# Patient Record
Sex: MALE | Race: WHITE | Employment: OTHER | URBAN - METROPOLITAN AREA
[De-identification: names, ages, dates, MRNs, and addresses within clinical notes are randomized per-mention and may not be internally consistent; named-entity substitution may affect disease eponyms.]

---

## 2017-01-23 RX ORDER — CHLORTHALIDONE 50 MG/1
TABLET ORAL
Qty: 90 TABLET | Refills: 0 | Status: SHIPPED | OUTPATIENT
Start: 2017-01-23 | End: 2017-04-21 | Stop reason: SDUPTHER

## 2017-03-17 ENCOUNTER — HOSPITAL ENCOUNTER (OUTPATIENT)
Dept: OTHER | Age: 67
Discharge: HOME | End: 2017-03-17
Attending: UROLOGY | Admitting: UROLOGY

## 2017-03-17 ENCOUNTER — HOSPITAL ENCOUNTER (OUTPATIENT)
Dept: OTHER | Age: 67
Discharge: HOME | End: 2017-03-17
Attending: INTERNAL MEDICINE | Admitting: INTERNAL MEDICINE

## 2017-03-17 LAB
CHOLESTEROL, TOTAL: 207 MG/DL (ref 0–199)
HDLC SERPL-MCNC: 35 MG/DL (ref 40–60)
LDL CHOLESTEROL CALCULATED: 132 MG/DL
PROSTATE SPECIFIC ANTIGEN: 2.68 NG/ML (ref 0–4)
TRIGL SERPL-MCNC: 201 MG/DL (ref 0–150)
VLDLC SERPL CALC-MCNC: 40 MG/DL

## 2017-03-28 ENCOUNTER — OFFICE VISIT (OUTPATIENT)
Dept: CARDIOLOGY CLINIC | Age: 67
End: 2017-03-28

## 2017-03-28 VITALS
BODY MASS INDEX: 32.58 KG/M2 | DIASTOLIC BLOOD PRESSURE: 80 MMHG | SYSTOLIC BLOOD PRESSURE: 134 MMHG | HEIGHT: 75 IN | HEART RATE: 77 BPM | WEIGHT: 262 LBS

## 2017-03-28 DIAGNOSIS — I25.10 ATHEROSCLEROSIS OF NATIVE CORONARY ARTERY OF NATIVE HEART WITHOUT ANGINA PECTORIS: ICD-10-CM

## 2017-03-28 DIAGNOSIS — I10 ESSENTIAL HYPERTENSION, BENIGN: ICD-10-CM

## 2017-03-28 DIAGNOSIS — I48.0 PAROXYSMAL ATRIAL FIBRILLATION (HCC): ICD-10-CM

## 2017-03-28 DIAGNOSIS — E78.49 OTHER HYPERLIPIDEMIA: Primary | ICD-10-CM

## 2017-03-28 PROCEDURE — G8427 DOCREV CUR MEDS BY ELIG CLIN: HCPCS | Performed by: INTERNAL MEDICINE

## 2017-03-28 PROCEDURE — G8417 CALC BMI ABV UP PARAM F/U: HCPCS | Performed by: INTERNAL MEDICINE

## 2017-03-28 PROCEDURE — 1123F ACP DISCUSS/DSCN MKR DOCD: CPT | Performed by: INTERNAL MEDICINE

## 2017-03-28 PROCEDURE — G8598 ASA/ANTIPLAT THER USED: HCPCS | Performed by: INTERNAL MEDICINE

## 2017-03-28 PROCEDURE — 99214 OFFICE O/P EST MOD 30 MIN: CPT | Performed by: INTERNAL MEDICINE

## 2017-03-28 PROCEDURE — G8484 FLU IMMUNIZE NO ADMIN: HCPCS | Performed by: INTERNAL MEDICINE

## 2017-03-28 PROCEDURE — 1036F TOBACCO NON-USER: CPT | Performed by: INTERNAL MEDICINE

## 2017-03-28 PROCEDURE — 4040F PNEUMOC VAC/ADMIN/RCVD: CPT | Performed by: INTERNAL MEDICINE

## 2017-03-28 PROCEDURE — 3017F COLORECTAL CA SCREEN DOC REV: CPT | Performed by: INTERNAL MEDICINE

## 2017-04-14 ENCOUNTER — HOSPITAL ENCOUNTER (OUTPATIENT)
Dept: OTHER | Age: 67
Discharge: HOME | End: 2017-04-14
Attending: FAMILY MEDICINE | Admitting: FAMILY MEDICINE

## 2017-04-14 LAB
A/G RATIO: 1.2 (ref 1.1–2.2)
ALBUMIN SERPL-MCNC: 3.9 G/DL (ref 3.4–5)
ALP BLD-CCNC: 75 U/L (ref 40–129)
ALT SERPL-CCNC: 29 U/L (ref 10–40)
ANION GAP SERPL CALCULATED.3IONS-SCNC: 10 MMOL/L (ref 3–16)
AST SERPL-CCNC: 21 U/L (ref 15–37)
BILIRUB SERPL-MCNC: 0.5 MG/DL (ref 0–1)
BUN BLDV-MCNC: 24 MG/DL (ref 7–20)
CALCIUM SERPL-MCNC: 10.2 MG/DL (ref 8.3–10.6)
CHLORIDE BLD-SCNC: 102 MMOL/L (ref 99–110)
CO2: 31 MMOL/L (ref 21–32)
CREAT SERPL-MCNC: 1 MG/DL (ref 0.8–1.3)
GFR AFRICAN AMERICAN: >60
GFR NON-AFRICAN AMERICAN: >60
GLOBULIN: 3.2 G/DL
GLUCOSE BLD-MCNC: 125 MG/DL (ref 70–99)
POTASSIUM SERPL-SCNC: 4.9 MMOL/L (ref 3.5–5.1)
SODIUM BLD-SCNC: 143 MMOL/L (ref 136–145)
TOTAL PROTEIN: 7.1 G/DL (ref 6.4–8.2)

## 2017-04-15 LAB
ESTIMATED AVERAGE GLUCOSE: 157.1 MG/DL
HBA1C MFR BLD: 7.1 %

## 2017-04-21 RX ORDER — CHLORTHALIDONE 50 MG/1
TABLET ORAL
Qty: 90 TABLET | Refills: 0 | Status: SHIPPED | OUTPATIENT
Start: 2017-04-21 | End: 2017-07-20 | Stop reason: SDUPTHER

## 2017-07-24 RX ORDER — CHLORTHALIDONE 50 MG/1
TABLET ORAL
Qty: 90 TABLET | Refills: 3 | Status: SHIPPED | OUTPATIENT
Start: 2017-07-24 | End: 2018-08-02 | Stop reason: SDUPTHER

## 2017-08-23 ENCOUNTER — HOSPITAL ENCOUNTER (OUTPATIENT)
Dept: OTHER | Age: 67
Discharge: HOME | End: 2017-08-23
Attending: FAMILY MEDICINE | Admitting: FAMILY MEDICINE

## 2017-08-23 ENCOUNTER — HOSPITAL ENCOUNTER (OUTPATIENT)
Dept: OTHER | Age: 67
Discharge: HOME | End: 2017-08-23
Attending: INTERNAL MEDICINE | Admitting: INTERNAL MEDICINE

## 2017-08-23 DIAGNOSIS — E78.5 HYPERLIPIDEMIA, UNSPECIFIED: ICD-10-CM

## 2017-08-23 LAB
A/G RATIO: 1.3 (ref 1.1–2.2)
ALBUMIN SERPL-MCNC: 4.1 G/DL (ref 3.4–5)
ALP BLD-CCNC: 78 U/L (ref 40–129)
ALT SERPL-CCNC: 34 U/L (ref 10–40)
ANION GAP SERPL CALCULATED.3IONS-SCNC: 11 MMOL/L (ref 3–16)
AST SERPL-CCNC: 24 U/L (ref 15–37)
BILIRUB SERPL-MCNC: 0.6 MG/DL (ref 0–1)
BUN BLDV-MCNC: 23 MG/DL (ref 7–20)
CALCIUM SERPL-MCNC: 10 MG/DL (ref 8.3–10.6)
CHLORIDE BLD-SCNC: 97 MMOL/L (ref 99–110)
CHOLESTEROL, TOTAL: 140 MG/DL (ref 0–199)
CO2: 30 MMOL/L (ref 21–32)
CREAT SERPL-MCNC: 1 MG/DL (ref 0.8–1.3)
ESTIMATED AVERAGE GLUCOSE: 165.7 MG/DL
GFR AFRICAN AMERICAN: >60
GFR NON-AFRICAN AMERICAN: >60
GLOBULIN: 3.1 G/DL
GLUCOSE BLD-MCNC: 159 MG/DL (ref 70–99)
HBA1C MFR BLD: 7.4 %
HDLC SERPL-MCNC: 30 MG/DL (ref 40–60)
LDL CHOLESTEROL CALCULATED: 71 MG/DL
POTASSIUM SERPL-SCNC: 4.7 MMOL/L (ref 3.5–5.1)
SODIUM BLD-SCNC: 138 MMOL/L (ref 136–145)
TOTAL PROTEIN: 7.2 G/DL (ref 6.4–8.2)
TRIGL SERPL-MCNC: 193 MG/DL (ref 0–150)
VLDLC SERPL CALC-MCNC: 39 MG/DL

## 2017-08-25 ENCOUNTER — TELEPHONE (OUTPATIENT)
Dept: CARDIOLOGY CLINIC | Age: 67
End: 2017-08-25

## 2017-08-28 ENCOUNTER — OFFICE VISIT (OUTPATIENT)
Dept: CARDIOLOGY CLINIC | Age: 67
End: 2017-08-28

## 2017-08-28 VITALS
HEART RATE: 83 BPM | DIASTOLIC BLOOD PRESSURE: 86 MMHG | WEIGHT: 261 LBS | HEIGHT: 75 IN | SYSTOLIC BLOOD PRESSURE: 147 MMHG | BODY MASS INDEX: 32.45 KG/M2

## 2017-08-28 DIAGNOSIS — I10 ESSENTIAL HYPERTENSION, BENIGN: ICD-10-CM

## 2017-08-28 DIAGNOSIS — I25.10 ATHEROSCLEROSIS OF NATIVE CORONARY ARTERY OF NATIVE HEART WITHOUT ANGINA PECTORIS: Primary | ICD-10-CM

## 2017-08-28 DIAGNOSIS — E78.5 HYPERLIPIDEMIA, UNSPECIFIED HYPERLIPIDEMIA TYPE: ICD-10-CM

## 2017-08-28 PROCEDURE — 4040F PNEUMOC VAC/ADMIN/RCVD: CPT | Performed by: INTERNAL MEDICINE

## 2017-08-28 PROCEDURE — G8427 DOCREV CUR MEDS BY ELIG CLIN: HCPCS | Performed by: INTERNAL MEDICINE

## 2017-08-28 PROCEDURE — 3017F COLORECTAL CA SCREEN DOC REV: CPT | Performed by: INTERNAL MEDICINE

## 2017-08-28 PROCEDURE — G8417 CALC BMI ABV UP PARAM F/U: HCPCS | Performed by: INTERNAL MEDICINE

## 2017-08-28 PROCEDURE — G8598 ASA/ANTIPLAT THER USED: HCPCS | Performed by: INTERNAL MEDICINE

## 2017-08-28 PROCEDURE — 1123F ACP DISCUSS/DSCN MKR DOCD: CPT | Performed by: INTERNAL MEDICINE

## 2017-08-28 PROCEDURE — 1036F TOBACCO NON-USER: CPT | Performed by: INTERNAL MEDICINE

## 2017-08-28 PROCEDURE — 99213 OFFICE O/P EST LOW 20 MIN: CPT | Performed by: INTERNAL MEDICINE

## 2017-08-28 RX ORDER — SOTALOL HYDROCHLORIDE 80 MG/1
80 TABLET ORAL 2 TIMES DAILY
Qty: 180 TABLET | Refills: 3 | Status: SHIPPED | OUTPATIENT
Start: 2017-08-28 | End: 2018-09-09 | Stop reason: SDUPTHER

## 2018-03-12 ENCOUNTER — HOSPITAL ENCOUNTER (OUTPATIENT)
Dept: OTHER | Age: 68
Discharge: HOME | End: 2018-03-12
Attending: FAMILY MEDICINE | Admitting: FAMILY MEDICINE

## 2018-03-12 ENCOUNTER — HOSPITAL ENCOUNTER (OUTPATIENT)
Dept: OTHER | Age: 68
Discharge: HOME | End: 2018-03-12
Attending: UROLOGY | Admitting: UROLOGY

## 2018-03-12 LAB
A/G RATIO: 1.5 (ref 1.1–2.2)
ALBUMIN SERPL-MCNC: 4.4 G/DL (ref 3.4–5)
ALP BLD-CCNC: 80 U/L (ref 40–129)
ALT SERPL-CCNC: 36 U/L (ref 10–40)
ANION GAP SERPL CALCULATED.3IONS-SCNC: 13 MMOL/L (ref 3–16)
AST SERPL-CCNC: 29 U/L (ref 15–37)
BILIRUB SERPL-MCNC: 0.5 MG/DL (ref 0–1)
BUN BLDV-MCNC: 26 MG/DL (ref 7–20)
CALCIUM SERPL-MCNC: 9.8 MG/DL (ref 8.3–10.6)
CHLORIDE BLD-SCNC: 101 MMOL/L (ref 99–110)
CO2: 31 MMOL/L (ref 21–32)
CREAT SERPL-MCNC: 1.1 MG/DL (ref 0.8–1.3)
ESTIMATED AVERAGE GLUCOSE: 174.3 MG/DL
GFR AFRICAN AMERICAN: >60
GFR NON-AFRICAN AMERICAN: >60
GLOBULIN: 3 G/DL
GLUCOSE BLD-MCNC: 152 MG/DL (ref 70–99)
HBA1C MFR BLD: 7.7 %
POTASSIUM SERPL-SCNC: 4.7 MMOL/L (ref 3.5–5.1)
PROSTATE SPECIFIC ANTIGEN: 2.85 NG/ML (ref 0–4)
SODIUM BLD-SCNC: 145 MMOL/L (ref 136–145)
TOTAL PROTEIN: 7.4 G/DL (ref 6.4–8.2)

## 2018-03-19 ENCOUNTER — TELEPHONE (OUTPATIENT)
Dept: CARDIOLOGY CLINIC | Age: 68
End: 2018-03-19

## 2018-03-19 NOTE — TELEPHONE ENCOUNTER
Pt calling is having cataract surgery on 04/12/18 and needs to know how many days prior he needs to stop the Xarelto?  Pls call to advise Thank you

## 2018-05-17 ENCOUNTER — OFFICE VISIT (OUTPATIENT)
Dept: CARDIOLOGY CLINIC | Age: 68
End: 2018-05-17

## 2018-05-17 VITALS
SYSTOLIC BLOOD PRESSURE: 124 MMHG | HEIGHT: 75 IN | BODY MASS INDEX: 31.83 KG/M2 | HEART RATE: 80 BPM | DIASTOLIC BLOOD PRESSURE: 78 MMHG | WEIGHT: 256 LBS

## 2018-05-17 DIAGNOSIS — I25.10 ATHEROSCLEROSIS OF NATIVE CORONARY ARTERY OF NATIVE HEART WITHOUT ANGINA PECTORIS: ICD-10-CM

## 2018-05-17 DIAGNOSIS — I10 ESSENTIAL HYPERTENSION, BENIGN: ICD-10-CM

## 2018-05-17 DIAGNOSIS — I48.0 PAROXYSMAL ATRIAL FIBRILLATION (HCC): Primary | ICD-10-CM

## 2018-05-17 DIAGNOSIS — E78.5 HYPERLIPIDEMIA, UNSPECIFIED HYPERLIPIDEMIA TYPE: ICD-10-CM

## 2018-05-17 PROCEDURE — 3017F COLORECTAL CA SCREEN DOC REV: CPT | Performed by: INTERNAL MEDICINE

## 2018-05-17 PROCEDURE — G8417 CALC BMI ABV UP PARAM F/U: HCPCS | Performed by: INTERNAL MEDICINE

## 2018-05-17 PROCEDURE — 4040F PNEUMOC VAC/ADMIN/RCVD: CPT | Performed by: INTERNAL MEDICINE

## 2018-05-17 PROCEDURE — 1123F ACP DISCUSS/DSCN MKR DOCD: CPT | Performed by: INTERNAL MEDICINE

## 2018-05-17 PROCEDURE — 1036F TOBACCO NON-USER: CPT | Performed by: INTERNAL MEDICINE

## 2018-05-17 PROCEDURE — 99214 OFFICE O/P EST MOD 30 MIN: CPT | Performed by: INTERNAL MEDICINE

## 2018-05-17 PROCEDURE — G8427 DOCREV CUR MEDS BY ELIG CLIN: HCPCS | Performed by: INTERNAL MEDICINE

## 2018-05-17 PROCEDURE — G8598 ASA/ANTIPLAT THER USED: HCPCS | Performed by: INTERNAL MEDICINE

## 2018-05-21 ENCOUNTER — NURSE ONLY (OUTPATIENT)
Dept: CARDIOLOGY CLINIC | Age: 68
End: 2018-05-21

## 2018-05-21 DIAGNOSIS — I48.0 PAROXYSMAL ATRIAL FIBRILLATION (HCC): Primary | ICD-10-CM

## 2018-05-21 PROCEDURE — 93225 XTRNL ECG REC<48 HRS REC: CPT | Performed by: INTERNAL MEDICINE

## 2018-05-23 PROCEDURE — 93227 XTRNL ECG REC<48 HR R&I: CPT | Performed by: INTERNAL MEDICINE

## 2018-05-24 ENCOUNTER — TELEPHONE (OUTPATIENT)
Dept: CARDIOLOGY CLINIC | Age: 68
End: 2018-05-24

## 2018-05-25 RX ORDER — ROSUVASTATIN CALCIUM 10 MG/1
10 TABLET, COATED ORAL EVERY OTHER DAY
Qty: 90 TABLET | Refills: 3 | Status: SHIPPED | OUTPATIENT
Start: 2018-05-25 | End: 2019-09-09 | Stop reason: SDUPTHER

## 2018-06-01 ENCOUNTER — TELEPHONE (OUTPATIENT)
Dept: CARDIOLOGY CLINIC | Age: 68
End: 2018-06-01

## 2018-08-02 RX ORDER — CHLORTHALIDONE 50 MG/1
TABLET ORAL
Qty: 90 TABLET | Refills: 1 | Status: SHIPPED | OUTPATIENT
Start: 2018-08-02 | End: 2021-05-13 | Stop reason: SDUPTHER

## 2018-09-10 ENCOUNTER — TELEPHONE (OUTPATIENT)
Dept: CARDIOLOGY CLINIC | Age: 68
End: 2018-09-10

## 2018-09-10 NOTE — TELEPHONE ENCOUNTER
RX APPROVAL:      Refill:   Requested Prescriptions     Pending Prescriptions Disp Refills    sotalol (BETAPACE) 80 MG tablet [Pharmacy Med Name: SOTALOL HCL TABS 80MG] 180 tablet 3     Sig: TAKE 1 TABLET TWICE A DAY      Last OV: 2018   Last EK16  Last Labs:   Lab Results   Component Value Date    GLUCOSE 152 2018    BUN 26 2018    CREATININE 1.1 2018    LABGLOM >60 2018     2018    K 4.7 2018     2018    CO2 31 2018    CALCIUM 9.8 2018     Lab Results   Component Value Date     2018     2018    CO2 31 2018    ANIONGAP 13 2018    GLUCOSE 152 2018    BUN 26 2018    CREATININE 1.1 2018    LABGLOM >60 2018    GFRAA >60 2018    GFRAA >60 2013    CALCIUM 9.8 2018    PROT 7.4 2018    LABALBU 4.4 2018    BILITOT 0.5 2018    ALKPHOS 80 2018    AST 29 2018    ALT 36 2018    GLOB 3.0 2018     Lab Results   Component Value Date    ALT 36 2018    AST 29 2018     Lab Results   Component Value Date    K 4.7 2018       Plan and labs reviewed

## 2018-09-10 NOTE — TELEPHONE ENCOUNTER
Called  office lmom to return our call and let us know patients most recent lab results and fax to our office.

## 2018-09-11 RX ORDER — SOTALOL HYDROCHLORIDE 80 MG/1
TABLET ORAL
Qty: 180 TABLET | Refills: 0 | Status: SHIPPED | OUTPATIENT
Start: 2018-09-11 | End: 2019-01-12 | Stop reason: SDUPTHER

## 2018-09-14 ENCOUNTER — HOSPITAL ENCOUNTER (OUTPATIENT)
Dept: OTHER | Age: 68
Discharge: HOME | End: 2018-09-14

## 2018-09-14 LAB
A/G RATIO: 1.4 (ref 1.1–2.2)
ALBUMIN SERPL-MCNC: 4.3 G/DL (ref 3.4–5)
ALP BLD-CCNC: 69 U/L (ref 40–129)
ALT SERPL-CCNC: 24 U/L (ref 10–40)
ANION GAP SERPL CALCULATED.3IONS-SCNC: 13 MMOL/L (ref 3–16)
AST SERPL-CCNC: 20 U/L (ref 15–37)
BILIRUB SERPL-MCNC: 0.4 MG/DL (ref 0–1)
BUN BLDV-MCNC: 29 MG/DL (ref 7–20)
CALCIUM SERPL-MCNC: 10.1 MG/DL (ref 8.3–10.6)
CHLORIDE BLD-SCNC: 98 MMOL/L (ref 99–110)
CHOLESTEROL, TOTAL: 125 MG/DL (ref 0–199)
CO2: 31 MMOL/L (ref 21–32)
CREAT SERPL-MCNC: 1.4 MG/DL (ref 0.8–1.3)
CREATININE URINE: 139.8 MG/DL (ref 39–259)
GFR AFRICAN AMERICAN: >60
GFR NON-AFRICAN AMERICAN: 50
GLOBULIN: 3.1 G/DL
GLUCOSE BLD-MCNC: 112 MG/DL (ref 70–99)
HDLC SERPL-MCNC: 29 MG/DL (ref 40–60)
HEPATITIS C ANTIBODY INTERPRETATION: NORMAL
LDL CHOLESTEROL CALCULATED: 62 MG/DL
MICROALBUMIN UR-MCNC: <1.2 MG/DL
MICROALBUMIN/CREAT UR-RTO: NORMAL MG/G (ref 0–30)
POTASSIUM SERPL-SCNC: 4.5 MMOL/L (ref 3.5–5.1)
SODIUM BLD-SCNC: 142 MMOL/L (ref 136–145)
T4 TOTAL: 7.1 UG/DL (ref 4.5–10.9)
TOTAL PROTEIN: 7.4 G/DL (ref 6.4–8.2)
TRIGL SERPL-MCNC: 170 MG/DL (ref 0–150)
TSH SERPL DL<=0.05 MIU/L-ACNC: 5.07 UIU/ML (ref 0.27–4.2)
VITAMIN B-12: 385 PG/ML (ref 211–911)
VLDLC SERPL CALC-MCNC: 34 MG/DL

## 2018-09-18 ENCOUNTER — TELEPHONE (OUTPATIENT)
Dept: CARDIOLOGY CLINIC | Age: 68
End: 2018-09-18

## 2018-09-18 NOTE — TELEPHONE ENCOUNTER
Assessment:  1. Paroxysmal atrial fibrillation (HCC)   ~regular to auscultation  ~sotalol / Xarelto   ~CHADsVASc 4    ~has not been aware of heart going out of rhythm  ~will wear 24 hr holter, if no afib, then will stop Xarelto and start ASA    2. Essential hypertension, benign: Stable   ~/78 (Site: Left Arm, Position: Sitting, Cuff Size: Medium Adult)   Pulse 80   Ht 6' 3\" (1.905 m)   Wt 256 lb (116.1 kg)   BMI 32.00 kg/m²   stable    3. Atherosclerosis of native coronary artery of native heart without angina pectoris: Stable, no anginal symptoms   ~Cardiac cath: '06>  mild non-obst disease: PDA/PLV bifurcation 30%. 3/2013 stress test showed no evidence of ischemia or scar    4. Hyperlipidemia:  On crestor 10 every other day 8/17   hdl 30 ldl 71    Plan:  Ada Larsen has a stable cardiac status. 1. Continue all medications for now  2. 24 holter hr monitor, if no afib will consider taking him off of Xarelto and placing him on ASA  3.  Follow up in 6 months with EKG    Thank you for allowing to us to participate in the care of Beatris Gallagher M.D., [facility] [place]

## 2018-12-04 ENCOUNTER — OFFICE VISIT (OUTPATIENT)
Dept: CARDIOLOGY CLINIC | Age: 68
End: 2018-12-04
Payer: MEDICARE

## 2018-12-04 VITALS
BODY MASS INDEX: 31.95 KG/M2 | DIASTOLIC BLOOD PRESSURE: 70 MMHG | SYSTOLIC BLOOD PRESSURE: 118 MMHG | HEIGHT: 75 IN | WEIGHT: 257 LBS

## 2018-12-04 DIAGNOSIS — I25.10 ATHEROSCLEROSIS OF NATIVE CORONARY ARTERY OF NATIVE HEART WITHOUT ANGINA PECTORIS: Primary | ICD-10-CM

## 2018-12-04 DIAGNOSIS — E78.49 OTHER HYPERLIPIDEMIA: ICD-10-CM

## 2018-12-04 DIAGNOSIS — I48.0 PAROXYSMAL ATRIAL FIBRILLATION (HCC): ICD-10-CM

## 2018-12-04 DIAGNOSIS — I10 ESSENTIAL HYPERTENSION, BENIGN: ICD-10-CM

## 2018-12-04 PROCEDURE — G8417 CALC BMI ABV UP PARAM F/U: HCPCS | Performed by: INTERNAL MEDICINE

## 2018-12-04 PROCEDURE — 99214 OFFICE O/P EST MOD 30 MIN: CPT | Performed by: INTERNAL MEDICINE

## 2018-12-04 PROCEDURE — G8484 FLU IMMUNIZE NO ADMIN: HCPCS | Performed by: INTERNAL MEDICINE

## 2018-12-04 PROCEDURE — 1036F TOBACCO NON-USER: CPT | Performed by: INTERNAL MEDICINE

## 2018-12-04 PROCEDURE — 1123F ACP DISCUSS/DSCN MKR DOCD: CPT | Performed by: INTERNAL MEDICINE

## 2018-12-04 PROCEDURE — 1101F PT FALLS ASSESS-DOCD LE1/YR: CPT | Performed by: INTERNAL MEDICINE

## 2018-12-04 PROCEDURE — 93000 ELECTROCARDIOGRAM COMPLETE: CPT | Performed by: INTERNAL MEDICINE

## 2018-12-04 PROCEDURE — G8598 ASA/ANTIPLAT THER USED: HCPCS | Performed by: INTERNAL MEDICINE

## 2018-12-04 PROCEDURE — G8427 DOCREV CUR MEDS BY ELIG CLIN: HCPCS | Performed by: INTERNAL MEDICINE

## 2018-12-04 PROCEDURE — 3017F COLORECTAL CA SCREEN DOC REV: CPT | Performed by: INTERNAL MEDICINE

## 2018-12-04 PROCEDURE — 4040F PNEUMOC VAC/ADMIN/RCVD: CPT | Performed by: INTERNAL MEDICINE

## 2018-12-06 ENCOUNTER — HOSPITAL ENCOUNTER (OUTPATIENT)
Age: 68
Discharge: HOME | End: 2018-12-06
Payer: MEDICARE

## 2018-12-06 LAB
ESTIMATED AVERAGE GLUCOSE: 168.6 MG/DL
HBA1C MFR BLD: 7.5 %

## 2018-12-06 PROCEDURE — 36415 COLL VENOUS BLD VENIPUNCTURE: CPT

## 2018-12-06 PROCEDURE — 83036 HEMOGLOBIN GLYCOSYLATED A1C: CPT

## 2019-01-14 RX ORDER — SOTALOL HYDROCHLORIDE 80 MG/1
TABLET ORAL
Qty: 180 TABLET | Refills: 2 | Status: SHIPPED | OUTPATIENT
Start: 2019-01-14 | End: 2019-11-05 | Stop reason: SDUPTHER

## 2019-05-21 NOTE — PROGRESS NOTES
BILITOT 0.4 09/14/2018     Lab Results   Component Value Date    CREATININE 1.4 (H) 09/14/2018    BUN 29 (H) 09/14/2018     09/14/2018    K 4.5 09/14/2018    CL 98 (L) 09/14/2018    CO2 31 09/14/2018     Lab Results   Component Value Date    TSH 5.07 (H) 09/14/2018    D4GRBRI 7.1 09/14/2018     Lab Results   Component Value Date    WBC 7.5 12/15/2016    HGB 13.5 12/15/2016    HCT 39.7 (L) 12/15/2016    MCV 92.9 12/15/2016     12/15/2016     No components found for: CHLPL  Lab Results   Component Value Date    TRIG 170 (H) 09/14/2018    TRIG 193 (H) 08/23/2017    TRIG 201 (H) 03/17/2017     Lab Results   Component Value Date    HDL 29 (L) 09/14/2018    HDL 30 (L) 08/23/2017    HDL 35 (L) 03/17/2017     Lab Results   Component Value Date    LDLCALC 62 09/14/2018    LDLCALC 71 08/23/2017    LDLCALC 132 (H) 03/17/2017     Lab Results   Component Value Date    LABVLDL 34 09/14/2018    LABVLDL 39 08/23/2017    LABVLDL 40 03/17/2017     Radiology Review:  Pertinent images / reports were reviewed as a part of this visit and reveals the following:    Echo 8/12/15:  Summary  -Technically limited study due to lung interface.  -Normal left ventricular wall thickness.  -Left ventricular size is mildly increased .  -Left ventricular function is borderline with ejection fraction estimated at 50 %. -There is reversal of E/A inflow velocities across the mitral valve  suggesting impaired left ventricular relaxation.  -Abnormal (paradoxical) septal motion is present 2 to CLBBB. -Mild mitral regurgitation is present.  -The aortic root is dilated. Nuclear stress test March 2013:  Summary   There is normal isotope uptake at stress and rest. There is no evidence of   myocardial ischemia or scar. Normal LV function. Stress Protocols      Resting ECG   Normal sinus rhythm. Left bundle branch block.       Resting HR:54 bpm Resting BP:152/70 mmHg      Stress Protocol:Pharmacologic - Lexiscan's      Peak HR:63 bpm HR/BP product:9387   Peak BP:149/68 mmHg   Predicted HR: 156 bpm   % of predicted HR: 40   Test duration: 4 min   Reason for termination:Completed      ECG Findings   Normal sinus rhythm. Cardiac cath 2006: mild non-obst disease: PDA/PLV bifurcation 30%    Assessment:  1. Atherosclerosis of native coronary artery of native heart without angina pectoris: Stable, no anginal symptoms   Cardiac cath 2006> Mild non-obst disease -- PDA/PLV bifurcation 30%. Nuclear stress test 2013> No evidence of ischemia or scar   2. Essential hypertension, benign: Stable   /79 (Site: Right Upper Arm, Position: Sitting, Cuff Size: Medium Adult)   Pulse 67   Ht 6' 3\" (1.905 m)   Wt 252 lb (114.3 kg)   BMI 31.50 kg/m²     3. Hyperlipidemia: Stable on Crestor 10mg every other day. 9/14/18> , , HDL 29, LDL 62.   4.      Paroxysmal atrial fibrillation with CHADsVASc 4: HR regular & controlled. He has not been aware of heart going out of rhythm. Xarelto stopped June 2018 due to no A.fib on Holter. Started baby aspirin at that time. Remains on Betapace. 24 hr Holter monitor 5/21/18> NSR w/ 1st deg AV block & 2nd deg AV block Type I, hr 63, ranges , no episodes of AF noted. Plan:  Noman Cortes has a stable cardiac status. 1. No med changes. 2. Will continue with risk factor modifications. 3. Return for regular follow up in 6 months. Thank you for allowing to us to participate in the care of Carlos Eduardo Buckley M.D., [address] attestation: This note was scribed in the presence of Dr. Park Mooney MD, by Evgeny Lucas RN. The scribe's documentation has been prepared under my direction and personally reviewed by me in its entirety. I confirm that the note above accurately reflects all work, treatment, procedures, and medical decision making performed by me.

## 2019-05-28 ENCOUNTER — OFFICE VISIT (OUTPATIENT)
Dept: CARDIOLOGY CLINIC | Age: 69
End: 2019-05-28
Payer: MEDICARE

## 2019-05-28 VITALS
BODY MASS INDEX: 31.33 KG/M2 | DIASTOLIC BLOOD PRESSURE: 79 MMHG | SYSTOLIC BLOOD PRESSURE: 129 MMHG | HEIGHT: 75 IN | WEIGHT: 252 LBS | HEART RATE: 67 BPM

## 2019-05-28 DIAGNOSIS — I10 ESSENTIAL HYPERTENSION, BENIGN: ICD-10-CM

## 2019-05-28 DIAGNOSIS — E78.49 OTHER HYPERLIPIDEMIA: ICD-10-CM

## 2019-05-28 DIAGNOSIS — I48.0 PAROXYSMAL ATRIAL FIBRILLATION (HCC): ICD-10-CM

## 2019-05-28 DIAGNOSIS — I25.10 ATHEROSCLEROSIS OF NATIVE CORONARY ARTERY OF NATIVE HEART WITHOUT ANGINA PECTORIS: Primary | ICD-10-CM

## 2019-05-28 PROCEDURE — 4040F PNEUMOC VAC/ADMIN/RCVD: CPT | Performed by: INTERNAL MEDICINE

## 2019-05-28 PROCEDURE — 1036F TOBACCO NON-USER: CPT | Performed by: INTERNAL MEDICINE

## 2019-05-28 PROCEDURE — 1123F ACP DISCUSS/DSCN MKR DOCD: CPT | Performed by: INTERNAL MEDICINE

## 2019-05-28 PROCEDURE — 3017F COLORECTAL CA SCREEN DOC REV: CPT | Performed by: INTERNAL MEDICINE

## 2019-05-28 PROCEDURE — G8427 DOCREV CUR MEDS BY ELIG CLIN: HCPCS | Performed by: INTERNAL MEDICINE

## 2019-05-28 PROCEDURE — G8417 CALC BMI ABV UP PARAM F/U: HCPCS | Performed by: INTERNAL MEDICINE

## 2019-05-28 PROCEDURE — 99213 OFFICE O/P EST LOW 20 MIN: CPT | Performed by: INTERNAL MEDICINE

## 2019-05-28 PROCEDURE — G8598 ASA/ANTIPLAT THER USED: HCPCS | Performed by: INTERNAL MEDICINE

## 2019-09-05 ENCOUNTER — HOSPITAL ENCOUNTER (OUTPATIENT)
Age: 69
Discharge: HOME | End: 2019-09-05
Payer: MEDICARE

## 2019-09-05 LAB
A/G RATIO: 1.5 (ref 1.1–2.2)
ALBUMIN SERPL-MCNC: 4.3 G/DL (ref 3.4–5)
ALP BLD-CCNC: 71 U/L (ref 40–129)
ALT SERPL-CCNC: 29 U/L (ref 10–40)
ANION GAP SERPL CALCULATED.3IONS-SCNC: 13 MMOL/L (ref 3–16)
AST SERPL-CCNC: 24 U/L (ref 15–37)
BILIRUB SERPL-MCNC: 0.4 MG/DL (ref 0–1)
BUN BLDV-MCNC: 29 MG/DL (ref 7–20)
CALCIUM SERPL-MCNC: 10.3 MG/DL (ref 8.3–10.6)
CHLORIDE BLD-SCNC: 100 MMOL/L (ref 99–110)
CHOLESTEROL, TOTAL: 142 MG/DL (ref 0–199)
CO2: 30 MMOL/L (ref 21–32)
CREAT SERPL-MCNC: 1.4 MG/DL (ref 0.8–1.3)
CREATININE URINE: 110.2 MG/DL (ref 39–259)
GFR AFRICAN AMERICAN: >60
GFR NON-AFRICAN AMERICAN: 50
GLOBULIN: 2.9 G/DL
GLUCOSE BLD-MCNC: 97 MG/DL (ref 70–99)
HDLC SERPL-MCNC: 32 MG/DL (ref 40–60)
LDL CHOLESTEROL CALCULATED: 75 MG/DL
MICROALBUMIN UR-MCNC: <1.2 MG/DL
MICROALBUMIN/CREAT UR-RTO: NORMAL MG/G (ref 0–30)
POTASSIUM SERPL-SCNC: 4.3 MMOL/L (ref 3.5–5.1)
PROSTATE SPECIFIC ANTIGEN: 3.29 NG/ML (ref 0–4)
SODIUM BLD-SCNC: 143 MMOL/L (ref 136–145)
TOTAL PROTEIN: 7.2 G/DL (ref 6.4–8.2)
TRIGL SERPL-MCNC: 173 MG/DL (ref 0–150)
VLDLC SERPL CALC-MCNC: 35 MG/DL

## 2019-09-05 PROCEDURE — 83036 HEMOGLOBIN GLYCOSYLATED A1C: CPT

## 2019-09-05 PROCEDURE — 82570 ASSAY OF URINE CREATININE: CPT

## 2019-09-05 PROCEDURE — 82043 UR ALBUMIN QUANTITATIVE: CPT

## 2019-09-05 PROCEDURE — 80053 COMPREHEN METABOLIC PANEL: CPT

## 2019-09-05 PROCEDURE — 80061 LIPID PANEL: CPT

## 2019-09-05 PROCEDURE — 36415 COLL VENOUS BLD VENIPUNCTURE: CPT

## 2019-09-05 PROCEDURE — 84153 ASSAY OF PSA TOTAL: CPT

## 2019-09-06 LAB
ESTIMATED AVERAGE GLUCOSE: 154.2 MG/DL
HBA1C MFR BLD: 7 %

## 2019-09-09 RX ORDER — ROSUVASTATIN CALCIUM 10 MG/1
TABLET, COATED ORAL
Qty: 90 TABLET | Refills: 4 | Status: SHIPPED | OUTPATIENT
Start: 2019-09-09 | End: 2020-09-21

## 2019-11-05 ENCOUNTER — OFFICE VISIT (OUTPATIENT)
Dept: CARDIOLOGY CLINIC | Age: 69
End: 2019-11-05
Payer: MEDICARE

## 2019-11-05 VITALS
HEART RATE: 68 BPM | WEIGHT: 238 LBS | HEIGHT: 75 IN | BODY MASS INDEX: 29.59 KG/M2 | DIASTOLIC BLOOD PRESSURE: 60 MMHG | SYSTOLIC BLOOD PRESSURE: 110 MMHG

## 2019-11-05 DIAGNOSIS — I48.0 PAROXYSMAL ATRIAL FIBRILLATION (HCC): ICD-10-CM

## 2019-11-05 DIAGNOSIS — E78.49 OTHER HYPERLIPIDEMIA: ICD-10-CM

## 2019-11-05 DIAGNOSIS — I10 ESSENTIAL HYPERTENSION, BENIGN: ICD-10-CM

## 2019-11-05 DIAGNOSIS — I25.10 ATHEROSCLEROSIS OF NATIVE CORONARY ARTERY OF NATIVE HEART WITHOUT ANGINA PECTORIS: Primary | ICD-10-CM

## 2019-11-05 PROCEDURE — 3017F COLORECTAL CA SCREEN DOC REV: CPT | Performed by: INTERNAL MEDICINE

## 2019-11-05 PROCEDURE — 1123F ACP DISCUSS/DSCN MKR DOCD: CPT | Performed by: INTERNAL MEDICINE

## 2019-11-05 PROCEDURE — G8417 CALC BMI ABV UP PARAM F/U: HCPCS | Performed by: INTERNAL MEDICINE

## 2019-11-05 PROCEDURE — 1036F TOBACCO NON-USER: CPT | Performed by: INTERNAL MEDICINE

## 2019-11-05 PROCEDURE — 99214 OFFICE O/P EST MOD 30 MIN: CPT | Performed by: INTERNAL MEDICINE

## 2019-11-05 PROCEDURE — G8598 ASA/ANTIPLAT THER USED: HCPCS | Performed by: INTERNAL MEDICINE

## 2019-11-05 PROCEDURE — G8427 DOCREV CUR MEDS BY ELIG CLIN: HCPCS | Performed by: INTERNAL MEDICINE

## 2019-11-05 PROCEDURE — G8484 FLU IMMUNIZE NO ADMIN: HCPCS | Performed by: INTERNAL MEDICINE

## 2019-11-05 PROCEDURE — 4040F PNEUMOC VAC/ADMIN/RCVD: CPT | Performed by: INTERNAL MEDICINE

## 2019-11-05 RX ORDER — ROSUVASTATIN CALCIUM 10 MG/1
10 TABLET, COATED ORAL DAILY
Qty: 90 TABLET | Refills: 3 | Status: CANCELLED | OUTPATIENT
Start: 2019-11-05

## 2019-11-05 RX ORDER — SOTALOL HYDROCHLORIDE 80 MG/1
80 TABLET ORAL 2 TIMES DAILY
Qty: 180 TABLET | Refills: 3 | Status: SHIPPED | OUTPATIENT
Start: 2019-11-05 | End: 2020-11-10 | Stop reason: SDUPTHER

## 2020-05-21 ENCOUNTER — HOSPITAL ENCOUNTER (OUTPATIENT)
Age: 70
Discharge: HOME | End: 2020-05-21
Payer: MEDICARE

## 2020-05-21 LAB — PROSTATE SPECIFIC ANTIGEN: 3.33 NG/ML (ref 0–4)

## 2020-05-21 PROCEDURE — 36415 COLL VENOUS BLD VENIPUNCTURE: CPT

## 2020-05-21 PROCEDURE — 84153 ASSAY OF PSA TOTAL: CPT

## 2020-05-21 NOTE — PROGRESS NOTES
[address]     Outpatient Follow Up Note      Chief Complaint   Patient presents with    6 Month Follow-Up     Pt denies cardiac SX.  Coronary Artery Disease    Atrial Fibrillation     paroxsymal    Hypertension     HPI:  Mr. Laquita Lauren 69 y.o. male with a history of  CAD, hypertension, hyperlipidemia, PAF. He wore a Holter monitor May 2018 which showed no a. Fib; his Xarelto was stopped and baby aspirin started. Today, he reports he feels ok. He has recently found out hi wife has breast cancer. He denies exertional chest pain, palpitations, light-headedness, edema. He is somewhat active, does not smoke. Past Medical History:   Diagnosis Date    Atrial fibrillation ([facility] [address]     Diabetes mellitus ([facility] [address]     Hyperlipidemia     Hypertension      Social History:    Social History     Tobacco Use   Smoking Status Never Smoker   Smokeless Tobacco Never Used     Current Medications:  Current Outpatient Medications   Medication Sig Dispense Refill    sotalol (BETAPACE) 80 MG tablet Take 1 tablet by mouth 2 times daily 180 tablet 3    rosuvastatin (CRESTOR) 10 MG tablet TAKE 1 TABLET EVERY OTHER DAY 90 tablet 4    chlorthalidone (HYGROTEN) 50 MG tablet TAKE 1 TABLET DAILY 90 tablet 1    aspirin 81 MG tablet Take 81 mg by mouth daily      losartan (COZAAR) 50 MG tablet Take 100 mg by mouth daily      insulin lispro (HUMALOG) 100 UNIT/ML injection cartridge Inject into the skin 3 times daily (before meals)      terazosin (HYTRIN) 10 MG capsule Take 10 mg by mouth nightly      glimepiride (AMARYL) 4 MG tablet Take 4 mg by mouth every morning (before breakfast).  omeprazole (PRILOSEC) 20 MG capsule Take 20 mg by mouth Five times weekly       metformin (GLUCOPHAGE) 1000 MG tablet Take 1,000 mg by mouth 2 times daily (with meals).  insulin glargine (LANTUS) 100 UNIT/ML injection Inject 25 Units into the skin nightly        No current facility-administered medications for this visit. ALKPHOS 71 09/05/2019    BILITOT 0.4 09/05/2019     Lab Results   Component Value Date    CREATININE 1.4 (H) 09/05/2019    BUN 29 (H) 09/05/2019     09/05/2019    K 4.3 09/05/2019     09/05/2019    CO2 30 09/05/2019     Lab Results   Component Value Date    TSH 5.07 (H) 09/14/2018    A2FLUAM 7.1 09/14/2018     Lab Results   Component Value Date    WBC 7.5 12/15/2016    HGB 13.5 12/15/2016    HCT 39.7 (L) 12/15/2016    MCV 92.9 12/15/2016     12/15/2016     No components found for: CHLPL  Lab Results   Component Value Date    TRIG 173 (H) 09/05/2019    TRIG 170 (H) 09/14/2018    TRIG 193 (H) 08/23/2017     Lab Results   Component Value Date    HDL 32 (L) 09/05/2019    HDL 29 (L) 09/14/2018    HDL 30 (L) 08/23/2017     Lab Results   Component Value Date    LDLCALC 75 09/05/2019    LDLCALC 62 09/14/2018    LDLCALC 71 08/23/2017     Lab Results   Component Value Date    LABVLDL 35 09/05/2019    LABVLDL 34 09/14/2018    LABVLDL 39 08/23/2017     Radiology Review:  Pertinent images / reports were reviewed as a part of this visit and reveals the following:    Echo 8/12/15:  Summary  -Technically limited study due to lung interface.  -Normal left ventricular wall thickness.  -Left ventricular size is mildly increased .  -Left ventricular function is borderline with ejection fraction estimated at 50 %. -There is reversal of E/A inflow velocities across the mitral valve  suggesting impaired left ventricular relaxation.  -Abnormal (paradoxical) septal motion is present 2 to CLBBB. -Mild mitral regurgitation is present.  -The aortic root is dilated. Nuclear stress test March 2013:  Summary   There is normal isotope uptake at stress and rest. There is no evidence of   myocardial ischemia or scar. Normal LV function. Stress Protocols      Resting ECG   Normal sinus rhythm. Left bundle branch block.       Resting HR:54 bpm Resting BP:152/70 mmHg      Stress Protocol:Pharmacologic - Lexiscan's

## 2020-05-26 ENCOUNTER — OFFICE VISIT (OUTPATIENT)
Dept: CARDIOLOGY CLINIC | Age: 70
End: 2020-05-26
Payer: MEDICARE

## 2020-05-26 VITALS
BODY MASS INDEX: 28.85 KG/M2 | DIASTOLIC BLOOD PRESSURE: 70 MMHG | SYSTOLIC BLOOD PRESSURE: 124 MMHG | HEART RATE: 58 BPM | WEIGHT: 232 LBS | HEIGHT: 75 IN | TEMPERATURE: 97.4 F

## 2020-05-26 PROCEDURE — 3017F COLORECTAL CA SCREEN DOC REV: CPT | Performed by: INTERNAL MEDICINE

## 2020-05-26 PROCEDURE — 99214 OFFICE O/P EST MOD 30 MIN: CPT | Performed by: INTERNAL MEDICINE

## 2020-05-26 PROCEDURE — G8417 CALC BMI ABV UP PARAM F/U: HCPCS | Performed by: INTERNAL MEDICINE

## 2020-05-26 PROCEDURE — 93000 ELECTROCARDIOGRAM COMPLETE: CPT | Performed by: INTERNAL MEDICINE

## 2020-05-26 PROCEDURE — 1123F ACP DISCUSS/DSCN MKR DOCD: CPT | Performed by: INTERNAL MEDICINE

## 2020-05-26 PROCEDURE — G8427 DOCREV CUR MEDS BY ELIG CLIN: HCPCS | Performed by: INTERNAL MEDICINE

## 2020-05-26 PROCEDURE — 4040F PNEUMOC VAC/ADMIN/RCVD: CPT | Performed by: INTERNAL MEDICINE

## 2020-05-26 PROCEDURE — 1036F TOBACCO NON-USER: CPT | Performed by: INTERNAL MEDICINE

## 2020-09-21 RX ORDER — ROSUVASTATIN CALCIUM 10 MG/1
TABLET, COATED ORAL
Qty: 45 TABLET | Refills: 5 | Status: SHIPPED | OUTPATIENT
Start: 2020-09-21 | End: 2021-05-13 | Stop reason: SDUPTHER

## 2020-09-21 NOTE — TELEPHONE ENCOUNTER
RX APPROVAL:      Refill:   Requested Prescriptions     Pending Prescriptions Disp Refills    rosuvastatin (CRESTOR) 10 MG tablet [Pharmacy Med Name: ROSUVASTATIN CALCIUM 10 MG Tablet] 45 tablet      Sig: TAKE 1 TABLET EVERY OTHER DAY      Last OV: 5/26/2020   Last EKG:    Last Labs:  Lab Results   Component Value Date    CHOL 142 09/05/2019    TRIG 173 09/05/2019    HDL 32 09/05/2019    LDLCALC 75 09/05/2019    LABVLDL 35 09/05/2019     Lab Results   Component Value Date    ALT 29 09/05/2019    AST 24 09/05/2019       Plan and labs reviewed

## 2020-11-06 NOTE — PROGRESS NOTES
[address]     Outpatient Follow Up Note      Chief Complaint   Patient presents with    6 Month Follow-Up    Coronary Artery Disease    Hyperlipidemia     HPI:  Mr. Jose David 69 y.o. male with a history of  CAD, hypertension, hyperlipidemia, PAF. He wore a Holter monitor May 2018 which showed no a. Fib; his Xarelto was stopped and baby aspirin started. Today, he reports he feels ok. He believes his heart has been in rhythm. He has recently found out his wife has breast cancer, she is to begin radiation treatment soon. He denies exertional chest pain, palpitations, light-headedness, edema. He is somewhat active, does not smoke. No recent labs. Past Medical History:   Diagnosis Date    Atrial fibrillation ([facility] [address]     Diabetes mellitus ([facility] [address]     Hyperlipidemia     Hypertension      Social History:    Social History     Tobacco Use   Smoking Status Never Smoker   Smokeless Tobacco Never Used     Current Medications:  Current Outpatient Medications   Medication Sig Dispense Refill    Naproxen Sodium 220 MG CAPS Take 220 mg by mouth as needed for Pain      rosuvastatin (CRESTOR) 10 MG tablet TAKE 1 TABLET EVERY OTHER DAY 45 tablet 5    sotalol (BETAPACE) 80 MG tablet Take 1 tablet by mouth 2 times daily 180 tablet 3    chlorthalidone (HYGROTEN) 50 MG tablet TAKE 1 TABLET DAILY 90 tablet 1    aspirin 81 MG tablet Take 81 mg by mouth daily      losartan (COZAAR) 50 MG tablet Take 100 mg by mouth daily      insulin lispro (HUMALOG) 100 UNIT/ML injection cartridge Inject into the skin 3 times daily (before meals)      terazosin (HYTRIN) 10 MG capsule Take 10 mg by mouth nightly      glimepiride (AMARYL) 4 MG tablet Take 4 mg by mouth every morning (before breakfast).  omeprazole (PRILOSEC) 20 MG capsule Take 20 mg by mouth Five times weekly       metformin (GLUCOPHAGE) 1000 MG tablet Take 1,000 mg by mouth 2 times daily (with meals).         insulin glargine (LANTUS) 100 UNIT/ML injection Inject 25 Units into the skin nightly        No current facility-administered medications for this visit. REVIEW OF SYSTEMS:    CONSTITUTIONAL: No major weight gain or loss, fatigue, weakness, night sweats or fever. HEENT: No new vision difficulties or ringing in the ears. RESPIRATORY: no  SOB; - PND, orthopnea or cough. CARDIOVASCULAR: See HPI  GI: No nausea, vomiting, diarrhea, constipation, abdominal pain or changes in bowel habits. : No urinary frequency, urgency, incontinence hematuria or dysuria. SKIN: No cyanosis or skin lesions. MUSCULOSKELETAL: No new muscle or joint pain. Knee pain  NEUROLOGICAL: No syncope or TIA-like symptoms. PSYCHIATRIC: + anxiety    PHYSICAL EXAM:        Vitals:    08/09/16 0833 08/09/16 0855 08/09/16 0857   BP: 130/84 104/62 108/64   Pulse: 65 RUE LUE   Weight: 250 lb (113.4 kg)     Height: 6' 3\" (1.905 m)       /62 (Site: Right Upper Arm, Position: Sitting, Cuff Size: Medium Adult)   Pulse 66   Resp 20   Ht 6' 3\" (1.905 m)   Wt 242 lb (109.8 kg)   SpO2 97%   BMI 30.25 kg/m²      CONSTITUTIONAL: Cooperative, no apparent distress, and appears well nourished / developed  NEUROLOGIC:  Awake and orientated to person, place and time. PSYCH: Calm affect. SKIN: Warm and dry. HEENT: Sclera non-icteric, normocephalic, neck supple, no elevation of JVP, normal carotid pulses with no bruits and thyroid normal size. LUNGS:  No increased work of breathing and clear to auscultation, no crackles or wheezing  CARDIOVASCULAR:  Regular rate and rhythm with no murmurs, gallops, rubs, or abnormal heart sounds, normal PMI. The apical impulses not displaced  JVP less than 8 cm H2O  Heart tones are crisp and normal  Cervical veins are not engorged  The carotid upstroke is normal in amplitude and contour without delay or bruit  JVP is not elevated  ABDOMEN:  Normal bowel sounds, non-distended and non-tender to palpation  EXT: No edema, no calf tenderness.  Pulses are present block. Resting HR:54 bpm Resting BP:152/70 mmHg      Stress Protocol:Pharmacologic - Lexiscan's      Peak HR:63 bpm HR/BP product:9387   Peak BP:149/68 mmHg   Predicted HR: 156 bpm   % of predicted HR: 40   Test duration: 4 min   Reason for termination:Completed      ECG Findings   Normal sinus rhythm. Cardiac cath 2006: mild non-obst disease: PDA/PLV bifurcation 30%    Assessment:  1. Atherosclerosis of native coronary artery of native heart without angina pectoris: Stable, no anginal symptoms   Cardiac cath 2006> Mild non-obst disease -- PDA/PLV bifurcation 30%. Nuclear stress test 2013> No evidence of ischemia or scar   2. Essential hypertension, benign: Stable   /62 (Site: Right Upper Arm, Position: Sitting, Cuff Size: Medium Adult)   Pulse 66   Resp 20   Ht 6' 3\" (1.905 m)   Wt 242 lb (109.8 kg)   SpO2 97%   BMI 30.25 kg/m²     3. Hyperlipidemia: Stable on Crestor 10mg every other day. 9/05/19> , , HDL 32, LDL 75.   4.      Paroxysmal atrial fibrillation : HR regular & controlled. He has not been aware of heart going out of rhythm. Xarelto stopped June 2018 due to no A.fib on Holter. Remains on Betapace. 24 hr Holter monitor 5/21/18> NSR w/ 1st deg AV block & 2nd deg AV block Type I, hr 63, ranges , no episodes of AF noted. Plan:  Kat Best has a stable cardiac status. Cardiac test and lab results personally reviewed by me during this office visit and discussed. No med changes. Labs soon. Continue risk factor modifications. Call for any change in symptoms. Return for regular follow up in 6 months with EKG. Thank you for allowing to us to participate in the care of Bonifacio Proctor M.D., [facility] [place]    Patient's problem list, medications, allergies, past medical, surgical, social and family histories were reviewed and updated as appropriate. Scribe's attestation:  This note was scribed in the presence of Dr. Santino Ferreira MD, by Germain Murphy RN.    The scribe's documentation has been prepared under my direction and personally reviewed by me in its entirety. I confirm that the note above accurately reflects all work, treatment, procedures, and medical decision making performed by me.

## 2020-11-10 ENCOUNTER — OFFICE VISIT (OUTPATIENT)
Dept: CARDIOLOGY CLINIC | Age: 70
End: 2020-11-10
Payer: MEDICARE

## 2020-11-10 VITALS
SYSTOLIC BLOOD PRESSURE: 126 MMHG | HEART RATE: 66 BPM | RESPIRATION RATE: 20 BRPM | DIASTOLIC BLOOD PRESSURE: 62 MMHG | HEIGHT: 75 IN | BODY MASS INDEX: 30.09 KG/M2 | WEIGHT: 242 LBS | OXYGEN SATURATION: 97 %

## 2020-11-10 PROCEDURE — 4040F PNEUMOC VAC/ADMIN/RCVD: CPT | Performed by: INTERNAL MEDICINE

## 2020-11-10 PROCEDURE — 3017F COLORECTAL CA SCREEN DOC REV: CPT | Performed by: INTERNAL MEDICINE

## 2020-11-10 PROCEDURE — 99213 OFFICE O/P EST LOW 20 MIN: CPT | Performed by: INTERNAL MEDICINE

## 2020-11-10 PROCEDURE — G8427 DOCREV CUR MEDS BY ELIG CLIN: HCPCS | Performed by: INTERNAL MEDICINE

## 2020-11-10 PROCEDURE — 1123F ACP DISCUSS/DSCN MKR DOCD: CPT | Performed by: INTERNAL MEDICINE

## 2020-11-10 PROCEDURE — 1036F TOBACCO NON-USER: CPT | Performed by: INTERNAL MEDICINE

## 2020-11-10 PROCEDURE — G8417 CALC BMI ABV UP PARAM F/U: HCPCS | Performed by: INTERNAL MEDICINE

## 2020-11-10 PROCEDURE — G8484 FLU IMMUNIZE NO ADMIN: HCPCS | Performed by: INTERNAL MEDICINE

## 2020-11-10 RX ORDER — COVID-19 ANTIGEN TEST
220 KIT MISCELLANEOUS PRN
Status: ON HOLD | COMMUNITY
End: 2022-07-25 | Stop reason: HOSPADM

## 2020-11-10 RX ORDER — SOTALOL HYDROCHLORIDE 80 MG/1
80 TABLET ORAL 2 TIMES DAILY
Qty: 180 TABLET | Refills: 3 | Status: SHIPPED | OUTPATIENT
Start: 2020-11-10 | End: 2021-05-13 | Stop reason: SDUPTHER

## 2021-01-04 ENCOUNTER — HOSPITAL ENCOUNTER (OUTPATIENT)
Age: 71
Discharge: HOME | End: 2021-01-04
Payer: MEDICARE

## 2021-01-04 LAB
A/G RATIO: 1.5 (ref 1.1–2.2)
ALBUMIN SERPL-MCNC: 4.4 G/DL (ref 3.4–5)
ALP BLD-CCNC: 98 U/L (ref 40–129)
ALT SERPL-CCNC: 20 U/L (ref 10–40)
ANION GAP SERPL CALCULATED.3IONS-SCNC: 10 MMOL/L (ref 3–16)
AST SERPL-CCNC: 17 U/L (ref 15–37)
BILIRUB SERPL-MCNC: 0.4 MG/DL (ref 0–1)
BUN BLDV-MCNC: 24 MG/DL (ref 7–20)
CALCIUM SERPL-MCNC: 10.1 MG/DL (ref 8.3–10.6)
CHLORIDE BLD-SCNC: 100 MMOL/L (ref 99–110)
CHOLESTEROL, TOTAL: 151 MG/DL (ref 0–199)
CO2: 30 MMOL/L (ref 21–32)
CREAT SERPL-MCNC: 1.4 MG/DL (ref 0.8–1.3)
CREATININE URINE: 73.1 MG/DL (ref 39–259)
FOLATE: >20 NG/ML (ref 4.78–24.2)
GFR AFRICAN AMERICAN: >60
GFR NON-AFRICAN AMERICAN: 50
GLOBULIN: 3 G/DL
GLUCOSE BLD-MCNC: 126 MG/DL (ref 70–99)
HDLC SERPL-MCNC: 33 MG/DL (ref 40–60)
LDL CHOLESTEROL CALCULATED: 85 MG/DL
MICROALBUMIN UR-MCNC: <1.2 MG/DL
MICROALBUMIN/CREAT UR-RTO: NORMAL MG/G (ref 0–30)
POTASSIUM SERPL-SCNC: 4.7 MMOL/L (ref 3.5–5.1)
SODIUM BLD-SCNC: 140 MMOL/L (ref 136–145)
T4 FREE: 1.1 NG/DL (ref 0.9–1.8)
TOTAL PROTEIN: 7.4 G/DL (ref 6.4–8.2)
TRIGL SERPL-MCNC: 164 MG/DL (ref 0–150)
TSH SERPL DL<=0.05 MIU/L-ACNC: 5.03 UIU/ML (ref 0.27–4.2)
VITAMIN B-12: 354 PG/ML (ref 211–911)
VLDLC SERPL CALC-MCNC: 33 MG/DL

## 2021-01-04 PROCEDURE — 80053 COMPREHEN METABOLIC PANEL: CPT

## 2021-01-04 PROCEDURE — 82043 UR ALBUMIN QUANTITATIVE: CPT

## 2021-01-04 PROCEDURE — 80061 LIPID PANEL: CPT

## 2021-01-04 PROCEDURE — 82570 ASSAY OF URINE CREATININE: CPT

## 2021-01-04 PROCEDURE — 84439 ASSAY OF FREE THYROXINE: CPT

## 2021-01-04 PROCEDURE — 84443 ASSAY THYROID STIM HORMONE: CPT

## 2021-01-04 PROCEDURE — 82607 VITAMIN B-12: CPT

## 2021-01-04 PROCEDURE — 82746 ASSAY OF FOLIC ACID SERUM: CPT

## 2021-01-04 PROCEDURE — 83036 HEMOGLOBIN GLYCOSYLATED A1C: CPT

## 2021-01-05 LAB
ESTIMATED AVERAGE GLUCOSE: 159.9 MG/DL
HBA1C MFR BLD: 7.2 %

## 2021-03-16 ENCOUNTER — HOSPITAL ENCOUNTER (OUTPATIENT)
Age: 71
Discharge: HOME | End: 2021-03-16
Payer: MEDICARE

## 2021-03-16 DIAGNOSIS — I25.10 ATHEROSCLEROSIS OF NATIVE CORONARY ARTERY OF NATIVE HEART WITHOUT ANGINA PECTORIS: ICD-10-CM

## 2021-03-16 DIAGNOSIS — I10 ESSENTIAL HYPERTENSION, BENIGN: ICD-10-CM

## 2021-03-16 DIAGNOSIS — E78.49 OTHER HYPERLIPIDEMIA: ICD-10-CM

## 2021-03-16 LAB
ALBUMIN SERPL-MCNC: 3.9 G/DL (ref 3.4–5)
ALP BLD-CCNC: 86 U/L (ref 40–129)
ALT SERPL-CCNC: 15 U/L (ref 10–40)
ANION GAP SERPL CALCULATED.3IONS-SCNC: 9 MMOL/L (ref 3–16)
AST SERPL-CCNC: 14 U/L (ref 15–37)
BASOPHILS ABSOLUTE: 0.1 K/UL (ref 0–0.2)
BASOPHILS RELATIVE PERCENT: 1.1 %
BILIRUB SERPL-MCNC: 0.4 MG/DL (ref 0–1)
BILIRUBIN DIRECT: <0.2 MG/DL (ref 0–0.3)
BILIRUBIN, INDIRECT: ABNORMAL MG/DL (ref 0–1)
BUN BLDV-MCNC: 22 MG/DL (ref 7–20)
CALCIUM SERPL-MCNC: 10.7 MG/DL (ref 8.3–10.6)
CHLORIDE BLD-SCNC: 100 MMOL/L (ref 99–110)
CHOLESTEROL, TOTAL: 130 MG/DL (ref 0–199)
CO2: 31 MMOL/L (ref 21–32)
CREAT SERPL-MCNC: 1.4 MG/DL (ref 0.8–1.3)
EOSINOPHILS ABSOLUTE: 0.5 K/UL (ref 0–0.6)
EOSINOPHILS RELATIVE PERCENT: 6.5 %
GFR AFRICAN AMERICAN: >60
GFR NON-AFRICAN AMERICAN: 50
GLUCOSE BLD-MCNC: 151 MG/DL (ref 70–99)
HCT VFR BLD CALC: 36.3 % (ref 40.5–52.5)
HDLC SERPL-MCNC: 26 MG/DL (ref 40–60)
HEMOGLOBIN: 12.6 G/DL (ref 13.5–17.5)
LDL CHOLESTEROL CALCULATED: 72 MG/DL
LYMPHOCYTES ABSOLUTE: 1.2 K/UL (ref 1–5.1)
LYMPHOCYTES RELATIVE PERCENT: 15.4 %
MCH RBC QN AUTO: 32.1 PG (ref 26–34)
MCHC RBC AUTO-ENTMCNC: 34.8 G/DL (ref 31–36)
MCV RBC AUTO: 92.1 FL (ref 80–100)
MONOCYTES ABSOLUTE: 0.6 K/UL (ref 0–1.3)
MONOCYTES RELATIVE PERCENT: 8.2 %
NEUTROPHILS ABSOLUTE: 5.2 K/UL (ref 1.7–7.7)
NEUTROPHILS RELATIVE PERCENT: 68.8 %
PDW BLD-RTO: 13.2 % (ref 12.4–15.4)
PHOSPHORUS: 3.2 MG/DL (ref 2.5–4.9)
PLATELET # BLD: 205 K/UL (ref 135–450)
PMV BLD AUTO: 8 FL (ref 5–10.5)
POTASSIUM SERPL-SCNC: 4.1 MMOL/L (ref 3.5–5.1)
RBC # BLD: 3.94 M/UL (ref 4.2–5.9)
SODIUM BLD-SCNC: 140 MMOL/L (ref 136–145)
TOTAL PROTEIN: 7.2 G/DL (ref 6.4–8.2)
TRIGL SERPL-MCNC: 158 MG/DL (ref 0–150)
VLDLC SERPL CALC-MCNC: 32 MG/DL
WBC # BLD: 7.5 K/UL (ref 4–11)

## 2021-03-16 PROCEDURE — 80061 LIPID PANEL: CPT

## 2021-03-16 PROCEDURE — 36415 COLL VENOUS BLD VENIPUNCTURE: CPT

## 2021-03-16 PROCEDURE — 85025 COMPLETE CBC W/AUTO DIFF WBC: CPT

## 2021-03-16 PROCEDURE — 80076 HEPATIC FUNCTION PANEL: CPT

## 2021-03-16 PROCEDURE — 80069 RENAL FUNCTION PANEL: CPT

## 2021-04-27 ENCOUNTER — HOSPITAL ENCOUNTER (OUTPATIENT)
Age: 71
Discharge: HOME | End: 2021-04-27
Payer: MEDICARE

## 2021-04-27 LAB — PROSTATE SPECIFIC ANTIGEN: 4.37 NG/ML (ref 0–4)

## 2021-04-27 PROCEDURE — 84153 ASSAY OF PSA TOTAL: CPT

## 2021-04-27 PROCEDURE — 36415 COLL VENOUS BLD VENIPUNCTURE: CPT

## 2021-05-13 ENCOUNTER — OFFICE VISIT (OUTPATIENT)
Dept: CARDIOLOGY CLINIC | Age: 71
End: 2021-05-13
Payer: MEDICARE

## 2021-05-13 VITALS
OXYGEN SATURATION: 96 % | DIASTOLIC BLOOD PRESSURE: 66 MMHG | HEART RATE: 80 BPM | HEIGHT: 75 IN | BODY MASS INDEX: 29.22 KG/M2 | WEIGHT: 235 LBS | SYSTOLIC BLOOD PRESSURE: 124 MMHG | RESPIRATION RATE: 18 BRPM

## 2021-05-13 DIAGNOSIS — I25.10 ATHEROSCLEROSIS OF NATIVE CORONARY ARTERY OF NATIVE HEART WITHOUT ANGINA PECTORIS: ICD-10-CM

## 2021-05-13 DIAGNOSIS — E78.49 OTHER HYPERLIPIDEMIA: ICD-10-CM

## 2021-05-13 DIAGNOSIS — I48.0 PAROXYSMAL ATRIAL FIBRILLATION (HCC): Primary | ICD-10-CM

## 2021-05-13 DIAGNOSIS — I10 ESSENTIAL HYPERTENSION, BENIGN: ICD-10-CM

## 2021-05-13 PROCEDURE — G8417 CALC BMI ABV UP PARAM F/U: HCPCS | Performed by: INTERNAL MEDICINE

## 2021-05-13 PROCEDURE — 1123F ACP DISCUSS/DSCN MKR DOCD: CPT | Performed by: INTERNAL MEDICINE

## 2021-05-13 PROCEDURE — 3017F COLORECTAL CA SCREEN DOC REV: CPT | Performed by: INTERNAL MEDICINE

## 2021-05-13 PROCEDURE — 99214 OFFICE O/P EST MOD 30 MIN: CPT | Performed by: INTERNAL MEDICINE

## 2021-05-13 PROCEDURE — 4040F PNEUMOC VAC/ADMIN/RCVD: CPT | Performed by: INTERNAL MEDICINE

## 2021-05-13 PROCEDURE — G8427 DOCREV CUR MEDS BY ELIG CLIN: HCPCS | Performed by: INTERNAL MEDICINE

## 2021-05-13 PROCEDURE — 93000 ELECTROCARDIOGRAM COMPLETE: CPT | Performed by: INTERNAL MEDICINE

## 2021-05-13 PROCEDURE — 1036F TOBACCO NON-USER: CPT | Performed by: INTERNAL MEDICINE

## 2021-05-13 RX ORDER — SOTALOL HYDROCHLORIDE 80 MG/1
80 TABLET ORAL 2 TIMES DAILY
Qty: 180 TABLET | Refills: 3 | Status: SHIPPED | OUTPATIENT
Start: 2021-05-13 | End: 2022-06-28 | Stop reason: SDUPTHER

## 2021-05-13 RX ORDER — ROSUVASTATIN CALCIUM 10 MG/1
TABLET, COATED ORAL
Qty: 45 TABLET | Refills: 3 | Status: SHIPPED | OUTPATIENT
Start: 2021-05-13 | End: 2022-06-28 | Stop reason: SDUPTHER

## 2021-05-13 RX ORDER — CHLORTHALIDONE 50 MG/1
TABLET ORAL
Qty: 90 TABLET | Refills: 3 | Status: SHIPPED | OUTPATIENT
Start: 2021-05-13

## 2021-05-13 NOTE — PROGRESS NOTES
[address]     Outpatient Follow Up Note      Chief Complaint   Patient presents with    6 Month Follow-Up    Coronary Artery Disease    Hyperlipidemia    Atrial Fibrillation     HPI:  Mr. Demetri Dai 66 y.o. male with a history of  CAD, hypertension, hyperlipidemia, PAF. He wore a Holter monitor May 2018 which showed no a. Fib; his Xarelto was stopped and baby aspirin started. Today, he reports he feels good. He believes his heart has been in rhythm. He denies exertional chest pain, palpitations, light-headedness, edema. He is somewhat active, does not smoke. Strongly encouraged the COVID vaccine. Past Medical History:   Diagnosis Date    At[facility] fibrillation ([facility] [address]     Diabetes mellitus ([facility] [address]     Hyperlipidemia     Hypertension      Social History:    Social History     Tobacco Use   Smoking Status Never Smoker   Smokeless Tobacco Never Used     Current Medications:  Current Outpatient Medications   Medication Sig Dispense Refill    Naproxen Sodium 220 MG CAPS Take 220 mg by mouth as needed for Pain      sotalol (BETAPACE) 80 MG tablet Take 1 tablet by mouth 2 times daily 180 tablet 3    rosuvastatin (CRESTOR) 10 MG tablet TAKE 1 TABLET EVERY OTHER DAY 45 tablet 5    chlorthalidone (HYGROTEN) 50 MG tablet TAKE 1 TABLET DAILY 90 tablet 1    aspirin 81 MG tablet Take 81 mg by mouth daily      losartan (COZAAR) 50 MG tablet Take 100 mg by mouth daily      insulin lispro (HUMALOG) 100 UNIT/ML injection cartridge Inject into the skin 3 times daily (before meals)      terazosin (HYTRIN) 10 MG capsule Take 10 mg by mouth nightly      glimepiride (AMARYL) 4 MG tablet Take 4 mg by mouth every morning (before breakfast).  omeprazole (PRILOSEC) 20 MG capsule Take 20 mg by mouth Five times weekly       metformin (GLUCOPHAGE) 1000 MG tablet Take 1,000 mg by mouth 2 times daily (with meals).         insulin glargine (LANTUS) 100 UNIT/ML injection Inject 25 Units into the skin nightly BP:152/70 mmHg      Stress Protocol:Pharmacologic - Lexiscan's      Peak HR:63 bpm HR/BP product:9387   Peak BP:149/68 mmHg   Predicted HR: 156 bpm   % of predicted HR: 40   Test duration: 4 min   Reason for termination:Completed      ECG Findings   Normal sinus rhythm. Cardiac cath 2006: mild non-obst disease: PDA/PLV bifurcation 30%    Assessment:  1. Atherosclerosis of native coronary artery of native heart without angina pectoris: Stable, no anginal symptoms   Cardiac cath 2006> Mild non-obst disease -- PDA/PLV bifurcation 30%. Nuclear stress test 2013> No evidence of ischemia or scar   2. Essential hypertension, benign: Stable   /66 (Site: Left Upper Arm, Position: Sitting, Cuff Size: Large Adult)   Pulse 80   Resp 18   Ht 6' 3\" (1.905 m)   Wt 235 lb (106.6 kg)   SpO2 96%   BMI 29.37 kg/m²     3. Hyperlipidemia: Stable on Crestor 10mg every other day. 4.      Paroxysmal atrial fibrillation : HR regular & controlled. He has not been aware of heart going out of rhythm. Xarelto stopped June 2018 due to no A.fib on Holter. Remains on Betapace. 24 hr Holter monitor 5/21/18> NSR w/ 1st deg AV block & 2nd deg AV block Type I, hr 63, ranges , no episodes of AF noted. Plan:  Kenny Escalera has a stable cardiac status. Cardiac test and lab results personally reviewed by me during this office visit and discussed. No med changes. Strongly encouraged the COVID vaccine. Continue risk factor modifications. Call for any change in symptoms. Return for regular follow up in 12 months with EKG . Thank you for allowing to us to participate in the care of Liz Watts M.D., [facility]    Patient's problem list, medications, allergies, past medical, surgical, social and family histories were reviewed and updated as appropriate. Scribe's attestation: This note was scribed in the presence of Dr. Jacob Neal MD, by Kayleigh Perez RN.       The scribe's documentation has been prepared under my direction and personally reviewed by me in its entirety. I confirm that the note above accurately reflects all work, treatment, procedures, and medical decision making performed by me.

## 2021-09-16 ENCOUNTER — HOSPITAL ENCOUNTER (OUTPATIENT)
Age: 71
Discharge: HOME | End: 2021-09-16
Payer: MEDICARE

## 2021-09-16 LAB — PROSTATE SPECIFIC ANTIGEN: 5.27 NG/ML (ref 0–4)

## 2021-09-16 PROCEDURE — 36415 COLL VENOUS BLD VENIPUNCTURE: CPT

## 2021-09-16 PROCEDURE — 84153 ASSAY OF PSA TOTAL: CPT

## 2022-05-12 ENCOUNTER — HOSPITAL ENCOUNTER (OUTPATIENT)
Dept: NUCLEAR MEDICINE | Age: 72
Discharge: HOME | End: 2022-05-12
Payer: MEDICARE

## 2022-05-12 ENCOUNTER — HOSPITAL ENCOUNTER (OUTPATIENT)
Dept: CT IMAGING | Age: 72
Discharge: HOME | End: 2022-05-12
Payer: MEDICARE

## 2022-05-12 DIAGNOSIS — C61 MALIGNANT NEOPLASM OF PROSTATE (HCC): ICD-10-CM

## 2022-05-12 DIAGNOSIS — C61 PROSTATE CANCER (HCC): ICD-10-CM

## 2022-05-12 LAB
BUN BLDV-MCNC: 30 MG/DL (ref 7–20)
CREAT SERPL-MCNC: 1.5 MG/DL (ref 0.8–1.3)
GFR AFRICAN AMERICAN: 55
GFR NON-AFRICAN AMERICAN: 46

## 2022-05-12 PROCEDURE — 82565 ASSAY OF CREATININE: CPT

## 2022-05-12 PROCEDURE — 36415 COLL VENOUS BLD VENIPUNCTURE: CPT

## 2022-05-12 PROCEDURE — 78306 BONE IMAGING WHOLE BODY: CPT

## 2022-05-12 PROCEDURE — A9503 TC99M MEDRONATE: HCPCS | Performed by: RADIOLOGY

## 2022-05-12 PROCEDURE — 3430000000 HC RX DIAGNOSTIC RADIOPHARMACEUTICAL: Performed by: RADIOLOGY

## 2022-05-12 PROCEDURE — 2580000003 HC RX 258: Performed by: RADIOLOGY

## 2022-05-12 PROCEDURE — 74177 CT ABD & PELVIS W/CONTRAST: CPT

## 2022-05-12 PROCEDURE — 84520 ASSAY OF UREA NITROGEN: CPT

## 2022-05-12 PROCEDURE — 6360000004 HC RX CONTRAST MEDICATION: Performed by: RADIOLOGY

## 2022-05-12 RX ORDER — TC 99M MEDRONATE 20 MG/10ML
27.16 INJECTION, POWDER, LYOPHILIZED, FOR SOLUTION INTRAVENOUS
Status: COMPLETED | OUTPATIENT
Start: 2022-05-12 | End: 2022-05-12

## 2022-05-12 RX ORDER — SODIUM CHLORIDE 0.9 % (FLUSH) 0.9 %
10 SYRINGE (ML) INJECTION PRN
Status: DISCONTINUED | OUTPATIENT
Start: 2022-05-12 | End: 2022-05-13 | Stop reason: HOSPADM

## 2022-05-12 RX ADMIN — IOPAMIDOL 75 ML: 755 INJECTION, SOLUTION INTRAVENOUS at 11:27

## 2022-05-12 RX ADMIN — Medication 10 ML: at 10:00

## 2022-05-12 RX ADMIN — TC 99M MEDRONATE 27.16 MILLICURIE: 20 INJECTION, POWDER, LYOPHILIZED, FOR SOLUTION INTRAVENOUS at 10:00

## 2022-05-27 ENCOUNTER — HOSPITAL ENCOUNTER (OUTPATIENT)
Dept: CT IMAGING | Age: 72
Discharge: HOME | End: 2022-05-27
Payer: MEDICARE

## 2022-05-27 DIAGNOSIS — C61 MALIGNANT NEOPLASM OF PROSTATE (HCC): ICD-10-CM

## 2022-05-27 PROCEDURE — 71250 CT THORAX DX C-: CPT

## 2022-06-21 NOTE — PROGRESS NOTES
[facility]     Outpatient Follow Up Note      Chief Complaint   Patient presents with    Atrial Fibrillation    Hyperlipidemia    Hypertension    Cardiac Clearance     HPI:  Mr. Noyola Stager 67 y.o. male here for a 1 year follow up appointment with pre operative clearance for a Radical Right Nephrectomy with Dr Katelin Kirk on 7/25/22. He has a history of  CAD, hypertension, hyperlipidemia, PAF. He wore a Holter monitor May 2018 which showed no a. Fib; his Xarelto was stopped and baby aspirin started. Today, he reports that he is scheduled for surgery in July a right Nephrectomy. The Urologist and Oncologist have concerns for cancer in the kidney. It started with his prostate, he had a biopsy. He states he was told he may have to stay overnight. He put in some fence posts yesterday and didn't notice any shortness of breath, just discomfort in his shoulders.      Past Medical History:   Diagnosis Date    Atrial fibrillation ([facility]     Diabetes mellitus ([facility]     Hyperlipidemia     Hypertension      Social History:    Social History     Tobacco Use   Smoking Status Never Smoker   Smokeless Tobacco Never Used     Current Medications:  Current Outpatient Medications   Medication Sig Dispense Refill    ascorbic acid (VITAMIN C) 500 MG tablet Take 500 mg by mouth daily      vitamin D3 (CHOLECALCIFEROL) 125 MCG (5000 UT) TABS tablet Take 5,000 Units by mouth daily      diclofenac sodium (VOLTAREN) 1 % GEL Apply 4 g topically 4 times daily      Multiple Vitamins-Minerals (MULTIVITAMIN ADULTS PO) Take 1 tablet by mouth daily      sotalol (BETAPACE) 80 MG tablet Take 1 tablet by mouth 2 times daily 180 tablet 3    rosuvastatin (CRESTOR) 10 MG tablet TAKE 1 TABLET EVERY OTHER DAY 45 tablet 3    chlorthalidone (HYGROTEN) 50 MG tablet TAKE 1 TABLET DAILY 90 tablet 3    Naproxen Sodium 220 MG CAPS Take 220 mg by mouth as needed for Pain      aspirin 81 MG tablet Take 81 mg by mouth daily      losartan (COZAAR) 50 MG tablet Take 100 mg by mouth daily      insulin lispro (HUMALOG) 100 UNIT/ML injection cartridge Inject into the skin 3 times daily (before meals)      terazosin (HYTRIN) 10 MG capsule Take 10 mg by mouth nightly      glimepiride (AMARYL) 4 MG tablet Take 4 mg by mouth every morning (before breakfast).  omeprazole (PRILOSEC) 20 MG capsule Take 20 mg by mouth Five times weekly       metformin (GLUCOPHAGE) 1000 MG tablet Take 1,000 mg by mouth 2 times daily (with meals).  insulin glargine (LANTUS) 100 UNIT/ML injection Inject 25 Units into the skin nightly        No current facility-administered medications for this visit. REVIEW OF SYSTEMS:    CONSTITUTIONAL: No major weight gain or loss, fatigue, weakness, night sweats or fever. HEENT: No new vision difficulties or ringing in the ears. RESPIRATORY: no  SOB; - PND, orthopnea or cough. CARDIOVASCULAR: See HPI  GI: No nausea, vomiting, diarrhea, constipation, abdominal pain or changes in bowel habits. : No urinary frequency, urgency, incontinence hematuria or dysuria. SKIN: No cyanosis or skin lesions. MUSCULOSKELETAL: No new muscle or joint pain. Knee pain  NEUROLOGICAL: No syncope or TIA-like symptoms. PSYCHIATRIC: + anxiety    PHYSICAL EXAM:        Vitals:    08/09/16 0833 08/09/16 0855 08/09/16 0857   BP: 130/84 104/62 108/64   Pulse: 65 RUE LUE   Weight: 250 lb (113.4 kg)     Height: 6' 3\" (1.905 m)       /64 (Site: Left Upper Arm, Position: Sitting, Cuff Size: Medium Adult)   Pulse 70   Ht 6' 3\" (1.905 m)   Wt 215 lb 11.2 oz (97.8 kg)   SpO2 99%   BMI 26.96 kg/m²      CONSTITUTIONAL: Cooperative, no apparent distress, and appears well nourished / developed  NEUROLOGIC:  Awake and orientated to person, place and time. PSYCH: Calm affect. SKIN: Warm and dry. HEENT: Sclera non-icteric, normocephalic, neck supple, no elevation of JVP, normal carotid pulses with no bruits and thyroid normal size.   LUNGS:  No increased work of breathing and clear to auscultation, no crackles or wheezing  CARDIOVASCULAR:  Regular rate and rhythm with no murmurs, gallops, rubs, or abnormal heart sounds, normal PMI. The apical impulses not displaced  JVP less than 8 cm H2O  Heart tones are crisp and normal  Cervical veins are not engorged  The carotid upstroke is normal in amplitude and contour without delay or bruit  JVP is not elevated  ABDOMEN:  Normal bowel sounds, non-distended and non-tender to palpation  EXT: No edema, no calf tenderness. Pulses are present bilaterally.     DATA:    Lab Results   Component Value Date    ALT 15 03/16/2021    AST 14 (L) 03/16/2021    ALKPHOS 86 03/16/2021    BILITOT 0.4 03/16/2021     Lab Results   Component Value Date    CREATININE 1.5 (H) 05/12/2022    BUN 30 (H) 05/12/2022     03/16/2021    K 4.1 03/16/2021     03/16/2021    CO2 31 03/16/2021     Lab Results   Component Value Date    TSH 5.03 (H) 01/04/2021    G3MWFSC 7.1 09/14/2018     Lab Results   Component Value Date    WBC 7.5 03/16/2021    HGB 12.6 (L) 03/16/2021    HCT 36.3 (L) 03/16/2021    MCV 92.1 03/16/2021     03/16/2021     No components found for: CHLPL  Lab Results   Component Value Date    TRIG 158 (H) 03/16/2021    TRIG 164 (H) 01/04/2021    TRIG 173 (H) 09/05/2019     Lab Results   Component Value Date    HDL 26 (L) 03/16/2021    HDL 33 (L) 01/04/2021    HDL 32 (L) 09/05/2019     Lab Results   Component Value Date    LDLCALC 72 03/16/2021    LDLCALC 85 01/04/2021    LDLCALC 75 09/05/2019     Lab Results   Component Value Date    LABVLDL 32 03/16/2021    LABVLDL 33 01/04/2021    LABVLDL 35 09/05/2019     Radiology Review:  Pertinent images / reports were reviewed as a part of this visit and reveals the following:    Echo 8/12/15:  Summary  -Technically limited study due to lung interface.  -Normal left ventricular wall thickness.  -Left ventricular size is mildly increased .  -Left ventricular function is borderline with ejection fraction estimated at 50 %. -There is reversal of E/A inflow velocities across the mitral valve  suggesting impaired left ventricular relaxation.  -Abnormal (paradoxical) septal motion is present 2 to CLBBB. -Mild mitral regurgitation is present.  -The aortic root is dilated. Nuclear stress test March 2013:  Summary   There is normal isotope uptake at stress and rest. There is no evidence of   myocardial ischemia or scar. Normal LV function. Stress Protocols      Resting ECG   Normal sinus rhythm. Left bundle branch block. Resting HR:54 bpm Resting BP:152/70 mmHg      Stress Protocol:Pharmacologic - Lexiscan's      Peak HR:63 bpm HR/BP product:[id number]   Peak BP:149/68 mmHg   Predicted HR: 156 bpm   % of predicted HR: 40   Test duration: 4 min   Reason for termination:Completed      ECG Findings   Normal sinus rhythm. Cardiac cath 2006: mild non-obst disease: PDA/PLV bifurcation 30%    Assessment:  1. Atherosclerosis of native coronary artery of native heart without angina pectoris: Stable, no anginal symptoms   Cardiac cath 2006> Mild non-obst disease -- PDA/PLV bifurcation 30%. Nuclear stress test 2013> No evidence of ischemia or scar   2. Essential hypertension, benign: Stable   /64 (Site: Left Upper Arm, Position: Sitting, Cuff Size: Medium Adult)   Pulse 70   Ht 6' 3\" (1.905 m)   Wt 215 lb 11.2 oz (97.8 kg)   SpO2 99%   BMI 26.96 kg/m²     3. Hyperlipidemia: Stable on Crestor 10mg every other day. 4.      Paroxysmal atrial fibrillation : HR regular & controlled. He has not been aware of heart going out of rhythm. Xarelto stopped June 2018 due to no A.fib on Holter. Remains on Betapace. 24 hr Holter monitor 5/21/18> NSR w/ 1st deg AV block & 2nd deg AV block Type I, hr 63, ranges , no episodes of AF noted. Plan:  Russ Abreu has a stable cardiac status. Cardiac test and lab results personally reviewed by me during this office visit and discussed.    No med changes. Cleared for Robotic Laparoscopic Radical Right Nephrectomy with Dr Everett Contreras on 7/25/22, okay to hold aspirin 7 days prior to procedure   Continue risk factor modifications. Call for any change in symptoms. Return for regular follow up in 12 months with EKG . Thank you for allowing to us to participate in the care of Thelma Ornelas M.D., [facility]    Patient's problem list, medications, allergies, past medical, surgical, social and family histories were reviewed and updated as appropriate. Scribe's attestation: This note was scribed in the presence of Dr. Kacie Orr MD, by Usha Arce RN. The scribe's documentation has been prepared under my direction and personally reviewed by me in its entirety. I confirm that the note above accurately reflects all work, treatment, procedures, and medical decision making performed by me.

## 2022-06-28 ENCOUNTER — OFFICE VISIT (OUTPATIENT)
Dept: CARDIOLOGY CLINIC | Age: 72
End: 2022-06-28
Payer: MEDICARE

## 2022-06-28 VITALS
HEART RATE: 70 BPM | SYSTOLIC BLOOD PRESSURE: 122 MMHG | BODY MASS INDEX: 26.82 KG/M2 | OXYGEN SATURATION: 99 % | HEIGHT: 75 IN | WEIGHT: 215.7 LBS | DIASTOLIC BLOOD PRESSURE: 64 MMHG

## 2022-06-28 DIAGNOSIS — I25.10 ATHEROSCLEROSIS OF NATIVE CORONARY ARTERY OF NATIVE HEART WITHOUT ANGINA PECTORIS: Primary | ICD-10-CM

## 2022-06-28 DIAGNOSIS — I48.0 PAROXYSMAL ATRIAL FIBRILLATION (HCC): ICD-10-CM

## 2022-06-28 DIAGNOSIS — E78.49 OTHER HYPERLIPIDEMIA: ICD-10-CM

## 2022-06-28 DIAGNOSIS — Z01.818 PRE-OPERATIVE CLEARANCE: ICD-10-CM

## 2022-06-28 DIAGNOSIS — I10 ESSENTIAL HYPERTENSION, BENIGN: ICD-10-CM

## 2022-06-28 PROCEDURE — 99214 OFFICE O/P EST MOD 30 MIN: CPT | Performed by: INTERNAL MEDICINE

## 2022-06-28 PROCEDURE — G8417 CALC BMI ABV UP PARAM F/U: HCPCS | Performed by: INTERNAL MEDICINE

## 2022-06-28 PROCEDURE — 3017F COLORECTAL CA SCREEN DOC REV: CPT | Performed by: INTERNAL MEDICINE

## 2022-06-28 PROCEDURE — 1036F TOBACCO NON-USER: CPT | Performed by: INTERNAL MEDICINE

## 2022-06-28 PROCEDURE — 93000 ELECTROCARDIOGRAM COMPLETE: CPT | Performed by: INTERNAL MEDICINE

## 2022-06-28 PROCEDURE — G8427 DOCREV CUR MEDS BY ELIG CLIN: HCPCS | Performed by: INTERNAL MEDICINE

## 2022-06-28 PROCEDURE — 1123F ACP DISCUSS/DSCN MKR DOCD: CPT | Performed by: INTERNAL MEDICINE

## 2022-06-28 RX ORDER — SOTALOL HYDROCHLORIDE 80 MG/1
80 TABLET ORAL 2 TIMES DAILY
Qty: 180 TABLET | Refills: 3 | Status: SHIPPED | OUTPATIENT
Start: 2022-06-28

## 2022-06-28 RX ORDER — ASCORBIC ACID 500 MG
500 TABLET ORAL DAILY
COMMUNITY

## 2022-06-28 RX ORDER — ROSUVASTATIN CALCIUM 10 MG/1
TABLET, COATED ORAL
Qty: 90 TABLET | Refills: 3 | Status: SHIPPED | OUTPATIENT
Start: 2022-06-28

## 2022-06-29 NOTE — PROGRESS NOTES
Name_______________________________________Printed:____________________  Date and time of surgery__7/25/22 @ 0730______________________Arrival Time:__0600 main hosp______________   1. The instructions given regarding when and if a patient needs to stop oral intake prior to surgery varies. Follow the specific instructions you were given                  __x_Nothing to eat or to drink after Midnight the night before.                   ____Carbo loading or ERAS instructions will be given to select patients-if you have been given those instructions -please do the following                           The evening before your surgery after dinner before midnight drink 40 ounces of gatorade. If you are diabetic use sugar free. The morning of surgery drink 40 ounces of water. This needs to be finished 3 hours prior to your surgery start time. 2. Take the following pills with a small sip of water on the morning of surgery______sotalol, omeprazole  ____________________________________________                  Do not take blood pressure medications ending in pril or sartan the roderick prior to surgery or the morning of surgery_   3. Aspirin, Ibuprofen, Advil, Naproxen, Vitamin E and other Anti-inflammatory products and supplements should be stopped for 5 -7days before surgery or as directed by your physician. 4. Check with your Doctor regarding stopping Plavix, Coumadin,Eliquis, Lovenox,Effient,Pradaxa,Xarelto, Fragmin or other blood thinners and follow their instructions. 5. Do not smoke, and do not drink any alcoholic beverages 24 hours prior to surgery. This includes NA Beer. Refrain from the usage of any recreational drugs. 6. You may brush your teeth and gargle the morning of surgery. DO NOT SWALLOW WATER   7. You MUST make arrangements for a responsible adult to stay on site while you are here and take you home after your surgery. You will not be allowed to leave alone or drive yourself home.   It is strongly suggested someone stay with you the first 24 hrs. Your surgery will be cancelled if you do not have a ride home. 8. A parent/legal guardian must accompany a child scheduled for surgery and plan to stay at the hospital until the child is discharged. Please do not bring other children with you. 9. Please wear simple, loose fitting clothing to the hospital.  Laney Culp not bring valuables (money, credit cards, checkbooks, etc.) Do not wear any makeup (including no eye makeup) or nail polish on your fingers or toes. 10. DO NOT wear any jewelry or piercings on day of surgery. All body piercing jewelry must be removed. 11. If you have ___dentures, they will be removed before going to the OR; we will provide you a container. If you wear ___contact lenses or __x_glasses, they will be removed; please bring a case for them. 12. Please see your family doctor/pediatrician for a history & physical and/or concerning medications. Bring any test results/reports from your physician's office. PCP___more_______________Phone___________H&P Appt. Date__7/12______             13 If you  have a Living Will and Durable Power of  for Healthcare, please bring in a copy. 15. Notify your Surgeon if you develop any illness between now and surgery  time, cough, cold, fever, sore throat, nausea, vomiting, etc.  Please notify your surgeon if you experience dizziness, shortness of breath or blurred vision between now & the time of your surgery             15. DO NOT shave your operative site 96 hours prior to surgery. For face & neck surgery, men may use an electric razor 48 hours prior to surgery. 16. Shower the night before or morning of surgery using an antibacterial soap or as you have been instructed. 17. To provide excellent care visitors will be limited to one in the room at any given time. 18.  Please bring picture ID and insurance card.              19. Visit our web site for additional information:  [email or web address]              20.During flu season no children under the age of 15 are permitted in the hospital for the safety of all patients. 21. If you take a long acting insulin in the evening only  take half of your usual  dose the night  before your procedure              22. If you use a c-pap please bring DOS if staying overnight,             23.For your convenience [facility] has a pharmacy on site to fill your prescriptions. 24. If you use oxygen and have a portable tank please bring it  with you the DOS             25. Bring a complete list of all your medications with name and dose include any supplements. 26. Other__________________________________________   *Please call pre admission testing if you any further questions   [facility]   [address] [place] [address]. [place]  [phone]   [address] [place] [address]       VISITOR POLICY(subject to change)    Current policy is 2 visitors per patient. No children. A mask is required. Visiting hours are 8a-8p. Overnight visitors will be at the discretion of the nurse. All above information reviewed with patient in person or by phone. Patient verbalizes understanding. All questions and concerns addressed.                                                                                                  Patient/Rep____pt________________                                                                                                                                    PRE OP INSTRUCTIONS

## 2022-07-08 ENCOUNTER — HOSPITAL ENCOUNTER (OUTPATIENT)
Age: 72
Discharge: HOME | End: 2022-07-08
Payer: MEDICARE

## 2022-07-08 DIAGNOSIS — Z01.818 PREOP TESTING: ICD-10-CM

## 2022-07-08 LAB
A/G RATIO: 1.1 (ref 1.1–2.2)
ABO/RH: NORMAL
ALBUMIN SERPL-MCNC: 3.6 G/DL (ref 3.4–5)
ALP BLD-CCNC: 77 U/L (ref 40–129)
ALT SERPL-CCNC: 6 U/L (ref 10–40)
ANION GAP SERPL CALCULATED.3IONS-SCNC: 11 MMOL/L (ref 3–16)
ANTIBODY SCREEN: NORMAL
APTT: 32.1 SEC (ref 23–34.3)
AST SERPL-CCNC: 9 U/L (ref 15–37)
BASOPHILS ABSOLUTE: 0 K/UL (ref 0–0.2)
BASOPHILS RELATIVE PERCENT: 0.6 %
BILIRUB SERPL-MCNC: 0.3 MG/DL (ref 0–1)
BUN BLDV-MCNC: 26 MG/DL (ref 7–20)
CALCIUM SERPL-MCNC: 9.5 MG/DL (ref 8.3–10.6)
CHLORIDE BLD-SCNC: 102 MMOL/L (ref 99–110)
CO2: 29 MMOL/L (ref 21–32)
CREAT SERPL-MCNC: 1.5 MG/DL (ref 0.8–1.3)
EOSINOPHILS ABSOLUTE: 0.3 K/UL (ref 0–0.6)
EOSINOPHILS RELATIVE PERCENT: 4.8 %
GFR AFRICAN AMERICAN: 55
GFR NON-AFRICAN AMERICAN: 46
GLUCOSE BLD-MCNC: 123 MG/DL (ref 70–99)
HCT VFR BLD CALC: 32.3 % (ref 40.5–52.5)
HEMOGLOBIN: 10.6 G/DL (ref 13.5–17.5)
INR BLD: 1.11 (ref 0.87–1.14)
LYMPHOCYTES ABSOLUTE: 0.8 K/UL (ref 1–5.1)
LYMPHOCYTES RELATIVE PERCENT: 12.8 %
MCH RBC QN AUTO: 28.8 PG (ref 26–34)
MCHC RBC AUTO-ENTMCNC: 32.8 G/DL (ref 31–36)
MCV RBC AUTO: 87.8 FL (ref 80–100)
MONOCYTES ABSOLUTE: 0.4 K/UL (ref 0–1.3)
MONOCYTES RELATIVE PERCENT: 7.1 %
NEUTROPHILS ABSOLUTE: 4.6 K/UL (ref 1.7–7.7)
NEUTROPHILS RELATIVE PERCENT: 74.7 %
PDW BLD-RTO: 14.5 % (ref 12.4–15.4)
PLATELET # BLD: 231 K/UL (ref 135–450)
PMV BLD AUTO: 7.4 FL (ref 5–10.5)
POTASSIUM SERPL-SCNC: 4.2 MMOL/L (ref 3.5–5.1)
PROSTATE SPECIFIC ANTIGEN: 6.91 NG/ML (ref 0–4)
PROTHROMBIN TIME: 14.3 SEC (ref 11.7–14.5)
RBC # BLD: 3.68 M/UL (ref 4.2–5.9)
SODIUM BLD-SCNC: 142 MMOL/L (ref 136–145)
TOTAL PROTEIN: 7 G/DL (ref 6.4–8.2)
WBC # BLD: 6.2 K/UL (ref 4–11)

## 2022-07-08 PROCEDURE — 84153 ASSAY OF PSA TOTAL: CPT

## 2022-07-08 PROCEDURE — 86901 BLOOD TYPING SEROLOGIC RH(D): CPT

## 2022-07-08 PROCEDURE — 85610 PROTHROMBIN TIME: CPT

## 2022-07-08 PROCEDURE — 85730 THROMBOPLASTIN TIME PARTIAL: CPT

## 2022-07-08 PROCEDURE — 80053 COMPREHEN METABOLIC PANEL: CPT

## 2022-07-08 PROCEDURE — 85025 COMPLETE CBC W/AUTO DIFF WBC: CPT

## 2022-07-08 PROCEDURE — 36415 COLL VENOUS BLD VENIPUNCTURE: CPT

## 2022-07-08 PROCEDURE — 86850 RBC ANTIBODY SCREEN: CPT

## 2022-07-08 PROCEDURE — 86900 BLOOD TYPING SEROLOGIC ABO: CPT

## 2022-07-24 ENCOUNTER — ANESTHESIA EVENT (OUTPATIENT)
Dept: OPERATING ROOM | Age: 72
DRG: 657 | End: 2022-07-24
Payer: MEDICARE

## 2022-07-25 ENCOUNTER — HOSPITAL ENCOUNTER (INPATIENT)
Age: 72
LOS: 1 days | Discharge: HOME | DRG: 657 | End: 2022-07-26
Attending: UROLOGY | Admitting: UROLOGY
Payer: MEDICARE

## 2022-07-25 ENCOUNTER — ANESTHESIA (OUTPATIENT)
Dept: OPERATING ROOM | Age: 72
DRG: 657 | End: 2022-07-25
Payer: MEDICARE

## 2022-07-25 DIAGNOSIS — N28.89 RENAL MASS: ICD-10-CM

## 2022-07-25 DIAGNOSIS — Z01.818 PREOP TESTING: Primary | ICD-10-CM

## 2022-07-25 LAB
ABO/RH: NORMAL
ANION GAP SERPL CALCULATED.3IONS-SCNC: 10 MMOL/L (ref 3–16)
ANTIBODY SCREEN: NORMAL
BUN BLDV-MCNC: 31 MG/DL (ref 7–20)
CALCIUM SERPL-MCNC: 8.8 MG/DL (ref 8.3–10.6)
CHLORIDE BLD-SCNC: 103 MMOL/L (ref 99–110)
CO2: 25 MMOL/L (ref 21–32)
CREAT SERPL-MCNC: 1.7 MG/DL (ref 0.8–1.3)
GFR AFRICAN AMERICAN: 48
GFR NON-AFRICAN AMERICAN: 40
GLUCOSE BLD-MCNC: 146 MG/DL (ref 70–99)
GLUCOSE BLD-MCNC: 198 MG/DL (ref 70–99)
GLUCOSE BLD-MCNC: 201 MG/DL (ref 70–99)
GLUCOSE BLD-MCNC: 253 MG/DL (ref 70–99)
HCT VFR BLD CALC: 28.7 % (ref 40.5–52.5)
HEMOGLOBIN: 9.5 G/DL (ref 13.5–17.5)
MCH RBC QN AUTO: 29 PG (ref 26–34)
MCHC RBC AUTO-ENTMCNC: 33.1 G/DL (ref 31–36)
MCV RBC AUTO: 87.8 FL (ref 80–100)
PDW BLD-RTO: 14.3 % (ref 12.4–15.4)
PERFORMED ON: ABNORMAL
PLATELET # BLD: 232 K/UL (ref 135–450)
PMV BLD AUTO: 7.2 FL (ref 5–10.5)
POTASSIUM SERPL-SCNC: 4.1 MMOL/L (ref 3.5–5.1)
RBC # BLD: 3.27 M/UL (ref 4.2–5.9)
SODIUM BLD-SCNC: 138 MMOL/L (ref 136–145)
WBC # BLD: 8.5 K/UL (ref 4–11)

## 2022-07-25 PROCEDURE — 8E0W4CZ ROBOTIC ASSISTED PROCEDURE OF TRUNK REGION, PERCUTANEOUS ENDOSCOPIC APPROACH: ICD-10-PCS | Performed by: UROLOGY

## 2022-07-25 PROCEDURE — 3700000001 HC ADD 15 MINUTES (ANESTHESIA): Performed by: UROLOGY

## 2022-07-25 PROCEDURE — 6360000002 HC RX W HCPCS: Performed by: UROLOGY

## 2022-07-25 PROCEDURE — 86900 BLOOD TYPING SEROLOGIC ABO: CPT

## 2022-07-25 PROCEDURE — 6360000002 HC RX W HCPCS

## 2022-07-25 PROCEDURE — 6370000000 HC RX 637 (ALT 250 FOR IP): Performed by: UROLOGY

## 2022-07-25 PROCEDURE — 3600000019 HC SURGERY ROBOT ADDTL 15MIN: Performed by: UROLOGY

## 2022-07-25 PROCEDURE — 2580000003 HC RX 258: Performed by: NURSE ANESTHETIST, CERTIFIED REGISTERED

## 2022-07-25 PROCEDURE — 1200000000 HC SEMI PRIVATE

## 2022-07-25 PROCEDURE — 85027 COMPLETE CBC AUTOMATED: CPT

## 2022-07-25 PROCEDURE — 2709999900 HC NON-CHARGEABLE SUPPLY: Performed by: UROLOGY

## 2022-07-25 PROCEDURE — 3700000000 HC ANESTHESIA ATTENDED CARE: Performed by: UROLOGY

## 2022-07-25 PROCEDURE — 6360000002 HC RX W HCPCS: Performed by: ANESTHESIOLOGY

## 2022-07-25 PROCEDURE — 2580000003 HC RX 258: Performed by: UROLOGY

## 2022-07-25 PROCEDURE — 7100000001 HC PACU RECOVERY - ADDTL 15 MIN: Performed by: UROLOGY

## 2022-07-25 PROCEDURE — 36415 COLL VENOUS BLD VENIPUNCTURE: CPT

## 2022-07-25 PROCEDURE — 88307 TISSUE EXAM BY PATHOLOGIST: CPT

## 2022-07-25 PROCEDURE — 3600000009 HC SURGERY ROBOT BASE: Performed by: UROLOGY

## 2022-07-25 PROCEDURE — 80048 BASIC METABOLIC PNL TOTAL CA: CPT

## 2022-07-25 PROCEDURE — 86850 RBC ANTIBODY SCREEN: CPT

## 2022-07-25 PROCEDURE — 88342 IMHCHEM/IMCYTCHM 1ST ANTB: CPT

## 2022-07-25 PROCEDURE — 88341 IMHCHEM/IMCYTCHM EA ADD ANTB: CPT

## 2022-07-25 PROCEDURE — 6370000000 HC RX 637 (ALT 250 FOR IP)

## 2022-07-25 PROCEDURE — 7100000000 HC PACU RECOVERY - FIRST 15 MIN: Performed by: UROLOGY

## 2022-07-25 PROCEDURE — A4217 STERILE WATER/SALINE, 500 ML: HCPCS | Performed by: UROLOGY

## 2022-07-25 PROCEDURE — 6360000002 HC RX W HCPCS: Performed by: NURSE ANESTHETIST, CERTIFIED REGISTERED

## 2022-07-25 PROCEDURE — 2720000010 HC SURG SUPPLY STERILE: Performed by: UROLOGY

## 2022-07-25 PROCEDURE — S2900 ROBOTIC SURGICAL SYSTEM: HCPCS | Performed by: UROLOGY

## 2022-07-25 PROCEDURE — 2500000003 HC RX 250 WO HCPCS: Performed by: NURSE ANESTHETIST, CERTIFIED REGISTERED

## 2022-07-25 PROCEDURE — 2500000003 HC RX 250 WO HCPCS: Performed by: UROLOGY

## 2022-07-25 PROCEDURE — C9290 INJ, BUPIVACAINE LIPOSOME: HCPCS | Performed by: UROLOGY

## 2022-07-25 PROCEDURE — 86901 BLOOD TYPING SEROLOGIC RH(D): CPT

## 2022-07-25 PROCEDURE — 0TT04ZZ RESECTION OF RIGHT KIDNEY, PERCUTANEOUS ENDOSCOPIC APPROACH: ICD-10-PCS | Performed by: UROLOGY

## 2022-07-25 RX ORDER — MAGNESIUM HYDROXIDE 1200 MG/15ML
LIQUID ORAL CONTINUOUS PRN
Status: COMPLETED | OUTPATIENT
Start: 2022-07-25 | End: 2022-07-25

## 2022-07-25 RX ORDER — ONDANSETRON 2 MG/ML
4 INJECTION INTRAMUSCULAR; INTRAVENOUS EVERY 6 HOURS PRN
Status: DISCONTINUED | OUTPATIENT
Start: 2022-07-25 | End: 2022-07-26 | Stop reason: HOSPADM

## 2022-07-25 RX ORDER — KETAMINE HCL IN NACL, ISO-OSM 100MG/10ML
SYRINGE (ML) INJECTION PRN
Status: DISCONTINUED | OUTPATIENT
Start: 2022-07-25 | End: 2022-07-25 | Stop reason: SDUPTHER

## 2022-07-25 RX ORDER — ROSUVASTATIN CALCIUM 10 MG/1
5 TABLET, COATED ORAL NIGHTLY
Status: DISCONTINUED | OUTPATIENT
Start: 2022-07-25 | End: 2022-07-25

## 2022-07-25 RX ORDER — SENNA AND DOCUSATE SODIUM 50; 8.6 MG/1; MG/1
1 TABLET, FILM COATED ORAL DAILY
Qty: 30 TABLET | Refills: 0 | Status: SHIPPED | OUTPATIENT
Start: 2022-07-25

## 2022-07-25 RX ORDER — APREPITANT 40 MG/1
CAPSULE ORAL
Status: COMPLETED
Start: 2022-07-25 | End: 2022-07-25

## 2022-07-25 RX ORDER — PHENYLEPHRINE HYDROCHLORIDE 10 MG/ML
INJECTION INTRAVENOUS PRN
Status: DISCONTINUED | OUTPATIENT
Start: 2022-07-25 | End: 2022-07-25 | Stop reason: SDUPTHER

## 2022-07-25 RX ORDER — FENTANYL CITRATE 50 UG/ML
25 INJECTION, SOLUTION INTRAMUSCULAR; INTRAVENOUS EVERY 5 MIN PRN
Status: DISCONTINUED | OUTPATIENT
Start: 2022-07-25 | End: 2022-07-25 | Stop reason: HOSPADM

## 2022-07-25 RX ORDER — FENTANYL CITRATE 50 UG/ML
INJECTION, SOLUTION INTRAMUSCULAR; INTRAVENOUS PRN
Status: DISCONTINUED | OUTPATIENT
Start: 2022-07-25 | End: 2022-07-25 | Stop reason: SDUPTHER

## 2022-07-25 RX ORDER — VASOPRESSIN 20 U/ML
INJECTION PARENTERAL PRN
Status: DISCONTINUED | OUTPATIENT
Start: 2022-07-25 | End: 2022-07-25 | Stop reason: SDUPTHER

## 2022-07-25 RX ORDER — INSULIN LISPRO 100 [IU]/ML
0-4 INJECTION, SOLUTION INTRAVENOUS; SUBCUTANEOUS NIGHTLY
Status: DISCONTINUED | OUTPATIENT
Start: 2022-07-25 | End: 2022-07-26 | Stop reason: HOSPADM

## 2022-07-25 RX ORDER — INSULIN LISPRO 100 [IU]/ML
0-8 INJECTION, SOLUTION INTRAVENOUS; SUBCUTANEOUS
Status: DISCONTINUED | OUTPATIENT
Start: 2022-07-25 | End: 2022-07-26 | Stop reason: HOSPADM

## 2022-07-25 RX ORDER — BUPIVACAINE HYDROCHLORIDE 5 MG/ML
INJECTION, SOLUTION EPIDURAL; INTRACAUDAL
Status: COMPLETED | OUTPATIENT
Start: 2022-07-25 | End: 2022-07-25

## 2022-07-25 RX ORDER — SODIUM CHLORIDE 0.9 % (FLUSH) 0.9 %
5-40 SYRINGE (ML) INJECTION EVERY 12 HOURS SCHEDULED
Status: DISCONTINUED | OUTPATIENT
Start: 2022-07-25 | End: 2022-07-26 | Stop reason: HOSPADM

## 2022-07-25 RX ORDER — ONDANSETRON 2 MG/ML
4 INJECTION INTRAMUSCULAR; INTRAVENOUS
Status: COMPLETED | OUTPATIENT
Start: 2022-07-25 | End: 2022-07-25

## 2022-07-25 RX ORDER — EPHEDRINE SULFATE/0.9% NACL/PF 50 MG/5 ML
SYRINGE (ML) INTRAVENOUS PRN
Status: DISCONTINUED | OUTPATIENT
Start: 2022-07-25 | End: 2022-07-25 | Stop reason: SDUPTHER

## 2022-07-25 RX ORDER — SODIUM CHLORIDE 9 MG/ML
INJECTION, SOLUTION INTRAVENOUS CONTINUOUS
Status: DISCONTINUED | OUTPATIENT
Start: 2022-07-25 | End: 2022-07-26 | Stop reason: HOSPADM

## 2022-07-25 RX ORDER — DEXAMETHASONE SODIUM PHOSPHATE 4 MG/ML
INJECTION, SOLUTION INTRA-ARTICULAR; INTRALESIONAL; INTRAMUSCULAR; INTRAVENOUS; SOFT TISSUE PRN
Status: DISCONTINUED | OUTPATIENT
Start: 2022-07-25 | End: 2022-07-25 | Stop reason: SDUPTHER

## 2022-07-25 RX ORDER — SOTALOL HYDROCHLORIDE 80 MG/1
80 TABLET ORAL 2 TIMES DAILY
Status: DISCONTINUED | OUTPATIENT
Start: 2022-07-25 | End: 2022-07-26 | Stop reason: HOSPADM

## 2022-07-25 RX ORDER — SODIUM CHLORIDE 0.9 % (FLUSH) 0.9 %
5-40 SYRINGE (ML) INJECTION PRN
Status: DISCONTINUED | OUTPATIENT
Start: 2022-07-25 | End: 2022-07-26 | Stop reason: HOSPADM

## 2022-07-25 RX ORDER — DEXTROSE MONOHYDRATE 100 MG/ML
INJECTION, SOLUTION INTRAVENOUS CONTINUOUS PRN
Status: DISCONTINUED | OUTPATIENT
Start: 2022-07-25 | End: 2022-07-26 | Stop reason: HOSPADM

## 2022-07-25 RX ORDER — LIDOCAINE HYDROCHLORIDE 20 MG/ML
INJECTION, SOLUTION EPIDURAL; INFILTRATION; INTRACAUDAL; PERINEURAL PRN
Status: DISCONTINUED | OUTPATIENT
Start: 2022-07-25 | End: 2022-07-25 | Stop reason: SDUPTHER

## 2022-07-25 RX ORDER — LABETALOL HYDROCHLORIDE 5 MG/ML
10 INJECTION, SOLUTION INTRAVENOUS
Status: DISCONTINUED | OUTPATIENT
Start: 2022-07-25 | End: 2022-07-25 | Stop reason: HOSPADM

## 2022-07-25 RX ORDER — HYDROMORPHONE HCL 110MG/55ML
PATIENT CONTROLLED ANALGESIA SYRINGE INTRAVENOUS
Status: COMPLETED
Start: 2022-07-25 | End: 2022-07-25

## 2022-07-25 RX ORDER — ACETAMINOPHEN 325 MG/1
TABLET ORAL
Status: COMPLETED
Start: 2022-07-25 | End: 2022-07-25

## 2022-07-25 RX ORDER — OXYCODONE HYDROCHLORIDE AND ACETAMINOPHEN 5; 325 MG/1; MG/1
1 TABLET ORAL EVERY 8 HOURS PRN
Qty: 15 TABLET | Refills: 0 | Status: SHIPPED | OUTPATIENT
Start: 2022-07-25 | End: 2022-07-30

## 2022-07-25 RX ORDER — SENNA AND DOCUSATE SODIUM 50; 8.6 MG/1; MG/1
1 TABLET, FILM COATED ORAL 2 TIMES DAILY
Status: DISCONTINUED | OUTPATIENT
Start: 2022-07-25 | End: 2022-07-26 | Stop reason: HOSPADM

## 2022-07-25 RX ORDER — LIDOCAINE HYDROCHLORIDE 10 MG/ML
0.5 INJECTION, SOLUTION EPIDURAL; INFILTRATION; INTRACAUDAL; PERINEURAL ONCE
Status: DISCONTINUED | OUTPATIENT
Start: 2022-07-25 | End: 2022-07-25 | Stop reason: HOSPADM

## 2022-07-25 RX ORDER — ROSUVASTATIN CALCIUM 10 MG/1
10 TABLET, COATED ORAL EVERY OTHER DAY
Status: DISCONTINUED | OUTPATIENT
Start: 2022-07-25 | End: 2022-07-26 | Stop reason: HOSPADM

## 2022-07-25 RX ORDER — ACETAMINOPHEN 325 MG/1
650 TABLET ORAL EVERY 6 HOURS
Status: DISCONTINUED | OUTPATIENT
Start: 2022-07-25 | End: 2022-07-26 | Stop reason: HOSPADM

## 2022-07-25 RX ORDER — ACETAMINOPHEN 325 MG/1
650 TABLET ORAL ONCE
Status: DISCONTINUED | OUTPATIENT
Start: 2022-07-25 | End: 2022-07-25 | Stop reason: SDUPTHER

## 2022-07-25 RX ORDER — ONDANSETRON 4 MG/1
4 TABLET, ORALLY DISINTEGRATING ORAL EVERY 8 HOURS PRN
Status: DISCONTINUED | OUTPATIENT
Start: 2022-07-25 | End: 2022-07-26 | Stop reason: HOSPADM

## 2022-07-25 RX ORDER — MORPHINE SULFATE 2 MG/ML
2 INJECTION, SOLUTION INTRAMUSCULAR; INTRAVENOUS
Status: DISCONTINUED | OUTPATIENT
Start: 2022-07-25 | End: 2022-07-26 | Stop reason: HOSPADM

## 2022-07-25 RX ORDER — APREPITANT 40 MG/1
40 CAPSULE ORAL ONCE
Status: COMPLETED | OUTPATIENT
Start: 2022-07-25 | End: 2022-07-25

## 2022-07-25 RX ORDER — MORPHINE SULFATE 4 MG/ML
4 INJECTION, SOLUTION INTRAMUSCULAR; INTRAVENOUS
Status: DISCONTINUED | OUTPATIENT
Start: 2022-07-25 | End: 2022-07-26 | Stop reason: HOSPADM

## 2022-07-25 RX ORDER — OXYCODONE HYDROCHLORIDE 5 MG/1
5 TABLET ORAL
Status: DISCONTINUED | OUTPATIENT
Start: 2022-07-25 | End: 2022-07-25 | Stop reason: HOSPADM

## 2022-07-25 RX ORDER — SUCCINYLCHOLINE/SOD CL,ISO/PF 200MG/10ML
SYRINGE (ML) INTRAVENOUS PRN
Status: DISCONTINUED | OUTPATIENT
Start: 2022-07-25 | End: 2022-07-25 | Stop reason: SDUPTHER

## 2022-07-25 RX ORDER — LIDOCAINE HYDROCHLORIDE 10 MG/ML
1 INJECTION, SOLUTION EPIDURAL; INFILTRATION; INTRACAUDAL; PERINEURAL
Status: DISCONTINUED | OUTPATIENT
Start: 2022-07-25 | End: 2022-07-25 | Stop reason: HOSPADM

## 2022-07-25 RX ORDER — TERAZOSIN 5 MG/1
10 CAPSULE ORAL NIGHTLY
Status: DISCONTINUED | OUTPATIENT
Start: 2022-07-25 | End: 2022-07-25 | Stop reason: CLARIF

## 2022-07-25 RX ORDER — ACETAMINOPHEN 325 MG/1
650 TABLET ORAL ONCE
Status: COMPLETED | OUTPATIENT
Start: 2022-07-25 | End: 2022-07-25

## 2022-07-25 RX ORDER — PANTOPRAZOLE SODIUM 40 MG/1
40 TABLET, DELAYED RELEASE ORAL
Status: DISCONTINUED | OUTPATIENT
Start: 2022-07-26 | End: 2022-07-26 | Stop reason: HOSPADM

## 2022-07-25 RX ORDER — APREPITANT 40 MG/1
40 CAPSULE ORAL ONCE
Status: DISCONTINUED | OUTPATIENT
Start: 2022-07-25 | End: 2022-07-26 | Stop reason: HOSPADM

## 2022-07-25 RX ORDER — DOXAZOSIN MESYLATE 1 MG/1
8 TABLET ORAL NIGHTLY
Status: DISCONTINUED | OUTPATIENT
Start: 2022-07-25 | End: 2022-07-26 | Stop reason: HOSPADM

## 2022-07-25 RX ORDER — PROCHLORPERAZINE EDISYLATE 5 MG/ML
5 INJECTION INTRAMUSCULAR; INTRAVENOUS
Status: DISCONTINUED | OUTPATIENT
Start: 2022-07-25 | End: 2022-07-25 | Stop reason: HOSPADM

## 2022-07-25 RX ORDER — HYDRALAZINE HYDROCHLORIDE 20 MG/ML
10 INJECTION INTRAMUSCULAR; INTRAVENOUS
Status: DISCONTINUED | OUTPATIENT
Start: 2022-07-25 | End: 2022-07-25 | Stop reason: HOSPADM

## 2022-07-25 RX ORDER — LOSARTAN POTASSIUM 100 MG/1
100 TABLET ORAL DAILY
Status: DISCONTINUED | OUTPATIENT
Start: 2022-07-25 | End: 2022-07-26 | Stop reason: HOSPADM

## 2022-07-25 RX ORDER — HYDROMORPHONE HCL 110MG/55ML
0.25 PATIENT CONTROLLED ANALGESIA SYRINGE INTRAVENOUS EVERY 5 MIN PRN
Status: DISCONTINUED | OUTPATIENT
Start: 2022-07-25 | End: 2022-07-25 | Stop reason: HOSPADM

## 2022-07-25 RX ORDER — OXYCODONE HYDROCHLORIDE 5 MG/1
5 TABLET ORAL EVERY 4 HOURS PRN
Status: DISCONTINUED | OUTPATIENT
Start: 2022-07-25 | End: 2022-07-26 | Stop reason: HOSPADM

## 2022-07-25 RX ORDER — ONDANSETRON 2 MG/ML
INJECTION INTRAMUSCULAR; INTRAVENOUS PRN
Status: DISCONTINUED | OUTPATIENT
Start: 2022-07-25 | End: 2022-07-25 | Stop reason: SDUPTHER

## 2022-07-25 RX ORDER — SODIUM CHLORIDE, SODIUM LACTATE, POTASSIUM CHLORIDE, CALCIUM CHLORIDE 600; 310; 30; 20 MG/100ML; MG/100ML; MG/100ML; MG/100ML
INJECTION, SOLUTION INTRAVENOUS CONTINUOUS PRN
Status: COMPLETED | OUTPATIENT
Start: 2022-07-25 | End: 2022-07-25

## 2022-07-25 RX ORDER — PROPOFOL 10 MG/ML
INJECTION, EMULSION INTRAVENOUS PRN
Status: DISCONTINUED | OUTPATIENT
Start: 2022-07-25 | End: 2022-07-25 | Stop reason: SDUPTHER

## 2022-07-25 RX ORDER — OXYCODONE HYDROCHLORIDE 5 MG/1
10 TABLET ORAL EVERY 4 HOURS PRN
Status: DISCONTINUED | OUTPATIENT
Start: 2022-07-25 | End: 2022-07-26 | Stop reason: HOSPADM

## 2022-07-25 RX ORDER — SODIUM CHLORIDE 9 MG/ML
INJECTION, SOLUTION INTRAVENOUS PRN
Status: DISCONTINUED | OUTPATIENT
Start: 2022-07-25 | End: 2022-07-26 | Stop reason: HOSPADM

## 2022-07-25 RX ORDER — SODIUM CHLORIDE 9 MG/ML
INJECTION, SOLUTION INTRAVENOUS CONTINUOUS
Status: DISCONTINUED | OUTPATIENT
Start: 2022-07-25 | End: 2022-07-25 | Stop reason: HOSPADM

## 2022-07-25 RX ORDER — CHLORTHALIDONE 25 MG/1
50 TABLET ORAL DAILY
Status: DISCONTINUED | OUTPATIENT
Start: 2022-07-26 | End: 2022-07-26 | Stop reason: HOSPADM

## 2022-07-25 RX ORDER — SODIUM CHLORIDE 9 MG/ML
INJECTION, SOLUTION INTRAVENOUS CONTINUOUS PRN
Status: DISCONTINUED | OUTPATIENT
Start: 2022-07-25 | End: 2022-07-25 | Stop reason: SDUPTHER

## 2022-07-25 RX ORDER — VECURONIUM BROMIDE 1 MG/ML
INJECTION, POWDER, LYOPHILIZED, FOR SOLUTION INTRAVENOUS PRN
Status: DISCONTINUED | OUTPATIENT
Start: 2022-07-25 | End: 2022-07-25 | Stop reason: SDUPTHER

## 2022-07-25 RX ORDER — MAGNESIUM SULFATE HEPTAHYDRATE 500 MG/ML
INJECTION, SOLUTION INTRAMUSCULAR; INTRAVENOUS PRN
Status: DISCONTINUED | OUTPATIENT
Start: 2022-07-25 | End: 2022-07-25 | Stop reason: SDUPTHER

## 2022-07-25 RX ADMIN — ROSUVASTATIN CALCIUM 10 MG: 10 TABLET, FILM COATED ORAL at 20:41

## 2022-07-25 RX ADMIN — STANDARDIZED SENNA CONCENTRATE AND DOCUSATE SODIUM 1 TABLET: 8.6; 5 TABLET ORAL at 20:06

## 2022-07-25 RX ADMIN — ACETAMINOPHEN 650 MG: 325 TABLET ORAL at 07:25

## 2022-07-25 RX ADMIN — Medication 10 MG: at 08:10

## 2022-07-25 RX ADMIN — HYDROMORPHONE HYDROCHLORIDE 0.25 MG: 2 INJECTION, SOLUTION INTRAMUSCULAR; INTRAVENOUS; SUBCUTANEOUS at 11:35

## 2022-07-25 RX ADMIN — CEFAZOLIN 2000 MG: 2 INJECTION, POWDER, FOR SOLUTION INTRAMUSCULAR; INTRAVENOUS at 07:26

## 2022-07-25 RX ADMIN — ONDANSETRON 4 MG: 2 INJECTION INTRAMUSCULAR; INTRAVENOUS at 10:24

## 2022-07-25 RX ADMIN — Medication 10 MG: at 08:17

## 2022-07-25 RX ADMIN — VASOPRESSIN 1 UNITS: 20 INJECTION INTRAVENOUS at 07:47

## 2022-07-25 RX ADMIN — HYDROMORPHONE HYDROCHLORIDE 0.25 MG: 2 INJECTION, SOLUTION INTRAMUSCULAR; INTRAVENOUS; SUBCUTANEOUS at 16:59

## 2022-07-25 RX ADMIN — HYDROMORPHONE HYDROCHLORIDE 0.25 MG: 2 INJECTION, SOLUTION INTRAMUSCULAR; INTRAVENOUS; SUBCUTANEOUS at 11:25

## 2022-07-25 RX ADMIN — DOXAZOSIN 8 MG: 1 TABLET ORAL at 20:06

## 2022-07-25 RX ADMIN — VECURONIUM BROMIDE 2 MG: 1 INJECTION, POWDER, LYOPHILIZED, FOR SOLUTION INTRAVENOUS at 10:14

## 2022-07-25 RX ADMIN — HYDROMORPHONE HYDROCHLORIDE 0.25 MG: 2 INJECTION, SOLUTION INTRAMUSCULAR; INTRAVENOUS; SUBCUTANEOUS at 12:03

## 2022-07-25 RX ADMIN — MAGNESIUM SULFATE HEPTAHYDRATE 1 G: 500 INJECTION, SOLUTION INTRAMUSCULAR; INTRAVENOUS at 07:30

## 2022-07-25 RX ADMIN — SODIUM CHLORIDE: 9 INJECTION, SOLUTION INTRAVENOUS at 12:20

## 2022-07-25 RX ADMIN — PROPOFOL 100 MG: 10 INJECTION, EMULSION INTRAVENOUS at 07:33

## 2022-07-25 RX ADMIN — SODIUM CHLORIDE: 9 INJECTION, SOLUTION INTRAVENOUS at 07:10

## 2022-07-25 RX ADMIN — HYDROMORPHONE HYDROCHLORIDE 0.25 MG: 2 INJECTION, SOLUTION INTRAMUSCULAR; INTRAVENOUS; SUBCUTANEOUS at 11:50

## 2022-07-25 RX ADMIN — VASOPRESSIN 1 UNITS: 20 INJECTION INTRAVENOUS at 08:01

## 2022-07-25 RX ADMIN — FENTANYL CITRATE 25 MCG: 50 INJECTION, SOLUTION INTRAMUSCULAR; INTRAVENOUS at 10:19

## 2022-07-25 RX ADMIN — DEXAMETHASONE SODIUM PHOSPHATE 4 MG: 4 INJECTION, SOLUTION INTRAMUSCULAR; INTRAVENOUS at 07:58

## 2022-07-25 RX ADMIN — MORPHINE SULFATE 4 MG: 4 INJECTION, SOLUTION INTRAMUSCULAR; INTRAVENOUS at 21:51

## 2022-07-25 RX ADMIN — Medication 10 MG: at 07:54

## 2022-07-25 RX ADMIN — APREPITANT 40 MG: 40 CAPSULE ORAL at 07:26

## 2022-07-25 RX ADMIN — FENTANYL CITRATE 25 MCG: 50 INJECTION, SOLUTION INTRAMUSCULAR; INTRAVENOUS at 07:30

## 2022-07-25 RX ADMIN — PHENYLEPHRINE HYDROCHLORIDE 100 MCG: 10 INJECTION INTRAVENOUS at 08:20

## 2022-07-25 RX ADMIN — ONDANSETRON 4 MG: 2 INJECTION INTRAMUSCULAR; INTRAVENOUS at 16:58

## 2022-07-25 RX ADMIN — VASOPRESSIN 1 UNITS: 20 INJECTION INTRAVENOUS at 08:14

## 2022-07-25 RX ADMIN — SODIUM CHLORIDE: 9 INJECTION, SOLUTION INTRAVENOUS at 15:25

## 2022-07-25 RX ADMIN — Medication 10 MG: at 10:14

## 2022-07-25 RX ADMIN — Medication 10 MG: at 08:06

## 2022-07-25 RX ADMIN — SOTALOL HYDROCHLORIDE 80 MG: 80 TABLET ORAL at 20:06

## 2022-07-25 RX ADMIN — VASOPRESSIN 1 UNITS: 20 INJECTION INTRAVENOUS at 08:08

## 2022-07-25 RX ADMIN — LIDOCAINE HYDROCHLORIDE 100 MG: 20 INJECTION, SOLUTION EPIDURAL; INFILTRATION; INTRACAUDAL; PERINEURAL at 07:33

## 2022-07-25 RX ADMIN — HYDROMORPHONE HYDROCHLORIDE 0.25 MG: 2 INJECTION, SOLUTION INTRAMUSCULAR; INTRAVENOUS; SUBCUTANEOUS at 11:40

## 2022-07-25 RX ADMIN — HYDROMORPHONE HYDROCHLORIDE 0.25 MG: 2 INJECTION, SOLUTION INTRAMUSCULAR; INTRAVENOUS; SUBCUTANEOUS at 11:30

## 2022-07-25 RX ADMIN — VECURONIUM BROMIDE 2 MG: 1 INJECTION, POWDER, LYOPHILIZED, FOR SOLUTION INTRAVENOUS at 08:25

## 2022-07-25 RX ADMIN — ACETAMINOPHEN 650 MG: 325 TABLET ORAL at 20:06

## 2022-07-25 RX ADMIN — Medication 10 MG: at 07:30

## 2022-07-25 RX ADMIN — ONDANSETRON 4 MG: 2 INJECTION INTRAMUSCULAR; INTRAVENOUS at 21:51

## 2022-07-25 RX ADMIN — SODIUM CHLORIDE: 9 INJECTION, SOLUTION INTRAVENOUS at 07:30

## 2022-07-25 RX ADMIN — VECURONIUM BROMIDE 3 MG: 1 INJECTION, POWDER, LYOPHILIZED, FOR SOLUTION INTRAVENOUS at 09:46

## 2022-07-25 RX ADMIN — Medication 20 MG: at 08:40

## 2022-07-25 RX ADMIN — VECURONIUM BROMIDE 8 MG: 1 INJECTION, POWDER, LYOPHILIZED, FOR SOLUTION INTRAVENOUS at 07:47

## 2022-07-25 RX ADMIN — SODIUM CHLORIDE: 9 INJECTION, SOLUTION INTRAVENOUS at 09:30

## 2022-07-25 RX ADMIN — Medication 120 MG: at 07:33

## 2022-07-25 ASSESSMENT — PAIN DESCRIPTION - LOCATION
LOCATION: ABDOMEN

## 2022-07-25 ASSESSMENT — PAIN SCALES - GENERAL
PAINLEVEL_OUTOF10: 0
PAINLEVEL_OUTOF10: 7
PAINLEVEL_OUTOF10: 5
PAINLEVEL_OUTOF10: 7
PAINLEVEL_OUTOF10: 8
PAINLEVEL_OUTOF10: 7
PAINLEVEL_OUTOF10: 3
PAINLEVEL_OUTOF10: 8
PAINLEVEL_OUTOF10: 2
PAINLEVEL_OUTOF10: 7
PAINLEVEL_OUTOF10: 7

## 2022-07-25 ASSESSMENT — PAIN DESCRIPTION - FREQUENCY: FREQUENCY: INTERMITTENT

## 2022-07-25 ASSESSMENT — PAIN DESCRIPTION - DESCRIPTORS: DESCRIPTORS: ACHING;DISCOMFORT

## 2022-07-25 ASSESSMENT — PAIN DESCRIPTION - PAIN TYPE: TYPE: SURGICAL PAIN

## 2022-07-25 ASSESSMENT — ENCOUNTER SYMPTOMS: SHORTNESS OF BREATH: 0

## 2022-07-25 ASSESSMENT — PAIN - FUNCTIONAL ASSESSMENT: PAIN_FUNCTIONAL_ASSESSMENT: NONE - DENIES PAIN

## 2022-07-25 NOTE — PROGRESS NOTES
Pt arrived from OR to PACU, awakens to voice, c/o pain in abdomen. VSS, O2 sats 94% on 6 L simple mask. Incisions to abdomen dry and intact, abdomen soft, ice pack in place. Lazo in place draining clear yellow urine. Will monitor and treat pain per MAR.

## 2022-07-25 NOTE — ANESTHESIA PRE PROCEDURE
Department of Anesthesiology  Preprocedure Note       Name:  Liberty Kumar   Age:  67 y.o.  :  1950                                          MRN:  [id number]         Date:  2022      Surgeon: Mirella Sheikh):  Betty Case MD    Procedure: Procedure(s):  ROBOTIC LAPAROSCOPIC RADICAL RIGHT NEPHRECTOMY    Medications prior to admission:   Prior to Admission medications    Medication Sig Start Date End Date Taking? Authorizing Provider   ascorbic acid (VITAMIN C) 500 MG tablet Take 500 mg by mouth daily    Historical Provider, MD   vitamin D3 (CHOLECALCIFEROL) 125 MCG (5000 UT) TABS tablet Take 5,000 Units by mouth daily    Historical Provider, MD   diclofenac sodium (VOLTAREN) 1 % GEL Apply 4 g topically 4 times daily    Historical Provider, MD   Multiple Vitamins-Minerals (MULTIVITAMIN ADULTS PO) Take 1 tablet by mouth daily    Historical Provider, MD   rosuvastatin (CRESTOR) 10 MG tablet TAKE 1 TABLET EVERY OTHER DAY 22   Clair Veras MD   sotalol (BETAPACE) 80 MG tablet Take 1 tablet by mouth 2 times daily 22   Clair Veras MD   chlorthalidone (HYGROTEN) 50 MG tablet TAKE 1 TABLET DAILY 21   Clair Veras MD   Naproxen Sodium 220 MG CAPS Take 220 mg by mouth as needed for Pain    Historical Provider, MD   aspirin 81 MG tablet Take 81 mg by mouth daily    Historical Provider, MD   losartan (COZAAR) 50 MG tablet Take 100 mg by mouth daily    Historical Provider, MD   insulin lispro (HUMALOG) 100 UNIT/ML injection cartridge Inject into the skin 3 times daily (before meals)    Historical Provider, MD   terazosin (HYTRIN) 10 MG capsule Take 10 mg by mouth nightly    Historical Provider, MD   glimepiride (AMARYL) 4 MG tablet Take 4 mg by mouth every morning (before breakfast).       Historical Provider, MD   omeprazole (PRILOSEC) 20 MG capsule Take 20 mg by mouth Five times weekly     Historical Provider, MD   metformin (GLUCOPHAGE) 1000 MG tablet Take 1,000 mg by mouth 2 times daily (with She will return for labs next week,   Apply bactroban oint to affected lesion bid,   If symptoms persist call office will refer to derm if needed,   Cont f/u with GYN as scheduled for visit and US,   Diflucan 150mg x1 tablet.   Encouraged low salt diet, regular exercise.   Increase fluid intake, get plenty of rest.   .Patient agrees with plan of care and understands instructions. Call if worsening symptoms or any problems or concerns.      Comment: minimal                                Counseling given: Not Answered      Vital Signs (Current):   Vitals:    06/29/22 1149 07/25/22 0630   BP:  125/69   Pulse:  62   Resp:  16   Temp:  (!) 96 °F (35.6 °C)   TempSrc:  Temporal   SpO2:  99%   Weight: 216 lb (98 kg) 211 lb 14.4 oz (96.1 kg)   Height: 6' 3\" (1.905 m)                                               BP Readings from Last 3 Encounters:   07/25/22 125/69   06/28/22 122/64   05/13/21 124/66       NPO Status: Time of last liquid consumption: 2359                        Time of last solid consumption: 2000                        Date of last liquid consumption: 07/24/22                        Date of last solid food consumption: 07/24/22    BMI:   Wt Readings from Last 3 Encounters:   07/25/22 211 lb 14.4 oz (96.1 kg)   06/28/22 215 lb 11.2 oz (97.8 kg)   05/13/21 235 lb (106.6 kg)     Body mass index is 26.49 kg/m².     CBC:   Lab Results   Component Value Date/Time    WBC 6.2 07/08/2022 09:28 AM    RBC 3.68 07/08/2022 09:28 AM    HGB 10.6 07/08/2022 09:28 AM    HCT 32.3 07/08/2022 09:28 AM    MCV 87.8 07/08/2022 09:28 AM    RDW 14.5 07/08/2022 09:28 AM     07/08/2022 09:28 AM       CMP:   Lab Results   Component Value Date/Time     07/08/2022 09:28 AM    K 4.2 07/08/2022 09:28 AM     07/08/2022 09:28 AM    CO2 29 07/08/2022 09:28 AM    BUN 26 07/08/2022 09:28 AM    CREATININE 1.5 07/08/2022 09:28 AM    GFRAA 55 07/08/2022 09:28 AM    GFRAA >60 03/21/2013 10:55 AM    AGRATIO 1.1 07/08/2022 09:28 AM    LABGLOM 46 07/08/2022 09:28 AM    GLUCOSE 123 07/08/2022 09:28 AM    PROT 7.0 07/08/2022 09:28 AM    CALCIUM 9.5 07/08/2022 09:28 AM    BILITOT 0.3 07/08/2022 09:28 AM    ALKPHOS 77 07/08/2022 09:28 AM    AST 9 07/08/2022 09:28 AM    ALT 6 07/08/2022 09:28 AM       POC Tests:   Recent Labs     07/25/22  0646   POCGLU 146*       Coags:   Lab Results   Component Value Date/Time    PROTIME 14.3 07/08/2022 09:28 AM    INR 1.11 07/08/2022 09:28 AM    APTT 32.1 07/08/2022 09:28 AM       HCG (If Applicable): No results found for: PREGTESTUR, PREGSERUM, HCG, HCGQUANT     ABGs: No results found for: PHART, PO2ART, PUT5XUO, IPB9NZU, BEART, [id number]     Type & Screen (If Applicable):  No results found for: LABABO, LABRH    Drug/Infectious Status (If Applicable):  No results found for: HIV, HEPCAB    COVID-19 Screening (If Applicable): No results found for: COVID19        Anesthesia Evaluation  Patient summary reviewed and Nursing notes reviewed no history of anesthetic complications:   Airway: Mallampati: I  TM distance: >3 FB   Neck ROM: full  Mouth opening: > = 3 FB   Dental: normal exam         Pulmonary:       (-) asthma and shortness of breath                           Cardiovascular:    (+) hypertension:, CAD:, dysrhythmias: atrial fibrillation, hyperlipidemia    (-)  angina          Echocardiogram reviewed                  Neuro/Psych:      (-) CVA           GI/Hepatic/Renal:        (-) GERD and liver disease       Endo/Other:    (+) DiabetesType II DM, , malignancy/cancer. (-) hypothyroidism               Abdominal:             Vascular:     - PVD. Other Findings:           Anesthesia Plan      general     ASA 3       Induction: intravenous. MIPS: Postoperative opioids intended and Prophylactic antiemetics administered. Anesthetic plan and risks discussed with patient. Use of blood products discussed with patient whom. Plan discussed with CRNA.                     Omayra Rosario MD   7/25/2022

## 2022-07-25 NOTE — ANESTHESIA POSTPROCEDURE EVALUATION
Department of Anesthesiology  Postprocedure Note    Patient: Harry Milner  MRN: [id number]  YOB: 1950  Date of evaluation: 7/25/2022      Procedure Summary     Date: 07/25/22 Room / Location: [facility] OR [address] [place] [address]    Anesthesia Start: 0730 Anesthesia Stop: 6117    Procedure: ROBOTIC LAPAROSCOPIC RADICAL RIGHT NEPHRECTOMY (Right: Abdomen) Diagnosis:       Right renal mass      (Right renal mass [N28.89])    Surgeons: Román Rosales MD Responsible Provider: Ellamae Aschoff, MD    Anesthesia Type: General ASA Status: 3          Anesthesia Type: General    Ye Phase I: Ye Score: 8    Ye Phase II:        Anesthesia Post Evaluation    Patient location during evaluation: PACU  Patient participation: complete - patient participated  Level of consciousness: awake and alert  Airway patency: patent  Nausea & Vomiting: no nausea and no vomiting  Complications: no  Cardiovascular status: blood pressure returned to baseline  Respiratory status: acceptable  Hydration status: euvolemic  Multimodal analgesia pain management approach

## 2022-07-25 NOTE — PROGRESS NOTES
Pt resting quietly in bed, awake, rates pain in abdomen as a 2 out of 10, states it is tolerable for him. VSS, O2 sats 99% on 3 L NC. Incisions to abdomen dry and intact, abdomen soft. Lazo remains in place draining clear yellow urine. Pt seen by anesthesia, phase 1 criteria met. Awaiting bed placement on 4T. Will monitor and transfer pt when room ready.

## 2022-07-25 NOTE — PROGRESS NOTES
Pt resting quietly in bed with eyes closed, awakens easily-VSS, denies any needs at this time. Family at bedside. Labs resulted and Dr. Silvio Desouza aware, no new orders. Will continue to monitor while waiting on 4T bed.

## 2022-07-25 NOTE — OP NOTE
Urology Operative Report  [facility]    Provider: Taty Gongora MD MD Patient ID:  Admission Date: 2022 Name: Cheikh Hale  OR Date: 2022  MRN: [id number]   Patient Location: OR/NONE : 1950  Attending: Taty Gongora MD Date of Service: 2022  PCP: Charito Cedeno     Date of Operation: 2022     Preoperative Diagnosis: renal mass on the RIGHT kidney (~9cm)    Postoperative Diagnosis: same    Procedure:    1. Robotic assisted laparoscopic radical nephrectomy on the RIGHT  2. Layered closure of the anterior abdominal wall    Surgeon:   Taty Gongora MD    Anesthesia: General endotracheal anesthesia    Indications: Cheikh Hale is a 67 y.o. male who presents for the above named surgery. Informed consent was obtained and the risks, benefits, and details of the procedure were explained to the patient who elected to proceed. Details of Procedure:  After informed consent was obtained, making the patient aware of the risks, benefits and alternatives to the procedure, he was taken back to the surgical suite and positioned in a supine position on the surgical table. SCDs were placed on the lower extremities. General anesthesia was induced, A Lazo catheter was placed, and he was transferred to a left lateral decubitus position. All pressure points were carefully padded. An 8mm incision was made in the right side paramedian location, superior and lateral to the umbilicus. A veress needle was introduced into the abdomen in one attempt and insufflation was obtained. A 8mm trocar was used to enter the abdomen. No adhesions were seen and no injury was noted. After inspecting the abdomen sufficiently we placed 2 robotic 8 mm trocars in the right lower quadrant and one 8mm trocar in the right upper quadrant. An assistant 5mm port was placed in the midline supra-umbilically.  A 5mm assistant port was placed in the midline below the xiphoid process to assist with liver retraction. A bullet clamped was placed through this port, used to elevate the liver edge, and was self-retaining by grasping the diaphragm. The robot was then docked and the instruments advanced under direct vision. The ascending colon was reflected medially by incising the white line of Toldt. The duodenum was reflected medially allowing exposure to the kidney, the hilar vessels, and the IVC in the usual retroperitoneal location. A plane was created directly above the psoas muscle, elevating the ureter with the kidney packet, and leaving the gonadal vessel with the IVC. This plane posterior to the kidney, on top of the psoas muscle was extended cephalad. The renal hilar vessels were encountered medial to the kidney. The renal artery and vein were dissected free of investing tissues. A 35 mm vascular staple load was used to take the renal artery. A second load was used to take the renal vein. An adrenal sparing approach was employed for the upper pole by creating a fat plane between the kidney and the adrenal gland. There was partial transection oft he adrenal gland, but a majority was spared. All upper pole and lateral kidney attachments were taken down. The ureter was clipped with hem-o-lock clips and cut. The kidney was entirely free and placed into a specimen bag. The wound bed was irrigated and hemostasis was ensured with pneumoperitoneum decreased. Robotic instruments were removed. The camera port incision was extended to allow extraction of the kidney. This incision was closed using 0-vicryl for the peritoneum, 0-looped PDS to close the fascia, 3-0 vicryl to the subcutaneous fat, and 4-0 running Monocryl was used to approximate the skin edges and Dermabond was applied to all wounds. At the end of the procedure all needle, lap, instrument and sponge counts were correct.  The patient tolerated the procedure well, was extubated without issue in the operating room, and was transported to the PACU in stable condition. Findings: There did appear to be an adequate surgical margin. Estimated Blood Loss: Approximately 50mL                  Drains: Lazo catheter    Specimens: Right kidney and proximal ureter    Complications: none apparent           Disposition:  PACU - hemodynamically stable.             Román Rosales MD, MD  7/25/2022

## 2022-07-25 NOTE — H&P
The history and physical was reviewed. The patient was seen and examined in pre-op and his right side was marked with his participation. He had a chance to ask questions which were answered. There has been no interval change. Plan to continue to the OR for right robotic radical nephrectomy.     Cash Doyle MD  7/25/2022

## 2022-07-26 VITALS
DIASTOLIC BLOOD PRESSURE: 71 MMHG | BODY MASS INDEX: 26.24 KG/M2 | TEMPERATURE: 97.6 F | RESPIRATION RATE: 16 BRPM | OXYGEN SATURATION: 95 % | HEIGHT: 75 IN | WEIGHT: 211 LBS | HEART RATE: 57 BPM | SYSTOLIC BLOOD PRESSURE: 131 MMHG

## 2022-07-26 PROBLEM — N28.89 RENAL MASS: Status: ACTIVE | Noted: 2022-07-26

## 2022-07-26 LAB
ANION GAP SERPL CALCULATED.3IONS-SCNC: 12 MMOL/L (ref 3–16)
BUN BLDV-MCNC: 31 MG/DL (ref 7–20)
CALCIUM SERPL-MCNC: 8.6 MG/DL (ref 8.3–10.6)
CHLORIDE BLD-SCNC: 104 MMOL/L (ref 99–110)
CO2: 24 MMOL/L (ref 21–32)
CREAT SERPL-MCNC: 2 MG/DL (ref 0.8–1.3)
GFR AFRICAN AMERICAN: 40
GFR NON-AFRICAN AMERICAN: 33
GLUCOSE BLD-MCNC: 150 MG/DL (ref 70–99)
GLUCOSE BLD-MCNC: 151 MG/DL (ref 70–99)
HCT VFR BLD CALC: 29.4 % (ref 40.5–52.5)
HEMOGLOBIN: 9.6 G/DL (ref 13.5–17.5)
MCH RBC QN AUTO: 28.5 PG (ref 26–34)
MCHC RBC AUTO-ENTMCNC: 32.5 G/DL (ref 31–36)
MCV RBC AUTO: 87.6 FL (ref 80–100)
PDW BLD-RTO: 14.6 % (ref 12.4–15.4)
PERFORMED ON: ABNORMAL
PLATELET # BLD: 255 K/UL (ref 135–450)
PMV BLD AUTO: 7.2 FL (ref 5–10.5)
POTASSIUM SERPL-SCNC: 3.7 MMOL/L (ref 3.5–5.1)
RBC # BLD: 3.36 M/UL (ref 4.2–5.9)
SODIUM BLD-SCNC: 140 MMOL/L (ref 136–145)
WBC # BLD: 10.2 K/UL (ref 4–11)

## 2022-07-26 PROCEDURE — 85027 COMPLETE CBC AUTOMATED: CPT

## 2022-07-26 PROCEDURE — 36415 COLL VENOUS BLD VENIPUNCTURE: CPT

## 2022-07-26 PROCEDURE — 6370000000 HC RX 637 (ALT 250 FOR IP): Performed by: UROLOGY

## 2022-07-26 PROCEDURE — 80048 BASIC METABOLIC PNL TOTAL CA: CPT

## 2022-07-26 PROCEDURE — 2580000003 HC RX 258: Performed by: UROLOGY

## 2022-07-26 RX ADMIN — SOTALOL HYDROCHLORIDE 80 MG: 80 TABLET ORAL at 08:32

## 2022-07-26 RX ADMIN — SODIUM CHLORIDE: 9 INJECTION, SOLUTION INTRAVENOUS at 05:22

## 2022-07-26 RX ADMIN — OXYCODONE 5 MG: 5 TABLET ORAL at 11:07

## 2022-07-26 RX ADMIN — STANDARDIZED SENNA CONCENTRATE AND DOCUSATE SODIUM 1 TABLET: 8.6; 5 TABLET ORAL at 08:29

## 2022-07-26 RX ADMIN — LOSARTAN POTASSIUM 100 MG: 100 TABLET, FILM COATED ORAL at 08:30

## 2022-07-26 RX ADMIN — ACETAMINOPHEN 650 MG: 325 TABLET ORAL at 05:20

## 2022-07-26 ASSESSMENT — PAIN - FUNCTIONAL ASSESSMENT: PAIN_FUNCTIONAL_ASSESSMENT: ACTIVITIES ARE NOT PREVENTED

## 2022-07-26 ASSESSMENT — PAIN SCALES - GENERAL
PAINLEVEL_OUTOF10: 2
PAINLEVEL_OUTOF10: 5
PAINLEVEL_OUTOF10: 2

## 2022-07-26 ASSESSMENT — PAIN DESCRIPTION - LOCATION
LOCATION: ABDOMEN
LOCATION: ABDOMEN

## 2022-07-26 ASSESSMENT — PAIN DESCRIPTION - ORIENTATION: ORIENTATION: MID

## 2022-07-26 ASSESSMENT — PAIN DESCRIPTION - DESCRIPTORS: DESCRIPTORS: SORE

## 2022-07-26 NOTE — PROGRESS NOTES
CLINICAL PHARMACY NOTE: MEDS TO BEDS    Total # of Prescriptions Filled: 2   The following medications were delivered to the patient:  Percocet 5-325 mg  Stimulant laxative 8.5-50 mg    Additional Documentation:  Patient picked up from Outpatient Pharmacy=signed  Delebronwyn Alvarez CPhT

## 2022-07-26 NOTE — DISCHARGE SUMMARY
Urology Discharge Summary  [facility]    Provider: JANET Vasquez CNP  Patient ID:  Admission Date: 2022 Name: Vickie Mendosa Date: 2022 MRN: [id number]   Patient Location: [id number] : 1950  Attending: Naida Ramos MD Date of Service: 2022  PCP: Jyotsna Melgar     Discharge date: 2022    Discharge Diagnoses:   1. Preop testing    2. Renal mass        Reason for Hospitalization: Tamika Murphy is a 67 y.o. male who was admitted post surgery. Operations/Procedures Performed:   1. Robotic assisted laparoscopic radical nephrectomy on the RIGHT  2. Layered closure of the anterior abdominal wall    Discharge Condition:  Stable    Hospital Course: The patient was admitted on 2022 and underwent the above mentioned procedure(s). He tolerated the procedure well with no apparent complications. Please see full operative report for further details regarding the operation. Postoperatively the patient remained in stable condition. Pain was controlled post-operatively with oral and IV medication. Diet was advanced and this was tolerated well. At the time of discharge, the patient was tolerating oral food and hydration, was ambulating without difficulty, and pain was controlled on oral medications. The patient was determined to be suitable for discharge and the patient felt comfortable with that decision. Patient was discharged in good condition. Separate discharge instructions provided to the patient or their caregiver. Consults: None    Significant Diagnostic findings: There did appear to be an adequate surgical margin    Discharge Exam:  Blood pressure 131/71, pulse 57, temperature 97.6 °F (36.4 °C), temperature source Oral, resp. rate 16, height 6' 3\" (1.905 m), weight 211 lb (95.7 kg), SpO2 95 %.   Gen - NAD, AOx3  CV - RRR  Resp - CTAB  Abd - soft, NT/ND, inc c/d/i  Ext - warm, well-perfused    Disposition: Discharged home in good condition    Discharge Medications:  Current Discharge Medication List        START taking these medications    Details   oxyCODONE-acetaminophen (PERCOCET) 5-325 MG per tablet Take 1 tablet by mouth every 8 hours as needed for Pain for up to 5 days. Intended supply: 3 days. Take lowest dose possible to manage pain  Qty: 15 tablet, Refills: 0    Comments: Reduce doses taken as pain becomes manageable  Associated Diagnoses: Renal mass      sennosides-docusate sodium (SENOKOT-S) 8.6-50 MG tablet Take 1 tablet by mouth in the morning. Qty: 30 tablet, Refills: 0           CONTINUE these medications which have NOT CHANGED    Details   ascorbic acid (VITAMIN C) 500 MG tablet Take 500 mg by mouth daily      vitamin D3 (CHOLECALCIFEROL) 125 MCG (5000 UT) TABS tablet Take 5,000 Units by mouth daily      diclofenac sodium (VOLTAREN) 1 % GEL Apply 4 g topically 4 times daily      Multiple Vitamins-Minerals (MULTIVITAMIN ADULTS PO) Take 1 tablet by mouth daily      rosuvastatin (CRESTOR) 10 MG tablet TAKE 1 TABLET EVERY OTHER DAY  Qty: 90 tablet, Refills: 3      sotalol (BETAPACE) 80 MG tablet Take 1 tablet by mouth 2 times daily  Qty: 180 tablet, Refills: 3    Associated Diagnoses: Paroxysmal atrial fibrillation (HCC)      chlorthalidone (HYGROTEN) 50 MG tablet TAKE 1 TABLET DAILY  Qty: 90 tablet, Refills: 3      aspirin 81 MG tablet Take 81 mg by mouth daily      losartan (COZAAR) 50 MG tablet Take 100 mg by mouth daily      insulin lispro (HUMALOG) 100 UNIT/ML injection cartridge Inject into the skin 3 times daily (before meals)      terazosin (HYTRIN) 10 MG capsule Take 10 mg by mouth nightly      glimepiride (AMARYL) 4 MG tablet Take 4 mg by mouth every morning (before breakfast). omeprazole (PRILOSEC) 20 MG capsule Take 20 mg by mouth Five times weekly       metformin (GLUCOPHAGE) 1000 MG tablet Take 1,000 mg by mouth 2 times daily (with meals).         insulin glargine (LANTUS) 100 UNIT/ML injection Inject 25 Units into the skin nightly STOP taking these medications       Naproxen Sodium 220 MG CAPS Comments:   Reason for Stopping: Follow up Appointment:  Please call [phone] to schedule a follow up appointment with Dr. Neelam Aldrich in 1 weeks. Total time spent reviewing discharge plan with patient/caregivers, preparation of the discharge record, filling prescriptions, and arranging post discharge plan was 10min.     Sara Parker, JANET - FRANCHESCA,  7/26/2022

## 2022-07-26 NOTE — PROGRESS NOTES
Discharge instructions gone over with patient, all questions answered and understood. IVs were removed with no complications. Patient was taken out for discharge via wheelchair.

## 2022-07-26 NOTE — PLAN OF CARE
Problem: Chronic Conditions and Co-morbidities  Goal: Patient's chronic conditions and co-morbidity symptoms are monitored and maintained or improved  7/26/2022 1224 by Mukesh Celis RN  Outcome: Resolved/Met     Problem: Discharge Planning  Goal: Discharge to home or other facility with appropriate resources  7/26/2022 1224 by Mukesh Celis RN  Outcome: Resolved/Met     Problem: ABCDS Injury Assessment  Goal: Absence of physical injury  7/26/2022 1224 by Mukesh Celis RN  Outcome: Resolved/Met     Problem: Pain  Goal: Verbalizes/displays adequate comfort level or baseline comfort level  7/26/2022 1224 by Mukesh Celis RN  Outcome: Resolved/Met

## 2022-07-26 NOTE — PROGRESS NOTES
Report received from Светлана Anthony, pt is alert and oriented, denies any needs at this time, will monitor pt. The care plan and education has been reviewed and mutually agreed upon with the patient.

## 2022-07-26 NOTE — PLAN OF CARE
Problem: Chronic Conditions and Co-morbidities  Goal: Patient's chronic conditions and co-morbidity symptoms are monitored and maintained or improved  Outcome: Progressing Towards Goal     Problem: Discharge Planning  Goal: Discharge to home or other facility with appropriate resources  Outcome: Progressing Towards Goal  Flowsheets (Taken 7/25/2022 [id number] by Sacha Slater RN)  Discharge to home or other facility with appropriate resources: Refer to discharge planning if patient needs post-hospital services based on physician order or complex needs related to functional status, cognitive ability or social support system     Problem: ABCDS Injury Assessment  Goal: Absence of physical injury  Outcome: Progressing Towards Goal     Problem: Pain  Goal: Verbalizes/displays adequate comfort level or baseline comfort level  Outcome: Progressing Towards Goal

## 2022-07-26 NOTE — PROGRESS NOTES
note    POD 1 Radical right nephrectomy    Lazo out and voiding volitionally    Incisions CDI, abdomen soft, denies n/v, tolerating po    Some surgical pain when coughing/deep breathing- encouraged IS    AFVSS, labs nl    Discharge in stable condition      Severa Chess, CNP  07/26/2022

## 2022-07-26 NOTE — CARE COORDINATION
Discharge Planning Note:    Chart reviewed and it appears that patient has minimal needs for discharge at this time. Discussed with patients nurse and requested that case management be notified if discharge needs are identified.     - Current discharge plan is for the patient to return home with no needs. Case management will continue to follow progress and update discharge plan as needed.       Risk of Readmission Score: 12%    SUZANNE Contreras RN    [facility]  Phone: [phone]

## 2022-07-28 PROBLEM — Z01.818 PRE-OPERATIVE CLEARANCE: Status: RESOLVED | Noted: 2022-06-28 | Resolved: 2022-07-28

## 2022-08-04 NOTE — PROGRESS NOTES
Physician Progress Note      Maggie Lomeli  CSN #:                  [id number]  :                       1950  ADMIT DATE:       2022 6:06 AM  DISCH DATE:        2022 12:57 PM  RESPONDING  PROVIDER #:        Vania Cuba MD          QUERY TEXT:    Pt admitted with renal mass and noted to have surgical pathology consistent   with Clear cell renal cell carcinoma. If possible, please document in   progress notes and d/c summary a correlating diagnosis to explain pathology   findings: The medical record reflects the following:  Risk Factors: renal mass  Clinical Indicators: The pathology report on  indicates a diagnosis of   clear cell renal cell carcinoma with tumor extension into renal sinus, and   major vein. Treatment: Radical nephrectomy with pathology  Options provided:  -- clear cell renal cell carcinoma with tumor extension into renal sinus, and   major vein  -- Other - I will add my own diagnosis  -- Disagree - Not applicable / Not valid  -- Disagree - Clinically unable to determine / Unknown  -- Refer to Clinical Documentation Reviewer    PROVIDER RESPONSE TEXT:    This patient has a clear cell renal cell carcinoma with tumor extension into   renal sinus, and major vein.     Query created by: Fany Quesada on 8/3/2022 12:43 PM      Electronically signed by:  aVnia Cuba MD 2022 6:27 PM

## 2022-11-21 ENCOUNTER — HOSPITAL ENCOUNTER (OUTPATIENT)
Dept: CT IMAGING | Age: 72
Discharge: HOME | End: 2022-11-21
Payer: MEDICARE

## 2022-11-21 DIAGNOSIS — C64.1 MALIGNANT NEOPLASM OF RIGHT KIDNEY, EXCEPT RENAL PELVIS (HCC): ICD-10-CM

## 2022-11-21 PROCEDURE — 71250 CT THORAX DX C-: CPT

## 2023-03-10 ENCOUNTER — HOSPITAL ENCOUNTER (OUTPATIENT)
Dept: CT IMAGING | Age: 73
Discharge: HOME | End: 2023-03-10
Payer: MEDICARE

## 2023-03-10 DIAGNOSIS — C64.1 MALIGNANT NEOPLASM OF RIGHT KIDNEY, EXCEPT RENAL PELVIS (HCC): ICD-10-CM

## 2023-03-10 PROCEDURE — 71250 CT THORAX DX C-: CPT

## 2023-03-17 ENCOUNTER — TELEPHONE (OUTPATIENT)
Dept: CARDIOLOGY CLINIC | Age: 73
End: 2023-03-17

## 2023-03-17 DIAGNOSIS — I71.21 ASCENDING AORTIC ANEURYSM, UNSPECIFIED WHETHER RUPTURED (HCC): Primary | ICD-10-CM

## 2023-04-19 ENCOUNTER — OFFICE VISIT (OUTPATIENT)
Dept: CARDIOTHORACIC SURGERY | Age: 73
End: 2023-04-19
Payer: MEDICARE

## 2023-04-19 VITALS
WEIGHT: 222 LBS | HEART RATE: 65 BPM | DIASTOLIC BLOOD PRESSURE: 78 MMHG | OXYGEN SATURATION: 98 % | SYSTOLIC BLOOD PRESSURE: 138 MMHG | BODY MASS INDEX: 27.6 KG/M2 | HEIGHT: 75 IN | TEMPERATURE: 98 F

## 2023-04-19 DIAGNOSIS — I77.810 ASCENDING AORTA DILATATION (HCC): Primary | ICD-10-CM

## 2023-04-19 PROCEDURE — 1123F ACP DISCUSS/DSCN MKR DOCD: CPT | Performed by: THORACIC SURGERY (CARDIOTHORACIC VASCULAR SURGERY)

## 2023-04-19 PROCEDURE — 3075F SYST BP GE 130 - 139MM HG: CPT | Performed by: THORACIC SURGERY (CARDIOTHORACIC VASCULAR SURGERY)

## 2023-04-19 PROCEDURE — 1036F TOBACCO NON-USER: CPT | Performed by: THORACIC SURGERY (CARDIOTHORACIC VASCULAR SURGERY)

## 2023-04-19 PROCEDURE — 3017F COLORECTAL CA SCREEN DOC REV: CPT | Performed by: THORACIC SURGERY (CARDIOTHORACIC VASCULAR SURGERY)

## 2023-04-19 PROCEDURE — G8417 CALC BMI ABV UP PARAM F/U: HCPCS | Performed by: THORACIC SURGERY (CARDIOTHORACIC VASCULAR SURGERY)

## 2023-04-19 PROCEDURE — 3078F DIAST BP <80 MM HG: CPT | Performed by: THORACIC SURGERY (CARDIOTHORACIC VASCULAR SURGERY)

## 2023-04-19 PROCEDURE — 99204 OFFICE O/P NEW MOD 45 MIN: CPT | Performed by: THORACIC SURGERY (CARDIOTHORACIC VASCULAR SURGERY)

## 2023-04-19 PROCEDURE — G8427 DOCREV CUR MEDS BY ELIG CLIN: HCPCS | Performed by: THORACIC SURGERY (CARDIOTHORACIC VASCULAR SURGERY)

## 2023-04-19 RX ORDER — LANOLIN ALCOHOL/MO/W.PET/CERES
1000 CREAM (GRAM) TOPICAL DAILY
COMMUNITY

## 2023-04-19 ASSESSMENT — ENCOUNTER SYMPTOMS
ALLERGIC/IMMUNOLOGIC NEGATIVE: 1
GASTROINTESTINAL NEGATIVE: 1
EYES NEGATIVE: 1
RESPIRATORY NEGATIVE: 1

## 2023-04-19 NOTE — PROGRESS NOTES
Subjective:      Patient ID: Karen Waldron is a 75 y.o. male. Chief Complaint   Patient presents with    New Patient     Mr. Sudhakar Ayala is being seen today at the request of Dr. Christianne Frank for ascending aortic and aortic root aneurysm. HPI Mr. Sudhakar Ayala was recently identified as having a dilated ascending aorta on a recent chest CT scan that was performed for other reasons. He is entirely asymptomatic minutes. He presents today for discussion regarding future management. Review of Systems   Constitutional: Negative. HENT: Negative. Eyes: Negative. Wears glasses   Respiratory: Negative. Cardiovascular: Negative. Gastrointestinal: Negative. Endocrine: Negative. Genitourinary: Negative. Musculoskeletal:  Positive for arthralgias (bilateral knees). Negative for gait problem. Skin: Negative. Allergic/Immunologic: Negative. Neurological: Negative. Hematological:  Bruises/bleeds easily. Psychiatric/Behavioral: Negative. Past Medical History:   Diagnosis Date    Atrial fibrillation ([facility] [address]     CAD (coronary artery disease)     Cancer of kidney ([facility] [address]     Currently on Immunotherapy    Diabetes mellitus ([facility] [address]     Hyperlipidemia     Hypertension     Prostate cancer ([facility] [place] [address]     Right renal mass      Past Surgical History:   Procedure Laterality Date    CATARACT REMOVAL Right     ~ 2006    CHOLECYSTECTOMY      EYE SURGERY Left 04/19/2018    Cataract    HERNIA REPAIR      KIDNEY REMOVAL Right 07/25/2022    ROBOTIC LAPAROSCOPIC RADICAL RIGHT NEPHRECTOMY performed by Kimi Julio MD at [address]      both knees    NASAL SEPTUM SURGERY       Allergies   Allergen Reactions    Amoxicillin Other (See Comments)     Urethritis, rash    Bisoprolol-Hydrochlorothiazide Other (See Comments)     G. I.    Cisapride Other (See Comments)     diarrhea    Penicillins      Un aware of pcn allergy    Pioglitazone Other (See Comments)     G. I.     Current Outpatient Medications

## 2023-04-25 NOTE — TELEPHONE ENCOUNTER
Called pt, however no answer. Left VM for Quinn Chaudhari to call back.     Quinn Chaudhari is to call [phone] [facility] scheduling) to schedule his CTA.    (Order is placed)

## 2023-07-26 NOTE — PROGRESS NOTES
visit.      REVIEW OF SYSTEMS:    CONSTITUTIONAL: No major weight gain or loss, fatigue, weakness, night sweats or fever. HEENT: No new vision difficulties or ringing in the ears. RESPIRATORY: no  SOB; - PND, orthopnea or cough. CARDIOVASCULAR: See HPI  GI: No nausea, vomiting, diarrhea, constipation, abdominal pain or changes in bowel habits. : No urinary frequency, urgency, incontinence hematuria or dysuria. SKIN: No cyanosis or skin lesions. MUSCULOSKELETAL: No new muscle or joint pain. Knee pain  NEUROLOGICAL: No syncope or TIA-like symptoms. PSYCHIATRIC: + anxiety    PHYSICAL EXAM:        Vitals:    08/09/16 0833 08/09/16 0855 08/09/16 0857   BP: 130/84 104/62 108/64   Pulse: 65 RUE LUE   Weight: 250 lb (113.4 kg)     Height: 6' 3\" (1.905 m)       /70 (Site: Left Upper Arm, Position: Sitting, Cuff Size: Medium Adult)   Ht 6' 3\" (1.905 m)   Wt 257 lb (116.6 kg)   BMI 32.12 kg/m²      CONSTITUTIONAL: Cooperative, no apparent distress, and appears well nourished / developed  NEUROLOGIC:  Awake and orientated to person, place and time. PSYCH: Calm affect. SKIN: Warm and dry. HEENT: Sclera non-icteric, normocephalic, neck supple, no elevation of JVP, normal carotid pulses with no bruits and thyroid normal size. LUNGS:  No increased work of breathing and clear to auscultation, no crackles or wheezing  CARDIOVASCULAR:  Regular rate and rhythm with no murmurs, gallops, rubs, or abnormal heart sounds, normal PMI. The apical impulses not displaced  JVP less than 8 cm H2O  Heart tones are crisp and normal  Cervical veins are not engorged  The carotid upstroke is normal in amplitude and contour without delay or bruit  JVP is not elevated  ABDOMEN:  Normal bowel sounds, non-distended and non-tender to palpation  EXT: No edema, no calf tenderness. Pulses are present bilaterally.     DATA:    Lab Results   Component Value Date    ALT 24 09/14/2018    AST 20 09/14/2018    ALKPHOS 69 09/14/2018    BILITOT Cimetidine Counseling:  I discussed with the patient the risks of Cimetidine including but not limited to gynecomastia, headache, diarrhea, nausea, drowsiness, arrhythmias, pancreatitis, skin rashes, psychosis, bone marrow suppression and kidney toxicity.

## 2023-08-08 ENCOUNTER — HOSPITAL ENCOUNTER (OUTPATIENT)
Dept: CT IMAGING | Age: 73
Discharge: HOME | End: 2023-08-08
Attending: INTERNAL MEDICINE
Payer: MEDICARE

## 2023-08-08 DIAGNOSIS — C64.1 MALIGNANT NEOPLASM OF RIGHT KIDNEY, EXCEPT RENAL PELVIS (HCC): ICD-10-CM

## 2023-08-08 PROCEDURE — 74176 CT ABD & PELVIS W/O CONTRAST: CPT

## 2023-09-12 ENCOUNTER — OFFICE VISIT (OUTPATIENT)
Dept: CARDIOLOGY CLINIC | Age: 73
End: 2023-09-12
Payer: MEDICARE

## 2023-09-12 VITALS
SYSTOLIC BLOOD PRESSURE: 98 MMHG | HEART RATE: 60 BPM | OXYGEN SATURATION: 98 % | WEIGHT: 211.1 LBS | HEIGHT: 75 IN | BODY MASS INDEX: 26.25 KG/M2 | DIASTOLIC BLOOD PRESSURE: 52 MMHG

## 2023-09-12 DIAGNOSIS — E78.49 OTHER HYPERLIPIDEMIA: ICD-10-CM

## 2023-09-12 DIAGNOSIS — I25.10 ATHEROSCLEROSIS OF NATIVE CORONARY ARTERY OF NATIVE HEART WITHOUT ANGINA PECTORIS: ICD-10-CM

## 2023-09-12 DIAGNOSIS — I48.0 PAROXYSMAL ATRIAL FIBRILLATION (HCC): Primary | ICD-10-CM

## 2023-09-12 DIAGNOSIS — I10 ESSENTIAL HYPERTENSION, BENIGN: ICD-10-CM

## 2023-09-12 PROCEDURE — 3074F SYST BP LT 130 MM HG: CPT | Performed by: INTERNAL MEDICINE

## 2023-09-12 PROCEDURE — G8427 DOCREV CUR MEDS BY ELIG CLIN: HCPCS | Performed by: INTERNAL MEDICINE

## 2023-09-12 PROCEDURE — 3078F DIAST BP <80 MM HG: CPT | Performed by: INTERNAL MEDICINE

## 2023-09-12 PROCEDURE — 3017F COLORECTAL CA SCREEN DOC REV: CPT | Performed by: INTERNAL MEDICINE

## 2023-09-12 PROCEDURE — 99214 OFFICE O/P EST MOD 30 MIN: CPT | Performed by: INTERNAL MEDICINE

## 2023-09-12 PROCEDURE — 1123F ACP DISCUSS/DSCN MKR DOCD: CPT | Performed by: INTERNAL MEDICINE

## 2023-09-12 PROCEDURE — 1036F TOBACCO NON-USER: CPT | Performed by: INTERNAL MEDICINE

## 2023-09-12 PROCEDURE — G8417 CALC BMI ABV UP PARAM F/U: HCPCS | Performed by: INTERNAL MEDICINE

## 2023-09-12 RX ORDER — ROSUVASTATIN CALCIUM 10 MG/1
TABLET, COATED ORAL
Qty: 90 TABLET | Refills: 3 | Status: SHIPPED | OUTPATIENT
Start: 2023-09-12

## 2023-09-12 RX ORDER — LOSARTAN POTASSIUM 50 MG/1
50 TABLET ORAL DAILY
Qty: 90 TABLET | Refills: 3 | Status: SHIPPED | OUTPATIENT
Start: 2023-09-12

## 2023-09-12 RX ORDER — LOSARTAN POTASSIUM 100 MG/1
TABLET ORAL
COMMUNITY
Start: 2023-07-19 | End: 2023-09-12

## 2023-09-12 RX ORDER — SOTALOL HYDROCHLORIDE 80 MG/1
80 TABLET ORAL 2 TIMES DAILY
Qty: 180 TABLET | Refills: 3 | Status: SHIPPED | OUTPATIENT
Start: 2023-09-12

## 2023-09-12 RX ORDER — CHLORTHALIDONE 50 MG/1
TABLET ORAL
Qty: 90 TABLET | Refills: 3 | Status: CANCELLED | OUTPATIENT
Start: 2023-09-12

## 2023-09-12 NOTE — PATIENT INSTRUCTIONS
Stop chlorthalidone  Decrease Losartan to 50 mg daily  If bp elevates, call Dr Raji Brandt (if sbp > 140 or if dbp > 90)  Echocardiogram soon  Continue risk factor modifications. Call for any change in symptoms, call to report any changes in shortness of breath or development of chest pain with activity.     Follow up in 3 mos with EKG

## 2023-09-21 ENCOUNTER — PROCEDURE VISIT (OUTPATIENT)
Dept: CARDIOLOGY CLINIC | Age: 73
End: 2023-09-21

## 2023-09-21 DIAGNOSIS — I48.0 PAROXYSMAL ATRIAL FIBRILLATION (HCC): ICD-10-CM

## 2023-09-21 DIAGNOSIS — E78.49 OTHER HYPERLIPIDEMIA: ICD-10-CM

## 2023-09-21 DIAGNOSIS — I10 ESSENTIAL HYPERTENSION, BENIGN: ICD-10-CM

## 2023-09-21 DIAGNOSIS — I25.10 ATHEROSCLEROSIS OF NATIVE CORONARY ARTERY OF NATIVE HEART WITHOUT ANGINA PECTORIS: ICD-10-CM

## 2023-10-09 ENCOUNTER — HOSPITAL ENCOUNTER (OUTPATIENT)
Dept: MRI IMAGING | Age: 73
Discharge: HOME | End: 2023-10-09
Payer: MEDICARE

## 2023-10-09 DIAGNOSIS — R41.0 CONFUSION: ICD-10-CM

## 2023-10-09 DIAGNOSIS — R68.89 FORGETFULNESS: ICD-10-CM

## 2023-10-09 DIAGNOSIS — C64.1 MALIGNANT NEOPLASM OF RIGHT KIDNEY, EXCEPT RENAL PELVIS (HCC): ICD-10-CM

## 2023-10-09 PROCEDURE — 70551 MRI BRAIN STEM W/O DYE: CPT

## 2023-10-31 ENCOUNTER — OFFICE VISIT (OUTPATIENT)
Dept: NEUROLOGY | Age: 73
End: 2023-10-31
Payer: MEDICARE

## 2023-10-31 VITALS
WEIGHT: 223 LBS | DIASTOLIC BLOOD PRESSURE: 83 MMHG | HEART RATE: 72 BPM | BODY MASS INDEX: 27.87 KG/M2 | SYSTOLIC BLOOD PRESSURE: 156 MMHG

## 2023-10-31 DIAGNOSIS — I63.231 ARTERIAL ISCHEMIC STROKE, ICA, RIGHT, ACUTE (HCC): Primary | ICD-10-CM

## 2023-10-31 DIAGNOSIS — I10 HTN (HYPERTENSION), BENIGN: ICD-10-CM

## 2023-10-31 DIAGNOSIS — I48.0 PAF (PAROXYSMAL ATRIAL FIBRILLATION) (HCC): ICD-10-CM

## 2023-10-31 DIAGNOSIS — Z86.73 REMOTE HISTORY OF STROKE: ICD-10-CM

## 2023-10-31 PROCEDURE — G8427 DOCREV CUR MEDS BY ELIG CLIN: HCPCS | Performed by: PSYCHIATRY & NEUROLOGY

## 2023-10-31 PROCEDURE — 99204 OFFICE O/P NEW MOD 45 MIN: CPT | Performed by: PSYCHIATRY & NEUROLOGY

## 2023-10-31 PROCEDURE — 1123F ACP DISCUSS/DSCN MKR DOCD: CPT | Performed by: PSYCHIATRY & NEUROLOGY

## 2023-10-31 PROCEDURE — G8417 CALC BMI ABV UP PARAM F/U: HCPCS | Performed by: PSYCHIATRY & NEUROLOGY

## 2023-10-31 PROCEDURE — G8484 FLU IMMUNIZE NO ADMIN: HCPCS | Performed by: PSYCHIATRY & NEUROLOGY

## 2023-10-31 PROCEDURE — 3077F SYST BP >= 140 MM HG: CPT | Performed by: PSYCHIATRY & NEUROLOGY

## 2023-10-31 PROCEDURE — 3017F COLORECTAL CA SCREEN DOC REV: CPT | Performed by: PSYCHIATRY & NEUROLOGY

## 2023-10-31 PROCEDURE — 1036F TOBACCO NON-USER: CPT | Performed by: PSYCHIATRY & NEUROLOGY

## 2023-10-31 PROCEDURE — 3079F DIAST BP 80-89 MM HG: CPT | Performed by: PSYCHIATRY & NEUROLOGY

## 2023-10-31 RX ORDER — ASPIRIN 325 MG
325 TABLET, DELAYED RELEASE (ENTERIC COATED) ORAL DAILY
COMMUNITY

## 2023-10-31 NOTE — PROGRESS NOTES
The patient is a 75y.o. years old male who  was referred consultation regarding MRI results and new stroke    HPI:  The patient had a sudden onset of confusion with ataxia 3 weeks ago. Degree was severe persistent. He was having difficulties with some of his ADLs and even using remote control of his TV. No triggers or relieving factors. No headache or chest pain or falling. Further imaging with MRI which I did review with the patient showed small acute right cerebellar stroke and remote strokes in the past.  He came today for stroke prevention. He does have longstanding history of renal and prostate cancer. He is receiving immunotherapy with oncology. History of prior A-fib in the past and according to report, he had event monitor in 2018 which showed no A-fib. Is currently on aspirin. He is to be on Xarelto. He is not aware of any prior strokes in the past.  No recent carotid Doppler. Recent echo from last month was unremarkable. He takes aspirin and statin. Blood pressures controlled medication. He denies any sleep issues. He is diabetic. Cannot recall last A1c although 21 A1c was 7.1. Now he feels better. No history of DVT or PE. Other review of system was unremarkable. ROS : A 10-14 system review of constitutional, cardiovascular, respiratory, GI, eyes, , ENT, musculoskeletal, endocrine, skin, SHEENT, genitourinary, psychiatric and neurologic systems was obtained and updated today and is unremarkable except as mentioned in my HPI        Exam:   Constitutional:   Vitals:    10/31/23 1429   BP: (!) 156/83   Pulse: 72   Weight: 101.2 kg (223 lb)       General appearance:  Normal development and appear in no acute distress. Mental Status:   Oriented to person, place, problem, and time. Memory: Good immediate recall. Intact remote memory  Normal attention span and concentration. Language: intact naming, repeating and fluency   Good fund of Knowledge.  Aware of current events and

## 2023-11-07 ENCOUNTER — HOSPITAL ENCOUNTER (OUTPATIENT)
Dept: VASCULAR LAB | Age: 73
Discharge: HOME | End: 2023-11-07
Attending: PSYCHIATRY & NEUROLOGY
Payer: MEDICARE

## 2023-11-07 DIAGNOSIS — I63.231 ARTERIAL ISCHEMIC STROKE, ICA, RIGHT, ACUTE (HCC): ICD-10-CM

## 2023-11-07 PROCEDURE — 93880 EXTRACRANIAL BILAT STUDY: CPT

## 2023-11-20 ENCOUNTER — HOSPITAL ENCOUNTER (EMERGENCY)
Age: 73
Discharge: HOME | End: 2023-11-20
Attending: EMERGENCY MEDICINE
Payer: MEDICARE

## 2023-11-20 ENCOUNTER — APPOINTMENT (OUTPATIENT)
Dept: CT IMAGING | Age: 73
End: 2023-11-20
Payer: MEDICARE

## 2023-11-20 VITALS
SYSTOLIC BLOOD PRESSURE: 150 MMHG | DIASTOLIC BLOOD PRESSURE: 93 MMHG | RESPIRATION RATE: 25 BRPM | HEART RATE: 61 BPM | OXYGEN SATURATION: 97 % | TEMPERATURE: 98.2 F

## 2023-11-20 DIAGNOSIS — E16.2 HYPOGLYCEMIA: Primary | ICD-10-CM

## 2023-11-20 DIAGNOSIS — N28.9 RENAL INSUFFICIENCY: ICD-10-CM

## 2023-11-20 LAB
ALBUMIN SERPL-MCNC: 3.4 G/DL (ref 3.4–5)
ALBUMIN/GLOB SERPL: 1.2 {RATIO} (ref 1.1–2.2)
ALP SERPL-CCNC: 61 U/L (ref 40–129)
ALT SERPL-CCNC: <5 U/L (ref 10–40)
ANION GAP SERPL CALCULATED.3IONS-SCNC: 12 MMOL/L (ref 3–16)
AST SERPL-CCNC: 10 U/L (ref 15–37)
BASOPHILS # BLD: 0 K/UL (ref 0–0.2)
BASOPHILS NFR BLD: 0.4 %
BILIRUB SERPL-MCNC: <0.2 MG/DL (ref 0–1)
BUN SERPL-MCNC: 40 MG/DL (ref 7–20)
CALCIUM SERPL-MCNC: 9.2 MG/DL (ref 8.3–10.6)
CHLORIDE SERPL-SCNC: 106 MMOL/L (ref 99–110)
CO2 SERPL-SCNC: 22 MMOL/L (ref 21–32)
CREAT SERPL-MCNC: 3.4 MG/DL (ref 0.8–1.3)
DEPRECATED RDW RBC AUTO: 13.2 % (ref 12.4–15.4)
EOSINOPHIL # BLD: 0.2 K/UL (ref 0–0.6)
EOSINOPHIL NFR BLD: 3.1 %
GFR SERPLBLD CREATININE-BSD FMLA CKD-EPI: 18 ML/MIN/{1.73_M2}
GLUCOSE BLD-MCNC: 136 MG/DL (ref 70–99)
GLUCOSE BLD-MCNC: 52 MG/DL (ref 70–99)
GLUCOSE BLD-MCNC: 55 MG/DL (ref 70–99)
GLUCOSE BLD-MCNC: 73 MG/DL (ref 70–99)
GLUCOSE SERPL-MCNC: 52 MG/DL (ref 70–99)
HCT VFR BLD AUTO: 25.1 % (ref 40.5–52.5)
HGB BLD-MCNC: 8.3 G/DL (ref 13.5–17.5)
LACTATE BLDV-SCNC: 1.3 MMOL/L (ref 0.4–2)
LYMPHOCYTES # BLD: 0.6 K/UL (ref 1–5.1)
LYMPHOCYTES NFR BLD: 8.7 %
MCH RBC QN AUTO: 30.3 PG (ref 26–34)
MCHC RBC AUTO-ENTMCNC: 33.2 G/DL (ref 31–36)
MCV RBC AUTO: 91.5 FL (ref 80–100)
MONOCYTES # BLD: 0.7 K/UL (ref 0–1.3)
MONOCYTES NFR BLD: 9.9 %
NEUTROPHILS # BLD: 5.8 K/UL (ref 1.7–7.7)
NEUTROPHILS NFR BLD: 77.9 %
PERFORMED ON: ABNORMAL
PERFORMED ON: NORMAL
PLATELET # BLD AUTO: 194 K/UL (ref 135–450)
PMV BLD AUTO: 7.4 FL (ref 5–10.5)
POTASSIUM SERPL-SCNC: 3.9 MMOL/L (ref 3.5–5.1)
PROT SERPL-MCNC: 6.2 G/DL (ref 6.4–8.2)
RBC # BLD AUTO: 2.74 M/UL (ref 4.2–5.9)
SODIUM SERPL-SCNC: 140 MMOL/L (ref 136–145)
WBC # BLD AUTO: 7.4 K/UL (ref 4–11)

## 2023-11-20 PROCEDURE — 70450 CT HEAD/BRAIN W/O DYE: CPT

## 2023-11-20 PROCEDURE — 80053 COMPREHEN METABOLIC PANEL: CPT

## 2023-11-20 PROCEDURE — 85025 COMPLETE CBC W/AUTO DIFF WBC: CPT

## 2023-11-20 PROCEDURE — 99284 EMERGENCY DEPT VISIT MOD MDM: CPT

## 2023-11-20 PROCEDURE — 83605 ASSAY OF LACTIC ACID: CPT

## 2023-11-20 ASSESSMENT — LIFESTYLE VARIABLES
HOW OFTEN DO YOU HAVE A DRINK CONTAINING ALCOHOL: NEVER
HOW MANY STANDARD DRINKS CONTAINING ALCOHOL DO YOU HAVE ON A TYPICAL DAY: PATIENT DOES NOT DRINK

## 2023-11-20 ASSESSMENT — PAIN - FUNCTIONAL ASSESSMENT: PAIN_FUNCTIONAL_ASSESSMENT: NONE - DENIES PAIN

## 2023-11-20 NOTE — ED PROVIDER NOTES
Take 1 capsule by mouth nightlyHistorical Med      glimepiride (AMARYL) 4 MG tablet Take 1 tablet by mouth every morning (before breakfast)Historical Med      omeprazole (PRILOSEC) 20 MG capsule Take 1 capsule by mouth every other dayHistorical Med      metformin (GLUCOPHAGE) 1000 MG tablet Take 1 tablet by mouth 2 times daily (with meals)Historical Med      insulin glargine (LANTUS) 100 UNIT/ML injection Inject 20-25 Units into the skin nightlyHistorical Med       !! - Potential duplicate medications found. Please discuss with provider.         STOP taking these medications       Naproxen Sodium 220 MG CAPS Comments:   Reason for Stopping:                  Jamar Silverman MD  11/20/23 6850

## 2023-11-20 NOTE — ED NOTES
Pt updated on wait for discharge, family at bedside. Pt is alert and oriented, given peanut butter and crackers, pudding and juice.       Gary Aguilar RN  11/20/23 0952

## 2023-11-27 ENCOUNTER — HOSPITAL ENCOUNTER (OUTPATIENT)
Age: 73
Discharge: HOME | End: 2023-11-27
Payer: MEDICARE

## 2023-11-27 LAB — PSA SERPL DL<=0.01 NG/ML-MCNC: 0.49 NG/ML (ref 0–4)

## 2023-11-27 PROCEDURE — 36415 COLL VENOUS BLD VENIPUNCTURE: CPT

## 2023-11-27 PROCEDURE — 84153 ASSAY OF PSA TOTAL: CPT

## 2023-11-28 ENCOUNTER — HOSPITAL ENCOUNTER (INPATIENT)
Age: 73
LOS: 12 days | Discharge: NURSING, REHAB OR LONG-TERM CARE | DRG: 682 | End: 2023-12-10
Attending: INTERNAL MEDICINE | Admitting: INTERNAL MEDICINE
Payer: MEDICARE

## 2023-11-28 DIAGNOSIS — N17.9 AKI (ACUTE KIDNEY INJURY) (HCC): Primary | ICD-10-CM

## 2023-11-28 DIAGNOSIS — Z90.5 H/O RIGHT NEPHRECTOMY: ICD-10-CM

## 2023-11-28 DIAGNOSIS — Z85.46 HISTORY OF PROSTATE CANCER: ICD-10-CM

## 2023-11-28 DIAGNOSIS — Z79.899 ON ANTINEOPLASTIC CHEMOTHERAPY: ICD-10-CM

## 2023-11-28 LAB
ALBUMIN SERPL-MCNC: 3.3 G/DL (ref 3.4–5)
ALBUMIN/GLOB SERPL: 0.9 {RATIO} (ref 1.1–2.2)
ALP SERPL-CCNC: 57 U/L (ref 40–129)
ALT SERPL-CCNC: 10 U/L (ref 10–40)
ANION GAP SERPL CALCULATED.3IONS-SCNC: 12 MMOL/L (ref 3–16)
AST SERPL-CCNC: 9 U/L (ref 15–37)
BACTERIA URNS QL MICRO: ABNORMAL /HPF
BASOPHILS # BLD: 0.1 K/UL (ref 0–0.2)
BASOPHILS NFR BLD: 0.7 %
BILIRUB SERPL-MCNC: <0.2 MG/DL (ref 0–1)
BILIRUB UR QL STRIP.AUTO: NEGATIVE
BUN SERPL-MCNC: 64 MG/DL (ref 7–20)
CALCIUM SERPL-MCNC: 8.9 MG/DL (ref 8.3–10.6)
CHLORIDE SERPL-SCNC: 104 MMOL/L (ref 99–110)
CLARITY UR: CLEAR
CO2 SERPL-SCNC: 20 MMOL/L (ref 21–32)
COLOR UR: YELLOW
CREAT SERPL-MCNC: 5.8 MG/DL (ref 0.8–1.3)
DEPRECATED RDW RBC AUTO: 13.4 % (ref 12.4–15.4)
EKG ATRIAL RATE: 66 BPM
EKG DIAGNOSIS: NORMAL
EKG Q-T INTERVAL: 444 MS
EKG QRS DURATION: 138 MS
EKG QTC CALCULATION (BAZETT): 454 MS
EKG R AXIS: -19 DEGREES
EKG T AXIS: 59 DEGREES
EKG VENTRICULAR RATE: 63 BPM
EOSINOPHIL # BLD: 0.3 K/UL (ref 0–0.6)
EOSINOPHIL NFR BLD: 3.7 %
EPI CELLS #/AREA URNS AUTO: 2 /HPF (ref 0–5)
FLUAV RNA RESP QL NAA+PROBE: NOT DETECTED
FLUBV RNA RESP QL NAA+PROBE: NOT DETECTED
FOLATE SERPL-MCNC: >20 NG/ML (ref 4.78–24.2)
GFR SERPLBLD CREATININE-BSD FMLA CKD-EPI: 10 ML/MIN/{1.73_M2}
GLUCOSE BLD-MCNC: 146 MG/DL (ref 70–99)
GLUCOSE BLD-MCNC: 94 MG/DL (ref 70–99)
GLUCOSE SERPL-MCNC: 126 MG/DL (ref 70–99)
GLUCOSE UR STRIP.AUTO-MCNC: NEGATIVE MG/DL
HCT VFR BLD AUTO: 24.8 % (ref 40.5–52.5)
HGB BLD-MCNC: 8.2 G/DL (ref 13.5–17.5)
HGB UR QL STRIP.AUTO: ABNORMAL
HYALINE CASTS #/AREA URNS AUTO: 2 /LPF (ref 0–8)
IRON SATN MFR SERPL: 15 % (ref 20–50)
IRON SERPL-MCNC: 50 UG/DL (ref 59–158)
KETONES UR STRIP.AUTO-MCNC: NEGATIVE MG/DL
LEUKOCYTE ESTERASE UR QL STRIP.AUTO: ABNORMAL
LYMPHOCYTES # BLD: 0.5 K/UL (ref 1–5.1)
LYMPHOCYTES NFR BLD: 5.3 %
MCH RBC QN AUTO: 30.3 PG (ref 26–34)
MCHC RBC AUTO-ENTMCNC: 32.9 G/DL (ref 31–36)
MCV RBC AUTO: 92.1 FL (ref 80–100)
MONOCYTES # BLD: 0.7 K/UL (ref 0–1.3)
MONOCYTES NFR BLD: 7.7 %
NEUTROPHILS # BLD: 7.7 K/UL (ref 1.7–7.7)
NEUTROPHILS NFR BLD: 82.6 %
NITRITE UR QL STRIP.AUTO: NEGATIVE
PERFORMED ON: ABNORMAL
PERFORMED ON: NORMAL
PH UR STRIP.AUTO: 5 [PH] (ref 5–8)
PLATELET # BLD AUTO: 263 K/UL (ref 135–450)
PMV BLD AUTO: 7 FL (ref 5–10.5)
POTASSIUM SERPL-SCNC: 4 MMOL/L (ref 3.5–5.1)
PROT SERPL-MCNC: 6.9 G/DL (ref 6.4–8.2)
PROT UR STRIP.AUTO-MCNC: 100 MG/DL
RBC # BLD AUTO: 2.7 M/UL (ref 4.2–5.9)
RBC CLUMPS #/AREA URNS AUTO: 0 /HPF (ref 0–4)
SARS-COV-2 RNA RESP QL NAA+PROBE: NOT DETECTED
SODIUM SERPL-SCNC: 136 MMOL/L (ref 136–145)
SP GR UR STRIP.AUTO: 1.01 (ref 1–1.03)
TIBC SERPL-MCNC: 326 UG/DL (ref 260–445)
UA COMPLETE W REFLEX CULTURE PNL UR: YES
UA DIPSTICK W REFLEX MICRO PNL UR: YES
URN SPEC COLLECT METH UR: ABNORMAL
UROBILINOGEN UR STRIP-ACNC: 0.2 E.U./DL
VIT B12 SERPL-MCNC: 451 PG/ML (ref 211–911)
WBC # BLD AUTO: 9.4 K/UL (ref 4–11)
WBC #/AREA URNS AUTO: 39 /HPF (ref 0–5)

## 2023-11-28 PROCEDURE — 87636 SARSCOV2 & INF A&B AMP PRB: CPT

## 2023-11-28 PROCEDURE — 2580000003 HC RX 258: Performed by: INTERNAL MEDICINE

## 2023-11-28 PROCEDURE — 6360000002 HC RX W HCPCS: Performed by: INTERNAL MEDICINE

## 2023-11-28 PROCEDURE — 2580000003 HC RX 258: Performed by: PHYSICIAN ASSISTANT

## 2023-11-28 PROCEDURE — 85025 COMPLETE CBC W/AUTO DIFF WBC: CPT

## 2023-11-28 PROCEDURE — 83540 ASSAY OF IRON: CPT

## 2023-11-28 PROCEDURE — 93005 ELECTROCARDIOGRAM TRACING: CPT | Performed by: PHYSICIAN ASSISTANT

## 2023-11-28 PROCEDURE — 81001 URINALYSIS AUTO W/SCOPE: CPT

## 2023-11-28 PROCEDURE — 6370000000 HC RX 637 (ALT 250 FOR IP): Performed by: NURSE PRACTITIONER

## 2023-11-28 PROCEDURE — 93010 ELECTROCARDIOGRAM REPORT: CPT | Performed by: INTERNAL MEDICINE

## 2023-11-28 PROCEDURE — 87086 URINE CULTURE/COLONY COUNT: CPT

## 2023-11-28 PROCEDURE — 83550 IRON BINDING TEST: CPT

## 2023-11-28 PROCEDURE — 1200000000 HC SEMI PRIVATE

## 2023-11-28 PROCEDURE — 82607 VITAMIN B-12: CPT

## 2023-11-28 PROCEDURE — 82746 ASSAY OF FOLIC ACID SERUM: CPT

## 2023-11-28 PROCEDURE — 99285 EMERGENCY DEPT VISIT HI MDM: CPT

## 2023-11-28 PROCEDURE — 80053 COMPREHEN METABOLIC PANEL: CPT

## 2023-11-28 PROCEDURE — 6370000000 HC RX 637 (ALT 250 FOR IP): Performed by: INTERNAL MEDICINE

## 2023-11-28 RX ORDER — HYDROXYZINE PAMOATE 25 MG/1
25 CAPSULE ORAL
Status: COMPLETED | OUTPATIENT
Start: 2023-11-28 | End: 2023-11-28

## 2023-11-28 RX ORDER — INSULIN LISPRO 100 [IU]/ML
0-8 INJECTION, SOLUTION INTRAVENOUS; SUBCUTANEOUS
Status: DISCONTINUED | OUTPATIENT
Start: 2023-11-28 | End: 2023-12-01

## 2023-11-28 RX ORDER — DOXAZOSIN MESYLATE 4 MG/1
8 TABLET ORAL NIGHTLY
Status: DISCONTINUED | OUTPATIENT
Start: 2023-11-28 | End: 2023-11-28

## 2023-11-28 RX ORDER — DOXAZOSIN MESYLATE 4 MG/1
8 TABLET ORAL NIGHTLY
Status: DISCONTINUED | OUTPATIENT
Start: 2023-11-28 | End: 2023-12-10 | Stop reason: HOSPADM

## 2023-11-28 RX ORDER — INSULIN LISPRO 100 [IU]/ML
0-4 INJECTION, SOLUTION INTRAVENOUS; SUBCUTANEOUS NIGHTLY
Status: DISCONTINUED | OUTPATIENT
Start: 2023-11-28 | End: 2023-12-01

## 2023-11-28 RX ORDER — ST. JOHN'S WORT 300 MG
1 CAPSULE ORAL NIGHTLY
Status: ON HOLD | COMMUNITY

## 2023-11-28 RX ORDER — POLYETHYLENE GLYCOL 3350 17 G/17G
17 POWDER, FOR SOLUTION ORAL DAILY PRN
Status: DISCONTINUED | OUTPATIENT
Start: 2023-11-28 | End: 2023-12-10 | Stop reason: HOSPADM

## 2023-11-28 RX ORDER — HEPARIN SODIUM 5000 [USP'U]/ML
5000 INJECTION, SOLUTION INTRAVENOUS; SUBCUTANEOUS EVERY 8 HOURS SCHEDULED
Status: DISCONTINUED | OUTPATIENT
Start: 2023-11-28 | End: 2023-12-08

## 2023-11-28 RX ORDER — ACETAMINOPHEN 325 MG/1
650 TABLET ORAL EVERY 6 HOURS PRN
Status: DISCONTINUED | OUTPATIENT
Start: 2023-11-28 | End: 2023-12-10 | Stop reason: HOSPADM

## 2023-11-28 RX ORDER — ASPIRIN 325 MG
325 TABLET, DELAYED RELEASE (ENTERIC COATED) ORAL DAILY
Status: DISCONTINUED | OUTPATIENT
Start: 2023-11-28 | End: 2023-12-01

## 2023-11-28 RX ORDER — 0.9 % SODIUM CHLORIDE 0.9 %
1000 INTRAVENOUS SOLUTION INTRAVENOUS ONCE
Status: COMPLETED | OUTPATIENT
Start: 2023-11-28 | End: 2023-11-28

## 2023-11-28 RX ORDER — SODIUM CHLORIDE 0.9 % (FLUSH) 0.9 %
5-40 SYRINGE (ML) INJECTION PRN
Status: DISCONTINUED | OUTPATIENT
Start: 2023-11-28 | End: 2023-12-10 | Stop reason: HOSPADM

## 2023-11-28 RX ORDER — SODIUM CHLORIDE 9 MG/ML
INJECTION, SOLUTION INTRAVENOUS PRN
Status: DISCONTINUED | OUTPATIENT
Start: 2023-11-28 | End: 2023-12-10 | Stop reason: HOSPADM

## 2023-11-28 RX ORDER — ACETAMINOPHEN 650 MG/1
650 SUPPOSITORY RECTAL EVERY 6 HOURS PRN
Status: DISCONTINUED | OUTPATIENT
Start: 2023-11-28 | End: 2023-12-10 | Stop reason: HOSPADM

## 2023-11-28 RX ORDER — ONDANSETRON 4 MG/1
4 TABLET, ORALLY DISINTEGRATING ORAL EVERY 8 HOURS PRN
Status: DISCONTINUED | OUTPATIENT
Start: 2023-11-28 | End: 2023-12-10 | Stop reason: HOSPADM

## 2023-11-28 RX ORDER — SODIUM CHLORIDE, SODIUM LACTATE, POTASSIUM CHLORIDE, CALCIUM CHLORIDE 600; 310; 30; 20 MG/100ML; MG/100ML; MG/100ML; MG/100ML
INJECTION, SOLUTION INTRAVENOUS CONTINUOUS
Status: DISCONTINUED | OUTPATIENT
Start: 2023-11-28 | End: 2023-11-30

## 2023-11-28 RX ORDER — LEVOFLOXACIN 5 MG/ML
750 INJECTION, SOLUTION INTRAVENOUS ONCE
Status: DISCONTINUED | OUTPATIENT
Start: 2023-11-28 | End: 2023-11-30

## 2023-11-28 RX ORDER — ONDANSETRON 2 MG/ML
4 INJECTION INTRAMUSCULAR; INTRAVENOUS EVERY 6 HOURS PRN
Status: DISCONTINUED | OUTPATIENT
Start: 2023-11-28 | End: 2023-12-10 | Stop reason: HOSPADM

## 2023-11-28 RX ORDER — SOTALOL HYDROCHLORIDE 80 MG/1
80 TABLET ORAL 2 TIMES DAILY
Status: CANCELLED | OUTPATIENT
Start: 2023-11-28

## 2023-11-28 RX ORDER — CIPROFLOXACIN 2 MG/ML
400 INJECTION, SOLUTION INTRAVENOUS EVERY 12 HOURS
Status: DISCONTINUED | OUTPATIENT
Start: 2023-11-28 | End: 2023-11-28 | Stop reason: CLARIF

## 2023-11-28 RX ORDER — INSULIN GLARGINE 100 [IU]/ML
15 INJECTION, SOLUTION SUBCUTANEOUS NIGHTLY
Status: DISCONTINUED | OUTPATIENT
Start: 2023-11-28 | End: 2023-11-29

## 2023-11-28 RX ORDER — DEXTROSE MONOHYDRATE 100 MG/ML
INJECTION, SOLUTION INTRAVENOUS CONTINUOUS PRN
Status: DISCONTINUED | OUTPATIENT
Start: 2023-11-28 | End: 2023-12-10 | Stop reason: HOSPADM

## 2023-11-28 RX ORDER — LEVOFLOXACIN 5 MG/ML
500 INJECTION, SOLUTION INTRAVENOUS
Status: DISCONTINUED | OUTPATIENT
Start: 2023-11-30 | End: 2023-11-30

## 2023-11-28 RX ORDER — SODIUM CHLORIDE 0.9 % (FLUSH) 0.9 %
5-40 SYRINGE (ML) INJECTION EVERY 12 HOURS SCHEDULED
Status: DISCONTINUED | OUTPATIENT
Start: 2023-11-28 | End: 2023-12-10 | Stop reason: HOSPADM

## 2023-11-28 RX ADMIN — INSULIN GLARGINE 15 UNITS: 100 INJECTION, SOLUTION SUBCUTANEOUS at 22:56

## 2023-11-28 RX ADMIN — HYDROXYZINE PAMOATE 25 MG: 25 CAPSULE ORAL at 22:54

## 2023-11-28 RX ADMIN — DOXAZOSIN 8 MG: 4 TABLET ORAL at 19:23

## 2023-11-28 RX ADMIN — SODIUM CHLORIDE 1000 ML: 9 INJECTION, SOLUTION INTRAVENOUS at 16:37

## 2023-11-28 RX ADMIN — HEPARIN SODIUM 5000 UNITS: 5000 INJECTION INTRAVENOUS; SUBCUTANEOUS at 22:57

## 2023-11-28 RX ADMIN — SODIUM CHLORIDE, POTASSIUM CHLORIDE, SODIUM LACTATE AND CALCIUM CHLORIDE: 600; 310; 30; 20 INJECTION, SOLUTION INTRAVENOUS at 23:18

## 2023-11-28 RX ADMIN — Medication 5 ML: at 23:19

## 2023-11-28 RX ADMIN — ASPIRIN 325 MG: 325 TABLET, COATED ORAL at 22:54

## 2023-11-28 ASSESSMENT — LIFESTYLE VARIABLES
HOW OFTEN DO YOU HAVE A DRINK CONTAINING ALCOHOL: MONTHLY OR LESS
HOW MANY STANDARD DRINKS CONTAINING ALCOHOL DO YOU HAVE ON A TYPICAL DAY: 1 OR 2

## 2023-11-28 ASSESSMENT — ENCOUNTER SYMPTOMS
ABDOMINAL PAIN: 0
DIARRHEA: 0
BACK PAIN: 0
SORE THROAT: 0
EYE PAIN: 0
RHINORRHEA: 0
NAUSEA: 0
VOMITING: 0
COUGH: 0
CONSTIPATION: 0
SHORTNESS OF BREATH: 0

## 2023-11-28 ASSESSMENT — PAIN SCALES - GENERAL: PAINLEVEL_OUTOF10: 0

## 2023-11-28 ASSESSMENT — PAIN - FUNCTIONAL ASSESSMENT: PAIN_FUNCTIONAL_ASSESSMENT: 0-10

## 2023-11-28 NOTE — ACP (ADVANCE CARE PLANNING)
Advanced Care Planning Note. Purpose of Encounter: Advanced care planning in light of hospitalization  Parties In Attendance: Patient,    Decisional Capacity: Yes  Subjective: Patient  understand that this conversation is to address long term care goal  Objective: Admitted to our hospital with CASTRO on CKD 3 history of renal cell carcinoma and diabetes  Goals of Care Determination: Patient would pursue CPR and Intubation if required. No tracheostomy or long term ventilation if required.    Consider short-term dialysis if required  Code Status: full code  Time spent on Advanced care Plannin minutes  Advanced Care Planning Documents: documented patient's wishes, would like Wife Kieran Night to make medical decisions if unable to make decisions    Tommy Case MD  2023 4:39 PM

## 2023-11-28 NOTE — ED NOTES
ED TO INPATIENT SBAR HANDOFF    Patient Name: Joaquina Yepez   :  1950  75 y.o. MRN:  [id number]  Preferred Name  Kaykay Rivas  ED Room #:  ED-0014/14  Family/Caregiver Present yes   Restraints no   Sitter no   Sepsis Risk Score Sepsis Risk Score: 1.35    Situation  Code Status: Prior No additional code details. Allergies: Amoxicillin, Bisoprolol-hydrochlorothiazide, Cisapride, Penicillins, and Pioglitazone  Weight: Patient Vitals for the past 96 hrs (Last 3 readings):   Weight   23 1355 102.1 kg (225 lb)     Arrived from: home  Chief Complaint:   Chief Complaint   Patient presents with    Abnormal Lab     States he had blood work done recently, and was called today told to come to ED for abnormal kidney labs. Denies lower back back, reports hx of kidney stones. Hospital Problem/Diagnosis:  Principal Problem:    CASTRO (acute kidney injury) ([address])  Resolved Problems:    * No resolved hospital problems.  *    Imaging:   No orders to display     Abnormal labs:   Abnormal Labs Reviewed   CBC WITH AUTO DIFFERENTIAL - Abnormal; Notable for the following components:       Result Value    RBC 2.70 (*)     Hemoglobin 8.2 (*)     Hematocrit 24.8 (*)     Lymphocytes Absolute 0.5 (*)     All other components within normal limits   COMPREHENSIVE METABOLIC PANEL W/ REFLEX TO MG FOR LOW K - Abnormal; Notable for the following components:    CO2 20 (*)     Glucose 126 (*)     BUN 64 (*)     Creatinine 5.8 (*)     Est, Glom Filt Rate 10 (*)     Albumin 3.3 (*)     Albumin/Globulin Ratio 0.9 (*)     AST 9 (*)     All other components within normal limits    Narrative:     CALL  [facility] tel. [phone],  Chemistry results called to and read back by Courtney Galaviz, 2023  15:08, by [address] - Abnormal; Notable for the following components:    Blood, Urine TRACE (*)     Protein,  (*)     Leukocyte Esterase, Urine MODERATE (*)     All other components within normal limits

## 2023-11-28 NOTE — H&P
HOSPITALISTS HISTORY AND PHYSICAL    11/28/2023 4:36 PM    Patient Information:  Ravindra Wilburn is a 75 y.o. male [id number]  PCP:  Lise Pcp (Tel: None )    Chief complaint:    Chief Complaint   Patient presents with    Abnormal Lab     States he had blood work done recently, and was called today told to come to ED for abnormal kidney labs. Denies lower back back, reports hx of kidney stones. History of Present Illness:  Ravindra Wilburn is a 75 y.o. male with a history of renal cell carcinoma underwent immunotherapy with 2-day yesterday. Patient had abnormal labs with elevated creatinine thus was sent to the ED by oncology. Patient denies any nausea vomiting difficulty urinating or diarrhea patient states has been drinking plenty of water. REVIEW OF SYSTEMS:   Constitutional: Negative for fever,chills or night sweats  ENT: Negative for rhinorrhea, epistaxis, hoarseness, sore throat. Respiratory: Negative for shortness of breath,wheezing  Cardiovascular: Negative for chest pain, palpitations   Gastrointestinal: Negative for nausea, vomiting, diarrhea  Genitourinary: Negative for polyuria, dysuria   Hematologic/Lymphatic: Negative for bleeding tendency, easy bruising  Musculoskeletal: Negative for myalgias and arthralgias  Neurologic: Negative for confusion,dysarthria. Skin: Negative for itching,rash  Psychiatric: Negative for depression,anxiety, agitation. Endocrine: Negative for polydipsia,polyuria,heat /cold intolerance. Past Medical History:   has a past medical history of Atrial fibrillation ([address]), CAD (coronary artery disease), Cancer of kidney ([address]), Diabetes mellitus ([address]), Hyperlipidemia, Hypertension, Prostate cancer ([address]), and Right renal mass. Past Surgical History:   has a past surgical history that includes hernia repair; knee surgery; Cholecystectomy;  Nasal septum surgery; eye surgery (Left,

## 2023-11-29 ENCOUNTER — APPOINTMENT (OUTPATIENT)
Dept: CT IMAGING | Age: 73
DRG: 682 | End: 2023-11-29
Payer: MEDICARE

## 2023-11-29 LAB
ANION GAP SERPL CALCULATED.3IONS-SCNC: 14 MMOL/L (ref 3–16)
BACTERIA UR CULT: NORMAL
BASOPHILS # BLD: 0.1 K/UL (ref 0–0.2)
BASOPHILS NFR BLD: 0.9 %
BUN SERPL-MCNC: 62 MG/DL (ref 7–20)
CALCIUM SERPL-MCNC: 9.1 MG/DL (ref 8.3–10.6)
CHLORIDE SERPL-SCNC: 106 MMOL/L (ref 99–110)
CO2 SERPL-SCNC: 20 MMOL/L (ref 21–32)
CREAT SERPL-MCNC: 5.5 MG/DL (ref 0.8–1.3)
DEPRECATED RDW RBC AUTO: 13.5 % (ref 12.4–15.4)
EOSINOPHIL # BLD: 0.4 K/UL (ref 0–0.6)
EOSINOPHIL NFR BLD: 4.7 %
GFR SERPLBLD CREATININE-BSD FMLA CKD-EPI: 10 ML/MIN/{1.73_M2}
GLUCOSE BLD-MCNC: 106 MG/DL (ref 70–99)
GLUCOSE BLD-MCNC: 109 MG/DL (ref 70–99)
GLUCOSE BLD-MCNC: 70 MG/DL (ref 70–99)
GLUCOSE BLD-MCNC: 74 MG/DL (ref 70–99)
GLUCOSE BLD-MCNC: 79 MG/DL (ref 70–99)
GLUCOSE BLD-MCNC: 89 MG/DL (ref 70–99)
GLUCOSE BLD-MCNC: 90 MG/DL (ref 70–99)
GLUCOSE BLD-MCNC: 93 MG/DL (ref 70–99)
GLUCOSE SERPL-MCNC: 72 MG/DL (ref 70–99)
HCT VFR BLD AUTO: 23 % (ref 40.5–52.5)
HGB BLD-MCNC: 7.5 G/DL (ref 13.5–17.5)
LYMPHOCYTES # BLD: 0.5 K/UL (ref 1–5.1)
LYMPHOCYTES NFR BLD: 6.3 %
MCH RBC QN AUTO: 30.2 PG (ref 26–34)
MCHC RBC AUTO-ENTMCNC: 32.8 G/DL (ref 31–36)
MCV RBC AUTO: 92 FL (ref 80–100)
MONOCYTES # BLD: 0.8 K/UL (ref 0–1.3)
MONOCYTES NFR BLD: 9.2 %
NEUTROPHILS # BLD: 6.4 K/UL (ref 1.7–7.7)
NEUTROPHILS NFR BLD: 78.9 %
PERFORMED ON: ABNORMAL
PERFORMED ON: ABNORMAL
PERFORMED ON: NORMAL
PLATELET # BLD AUTO: 239 K/UL (ref 135–450)
PMV BLD AUTO: 6.7 FL (ref 5–10.5)
POTASSIUM SERPL-SCNC: 3.8 MMOL/L (ref 3.5–5.1)
RBC # BLD AUTO: 2.5 M/UL (ref 4.2–5.9)
SODIUM SERPL-SCNC: 140 MMOL/L (ref 136–145)
WBC # BLD AUTO: 8.2 K/UL (ref 4–11)

## 2023-11-29 PROCEDURE — 6370000000 HC RX 637 (ALT 250 FOR IP): Performed by: INTERNAL MEDICINE

## 2023-11-29 PROCEDURE — 97162 PT EVAL MOD COMPLEX 30 MIN: CPT

## 2023-11-29 PROCEDURE — 97166 OT EVAL MOD COMPLEX 45 MIN: CPT

## 2023-11-29 PROCEDURE — 97530 THERAPEUTIC ACTIVITIES: CPT

## 2023-11-29 PROCEDURE — 6370000000 HC RX 637 (ALT 250 FOR IP): Performed by: NURSE PRACTITIONER

## 2023-11-29 PROCEDURE — 1200000000 HC SEMI PRIVATE

## 2023-11-29 PROCEDURE — 80048 BASIC METABOLIC PNL TOTAL CA: CPT

## 2023-11-29 PROCEDURE — 99222 1ST HOSP IP/OBS MODERATE 55: CPT | Performed by: STUDENT IN AN ORGANIZED HEALTH CARE EDUCATION/TRAINING PROGRAM

## 2023-11-29 PROCEDURE — 6370000000 HC RX 637 (ALT 250 FOR IP): Performed by: STUDENT IN AN ORGANIZED HEALTH CARE EDUCATION/TRAINING PROGRAM

## 2023-11-29 PROCEDURE — 6360000002 HC RX W HCPCS: Performed by: INTERNAL MEDICINE

## 2023-11-29 PROCEDURE — 36415 COLL VENOUS BLD VENIPUNCTURE: CPT

## 2023-11-29 PROCEDURE — 97116 GAIT TRAINING THERAPY: CPT

## 2023-11-29 PROCEDURE — 97535 SELF CARE MNGMENT TRAINING: CPT

## 2023-11-29 PROCEDURE — 85025 COMPLETE CBC W/AUTO DIFF WBC: CPT

## 2023-11-29 PROCEDURE — 51798 US URINE CAPACITY MEASURE: CPT

## 2023-11-29 PROCEDURE — 74176 CT ABD & PELVIS W/O CONTRAST: CPT

## 2023-11-29 PROCEDURE — 2580000003 HC RX 258: Performed by: INTERNAL MEDICINE

## 2023-11-29 RX ORDER — INSULIN GLARGINE 100 [IU]/ML
10 INJECTION, SOLUTION SUBCUTANEOUS NIGHTLY
Status: DISCONTINUED | OUTPATIENT
Start: 2023-11-29 | End: 2023-12-01

## 2023-11-29 RX ORDER — METOPROLOL SUCCINATE 25 MG/1
25 TABLET, EXTENDED RELEASE ORAL DAILY
Status: DISCONTINUED | OUTPATIENT
Start: 2023-11-29 | End: 2023-12-09

## 2023-11-29 RX ORDER — HYDROXYZINE PAMOATE 25 MG/1
25 CAPSULE ORAL NIGHTLY PRN
Status: DISCONTINUED | OUTPATIENT
Start: 2023-11-29 | End: 2023-11-30

## 2023-11-29 RX ORDER — HYDRALAZINE HYDROCHLORIDE 20 MG/ML
5 INJECTION INTRAMUSCULAR; INTRAVENOUS EVERY 4 HOURS PRN
Status: DISCONTINUED | OUTPATIENT
Start: 2023-11-29 | End: 2023-12-10 | Stop reason: HOSPADM

## 2023-11-29 RX ADMIN — METOPROLOL SUCCINATE 25 MG: 25 TABLET, FILM COATED, EXTENDED RELEASE ORAL at 08:56

## 2023-11-29 RX ADMIN — HEPARIN SODIUM 5000 UNITS: 5000 INJECTION INTRAVENOUS; SUBCUTANEOUS at 05:35

## 2023-11-29 RX ADMIN — ASPIRIN 325 MG: 325 TABLET, COATED ORAL at 08:56

## 2023-11-29 RX ADMIN — HEPARIN SODIUM 5000 UNITS: 5000 INJECTION INTRAVENOUS; SUBCUTANEOUS at 23:40

## 2023-11-29 RX ADMIN — DOXAZOSIN 8 MG: 4 TABLET ORAL at 23:40

## 2023-11-29 RX ADMIN — HEPARIN SODIUM 5000 UNITS: 5000 INJECTION INTRAVENOUS; SUBCUTANEOUS at 16:36

## 2023-11-29 RX ADMIN — Medication 10 ML: at 23:40

## 2023-11-29 RX ADMIN — Medication 10 ML: at 08:56

## 2023-11-29 RX ADMIN — HYDROXYZINE PAMOATE 25 MG: 25 CAPSULE ORAL at 23:55

## 2023-11-30 LAB
ALBUMIN SERPL-MCNC: 2.9 G/DL (ref 3.4–5)
ANION GAP SERPL CALCULATED.3IONS-SCNC: 12 MMOL/L (ref 3–16)
BASOPHILS # BLD: 0 K/UL (ref 0–0.2)
BASOPHILS NFR BLD: 0.6 %
BUN SERPL-MCNC: 62 MG/DL (ref 7–20)
CALCIUM SERPL-MCNC: 9.2 MG/DL (ref 8.3–10.6)
CHLORIDE SERPL-SCNC: 108 MMOL/L (ref 99–110)
CO2 SERPL-SCNC: 20 MMOL/L (ref 21–32)
CREAT SERPL-MCNC: 6.4 MG/DL (ref 0.8–1.3)
DEPRECATED RDW RBC AUTO: 13.5 % (ref 12.4–15.4)
EOSINOPHIL # BLD: 0.3 K/UL (ref 0–0.6)
EOSINOPHIL NFR BLD: 4.3 %
GFR SERPLBLD CREATININE-BSD FMLA CKD-EPI: 8 ML/MIN/{1.73_M2}
GLUCOSE BLD-MCNC: 107 MG/DL (ref 70–99)
GLUCOSE BLD-MCNC: 152 MG/DL (ref 70–99)
GLUCOSE BLD-MCNC: 228 MG/DL (ref 70–99)
GLUCOSE BLD-MCNC: 253 MG/DL (ref 70–99)
GLUCOSE BLD-MCNC: 81 MG/DL (ref 70–99)
GLUCOSE SERPL-MCNC: 74 MG/DL (ref 70–99)
HCT VFR BLD AUTO: 22.7 % (ref 40.5–52.5)
HGB BLD-MCNC: 7.6 G/DL (ref 13.5–17.5)
LYMPHOCYTES # BLD: 0.4 K/UL (ref 1–5.1)
LYMPHOCYTES NFR BLD: 5.8 %
MAGNESIUM SERPL-MCNC: 1.8 MG/DL (ref 1.8–2.4)
MCH RBC QN AUTO: 30.8 PG (ref 26–34)
MCHC RBC AUTO-ENTMCNC: 33.7 G/DL (ref 31–36)
MCV RBC AUTO: 91.5 FL (ref 80–100)
MONOCYTES # BLD: 0.6 K/UL (ref 0–1.3)
MONOCYTES NFR BLD: 7.7 %
NEUTROPHILS # BLD: 6 K/UL (ref 1.7–7.7)
NEUTROPHILS NFR BLD: 81.6 %
PERFORMED ON: ABNORMAL
PERFORMED ON: NORMAL
PHOSPHATE SERPL-MCNC: 4.2 MG/DL (ref 2.5–4.9)
PLATELET # BLD AUTO: 232 K/UL (ref 135–450)
PMV BLD AUTO: 6.8 FL (ref 5–10.5)
POTASSIUM SERPL-SCNC: 3.9 MMOL/L (ref 3.5–5.1)
RBC # BLD AUTO: 2.48 M/UL (ref 4.2–5.9)
SODIUM SERPL-SCNC: 140 MMOL/L (ref 136–145)
WBC # BLD AUTO: 7.3 K/UL (ref 4–11)

## 2023-11-30 PROCEDURE — 85025 COMPLETE CBC W/AUTO DIFF WBC: CPT

## 2023-11-30 PROCEDURE — 6360000002 HC RX W HCPCS: Performed by: INTERNAL MEDICINE

## 2023-11-30 PROCEDURE — 2580000003 HC RX 258: Performed by: INTERNAL MEDICINE

## 2023-11-30 PROCEDURE — 6370000000 HC RX 637 (ALT 250 FOR IP): Performed by: INTERNAL MEDICINE

## 2023-11-30 PROCEDURE — 36415 COLL VENOUS BLD VENIPUNCTURE: CPT

## 2023-11-30 PROCEDURE — 6370000000 HC RX 637 (ALT 250 FOR IP): Performed by: NURSE PRACTITIONER

## 2023-11-30 PROCEDURE — 1200000000 HC SEMI PRIVATE

## 2023-11-30 PROCEDURE — 84165 PROTEIN E-PHORESIS SERUM: CPT

## 2023-11-30 PROCEDURE — 83735 ASSAY OF MAGNESIUM: CPT

## 2023-11-30 PROCEDURE — 97530 THERAPEUTIC ACTIVITIES: CPT

## 2023-11-30 PROCEDURE — 6370000000 HC RX 637 (ALT 250 FOR IP): Performed by: STUDENT IN AN ORGANIZED HEALTH CARE EDUCATION/TRAINING PROGRAM

## 2023-11-30 PROCEDURE — 99223 1ST HOSP IP/OBS HIGH 75: CPT | Performed by: INTERNAL MEDICINE

## 2023-11-30 PROCEDURE — 80069 RENAL FUNCTION PANEL: CPT

## 2023-11-30 PROCEDURE — 84155 ASSAY OF PROTEIN SERUM: CPT

## 2023-11-30 PROCEDURE — 82728 ASSAY OF FERRITIN: CPT

## 2023-11-30 RX ORDER — PREDNISONE 20 MG/1
60 TABLET ORAL DAILY
Status: DISCONTINUED | OUTPATIENT
Start: 2023-11-30 | End: 2023-11-30

## 2023-11-30 RX ORDER — PANTOPRAZOLE SODIUM 40 MG/1
40 TABLET, DELAYED RELEASE ORAL
Status: DISCONTINUED | OUTPATIENT
Start: 2023-12-01 | End: 2023-12-10 | Stop reason: HOSPADM

## 2023-11-30 RX ORDER — AMLODIPINE BESYLATE 5 MG/1
5 TABLET ORAL DAILY
Status: DISCONTINUED | OUTPATIENT
Start: 2023-11-30 | End: 2023-12-10

## 2023-11-30 RX ADMIN — METOPROLOL SUCCINATE 25 MG: 25 TABLET, FILM COATED, EXTENDED RELEASE ORAL at 09:31

## 2023-11-30 RX ADMIN — HYDRALAZINE HYDROCHLORIDE 5 MG: 20 INJECTION, SOLUTION INTRAMUSCULAR; INTRAVENOUS at 06:18

## 2023-11-30 RX ADMIN — HEPARIN SODIUM 5000 UNITS: 5000 INJECTION INTRAVENOUS; SUBCUTANEOUS at 22:32

## 2023-11-30 RX ADMIN — INSULIN GLARGINE 10 UNITS: 100 INJECTION, SOLUTION SUBCUTANEOUS at 20:20

## 2023-11-30 RX ADMIN — HEPARIN SODIUM 5000 UNITS: 5000 INJECTION INTRAVENOUS; SUBCUTANEOUS at 13:27

## 2023-11-30 RX ADMIN — Medication 10 ML: at 20:19

## 2023-11-30 RX ADMIN — AMLODIPINE BESYLATE 5 MG: 5 TABLET ORAL at 13:27

## 2023-11-30 RX ADMIN — Medication 10 ML: at 09:31

## 2023-11-30 RX ADMIN — HEPARIN SODIUM 5000 UNITS: 5000 INJECTION INTRAVENOUS; SUBCUTANEOUS at 06:12

## 2023-11-30 RX ADMIN — HYDRALAZINE HYDROCHLORIDE 5 MG: 20 INJECTION, SOLUTION INTRAMUSCULAR; INTRAVENOUS at 01:15

## 2023-11-30 RX ADMIN — PREDNISONE 60 MG: 20 TABLET ORAL at 13:27

## 2023-11-30 RX ADMIN — DOXAZOSIN 8 MG: 4 TABLET ORAL at 20:19

## 2023-11-30 RX ADMIN — WATER 125 MG: 1 INJECTION INTRAMUSCULAR; INTRAVENOUS; SUBCUTANEOUS at 22:32

## 2023-11-30 RX ADMIN — ASPIRIN 325 MG: 325 TABLET, COATED ORAL at 09:30

## 2023-12-01 LAB
ALBUMIN SERPL ELPH-MCNC: 2.3 G/DL (ref 3.1–4.9)
ALBUMIN SERPL-MCNC: 3 G/DL (ref 3.4–5)
ALPHA1 GLOB SERPL ELPH-MCNC: 0.4 G/DL (ref 0.2–0.4)
ALPHA2 GLOB SERPL ELPH-MCNC: 1.1 G/DL (ref 0.4–1.1)
ANION GAP SERPL CALCULATED.3IONS-SCNC: 17 MMOL/L (ref 3–16)
ANION GAP SERPL CALCULATED.3IONS-SCNC: 17 MMOL/L (ref 3–16)
B-GLOBULIN SERPL ELPH-MCNC: 0.8 G/DL (ref 0.9–1.6)
BASOPHILS # BLD: 0 K/UL (ref 0–0.2)
BASOPHILS NFR BLD: 0.2 %
BUN SERPL-MCNC: 73 MG/DL (ref 7–20)
BUN SERPL-MCNC: 78 MG/DL (ref 7–20)
CALCIUM SERPL-MCNC: 8.9 MG/DL (ref 8.3–10.6)
CALCIUM SERPL-MCNC: 9.3 MG/DL (ref 8.3–10.6)
CHLORIDE SERPL-SCNC: 104 MMOL/L (ref 99–110)
CHLORIDE SERPL-SCNC: 105 MMOL/L (ref 99–110)
CO2 SERPL-SCNC: 17 MMOL/L (ref 21–32)
CO2 SERPL-SCNC: 19 MMOL/L (ref 21–32)
CREAT SERPL-MCNC: 6.6 MG/DL (ref 0.8–1.3)
CREAT SERPL-MCNC: 6.9 MG/DL (ref 0.8–1.3)
DEPRECATED RDW RBC AUTO: 13.6 % (ref 12.4–15.4)
EOSINOPHIL # BLD: 0 K/UL (ref 0–0.6)
EOSINOPHIL NFR BLD: 0 %
FERRITIN SERPL IA-MCNC: 151.1 NG/ML (ref 30–400)
GAMMA GLOB SERPL ELPH-MCNC: 1.1 G/DL (ref 0.6–1.8)
GFR SERPLBLD CREATININE-BSD FMLA CKD-EPI: 8 ML/MIN/{1.73_M2}
GFR SERPLBLD CREATININE-BSD FMLA CKD-EPI: 8 ML/MIN/{1.73_M2}
GLUCOSE BLD-MCNC: 262 MG/DL (ref 70–99)
GLUCOSE BLD-MCNC: 296 MG/DL (ref 70–99)
GLUCOSE BLD-MCNC: 358 MG/DL (ref 70–99)
GLUCOSE BLD-MCNC: 383 MG/DL (ref 70–99)
GLUCOSE BLD-MCNC: 409 MG/DL (ref 70–99)
GLUCOSE SERPL-MCNC: 289 MG/DL (ref 70–99)
GLUCOSE SERPL-MCNC: 314 MG/DL (ref 70–99)
HCT VFR BLD AUTO: 23.4 % (ref 40.5–52.5)
HGB BLD-MCNC: 7.8 G/DL (ref 13.5–17.5)
LYMPHOCYTES # BLD: 0.3 K/UL (ref 1–5.1)
LYMPHOCYTES NFR BLD: 4.1 %
MAGNESIUM SERPL-MCNC: 2 MG/DL (ref 1.8–2.4)
MCH RBC QN AUTO: 30.8 PG (ref 26–34)
MCHC RBC AUTO-ENTMCNC: 33.2 G/DL (ref 31–36)
MCV RBC AUTO: 92.8 FL (ref 80–100)
MONOCYTES # BLD: 0.1 K/UL (ref 0–1.3)
MONOCYTES NFR BLD: 0.6 %
NEUTROPHILS # BLD: 7.6 K/UL (ref 1.7–7.7)
NEUTROPHILS NFR BLD: 95.1 %
PERFORMED ON: ABNORMAL
PHOSPHATE SERPL-MCNC: 5.3 MG/DL (ref 2.5–4.9)
PLATELET # BLD AUTO: 263 K/UL (ref 135–450)
PMV BLD AUTO: 7.3 FL (ref 5–10.5)
POTASSIUM SERPL-SCNC: 4.4 MMOL/L (ref 3.5–5.1)
POTASSIUM SERPL-SCNC: 4.7 MMOL/L (ref 3.5–5.1)
PROT SERPL-MCNC: 5.7 G/DL (ref 6.4–8.2)
RBC # BLD AUTO: 2.53 M/UL (ref 4.2–5.9)
SODIUM SERPL-SCNC: 139 MMOL/L (ref 136–145)
SODIUM SERPL-SCNC: 140 MMOL/L (ref 136–145)
SPE/IFE INTERPRETATION: NORMAL
WBC # BLD AUTO: 8 K/UL (ref 4–11)

## 2023-12-01 PROCEDURE — 2580000003 HC RX 258

## 2023-12-01 PROCEDURE — 83735 ASSAY OF MAGNESIUM: CPT

## 2023-12-01 PROCEDURE — 6370000000 HC RX 637 (ALT 250 FOR IP): Performed by: NURSE PRACTITIONER

## 2023-12-01 PROCEDURE — 2580000003 HC RX 258: Performed by: INTERNAL MEDICINE

## 2023-12-01 PROCEDURE — 85025 COMPLETE CBC W/AUTO DIFF WBC: CPT

## 2023-12-01 PROCEDURE — 36415 COLL VENOUS BLD VENIPUNCTURE: CPT

## 2023-12-01 PROCEDURE — 6370000000 HC RX 637 (ALT 250 FOR IP): Performed by: INTERNAL MEDICINE

## 2023-12-01 PROCEDURE — 6360000002 HC RX W HCPCS: Performed by: NURSE PRACTITIONER

## 2023-12-01 PROCEDURE — 6360000002 HC RX W HCPCS: Performed by: INTERNAL MEDICINE

## 2023-12-01 PROCEDURE — 80069 RENAL FUNCTION PANEL: CPT

## 2023-12-01 PROCEDURE — 6370000000 HC RX 637 (ALT 250 FOR IP): Performed by: STUDENT IN AN ORGANIZED HEALTH CARE EDUCATION/TRAINING PROGRAM

## 2023-12-01 PROCEDURE — 2580000003 HC RX 258: Performed by: NURSE PRACTITIONER

## 2023-12-01 PROCEDURE — 1200000000 HC SEMI PRIVATE

## 2023-12-01 RX ORDER — INSULIN LISPRO 100 [IU]/ML
0-4 INJECTION, SOLUTION INTRAVENOUS; SUBCUTANEOUS NIGHTLY
Status: DISCONTINUED | OUTPATIENT
Start: 2023-12-01 | End: 2023-12-10 | Stop reason: HOSPADM

## 2023-12-01 RX ORDER — INSULIN LISPRO 100 [IU]/ML
0-16 INJECTION, SOLUTION INTRAVENOUS; SUBCUTANEOUS
Status: DISCONTINUED | OUTPATIENT
Start: 2023-12-01 | End: 2023-12-10 | Stop reason: HOSPADM

## 2023-12-01 RX ORDER — INSULIN GLARGINE 100 [IU]/ML
15 INJECTION, SOLUTION SUBCUTANEOUS NIGHTLY
Status: DISCONTINUED | OUTPATIENT
Start: 2023-12-01 | End: 2023-12-06

## 2023-12-01 RX ORDER — LORAZEPAM 2 MG/ML
0.5 INJECTION INTRAMUSCULAR
Status: COMPLETED | OUTPATIENT
Start: 2023-12-01 | End: 2023-12-02

## 2023-12-01 RX ORDER — SODIUM CHLORIDE 9 MG/ML
INJECTION, SOLUTION INTRAVENOUS
Status: COMPLETED
Start: 2023-12-01 | End: 2023-12-01

## 2023-12-01 RX ADMIN — INSULIN LISPRO 4 UNITS: 100 INJECTION, SOLUTION INTRAVENOUS; SUBCUTANEOUS at 22:32

## 2023-12-01 RX ADMIN — AMLODIPINE BESYLATE 5 MG: 5 TABLET ORAL at 11:03

## 2023-12-01 RX ADMIN — HEPARIN SODIUM 5000 UNITS: 5000 INJECTION INTRAVENOUS; SUBCUTANEOUS at 14:51

## 2023-12-01 RX ADMIN — Medication 10 ML: at 11:04

## 2023-12-01 RX ADMIN — SODIUM CHLORIDE: 9 INJECTION, SOLUTION INTRAVENOUS at 11:43

## 2023-12-01 RX ADMIN — SODIUM CHLORIDE 200 MG: 9 INJECTION, SOLUTION INTRAVENOUS at 11:42

## 2023-12-01 RX ADMIN — WATER 125 MG: 1 INJECTION INTRAMUSCULAR; INTRAVENOUS; SUBCUTANEOUS at 22:33

## 2023-12-01 RX ADMIN — Medication 10 ML: at 21:30

## 2023-12-01 RX ADMIN — INSULIN LISPRO 8 UNITS: 100 INJECTION, SOLUTION INTRAVENOUS; SUBCUTANEOUS at 11:33

## 2023-12-01 RX ADMIN — WATER 125 MG: 1 INJECTION INTRAMUSCULAR; INTRAVENOUS; SUBCUTANEOUS at 11:24

## 2023-12-01 RX ADMIN — METOPROLOL SUCCINATE 25 MG: 25 TABLET, FILM COATED, EXTENDED RELEASE ORAL at 11:03

## 2023-12-01 RX ADMIN — INSULIN GLARGINE 15 UNITS: 100 INJECTION, SOLUTION SUBCUTANEOUS at 21:27

## 2023-12-01 RX ADMIN — DOXAZOSIN 8 MG: 4 TABLET ORAL at 21:27

## 2023-12-01 RX ADMIN — HEPARIN SODIUM 5000 UNITS: 5000 INJECTION INTRAVENOUS; SUBCUTANEOUS at 21:27

## 2023-12-01 RX ADMIN — INSULIN LISPRO 16 UNITS: 100 INJECTION, SOLUTION INTRAVENOUS; SUBCUTANEOUS at 18:26

## 2023-12-01 RX ADMIN — HEPARIN SODIUM 5000 UNITS: 5000 INJECTION INTRAVENOUS; SUBCUTANEOUS at 06:28

## 2023-12-01 RX ADMIN — PANTOPRAZOLE SODIUM 40 MG: 40 TABLET, DELAYED RELEASE ORAL at 11:03

## 2023-12-01 RX ADMIN — ZIPRASIDONE MESYLATE 10 MG: 20 INJECTION, POWDER, LYOPHILIZED, FOR SOLUTION INTRAMUSCULAR at 02:51

## 2023-12-01 NOTE — PLAN OF CARE
Problem: Safety - Adult  Goal: Free from fall injury  12/1/2023 0019 by Ashkan Kimble RN  Outcome: Progressing       Problem: Chronic Conditions and Co-morbidities  Goal: Patient's chronic conditions and co-morbidity symptoms are monitored and maintained or improved  12/1/2023 0019 by Ashkan Kimble RN  Outcome: Progressing       Problem: Skin/Tissue Integrity  Goal: Absence of new skin breakdown  Description: 1. Monitor for areas of redness and/or skin breakdown  2. Assess vascular access sites hourly  3. Every 4-6 hours minimum:  Change oxygen saturation probe site  4. Every 4-6 hours:  If on nasal continuous positive airway pressure, respiratory therapy assess nares and determine need for appliance change or resting period.   12/1/2023 0019 by Ashkan Kimble RN  Outcome: Progressing

## 2023-12-02 PROBLEM — I44.7 LBBB (LEFT BUNDLE BRANCH BLOCK): Status: ACTIVE | Noted: 2023-12-02

## 2023-12-02 LAB
ALBUMIN SERPL-MCNC: 3.1 G/DL (ref 3.4–5)
ANION GAP SERPL CALCULATED.3IONS-SCNC: 12 MMOL/L (ref 3–16)
BASE EXCESS BLDA CALC-SCNC: -6.9 MMOL/L (ref -3–3)
BASOPHILS # BLD: 0 K/UL (ref 0–0.2)
BASOPHILS NFR BLD: 0.2 %
BUN SERPL-MCNC: 83 MG/DL (ref 7–20)
CALCIUM SERPL-MCNC: 9 MG/DL (ref 8.3–10.6)
CHLORIDE SERPL-SCNC: 108 MMOL/L (ref 99–110)
CO2 BLDA-SCNC: 45.4 MMOL/L
CO2 SERPL-SCNC: 20 MMOL/L (ref 21–32)
COHGB MFR BLDA: 1 % (ref 0–1.5)
CREAT SERPL-MCNC: 6.8 MG/DL (ref 0.8–1.3)
DEPRECATED RDW RBC AUTO: 13.7 % (ref 12.4–15.4)
EOSINOPHIL # BLD: 0 K/UL (ref 0–0.6)
EOSINOPHIL NFR BLD: 0 %
GFR SERPLBLD CREATININE-BSD FMLA CKD-EPI: 8 ML/MIN/{1.73_M2}
GLUCOSE BLD-MCNC: 241 MG/DL (ref 70–99)
GLUCOSE BLD-MCNC: 252 MG/DL (ref 70–99)
GLUCOSE BLD-MCNC: 300 MG/DL (ref 70–99)
GLUCOSE BLD-MCNC: 341 MG/DL (ref 70–99)
GLUCOSE SERPL-MCNC: 216 MG/DL (ref 70–99)
HCO3 BLDA-SCNC: 19.1 MMOL/L (ref 21–29)
HCT VFR BLD AUTO: 25.7 % (ref 40.5–52.5)
HGB BLD-MCNC: 8.6 G/DL (ref 13.5–17.5)
HGB BLDA-MCNC: 8.4 G/DL (ref 13.5–17.5)
LYMPHOCYTES # BLD: 0.3 K/UL (ref 1–5.1)
LYMPHOCYTES NFR BLD: 1.9 %
MAGNESIUM SERPL-MCNC: 2.3 MG/DL (ref 1.8–2.4)
MCH RBC QN AUTO: 30.5 PG (ref 26–34)
MCHC RBC AUTO-ENTMCNC: 33.6 G/DL (ref 31–36)
MCV RBC AUTO: 90.8 FL (ref 80–100)
METHGB MFR BLDA: 0.1 %
MONOCYTES # BLD: 0.2 K/UL (ref 0–1.3)
MONOCYTES NFR BLD: 1.5 %
NEUTROPHILS # BLD: 14.6 K/UL (ref 1.7–7.7)
NEUTROPHILS NFR BLD: 96.4 %
O2 THERAPY: ABNORMAL
PCO2 BLDA: 39.4 MMHG (ref 35–45)
PERFORMED ON: ABNORMAL
PH BLDA: 7.29 [PH] (ref 7.35–7.45)
PHOSPHATE SERPL-MCNC: 5.5 MG/DL (ref 2.5–4.9)
PLATELET # BLD AUTO: 292 K/UL (ref 135–450)
PMV BLD AUTO: 6.8 FL (ref 5–10.5)
PO2 BLDA: 71.3 MMHG (ref 75–108)
POTASSIUM SERPL-SCNC: 4.7 MMOL/L (ref 3.5–5.1)
RBC # BLD AUTO: 2.83 M/UL (ref 4.2–5.9)
SAO2 % BLDA: 94 %
SODIUM SERPL-SCNC: 140 MMOL/L (ref 136–145)
WBC # BLD AUTO: 15.2 K/UL (ref 4–11)

## 2023-12-02 PROCEDURE — 2580000003 HC RX 258: Performed by: INTERNAL MEDICINE

## 2023-12-02 PROCEDURE — 1200000000 HC SEMI PRIVATE

## 2023-12-02 PROCEDURE — 6360000002 HC RX W HCPCS: Performed by: INTERNAL MEDICINE

## 2023-12-02 PROCEDURE — 94640 AIRWAY INHALATION TREATMENT: CPT

## 2023-12-02 PROCEDURE — 6370000000 HC RX 637 (ALT 250 FOR IP): Performed by: NURSE PRACTITIONER

## 2023-12-02 PROCEDURE — 36415 COLL VENOUS BLD VENIPUNCTURE: CPT

## 2023-12-02 PROCEDURE — 85025 COMPLETE CBC W/AUTO DIFF WBC: CPT

## 2023-12-02 PROCEDURE — 6370000000 HC RX 637 (ALT 250 FOR IP): Performed by: INTERNAL MEDICINE

## 2023-12-02 PROCEDURE — 6360000002 HC RX W HCPCS: Performed by: NURSE PRACTITIONER

## 2023-12-02 PROCEDURE — 2700000000 HC OXYGEN THERAPY PER DAY

## 2023-12-02 PROCEDURE — 37799 UNLISTED PX VASCULAR SURGERY: CPT

## 2023-12-02 PROCEDURE — 80069 RENAL FUNCTION PANEL: CPT

## 2023-12-02 PROCEDURE — 6370000000 HC RX 637 (ALT 250 FOR IP): Performed by: STUDENT IN AN ORGANIZED HEALTH CARE EDUCATION/TRAINING PROGRAM

## 2023-12-02 PROCEDURE — 99233 SBSQ HOSP IP/OBS HIGH 50: CPT | Performed by: CLINICAL NURSE SPECIALIST

## 2023-12-02 PROCEDURE — 83735 ASSAY OF MAGNESIUM: CPT

## 2023-12-02 PROCEDURE — 94761 N-INVAS EAR/PLS OXIMETRY MLT: CPT

## 2023-12-02 PROCEDURE — 82803 BLOOD GASES ANY COMBINATION: CPT

## 2023-12-02 RX ORDER — HALOPERIDOL 5 MG/ML
2 INJECTION INTRAMUSCULAR
Status: COMPLETED | OUTPATIENT
Start: 2023-12-02 | End: 2023-12-02

## 2023-12-02 RX ORDER — IPRATROPIUM BROMIDE AND ALBUTEROL SULFATE 2.5; .5 MG/3ML; MG/3ML
1 SOLUTION RESPIRATORY (INHALATION) EVERY 4 HOURS PRN
Status: DISCONTINUED | OUTPATIENT
Start: 2023-12-02 | End: 2023-12-10 | Stop reason: HOSPADM

## 2023-12-02 RX ORDER — FUROSEMIDE 10 MG/ML
20 INJECTION INTRAMUSCULAR; INTRAVENOUS 2 TIMES DAILY
Status: DISCONTINUED | OUTPATIENT
Start: 2023-12-02 | End: 2023-12-04

## 2023-12-02 RX ORDER — QUETIAPINE FUMARATE 25 MG/1
25 TABLET, FILM COATED ORAL NIGHTLY
Status: DISCONTINUED | OUTPATIENT
Start: 2023-12-02 | End: 2023-12-10 | Stop reason: HOSPADM

## 2023-12-02 RX ADMIN — LORAZEPAM 0.5 MG: 2 INJECTION INTRAMUSCULAR; INTRAVENOUS at 03:03

## 2023-12-02 RX ADMIN — WATER 125 MG: 1 INJECTION INTRAMUSCULAR; INTRAVENOUS; SUBCUTANEOUS at 20:49

## 2023-12-02 RX ADMIN — Medication 10 ML: at 08:54

## 2023-12-02 RX ADMIN — AMLODIPINE BESYLATE 5 MG: 5 TABLET ORAL at 08:53

## 2023-12-02 RX ADMIN — INSULIN LISPRO 8 UNITS: 100 INJECTION, SOLUTION INTRAVENOUS; SUBCUTANEOUS at 13:46

## 2023-12-02 RX ADMIN — WATER 125 MG: 1 INJECTION INTRAMUSCULAR; INTRAVENOUS; SUBCUTANEOUS at 08:54

## 2023-12-02 RX ADMIN — SODIUM CHLORIDE: 9 INJECTION, SOLUTION INTRAVENOUS at 09:01

## 2023-12-02 RX ADMIN — PANTOPRAZOLE SODIUM 40 MG: 40 TABLET, DELAYED RELEASE ORAL at 05:39

## 2023-12-02 RX ADMIN — INSULIN GLARGINE 15 UNITS: 100 INJECTION, SOLUTION SUBCUTANEOUS at 20:19

## 2023-12-02 RX ADMIN — Medication 10 ML: at 20:50

## 2023-12-02 RX ADMIN — HEPARIN SODIUM 5000 UNITS: 5000 INJECTION INTRAVENOUS; SUBCUTANEOUS at 13:46

## 2023-12-02 RX ADMIN — DOXAZOSIN 8 MG: 4 TABLET ORAL at 20:51

## 2023-12-02 RX ADMIN — QUETIAPINE FUMARATE 25 MG: 25 TABLET ORAL at 20:49

## 2023-12-02 RX ADMIN — INSULIN LISPRO 4 UNITS: 100 INJECTION, SOLUTION INTRAVENOUS; SUBCUTANEOUS at 20:20

## 2023-12-02 RX ADMIN — METOPROLOL SUCCINATE 25 MG: 25 TABLET, FILM COATED, EXTENDED RELEASE ORAL at 08:53

## 2023-12-02 RX ADMIN — SODIUM CHLORIDE 200 MG: 9 INJECTION, SOLUTION INTRAVENOUS at 09:02

## 2023-12-02 RX ADMIN — HALOPERIDOL LACTATE 2 MG: 5 INJECTION, SOLUTION INTRAMUSCULAR at 04:09

## 2023-12-02 RX ADMIN — HEPARIN SODIUM 5000 UNITS: 5000 INJECTION INTRAVENOUS; SUBCUTANEOUS at 05:20

## 2023-12-02 RX ADMIN — HEPARIN SODIUM 5000 UNITS: 5000 INJECTION INTRAVENOUS; SUBCUTANEOUS at 21:30

## 2023-12-02 RX ADMIN — IPRATROPIUM BROMIDE AND ALBUTEROL SULFATE 1 DOSE: .5; 3 SOLUTION RESPIRATORY (INHALATION) at 03:49

## 2023-12-02 RX ADMIN — INSULIN LISPRO 4 UNITS: 100 INJECTION, SOLUTION INTRAVENOUS; SUBCUTANEOUS at 08:54

## 2023-12-02 ASSESSMENT — PAIN SCALES - GENERAL: PAINLEVEL_OUTOF10: 0

## 2023-12-03 ENCOUNTER — APPOINTMENT (OUTPATIENT)
Dept: GENERAL RADIOLOGY | Age: 73
DRG: 682 | End: 2023-12-03
Payer: MEDICARE

## 2023-12-03 LAB
ALBUMIN SERPL-MCNC: 3.4 G/DL (ref 3.4–5)
ANION GAP SERPL CALCULATED.3IONS-SCNC: 13 MMOL/L (ref 3–16)
BASOPHILS # BLD: 0 K/UL (ref 0–0.2)
BASOPHILS NFR BLD: 0.1 %
BUN SERPL-MCNC: 89 MG/DL (ref 7–20)
CALCIUM SERPL-MCNC: 9.2 MG/DL (ref 8.3–10.6)
CHLORIDE SERPL-SCNC: 107 MMOL/L (ref 99–110)
CO2 SERPL-SCNC: 21 MMOL/L (ref 21–32)
CREAT SERPL-MCNC: 6.5 MG/DL (ref 0.8–1.3)
DEPRECATED RDW RBC AUTO: 13.8 % (ref 12.4–15.4)
EOSINOPHIL # BLD: 0 K/UL (ref 0–0.6)
EOSINOPHIL NFR BLD: 0 %
GFR SERPLBLD CREATININE-BSD FMLA CKD-EPI: 8 ML/MIN/{1.73_M2}
GLUCOSE BLD-MCNC: 187 MG/DL (ref 70–99)
GLUCOSE BLD-MCNC: 221 MG/DL (ref 70–99)
GLUCOSE BLD-MCNC: 238 MG/DL (ref 70–99)
GLUCOSE BLD-MCNC: 288 MG/DL (ref 70–99)
GLUCOSE SERPL-MCNC: 230 MG/DL (ref 70–99)
HCT VFR BLD AUTO: 25 % (ref 40.5–52.5)
HGB BLD-MCNC: 8.4 G/DL (ref 13.5–17.5)
LYMPHOCYTES # BLD: 0.2 K/UL (ref 1–5.1)
LYMPHOCYTES NFR BLD: 1.3 %
MCH RBC QN AUTO: 30.5 PG (ref 26–34)
MCHC RBC AUTO-ENTMCNC: 33.4 G/DL (ref 31–36)
MCV RBC AUTO: 91.3 FL (ref 80–100)
MONOCYTES # BLD: 0.2 K/UL (ref 0–1.3)
MONOCYTES NFR BLD: 1.6 %
NEUTROPHILS # BLD: 13.5 K/UL (ref 1.7–7.7)
NEUTROPHILS NFR BLD: 97 %
PERFORMED ON: ABNORMAL
PHOSPHATE SERPL-MCNC: 5 MG/DL (ref 2.5–4.9)
PLATELET # BLD AUTO: 290 K/UL (ref 135–450)
PMV BLD AUTO: 6.8 FL (ref 5–10.5)
POTASSIUM SERPL-SCNC: 4.7 MMOL/L (ref 3.5–5.1)
RBC # BLD AUTO: 2.74 M/UL (ref 4.2–5.9)
SODIUM SERPL-SCNC: 141 MMOL/L (ref 136–145)
WBC # BLD AUTO: 13.9 K/UL (ref 4–11)

## 2023-12-03 PROCEDURE — 6360000002 HC RX W HCPCS: Performed by: INTERNAL MEDICINE

## 2023-12-03 PROCEDURE — 2580000003 HC RX 258: Performed by: INTERNAL MEDICINE

## 2023-12-03 PROCEDURE — 36415 COLL VENOUS BLD VENIPUNCTURE: CPT

## 2023-12-03 PROCEDURE — 6370000000 HC RX 637 (ALT 250 FOR IP): Performed by: INTERNAL MEDICINE

## 2023-12-03 PROCEDURE — 1200000000 HC SEMI PRIVATE

## 2023-12-03 PROCEDURE — 71045 X-RAY EXAM CHEST 1 VIEW: CPT

## 2023-12-03 PROCEDURE — 80069 RENAL FUNCTION PANEL: CPT

## 2023-12-03 PROCEDURE — 6370000000 HC RX 637 (ALT 250 FOR IP): Performed by: STUDENT IN AN ORGANIZED HEALTH CARE EDUCATION/TRAINING PROGRAM

## 2023-12-03 PROCEDURE — 6370000000 HC RX 637 (ALT 250 FOR IP): Performed by: NURSE PRACTITIONER

## 2023-12-03 PROCEDURE — 85025 COMPLETE CBC W/AUTO DIFF WBC: CPT

## 2023-12-03 RX ORDER — LORAZEPAM 0.5 MG/1
0.5 TABLET ORAL EVERY 6 HOURS PRN
Status: DISCONTINUED | OUTPATIENT
Start: 2023-12-03 | End: 2023-12-03

## 2023-12-03 RX ORDER — LORAZEPAM 2 MG/ML
0.5 INJECTION INTRAMUSCULAR EVERY 6 HOURS PRN
Status: DISCONTINUED | OUTPATIENT
Start: 2023-12-03 | End: 2023-12-10 | Stop reason: HOSPADM

## 2023-12-03 RX ADMIN — HEPARIN SODIUM 5000 UNITS: 5000 INJECTION INTRAVENOUS; SUBCUTANEOUS at 16:11

## 2023-12-03 RX ADMIN — INSULIN LISPRO 8 UNITS: 100 INJECTION, SOLUTION INTRAVENOUS; SUBCUTANEOUS at 18:53

## 2023-12-03 RX ADMIN — HEPARIN SODIUM 5000 UNITS: 5000 INJECTION INTRAVENOUS; SUBCUTANEOUS at 20:50

## 2023-12-03 RX ADMIN — FUROSEMIDE 20 MG: 10 INJECTION, SOLUTION INTRAMUSCULAR; INTRAVENOUS at 08:47

## 2023-12-03 RX ADMIN — AMLODIPINE BESYLATE 5 MG: 5 TABLET ORAL at 08:47

## 2023-12-03 RX ADMIN — INSULIN GLARGINE 15 UNITS: 100 INJECTION, SOLUTION SUBCUTANEOUS at 20:55

## 2023-12-03 RX ADMIN — Medication 10 ML: at 09:01

## 2023-12-03 RX ADMIN — LORAZEPAM 0.5 MG: 0.5 TABLET ORAL at 11:09

## 2023-12-03 RX ADMIN — WATER 125 MG: 1 INJECTION INTRAMUSCULAR; INTRAVENOUS; SUBCUTANEOUS at 08:47

## 2023-12-03 RX ADMIN — DOXAZOSIN 8 MG: 4 TABLET ORAL at 20:48

## 2023-12-03 RX ADMIN — SODIUM CHLORIDE: 9 INJECTION, SOLUTION INTRAVENOUS at 08:57

## 2023-12-03 RX ADMIN — FUROSEMIDE 20 MG: 10 INJECTION, SOLUTION INTRAMUSCULAR; INTRAVENOUS at 16:11

## 2023-12-03 RX ADMIN — PANTOPRAZOLE SODIUM 40 MG: 40 TABLET, DELAYED RELEASE ORAL at 08:47

## 2023-12-03 RX ADMIN — SODIUM CHLORIDE 200 MG: 9 INJECTION, SOLUTION INTRAVENOUS at 08:58

## 2023-12-03 RX ADMIN — HEPARIN SODIUM 5000 UNITS: 5000 INJECTION INTRAVENOUS; SUBCUTANEOUS at 06:27

## 2023-12-03 RX ADMIN — Medication 10 ML: at 20:46

## 2023-12-03 RX ADMIN — WATER 125 MG: 1 INJECTION INTRAMUSCULAR; INTRAVENOUS; SUBCUTANEOUS at 23:16

## 2023-12-03 RX ADMIN — METOPROLOL SUCCINATE 25 MG: 25 TABLET, FILM COATED, EXTENDED RELEASE ORAL at 08:47

## 2023-12-03 RX ADMIN — INSULIN LISPRO 4 UNITS: 100 INJECTION, SOLUTION INTRAVENOUS; SUBCUTANEOUS at 08:47

## 2023-12-03 RX ADMIN — SODIUM CHLORIDE, PRESERVATIVE FREE 10 ML: 5 INJECTION INTRAVENOUS at 16:12

## 2023-12-03 RX ADMIN — QUETIAPINE FUMARATE 25 MG: 25 TABLET ORAL at 20:48

## 2023-12-03 RX ADMIN — LORAZEPAM 0.5 MG: 2 INJECTION INTRAMUSCULAR; INTRAVENOUS at 20:43

## 2023-12-03 ASSESSMENT — PAIN SCALES - GENERAL
PAINLEVEL_OUTOF10: 0
PAINLEVEL_OUTOF10: 0

## 2023-12-04 ENCOUNTER — APPOINTMENT (OUTPATIENT)
Dept: MRI IMAGING | Age: 73
DRG: 682 | End: 2023-12-04
Payer: MEDICARE

## 2023-12-04 LAB
ALBUMIN SERPL-MCNC: 3 G/DL (ref 3.4–5)
ANA SER QL IA: NEGATIVE
ANION GAP SERPL CALCULATED.3IONS-SCNC: 10 MMOL/L (ref 3–16)
BACTERIA URNS QL MICRO: ABNORMAL /HPF
BASOPHILS # BLD: 0 K/UL (ref 0–0.2)
BASOPHILS NFR BLD: 0 %
BILIRUB UR QL STRIP.AUTO: NEGATIVE
BUN SERPL-MCNC: 94 MG/DL (ref 7–20)
CALCIUM SERPL-MCNC: 8.5 MG/DL (ref 8.3–10.6)
CHLORIDE SERPL-SCNC: 110 MMOL/L (ref 99–110)
CLARITY UR: CLEAR
CO2 SERPL-SCNC: 21 MMOL/L (ref 21–32)
COLOR UR: YELLOW
CREAT SERPL-MCNC: 6.1 MG/DL (ref 0.8–1.3)
DEPRECATED RDW RBC AUTO: 13.9 % (ref 12.4–15.4)
EOSINOPHIL # BLD: 0 K/UL (ref 0–0.6)
EOSINOPHIL NFR BLD: 0 %
EPI CELLS #/AREA URNS AUTO: 0 /HPF (ref 0–5)
GFR SERPLBLD CREATININE-BSD FMLA CKD-EPI: 9 ML/MIN/{1.73_M2}
GLUCOSE BLD-MCNC: 176 MG/DL (ref 70–99)
GLUCOSE BLD-MCNC: 198 MG/DL (ref 70–99)
GLUCOSE BLD-MCNC: 203 MG/DL (ref 70–99)
GLUCOSE BLD-MCNC: 231 MG/DL (ref 70–99)
GLUCOSE BLD-MCNC: 286 MG/DL (ref 70–99)
GLUCOSE SERPL-MCNC: 203 MG/DL (ref 70–99)
GLUCOSE UR STRIP.AUTO-MCNC: 500 MG/DL
HCT VFR BLD AUTO: 24.9 % (ref 40.5–52.5)
HGB BLD-MCNC: 8.1 G/DL (ref 13.5–17.5)
HGB UR QL STRIP.AUTO: NEGATIVE
HYALINE CASTS #/AREA URNS AUTO: 0 /LPF (ref 0–8)
KETONES UR STRIP.AUTO-MCNC: NEGATIVE MG/DL
LEUKOCYTE ESTERASE UR QL STRIP.AUTO: ABNORMAL
LYMPHOCYTES # BLD: 0.4 K/UL (ref 1–5.1)
LYMPHOCYTES NFR BLD: 4 %
MCH RBC QN AUTO: 30 PG (ref 26–34)
MCHC RBC AUTO-ENTMCNC: 32.7 G/DL (ref 31–36)
MCV RBC AUTO: 91.8 FL (ref 80–100)
METAMYELOCYTES NFR BLD MANUAL: 2 %
MONOCYTES # BLD: 0.1 K/UL (ref 0–1.3)
MONOCYTES NFR BLD: 1 %
NEUTROPHILS # BLD: 10 K/UL (ref 1.7–7.7)
NEUTROPHILS NFR BLD: 93 %
NITRITE UR QL STRIP.AUTO: NEGATIVE
PERFORMED ON: ABNORMAL
PH UR STRIP.AUTO: 5.5 [PH] (ref 5–8)
PHOSPHATE SERPL-MCNC: 5.3 MG/DL (ref 2.5–4.9)
PLATELET # BLD AUTO: 270 K/UL (ref 135–450)
PLATELET BLD QL SMEAR: ADEQUATE
PMV BLD AUTO: 7 FL (ref 5–10.5)
POTASSIUM SERPL-SCNC: 4.7 MMOL/L (ref 3.5–5.1)
PROT UR STRIP.AUTO-MCNC: ABNORMAL MG/DL
RBC # BLD AUTO: 2.71 M/UL (ref 4.2–5.9)
RBC CLUMPS #/AREA URNS AUTO: 0 /HPF (ref 0–4)
RBC MORPH BLD: NORMAL
SLIDE REVIEW: ABNORMAL
SODIUM SERPL-SCNC: 141 MMOL/L (ref 136–145)
SP GR UR STRIP.AUTO: 1.01 (ref 1–1.03)
UA DIPSTICK W REFLEX MICRO PNL UR: YES
URN SPEC COLLECT METH UR: ABNORMAL
UROBILINOGEN UR STRIP-ACNC: 0.2 E.U./DL
WBC # BLD AUTO: 10.5 K/UL (ref 4–11)
WBC #/AREA URNS AUTO: 5 /HPF (ref 0–5)

## 2023-12-04 PROCEDURE — 97110 THERAPEUTIC EXERCISES: CPT

## 2023-12-04 PROCEDURE — 6360000002 HC RX W HCPCS: Performed by: INTERNAL MEDICINE

## 2023-12-04 PROCEDURE — 6370000000 HC RX 637 (ALT 250 FOR IP): Performed by: STUDENT IN AN ORGANIZED HEALTH CARE EDUCATION/TRAINING PROGRAM

## 2023-12-04 PROCEDURE — 2580000003 HC RX 258: Performed by: INTERNAL MEDICINE

## 2023-12-04 PROCEDURE — 6370000000 HC RX 637 (ALT 250 FOR IP): Performed by: INTERNAL MEDICINE

## 2023-12-04 PROCEDURE — 97116 GAIT TRAINING THERAPY: CPT

## 2023-12-04 PROCEDURE — 85025 COMPLETE CBC W/AUTO DIFF WBC: CPT

## 2023-12-04 PROCEDURE — 6370000000 HC RX 637 (ALT 250 FOR IP): Performed by: NURSE PRACTITIONER

## 2023-12-04 PROCEDURE — 70551 MRI BRAIN STEM W/O DYE: CPT

## 2023-12-04 PROCEDURE — 83516 IMMUNOASSAY NONANTIBODY: CPT

## 2023-12-04 PROCEDURE — 86038 ANTINUCLEAR ANTIBODIES: CPT

## 2023-12-04 PROCEDURE — 1200000000 HC SEMI PRIVATE

## 2023-12-04 PROCEDURE — 81001 URINALYSIS AUTO W/SCOPE: CPT

## 2023-12-04 PROCEDURE — 80069 RENAL FUNCTION PANEL: CPT

## 2023-12-04 PROCEDURE — 36415 COLL VENOUS BLD VENIPUNCTURE: CPT

## 2023-12-04 RX ORDER — ATORVASTATIN CALCIUM 40 MG/1
40 TABLET, FILM COATED ORAL NIGHTLY
Status: DISCONTINUED | OUTPATIENT
Start: 2023-12-04 | End: 2023-12-10 | Stop reason: HOSPADM

## 2023-12-04 RX ORDER — ASPIRIN 81 MG/1
81 TABLET, CHEWABLE ORAL DAILY
Status: DISCONTINUED | OUTPATIENT
Start: 2023-12-04 | End: 2023-12-10 | Stop reason: HOSPADM

## 2023-12-04 RX ORDER — MYCOPHENOLATE MOFETIL 250 MG/1
250 CAPSULE ORAL 2 TIMES DAILY
Status: DISCONTINUED | OUTPATIENT
Start: 2023-12-05 | End: 2023-12-05

## 2023-12-04 RX ADMIN — HEPARIN SODIUM 5000 UNITS: 5000 INJECTION INTRAVENOUS; SUBCUTANEOUS at 20:27

## 2023-12-04 RX ADMIN — INSULIN GLARGINE 15 UNITS: 100 INJECTION, SOLUTION SUBCUTANEOUS at 21:10

## 2023-12-04 RX ADMIN — Medication 10 ML: at 09:47

## 2023-12-04 RX ADMIN — HEPARIN SODIUM 5000 UNITS: 5000 INJECTION INTRAVENOUS; SUBCUTANEOUS at 06:47

## 2023-12-04 RX ADMIN — ASPIRIN 81 MG 81 MG: 81 TABLET ORAL at 17:58

## 2023-12-04 RX ADMIN — METOPROLOL SUCCINATE 25 MG: 25 TABLET, FILM COATED, EXTENDED RELEASE ORAL at 09:47

## 2023-12-04 RX ADMIN — HEPARIN SODIUM 5000 UNITS: 5000 INJECTION INTRAVENOUS; SUBCUTANEOUS at 17:49

## 2023-12-04 RX ADMIN — FUROSEMIDE 20 MG: 10 INJECTION, SOLUTION INTRAMUSCULAR; INTRAVENOUS at 09:47

## 2023-12-04 RX ADMIN — PANTOPRAZOLE SODIUM 40 MG: 40 TABLET, DELAYED RELEASE ORAL at 09:47

## 2023-12-04 RX ADMIN — INSULIN LISPRO 4 UNITS: 100 INJECTION, SOLUTION INTRAVENOUS; SUBCUTANEOUS at 12:18

## 2023-12-04 RX ADMIN — Medication 10 ML: at 21:11

## 2023-12-04 RX ADMIN — INSULIN LISPRO 8 UNITS: 100 INJECTION, SOLUTION INTRAVENOUS; SUBCUTANEOUS at 17:49

## 2023-12-04 RX ADMIN — AMLODIPINE BESYLATE 5 MG: 5 TABLET ORAL at 09:47

## 2023-12-04 RX ADMIN — QUETIAPINE FUMARATE 25 MG: 25 TABLET ORAL at 20:25

## 2023-12-04 RX ADMIN — HYDRALAZINE HYDROCHLORIDE 5 MG: 20 INJECTION, SOLUTION INTRAMUSCULAR; INTRAVENOUS at 11:07

## 2023-12-04 RX ADMIN — DOXAZOSIN 8 MG: 4 TABLET ORAL at 20:25

## 2023-12-04 RX ADMIN — ATORVASTATIN CALCIUM 40 MG: 40 TABLET, FILM COATED ORAL at 21:10

## 2023-12-04 RX ADMIN — WATER 125 MG: 1 INJECTION INTRAMUSCULAR; INTRAVENOUS; SUBCUTANEOUS at 09:47

## 2023-12-05 DIAGNOSIS — I48.91 ATRIAL FIBRILLATION, UNSPECIFIED TYPE (HCC): Primary | ICD-10-CM

## 2023-12-05 PROBLEM — Z90.5 H/O RIGHT NEPHRECTOMY: Status: ACTIVE | Noted: 2023-12-05

## 2023-12-05 LAB
ALBUMIN SERPL-MCNC: 2.9 G/DL (ref 3.4–5)
ANION GAP SERPL CALCULATED.3IONS-SCNC: 10 MMOL/L (ref 3–16)
BASOPHILS # BLD: 0 K/UL (ref 0–0.2)
BASOPHILS NFR BLD: 0.1 %
BUN SERPL-MCNC: 94 MG/DL (ref 7–20)
C3 SERPL-MCNC: 80.3 MG/DL (ref 90–180)
C4 SERPL-MCNC: 22.7 MG/DL (ref 10–40)
CALCIUM SERPL-MCNC: 8.3 MG/DL (ref 8.3–10.6)
CHLORIDE SERPL-SCNC: 111 MMOL/L (ref 99–110)
CHOLEST SERPL-MCNC: 136 MG/DL (ref 0–199)
CO2 SERPL-SCNC: 22 MMOL/L (ref 21–32)
CREAT SERPL-MCNC: 5.4 MG/DL (ref 0.8–1.3)
DEPRECATED RDW RBC AUTO: 13.7 % (ref 12.4–15.4)
EOSINOPHIL # BLD: 0 K/UL (ref 0–0.6)
EOSINOPHIL NFR BLD: 0.1 %
GFR SERPLBLD CREATININE-BSD FMLA CKD-EPI: 10 ML/MIN/{1.73_M2}
GLUCOSE BLD-MCNC: 140 MG/DL (ref 70–99)
GLUCOSE BLD-MCNC: 181 MG/DL (ref 70–99)
GLUCOSE BLD-MCNC: 279 MG/DL (ref 70–99)
GLUCOSE BLD-MCNC: 298 MG/DL (ref 70–99)
GLUCOSE SERPL-MCNC: 149 MG/DL (ref 70–99)
HCT VFR BLD AUTO: 24.2 % (ref 40.5–52.5)
HDLC SERPL-MCNC: 28 MG/DL (ref 40–60)
HGB BLD-MCNC: 8.1 G/DL (ref 13.5–17.5)
LDLC SERPL CALC-MCNC: 78 MG/DL
LYMPHOCYTES # BLD: 0.5 K/UL (ref 1–5.1)
LYMPHOCYTES NFR BLD: 5.2 %
MAGNESIUM SERPL-MCNC: 1.8 MG/DL (ref 1.8–2.4)
MCH RBC QN AUTO: 30.8 PG (ref 26–34)
MCHC RBC AUTO-ENTMCNC: 33.7 G/DL (ref 31–36)
MCV RBC AUTO: 91.3 FL (ref 80–100)
MONOCYTES # BLD: 0.5 K/UL (ref 0–1.3)
MONOCYTES NFR BLD: 5.3 %
NEUTROPHILS # BLD: 8.6 K/UL (ref 1.7–7.7)
NEUTROPHILS NFR BLD: 89.3 %
PERFORMED ON: ABNORMAL
PHOSPHATE SERPL-MCNC: 3.9 MG/DL (ref 2.5–4.9)
PLATELET # BLD AUTO: 253 K/UL (ref 135–450)
PMV BLD AUTO: 6.8 FL (ref 5–10.5)
POTASSIUM SERPL-SCNC: 3.7 MMOL/L (ref 3.5–5.1)
RBC # BLD AUTO: 2.65 M/UL (ref 4.2–5.9)
SODIUM SERPL-SCNC: 143 MMOL/L (ref 136–145)
TRIGL SERPL-MCNC: 152 MG/DL (ref 0–150)
VLDLC SERPL CALC-MCNC: 30 MG/DL
WBC # BLD AUTO: 9.6 K/UL (ref 4–11)

## 2023-12-05 PROCEDURE — 6370000000 HC RX 637 (ALT 250 FOR IP): Performed by: INTERNAL MEDICINE

## 2023-12-05 PROCEDURE — 83735 ASSAY OF MAGNESIUM: CPT

## 2023-12-05 PROCEDURE — 97530 THERAPEUTIC ACTIVITIES: CPT

## 2023-12-05 PROCEDURE — 80061 LIPID PANEL: CPT

## 2023-12-05 PROCEDURE — 97535 SELF CARE MNGMENT TRAINING: CPT

## 2023-12-05 PROCEDURE — 92610 EVALUATE SWALLOWING FUNCTION: CPT

## 2023-12-05 PROCEDURE — 86160 COMPLEMENT ANTIGEN: CPT

## 2023-12-05 PROCEDURE — 6360000002 HC RX W HCPCS: Performed by: INTERNAL MEDICINE

## 2023-12-05 PROCEDURE — 83516 IMMUNOASSAY NONANTIBODY: CPT

## 2023-12-05 PROCEDURE — APPSS60 APP SPLIT SHARED TIME 46-60 MINUTES

## 2023-12-05 PROCEDURE — 83036 HEMOGLOBIN GLYCOSYLATED A1C: CPT

## 2023-12-05 PROCEDURE — 2580000003 HC RX 258: Performed by: INTERNAL MEDICINE

## 2023-12-05 PROCEDURE — 86038 ANTINUCLEAR ANTIBODIES: CPT

## 2023-12-05 PROCEDURE — 36415 COLL VENOUS BLD VENIPUNCTURE: CPT

## 2023-12-05 PROCEDURE — 92523 SPEECH SOUND LANG COMPREHEN: CPT

## 2023-12-05 PROCEDURE — 80069 RENAL FUNCTION PANEL: CPT

## 2023-12-05 PROCEDURE — 99223 1ST HOSP IP/OBS HIGH 75: CPT | Performed by: PSYCHIATRY & NEUROLOGY

## 2023-12-05 PROCEDURE — 6370000000 HC RX 637 (ALT 250 FOR IP): Performed by: STUDENT IN AN ORGANIZED HEALTH CARE EDUCATION/TRAINING PROGRAM

## 2023-12-05 PROCEDURE — 85025 COMPLETE CBC W/AUTO DIFF WBC: CPT

## 2023-12-05 PROCEDURE — 92526 ORAL FUNCTION THERAPY: CPT

## 2023-12-05 PROCEDURE — 1200000000 HC SEMI PRIVATE

## 2023-12-05 PROCEDURE — APPNB30 APP NON BILLABLE TIME 0-30 MINS

## 2023-12-05 PROCEDURE — 6370000000 HC RX 637 (ALT 250 FOR IP): Performed by: NURSE PRACTITIONER

## 2023-12-05 RX ADMIN — QUETIAPINE FUMARATE 25 MG: 25 TABLET ORAL at 20:24

## 2023-12-05 RX ADMIN — LORAZEPAM 0.5 MG: 2 INJECTION INTRAMUSCULAR; INTRAVENOUS at 23:21

## 2023-12-05 RX ADMIN — AMLODIPINE BESYLATE 5 MG: 5 TABLET ORAL at 08:56

## 2023-12-05 RX ADMIN — Medication 10 ML: at 08:57

## 2023-12-05 RX ADMIN — HYDRALAZINE HYDROCHLORIDE 5 MG: 20 INJECTION, SOLUTION INTRAMUSCULAR; INTRAVENOUS at 23:21

## 2023-12-05 RX ADMIN — HEPARIN SODIUM 5000 UNITS: 5000 INJECTION INTRAVENOUS; SUBCUTANEOUS at 14:41

## 2023-12-05 RX ADMIN — HEPARIN SODIUM 5000 UNITS: 5000 INJECTION INTRAVENOUS; SUBCUTANEOUS at 20:21

## 2023-12-05 RX ADMIN — MYCOPHENOLATE MOFETIL 250 MG: 250 CAPSULE ORAL at 08:56

## 2023-12-05 RX ADMIN — Medication 10 ML: at 20:15

## 2023-12-05 RX ADMIN — ATORVASTATIN CALCIUM 40 MG: 40 TABLET, FILM COATED ORAL at 20:24

## 2023-12-05 RX ADMIN — PREDNISONE 30 MG: 20 TABLET ORAL at 15:06

## 2023-12-05 RX ADMIN — ASPIRIN 81 MG 81 MG: 81 TABLET ORAL at 08:56

## 2023-12-05 RX ADMIN — METHYLPREDNISOLONE SODIUM SUCCINATE 125 MG: 125 INJECTION INTRAMUSCULAR; INTRAVENOUS at 08:56

## 2023-12-05 RX ADMIN — DOXAZOSIN 8 MG: 4 TABLET ORAL at 20:24

## 2023-12-05 RX ADMIN — HEPARIN SODIUM 5000 UNITS: 5000 INJECTION INTRAVENOUS; SUBCUTANEOUS at 05:30

## 2023-12-05 RX ADMIN — PANTOPRAZOLE SODIUM 40 MG: 40 TABLET, DELAYED RELEASE ORAL at 08:58

## 2023-12-05 RX ADMIN — INSULIN LISPRO 8 UNITS: 100 INJECTION, SOLUTION INTRAVENOUS; SUBCUTANEOUS at 17:06

## 2023-12-05 RX ADMIN — INSULIN GLARGINE 15 UNITS: 100 INJECTION, SOLUTION SUBCUTANEOUS at 20:15

## 2023-12-05 RX ADMIN — METOPROLOL SUCCINATE 25 MG: 25 TABLET, FILM COATED, EXTENDED RELEASE ORAL at 08:56

## 2023-12-05 ASSESSMENT — PAIN SCALES - GENERAL: PAINLEVEL_OUTOF10: 0

## 2023-12-06 LAB
ALBUMIN SERPL-MCNC: 3.2 G/DL (ref 3.4–5)
ANA SER QL IA: NEGATIVE
ANION GAP SERPL CALCULATED.3IONS-SCNC: 11 MMOL/L (ref 3–16)
BASOPHILS # BLD: 0 K/UL (ref 0–0.2)
BASOPHILS NFR BLD: 0 %
BM IGG SER IF-ACNC: 0 AU/ML (ref 0–19)
BUN SERPL-MCNC: 91 MG/DL (ref 7–20)
CALCIUM SERPL-MCNC: 8.7 MG/DL (ref 8.3–10.6)
CHLORIDE SERPL-SCNC: 109 MMOL/L (ref 99–110)
CO2 SERPL-SCNC: 23 MMOL/L (ref 21–32)
CREAT SERPL-MCNC: 4.9 MG/DL (ref 0.8–1.3)
DEPRECATED RDW RBC AUTO: 14.1 % (ref 12.4–15.4)
EOSINOPHIL # BLD: 0 K/UL (ref 0–0.6)
EOSINOPHIL NFR BLD: 0 %
EST. AVERAGE GLUCOSE BLD GHB EST-MCNC: 151.3 MG/DL
GFR SERPLBLD CREATININE-BSD FMLA CKD-EPI: 12 ML/MIN/{1.73_M2}
GLUCOSE BLD-MCNC: 213 MG/DL (ref 70–99)
GLUCOSE BLD-MCNC: 231 MG/DL (ref 70–99)
GLUCOSE BLD-MCNC: 238 MG/DL (ref 70–99)
GLUCOSE BLD-MCNC: 262 MG/DL (ref 70–99)
GLUCOSE SERPL-MCNC: 212 MG/DL (ref 70–99)
HBA1C MFR BLD: 6.9 %
HCT VFR BLD AUTO: 27.2 % (ref 40.5–52.5)
HGB BLD-MCNC: 8.9 G/DL (ref 13.5–17.5)
LYMPHOCYTES # BLD: 0.4 K/UL (ref 1–5.1)
LYMPHOCYTES NFR BLD: 2 %
MAGNESIUM SERPL-MCNC: 1.8 MG/DL (ref 1.8–2.4)
MCH RBC QN AUTO: 30 PG (ref 26–34)
MCHC RBC AUTO-ENTMCNC: 32.7 G/DL (ref 31–36)
MCV RBC AUTO: 91.8 FL (ref 80–100)
MONOCYTES # BLD: 0.4 K/UL (ref 0–1.3)
MONOCYTES NFR BLD: 4 %
MYELOPEROXIDASE AB SER-ACNC: 0 AU/ML (ref 0–19)
NEUTROPHILS # BLD: 8.2 K/UL (ref 1.7–7.7)
NEUTROPHILS NFR BLD: 92 %
PERFORMED ON: ABNORMAL
PHOSPHATE SERPL-MCNC: 4.1 MG/DL (ref 2.5–4.9)
PLATELET # BLD AUTO: 237 K/UL (ref 135–450)
PLATELET BLD QL SMEAR: ADEQUATE
PMV BLD AUTO: 7 FL (ref 5–10.5)
POTASSIUM SERPL-SCNC: 3.9 MMOL/L (ref 3.5–5.1)
PROTEINASE3 AB SER-ACNC: 0 AU/ML (ref 0–19)
RBC # BLD AUTO: 2.97 M/UL (ref 4.2–5.9)
RBC MORPH BLD: NORMAL
SLIDE REVIEW: ABNORMAL
SODIUM SERPL-SCNC: 143 MMOL/L (ref 136–145)
VARIANT LYMPHS NFR BLD MANUAL: 2 % (ref 0–6)
WBC # BLD AUTO: 8.9 K/UL (ref 4–11)

## 2023-12-06 PROCEDURE — 97530 THERAPEUTIC ACTIVITIES: CPT

## 2023-12-06 PROCEDURE — 1200000000 HC SEMI PRIVATE

## 2023-12-06 PROCEDURE — 83735 ASSAY OF MAGNESIUM: CPT

## 2023-12-06 PROCEDURE — APPNB30 APP NON BILLABLE TIME 0-30 MINS

## 2023-12-06 PROCEDURE — 36415 COLL VENOUS BLD VENIPUNCTURE: CPT

## 2023-12-06 PROCEDURE — 92526 ORAL FUNCTION THERAPY: CPT

## 2023-12-06 PROCEDURE — 6370000000 HC RX 637 (ALT 250 FOR IP): Performed by: INTERNAL MEDICINE

## 2023-12-06 PROCEDURE — 6360000004 HC RX CONTRAST MEDICATION: Performed by: INTERNAL MEDICINE

## 2023-12-06 PROCEDURE — 85025 COMPLETE CBC W/AUTO DIFF WBC: CPT

## 2023-12-06 PROCEDURE — 6370000000 HC RX 637 (ALT 250 FOR IP): Performed by: STUDENT IN AN ORGANIZED HEALTH CARE EDUCATION/TRAINING PROGRAM

## 2023-12-06 PROCEDURE — 2580000003 HC RX 258: Performed by: INTERNAL MEDICINE

## 2023-12-06 PROCEDURE — APPSS30 APP SPLIT SHARED TIME 16-30 MINUTES

## 2023-12-06 PROCEDURE — 97535 SELF CARE MNGMENT TRAINING: CPT

## 2023-12-06 PROCEDURE — 80069 RENAL FUNCTION PANEL: CPT

## 2023-12-06 PROCEDURE — 6370000000 HC RX 637 (ALT 250 FOR IP): Performed by: NURSE PRACTITIONER

## 2023-12-06 PROCEDURE — 97116 GAIT TRAINING THERAPY: CPT

## 2023-12-06 PROCEDURE — 97129 THER IVNTJ 1ST 15 MIN: CPT

## 2023-12-06 PROCEDURE — C8929 TTE W OR WO FOL WCON,DOPPLER: HCPCS

## 2023-12-06 PROCEDURE — 6360000002 HC RX W HCPCS: Performed by: INTERNAL MEDICINE

## 2023-12-06 PROCEDURE — 99232 SBSQ HOSP IP/OBS MODERATE 35: CPT | Performed by: PSYCHIATRY & NEUROLOGY

## 2023-12-06 RX ORDER — INSULIN GLARGINE 100 [IU]/ML
18 INJECTION, SOLUTION SUBCUTANEOUS NIGHTLY
Status: DISCONTINUED | OUTPATIENT
Start: 2023-12-06 | End: 2023-12-09

## 2023-12-06 RX ORDER — PREDNISONE 20 MG/1
100 TABLET ORAL DAILY
Status: DISCONTINUED | OUTPATIENT
Start: 2023-12-07 | End: 2023-12-07

## 2023-12-06 RX ADMIN — ASPIRIN 81 MG 81 MG: 81 TABLET ORAL at 11:34

## 2023-12-06 RX ADMIN — INSULIN LISPRO 4 UNITS: 100 INJECTION, SOLUTION INTRAVENOUS; SUBCUTANEOUS at 17:26

## 2023-12-06 RX ADMIN — DOXAZOSIN 8 MG: 4 TABLET ORAL at 20:06

## 2023-12-06 RX ADMIN — PERFLUTREN 1.5 ML: 6.52 INJECTION, SUSPENSION INTRAVENOUS at 10:15

## 2023-12-06 RX ADMIN — HEPARIN SODIUM 5000 UNITS: 5000 INJECTION INTRAVENOUS; SUBCUTANEOUS at 20:06

## 2023-12-06 RX ADMIN — AMLODIPINE BESYLATE 5 MG: 5 TABLET ORAL at 11:34

## 2023-12-06 RX ADMIN — INSULIN GLARGINE 18 UNITS: 100 INJECTION, SOLUTION SUBCUTANEOUS at 20:07

## 2023-12-06 RX ADMIN — PREDNISONE 30 MG: 20 TABLET ORAL at 11:34

## 2023-12-06 RX ADMIN — QUETIAPINE FUMARATE 25 MG: 25 TABLET ORAL at 20:06

## 2023-12-06 RX ADMIN — HEPARIN SODIUM 5000 UNITS: 5000 INJECTION INTRAVENOUS; SUBCUTANEOUS at 05:58

## 2023-12-06 RX ADMIN — INSULIN LISPRO 4 UNITS: 100 INJECTION, SOLUTION INTRAVENOUS; SUBCUTANEOUS at 11:34

## 2023-12-06 RX ADMIN — HEPARIN SODIUM 5000 UNITS: 5000 INJECTION INTRAVENOUS; SUBCUTANEOUS at 16:49

## 2023-12-06 RX ADMIN — METOPROLOL SUCCINATE 25 MG: 25 TABLET, FILM COATED, EXTENDED RELEASE ORAL at 11:34

## 2023-12-06 RX ADMIN — Medication 10 ML: at 20:06

## 2023-12-06 RX ADMIN — PANTOPRAZOLE SODIUM 40 MG: 40 TABLET, DELAYED RELEASE ORAL at 05:58

## 2023-12-06 RX ADMIN — Medication 10 ML: at 11:34

## 2023-12-06 RX ADMIN — ATORVASTATIN CALCIUM 40 MG: 40 TABLET, FILM COATED ORAL at 20:06

## 2023-12-06 ASSESSMENT — PAIN SCALES - GENERAL: PAINLEVEL_OUTOF10: 0

## 2023-12-07 LAB
ALBUMIN SERPL-MCNC: 2.9 G/DL (ref 3.4–5)
ANION GAP SERPL CALCULATED.3IONS-SCNC: 8 MMOL/L (ref 3–16)
BASOPHILS # BLD: 0 K/UL (ref 0–0.2)
BASOPHILS NFR BLD: 0.2 %
BM IGG SER IF-ACNC: 0 AU/ML (ref 0–19)
BUN SERPL-MCNC: 88 MG/DL (ref 7–20)
CALCIUM SERPL-MCNC: 8.7 MG/DL (ref 8.3–10.6)
CHLORIDE SERPL-SCNC: 111 MMOL/L (ref 99–110)
CO2 SERPL-SCNC: 25 MMOL/L (ref 21–32)
CREAT SERPL-MCNC: 4.5 MG/DL (ref 0.8–1.3)
DEPRECATED RDW RBC AUTO: 13.6 % (ref 12.4–15.4)
EOSINOPHIL # BLD: 0.2 K/UL (ref 0–0.6)
EOSINOPHIL NFR BLD: 1.7 %
GFR SERPLBLD CREATININE-BSD FMLA CKD-EPI: 13 ML/MIN/{1.73_M2}
GLUCOSE BLD-MCNC: 133 MG/DL (ref 70–99)
GLUCOSE BLD-MCNC: 193 MG/DL (ref 70–99)
GLUCOSE BLD-MCNC: 395 MG/DL (ref 70–99)
GLUCOSE BLD-MCNC: 403 MG/DL (ref 70–99)
GLUCOSE BLD-MCNC: 427 MG/DL (ref 70–99)
GLUCOSE SERPL-MCNC: 141 MG/DL (ref 70–99)
HCT VFR BLD AUTO: 26.5 % (ref 40.5–52.5)
HGB BLD-MCNC: 8.6 G/DL (ref 13.5–17.5)
LYMPHOCYTES # BLD: 0.6 K/UL (ref 1–5.1)
LYMPHOCYTES NFR BLD: 6.1 %
MAGNESIUM SERPL-MCNC: 1.7 MG/DL (ref 1.8–2.4)
MCH RBC QN AUTO: 29.9 PG (ref 26–34)
MCHC RBC AUTO-ENTMCNC: 32.6 G/DL (ref 31–36)
MCV RBC AUTO: 91.8 FL (ref 80–100)
MONOCYTES # BLD: 0.5 K/UL (ref 0–1.3)
MONOCYTES NFR BLD: 4.8 %
MYELOPEROXIDASE AB SER-ACNC: 0 AU/ML (ref 0–19)
NEUTROPHILS # BLD: 8.4 K/UL (ref 1.7–7.7)
NEUTROPHILS NFR BLD: 87.2 %
PERFORMED ON: ABNORMAL
PHOSPHATE SERPL-MCNC: 3.5 MG/DL (ref 2.5–4.9)
PLATELET # BLD AUTO: 217 K/UL (ref 135–450)
PMV BLD AUTO: 7.4 FL (ref 5–10.5)
POTASSIUM SERPL-SCNC: 3.8 MMOL/L (ref 3.5–5.1)
PROTEINASE3 AB SER-ACNC: 0 AU/ML (ref 0–19)
RBC # BLD AUTO: 2.89 M/UL (ref 4.2–5.9)
SODIUM SERPL-SCNC: 144 MMOL/L (ref 136–145)
WBC # BLD AUTO: 9.6 K/UL (ref 4–11)

## 2023-12-07 PROCEDURE — 6370000000 HC RX 637 (ALT 250 FOR IP): Performed by: STUDENT IN AN ORGANIZED HEALTH CARE EDUCATION/TRAINING PROGRAM

## 2023-12-07 PROCEDURE — 97535 SELF CARE MNGMENT TRAINING: CPT

## 2023-12-07 PROCEDURE — 6370000000 HC RX 637 (ALT 250 FOR IP): Performed by: INTERNAL MEDICINE

## 2023-12-07 PROCEDURE — 97110 THERAPEUTIC EXERCISES: CPT

## 2023-12-07 PROCEDURE — 6360000002 HC RX W HCPCS: Performed by: INTERNAL MEDICINE

## 2023-12-07 PROCEDURE — 1200000000 HC SEMI PRIVATE

## 2023-12-07 PROCEDURE — 97530 THERAPEUTIC ACTIVITIES: CPT

## 2023-12-07 PROCEDURE — 92526 ORAL FUNCTION THERAPY: CPT

## 2023-12-07 PROCEDURE — 36415 COLL VENOUS BLD VENIPUNCTURE: CPT

## 2023-12-07 PROCEDURE — 80069 RENAL FUNCTION PANEL: CPT

## 2023-12-07 PROCEDURE — 83735 ASSAY OF MAGNESIUM: CPT

## 2023-12-07 PROCEDURE — 2580000003 HC RX 258: Performed by: INTERNAL MEDICINE

## 2023-12-07 PROCEDURE — 6370000000 HC RX 637 (ALT 250 FOR IP): Performed by: NURSE PRACTITIONER

## 2023-12-07 PROCEDURE — 97116 GAIT TRAINING THERAPY: CPT

## 2023-12-07 PROCEDURE — 92507 TX SP LANG VOICE COMM INDIV: CPT

## 2023-12-07 PROCEDURE — 85025 COMPLETE CBC W/AUTO DIFF WBC: CPT

## 2023-12-07 RX ORDER — INSULIN LISPRO 100 [IU]/ML
6 INJECTION, SOLUTION INTRAVENOUS; SUBCUTANEOUS ONCE
Status: COMPLETED | OUTPATIENT
Start: 2023-12-07 | End: 2023-12-07

## 2023-12-07 RX ORDER — PREDNISONE 20 MG/1
100 TABLET ORAL DAILY
Status: DISCONTINUED | OUTPATIENT
Start: 2023-12-08 | End: 2023-12-10 | Stop reason: HOSPADM

## 2023-12-07 RX ADMIN — PREDNISONE 100 MG: 20 TABLET ORAL at 10:33

## 2023-12-07 RX ADMIN — INSULIN GLARGINE 18 UNITS: 100 INJECTION, SOLUTION SUBCUTANEOUS at 21:40

## 2023-12-07 RX ADMIN — Medication 10 ML: at 10:34

## 2023-12-07 RX ADMIN — ATORVASTATIN CALCIUM 40 MG: 40 TABLET, FILM COATED ORAL at 21:39

## 2023-12-07 RX ADMIN — DOXAZOSIN 8 MG: 4 TABLET ORAL at 21:39

## 2023-12-07 RX ADMIN — INSULIN LISPRO 4 UNITS: 100 INJECTION, SOLUTION INTRAVENOUS; SUBCUTANEOUS at 21:44

## 2023-12-07 RX ADMIN — AMLODIPINE BESYLATE 5 MG: 5 TABLET ORAL at 10:34

## 2023-12-07 RX ADMIN — HEPARIN SODIUM 5000 UNITS: 5000 INJECTION INTRAVENOUS; SUBCUTANEOUS at 05:34

## 2023-12-07 RX ADMIN — LORAZEPAM 0.5 MG: 2 INJECTION INTRAMUSCULAR; INTRAVENOUS at 21:40

## 2023-12-07 RX ADMIN — INSULIN LISPRO 16 UNITS: 100 INJECTION, SOLUTION INTRAVENOUS; SUBCUTANEOUS at 18:21

## 2023-12-07 RX ADMIN — METOPROLOL SUCCINATE 25 MG: 25 TABLET, FILM COATED, EXTENDED RELEASE ORAL at 10:34

## 2023-12-07 RX ADMIN — QUETIAPINE FUMARATE 25 MG: 25 TABLET ORAL at 21:39

## 2023-12-07 RX ADMIN — INSULIN LISPRO 6 UNITS: 100 INJECTION, SOLUTION INTRAVENOUS; SUBCUTANEOUS at 22:00

## 2023-12-07 RX ADMIN — PANTOPRAZOLE SODIUM 40 MG: 40 TABLET, DELAYED RELEASE ORAL at 05:34

## 2023-12-07 RX ADMIN — HEPARIN SODIUM 5000 UNITS: 5000 INJECTION INTRAVENOUS; SUBCUTANEOUS at 15:28

## 2023-12-07 RX ADMIN — ASPIRIN 81 MG 81 MG: 81 TABLET ORAL at 10:33

## 2023-12-07 RX ADMIN — HYDRALAZINE HYDROCHLORIDE 5 MG: 20 INJECTION, SOLUTION INTRAMUSCULAR; INTRAVENOUS at 06:16

## 2023-12-07 RX ADMIN — Medication 10 ML: at 21:59

## 2023-12-07 RX ADMIN — HEPARIN SODIUM 5000 UNITS: 5000 INJECTION INTRAVENOUS; SUBCUTANEOUS at 21:39

## 2023-12-07 NOTE — PLAN OF CARE
Problem: Safety - Adult  Goal: Free from fall injury  12/6/2023 2235 by Tu Forrester RN  Outcome: Progressing  12/6/2023 1911 by Soco Read RN  Outcome: Progressing     Problem: Chronic Conditions and Co-morbidities  Goal: Patient's chronic conditions and co-morbidity symptoms are monitored and maintained or improved  12/6/2023 2235 by Tu Forrester RN  Outcome: Progressing  12/6/2023 1911 by Soco Read RN  Outcome: Progressing

## 2023-12-08 PROBLEM — I63.9 CEREBROVASCULAR ACCIDENT (CVA) (HCC): Status: ACTIVE | Noted: 2023-10-31

## 2023-12-08 LAB
ALBUMIN SERPL-MCNC: 2.8 G/DL (ref 3.4–5)
ANION GAP SERPL CALCULATED.3IONS-SCNC: 10 MMOL/L (ref 3–16)
BASOPHILS # BLD: 0 K/UL (ref 0–0.2)
BASOPHILS NFR BLD: 0.4 %
BUN SERPL-MCNC: 82 MG/DL (ref 7–20)
CALCIUM SERPL-MCNC: 8.3 MG/DL (ref 8.3–10.6)
CHLORIDE SERPL-SCNC: 109 MMOL/L (ref 99–110)
CO2 SERPL-SCNC: 22 MMOL/L (ref 21–32)
CREAT SERPL-MCNC: 4 MG/DL (ref 0.8–1.3)
DEPRECATED RDW RBC AUTO: 13.5 % (ref 12.4–15.4)
EKG ATRIAL RATE: 105 BPM
EKG DIAGNOSIS: NORMAL
EKG Q-T INTERVAL: 416 MS
EKG QRS DURATION: 152 MS
EKG QTC CALCULATION (BAZETT): 486 MS
EKG R AXIS: 0 DEGREES
EKG T AXIS: 137 DEGREES
EKG VENTRICULAR RATE: 82 BPM
EOSINOPHIL # BLD: 0 K/UL (ref 0–0.6)
EOSINOPHIL NFR BLD: 0.2 %
GFR SERPLBLD CREATININE-BSD FMLA CKD-EPI: 15 ML/MIN/{1.73_M2}
GLUCOSE BLD-MCNC: 112 MG/DL (ref 70–99)
GLUCOSE BLD-MCNC: 122 MG/DL (ref 70–99)
GLUCOSE BLD-MCNC: 236 MG/DL (ref 70–99)
GLUCOSE BLD-MCNC: 274 MG/DL (ref 70–99)
GLUCOSE BLD-MCNC: 318 MG/DL (ref 70–99)
GLUCOSE SERPL-MCNC: 104 MG/DL (ref 70–99)
HCT VFR BLD AUTO: 24.2 % (ref 40.5–52.5)
HGB BLD-MCNC: 8.2 G/DL (ref 13.5–17.5)
LYMPHOCYTES # BLD: 0.3 K/UL (ref 1–5.1)
LYMPHOCYTES NFR BLD: 3.3 %
MCH RBC QN AUTO: 30.8 PG (ref 26–34)
MCHC RBC AUTO-ENTMCNC: 33.7 G/DL (ref 31–36)
MCV RBC AUTO: 91.2 FL (ref 80–100)
MONOCYTES # BLD: 0.3 K/UL (ref 0–1.3)
MONOCYTES NFR BLD: 3.3 %
NEUTROPHILS # BLD: 8.2 K/UL (ref 1.7–7.7)
NEUTROPHILS NFR BLD: 92.8 %
PERFORMED ON: ABNORMAL
PHOSPHATE SERPL-MCNC: 3.8 MG/DL (ref 2.5–4.9)
PLATELET # BLD AUTO: 199 K/UL (ref 135–450)
PMV BLD AUTO: 7.5 FL (ref 5–10.5)
POTASSIUM SERPL-SCNC: 3.7 MMOL/L (ref 3.5–5.1)
RBC # BLD AUTO: 2.65 M/UL (ref 4.2–5.9)
SODIUM SERPL-SCNC: 141 MMOL/L (ref 136–145)
WBC # BLD AUTO: 8.9 K/UL (ref 4–11)

## 2023-12-08 PROCEDURE — 93005 ELECTROCARDIOGRAM TRACING: CPT | Performed by: INTERNAL MEDICINE

## 2023-12-08 PROCEDURE — 6370000000 HC RX 637 (ALT 250 FOR IP): Performed by: STUDENT IN AN ORGANIZED HEALTH CARE EDUCATION/TRAINING PROGRAM

## 2023-12-08 PROCEDURE — 6370000000 HC RX 637 (ALT 250 FOR IP): Performed by: INTERNAL MEDICINE

## 2023-12-08 PROCEDURE — 85025 COMPLETE CBC W/AUTO DIFF WBC: CPT

## 2023-12-08 PROCEDURE — 6370000000 HC RX 637 (ALT 250 FOR IP): Performed by: NURSE PRACTITIONER

## 2023-12-08 PROCEDURE — 97530 THERAPEUTIC ACTIVITIES: CPT

## 2023-12-08 PROCEDURE — 97129 THER IVNTJ 1ST 15 MIN: CPT

## 2023-12-08 PROCEDURE — 36415 COLL VENOUS BLD VENIPUNCTURE: CPT

## 2023-12-08 PROCEDURE — 2580000003 HC RX 258: Performed by: INTERNAL MEDICINE

## 2023-12-08 PROCEDURE — 93010 ELECTROCARDIOGRAM REPORT: CPT | Performed by: INTERNAL MEDICINE

## 2023-12-08 PROCEDURE — 1200000000 HC SEMI PRIVATE

## 2023-12-08 PROCEDURE — 80069 RENAL FUNCTION PANEL: CPT

## 2023-12-08 PROCEDURE — 6360000002 HC RX W HCPCS: Performed by: INTERNAL MEDICINE

## 2023-12-08 PROCEDURE — 92526 ORAL FUNCTION THERAPY: CPT

## 2023-12-08 PROCEDURE — 99233 SBSQ HOSP IP/OBS HIGH 50: CPT | Performed by: INTERNAL MEDICINE

## 2023-12-08 RX ORDER — OYSTER SHELL CALCIUM WITH VITAMIN D 500; 200 MG/1; [IU]/1
1 TABLET, FILM COATED ORAL 2 TIMES DAILY WITH MEALS
Status: DISCONTINUED | OUTPATIENT
Start: 2023-12-08 | End: 2023-12-08

## 2023-12-08 RX ADMIN — LORAZEPAM 0.5 MG: 2 INJECTION INTRAMUSCULAR; INTRAVENOUS at 20:56

## 2023-12-08 RX ADMIN — QUETIAPINE FUMARATE 25 MG: 25 TABLET ORAL at 20:55

## 2023-12-08 RX ADMIN — METOPROLOL SUCCINATE 25 MG: 25 TABLET, FILM COATED, EXTENDED RELEASE ORAL at 08:12

## 2023-12-08 RX ADMIN — PREDNISONE 100 MG: 20 TABLET ORAL at 08:12

## 2023-12-08 RX ADMIN — INSULIN LISPRO 8 UNITS: 100 INJECTION, SOLUTION INTRAVENOUS; SUBCUTANEOUS at 12:05

## 2023-12-08 RX ADMIN — ASPIRIN 81 MG 81 MG: 81 TABLET ORAL at 08:12

## 2023-12-08 RX ADMIN — PANTOPRAZOLE SODIUM 40 MG: 40 TABLET, DELAYED RELEASE ORAL at 06:05

## 2023-12-08 RX ADMIN — ATORVASTATIN CALCIUM 40 MG: 40 TABLET, FILM COATED ORAL at 20:55

## 2023-12-08 RX ADMIN — APIXABAN 5 MG: 5 TABLET, FILM COATED ORAL at 12:05

## 2023-12-08 RX ADMIN — INSULIN GLARGINE 18 UNITS: 100 INJECTION, SOLUTION SUBCUTANEOUS at 20:55

## 2023-12-08 RX ADMIN — APIXABAN 5 MG: 5 TABLET, FILM COATED ORAL at 21:02

## 2023-12-08 RX ADMIN — HEPARIN SODIUM 5000 UNITS: 5000 INJECTION INTRAVENOUS; SUBCUTANEOUS at 06:05

## 2023-12-08 RX ADMIN — INSULIN LISPRO 12 UNITS: 100 INJECTION, SOLUTION INTRAVENOUS; SUBCUTANEOUS at 17:11

## 2023-12-08 RX ADMIN — AMLODIPINE BESYLATE 5 MG: 5 TABLET ORAL at 08:12

## 2023-12-08 RX ADMIN — DOXAZOSIN 8 MG: 4 TABLET ORAL at 20:55

## 2023-12-08 RX ADMIN — Medication 10 ML: at 08:12

## 2023-12-08 RX ADMIN — Medication 1 TABLET: at 17:02

## 2023-12-08 RX ADMIN — Medication 10 ML: at 20:56

## 2023-12-08 NOTE — PLAN OF CARE
Problem: Safety - Adult  Goal: Free from fall injury  Outcome: Not Progressing     Problem: Chronic Conditions and Co-morbidities  Goal: Patient's chronic conditions and co-morbidity symptoms are monitored and maintained or improved  Outcome: Progressing     Problem: Skin/Tissue Integrity  Goal: Absence of new skin breakdown  Description: 1. Monitor for areas of redness and/or skin breakdown  2. Assess vascular access sites hourly  3. Every 4-6 hours minimum:  Change oxygen saturation probe site  4. Every 4-6 hours:  If on nasal continuous positive airway pressure, respiratory therapy assess nares and determine need for appliance change or resting period.   Outcome: Progressing     Problem: Nutrition Deficit:  Goal: Optimize nutritional status  Outcome: Progressing     Problem: Safety - Adult  Goal: Free from fall injury  Outcome: Not Progressing

## 2023-12-08 NOTE — ACP (ADVANCE CARE PLANNING)
Advanced Care Planning Note. Purpose of Encounter: Advanced care planning in light of acute CVA  Parties In Attendance: Patient, wife  Decisional Capacity: Yes  Subjective: Patient/ with tremors in arms  Objective: Cr 4.0  Goals of Care Determination: Patient wants full support (CPR, vent, surgery, HD, trach, PEG)  Plan:  Steroids, MRI Brain, Onc/Urology/Renal/Cardio/Neuro/PMR consults, PT/OT, ARU  Code Status: Full code  Time spent on Advanced care Plannin minutes  Advanced Care Planning Documents: Completed advanced directives on chart, wife is the POA.     Peace Lugo MD  2023 11:41 AM

## 2023-12-09 LAB
ALBUMIN SERPL-MCNC: 3 G/DL (ref 3.4–5)
ANION GAP SERPL CALCULATED.3IONS-SCNC: 8 MMOL/L (ref 3–16)
BASOPHILS # BLD: 0 K/UL (ref 0–0.2)
BASOPHILS NFR BLD: 0.1 %
BUN SERPL-MCNC: 83 MG/DL (ref 7–20)
CALCIUM SERPL-MCNC: 8.4 MG/DL (ref 8.3–10.6)
CHLORIDE SERPL-SCNC: 108 MMOL/L (ref 99–110)
CO2 SERPL-SCNC: 23 MMOL/L (ref 21–32)
CREAT SERPL-MCNC: 3.7 MG/DL (ref 0.8–1.3)
DEPRECATED RDW RBC AUTO: 13.8 % (ref 12.4–15.4)
EOSINOPHIL # BLD: 0 K/UL (ref 0–0.6)
EOSINOPHIL NFR BLD: 0.4 %
GFR SERPLBLD CREATININE-BSD FMLA CKD-EPI: 16 ML/MIN/{1.73_M2}
GLUCOSE BLD-MCNC: 198 MG/DL (ref 70–99)
GLUCOSE BLD-MCNC: 211 MG/DL (ref 70–99)
GLUCOSE BLD-MCNC: 277 MG/DL (ref 70–99)
GLUCOSE BLD-MCNC: 280 MG/DL (ref 70–99)
GLUCOSE BLD-MCNC: 301 MG/DL (ref 70–99)
GLUCOSE SERPL-MCNC: 226 MG/DL (ref 70–99)
HCT VFR BLD AUTO: 25.6 % (ref 40.5–52.5)
HGB BLD-MCNC: 8.4 G/DL (ref 13.5–17.5)
LYMPHOCYTES # BLD: 0.6 K/UL (ref 1–5.1)
LYMPHOCYTES NFR BLD: 5.5 %
MCH RBC QN AUTO: 29.8 PG (ref 26–34)
MCHC RBC AUTO-ENTMCNC: 32.7 G/DL (ref 31–36)
MCV RBC AUTO: 91.2 FL (ref 80–100)
MONOCYTES # BLD: 0.5 K/UL (ref 0–1.3)
MONOCYTES NFR BLD: 4.4 %
NEUTROPHILS # BLD: 9.3 K/UL (ref 1.7–7.7)
NEUTROPHILS NFR BLD: 89.6 %
PERFORMED ON: ABNORMAL
PHOSPHATE SERPL-MCNC: 3.2 MG/DL (ref 2.5–4.9)
PLATELET # BLD AUTO: 204 K/UL (ref 135–450)
PMV BLD AUTO: 7.4 FL (ref 5–10.5)
POTASSIUM SERPL-SCNC: 3.6 MMOL/L (ref 3.5–5.1)
RBC # BLD AUTO: 2.8 M/UL (ref 4.2–5.9)
SODIUM SERPL-SCNC: 139 MMOL/L (ref 136–145)
WBC # BLD AUTO: 10.3 K/UL (ref 4–11)

## 2023-12-09 PROCEDURE — 80069 RENAL FUNCTION PANEL: CPT

## 2023-12-09 PROCEDURE — 6370000000 HC RX 637 (ALT 250 FOR IP): Performed by: INTERNAL MEDICINE

## 2023-12-09 PROCEDURE — 2580000003 HC RX 258: Performed by: INTERNAL MEDICINE

## 2023-12-09 PROCEDURE — 6360000002 HC RX W HCPCS: Performed by: INTERNAL MEDICINE

## 2023-12-09 PROCEDURE — 1200000000 HC SEMI PRIVATE

## 2023-12-09 PROCEDURE — 85025 COMPLETE CBC W/AUTO DIFF WBC: CPT

## 2023-12-09 PROCEDURE — 6370000000 HC RX 637 (ALT 250 FOR IP): Performed by: NURSE PRACTITIONER

## 2023-12-09 PROCEDURE — 6370000000 HC RX 637 (ALT 250 FOR IP): Performed by: STUDENT IN AN ORGANIZED HEALTH CARE EDUCATION/TRAINING PROGRAM

## 2023-12-09 PROCEDURE — 99232 SBSQ HOSP IP/OBS MODERATE 35: CPT | Performed by: NURSE PRACTITIONER

## 2023-12-09 PROCEDURE — 36415 COLL VENOUS BLD VENIPUNCTURE: CPT

## 2023-12-09 RX ORDER — METOPROLOL SUCCINATE 50 MG/1
50 TABLET, EXTENDED RELEASE ORAL DAILY
Status: DISCONTINUED | OUTPATIENT
Start: 2023-12-10 | End: 2023-12-10 | Stop reason: HOSPADM

## 2023-12-09 RX ORDER — INSULIN GLARGINE 100 [IU]/ML
12 INJECTION, SOLUTION SUBCUTANEOUS ONCE
Status: DISCONTINUED | OUTPATIENT
Start: 2023-12-09 | End: 2023-12-10 | Stop reason: HOSPADM

## 2023-12-09 RX ORDER — INSULIN GLARGINE 100 [IU]/ML
30 INJECTION, SOLUTION SUBCUTANEOUS NIGHTLY
Status: DISCONTINUED | OUTPATIENT
Start: 2023-12-10 | End: 2023-12-10 | Stop reason: HOSPADM

## 2023-12-09 RX ADMIN — Medication 10 ML: at 21:01

## 2023-12-09 RX ADMIN — PANTOPRAZOLE SODIUM 40 MG: 40 TABLET, DELAYED RELEASE ORAL at 05:27

## 2023-12-09 RX ADMIN — INSULIN LISPRO 8 UNITS: 100 INJECTION, SOLUTION INTRAVENOUS; SUBCUTANEOUS at 18:11

## 2023-12-09 RX ADMIN — INSULIN LISPRO 4 UNITS: 100 INJECTION, SOLUTION INTRAVENOUS; SUBCUTANEOUS at 13:26

## 2023-12-09 RX ADMIN — PREDNISONE 100 MG: 20 TABLET ORAL at 08:24

## 2023-12-09 RX ADMIN — INSULIN GLARGINE 18 UNITS: 100 INJECTION, SOLUTION SUBCUTANEOUS at 21:01

## 2023-12-09 RX ADMIN — Medication 10 ML: at 08:25

## 2023-12-09 RX ADMIN — ATORVASTATIN CALCIUM 40 MG: 40 TABLET, FILM COATED ORAL at 20:59

## 2023-12-09 RX ADMIN — APIXABAN 5 MG: 5 TABLET, FILM COATED ORAL at 08:25

## 2023-12-09 RX ADMIN — ACETAMINOPHEN 650 MG: 325 TABLET ORAL at 06:49

## 2023-12-09 RX ADMIN — Medication 1 TABLET: at 08:25

## 2023-12-09 RX ADMIN — AMLODIPINE BESYLATE 5 MG: 5 TABLET ORAL at 08:25

## 2023-12-09 RX ADMIN — ASPIRIN 81 MG 81 MG: 81 TABLET ORAL at 08:25

## 2023-12-09 RX ADMIN — INSULIN LISPRO 4 UNITS: 100 INJECTION, SOLUTION INTRAVENOUS; SUBCUTANEOUS at 21:01

## 2023-12-09 RX ADMIN — QUETIAPINE FUMARATE 25 MG: 25 TABLET ORAL at 20:59

## 2023-12-09 RX ADMIN — LORAZEPAM 0.5 MG: 2 INJECTION INTRAMUSCULAR; INTRAVENOUS at 20:59

## 2023-12-09 RX ADMIN — Medication 1 TABLET: at 18:11

## 2023-12-09 RX ADMIN — APIXABAN 5 MG: 5 TABLET, FILM COATED ORAL at 20:59

## 2023-12-09 RX ADMIN — DOXAZOSIN 8 MG: 4 TABLET ORAL at 20:59

## 2023-12-09 RX ADMIN — METOPROLOL SUCCINATE 25 MG: 25 TABLET, FILM COATED, EXTENDED RELEASE ORAL at 08:25

## 2023-12-09 ASSESSMENT — PAIN DESCRIPTION - LOCATION: LOCATION: KNEE

## 2023-12-09 ASSESSMENT — PAIN SCALES - GENERAL
PAINLEVEL_OUTOF10: 3
PAINLEVEL_OUTOF10: 0
PAINLEVEL_OUTOF10: 7

## 2023-12-09 ASSESSMENT — PAIN DESCRIPTION - DESCRIPTORS: DESCRIPTORS: DISCOMFORT

## 2023-12-09 ASSESSMENT — PAIN DESCRIPTION - ORIENTATION: ORIENTATION: LEFT;RIGHT;LOWER

## 2023-12-10 ENCOUNTER — HOSPITAL ENCOUNTER (INPATIENT)
Age: 73
DRG: 947 | End: 2023-12-10
Attending: PHYSICAL MEDICINE & REHABILITATION | Admitting: PHYSICAL MEDICINE & REHABILITATION
Payer: MEDICARE

## 2023-12-10 VITALS
HEIGHT: 75 IN | DIASTOLIC BLOOD PRESSURE: 76 MMHG | SYSTOLIC BLOOD PRESSURE: 167 MMHG | BODY MASS INDEX: 28.16 KG/M2 | RESPIRATION RATE: 18 BRPM | TEMPERATURE: 97.3 F | HEART RATE: 87 BPM | WEIGHT: 226.5 LBS | OXYGEN SATURATION: 96 %

## 2023-12-10 PROBLEM — I63.9 ACUTE ISCHEMIC STROKE (HCC): Status: ACTIVE | Noted: 2023-12-10

## 2023-12-10 PROBLEM — D64.9 ACUTE ANEMIA: Status: ACTIVE | Noted: 2023-12-10

## 2023-12-10 LAB
ALBUMIN SERPL-MCNC: 3 G/DL (ref 3.4–5)
ANION GAP SERPL CALCULATED.3IONS-SCNC: 9 MMOL/L (ref 3–16)
BASOPHILS # BLD: 0 K/UL (ref 0–0.2)
BASOPHILS NFR BLD: 0.1 %
BUN SERPL-MCNC: 85 MG/DL (ref 7–20)
CALCIUM SERPL-MCNC: 8.4 MG/DL (ref 8.3–10.6)
CHLORIDE SERPL-SCNC: 111 MMOL/L (ref 99–110)
CO2 SERPL-SCNC: 23 MMOL/L (ref 21–32)
CREAT SERPL-MCNC: 3.7 MG/DL (ref 0.8–1.3)
DEPRECATED RDW RBC AUTO: 14.1 % (ref 12.4–15.4)
EOSINOPHIL # BLD: 0 K/UL (ref 0–0.6)
EOSINOPHIL NFR BLD: 0.1 %
GFR SERPLBLD CREATININE-BSD FMLA CKD-EPI: 16 ML/MIN/{1.73_M2}
GLUCOSE BLD-MCNC: 184 MG/DL (ref 70–99)
GLUCOSE BLD-MCNC: 192 MG/DL (ref 70–99)
GLUCOSE BLD-MCNC: 209 MG/DL (ref 70–99)
GLUCOSE BLD-MCNC: 299 MG/DL (ref 70–99)
GLUCOSE SERPL-MCNC: 170 MG/DL (ref 70–99)
HCT VFR BLD AUTO: 24.3 % (ref 40.5–52.5)
HGB BLD-MCNC: 8.1 G/DL (ref 13.5–17.5)
LYMPHOCYTES # BLD: 0.5 K/UL (ref 1–5.1)
LYMPHOCYTES NFR BLD: 5.2 %
MCH RBC QN AUTO: 30.5 PG (ref 26–34)
MCHC RBC AUTO-ENTMCNC: 33.5 G/DL (ref 31–36)
MCV RBC AUTO: 91.1 FL (ref 80–100)
MONOCYTES # BLD: 0.5 K/UL (ref 0–1.3)
MONOCYTES NFR BLD: 4.7 %
NEUTROPHILS # BLD: 8.9 K/UL (ref 1.7–7.7)
NEUTROPHILS NFR BLD: 89.9 %
PERFORMED ON: ABNORMAL
PHOSPHATE SERPL-MCNC: 3 MG/DL (ref 2.5–4.9)
PLATELET # BLD AUTO: 179 K/UL (ref 135–450)
PMV BLD AUTO: 7.7 FL (ref 5–10.5)
POTASSIUM SERPL-SCNC: 3.8 MMOL/L (ref 3.5–5.1)
RBC # BLD AUTO: 2.67 M/UL (ref 4.2–5.9)
SODIUM SERPL-SCNC: 143 MMOL/L (ref 136–145)
WBC # BLD AUTO: 10 K/UL (ref 4–11)

## 2023-12-10 PROCEDURE — 99232 SBSQ HOSP IP/OBS MODERATE 35: CPT | Performed by: NURSE PRACTITIONER

## 2023-12-10 PROCEDURE — 6370000000 HC RX 637 (ALT 250 FOR IP): Performed by: INTERNAL MEDICINE

## 2023-12-10 PROCEDURE — 80069 RENAL FUNCTION PANEL: CPT

## 2023-12-10 PROCEDURE — 6370000000 HC RX 637 (ALT 250 FOR IP): Performed by: PHYSICAL MEDICINE & REHABILITATION

## 2023-12-10 PROCEDURE — 36415 COLL VENOUS BLD VENIPUNCTURE: CPT

## 2023-12-10 PROCEDURE — 2580000003 HC RX 258: Performed by: INTERNAL MEDICINE

## 2023-12-10 PROCEDURE — 85025 COMPLETE CBC W/AUTO DIFF WBC: CPT

## 2023-12-10 PROCEDURE — 1280000000 HC REHAB R&B

## 2023-12-10 PROCEDURE — 6370000000 HC RX 637 (ALT 250 FOR IP): Performed by: NURSE PRACTITIONER

## 2023-12-10 RX ORDER — POLYETHYLENE GLYCOL 3350 17 G/17G
17 POWDER, FOR SOLUTION ORAL DAILY PRN
Status: DISCONTINUED | OUTPATIENT
Start: 2023-12-10 | End: 2023-12-21 | Stop reason: HOSPADM

## 2023-12-10 RX ORDER — ATORVASTATIN CALCIUM 40 MG/1
40 TABLET, FILM COATED ORAL NIGHTLY
Status: DISCONTINUED | OUTPATIENT
Start: 2023-12-10 | End: 2023-12-21 | Stop reason: HOSPADM

## 2023-12-10 RX ORDER — IPRATROPIUM BROMIDE AND ALBUTEROL SULFATE 2.5; .5 MG/3ML; MG/3ML
1 SOLUTION RESPIRATORY (INHALATION) EVERY 4 HOURS PRN
Status: CANCELLED | OUTPATIENT
Start: 2023-12-10

## 2023-12-10 RX ORDER — PANTOPRAZOLE SODIUM 40 MG/1
40 TABLET, DELAYED RELEASE ORAL
Status: DISCONTINUED | OUTPATIENT
Start: 2023-12-11 | End: 2023-12-21 | Stop reason: HOSPADM

## 2023-12-10 RX ORDER — POLYETHYLENE GLYCOL 3350 17 G/17G
17 POWDER, FOR SOLUTION ORAL DAILY PRN
Status: CANCELLED | OUTPATIENT
Start: 2023-12-10

## 2023-12-10 RX ORDER — METOPROLOL SUCCINATE 50 MG/1
50 TABLET, EXTENDED RELEASE ORAL DAILY
Status: DISCONTINUED | OUTPATIENT
Start: 2023-12-11 | End: 2023-12-11

## 2023-12-10 RX ORDER — PREDNISONE 20 MG/1
100 TABLET ORAL DAILY
Status: DISCONTINUED | OUTPATIENT
Start: 2023-12-11 | End: 2023-12-13

## 2023-12-10 RX ORDER — ASPIRIN 81 MG/1
81 TABLET, CHEWABLE ORAL DAILY
Status: DISCONTINUED | OUTPATIENT
Start: 2023-12-11 | End: 2023-12-21 | Stop reason: HOSPADM

## 2023-12-10 RX ORDER — ONDANSETRON 2 MG/ML
4 INJECTION INTRAMUSCULAR; INTRAVENOUS EVERY 6 HOURS PRN
Status: CANCELLED | OUTPATIENT
Start: 2023-12-10

## 2023-12-10 RX ORDER — BISACODYL 10 MG
10 SUPPOSITORY, RECTAL RECTAL DAILY PRN
Status: CANCELLED | OUTPATIENT
Start: 2023-12-10

## 2023-12-10 RX ORDER — FERROUS SULFATE 325(65) MG
325 TABLET ORAL
Status: DISCONTINUED | OUTPATIENT
Start: 2023-12-11 | End: 2023-12-10 | Stop reason: HOSPADM

## 2023-12-10 RX ORDER — AMLODIPINE BESYLATE 5 MG/1
10 TABLET ORAL DAILY
Status: DISCONTINUED | OUTPATIENT
Start: 2023-12-11 | End: 2023-12-10 | Stop reason: HOSPADM

## 2023-12-10 RX ORDER — ACETAMINOPHEN 325 MG/1
650 TABLET ORAL EVERY 6 HOURS PRN
Status: DISCONTINUED | OUTPATIENT
Start: 2023-12-10 | End: 2023-12-21 | Stop reason: HOSPADM

## 2023-12-10 RX ORDER — METOPROLOL SUCCINATE 50 MG/1
50 TABLET, EXTENDED RELEASE ORAL DAILY
Status: CANCELLED | OUTPATIENT
Start: 2023-12-11

## 2023-12-10 RX ORDER — QUETIAPINE FUMARATE 25 MG/1
25 TABLET, FILM COATED ORAL NIGHTLY
Status: DISCONTINUED | OUTPATIENT
Start: 2023-12-10 | End: 2023-12-21 | Stop reason: HOSPADM

## 2023-12-10 RX ORDER — DEXTROSE MONOHYDRATE 100 MG/ML
INJECTION, SOLUTION INTRAVENOUS CONTINUOUS PRN
Status: CANCELLED | OUTPATIENT
Start: 2023-12-10

## 2023-12-10 RX ORDER — PANTOPRAZOLE SODIUM 40 MG/1
40 TABLET, DELAYED RELEASE ORAL
Status: CANCELLED | OUTPATIENT
Start: 2023-12-11

## 2023-12-10 RX ORDER — INSULIN LISPRO 100 [IU]/ML
0-16 INJECTION, SOLUTION INTRAVENOUS; SUBCUTANEOUS
Status: DISCONTINUED | OUTPATIENT
Start: 2023-12-10 | End: 2023-12-21 | Stop reason: HOSPADM

## 2023-12-10 RX ORDER — INSULIN GLARGINE 100 [IU]/ML
30 INJECTION, SOLUTION SUBCUTANEOUS NIGHTLY
Status: CANCELLED | OUTPATIENT
Start: 2023-12-10

## 2023-12-10 RX ORDER — AMLODIPINE BESYLATE 5 MG/1
5 TABLET ORAL DAILY
Status: CANCELLED | OUTPATIENT
Start: 2023-12-11

## 2023-12-10 RX ORDER — ONDANSETRON 4 MG/1
4 TABLET, ORALLY DISINTEGRATING ORAL EVERY 8 HOURS PRN
Status: CANCELLED | OUTPATIENT
Start: 2023-12-10

## 2023-12-10 RX ORDER — DOXAZOSIN MESYLATE 4 MG/1
8 TABLET ORAL NIGHTLY
Status: DISCONTINUED | OUTPATIENT
Start: 2023-12-10 | End: 2023-12-21 | Stop reason: HOSPADM

## 2023-12-10 RX ORDER — ASPIRIN 81 MG/1
81 TABLET, CHEWABLE ORAL DAILY
Qty: 30 TABLET | Refills: 0 | Status: ON HOLD
Start: 2023-12-11

## 2023-12-10 RX ORDER — IPRATROPIUM BROMIDE AND ALBUTEROL SULFATE 2.5; .5 MG/3ML; MG/3ML
1 SOLUTION RESPIRATORY (INHALATION) EVERY 4 HOURS PRN
Status: DISCONTINUED | OUTPATIENT
Start: 2023-12-10 | End: 2023-12-21 | Stop reason: HOSPADM

## 2023-12-10 RX ORDER — PREDNISONE 50 MG/1
100 TABLET ORAL DAILY
Qty: 20 TABLET | Refills: 0 | Status: ON HOLD
Start: 2023-12-11 | End: 2023-12-21

## 2023-12-10 RX ORDER — AMLODIPINE BESYLATE 10 MG/1
10 TABLET ORAL DAILY
Qty: 30 TABLET | Refills: 0 | Status: ON HOLD
Start: 2023-12-11

## 2023-12-10 RX ORDER — ATORVASTATIN CALCIUM 40 MG/1
40 TABLET, FILM COATED ORAL NIGHTLY
Qty: 30 TABLET | Refills: 0 | Status: ON HOLD
Start: 2023-12-10

## 2023-12-10 RX ORDER — DEXTROSE MONOHYDRATE 100 MG/ML
INJECTION, SOLUTION INTRAVENOUS CONTINUOUS PRN
Status: DISCONTINUED | OUTPATIENT
Start: 2023-12-10 | End: 2023-12-21 | Stop reason: HOSPADM

## 2023-12-10 RX ORDER — QUETIAPINE FUMARATE 25 MG/1
25 TABLET, FILM COATED ORAL NIGHTLY
Qty: 60 TABLET | Refills: 0 | Status: ON HOLD
Start: 2023-12-10

## 2023-12-10 RX ORDER — HYDRALAZINE HYDROCHLORIDE 10 MG/1
10 TABLET, FILM COATED ORAL EVERY 6 HOURS PRN
Status: DISCONTINUED | OUTPATIENT
Start: 2023-12-10 | End: 2023-12-21 | Stop reason: HOSPADM

## 2023-12-10 RX ORDER — ONDANSETRON 4 MG/1
4 TABLET, ORALLY DISINTEGRATING ORAL EVERY 8 HOURS PRN
Status: DISCONTINUED | OUTPATIENT
Start: 2023-12-10 | End: 2023-12-21 | Stop reason: HOSPADM

## 2023-12-10 RX ORDER — DOXAZOSIN MESYLATE 4 MG/1
8 TABLET ORAL NIGHTLY
Status: CANCELLED | OUTPATIENT
Start: 2023-12-10

## 2023-12-10 RX ORDER — PREDNISONE 20 MG/1
100 TABLET ORAL DAILY
Status: CANCELLED | OUTPATIENT
Start: 2023-12-11

## 2023-12-10 RX ORDER — QUETIAPINE FUMARATE 25 MG/1
25 TABLET, FILM COATED ORAL NIGHTLY
Status: CANCELLED | OUTPATIENT
Start: 2023-12-10

## 2023-12-10 RX ORDER — INSULIN LISPRO 100 [IU]/ML
0-4 INJECTION, SOLUTION INTRAVENOUS; SUBCUTANEOUS NIGHTLY
Status: CANCELLED | OUTPATIENT
Start: 2023-12-10

## 2023-12-10 RX ORDER — INSULIN GLARGINE 100 [IU]/ML
25 INJECTION, SOLUTION SUBCUTANEOUS NIGHTLY
Qty: 10 ML | Refills: 3 | Status: ON HOLD
Start: 2023-12-10

## 2023-12-10 RX ORDER — INSULIN GLARGINE 100 [IU]/ML
30 INJECTION, SOLUTION SUBCUTANEOUS NIGHTLY
Status: DISCONTINUED | OUTPATIENT
Start: 2023-12-10 | End: 2023-12-18

## 2023-12-10 RX ORDER — GLIMEPIRIDE 4 MG/1
4 TABLET ORAL
COMMUNITY

## 2023-12-10 RX ORDER — SENNA AND DOCUSATE SODIUM 50; 8.6 MG/1; MG/1
1 TABLET, FILM COATED ORAL 2 TIMES DAILY
Status: DISCONTINUED | OUTPATIENT
Start: 2023-12-10 | End: 2023-12-16

## 2023-12-10 RX ORDER — HYDRALAZINE HYDROCHLORIDE 10 MG/1
10 TABLET, FILM COATED ORAL EVERY 6 HOURS PRN
Status: CANCELLED | OUTPATIENT
Start: 2023-12-10

## 2023-12-10 RX ORDER — ACETAMINOPHEN 325 MG/1
650 TABLET ORAL EVERY 6 HOURS PRN
Status: CANCELLED | OUTPATIENT
Start: 2023-12-10

## 2023-12-10 RX ORDER — ATORVASTATIN CALCIUM 40 MG/1
40 TABLET, FILM COATED ORAL NIGHTLY
Status: CANCELLED | OUTPATIENT
Start: 2023-12-10

## 2023-12-10 RX ORDER — INSULIN LISPRO 100 [IU]/ML
0-4 INJECTION, SOLUTION INTRAVENOUS; SUBCUTANEOUS NIGHTLY
Status: DISCONTINUED | OUTPATIENT
Start: 2023-12-10 | End: 2023-12-21 | Stop reason: HOSPADM

## 2023-12-10 RX ORDER — SOTALOL HYDROCHLORIDE 80 MG/1
80 TABLET ORAL 2 TIMES DAILY
Status: ON HOLD | COMMUNITY
End: 2023-12-19 | Stop reason: HOSPADM

## 2023-12-10 RX ORDER — INSULIN LISPRO 100 [IU]/ML
0-16 INJECTION, SOLUTION INTRAVENOUS; SUBCUTANEOUS
Status: CANCELLED | OUTPATIENT
Start: 2023-12-10

## 2023-12-10 RX ORDER — METOPROLOL SUCCINATE 50 MG/1
50 TABLET, EXTENDED RELEASE ORAL DAILY
Qty: 30 TABLET | Refills: 3 | Status: ON HOLD | OUTPATIENT
Start: 2023-12-11

## 2023-12-10 RX ORDER — BISACODYL 10 MG
10 SUPPOSITORY, RECTAL RECTAL DAILY PRN
Status: DISCONTINUED | OUTPATIENT
Start: 2023-12-10 | End: 2023-12-21 | Stop reason: HOSPADM

## 2023-12-10 RX ORDER — ONDANSETRON 2 MG/ML
4 INJECTION INTRAMUSCULAR; INTRAVENOUS EVERY 6 HOURS PRN
Status: DISCONTINUED | OUTPATIENT
Start: 2023-12-10 | End: 2023-12-21 | Stop reason: HOSPADM

## 2023-12-10 RX ORDER — SENNA AND DOCUSATE SODIUM 50; 8.6 MG/1; MG/1
1 TABLET, FILM COATED ORAL 2 TIMES DAILY
Status: CANCELLED | OUTPATIENT
Start: 2023-12-10

## 2023-12-10 RX ORDER — LOSARTAN POTASSIUM 50 MG/1
50 TABLET ORAL DAILY
Status: ON HOLD | COMMUNITY
End: 2023-12-19 | Stop reason: HOSPADM

## 2023-12-10 RX ORDER — ASPIRIN 81 MG/1
81 TABLET, CHEWABLE ORAL DAILY
Status: CANCELLED | OUTPATIENT
Start: 2023-12-11

## 2023-12-10 RX ORDER — FERROUS SULFATE 325(65) MG
325 TABLET ORAL
Qty: 30 TABLET | Refills: 0 | Status: ON HOLD
Start: 2023-12-11

## 2023-12-10 RX ADMIN — AMLODIPINE BESYLATE 5 MG: 5 TABLET ORAL at 08:59

## 2023-12-10 RX ADMIN — Medication 1 TABLET: at 16:59

## 2023-12-10 RX ADMIN — METOPROLOL SUCCINATE 50 MG: 50 TABLET, EXTENDED RELEASE ORAL at 08:59

## 2023-12-10 RX ADMIN — INSULIN LISPRO 4 UNITS: 100 INJECTION, SOLUTION INTRAVENOUS; SUBCUTANEOUS at 12:36

## 2023-12-10 RX ADMIN — Medication 1 TABLET: at 08:58

## 2023-12-10 RX ADMIN — DOXAZOSIN 8 MG: 4 TABLET ORAL at 20:24

## 2023-12-10 RX ADMIN — APIXABAN 5 MG: 5 TABLET, FILM COATED ORAL at 20:25

## 2023-12-10 RX ADMIN — PANTOPRAZOLE SODIUM 40 MG: 40 TABLET, DELAYED RELEASE ORAL at 06:52

## 2023-12-10 RX ADMIN — Medication 10 ML: at 09:00

## 2023-12-10 RX ADMIN — ATORVASTATIN CALCIUM 40 MG: 40 TABLET, FILM COATED ORAL at 20:24

## 2023-12-10 RX ADMIN — QUETIAPINE FUMARATE 25 MG: 25 TABLET ORAL at 20:25

## 2023-12-10 RX ADMIN — APIXABAN 5 MG: 5 TABLET, FILM COATED ORAL at 08:59

## 2023-12-10 RX ADMIN — PREDNISONE 100 MG: 20 TABLET ORAL at 08:59

## 2023-12-10 RX ADMIN — INSULIN GLARGINE 30 UNITS: 100 INJECTION, SOLUTION SUBCUTANEOUS at 20:25

## 2023-12-10 RX ADMIN — ASPIRIN 81 MG 81 MG: 81 TABLET ORAL at 09:00

## 2023-12-10 ASSESSMENT — PAIN SCALES - GENERAL
PAINLEVEL_OUTOF10: 0
PAINLEVEL_OUTOF10: 0

## 2023-12-10 NOTE — CARE COORDINATION
CM notified earlier by therapy ARU recommended. CM sent MD PS message in case he wanted to place consult. Pt has Medicare- No pre cert required. CM provided pt and spouse with Medicare snf list in case pt not ARU candidate. CM will need to follow up on choices.      Babs Hawk RN, BSN  [phone]
Discharge Planning Note:    Patient to discharge to ARU today 12/10/2023. Will continue to follow.     BLADE De DiosN RN    [facility]  Phone: [phone]
SW informed from [facility] admissions that patient is still accepted and will be going to ARU when medically stable. Stated possible admit tomorrow or over the weekend. Waiting on Renal's clearance.     Electronically signed by TALAT Gant, SHAYE on 12/7/2023 at 12:46 PM
members/significant others, and if so, who? Yes  Plans to Return to Present Housing: Yes  Other Identified Issues/Barriers to RETURNING to current housing: None  Potential Assistance needed at discharge: [address]            Potential DME:    Patient expects to discharge to: [address] [place] [address] S for transportation at discharge: Other (see comment) (transportation company to [employer])    Financial    Payor: MEDICARE / Plan: MEDICARE PART A AND B / Product Type: *No Product type* /     Does insurance require precert for SNF: No    Potential assistance Purchasing Medications: No  Meds-to-Beds request:        [address] [facility] [place], [place] [facility] [place] [phone] - F [phone]  [address] [facility] [address]  Phone: [phone] Fax: [phone]    [facility] [address] [phone] Deloris Nagyyal [phone] [address]  [address] [address]  Phone: [phone] Fax: [phone]      Notes:    Factors facilitating achievement of predicted outcomes: Family support, Motivated, Cooperative, and Pleasant    Barriers to discharge: Decreased endurance    Additional Case Management Notes:     ARU recommended by therapy, message sent to MD informing. CM provided snf list to pt and spouse at bedside. Follow up for choices. The Plan for Transition of Care is related to the following treatment goals of History of prostate cancer [Z85.46]  CASTRO (acute kidney injury) ([address]) [N17.9]  On antineoplastic chemotherapy [Z79.899]  H/O right nephrectomy [Y91.2]    IF APPLICABLE: The Patient and/or patient representative Shy Leal and his family were provided with a choice of provider and agrees with the discharge plan.  Freedom of choice list with basic dialogue that supports the patient's individualized plan of care/goals and shares the quality data associated with the providers was provided to: Patient, Patient Representative   Patient

## 2023-12-10 NOTE — PROGRESS NOTES
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 Hold Immunotherapy for RCC, last done ~ 2023 ; as per oncologist, AIN from immunotherapy is most likely, although not impossible, even with after end of immunotherapy treatment,    CASTRO - was still worsening   -So started steroids , ideally ~ 1 mg/kg/day, but his wt is 100 kg, so will Rx'ed max 60 mg/day x5 days   - With some psychosis, with encephalopathy, acute, likely metabolic, side effect, unclear if uremia contributing to encephalopathy,    - D/w onc - Dr Isabella Harrison- they suggested 125 mg IV solumedrol BID states that her  -Decreased dose 125 mg from twice to daily to  - I had decreased to Prednisone 30 mg /day - Dr Isabella Harrison prefers high-dose,  Prednisone 100 mg a day  - Monitor mental status closely-  - consult ID for mx of PJP prophylaxis management    - w/ improvement in renal function - did not decide for long term CellCept     -W/ steroids:   Started PPI  Monitor BG, -A1C 6.9  HTN - meds reviewed - monitor BP while on steroids   Added calcium, vitamin D,  -Patient decides to need long-term, will get PGP prophylaxis antibiotics    Kidney biopsy risk would be higher with bleeding, with severe azotemia currently,  I have stopped aspirin, last week,    Urology fup   monitor PVR w/ bladder scan for lowe insertion need.       HTN mx w/ cardura,  + added PRN hydralazine   Hold home dose of losartan  Added Norvasc   LR for mIVFs   Stopped w/ higher BP     Hold metformin  Use ISS     no need for dialysis,     Discussed about possible need, if uremia does not improve, in near future,  at higher risk for

## 2023-12-10 NOTE — ACP (ADVANCE CARE PLANNING)
Advanced Care Planning Note. Purpose of Encounter: Advanced care planning in light of renal cell cancer and acute CVA  Parties In Attendance: Patient, wife, daughter  Decisional Capacity: Yes  Subjective: Patient remains weak and tired  Objective: Cr 3.7  Goals of Care Determination: Patient wants full support (CPR, vent, surgery, HD, trach, PEG)  Plan:  Steroids, anticoagulation, MRI Brain, Onc/Urology/Renal/Cardio/Neuro/PMR consults, PT/OT, ARU  Code Status: Full code  Time spent on Advanced care Plannin minutes  Advanced Care Planning Documents: Completed advanced directives on chart, wife is the POA.     Jeferson Eric MD  12/10/2023 2:07 PM

## 2023-12-10 NOTE — DISCHARGE SUMMARY
12/4/2023 4:12 pm TECHNIQUE: Multiplanar multisequence MRI of the brain was performed without the administration of intravenous contrast. COMPARISON: None. HISTORY: ORDERING SYSTEM PROVIDED HISTORY: AMS TECHNOLOGIST PROVIDED HISTORY: Reason for exam:->AMS Reason for Exam: AMS Initial evaluation. FINDINGS: INTRACRANIAL STRUCTURES/VENTRICLES: There are 2 punctate foci of acute infarct involving the posterior right temporal lobe. No mass effect or midline shift. There is a small chronic infarct involving the left parietal lobe. No evidence of an acute intracranial hemorrhage. Areas of T2 FLAIR hyperintensity are seen in the periventricular and subcortical white matter, which are nonspecific, but may represent chronic microvascular ischemic change. There is prominence of the ventricles and sulci due to global parenchymal volume loss. The ventricular prominence is slightly out of proportion to the cortical sulci. The sellar/suprasellar regions appear unremarkable. The normal signal voids within the major intracranial vessels appear maintained. ORBITS: The visualized portion of the orbits demonstrate no acute abnormality. SINUSES: The visualized paranasal sinuses and mastoid air cells demonstrate no acute abnormality. BONES/SOFT TISSUES: The bone marrow signal intensity appears normal. The soft tissues demonstrate no acute abnormality. 1. There are 2 punctate foci of acute infarct involving the right temporal lobe. No mass effect or midline shift. 2. Otherwise, no acute intracranial abnormality. 3. Mild-to-moderate global parenchymal volume loss with mild chronic microvascular ischemic changes. 4.  The ventricular dilatation is slightly out of proportion to the cortical sulci. While this may be due to predominantly central parenchymal volume loss, a component of normal pressure hydrocephalus cannot be excluded. 5. Small chronic infarct involving the left frontal lobe.      XR CHEST PORTABLE    Result Date:

## 2023-12-10 NOTE — PLAN OF CARE
ARU PATIENT TREATMENT PLAN  [facility]  [address], [facility]  [phone]      Ion Wolf    : 1950  Acct #: [de-identified]  MRN: [id number]  PHYSICIAN:  Aleshia Brown DO  Primary Problem    Patient Active Problem List   Diagnosis    HLD (hyperlipidemia)    Primary hypertension    Coronary artery disease due to lipid rich plaque    Cardiac dysrhythmia    Diabetes mellitus    Paroxysmal atrial fibrillation (HCC)    Renal mass    Cerebrovascular accident (CVA) ([address])    Remote history of stroke    Benign essential HTN    PAF (paroxysmal atrial fibrillation) (HCC)    CASTRO (acute kidney injury) ([address])    LBBB (left bundle branch block)    H/O right nephrectomy    Acute anemia    Acute ischemic stroke [facility])       Rehabilitation Diagnosis:      Acute CVA  - eliquis  - PT/OT  - ASA and lipitor  - Slp eval  - functional mobility training     Afib  - AC- eliquis  - toprol XL     CASTRO on CKD 3  - nephrology following     Renal cell cancer  - oncology consulted     DM 2 with hyperglycemia   - lantus  - SSI as needed  - hypoglycemia protocol     ADMIT DATE:12/10/2023    Patient Goals: Go home and do the things I used to do. Admitting Impairments: Stroke - 1.1 - Left Body Involvement (Right Brain)  Activities: Impaired Eating, Hygiene, Toileting, Bathing, Dressing, Bed Mobility, Transfers, Ambulation, Stairs, and Endurance. Participation: Prior to admission patient was living at home with spouse, was independent with all mobility and activities, was an active .      CARE PLAN     NURSING:  Ion Wolf while on this unit will:  [] Be continent of bowel and bladder     [x] Have an adequate number of bowel movements  [] Urinate with no urinary retention >300ml in bladder  [] Complete bladder protocol with lowe removal  [] Maintain O2 SATs at ___%  [] Have pain managed while on ARU       [] Be pain free by discharge   [] Have no skin breakdown while on ARU  [] Have improved PM

## 2023-12-10 NOTE — PROGRESS NOTES
ARU Admission Assessment    Ethnicity  \"Are you of , /a, or Hungarian origin? \"  Check all that apply:  [x] A. No, not of , /a, or [place] Origin  [] B.  Yes, [place], [place] American, Chicano/a  [] C.  Yes, [facility]  [] D.  Yes, [place]  [] E.  Yes, another , , or Hungarian origin  [] X. Patient unable to respond  [] Y. Patient declines to respond    Race  \"What is your race? \"  Check all that apply:  [x] A. White  [] B. Black or   [] C. American [place] or [place] Native  [] D.  [place]  [] E. [place]  [] F. Greenlandic  [] G. [place]  [] Jacquie Bonilla  [] I. [place]  [] J.  Other   [] K.   [] L. Bolivian or Blade  [] M. Swiss  [] N. Other -[address] [place] [address]  [] X. Patient unable to respond  [] Y. Patient declines to respond  [] Z. None of the above    Language  A. \"What is your preferred language? \"   Alexis Alf. \"Do you need or want an  to communicate with a doctor or health care staff? \"  Check only one:  [x] 0. No  [] 1. Yes  [] 9. Unable to determine    Transportation  \"Has lack of transportation kept you from medical appointments, meetings, work, or from getting things needed for daily living? \"Check all that apply:  [] A.  Yes, it has kept me from medical appointments or from getting my medications  [] B.  Yes, it has kept me from non-medical meetings, appointments, work, or from getting things that I need  [x] C.  No  [] X. Patient unable to respond  [] Y. Patient declines to respond    Hearing  Ability to hear (with hearing aid or hearing appliances if normally used)  []  0. Adequate - no difficulty in normal conversation, social interaction, listening to TV  [x]  1. Minimal difficulty - difficulty in some environments (e.g. when person speaks softly or setting is noisy)  []  2. Moderate difficulty - speaker has to increase volume and speak distinctly   []  3.   Highly impaired - absence of the above    High Risk Drug Classes:  Use and Indication    Is taking: Check if the pt is taking any medications by pharmacological classification, not how it is used, in the following classes  Indication noted: If column 1 is checked, check if there is an indication noted for all meds in the drug class Is taking  (check all that apply) Indication noted (check all that apply)   Antipsychotic [x] [x]   Anticoagulant [x] [x]   Antibiotic [] []   Opioid [] []   Antiplatelet [x] [x]   Hypoglycemic (including insulin) [x] [x]   None of the above []     Special Treatments, Procedures, and Programs    Check all of the following treatments, procedures, and programs that apply on admission. On admission (check all that apply)   Cancer Treatments   A1. Chemotherapy []           A2. IV []           A3. Oral []           A10. Other []   B1. Radiation []   Respiratory Therapies   C1. Oxygen Therapy []           C2. Continuous (continuously for at least 14 hours per day) []           C3. Intermittent []           C4. High-concentration []   D1. Suctioning (Does not include oral suctioning) []           D2. Scheduled []           D3. As needed []   E1. Tracheostomy Care []   F1. Invasive Mechanical Ventilator (ventilator or respirator) []   G1. Non-invasive Mechanical Ventilator []           G2. BiPAP []           G3. CPAP []   Other   H1. IV Medications (Do not include sub Q pumps, flushes, Dextrose 50% or lactated ringers) []           H2. Vasoactive medications []           H3. Antibiotics []           H4. Anticoagulation []           H10. Other []   I1. Transfusions []   J1. Dialysis []           J2. Hemodialysis []           J3. Peritoneal dialysis []   O1. IV access (including a catheter in a vein) []           O2. Peripheral [x]           O3. Midline []           O4.  Central (PICC, tunneled, port) []      None of the above (select if no Cancer, Respiratory, or Other boxes are checked) []     The above items have been

## 2023-12-11 PROBLEM — N10 AIN (ACUTE INTERSTITIAL NEPHRITIS): Status: ACTIVE | Noted: 2023-12-11

## 2023-12-11 PROBLEM — Z79.52 CURRENT CHRONIC USE OF SYSTEMIC STEROIDS: Status: ACTIVE | Noted: 2023-12-11

## 2023-12-11 PROBLEM — R82.81 STERILE PYURIA: Status: ACTIVE | Noted: 2023-12-11

## 2023-12-11 PROBLEM — Z92.241 H/O SYSTEMIC STEROID THERAPY: Status: ACTIVE | Noted: 2023-12-11

## 2023-12-11 PROBLEM — E66.3 OVERWEIGHT (BMI 25.0-29.9): Status: ACTIVE | Noted: 2023-12-11

## 2023-12-11 LAB
ALBUMIN SERPL-MCNC: 2.9 G/DL (ref 3.4–5)
ANION GAP SERPL CALCULATED.3IONS-SCNC: 7 MMOL/L (ref 3–16)
BASOPHILS # BLD: 0 K/UL (ref 0–0.2)
BASOPHILS NFR BLD: 0.2 %
BUN SERPL-MCNC: 81 MG/DL (ref 7–20)
CALCIUM SERPL-MCNC: 8.7 MG/DL (ref 8.3–10.6)
CHLORIDE SERPL-SCNC: 109 MMOL/L (ref 99–110)
CO2 SERPL-SCNC: 25 MMOL/L (ref 21–32)
CREAT SERPL-MCNC: 3.7 MG/DL (ref 0.8–1.3)
DEPRECATED RDW RBC AUTO: 14 % (ref 12.4–15.4)
EOSINOPHIL # BLD: 0 K/UL (ref 0–0.6)
EOSINOPHIL NFR BLD: 0.3 %
GFR SERPLBLD CREATININE-BSD FMLA CKD-EPI: 16 ML/MIN/{1.73_M2}
GLUCOSE BLD-MCNC: 129 MG/DL (ref 70–99)
GLUCOSE BLD-MCNC: 234 MG/DL (ref 70–99)
GLUCOSE BLD-MCNC: 280 MG/DL (ref 70–99)
GLUCOSE BLD-MCNC: 95 MG/DL (ref 70–99)
GLUCOSE SERPL-MCNC: 110 MG/DL (ref 70–99)
HCT VFR BLD AUTO: 23.7 % (ref 40.5–52.5)
HGB BLD-MCNC: 7.9 G/DL (ref 13.5–17.5)
HIV 1+2 AB+HIV1 P24 AG SERPL QL IA: NORMAL
HIV 2 AB SERPL QL IA: NORMAL
HIV1 AB SERPL QL IA: NORMAL
HIV1 P24 AG SERPL QL IA: NORMAL
LYMPHOCYTES # BLD: 0.7 K/UL (ref 1–5.1)
LYMPHOCYTES NFR BLD: 6.3 %
MCH RBC QN AUTO: 30.4 PG (ref 26–34)
MCHC RBC AUTO-ENTMCNC: 33.4 G/DL (ref 31–36)
MCV RBC AUTO: 91 FL (ref 80–100)
MONOCYTES # BLD: 0.6 K/UL (ref 0–1.3)
MONOCYTES NFR BLD: 6.1 %
NEUTROPHILS # BLD: 9 K/UL (ref 1.7–7.7)
NEUTROPHILS NFR BLD: 87.1 %
PERFORMED ON: ABNORMAL
PERFORMED ON: NORMAL
PHOSPHATE SERPL-MCNC: 3 MG/DL (ref 2.5–4.9)
PLATELET # BLD AUTO: 179 K/UL (ref 135–450)
PMV BLD AUTO: 7.5 FL (ref 5–10.5)
POTASSIUM SERPL-SCNC: 3.7 MMOL/L (ref 3.5–5.1)
PREALB SERPL-MCNC: 19.1 MG/DL (ref 20–40)
RBC # BLD AUTO: 2.6 M/UL (ref 4.2–5.9)
SODIUM SERPL-SCNC: 141 MMOL/L (ref 136–145)
WBC # BLD AUTO: 10.3 K/UL (ref 4–11)

## 2023-12-11 PROCEDURE — 84134 ASSAY OF PREALBUMIN: CPT

## 2023-12-11 PROCEDURE — 6370000000 HC RX 637 (ALT 250 FOR IP): Performed by: INTERNAL MEDICINE

## 2023-12-11 PROCEDURE — 80069 RENAL FUNCTION PANEL: CPT

## 2023-12-11 PROCEDURE — 85025 COMPLETE CBC W/AUTO DIFF WBC: CPT

## 2023-12-11 PROCEDURE — 36415 COLL VENOUS BLD VENIPUNCTURE: CPT

## 2023-12-11 PROCEDURE — 6370000000 HC RX 637 (ALT 250 FOR IP): Performed by: PHYSICAL MEDICINE & REHABILITATION

## 2023-12-11 PROCEDURE — 92526 ORAL FUNCTION THERAPY: CPT

## 2023-12-11 PROCEDURE — 97530 THERAPEUTIC ACTIVITIES: CPT

## 2023-12-11 PROCEDURE — 1280000000 HC REHAB R&B

## 2023-12-11 PROCEDURE — 92523 SPEECH SOUND LANG COMPREHEN: CPT

## 2023-12-11 PROCEDURE — 86702 HIV-2 ANTIBODY: CPT

## 2023-12-11 PROCEDURE — 97165 OT EVAL LOW COMPLEX 30 MIN: CPT

## 2023-12-11 PROCEDURE — 92610 EVALUATE SWALLOWING FUNCTION: CPT

## 2023-12-11 PROCEDURE — 94150 VITAL CAPACITY TEST: CPT

## 2023-12-11 PROCEDURE — 97535 SELF CARE MNGMENT TRAINING: CPT

## 2023-12-11 PROCEDURE — 97161 PT EVAL LOW COMPLEX 20 MIN: CPT

## 2023-12-11 PROCEDURE — 86701 HIV-1ANTIBODY: CPT

## 2023-12-11 PROCEDURE — 87390 HIV-1 AG IA: CPT

## 2023-12-11 PROCEDURE — 99223 1ST HOSP IP/OBS HIGH 75: CPT | Performed by: INTERNAL MEDICINE

## 2023-12-11 PROCEDURE — 94761 N-INVAS EAR/PLS OXIMETRY MLT: CPT

## 2023-12-11 RX ORDER — METOPROLOL SUCCINATE 50 MG/1
100 TABLET, EXTENDED RELEASE ORAL DAILY
Status: DISCONTINUED | OUTPATIENT
Start: 2023-12-12 | End: 2023-12-21 | Stop reason: HOSPADM

## 2023-12-11 RX ORDER — TRAZODONE HYDROCHLORIDE 50 MG/1
50 TABLET ORAL NIGHTLY PRN
Status: DISCONTINUED | OUTPATIENT
Start: 2023-12-11 | End: 2023-12-21 | Stop reason: HOSPADM

## 2023-12-11 RX ORDER — AMLODIPINE BESYLATE 5 MG/1
5 TABLET ORAL DAILY
Status: DISCONTINUED | OUTPATIENT
Start: 2023-12-11 | End: 2023-12-21 | Stop reason: HOSPADM

## 2023-12-11 RX ORDER — ATOVAQUONE 750 MG/5ML
1500 SUSPENSION ORAL DAILY
Status: DISCONTINUED | OUTPATIENT
Start: 2023-12-11 | End: 2023-12-21 | Stop reason: HOSPADM

## 2023-12-11 RX ADMIN — AMLODIPINE BESYLATE 5 MG: 5 TABLET ORAL at 18:01

## 2023-12-11 RX ADMIN — PANTOPRAZOLE SODIUM 40 MG: 40 TABLET, DELAYED RELEASE ORAL at 06:28

## 2023-12-11 RX ADMIN — Medication 1 TABLET: at 07:56

## 2023-12-11 RX ADMIN — ASPIRIN 81 MG 81 MG: 81 TABLET ORAL at 07:56

## 2023-12-11 RX ADMIN — QUETIAPINE FUMARATE 25 MG: 25 TABLET ORAL at 21:04

## 2023-12-11 RX ADMIN — ATOVAQUONE 1500 MG: 750 SUSPENSION ORAL at 12:00

## 2023-12-11 RX ADMIN — DOXAZOSIN 8 MG: 4 TABLET ORAL at 21:19

## 2023-12-11 RX ADMIN — INSULIN LISPRO 4 UNITS: 100 INJECTION, SOLUTION INTRAVENOUS; SUBCUTANEOUS at 16:52

## 2023-12-11 RX ADMIN — INSULIN GLARGINE 30 UNITS: 100 INJECTION, SOLUTION SUBCUTANEOUS at 21:02

## 2023-12-11 RX ADMIN — PREDNISONE 100 MG: 20 TABLET ORAL at 07:57

## 2023-12-11 RX ADMIN — APIXABAN 5 MG: 5 TABLET, FILM COATED ORAL at 07:57

## 2023-12-11 RX ADMIN — ATORVASTATIN CALCIUM 40 MG: 40 TABLET, FILM COATED ORAL at 21:04

## 2023-12-11 RX ADMIN — Medication 1 TABLET: at 16:52

## 2023-12-11 RX ADMIN — STANDARDIZED SENNA CONCENTRATE AND DOCUSATE SODIUM 1 TABLET: 8.6; 5 TABLET ORAL at 07:56

## 2023-12-11 RX ADMIN — METOPROLOL SUCCINATE 50 MG: 50 TABLET, EXTENDED RELEASE ORAL at 07:57

## 2023-12-11 RX ADMIN — APIXABAN 5 MG: 5 TABLET, FILM COATED ORAL at 21:04

## 2023-12-11 ASSESSMENT — ENCOUNTER SYMPTOMS
CONSTIPATION: 0
ABDOMINAL PAIN: 0
BACK PAIN: 0
SORE THROAT: 0
SINUS PAIN: 0
NAUSEA: 0
RHINORRHEA: 0
COUGH: 0
DIARRHEA: 0
SHORTNESS OF BREATH: 0
SINUS PRESSURE: 0
WHEEZING: 0
EYE REDNESS: 0
EYE DISCHARGE: 0

## 2023-12-11 ASSESSMENT — PAIN SCALES - GENERAL
PAINLEVEL_OUTOF10: 0
PAINLEVEL_OUTOF10: 0

## 2023-12-11 NOTE — PLAN OF CARE
Problem: Discharge Planning  Goal: Discharge to home or other facility with appropriate resources  12/11/2023 0943 by Aubrey Cheema RN  Outcome: Progressing  Flowsheets (Taken 12/11/2023 0802)  Discharge to home or other facility with appropriate resources:   Identify barriers to discharge with patient and caregiver   Identify discharge learning needs (meds, wound care, etc)  12/10/2023 2203 by Tabby Collins RN  Outcome: Progressing     Problem: Safety - Adult  Goal: Free from fall injury  12/11/2023 0943 by uAbrey Cheema RN  Outcome: Progressing  12/10/2023 2203 by Tabby Collins RN  Outcome: Progressing     Problem: Pain  Goal: Verbalizes/displays adequate comfort level or baseline comfort level  Outcome: Progressing  Flowsheets (Taken 12/11/2023 0754)  Verbalizes/displays adequate comfort level or baseline comfort level:   Encourage patient to monitor pain and request assistance   Assess pain using appropriate pain scale

## 2023-12-11 NOTE — PROGRESS NOTES
[address] [facility] Department   Phone: [phone]    Speech Therapy    [x] Initial Evaluation            [] Daily Treatment Note         [] Discharge Summary      Patient: Kyung Jett   : 1950   MRN: [id number]   Date of Service:  2023  Admitting Diagnosis: Acute ischemic stroke [facility])  Current Admission Summary: See MD's summary   Past Medical History:  has a past medical history of Atrial fibrillation ([address]), CAD (coronary artery disease), Cancer of kidney ([address]), Diabetes mellitus ([address]), Hyperlipidemia, Hypertension, Prostate cancer ([address]), and Right renal mass. Past Surgical History:  has a past surgical history that includes hernia repair; knee surgery; Cholecystectomy; Nasal septum surgery; eye surgery (Left, 2018); Kidney removal (Right, 2022); and Cataract removal (Bilateral). Recent Chest xray completed :  IMPRESSION:  Interval development of mild bronchitis/bronchiolitis versus mild pulmonary  edema. Recent MRI Brain completed 23: IMPRESSION:  1. There are 2 punctate foci of acute infarct involving the right temporal  lobe. No mass effect or midline shift. 2. Otherwise, no acute intracranial abnormality. 3. Mild-to-moderate global parenchymal volume loss with mild chronic  microvascular ischemic changes. 4.  The ventricular dilatation is slightly out of proportion to the cortical  sulci. While this may be due to predominantly central parenchymal volume  loss, a component of normal pressure hydrocephalus cannot be excluded. 5. Small chronic infarct involving the left frontal lobe.   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 COGNITIVE DOMAIN Pt Score Points Possible  COMMENTS   ORIENTATION  3 6 Disoriented to year, [place], city    IMMEDIATE VERBAL RECALL 4 4    VISUAL RECOGNITION/NAMING 3 3    ATTENTION 3 7 Difficulty with selective attention and mental manipulation of digits    ABSTRACTION 3 3    LANGUAGE 2 3 Pt named 9/15+ items in 1 min   EXECUTIVE  1 4 Difficulty with cognitive shifting task and arithmetic reasoning    VISUOSPATIAL  2 4 All numbers present on clock, spacing off and hands incorrect with pt writing numbers for time    DELAYED VERBAL RECALL 0 4 Following 7:12 delay, improved to 3/4 given mod cues f/3    IMMEDIATE STORY RECALL 1 2 Pt immediately recalled 6/11 facts from short story    DELAYED VISUAL MEMORY  0 3 Following a 13 min delay, improved to 3/3 given mod semantic cues    DELAYED STORY RECALL 0 2 Recalled 2/11 details from short story given slight delay    STORY RECOGNITION  3 5 With f/3    SUM 25 50        BCAT CROSSWALK TO FUNCTIONAL STATUS:   Pt's cumulative score indicates MODERATE TO SEVERE DEMENTIA. MILD DEMENTIA (score range 26-34): IADL deficits; typically requires residential support services; clear objective evidence of memory and other cognitive declines. MODERATE TO SEVERE DEMENTIA (score range 0-25): Moderate (uppre end of the range) -Pervasive functional deficits (IADLs), but ADLs generally intact; marked deficits in memory and executive functions; behavior and psychological symptoms are common; requires significant residential support. Severe (lower end of the range) - Needs assistance in ADLs/IADLs; pervasive cognitive deficits; requires complex care     * Of note, Pt does not have a formal dementia diagnosis.  The above score and cognitive stage is not

## 2023-12-11 NOTE — PROGRESS NOTES
Assessment completed. Patient sitting up in chair with spouse at side, alert and oriented x 4 and able to make all his needs known. Needs reminders to call for assistance. Denied any pain. MCOT monitor in place to JÚNIOR chest. Uses a walker with CGA x 1 and tolerates fairly well. Medications administered as ordered. POC and education reviewed with patient and mutually agreed upon. Safety interventions in place. Video monitoring in progress. Call light in reach. Therapy at side. Will continue to monitor.

## 2023-12-11 NOTE — H&P
Patient: Farhana Glynn  [id number]  Date: 12/11/2023      Chief Complaint:     History of Present Illness/Hospital Course:  74 yo M with renal cell carcinoma on immunotherapy, Afib, HTN, DM 2, CKD 3 who came to ER with CASTRO. Has had some fatigue and weakness over past 1-2 weeks. He says just feels terrible overall. Was diagnosed with prostate cancer w mets to kidney and started on chemo. He needs contact-guard assist for transfers. Patient lives at home with his wife in a trilevel house. Prior Level of Function:  independent    Current Level of Function:  Mod assist     Functional Deficits:  Overall weakness- decreased ADLs     has a past medical history of Atrial fibrillation ([address]), CAD (coronary artery disease), Cancer of kidney ([address]), Diabetes mellitus ([address]), Hyperlipidemia, Hypertension, Prostate cancer ([address]), and Right renal mass. has a past surgical history that includes hernia repair; knee surgery; Cholecystectomy; Nasal septum surgery; eye surgery (Left, 04/19/2018); Kidney removal (Right, 07/25/2022); and Cataract removal (Bilateral). reports that he quit smoking about 53 years ago. His smoking use included cigarettes. He has never used smokeless tobacco. He reports current alcohol use. He reports that he does not use drugs. family history includes Heart Disease in his brother and mother; Skin Cancer in his father; Stroke in his brother.     Allergies: Amoxicillin, Bisoprolol-hydrochlorothiazide, Cisapride, Penicillins, and Pioglitazone    Current Facility-Administered Medications   Medication Dose Route Frequency Provider Last Rate Last Admin    atovaquone (MEPRON) suspension 1,500 mg  1,500 mg Oral Daily Jadiel Esteves MD   1,500 mg at 12/11/23 1200    apixaban (ELIQUIS) tablet 5 mg  5 mg Oral BID Susi Moeller MD   5 mg at 12/11/23 0757    aspirin chewable tablet 81 mg  81 mg Oral Daily Susi Moeller MD   81 mg at 12/11/23 0756    atorvastatin (LIPITOR) tablet 40 mg  40 mg following    Renal cell cancer  - oncology consulted    DM 2 with hyperglycemia   - lantus  - SSI as needed  - hypoglycemia protocol     Bowels: Per protocol  Bladder: Per protocol   Sleep: Trazodone provided prn.     DVT PPx: eliquis    ELOS: NAZARIO San[facility].H  PM&R  12/11/2023  1:51 PM

## 2023-12-11 NOTE — PROGRESS NOTES
[address] [facility] Department   Phone: [phone]    Physical Therapy    [x] Initial Evaluation            [] Daily Treatment Note         [] Discharge Summary      Patient: Alexy Harrell   : 1950   MRN: [id number]   Date of Service:  2023  Admitting Diagnosis: Acute ischemic stroke [facility])    Current Admission Summary: Alexy Harrell is a 75 y.o. male who presents to the emergency department due to feeling weak and fatigued over the last 10 days. He has been going through chemotherapy due to prostate cancer. States that he only has 1 kidney currently as he had cancer spread to his kidney as well. He states that his primary care doctor told him to come to the emergency room due to his numbers being low he is uncertain if it is just his hemoglobin or his kidney function exactly he believes that he was told it was his kidney function but he thinks it may be more. Patient denies any fevers or chills. Denies any cough or shortness of breath. He states that he just finished a round of chemotherapy yesterday. Patient's MRI scan of brain was done and revealed infarct in the right temporal lobe. Previous MRI scan done 2 months ago revealed acute right cerebellar infarction. Past Medical History:  has a past medical history of Atrial fibrillation ([address]), CAD (coronary artery disease), Cancer of kidney ([address]), Diabetes mellitus ([address]), Hyperlipidemia, Hypertension, Prostate cancer ([address]), and Right renal mass. Past Surgical History:  has a past surgical history that includes hernia repair; knee surgery; Cholecystectomy; Nasal septum surgery; eye surgery (Left, 2018); Kidney removal (Right, 2022); and Cataract removal (Bilateral).   Discharge Recommendations: Home with Home Health PT   DME Required For Discharge: rolling walker pending patient progress   Precautions/Restrictions: high fall risk  Weight Bearing Restrictions: no restrictions  [] Right Upper strike  Comments:  cuing for walker proximity as patient's lower extremities are on outside edges of rolling walker  Stair Mobility:  Number of Steps: 8  Step Height: 6 inch  Hand Rails: (B) handrail  Assistance: contact guard assistance  Comments: reciprocal pattern, cuing for safety while turning  Wheelchair Mobility:  No w/c mobility completed on this date. Comments:  Balance:  Static Sitting Balance: fair (+): maintains balance at SBA/supervision without use of UE support  Dynamic Sitting Balance: fair (+): maintains balance at SBA/supervision without use of UE support  Static Standing Balance: fair (-): maintains balance at CGA with use of UE support  Dynamic Standing Balance: poor (+): requires min (A) to maintain balance  Patient completes object retrieval from floor in standing position at contact guard assistance  Comments:    Other Therapeutic Interventions    Patient completes the following exercises with use of Airex pad:     1) Standing on Airex pad chest press with 1kg medicine ball x 10 reps with CGA  2) standing on Airex pad shoulder press with 1kg medicine ball x 10 reps with CGA  3) Stepping on/off Airex pad x 10 reps with (B) supported on RW with CGA  4) Repeated STS on Airex pad without UE support x 10 reps each LE with min-mod (A) -- increased losses of balance while in stance on (L) LE  4) Step forward/backward over obstacle without UE support-- min (A) progressing to max (A) -- patient crossing legs and completely looses balance. Appears to have balance and motor planning elements.   5) step forward/backward over obstacle with (B) support on RW with CGA    Functional Outcomes                 13/28 on Tinetti (entered in   [address]) indicating that patient is at a high risk of falls within the home setting     Cognition  Overall Cognitive Status: Impaired  Arousal/Alertness: appropriate responses to stimuli  Following Commands: follows one step commands with repetition, follows one step commands

## 2023-12-11 NOTE — PROGRESS NOTES
risk  Weight Bearing Restrictions: no restrictions   Required Braces/Orthotics: no braces required  Positional Restrictions:no positional restrictions    Pre-Admission Information   Lives With: spouse                  Type of Home: house  Home Layout: tri-level, 8 stairs up & 7 stairs down. HR on R side. Bedroom and bathroom upstairs   Home Access:  1 step to enter without rails   Bathroom Layout: walk in shower downstairs. Tub shower upstairs. Uses walk in shower  Bathroom Equipment: grab bars in shower  Toilet Height: standard height  Home Equipment: single point cane  Transfer Assistance: Independent without use of device  Ambulation Assistance:Independent without use of device  ADL Assistance: independent with all ADL's  IADL Assistance: independent with homemaking tasks. Wife completes most homemaking tasks. Patient did yard work   Active :        [x] Yes                 [] No  Hand Dominance: [] Left                 [x] Right  Current Employment:  retired. [employer]   Hobbies: cutting grass, watching sports, watch grand kids   Recent Falls: pt/spouse reports x1 fall on Nov 20th due to low blood sugar.  (Per acute eval)  Patient reported one stumble     Goes by [employer]     Examination   Vision:   Vision Gross Assessment: Impaired and Vision Corrective Device: wears glasses at all times  Hearing:   hard of hearing  Observation:   General Observation:  on RA, bandage on top of R foot, MCOT monitor on L chest. IV on R forearm   Posture:   Slightly forward head   Sensation:   denies numbness and tingling  Coordination Testing:   Coordination and Movement Description: (R) UE, (L) UE, fine motor impairments, decreased speed, decreased accuracy  Finger to Nose: Impaired Bilaterally  Finger/Thumb Opposition: Impaired Bilaterally    ROM:   (B) UE AROM WFL  Strength:   (B) UE strength grossly 4    Therapist Clinical Decision Making (Complexity): low complexity  Clinical Presentation: stable      Subjective  General: present    Plan  Frequency: 5 x/week, 60 min/day  Current Treatment Recommendations: strengthening, balance training, functional mobility training, transfer training, endurance training, neuromuscular re-education, patient/caregiver education, ADL/self-care training, IADL training, cognitive reorientation, and equipment evaluation/education    Goals  Patient Goals: get home and do the things I used to do    Short Term Goals:  Time Frame: 7-10 days   Patient will complete upper body ADL at [facility]   Patient will complete lower body ADL at [facility]   Patient will complete toileting at modified independent   Patient will complete functional transfers at [facility]   Patient to gather and transport IADL items at modified independent     Above goals reviewed on 12/11/2023. All goals are ongoing at this time unless indicated above.        Therapy Session Time     Individual Group Co-treatment   Time In 1085      Time Out 1145      Minutes 60           Timed Code Treatment Minutes:  Timed Code Treatment Minutes: 45 Minutes  Total Treatment Minutes:  60 minutes        Electronically Signed By: JASON Walters MOT OTR/ALEX [id number]

## 2023-12-11 NOTE — PROGRESS NOTES
Incentive Spirometry education and demonstration completed by Respiratory Therapy Yes      Response to education: Very Good     Teaching Time: 5 minutes    Minimum Predicted Vital Capacity - 855 mL. Patient's Actual Vital Capacity - 1000 mL. Turning over to Nursing for routine follow-up Yes.        Electronically signed by Alissa Trent RCP on 12/11/2023 at 4:30 PM

## 2023-12-11 NOTE — PROGRESS NOTES
12/10/23 1944   RT Protocol   History Pulmonary Disease 0   Respiratory pattern 2   Breath sounds 0   Cough 0   Bronchodilator Assessment Score 2

## 2023-12-11 NOTE — PROGRESS NOTES
4 Eyes Skin Assessment     NAME:  Adia Muniz  YOB: 1950  MEDICAL RECORD NUMBER:  [id number]    The patient is being assessed for  Admission    I agree that at least one RN has performed a thorough Head to Toe Skin Assessment on the patient. ALL assessment sites listed below have been assessed. Areas assessed by both nurses:    Head, Face, Ears, Shoulders, Back, Chest, Arms, Elbows, Hands, Sacrum. Buttock, Coccyx, Ischium, Legs. Feet and Heels, and Under Medical Devices         Does the Patient have a Wound?  No noted wound(s)       Price Prevention initiated by RN: Yes  Wound Care Orders initiated by RN: No    Pressure Injury (Stage 3,4, Unstageable, DTI, NWPT, and Complex wounds) if present, place Wound referral order by RN under : No    New Ostomies, if present place, Ostomy referral order under : No     Nurse 1 eSignature: Electronically signed by Litzy Negron RN on 12/10/23 at 10:36 PM EST    **SHARE this note so that the co-signing nurse can place an eSignature**    Nurse 2 eSignature: Electronically signed by Mikayla Cummings RN on 12/10/23 at 10:40 PM EST

## 2023-12-11 NOTE — PLAN OF CARE
Problem: Discharge Planning  Goal: Discharge to home or other facility with appropriate resources  Outcome: Progressing     Problem: Safety - Adult  Goal: Free from fall injury  12/10/2023 2203 by Randee Pena RN  Outcome: Progressing

## 2023-12-11 NOTE — PROGRESS NOTES
Admission Period/Goal QM Codes for [address] [facility]. QM Admit Code Goal Code   Eating 3 6   Oral Hygiene 4 6   Toileting Hygiene 4 6   Shower/Bathing 4 6   UB Dressing 4 6   LB Dressing 4 6   Putting on/off Footwear 4 6   Rolling Left and Right 4 6   Sit To Lying 4 6   Lying to Sitting on Bedside 3 6   Sit to Stand 3 6   Chair/Bed to Chair Transfer 3 6   Toilet Transfers 3 6   Car Transfers 3 6   Walk 10 Feet 3 6   Walk 50 Feet with Two Turns 3 6   Walk 150 Feet 3 6   Walk 10 Feet on Uneven Surfaces 3 6   1 Step (Curb) 3 6   4 Steps 3 6   12 Steps 80 6   Picking up Object from Floor 4 6   Wheel 50 Feet with 2 Turns 9 -   Type - -   Wheel 150 Feet 9 -   Type - -       The above codes were determined by the treatment team to be the patient's accurate admission assessment codes based on assessment performed soon after the patient's admission and prior to the benefit of services provided by staff, or if appropriate, the patient's usual performance at admission.     OT: Manisha Barrera, MOT OTR/L, [id number] 12/14/2023 9:53 AM     PT:  Brett Henderson PT, DPT - [id number], 12/13/2023 11:25 AM     RN:  Naomi Lezama, BSN, CRRN 12/13/23, 1200     ST:  StacyMandevillerohit [place], [place] CCC-SLP 12/13/2023 1441     :  Lucero Davis PT, DPT [id number]  12/14/23  10:01 AM

## 2023-12-11 NOTE — CONSULTS
[facility] [address] [place] [address] Dr SCOTT Consultation Note    Reason for Consult: CASTRO  Requesting Physician:  Dr. Jessie Gordillo:  Weakness    History obtained from records and patient. HISTORY OF PRESENT ILLNESS:                Gurpreet Bradley  is 75 y.o. y.o. 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 Fib, CAD, Renal CA, HTN, Prostate CA who is admitted to ARU for weakness. We are seeing patient for H/O CASTRO. Crea has now decreased to 3.7. Lower urine output. On Prednisone 100mg daily. -170's. No other complaints. Past Medical History:     has a past medical history of Atrial fibrillation ([address]), CAD (coronary artery disease), Cancer of kidney ([address]), Diabetes mellitus ([address]), Hyperlipidemia, Hypertension, Prostate cancer ([address]), and Right renal mass. Past Surgical History:     has a past surgical history that includes hernia repair; knee surgery; Cholecystectomy; Nasal septum surgery; eye surgery (Left, 04/19/2018); Kidney removal (Right, 07/25/2022); and Cataract removal (Bilateral).    Current Medications:    Current Facility-Administered Medications: atovaquone (MEPRON) suspension 1,500 mg, 1,500 mg, Oral, Daily  apixaban (ELIQUIS) tablet 5 mg, 5 mg, Oral, BID  aspirin chewable tablet 81 mg, 81 mg, Oral, Daily  atorvastatin (LIPITOR) tablet 40 mg, 40 mg, Oral, Nightly  dextrose 10 % infusion, , IntraVENous, Continuous PRN  dextrose bolus 10% 125 mL, 125 mL, IntraVENous, PRN **OR** dextrose bolus 10% 250 mL, 250 mL, IntraVENous, PRN  doxazosin (CARDURA) tablet 8 mg, 8 mg, Oral, Nightly  insulin glargine (LANTUS) injection vial 30 Units, 30 Units, SubCUTAneous, Nightly  insulin lispro (HUMALOG) injection vial 0-16 Units, 0-16 Units, SubCUTAneous, TID WC  insulin lispro (HUMALOG) injection vial 0-4 Units, 0-4 Units, SubCUTAneous, Nightly  metoprolol succinate (TOPROL XL)

## 2023-12-11 NOTE — PROGRESS NOTES
Nutrition Note    RECOMMENDATIONS  PO Diet: Continue the current diet  ONS: Continue the current ONS    NUTRITION ASSESSMENT   Nutrition assesment d/t new admission. Pt had reduced appetite and po intake several weeks ago. Wt hx review indicates no wt loss recently. Pt reported his eating is well currently nevf10-488% po intake. Receving Nepro BID for additional nutrition and accepts well. He would like to keep the supplement. Recommend drinking supplement after or beween meals to promote eating foods. Pt is agreeable. Will cont to monitor for po intake and adequate nutrition. Nutrition Related Findings: LBM 12/11. POCG 95. NA+ 141, K+ 3.7, Phos 3.0. BLE nonpitting edema. Wounds: None  Nutrition Education:  Education not indicated   Nutrition Goals: PO intake 75% or greater, by next RD assessment     MALNUTRITION ASSESSMENT   Acute Illness  Malnutrition Status: No malnutrition  Findings of the 6 clinical characteristics of malnutrition:  Energy Intake:  No significant decrease in energy intake  Weight Loss:  No significant weight loss     Body Fat Loss:  No significant body fat loss     Muscle Mass Loss:  No significant muscle mass loss    Fluid Accumulation:  No significant fluid accumulation     Strength:  Not Performed      NUTRITION DIAGNOSIS   No nutrition diagnosis at this time       CURRENT NUTRITION THERAPIES  ADULT DIET; Regular; 5 carb choices (75 gm/meal); Low Potassium (Less than 3000 mg/day)  ADULT ORAL NUTRITION SUPPLEMENT; Dinner, Breakfast; Renal Oral Supplement     PO Intake: %   PO Supplement Intake:%      ANTHROPOMETRICS  Current Height: 190.5 cm (6' 3\")  Current Weight - Scale: 104.4 kg (230 lb 3.2 oz)    Ideal Body Weight (IBW): 196 lbs  (89 kg)          BMI: 28.8    The patient will be monitored per nutrition standards of care. Consult dietitian if additional nutrition interventions are needed prior to RD reassessment.      Walter Young RD    Contact: [phone]

## 2023-12-12 ENCOUNTER — TELEPHONE (OUTPATIENT)
Dept: CARDIOLOGY CLINIC | Age: 73
End: 2023-12-12

## 2023-12-12 PROBLEM — Z71.89 DIABETES EDUCATION, ENCOUNTER FOR: Status: ACTIVE | Noted: 2023-12-12

## 2023-12-12 LAB
ALBUMIN SERPL-MCNC: 3 G/DL (ref 3.4–5)
ANION GAP SERPL CALCULATED.3IONS-SCNC: 7 MMOL/L (ref 3–16)
BASOPHILS # BLD: 0 K/UL (ref 0–0.2)
BASOPHILS NFR BLD: 0.1 %
BUN SERPL-MCNC: 75 MG/DL (ref 7–20)
CALCIUM SERPL-MCNC: 8.7 MG/DL (ref 8.3–10.6)
CHLORIDE SERPL-SCNC: 108 MMOL/L (ref 99–110)
CO2 SERPL-SCNC: 25 MMOL/L (ref 21–32)
CREAT SERPL-MCNC: 3.4 MG/DL (ref 0.8–1.3)
DEPRECATED RDW RBC AUTO: 14.1 % (ref 12.4–15.4)
EOSINOPHIL # BLD: 0 K/UL (ref 0–0.6)
EOSINOPHIL NFR BLD: 0.4 %
GFR SERPLBLD CREATININE-BSD FMLA CKD-EPI: 18 ML/MIN/{1.73_M2}
GLUCOSE BLD-MCNC: 148 MG/DL (ref 70–99)
GLUCOSE BLD-MCNC: 243 MG/DL (ref 70–99)
GLUCOSE BLD-MCNC: 281 MG/DL (ref 70–99)
GLUCOSE BLD-MCNC: 84 MG/DL (ref 70–99)
GLUCOSE SERPL-MCNC: 115 MG/DL (ref 70–99)
HCT VFR BLD AUTO: 25.2 % (ref 40.5–52.5)
HGB BLD-MCNC: 8.4 G/DL (ref 13.5–17.5)
LYMPHOCYTES # BLD: 0.7 K/UL (ref 1–5.1)
LYMPHOCYTES NFR BLD: 6.8 %
MCH RBC QN AUTO: 30.3 PG (ref 26–34)
MCHC RBC AUTO-ENTMCNC: 33.2 G/DL (ref 31–36)
MCV RBC AUTO: 91.4 FL (ref 80–100)
MONOCYTES # BLD: 0.6 K/UL (ref 0–1.3)
MONOCYTES NFR BLD: 5.8 %
NEUTROPHILS # BLD: 9.4 K/UL (ref 1.7–7.7)
NEUTROPHILS NFR BLD: 86.9 %
PERFORMED ON: ABNORMAL
PERFORMED ON: NORMAL
PHOSPHATE SERPL-MCNC: 2.7 MG/DL (ref 2.5–4.9)
PLATELET # BLD AUTO: 175 K/UL (ref 135–450)
PMV BLD AUTO: 8 FL (ref 5–10.5)
POTASSIUM SERPL-SCNC: 3.8 MMOL/L (ref 3.5–5.1)
RBC # BLD AUTO: 2.76 M/UL (ref 4.2–5.9)
SODIUM SERPL-SCNC: 140 MMOL/L (ref 136–145)
WBC # BLD AUTO: 10.9 K/UL (ref 4–11)

## 2023-12-12 PROCEDURE — 6370000000 HC RX 637 (ALT 250 FOR IP): Performed by: PHYSICAL MEDICINE & REHABILITATION

## 2023-12-12 PROCEDURE — 6370000000 HC RX 637 (ALT 250 FOR IP): Performed by: INTERNAL MEDICINE

## 2023-12-12 PROCEDURE — 97110 THERAPEUTIC EXERCISES: CPT

## 2023-12-12 PROCEDURE — 97530 THERAPEUTIC ACTIVITIES: CPT

## 2023-12-12 PROCEDURE — 97129 THER IVNTJ 1ST 15 MIN: CPT

## 2023-12-12 PROCEDURE — 1280000000 HC REHAB R&B

## 2023-12-12 PROCEDURE — 97112 NEUROMUSCULAR REEDUCATION: CPT

## 2023-12-12 PROCEDURE — 85025 COMPLETE CBC W/AUTO DIFF WBC: CPT

## 2023-12-12 PROCEDURE — 97116 GAIT TRAINING THERAPY: CPT

## 2023-12-12 PROCEDURE — 99233 SBSQ HOSP IP/OBS HIGH 50: CPT | Performed by: INTERNAL MEDICINE

## 2023-12-12 PROCEDURE — 97130 THER IVNTJ EA ADDL 15 MIN: CPT

## 2023-12-12 PROCEDURE — 92507 TX SP LANG VOICE COMM INDIV: CPT

## 2023-12-12 PROCEDURE — 80069 RENAL FUNCTION PANEL: CPT

## 2023-12-12 RX ADMIN — INSULIN LISPRO 4 UNITS: 100 INJECTION, SOLUTION INTRAVENOUS; SUBCUTANEOUS at 16:31

## 2023-12-12 RX ADMIN — APIXABAN 5 MG: 5 TABLET, FILM COATED ORAL at 20:31

## 2023-12-12 RX ADMIN — ATORVASTATIN CALCIUM 40 MG: 40 TABLET, FILM COATED ORAL at 20:32

## 2023-12-12 RX ADMIN — PANTOPRAZOLE SODIUM 40 MG: 40 TABLET, DELAYED RELEASE ORAL at 05:24

## 2023-12-12 RX ADMIN — STANDARDIZED SENNA CONCENTRATE AND DOCUSATE SODIUM 1 TABLET: 8.6; 5 TABLET ORAL at 07:52

## 2023-12-12 RX ADMIN — METOPROLOL SUCCINATE 100 MG: 50 TABLET, EXTENDED RELEASE ORAL at 07:52

## 2023-12-12 RX ADMIN — PREDNISONE 100 MG: 20 TABLET ORAL at 07:52

## 2023-12-12 RX ADMIN — DOXAZOSIN 8 MG: 4 TABLET ORAL at 20:32

## 2023-12-12 RX ADMIN — STANDARDIZED SENNA CONCENTRATE AND DOCUSATE SODIUM 1 TABLET: 8.6; 5 TABLET ORAL at 20:31

## 2023-12-12 RX ADMIN — INSULIN GLARGINE 30 UNITS: 100 INJECTION, SOLUTION SUBCUTANEOUS at 20:32

## 2023-12-12 RX ADMIN — QUETIAPINE FUMARATE 25 MG: 25 TABLET ORAL at 20:31

## 2023-12-12 RX ADMIN — Medication 1 TABLET: at 16:31

## 2023-12-12 RX ADMIN — APIXABAN 5 MG: 5 TABLET, FILM COATED ORAL at 07:52

## 2023-12-12 RX ADMIN — AMLODIPINE BESYLATE 5 MG: 5 TABLET ORAL at 08:02

## 2023-12-12 RX ADMIN — Medication 1 TABLET: at 07:52

## 2023-12-12 RX ADMIN — ASPIRIN 81 MG 81 MG: 81 TABLET ORAL at 07:52

## 2023-12-12 RX ADMIN — ATOVAQUONE 1500 MG: 750 SUSPENSION ORAL at 07:52

## 2023-12-12 ASSESSMENT — ENCOUNTER SYMPTOMS
FACIAL SWELLING: 0
BACK PAIN: 0
EYE DISCHARGE: 0
ABDOMINAL DISTENTION: 0
PHOTOPHOBIA: 0
NAUSEA: 0
CONSTIPATION: 0
BLOOD IN STOOL: 0
RHINORRHEA: 0
CHEST TIGHTNESS: 0
SHORTNESS OF BREATH: 0
EYE REDNESS: 0
ABDOMINAL PAIN: 0
WHEEZING: 0
DIARRHEA: 0
VOMITING: 0
SINUS PRESSURE: 0
SORE THROAT: 0
SINUS PAIN: 0
COUGH: 0

## 2023-12-12 ASSESSMENT — PAIN SCALES - GENERAL
PAINLEVEL_OUTOF10: 0
PAINLEVEL_OUTOF10: 0

## 2023-12-12 NOTE — PROGRESS NOTES
[address] [facility] Department   Phone: [phone]    Speech Therapy    [] Initial Evaluation            [x] Daily Treatment Note         [] Discharge Summary      Patient: Latasha Hahn   : 1950   MRN: [id number]   Date of Service:  2023  Admitting Diagnosis: Acute ischemic stroke [facility])  Current Admission Summary: See MD's summary   Past Medical History:  has a past medical history of Atrial fibrillation ([address]), CAD (coronary artery disease), Cancer of kidney ([address]), Diabetes mellitus ([address]), Hyperlipidemia, Hypertension, Prostate cancer ([address]), and Right renal mass. Past Surgical History:  has a past surgical history that includes hernia repair; knee surgery; Cholecystectomy; Nasal septum surgery; eye surgery (Left, 2018); Kidney removal (Right, 2022); and Cataract removal (Bilateral). 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  Vision:    Vision Corrective Device: wears glasses at all times      Subjective  General: Pt's wife initially present, left during session. Pt pleasant and engaged, aware of extreme difficulty with basic cognitive tasks. Pt with noticeable word finding deficits/ reading comprehension deficits this date more so than during initial evaluation. Changed some of pt's goals to reflect current status.  Pt with perseverative mastication/ chewing pattern during sessions -- no po present in mouth suspect habitual.   Pain: Denies    Safety Interventions: patient left in chair, chair alarm in place, call light within reach, and patient at risk

## 2023-12-12 NOTE — TELEPHONE ENCOUNTER
Fannie Rees, wife called to inform the office that the Pt is in the [address] is requesting for LES call her as she has questions. Please advise.   Thank you

## 2023-12-12 NOTE — PROGRESS NOTES
focal deficit present. Mental Status: He is alert and oriented to person, place, and time. Mental status is at baseline. Motor: No abnormal muscle tone. Psychiatric:         Mood and Affect: Mood normal.         Behavior: Behavior normal.           Lines and drains: All vascular access sites are healthy with no local erythema, discharge or tenderness. Intake and output:    I/O last 3 completed shifts: In: 5 [P.O.:720]  Out: 200 [Urine:350]    Lab Data:   All available labs and old records have been reviewed by me. CBC:  Recent Labs     12/10/23  0711 12/11/23  0625 12/12/23  0538   WBC 10.0 10.3 10.9   RBC 2.67* 2.60* 2.76*   HGB 8.1* 7.9* 8.4*   HCT 24.3* 23.7* 25.2*    179 175   MCV 91.1 91.0 91.4   MCH 30.5 30.4 30.3   MCHC 33.5 33.4 33.2   RDW 14.1 14.0 14.1        BMP:  Recent Labs     12/10/23  0711 12/11/23  0625 12/12/23  0538    141 140   K 3.8 3.7 3.8   * 109 108   CO2 23 25 25   BUN 85* 81* 75*   CREATININE 3.7* 3.7* 3.4*   CALCIUM 8.4 8.7 8.7   GLUCOSE 170* 110* 115*        Hepatic Function Panel:   Lab Results   Component Value Date/Time    ALKPHOS 57 11/28/2023 02:23 PM    ALT 10 11/28/2023 02:23 PM    AST 9 11/28/2023 02:23 PM    PROT 5.7 11/30/2023 05:56 AM    BILITOT <0.2 11/28/2023 02:23 PM    BILIDIR <0.2 03/16/2021 09:41 AM    IBILI see below 03/16/2021 09:41 AM    LABALBU 3.0 12/12/2023 05:38 AM       CPK: No results found for: \"CKTOTAL\"  ESR: No results found for: \"SEDRATE\"  CRP: No results found for: \"CRP\"        Imaging: All pertinent images and reports for the current visit were reviewed by me during this visit. I reviewed the chest x-ray/CT scan/MRI images and independently interpreted the findings and results today. No orders to display       Medications: All current and past medications were reviewed.      atovaquone  1,500 mg Oral Daily    amLODIPine  5 mg Oral Daily    metoprolol succinate  100 mg Oral Daily    apixaban  5 mg Oral BID aspirin  81 mg Oral Daily    atorvastatin  40 mg Oral Nightly    doxazosin  8 mg Oral Nightly    insulin glargine  30 Units SubCUTAneous Nightly    insulin lispro  0-16 Units SubCUTAneous TID WC    insulin lispro  0-4 Units SubCUTAneous Nightly    oyster shell calcium w/D  1 tablet Oral BID WC    pantoprazole  40 mg Oral QAM AC    predniSONE  100 mg Oral Daily    QUEtiapine  25 mg Oral Nightly    sennosides-docusate sodium  1 tablet Oral BID        dextrose         traZODone, dextrose, dextrose bolus **OR** dextrose bolus, ondansetron **OR** ondansetron, ipratropium 0.5 mg-albuterol 2.5 mg, hydrALAZINE, acetaminophen, polyethylene glycol, bisacodyl      Problem list:       Patient Active Problem List   Diagnosis Code    HLD (hyperlipidemia) E78.5    Essential hypertension I10    Coronary artery disease due to lipid rich plaque I25.10, I25.83    Cardiac dysrhythmia I49.9    Diabetes mellitus E11.9    Paroxysmal atrial fibrillation (HCC) I48.0    Renal mass N28.89    Cerebrovascular accident (CVA) ([address]) I63.9    Remote history of stroke Z86.73    Benign essential HTN I10    PAF (paroxysmal atrial fibrillation) (HCC) I48.0    CASTRO (acute kidney injury) ([address]) N17.9    LBBB (left bundle branch block) I44.7    H/O right nephrectomy Z90.5    Acute anemia D64.9    Acute ischemic stroke ([address]) I63.9    Overweight (BMI 25.0-29. 9) E66.3    AIN (acute interstitial nephritis) N10    Sterile pyuria R82.81    Current chronic use of systemic steroids Z79.52    H/O systemic steroid therapy Z92.241    Diabetes education, encounter for Z71.89       Please note that this chart was generated using Dragon dictation software. Although every effort was made to ensure the accuracy of this automated transcription, some errors in transcription may have occurred inadvertently. If you may need any clarification, please do not hesitate to contact me through EPIC or at the phone number provided below with my electronic signature.   Any pictures or

## 2023-12-12 NOTE — PROGRESS NOTES
Standing Balance: fair (-): maintains balance at CGA with use of UE support  Dynamic Standing Balance: fair (-): maintains balance at CGA with use of UE support  Comments:    Other Therapeutic Interventions  Standing balance and cog problem solving targeted this date w/ completion of low complexity PVC pipe activity in stance. Pt required maximal and extensive cueing to problem solve and re-create entire structure. Pt able to state color of pieces and shape of pieces (e.g., elbow connector) from image of example structure to re-create - however pt unable to then process and apply that info into the structure itself. Pt able to complete task w/ yes/no prompts and maximal VC/TC and visual cueing. Pt required maxA overall for task completion but CGA for balance. Significant forward trunk flexion demo'd throughout task. Pt required 9:50 min for task completion - maintaining stance w/o seated rest break. FM coordination and strength targeted this date w/ completion of theraputty exercises.  Pt completed the following exercises w/ light-resistance putty w/ maximal cueing for sequencing, technique, and initiation and [place] assist for motor planning:   - pincer pinch ~1 min R/L   - gross grasp/power gripping R/L - 2 min   - two handed grasp pull aways - 2 minutes  - radial/ulnar deviation w/ mass digit coordination to roll cinnamon buns - x1 on R and L   - pt removed 4 marbles from theraputty following 1 cue w/ B hands      Cognition  Overall Cognitive Status: Impaired  Arousal/Alertness: appropriate responses to stimuli  Following Commands: follows one step commands with repetition, follows one step commands with increased time  Attention Span: appears intact  Memory: decreased recall of recent events, decreased short term memory  Safety Judgement: decreased awareness of need for assistance, decreased awareness of need for safety  Problem Solving: assistance required to generate solutions, assistance required to implement call light within reach, patient at risk for falls, and family/caregiver present    Plan  Frequency: 5 x/week, 60 min/day  Current Treatment Recommendations: strengthening, balance training, functional mobility training, transfer training, endurance training, neuromuscular re-education, patient/caregiver education, ADL/self-care training, IADL training, cognitive reorientation, and equipment evaluation/education    Goals  Patient Goals: get home and do the things I used to do    Short Term Goals:  Time Frame: 7-10 days   Patient will complete upper body ADL at modified independent   Patient will complete lower body ADL at [facility] independent   Patient will complete toileting at modified independent   Patient will complete functional transfers at [facility] independent   Patient to gather and transport IADL items at modified independent     Above goals reviewed on 12/12/2023. All goals are ongoing at this time unless indicated above.        Therapy Session Time     Individual Group Co-treatment   Time In 6442      Time Out 1445      Minutes 60           Timed Code Treatment Minutes:  60 minutes  Total Treatment Minutes:  60 minutes        Electronically Signed By: Onel Honeycutt, [address] [place] [address], [facility] JENNIE/ALEX, [id number] 12/12/2023 3:11 PM

## 2023-12-12 NOTE — PROGRESS NOTES
[address] [facility] Department   Phone: [phone]    Physical Therapy    [] Initial Evaluation            [x] Daily Treatment Note         [] Discharge Summary      Patient: Cm Victor   : 1950   MRN: [id number]   Date of Service:  2023  Admitting Diagnosis: Acute ischemic stroke [facility])    Current Admission Summary: Cm Victor is a 75 y.o. male who presents to the emergency department due to feeling weak and fatigued over the last 10 days. He has been going through chemotherapy due to prostate cancer. States that he only has 1 kidney currently as he had cancer spread to his kidney as well. He states that his primary care doctor told him to come to the emergency room due to his numbers being low he is uncertain if it is just his hemoglobin or his kidney function exactly he believes that he was told it was his kidney function but he thinks it may be more. Patient denies any fevers or chills. Denies any cough or shortness of breath. He states that he just finished a round of chemotherapy yesterday. Patient's MRI scan of brain was done and revealed infarct in the right temporal lobe. Previous MRI scan done 2 months ago revealed acute right cerebellar infarction. Past Medical History:  has a past medical history of Atrial fibrillation ([address]), CAD (coronary artery disease), Cancer of kidney ([address]), Diabetes mellitus ([address]), Hyperlipidemia, Hypertension, Prostate cancer ([address]), and Right renal mass. Past Surgical History:  has a past surgical history that includes hernia repair; knee surgery; Cholecystectomy; Nasal septum surgery; eye surgery (Left, 2018); Kidney removal (Right, 2022); and Cataract removal (Bilateral).   Discharge Recommendations: Home with Home Health PT   DME Required For Discharge: rolling walker pending patient progress   Precautions/Restrictions: high fall risk  Weight Bearing Restrictions: no restrictions    Required

## 2023-12-12 NOTE — PLAN OF CARE
Problem: Discharge Planning  Goal: Discharge to home or other facility with appropriate resources  12/12/2023 1108 by Dorothy Macario RN  Outcome: Progressing  12/12/2023 0807 by Trino Gallegos RN  Outcome: Progressing  Flowsheets (Taken 12/12/2023 0802 by Dorothy Macario RN)  Discharge to home or other facility with appropriate resources:   Identify barriers to discharge with patient and caregiver   Identify discharge learning needs (meds, wound care, etc)     Problem: Safety - Adult  Goal: Free from fall injury  12/12/2023 1108 by Dorothy Macario RN  Outcome: Progressing  12/12/2023 0807 by Trino Gallegos RN  Outcome: Progressing     Problem: Pain  Goal: Verbalizes/displays adequate comfort level or baseline comfort level  Outcome: Progressing  Flowsheets (Taken 12/12/2023 0745)  Verbalizes/displays adequate comfort level or baseline comfort level:   Encourage patient to monitor pain and request assistance   Assess pain using appropriate pain scale

## 2023-12-12 NOTE — PROGRESS NOTES
Assessment completed. Patient up in bed, alert and oriented x 4 with some forgetfulness but able to make all his needs known. Spouse at side. Denied any pain. VSS. 2+ pitting edema noted to lateral sides of feet, encouraged and educated to keep elevated. Medications administered at ordered. POC and education reviewed with patient and mutually agreed upon. Safety interventions in place. Continues on video monitoring for safety. Call light in reach. Will continue to monitor.

## 2023-12-12 NOTE — PROGRESS NOTES
[facility] Renal ARU Note    Patient Active Problem List   Diagnosis    HLD (hyperlipidemia)    Essential hypertension    Coronary artery disease due to lipid rich plaque    Cardiac dysrhythmia    Diabetes mellitus    Paroxysmal atrial fibrillation (HCC)    Renal mass    Cerebrovascular accident (CVA) ([address])    Remote history of stroke    Benign essential HTN    PAF (paroxysmal atrial fibrillation) (HCC)    CASTRO (acute kidney injury) ([address])    LBBB (left bundle branch block)    H/O right nephrectomy    Acute anemia    Acute ischemic stroke (HCC)    Overweight (BMI 25.0-29. 9)    AIN (acute interstitial nephritis)    Sterile pyuria    Current chronic use of systemic steroids    H/O systemic steroid therapy       Past Medical History:   has a past medical history of Atrial fibrillation ([address]), CAD (coronary artery disease), Cancer of kidney ([address]), Diabetes mellitus ([address]), Hyperlipidemia, Hypertension, Prostate cancer ([address]), and Right renal mass. Past Social History:   reports that he quit smoking about 53 years ago. His smoking use included cigarettes. He has never used smokeless tobacco. He reports current alcohol use. He reports that he does not use drugs. Subjective:    Urinating well  No complaints    Review of Systems   Constitutional:  Positive for fatigue. Negative for activity change, appetite change, chills, fever and unexpected weight change. HENT:  Negative for congestion and facial swelling. Eyes:  Negative for photophobia, discharge and redness. Respiratory:  Negative for cough, chest tightness and shortness of breath. Cardiovascular:  Negative for chest pain, palpitations and leg swelling. Gastrointestinal:  Negative for abdominal distention, abdominal pain, blood in stool, constipation, diarrhea, nausea and vomiting. Endocrine: Negative for cold intolerance, heat intolerance and polyuria.    Genitourinary:  Negative for decreased urine volume, difficulty urinating, flank

## 2023-12-13 LAB
ALBUMIN SERPL-MCNC: 2.9 G/DL (ref 3.4–5)
ANION GAP SERPL CALCULATED.3IONS-SCNC: 7 MMOL/L (ref 3–16)
BASOPHILS # BLD: 0 K/UL (ref 0–0.2)
BASOPHILS NFR BLD: 0.1 %
BUN SERPL-MCNC: 79 MG/DL (ref 7–20)
CALCIUM SERPL-MCNC: 8.6 MG/DL (ref 8.3–10.6)
CHLORIDE SERPL-SCNC: 109 MMOL/L (ref 99–110)
CO2 SERPL-SCNC: 25 MMOL/L (ref 21–32)
CREAT SERPL-MCNC: 3.1 MG/DL (ref 0.8–1.3)
DEPRECATED RDW RBC AUTO: 14.6 % (ref 12.4–15.4)
EOSINOPHIL # BLD: 0 K/UL (ref 0–0.6)
EOSINOPHIL NFR BLD: 0.3 %
GFR SERPLBLD CREATININE-BSD FMLA CKD-EPI: 20 ML/MIN/{1.73_M2}
GLUCOSE BLD-MCNC: 164 MG/DL (ref 70–99)
GLUCOSE BLD-MCNC: 214 MG/DL (ref 70–99)
GLUCOSE BLD-MCNC: 284 MG/DL (ref 70–99)
GLUCOSE BLD-MCNC: 93 MG/DL (ref 70–99)
GLUCOSE SERPL-MCNC: 125 MG/DL (ref 70–99)
HCT VFR BLD AUTO: 24.4 % (ref 40.5–52.5)
HGB BLD-MCNC: 8.1 G/DL (ref 13.5–17.5)
LYMPHOCYTES # BLD: 0.7 K/UL (ref 1–5.1)
LYMPHOCYTES NFR BLD: 6.1 %
MCH RBC QN AUTO: 30.5 PG (ref 26–34)
MCHC RBC AUTO-ENTMCNC: 33.1 G/DL (ref 31–36)
MCV RBC AUTO: 92.1 FL (ref 80–100)
MONOCYTES # BLD: 0.6 K/UL (ref 0–1.3)
MONOCYTES NFR BLD: 5.8 %
NEUTROPHILS # BLD: 9.6 K/UL (ref 1.7–7.7)
NEUTROPHILS NFR BLD: 87.7 %
PERFORMED ON: ABNORMAL
PERFORMED ON: NORMAL
PHOSPHATE SERPL-MCNC: 2.8 MG/DL (ref 2.5–4.9)
PLATELET # BLD AUTO: 158 K/UL (ref 135–450)
PMV BLD AUTO: 8 FL (ref 5–10.5)
POTASSIUM SERPL-SCNC: 3.7 MMOL/L (ref 3.5–5.1)
RBC # BLD AUTO: 2.65 M/UL (ref 4.2–5.9)
SODIUM SERPL-SCNC: 141 MMOL/L (ref 136–145)
WBC # BLD AUTO: 10.9 K/UL (ref 4–11)

## 2023-12-13 PROCEDURE — 97130 THER IVNTJ EA ADDL 15 MIN: CPT

## 2023-12-13 PROCEDURE — 6370000000 HC RX 637 (ALT 250 FOR IP): Performed by: INTERNAL MEDICINE

## 2023-12-13 PROCEDURE — 99232 SBSQ HOSP IP/OBS MODERATE 35: CPT | Performed by: INTERNAL MEDICINE

## 2023-12-13 PROCEDURE — 92507 TX SP LANG VOICE COMM INDIV: CPT

## 2023-12-13 PROCEDURE — 97110 THERAPEUTIC EXERCISES: CPT

## 2023-12-13 PROCEDURE — 85025 COMPLETE CBC W/AUTO DIFF WBC: CPT

## 2023-12-13 PROCEDURE — 80069 RENAL FUNCTION PANEL: CPT

## 2023-12-13 PROCEDURE — 97112 NEUROMUSCULAR REEDUCATION: CPT

## 2023-12-13 PROCEDURE — 97129 THER IVNTJ 1ST 15 MIN: CPT

## 2023-12-13 PROCEDURE — 1280000000 HC REHAB R&B

## 2023-12-13 PROCEDURE — 97535 SELF CARE MNGMENT TRAINING: CPT

## 2023-12-13 PROCEDURE — 6370000000 HC RX 637 (ALT 250 FOR IP): Performed by: PHYSICAL MEDICINE & REHABILITATION

## 2023-12-13 PROCEDURE — 97530 THERAPEUTIC ACTIVITIES: CPT

## 2023-12-13 PROCEDURE — 97116 GAIT TRAINING THERAPY: CPT

## 2023-12-13 RX ORDER — PREDNISONE 20 MG/1
80 TABLET ORAL DAILY
Status: DISCONTINUED | OUTPATIENT
Start: 2023-12-14 | End: 2023-12-18

## 2023-12-13 RX ADMIN — PREDNISONE 100 MG: 20 TABLET ORAL at 08:54

## 2023-12-13 RX ADMIN — PANTOPRAZOLE SODIUM 40 MG: 40 TABLET, DELAYED RELEASE ORAL at 05:51

## 2023-12-13 RX ADMIN — Medication 1 TABLET: at 08:54

## 2023-12-13 RX ADMIN — INSULIN LISPRO 4 UNITS: 100 INJECTION, SOLUTION INTRAVENOUS; SUBCUTANEOUS at 17:39

## 2023-12-13 RX ADMIN — METOPROLOL SUCCINATE 100 MG: 50 TABLET, EXTENDED RELEASE ORAL at 08:54

## 2023-12-13 RX ADMIN — APIXABAN 5 MG: 5 TABLET, FILM COATED ORAL at 08:54

## 2023-12-13 RX ADMIN — ATORVASTATIN CALCIUM 40 MG: 40 TABLET, FILM COATED ORAL at 22:35

## 2023-12-13 RX ADMIN — APIXABAN 5 MG: 5 TABLET, FILM COATED ORAL at 22:35

## 2023-12-13 RX ADMIN — DOXAZOSIN 8 MG: 4 TABLET ORAL at 22:34

## 2023-12-13 RX ADMIN — ATOVAQUONE 1500 MG: 750 SUSPENSION ORAL at 08:54

## 2023-12-13 RX ADMIN — Medication 1 TABLET: at 17:39

## 2023-12-13 RX ADMIN — ASPIRIN 81 MG 81 MG: 81 TABLET ORAL at 08:54

## 2023-12-13 RX ADMIN — QUETIAPINE FUMARATE 25 MG: 25 TABLET ORAL at 22:35

## 2023-12-13 RX ADMIN — AMLODIPINE BESYLATE 5 MG: 5 TABLET ORAL at 08:54

## 2023-12-13 RX ADMIN — INSULIN GLARGINE 30 UNITS: 100 INJECTION, SOLUTION SUBCUTANEOUS at 22:36

## 2023-12-13 ASSESSMENT — ENCOUNTER SYMPTOMS
NAUSEA: 0
ABDOMINAL DISTENTION: 0
FACIAL SWELLING: 0
EYE REDNESS: 0
BLOOD IN STOOL: 0
WHEEZING: 0
CONSTIPATION: 0
COUGH: 0
PHOTOPHOBIA: 0
ABDOMINAL PAIN: 0
SHORTNESS OF BREATH: 0
EYE DISCHARGE: 0
CHEST TIGHTNESS: 0
RHINORRHEA: 0
SINUS PRESSURE: 0
DIARRHEA: 0
VOMITING: 0
SORE THROAT: 0
BACK PAIN: 0
SINUS PAIN: 0

## 2023-12-13 ASSESSMENT — PAIN SCALES - GENERAL
PAINLEVEL_OUTOF10: 0
PAINLEVEL_OUTOF10: 3
PAINLEVEL_OUTOF10: 0
PAINLEVEL_OUTOF10: 0

## 2023-12-13 ASSESSMENT — PAIN DESCRIPTION - LOCATION: LOCATION: KNEE

## 2023-12-13 ASSESSMENT — PAIN DESCRIPTION - ONSET: ONSET: ON-GOING

## 2023-12-13 ASSESSMENT — PAIN DESCRIPTION - ORIENTATION: ORIENTATION: RIGHT;LEFT

## 2023-12-13 ASSESSMENT — PAIN DESCRIPTION - DESCRIPTORS: DESCRIPTORS: DISCOMFORT

## 2023-12-13 ASSESSMENT — PAIN DESCRIPTION - PAIN TYPE: TYPE: CHRONIC PAIN

## 2023-12-13 ASSESSMENT — PAIN DESCRIPTION - FREQUENCY: FREQUENCY: CONTINUOUS

## 2023-12-13 NOTE — PROGRESS NOTES
[address] [facility] Department   Phone: [phone]    Physical Therapy    [] Initial Evaluation            [x] Daily Treatment Note         [] Discharge Summary      Patient: Carmenza Jones   : 1950   MRN: [id number]   Date of Service:  2023  Admitting Diagnosis: Acute ischemic stroke [facility])    Current Admission Summary: Carmenza Jones is a 75 y.o. male who presents to the emergency department due to feeling weak and fatigued over the last 10 days. He has been going through chemotherapy due to prostate cancer. States that he only has 1 kidney currently as he had cancer spread to his kidney as well. He states that his primary care doctor told him to come to the emergency room due to his numbers being low he is uncertain if it is just his hemoglobin or his kidney function exactly he believes that he was told it was his kidney function but he thinks it may be more. Patient denies any fevers or chills. Denies any cough or shortness of breath. He states that he just finished a round of chemotherapy yesterday. Patient's MRI scan of brain was done and revealed infarct in the right temporal lobe. Previous MRI scan done 2 months ago revealed acute right cerebellar infarction. Past Medical History:  has a past medical history of Atrial fibrillation ([address]), CAD (coronary artery disease), Cancer of kidney ([address]), Diabetes mellitus ([address]), Hyperlipidemia, Hypertension, Prostate cancer ([address]), and Right renal mass. Past Surgical History:  has a past surgical history that includes hernia repair; knee surgery; Cholecystectomy; Nasal septum surgery; eye surgery (Left, 2018); Kidney removal (Right, 2022); and Cataract removal (Bilateral).   Discharge Recommendations: Home with Home Health PT   DME Required For Discharge: rolling walker pending patient progress   Precautions/Restrictions: high fall risk  Weight Bearing Restrictions: no restrictions    Required

## 2023-12-13 NOTE — PATIENT CARE CONFERENCE
[facility]  Inpatient Rehabilitation  Weekly Team Conference Note    Patient Name: Esmer Ricardo        MRN: [id number]    : 1950  (75 y.o.)  Gender: male           The team conference for this patient was held on 23 at 80 am and led by:  Lashawn Pepper. DO Stephanie     CASE MANAGEMENT:  Assessment:   Patient is a 75year old male who admitted to ARU on 12/10/2023 with the admitting diagnosis of Acute ischemic stroke ([facility]) . Patient resides at home with his spouse and patient reports that he has children that provide support and care as needed. Patient reports that he was independent with ADLs and IADLs prior to admission and had not need for community support programs or DME. .Patient is retired and is an active . Patient would benefit from skilled PT/OT/SLP to promote increased safety and independence in order to return to his prior level of functioning. Patient anticipates discharging home with home health care services and with recommended DME. PHYSICAL THERAPY:    Bed Mobility:    Supine to Sit: stand by assistance  Sit to Supine: stand by assistance  Rolling Left: stand by assistance  Rolling Right: stand by assistance  Scooting: stand by assistance  Transfers:  Sit to stand transfer: contact guard assistance  Stand to sit transfer: contact guard assistance  Stand step transfer: minimal assistance  Comments: All transfers completed without device. Requires occasional min (A) during surface to surface transfers secondary to multidirectional LOB. VC to avoid reaching for nearby surfaces. Ambulation:  Surface:level surface  Assistive Device: no device  Assistance: minimal assistance, moderate assistance (CGA/min (A) for straight path, periods of mod (A) when turning)  Distance: 200' + 100' x 3 completions + short distances throughout duration of therapy session.    Gait Mechanics: forward flexed posturing, wide ALFREDO, (B) knee flexion with valgus, mild path

## 2023-12-13 NOTE — PLAN OF CARE
Problem: Discharge Planning  Goal: Discharge to home or other facility with appropriate resources  12/12/2023 2117 by Susan Hernandez RN  Outcome: Progressing  Flowsheets (Taken 12/12/2023 2037)  Discharge to home or other facility with appropriate resources:   Identify barriers to discharge with patient and caregiver   Identify discharge learning needs (meds, wound care, etc)  12/12/2023 1108 by Susan Hernandez RN  Outcome: Progressing  12/12/2023 0807 by Amparo Gr RN  Outcome: Progressing  Flowsheets (Taken 12/12/2023 0802 by Susan Hernandez RN)  Discharge to home or other facility with appropriate resources:   Identify barriers to discharge with patient and caregiver   Identify discharge learning needs (meds, wound care, etc)     Problem: Safety - Adult  Goal: Free from fall injury  12/12/2023 2117 by Susan Hernandez RN  Outcome: Progressing  12/12/2023 1108 by Susan Hernandez RN  Outcome: Progressing  12/12/2023 0807 by Amparo Gr RN  Outcome: Progressing     Problem: Pain  Goal: Verbalizes/displays adequate comfort level or baseline comfort level  12/12/2023 2117 by Ssuan Hernandez RN  Outcome: Progressing  Flowsheets (Taken 12/12/2023 2026)  Verbalizes/displays adequate comfort level or baseline comfort level:   Encourage patient to monitor pain and request assistance   Assess pain using appropriate pain scale  12/12/2023 1108 by Susan Hernandez RN  Outcome: Progressing  Flowsheets (Taken 12/12/2023 0745)  Verbalizes/displays adequate comfort level or baseline comfort level:   Encourage patient to monitor pain and request assistance   Assess pain using appropriate pain scale

## 2023-12-13 NOTE — PLAN OF CARE
Problem: Discharge Planning  Goal: Discharge to home or other facility with appropriate resources  Outcome: Progressing  Flowsheets (Taken 12/13/2023 1302)  Discharge to home or other facility with appropriate resources:   Identify barriers to discharge with patient and caregiver   Identify discharge learning needs (meds, wound care, etc)   Refer to discharge planning if patient needs post-hospital services based on physician order or complex needs related to functional status, cognitive ability or social support system   Arrange for needed discharge resources and transportation as appropriate     Problem: Safety - Adult  Goal: Free from fall injury  Outcome: Progressing  Flowsheets (Taken 12/13/2023 1302)  Free From Fall Injury: Instruct family/caregiver on patient safety     Problem: Pain  Goal: Verbalizes/displays adequate comfort level or baseline comfort level  Outcome: Progressing  Flowsheets (Taken 12/13/2023 1302)  Verbalizes/displays adequate comfort level or baseline comfort level:   Encourage patient to monitor pain and request assistance   Administer analgesics based on type and severity of pain and evaluate response   Consider cultural and social influences on pain and pain management   Assess pain using appropriate pain scale

## 2023-12-13 NOTE — PROGRESS NOTES
[facility] Renal ARU Note    Patient Active Problem List   Diagnosis    HLD (hyperlipidemia)    Essential hypertension    Coronary artery disease due to lipid rich plaque    Cardiac dysrhythmia    Diabetes mellitus    Paroxysmal atrial fibrillation (HCC)    Renal mass    Cerebrovascular accident (CVA) ([address])    Remote history of stroke    Benign essential HTN    PAF (paroxysmal atrial fibrillation) (HCC)    CASTRO (acute kidney injury) ([address])    LBBB (left bundle branch block)    H/O right nephrectomy    Acute anemia    Acute ischemic stroke (HCC)    Overweight (BMI 25.0-29. 9)    AIN (acute interstitial nephritis)    Sterile pyuria    Current chronic use of systemic steroids    H/O systemic steroid therapy    Diabetes education, encounter for       Past Medical History:   has a past medical history of Atrial fibrillation ([address]), CAD (coronary artery disease), Cancer of kidney ([address]), Diabetes mellitus ([address]), Hyperlipidemia, Hypertension, Prostate cancer ([address]), and Right renal mass. Past Social History:   reports that he quit smoking about 53 years ago. His smoking use included cigarettes. He has never used smokeless tobacco. He reports current alcohol use. He reports that he does not use drugs. Subjective:    Urinating well  No complaints    Review of Systems   Constitutional:  Positive for fatigue. Negative for activity change, appetite change, chills, fever and unexpected weight change. HENT:  Negative for congestion and facial swelling. Eyes:  Negative for photophobia, discharge and redness. Respiratory:  Negative for cough, chest tightness and shortness of breath. Cardiovascular:  Negative for chest pain, palpitations and leg swelling. Gastrointestinal:  Negative for abdominal distention, abdominal pain, blood in stool, constipation, diarrhea, nausea and vomiting. Endocrine: Negative for cold intolerance, heat intolerance and polyuria.    Genitourinary:  Negative for decreased urine Nephrectomy  Fatigue-now in Odin Ham MD

## 2023-12-13 NOTE — CARE COORDINATION
Social Work Admission Assessment    Objective:  Met with pt and his spouse to complete initial assessment and review role of  in rehab process. Pt oriented to unit. Pt states understanding of this. Current Home Situation:  Patient lives at home with his spouse. They reside in a tri-level home with 8 stairs up and 7 stairs down. Patient reports that he has adult children that provide care and support as needed. Patient's spouse is able to assist @ home as well. Pt's plans are:  Patient is retired and plans not to drive again until he takes a  re-evaluation course. Accessibility to community resources/transportation: Patient not active with any community resource programs. Patient reports that he was very active and independent prior to hospitalization. Has pt experienced a recent loss or signigicant life event that would impact their care or ability to participate? No    Has pt ever been treated for emotional disorders? No    How does pt and family cope with stressful events and this hospitalization? Patient reports, \" I am pretty laid back and I don't have a lot of stress. \"  \" I don't let things worry me\". Special Problem Areas: none     Discharge Plan:Home with Home Health Care    Impression/Plan:  Ignacio Dumas is a 75years old male that has been admitted to ARU. Provided patient with this SW's card to contact as needed. Will continue to follow for support and discharge planning.      Electronically signed by TALAT Mondragon on 12/13/2023 at 4:59 PM

## 2023-12-13 NOTE — PROGRESS NOTES
[address] [facility] Department   Phone: [phone]    Occupational Therapy    [] Initial Evaluation            [x] Daily Treatment Note         [] Discharge Summary      Patient: Atul Zamorano   : 1950   MRN: [id number]   Date of Service:  2023    Admitting Diagnosis:  Acute ischemic stroke [facility])  Current Admission Summary:   Atul Zamorano is a 75 y.o. male who presents to the emergency department due to feeling weak and fatigued over the last 10 days. He has been going through chemotherapy due to prostate cancer. States that he only has 1 kidney currently as he had cancer spread to his kidney as well. He states that his primary care doctor told him to come to the emergency room due to his numbers being low he is uncertain if it is just his hemoglobin or his kidney function exactly he believes that he was told it was his kidney function but he thinks it may be more. Patient denies any fevers or chills. Denies any cough or shortness of breath. He states that he just finished a round of chemotherapy yesterday. Patient's MRI scan of brain was done and revealed infarct in the right temporal lobe. Previous MRI scan done 2 months ago revealed acute right cerebellar infarction. Past Medical History:  has a past medical history of Atrial fibrillation ([address]), CAD (coronary artery disease), Cancer of kidney ([address]), Diabetes mellitus ([address]), Hyperlipidemia, Hypertension, Prostate cancer ([address]), and Right renal mass. Past Surgical History:  has a past surgical history that includes hernia repair; knee surgery; Cholecystectomy; Nasal septum surgery; eye surgery (Left, 2018); Kidney removal (Right, 2022); and Cataract removal (Bilateral).     Discharge Recommendations: Home with HHOT and supervision/assist from family pending progress     DME Required For Discharge: shower chair with back, rolling walker pending progress     Precautions/Restrictions: high fall modified independent   Patient will complete lower body ADL at modified independent   Patient will complete toileting at modified independent   Patient will complete functional transfers at modified independent   Patient to gather and transport IADL items at modified independent     Above goals reviewed on 12/13/2023. All goals are ongoing at this time unless indicated above.        Therapy Session Time     Individual Group Co-treatment   Time In 0730      Time Out 0830      Minutes 60           Timed Code Treatment Minutes: 60 minutes  Total Treatment Minutes:  60 minutes        Electronically Signed By: Jan Jeter, [address] [place] [address], SELENE OTR/ALEX, [id number] 12/13/2023 10:22 AM

## 2023-12-13 NOTE — PROGRESS NOTES
Infectious Diseases   Progress Note      Admission Date: 12/10/2023  Hospital Day: Hospital Day: 4   Attending: Sandy Dimas DO  Date of service: 12/13/2023     Chief complaint/ Reason for consult:       Need for pneumocystis prophylaxis  Need for long-term systemic steroid use  Sterile pyuria  Recently diagnosed with acute interstitial nephritis  Recent acute kidney injury    Microbiology:        I have reviewed allavailable micro lab data and cultures      Antibiotics and immunizations:       Current antibiotics: All antibiotics and their doses were reviewed by me    Recent Abx Admin                     atovaquone (MEPRON) suspension 1,500 mg (mg) 1,500 mg Given 12/13/23 2060                      Immunization History: All immunization history was reviewed by me today. There is no immunization history for the selected administration types on file for this patient. Known drug allergies: All allergies were reviewed and updated    Allergies   Allergen Reactions    Amoxicillin Other (See Comments)     Urethritis, rash    Bisoprolol-Hydrochlorothiazide Other (See Comments)     G. I. rash    Cisapride Other (See Comments)     diarrhea    Penicillins      Un aware of pcn allergy    Pioglitazone Other (See Comments)     G. I upset (long time ago)       Social history:     Social History:  All social andepidemiologic history was reviewed and updated by me today as needed. Tobacco use:   reports that he quit smoking about 53 years ago. His smoking use included cigarettes. He has never used smokeless tobacco.  Alcohol use:   reports current alcohol use. Currently lives in: [address] [place] [id number]   reports no history of drug use.      COVID VACCINATION AND LAB RESULT RECORDS:     Internal Administration   First Dose      Second Dose           Last COVID Lab SARS-CoV-2 RNA, RT PCR (no units)   Date Value   11/28/2023 NOT DETECTED     POC Anion Gap (no units)   Date Value   08/12/2015 10     POC BUN (mg/dL)   Date

## 2023-12-13 NOTE — PROGRESS NOTES
[address] [facility] Department   Phone: [phone]    Speech Therapy    [] Initial Evaluation            [x] Daily Treatment Note         [] Discharge Summary      Patient: Kamran Scales   : 1950   MRN: [id number]   Date of Service:  2023  Admitting Diagnosis: Acute ischemic stroke [facility])  Current Admission Summary: See MD's summary   Past Medical History:  has a past medical history of Atrial fibrillation ([address]), CAD (coronary artery disease), Cancer of kidney ([address]), Diabetes mellitus ([address]), Hyperlipidemia, Hypertension, Prostate cancer ([address]), and Right renal mass. Past Surgical History:  has a past surgical history that includes hernia repair; knee surgery; Cholecystectomy; Nasal septum surgery; eye surgery (Left, 2018); Kidney removal (Right, 2022); and Cataract removal (Bilateral). 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  Vision:    Vision Corrective Device: wears glasses at all times      Subjective  General: Pt pleasant and engaged, aware of extreme difficulty with basic cognitive tasks (I.e. using his call light).   Pt with perseverative mastication/ chewing pattern during sessions; reported he is chewing on his tongue   Pain: Denies    Safety Interventions: patient left in chair, chair alarm in place, call light within reach, and patient at risk for falls      Therapeutic Interventions:     Session 1:     Comprehension: Severity: Mild  and To be further assessed   Comprehension of yes/no questions (moderately complex/comparative)   - 87% accuracy function at discharge and to determine supports needed with higher level IADLs. Plan  Frequency: 60 minutes/day; 5 days per week, as tolerated, until goals met, or discharged from ARU. Therapeutic Interventions:  Cognitive-Linguistic intervention , Compensatory Cognitive intervention , and Patient/ Family education   Discharge Recommendations: Home with assistance  Continued SLP at Discharge: Yes     Goals  Patient Goals: \"To get healthy. \"   Time Frame: 7-14 days     Pt will complete word associative tasks to improve mental flexibility, complex thinking, and working memory skills with >85% accuracy. Ongoing   Pt will improve verbal expression for functional expression via graded tasks to > 80% acc, min cues. Ongoing   Pt with improve orientation and functional recall with use of cognitive aids as needed to >80% acc. Ongoing   Pt will complete functional problem solving tasks with >80% acc given min cues. Ongoing   Pt will complete graded auditory/ reading comprehension tasks with > 80% accuracy. Ongoing       Therapy Session Time      Session 1 Session 2   Time In 0900 1250   Time Out 0930 1325   Time Code Minutes 15 20   Individual Minutes 30 35     Timed Code Treatment Minutes: 35  Total Treatment Minutes:  72    Electronically Signed By:   Joel Dunbar M.A.  CCC-SLP S.PPete [id number]  Speech-Language Pathologist   12/13/2023 9:57 AM

## 2023-12-14 LAB
ALBUMIN SERPL-MCNC: 2.8 G/DL (ref 3.4–5)
ANION GAP SERPL CALCULATED.3IONS-SCNC: 9 MMOL/L (ref 3–16)
BASOPHILS # BLD: 0 K/UL (ref 0–0.2)
BASOPHILS NFR BLD: 0.1 %
BUN SERPL-MCNC: 78 MG/DL (ref 7–20)
CALCIUM SERPL-MCNC: 8.6 MG/DL (ref 8.3–10.6)
CHLORIDE SERPL-SCNC: 109 MMOL/L (ref 99–110)
CO2 SERPL-SCNC: 25 MMOL/L (ref 21–32)
CREAT SERPL-MCNC: 3.3 MG/DL (ref 0.8–1.3)
DEPRECATED RDW RBC AUTO: 14.4 % (ref 12.4–15.4)
EOSINOPHIL # BLD: 0 K/UL (ref 0–0.6)
EOSINOPHIL NFR BLD: 0.1 %
GFR SERPLBLD CREATININE-BSD FMLA CKD-EPI: 19 ML/MIN/{1.73_M2}
GLUCOSE BLD-MCNC: 109 MG/DL (ref 70–99)
GLUCOSE BLD-MCNC: 172 MG/DL (ref 70–99)
GLUCOSE BLD-MCNC: 220 MG/DL (ref 70–99)
GLUCOSE BLD-MCNC: 228 MG/DL (ref 70–99)
GLUCOSE SERPL-MCNC: 158 MG/DL (ref 70–99)
HCT VFR BLD AUTO: 22.3 % (ref 40.5–52.5)
HGB BLD-MCNC: 7.5 G/DL (ref 13.5–17.5)
LYMPHOCYTES # BLD: 0.5 K/UL (ref 1–5.1)
LYMPHOCYTES NFR BLD: 4.6 %
MCH RBC QN AUTO: 30.6 PG (ref 26–34)
MCHC RBC AUTO-ENTMCNC: 33.6 G/DL (ref 31–36)
MCV RBC AUTO: 90.9 FL (ref 80–100)
MONOCYTES # BLD: 0.5 K/UL (ref 0–1.3)
MONOCYTES NFR BLD: 4.4 %
NEUTROPHILS # BLD: 9.5 K/UL (ref 1.7–7.7)
NEUTROPHILS NFR BLD: 90.8 %
PERFORMED ON: ABNORMAL
PHOSPHATE SERPL-MCNC: 2.5 MG/DL (ref 2.5–4.9)
PLATELET # BLD AUTO: 140 K/UL (ref 135–450)
PMV BLD AUTO: 8.1 FL (ref 5–10.5)
POTASSIUM SERPL-SCNC: 4.1 MMOL/L (ref 3.5–5.1)
RBC # BLD AUTO: 2.45 M/UL (ref 4.2–5.9)
SODIUM SERPL-SCNC: 143 MMOL/L (ref 136–145)
WBC # BLD AUTO: 10.5 K/UL (ref 4–11)

## 2023-12-14 PROCEDURE — 97116 GAIT TRAINING THERAPY: CPT

## 2023-12-14 PROCEDURE — 6370000000 HC RX 637 (ALT 250 FOR IP): Performed by: PHYSICAL MEDICINE & REHABILITATION

## 2023-12-14 PROCEDURE — 97129 THER IVNTJ 1ST 15 MIN: CPT

## 2023-12-14 PROCEDURE — 92507 TX SP LANG VOICE COMM INDIV: CPT

## 2023-12-14 PROCEDURE — 80069 RENAL FUNCTION PANEL: CPT

## 2023-12-14 PROCEDURE — 1280000000 HC REHAB R&B

## 2023-12-14 PROCEDURE — 97530 THERAPEUTIC ACTIVITIES: CPT

## 2023-12-14 PROCEDURE — 6370000000 HC RX 637 (ALT 250 FOR IP): Performed by: INTERNAL MEDICINE

## 2023-12-14 PROCEDURE — 6360000002 HC RX W HCPCS: Performed by: INTERNAL MEDICINE

## 2023-12-14 PROCEDURE — 97112 NEUROMUSCULAR REEDUCATION: CPT

## 2023-12-14 PROCEDURE — 85025 COMPLETE CBC W/AUTO DIFF WBC: CPT

## 2023-12-14 PROCEDURE — 97130 THER IVNTJ EA ADDL 15 MIN: CPT

## 2023-12-14 RX ADMIN — ASPIRIN 81 MG 81 MG: 81 TABLET ORAL at 08:54

## 2023-12-14 RX ADMIN — AMLODIPINE BESYLATE 5 MG: 5 TABLET ORAL at 08:55

## 2023-12-14 RX ADMIN — PREDNISONE 80 MG: 20 TABLET ORAL at 08:53

## 2023-12-14 RX ADMIN — INSULIN GLARGINE 30 UNITS: 100 INJECTION, SOLUTION SUBCUTANEOUS at 20:27

## 2023-12-14 RX ADMIN — METOPROLOL SUCCINATE 100 MG: 50 TABLET, EXTENDED RELEASE ORAL at 08:54

## 2023-12-14 RX ADMIN — DOXAZOSIN 8 MG: 4 TABLET ORAL at 20:29

## 2023-12-14 RX ADMIN — APIXABAN 5 MG: 5 TABLET, FILM COATED ORAL at 08:54

## 2023-12-14 RX ADMIN — Medication 1 TABLET: at 17:14

## 2023-12-14 RX ADMIN — QUETIAPINE FUMARATE 25 MG: 25 TABLET ORAL at 20:28

## 2023-12-14 RX ADMIN — ATORVASTATIN CALCIUM 40 MG: 40 TABLET, FILM COATED ORAL at 20:28

## 2023-12-14 RX ADMIN — APIXABAN 5 MG: 5 TABLET, FILM COATED ORAL at 20:28

## 2023-12-14 RX ADMIN — INSULIN LISPRO 4 UNITS: 100 INJECTION, SOLUTION INTRAVENOUS; SUBCUTANEOUS at 17:13

## 2023-12-14 RX ADMIN — Medication 1 TABLET: at 08:53

## 2023-12-14 RX ADMIN — PANTOPRAZOLE SODIUM 40 MG: 40 TABLET, DELAYED RELEASE ORAL at 06:07

## 2023-12-14 RX ADMIN — EPOETIN ALFA-EPBX 7000 UNITS: 10000 INJECTION, SOLUTION INTRAVENOUS; SUBCUTANEOUS at 15:05

## 2023-12-14 RX ADMIN — ATOVAQUONE 1500 MG: 750 SUSPENSION ORAL at 08:55

## 2023-12-14 ASSESSMENT — PAIN SCALES - GENERAL
PAINLEVEL_OUTOF10: 0
PAINLEVEL_OUTOF10: 0

## 2023-12-14 NOTE — PLAN OF CARE
Problem: Discharge Planning  Goal: Discharge to home or other facility with appropriate resources  Recent Flowsheet Documentation  Taken 12/13/2023 2233 by Forest Hayes RN  Discharge to home or other facility with appropriate resources: Identify discharge learning needs (meds, wound care, etc)  12/13/2023 1302 by Sonu Cotton RN  Outcome: Progressing  Flowsheets (Taken 12/13/2023 1302)  Discharge to home or other facility with appropriate resources:   Identify barriers to discharge with patient and caregiver   Identify discharge learning needs (meds, wound care, etc)   Refer to discharge planning if patient needs post-hospital services based on physician order or complex needs related to functional status, cognitive ability or social support system   Arrange for needed discharge resources and transportation as appropriate     Problem: Safety - Adult  Goal: Free from fall injury  12/14/2023 0208 by Forest Hayes RN  Outcome: Progressing  12/13/2023 1302 by Sonu Cotton RN  Outcome: Progressing  Flowsheets (Taken 12/13/2023 1302)  Free From Fall Injury: Instruct family/caregiver on patient safety     Problem: Pain  Goal: Verbalizes/displays adequate comfort level or baseline comfort level  12/14/2023 0208 by Forest Hayes RN  Outcome: Progressing  Flowsheets (Taken 12/13/2023 2233)  Verbalizes/displays adequate comfort level or baseline comfort level: Encourage patient to monitor pain and request assistance  12/13/2023 1302 by Darwin Viveros RN  Outcome: Progressing  Flowsheets (Taken 12/13/2023 1302)  Verbalizes/displays adequate comfort level or baseline comfort level:   Encourage patient to monitor pain and request assistance   Administer analgesics based on type and severity of pain and evaluate response   Consider cultural and social influences on pain and pain management   Assess pain using appropriate pain scale     Problem: ABCDS Injury Assessment  Goal: Absence of physical injury  Outcome: Progressing

## 2023-12-14 NOTE — PLAN OF CARE
Problem: Discharge Planning  Goal: Discharge to home or other facility with appropriate resources  Outcome: Progressing  Flowsheets  Taken 12/14/2023 0902 by Lizbeth Hernandez RN  Discharge to home or other facility with appropriate resources:   Identify barriers to discharge with patient and caregiver   Identify discharge learning needs (meds, wound care, etc)  Taken 12/13/2023 2233 by Familia Hutchins RN  Discharge to home or other facility with appropriate resources: Identify discharge learning needs (meds, wound care, etc)     Problem: Safety - Adult  Goal: Free from fall injury  12/14/2023 1034 by Lizbeth Hernandez RN  Outcome: Progressing  12/14/2023 0208 by Familia Hutchins RN  Outcome: Progressing     Problem: Pain  Goal: Verbalizes/displays adequate comfort level or baseline comfort level  12/14/2023 1034 by Lizbeth Hernandez RN  Outcome: Progressing  Flowsheets (Taken 12/14/2023 8839)  Verbalizes/displays adequate comfort level or baseline comfort level:   Encourage patient to monitor pain and request assistance   Assess pain using appropriate pain scale  12/14/2023 0208 by Familia Hutchins RN  Outcome: Progressing  Flowsheets (Taken 12/13/2023 2233)  Verbalizes/displays adequate comfort level or baseline comfort level: Encourage patient to monitor pain and request assistance     Problem: ABCDS Injury Assessment  Goal: Absence of physical injury  12/14/2023 1034 by Lizbeth Hernandez RN  Outcome: Progressing  12/14/2023 0208 by Familia Hutchins RN  Outcome: Progressing

## 2023-12-14 NOTE — PROGRESS NOTES
Assessment completed. Patient up in chair, alert and oriented x 4 and able to make all his needs known. Denied any pain. Spouse at side. Skin tear to top of right foot with new dressing applied. No drainage noted. Medications administered as ordered. POC and education reviewed with patient and spouse and mutually agreed upon. Safety interventions in place. Video monitoring ongoing for impulsivity. All needs met at this time. Call light in reach. Will continue to monitor. 1055: Nephrology MD in to see patient and updated hi re patient's H&H. NON noted at this time but he will f/u with oncology. Will continue to monitor.

## 2023-12-14 NOTE — PROGRESS NOTES
[address] [facility] Department   Phone: [phone]    Physical Therapy    [] Initial Evaluation            [x] Daily Treatment Note         [] Discharge Summary      Patient: Desiree Gage   : 1950   MRN: [id number]   Date of Service:  2023  Admitting Diagnosis: Acute ischemic stroke [facility])    Current Admission Summary: Desiree Gage is a 75 y.o. male who presents to the emergency department due to feeling weak and fatigued over the last 10 days. He has been going through chemotherapy due to prostate cancer. States that he only has 1 kidney currently as he had cancer spread to his kidney as well. He states that his primary care doctor told him to come to the emergency room due to his numbers being low he is uncertain if it is just his hemoglobin or his kidney function exactly he believes that he was told it was his kidney function but he thinks it may be more. Patient denies any fevers or chills. Denies any cough or shortness of breath. He states that he just finished a round of chemotherapy yesterday. Patient's MRI scan of brain was done and revealed infarct in the right temporal lobe. Previous MRI scan done 2 months ago revealed acute right cerebellar infarction. Past Medical History:  has a past medical history of Atrial fibrillation ([address]), CAD (coronary artery disease), Cancer of kidney ([address]), Diabetes mellitus ([address]), Hyperlipidemia, Hypertension, Prostate cancer ([address]), and Right renal mass. Past Surgical History:  has a past surgical history that includes hernia repair; knee surgery; Cholecystectomy; Nasal septum surgery; eye surgery (Left, 2018); Kidney removal (Right, 2022); and Cataract removal (Bilateral).   Discharge Recommendations: Home with Home Health PT   DME Required For Discharge: rolling walker pending patient progress   Precautions/Restrictions: high fall risk  Weight Bearing Restrictions: no restrictions    Required services to further progress independence with mobility and aide in return to function. Safety Interventions: patient left in chair, chair alarm in place, call light within reach, and nurse notified    Plan  Frequency: 5 x/week, 60 min/day  Current Treatment Recommendations: strengthening, balance training, functional mobility training, transfer training, gait training, stair training, endurance training, patient/caregiver education, home exercise program, safety education, and equipment evaluation/education    Goals  Patient Goals: to go home   Short Term Goals:  Time Frame: to be met in 7-10 days  Patient will complete bed mobility at modified independent   Patient will complete stand step transfers at [facility]   Patient will ambulate 200 ft with use of LRAD at [facility]  Patient will ascend/descend 8 stairs with (R) ascending handrail at modified independent  Patient will complete car transfer at [facility]  Patient will improve Tinetti Balance Test score = to or > than 18 indicating a decreased risk of falling    Above goals reviewed on 12/14/2023. All goals are ongoing at this time unless indicated above.       Therapy Session Time     Session Therapy Time:   Individual Concurrent Group Co-treatment   Time In 1335         Time Out 1443         Minutes 68           Timed Code Treatment Minutes:  68 minutes    Total Treatment Minutes:  68 minutes       Electronically Signed By: Julia Dupree, [address], DPT [id number]

## 2023-12-14 NOTE — CARE COORDINATION
Team conference held today. Spoke with patient and his family  to discuss progress with therapy, barriers to discharge, and plans to return home. Estimated discharge date set for 12/21/2023. Patient anticipates discharging to home with home health care services and needed supports. MAIA/ANISHA provided patient a [address] List. MAIA/ANISHA will continue to follow for support and discharge planning.     Electronically signed by TALAT Corbin on 12/14/2023 at 4:25 PM

## 2023-12-14 NOTE — PROGRESS NOTES
[address] [facility] Department   Phone: [phone]    Speech Therapy    [] Initial Evaluation            [x] Daily Treatment Note         [] Discharge Summary      Patient: Trini Walker   : 1950   MRN: [id number]   Date of Service:  2023  Admitting Diagnosis: Acute ischemic stroke [facility])  Current Admission Summary: See MD's summary   Past Medical History:  has a past medical history of Atrial fibrillation ([address]), CAD (coronary artery disease), Cancer of kidney ([address]), Diabetes mellitus ([address]), Hyperlipidemia, Hypertension, Prostate cancer ([address]), and Right renal mass. Past Surgical History:  has a past surgical history that includes hernia repair; knee surgery; Cholecystectomy; Nasal septum surgery; eye surgery (Left, 2018); Kidney removal (Right, 2022); and Cataract removal (Bilateral). 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  Pt with perseverative mastication/ chewing pattern during sessions; reported he is chewing on his tongue   Pain: Denies    Safety Interventions: patient left in chair, chair alarm in place, call light within reach, and patient at risk for falls      Therapeutic Interventions:     Session 1:     Comprehension: Severity: Mild  and To be further assessed   - See below re: subtest from WAB   - reduced comprehension to stating year with perseveration back to date    Expressive Language: Severity: Mild , Moderate , and To be further assessed     Cognition: Severity: Moderate    Orientation  - pt oriented to place/ facility name, current month and date when asked year pt initially stated 2014 then read from digital clock 12/14, with cue to read cognitive aid pt able to correct, able to state current time with use of digital clock  - pt continues to report periods of disorientation especially right when he wakes up, wife stating it was because it was dark and he was having a hard time reading the clock     Functional Recall/ Personal Hx  - able to state where daughter went to college, sport she played and what position   - grossly approximated years she graduated from college   - pt stated high school he and his wife graduated from vs daughter required cues from wife and extended time to correct     Additional Interventions:  Provided education to pt and pt's wife re: language impairments from initial stroke further exacerbating cognitive impairments and causing increased difficulty for pt to complete basic cognitive tasks. Both wife and pt receptive to information. Wife reports she took over pt's meds and finances following initial stroke, however, pt was still driving and drove himself to the hospital for this admission.     Completed The Western Aphasia Battery-Revised (WAB-R) completed with pt:   Spontaneous Speech:  - Information Content- 10/10  - Fluency, Grammatical Competence, and paraphasias- 10/10  Auditory Verbal Comprehension:  - y/n questions 57/60  - Auditory Word Recognition 60/60  - Sequential Commands 88/80  - Repetition- 96/100  Naming and Word Finding:  - Object Naming 60/60  - Word Fluency 8/20  - Sentence Completion 10/10  - Responsive Speech 10/10     Aphasia Quotient (AQ)- 96.5  Mild =    Pt's aphasia classification: Anomic (suspect difficulty with higher level/ low frequency words)     Session 2:      Comprehension: Severity: Mild   Reading

## 2023-12-14 NOTE — PROGRESS NOTES
Assessment complete. Alert, oriented x4. Denies pain. Mepilex noted on superior portion of right foot. Superficial wound noted under Mepilex. Per patient and wife, origin of wound is unknown. Bruise present where border of Mepilex had been sitting against skin. Site cleansed with saline. Mepitel, gauze, and Tegaderm applied over site. Ambulated to bathroom using walker and standby assistance. Steady on feet, but required cues for safe use of walker. Voided. Continent. The care plan and education has been reviewed and mutually agreed upon with the patient. Wife at bedside, spending the night. In bed, alarm on, bed in lowest position, call light and table within reach. No further needs expressed at this time.

## 2023-12-14 NOTE — PROGRESS NOTES
[address] [facility] Department   Phone: [phone]    Occupational Therapy    [] Initial Evaluation            [x] Daily Treatment Note         [] Discharge Summary      Patient: Ravindra Wilburn   : 1950   MRN: [id number]   Date of Service:  2023    Admitting Diagnosis:  Acute ischemic stroke [facility])  Current Admission Summary:   Ravindra Wilburn is a 75 y.o. male who presents to the emergency department due to feeling weak and fatigued over the last 10 days. He has been going through chemotherapy due to prostate cancer. States that he only has 1 kidney currently as he had cancer spread to his kidney as well. He states that his primary care doctor told him to come to the emergency room due to his numbers being low he is uncertain if it is just his hemoglobin or his kidney function exactly he believes that he was told it was his kidney function but he thinks it may be more. Patient denies any fevers or chills. Denies any cough or shortness of breath. He states that he just finished a round of chemotherapy yesterday. Patient's MRI scan of brain was done and revealed infarct in the right temporal lobe. Previous MRI scan done 2 months ago revealed acute right cerebellar infarction. Past Medical History:  has a past medical history of Atrial fibrillation ([address]), CAD (coronary artery disease), Cancer of kidney ([address]), Diabetes mellitus ([address]), Hyperlipidemia, Hypertension, Prostate cancer ([address]), and Right renal mass. Past Surgical History:  has a past surgical history that includes hernia repair; knee surgery; Cholecystectomy; Nasal septum surgery; eye surgery (Left, 2018); Kidney removal (Right, 2022); and Cataract removal (Bilateral).     Discharge Recommendations: Home with HHOT and supervision/assist from family pending progress     DME Required For Discharge: shower chair with back, rolling walker pending progress     Precautions/Restrictions: high fall short term memory  Orientation:    oriented to person, oriented to place, oriented to situation, and disoriented to time   2026   Command Following:   impaired     Education  Barriers To Learning: cognition  Patient Education: patient educated on goals, OT role and benefits, plan of care, ADL adaptive strategies, proper use of assistive device/equipment, disease specific education, transfer training, discharge recommendations  Learning Assessment:  patient will require reinforcement due to cognitive deficits    Assessment  Activity Tolerance: well   Impairments Requiring Therapeutic Intervention: decreased functional mobility, decreased ADL status, decreased strength, decreased safety awareness, decreased cognition, decreased endurance, decreased balance, decreased IADL, decreased fine motor control, decreased coordination  Prognosis: good  Clinical Assessment: Pt currently CGA for amb w/ RW and David for amb w/o device - benefit from use of RW during cog challenging tasks such as IADLs at this time d/t significant LOB during cog taxing tasks. Significant cueing to sequence through mod-high complexity cog and IADL tasks but improved sequencing during task completion this date. Improved RW management and hand placement after initial cues at start of session. Patient primarily limited by decreased dynamic balance, coordination, safety awareness, and cognition. Patient will benefit from continued OT services to address above deficits and maximize safety and independence prior to returning home as well as decrease caregiver burden. Con't per POC.    Safety Interventions: patient left in chair, chair alarm in place, call light within reach, patient at risk for falls, and family/caregiver present    Plan  Frequency: 5 x/week, 60 min/day  Current Treatment Recommendations: strengthening, balance training, functional mobility training, transfer training, endurance training, neuromuscular re-education, patient/caregiver

## 2023-12-15 LAB
ALBUMIN SERPL-MCNC: 2.9 G/DL (ref 3.4–5)
ANION GAP SERPL CALCULATED.3IONS-SCNC: 7 MMOL/L (ref 3–16)
BASOPHILS # BLD: 0 K/UL (ref 0–0.2)
BASOPHILS NFR BLD: 0.2 %
BUN SERPL-MCNC: 79 MG/DL (ref 7–20)
CALCIUM SERPL-MCNC: 8.6 MG/DL (ref 8.3–10.6)
CHLORIDE SERPL-SCNC: 110 MMOL/L (ref 99–110)
CK SERPL-CCNC: 35 U/L (ref 39–308)
CO2 SERPL-SCNC: 26 MMOL/L (ref 21–32)
CREAT SERPL-MCNC: 3.5 MG/DL (ref 0.8–1.3)
DEPRECATED RDW RBC AUTO: 14.9 % (ref 12.4–15.4)
EOSINOPHIL # BLD: 0 K/UL (ref 0–0.6)
EOSINOPHIL NFR BLD: 0.1 %
GFR SERPLBLD CREATININE-BSD FMLA CKD-EPI: 17 ML/MIN/{1.73_M2}
GLUCOSE BLD-MCNC: 106 MG/DL (ref 70–99)
GLUCOSE BLD-MCNC: 190 MG/DL (ref 70–99)
GLUCOSE BLD-MCNC: 194 MG/DL (ref 70–99)
GLUCOSE BLD-MCNC: 336 MG/DL (ref 70–99)
GLUCOSE BLD-MCNC: 58 MG/DL (ref 70–99)
GLUCOSE BLD-MCNC: 75 MG/DL (ref 70–99)
GLUCOSE SERPL-MCNC: 103 MG/DL (ref 70–99)
HCT VFR BLD AUTO: 22.9 % (ref 40.5–52.5)
HGB BLD-MCNC: 7.5 G/DL (ref 13.5–17.5)
LYMPHOCYTES # BLD: 0.6 K/UL (ref 1–5.1)
LYMPHOCYTES NFR BLD: 5.8 %
MCH RBC QN AUTO: 29.9 PG (ref 26–34)
MCHC RBC AUTO-ENTMCNC: 32.8 G/DL (ref 31–36)
MCV RBC AUTO: 91.4 FL (ref 80–100)
MONOCYTES # BLD: 0.5 K/UL (ref 0–1.3)
MONOCYTES NFR BLD: 4.6 %
NEUTROPHILS # BLD: 9.9 K/UL (ref 1.7–7.7)
NEUTROPHILS NFR BLD: 89.3 %
PERFORMED ON: ABNORMAL
PERFORMED ON: NORMAL
PHOSPHATE SERPL-MCNC: 2.7 MG/DL (ref 2.5–4.9)
PLATELET # BLD AUTO: 135 K/UL (ref 135–450)
PMV BLD AUTO: 7.8 FL (ref 5–10.5)
POTASSIUM SERPL-SCNC: 3.9 MMOL/L (ref 3.5–5.1)
RBC # BLD AUTO: 2.5 M/UL (ref 4.2–5.9)
SODIUM SERPL-SCNC: 143 MMOL/L (ref 136–145)
WBC # BLD AUTO: 11 K/UL (ref 4–11)

## 2023-12-15 PROCEDURE — 6370000000 HC RX 637 (ALT 250 FOR IP): Performed by: PHYSICAL MEDICINE & REHABILITATION

## 2023-12-15 PROCEDURE — 80069 RENAL FUNCTION PANEL: CPT

## 2023-12-15 PROCEDURE — 6370000000 HC RX 637 (ALT 250 FOR IP): Performed by: INTERNAL MEDICINE

## 2023-12-15 PROCEDURE — 97110 THERAPEUTIC EXERCISES: CPT

## 2023-12-15 PROCEDURE — 36415 COLL VENOUS BLD VENIPUNCTURE: CPT

## 2023-12-15 PROCEDURE — 97535 SELF CARE MNGMENT TRAINING: CPT

## 2023-12-15 PROCEDURE — 97530 THERAPEUTIC ACTIVITIES: CPT

## 2023-12-15 PROCEDURE — 2580000003 HC RX 258: Performed by: INTERNAL MEDICINE

## 2023-12-15 PROCEDURE — 82550 ASSAY OF CK (CPK): CPT

## 2023-12-15 PROCEDURE — 97130 THER IVNTJ EA ADDL 15 MIN: CPT

## 2023-12-15 PROCEDURE — 97112 NEUROMUSCULAR REEDUCATION: CPT

## 2023-12-15 PROCEDURE — 97129 THER IVNTJ 1ST 15 MIN: CPT

## 2023-12-15 PROCEDURE — 1280000000 HC REHAB R&B

## 2023-12-15 PROCEDURE — 97116 GAIT TRAINING THERAPY: CPT

## 2023-12-15 PROCEDURE — 85025 COMPLETE CBC W/AUTO DIFF WBC: CPT

## 2023-12-15 RX ORDER — SODIUM CHLORIDE 450 MG/100ML
INJECTION, SOLUTION INTRAVENOUS CONTINUOUS
Status: DISCONTINUED | OUTPATIENT
Start: 2023-12-15 | End: 2023-12-16

## 2023-12-15 RX ADMIN — SODIUM CHLORIDE: 4.5 INJECTION, SOLUTION INTRAVENOUS at 11:24

## 2023-12-15 RX ADMIN — PREDNISONE 80 MG: 20 TABLET ORAL at 10:23

## 2023-12-15 RX ADMIN — DOXAZOSIN 8 MG: 4 TABLET ORAL at 21:34

## 2023-12-15 RX ADMIN — ASPIRIN 81 MG 81 MG: 81 TABLET ORAL at 10:23

## 2023-12-15 RX ADMIN — ATORVASTATIN CALCIUM 40 MG: 40 TABLET, FILM COATED ORAL at 21:31

## 2023-12-15 RX ADMIN — AMLODIPINE BESYLATE 5 MG: 5 TABLET ORAL at 10:22

## 2023-12-15 RX ADMIN — Medication 1 TABLET: at 10:23

## 2023-12-15 RX ADMIN — ATOVAQUONE 1500 MG: 750 SUSPENSION ORAL at 10:23

## 2023-12-15 RX ADMIN — SODIUM CHLORIDE: 4.5 INJECTION, SOLUTION INTRAVENOUS at 21:40

## 2023-12-15 RX ADMIN — Medication 1 TABLET: at 17:34

## 2023-12-15 RX ADMIN — APIXABAN 5 MG: 5 TABLET, FILM COATED ORAL at 21:32

## 2023-12-15 RX ADMIN — INSULIN GLARGINE 30 UNITS: 100 INJECTION, SOLUTION SUBCUTANEOUS at 21:34

## 2023-12-15 RX ADMIN — QUETIAPINE FUMARATE 25 MG: 25 TABLET ORAL at 21:32

## 2023-12-15 RX ADMIN — PANTOPRAZOLE SODIUM 40 MG: 40 TABLET, DELAYED RELEASE ORAL at 06:07

## 2023-12-15 RX ADMIN — TRAZODONE HYDROCHLORIDE 50 MG: 50 TABLET ORAL at 21:39

## 2023-12-15 RX ADMIN — STANDARDIZED SENNA CONCENTRATE AND DOCUSATE SODIUM 1 TABLET: 8.6; 5 TABLET ORAL at 10:22

## 2023-12-15 RX ADMIN — METOPROLOL SUCCINATE 100 MG: 50 TABLET, EXTENDED RELEASE ORAL at 10:22

## 2023-12-15 RX ADMIN — APIXABAN 5 MG: 5 TABLET, FILM COATED ORAL at 10:23

## 2023-12-15 RX ADMIN — INSULIN LISPRO 4 UNITS: 100 INJECTION, SOLUTION INTRAVENOUS; SUBCUTANEOUS at 21:35

## 2023-12-15 ASSESSMENT — PAIN SCALES - GENERAL: PAINLEVEL_OUTOF10: 0

## 2023-12-15 NOTE — PROGRESS NOTES
Assessment complete. Alert. Oriented to person, place, and situation. Unable to recall numerical birth date (i.e., \"1950\"), but able to state that he was born [birth year]. Guessed current date was 8/24/2014. Reoriented. Patient somewhat frustrated, stating that he thought he was doing good when someone had been asking him questions earlier in the day. Reviewed with patient cognitive deficits as a manifestation of his recent stroke. Patient encouraged to utilize resources, such as his cell phone or the digital clock on the wall, as tools for reorientation. Patient stated that he tries to avoid using such resources as he wants to be able to recall the information on is own. The care plan and education has been reviewed and mutually agreed upon with the patient. In bed, alarm on, bed in lowest position, call light and table within reach, camera at bedside. No further needs expressed at this time. 0130  MCOT event monitor and sensor fully charged. Sensor and new dressing applied to left chest. Ambulated to bathroom using walker and standby assistance. Required multiple cues to not abandon his walker during transfer. 0400  Episode of urge incontinence while ambulating to the bathroom resulted in pants becoming soiled. Cleaned and changed. Patient wanting to wear pants today to stay warm, but shorts are the only clean lower-body clothing items available at this time. Donned shorts. Pants to be laundered by this RN.

## 2023-12-15 NOTE — PROGRESS NOTES
[facility] Renal ARU Note    Patient Active Problem List   Diagnosis    HLD (hyperlipidemia)    Essential hypertension    Coronary artery disease due to lipid rich plaque    Cardiac dysrhythmia    Diabetes mellitus    Paroxysmal atrial fibrillation (HCC)    Renal mass    Cerebrovascular accident (CVA) ([address])    Remote history of stroke    Benign essential HTN    PAF (paroxysmal atrial fibrillation) (HCC)    CASTRO (acute kidney injury) ([address])    LBBB (left bundle branch block)    H/O right nephrectomy    Acute anemia    Acute ischemic stroke (HCC)    Overweight (BMI 25.0-29. 9)    AIN (acute interstitial nephritis)    Sterile pyuria    Current chronic use of systemic steroids    H/O systemic steroid therapy    Diabetes education, encounter for       Past Medical History:   has a past medical history of Atrial fibrillation ([address]), CAD (coronary artery disease), Cancer of kidney ([address]), Diabetes mellitus ([address]), Hyperlipidemia, Hypertension, Prostate cancer ([address]), and Right renal mass. Past Social History:   reports that he quit smoking about 53 years ago. His smoking use included cigarettes. He has never used smokeless tobacco. He reports current alcohol use. He reports that he does not use drugs. Subjective:    Urinating well  Some loose stools  No complaints    Review of Systems   Constitutional:  Positive for fatigue. Negative for activity change, appetite change, chills, fever and unexpected weight change. HENT:  Negative for congestion and facial swelling. Eyes:  Negative for photophobia, discharge and redness. Respiratory:  Negative for cough, chest tightness and shortness of breath. Cardiovascular:  Negative for chest pain, palpitations and leg swelling. Gastrointestinal:  Negative for abdominal distention, abdominal pain, blood in stool, constipation, diarrhea, nausea and vomiting. Endocrine: Negative for cold intolerance, heat intolerance and polyuria.    Genitourinary:  Negative Renal CA  H/O Prostate CA  H/O A. Fib-rate controlled  H/O DM 2  S/P Right Nephrectomy  Fatigue-now in Altagracia Bardales MD

## 2023-12-15 NOTE — PROGRESS NOTES
[address] [facility] Department   Phone: [phone]    Speech Therapy    [] Initial Evaluation            [x] Daily Treatment Note         [] Discharge Summary      Patient: Terry Culp   : 1950   MRN: [id number]   Date of Service:  12/15/2023  Admitting Diagnosis: Acute ischemic stroke [facility])  Current Admission Summary: See MD's summary   Past Medical History:  has a past medical history of Atrial fibrillation ([address]), CAD (coronary artery disease), Cancer of kidney ([address]), Diabetes mellitus ([address]), Hyperlipidemia, Hypertension, Prostate cancer ([address]), and Right renal mass. Past Surgical History:  has a past surgical history that includes hernia repair; knee surgery; Cholecystectomy; Nasal septum surgery; eye surgery (Left, 2018); Kidney removal (Right, 2022); and Cataract removal (Bilateral). 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    Pain: Denies    Safety Interventions: patient left in chair, chair alarm in place, call light within reach, and patient at risk for falls      Therapeutic Interventions:     Session 1:     Comprehension: Severity: Mild  and To be further assessed   Not directly targeted     Expressive Language: Severity: Mild , Moderate , and To be further assessed   Not directly targeted     Cognition: Severity: Moderate    Orientation  - pt oriented to current month, day of week, date and time with use of complete functional problem solving tasks with >80% acc given min cues. Ongoing   Pt will complete graded auditory/ reading comprehension tasks with > 80% accuracy. Ongoing       Therapy Session Time      Session 1 Session 2   Time In 0845    Time Out 0945    Time Code Minutes 60    Individual Minutes 60      Timed Code Treatment Minutes: 60  Total Treatment Minutes:  60    Electronically Signed By:   Severa Coe, M.A. CCC-SLP [facility] [id number]  Speech-Language Pathologist   12/15/2023 10:15 AM

## 2023-12-15 NOTE — PROGRESS NOTES
[address] [facility] Department   Phone: [phone]    Occupational Therapy    [] Initial Evaluation            [x] Daily Treatment Note         [] Discharge Summary      Patient: Vero Dent   : 1950   MRN: [id number]   Date of Service:  12/15/2023    Admitting Diagnosis:  Acute ischemic stroke [facility])  Current Admission Summary:   Vero Dent is a 75 y.o. male who presents to the emergency department due to feeling weak and fatigued over the last 10 days. He has been going through chemotherapy due to prostate cancer. States that he only has 1 kidney currently as he had cancer spread to his kidney as well. He states that his primary care doctor told him to come to the emergency room due to his numbers being low he is uncertain if it is just his hemoglobin or his kidney function exactly he believes that he was told it was his kidney function but he thinks it may be more. Patient denies any fevers or chills. Denies any cough or shortness of breath. He states that he just finished a round of chemotherapy yesterday. Patient's MRI scan of brain was done and revealed infarct in the right temporal lobe. Previous MRI scan done 2 months ago revealed acute right cerebellar infarction. Past Medical History:  has a past medical history of Atrial fibrillation ([address]), CAD (coronary artery disease), Cancer of kidney ([address]), Diabetes mellitus ([address]), Hyperlipidemia, Hypertension, Prostate cancer ([address]), and Right renal mass. Past Surgical History:  has a past surgical history that includes hernia repair; knee surgery; Cholecystectomy; Nasal septum surgery; eye surgery (Left, 2018); Kidney removal (Right, 2022); and Cataract removal (Bilateral).     Discharge Recommendations: Home with HHOT and supervision/assist from family pending progress     DME Required For Discharge: shower chair with back, rolling walker pending progress     Precautions/Restrictions: high fall brain fatigued and task progressed. Patient primarily limited by decreased dynamic balance, coordination, safety awareness, and cognition. Patient will benefit from continued OT services to address above deficits and maximize safety and independence prior to returning home as well as decrease caregiver burden. Con't per POC. Safety Interventions: patient left in chair, chair alarm in place, call light within reach, patient at risk for falls, and family/caregiver present    Plan  Frequency: 5 x/week, 60 min/day  Current Treatment Recommendations: strengthening, balance training, functional mobility training, transfer training, endurance training, neuromuscular re-education, patient/caregiver education, ADL/self-care training, IADL training, cognitive reorientation, and equipment evaluation/education    Goals  Patient Goals: get home and do the things I used to do    Short Term Goals:  Time Frame: 7-10 days   Patient will complete upper body ADL at modified independent   Patient will complete lower body ADL at modified independent   Patient will complete toileting at modified independent   Patient will complete functional transfers at modified independent   Patient to gather and transport IADL items at modified independent     Above goals reviewed on 12/15/2023. All goals are ongoing at this time unless indicated above.        Therapy Session Time     Individual Group Co-treatment   Time In 0730      Time Out 0830      Minutes 60           Timed Code Treatment Minutes: 60 minutes  Total Treatment Minutes:  60 minutes        Electronically Signed By: Augie Monday, [facility], SELENE SCHNEIDER/ALEX, [id number] 12/15/2023 11:23 AM

## 2023-12-15 NOTE — PLAN OF CARE
Problem: Safety - Adult  Goal: Free from fall injury  Outcome: Progressing     Problem: Pain  Goal: Verbalizes/displays adequate comfort level or baseline comfort level  Outcome: Progressing  Flowsheets (Taken 12/14/2023 2019)  Verbalizes/displays adequate comfort level or baseline comfort level: Encourage patient to monitor pain and request assistance     Problem: ABCDS Injury Assessment  Goal: Absence of physical injury  Outcome: Progressing  Flowsheets (Taken 12/15/2023 0103)  Absence of Physical Injury: Implement safety measures based on patient assessment

## 2023-12-15 NOTE — PLAN OF CARE
Problem: Discharge Planning  Goal: Discharge to home or other facility with appropriate resources  Outcome: Progressing     Problem: Safety - Adult  Goal: Free from fall injury  12/15/2023 0104 by Irlanda Giraldo RN  Outcome: Progressing     Problem: Pain  Goal: Verbalizes/displays adequate comfort level or baseline comfort level  12/15/2023 1234 by Antwon Miles RN  Outcome: Progressing

## 2023-12-15 NOTE — PROGRESS NOTES
Morning assessment completed. Pt comfortably resting in chair. VSS. Denies any pain at this moment. Morning meds given per MAR. PWWW. AXOX4. Call light and bedside table in reach. Chair locked and alarm on. The care plan and education has been reviewed and mutually agreed upon with the patient.

## 2023-12-15 NOTE — PROGRESS NOTES
[address] [facility] Department   Phone: [phone]    Physical Therapy    [] Initial Evaluation            [x] Daily Treatment Note         [] Discharge Summary      Patient: Trip Parekh   : 1950   MRN: [id number]   Date of Service:  12/15/2023  Admitting Diagnosis: Acute ischemic stroke [facility])    Current Admission Summary: Trip Parekh is a 75 y.o. male who presents to the emergency department due to feeling weak and fatigued over the last 10 days. He has been going through chemotherapy due to prostate cancer. States that he only has 1 kidney currently as he had cancer spread to his kidney as well. He states that his primary care doctor told him to come to the emergency room due to his numbers being low he is uncertain if it is just his hemoglobin or his kidney function exactly he believes that he was told it was his kidney function but he thinks it may be more. Patient denies any fevers or chills. Denies any cough or shortness of breath. He states that he just finished a round of chemotherapy yesterday. Patient's MRI scan of brain was done and revealed infarct in the right temporal lobe. Previous MRI scan done 2 months ago revealed acute right cerebellar infarction. Past Medical History:  has a past medical history of Atrial fibrillation ([address]), CAD (coronary artery disease), Cancer of kidney ([address]), Diabetes mellitus ([address]), Hyperlipidemia, Hypertension, Prostate cancer ([address]), and Right renal mass. Past Surgical History:  has a past surgical history that includes hernia repair; knee surgery; Cholecystectomy; Nasal septum surgery; eye surgery (Left, 2018); Kidney removal (Right, 2022); and Cataract removal (Bilateral).   Discharge Recommendations: Home with Home Health PT   DME Required For Discharge: rolling walker pending patient progress   Precautions/Restrictions: high fall risk  Weight Bearing Restrictions: no restrictions    Required

## 2023-12-16 LAB
ALBUMIN SERPL-MCNC: 2.9 G/DL (ref 3.4–5)
ANION GAP SERPL CALCULATED.3IONS-SCNC: 7 MMOL/L (ref 3–16)
BASOPHILS # BLD: 0 K/UL (ref 0–0.2)
BASOPHILS NFR BLD: 0.2 %
BUN SERPL-MCNC: 80 MG/DL (ref 7–20)
CALCIUM SERPL-MCNC: 8.6 MG/DL (ref 8.3–10.6)
CHLORIDE SERPL-SCNC: 109 MMOL/L (ref 99–110)
CO2 SERPL-SCNC: 26 MMOL/L (ref 21–32)
CREAT SERPL-MCNC: 3.7 MG/DL (ref 0.8–1.3)
DEPRECATED RDW RBC AUTO: 15.1 % (ref 12.4–15.4)
EOSINOPHIL # BLD: 0 K/UL (ref 0–0.6)
EOSINOPHIL NFR BLD: 0 %
GFR SERPLBLD CREATININE-BSD FMLA CKD-EPI: 16 ML/MIN/{1.73_M2}
GLUCOSE BLD-MCNC: 106 MG/DL (ref 70–99)
GLUCOSE BLD-MCNC: 150 MG/DL (ref 70–99)
GLUCOSE BLD-MCNC: 243 MG/DL (ref 70–99)
GLUCOSE BLD-MCNC: 297 MG/DL (ref 70–99)
GLUCOSE SERPL-MCNC: 138 MG/DL (ref 70–99)
HCT VFR BLD AUTO: 22.9 % (ref 40.5–52.5)
HGB BLD-MCNC: 7.6 G/DL (ref 13.5–17.5)
LYMPHOCYTES # BLD: 0.4 K/UL (ref 1–5.1)
LYMPHOCYTES NFR BLD: 4.4 %
MCH RBC QN AUTO: 30.3 PG (ref 26–34)
MCHC RBC AUTO-ENTMCNC: 33 G/DL (ref 31–36)
MCV RBC AUTO: 91.8 FL (ref 80–100)
MONOCYTES # BLD: 0.4 K/UL (ref 0–1.3)
MONOCYTES NFR BLD: 4.3 %
NEUTROPHILS # BLD: 8.8 K/UL (ref 1.7–7.7)
NEUTROPHILS NFR BLD: 91.1 %
PERFORMED ON: ABNORMAL
PHOSPHATE SERPL-MCNC: 2.8 MG/DL (ref 2.5–4.9)
PLATELET # BLD AUTO: 124 K/UL (ref 135–450)
PMV BLD AUTO: 7.8 FL (ref 5–10.5)
POTASSIUM SERPL-SCNC: 4.4 MMOL/L (ref 3.5–5.1)
RBC # BLD AUTO: 2.5 M/UL (ref 4.2–5.9)
SODIUM SERPL-SCNC: 142 MMOL/L (ref 136–145)
WBC # BLD AUTO: 9.7 K/UL (ref 4–11)

## 2023-12-16 PROCEDURE — 6370000000 HC RX 637 (ALT 250 FOR IP): Performed by: INTERNAL MEDICINE

## 2023-12-16 PROCEDURE — 1280000000 HC REHAB R&B

## 2023-12-16 PROCEDURE — 85025 COMPLETE CBC W/AUTO DIFF WBC: CPT

## 2023-12-16 PROCEDURE — 6370000000 HC RX 637 (ALT 250 FOR IP): Performed by: PHYSICAL MEDICINE & REHABILITATION

## 2023-12-16 PROCEDURE — 36415 COLL VENOUS BLD VENIPUNCTURE: CPT

## 2023-12-16 PROCEDURE — 80069 RENAL FUNCTION PANEL: CPT

## 2023-12-16 RX ORDER — SENNA AND DOCUSATE SODIUM 50; 8.6 MG/1; MG/1
1 TABLET, FILM COATED ORAL 2 TIMES DAILY PRN
Status: DISCONTINUED | OUTPATIENT
Start: 2023-12-16 | End: 2023-12-21 | Stop reason: HOSPADM

## 2023-12-16 RX ADMIN — AMLODIPINE BESYLATE 5 MG: 5 TABLET ORAL at 09:21

## 2023-12-16 RX ADMIN — APIXABAN 5 MG: 5 TABLET, FILM COATED ORAL at 09:21

## 2023-12-16 RX ADMIN — PREDNISONE 80 MG: 20 TABLET ORAL at 09:21

## 2023-12-16 RX ADMIN — Medication 1 TABLET: at 16:50

## 2023-12-16 RX ADMIN — ATOVAQUONE 1500 MG: 750 SUSPENSION ORAL at 09:23

## 2023-12-16 RX ADMIN — DOXAZOSIN 8 MG: 4 TABLET ORAL at 21:32

## 2023-12-16 RX ADMIN — ATORVASTATIN CALCIUM 40 MG: 40 TABLET, FILM COATED ORAL at 21:25

## 2023-12-16 RX ADMIN — METOPROLOL SUCCINATE 100 MG: 50 TABLET, EXTENDED RELEASE ORAL at 09:21

## 2023-12-16 RX ADMIN — INSULIN GLARGINE 30 UNITS: 100 INJECTION, SOLUTION SUBCUTANEOUS at 21:29

## 2023-12-16 RX ADMIN — PANTOPRAZOLE SODIUM 40 MG: 40 TABLET, DELAYED RELEASE ORAL at 06:18

## 2023-12-16 RX ADMIN — Medication 1 TABLET: at 09:21

## 2023-12-16 RX ADMIN — TRAZODONE HYDROCHLORIDE 50 MG: 50 TABLET ORAL at 21:26

## 2023-12-16 RX ADMIN — QUETIAPINE FUMARATE 25 MG: 25 TABLET ORAL at 21:26

## 2023-12-16 RX ADMIN — INSULIN LISPRO 8 UNITS: 100 INJECTION, SOLUTION INTRAVENOUS; SUBCUTANEOUS at 16:49

## 2023-12-16 RX ADMIN — APIXABAN 5 MG: 5 TABLET, FILM COATED ORAL at 21:25

## 2023-12-16 RX ADMIN — ASPIRIN 81 MG 81 MG: 81 TABLET ORAL at 09:22

## 2023-12-16 ASSESSMENT — PAIN SCALES - GENERAL: PAINLEVEL_OUTOF10: 0

## 2023-12-16 NOTE — PLAN OF CARE
Problem: Safety - Adult  Goal: Free from fall injury  12/15/2023 1234 by Lloyd Ruiz RN  Outcome: Progressing     Problem: ABCDS Injury Assessment  Goal: Absence of physical injury  Outcome: Progressing

## 2023-12-16 NOTE — PLAN OF CARE
Problem: Discharge Planning  Goal: Discharge to home or other facility with appropriate resources  Outcome: Progressing     Problem: Safety - Adult  Goal: Free from fall injury  12/16/2023 1227 by Jacob Lezama RN  Outcome: Progressing     Problem: Pain  Goal: Verbalizes/displays adequate comfort level or baseline comfort level  Outcome: Progressing     Problem: ABCDS Injury Assessment  Goal: Absence of physical injury  12/16/2023 1227 by Jacob Lezama RN  Outcome: Progressing

## 2023-12-16 NOTE — PROGRESS NOTES
[facility] Renal ARU Note    Patient Active Problem List   Diagnosis    HLD (hyperlipidemia)    Essential hypertension    Coronary artery disease due to lipid rich plaque    Cardiac dysrhythmia    Diabetes mellitus    Paroxysmal atrial fibrillation (HCC)    Renal mass    Cerebrovascular accident (CVA) ([address])    Remote history of stroke    Benign essential HTN    PAF (paroxysmal atrial fibrillation) (HCC)    CASTRO (acute kidney injury) ([address])    LBBB (left bundle branch block)    H/O right nephrectomy    Acute anemia    Acute ischemic stroke (HCC)    Overweight (BMI 25.0-29. 9)    AIN (acute interstitial nephritis)    Sterile pyuria    Current chronic use of systemic steroids    H/O systemic steroid therapy    Diabetes education, encounter for       Past Medical History:   has a past medical history of Atrial fibrillation ([address]), CAD (coronary artery disease), Cancer of kidney ([address]), Diabetes mellitus ([address]), Hyperlipidemia, Hypertension, Prostate cancer ([address]), and Right renal mass. Past Social History:   reports that he quit smoking about 53 years ago. His smoking use included cigarettes. He started smoking about 55 years ago. He has never used smokeless tobacco. He reports current alcohol use. He reports that he does not use drugs. Subjective:    Urinating well  Crea increasing despite IVF  No complaints    Review of Systems   Constitutional:  Positive for fatigue. Negative for activity change, appetite change, chills, fever and unexpected weight change. HENT:  Negative for congestion and facial swelling. Eyes:  Negative for photophobia, discharge and redness. Respiratory:  Negative for cough, chest tightness and shortness of breath. Cardiovascular:  Negative for chest pain, palpitations and leg swelling. Gastrointestinal:  Negative for abdominal distention, abdominal pain, blood in stool, constipation, diarrhea, nausea and vomiting.    Endocrine: Negative for cold intolerance, heat Amlodipine 5mg daily. Anemia-follow Hgb. Heme following.   POONAM subQ given 12/14/23  H/O Renal CA  H/O Prostate CA  H/O A. Fib-rate controlled  H/O DM 2  S/P Right Nephrectomy  Fatigue-now in Juan Thomas MD

## 2023-12-17 VITALS
TEMPERATURE: 97.8 F | HEART RATE: 68 BPM | HEIGHT: 75 IN | SYSTOLIC BLOOD PRESSURE: 138 MMHG | DIASTOLIC BLOOD PRESSURE: 65 MMHG | WEIGHT: 215.9 LBS | BODY MASS INDEX: 26.84 KG/M2 | RESPIRATION RATE: 18 BRPM | OXYGEN SATURATION: 97 %

## 2023-12-17 LAB
ALBUMIN SERPL-MCNC: 3.3 G/DL (ref 3.4–5)
ANION GAP SERPL CALCULATED.3IONS-SCNC: 9 MMOL/L (ref 3–16)
BASOPHILS # BLD: 0.1 K/UL (ref 0–0.2)
BASOPHILS NFR BLD: 0.5 %
BUN SERPL-MCNC: 77 MG/DL (ref 7–20)
CALCIUM SERPL-MCNC: 8.9 MG/DL (ref 8.3–10.6)
CHLORIDE SERPL-SCNC: 110 MMOL/L (ref 99–110)
CO2 SERPL-SCNC: 25 MMOL/L (ref 21–32)
CREAT SERPL-MCNC: 3.5 MG/DL (ref 0.8–1.3)
DEPRECATED RDW RBC AUTO: 15.3 % (ref 12.4–15.4)
EOSINOPHIL # BLD: 0 K/UL (ref 0–0.6)
EOSINOPHIL NFR BLD: 0.2 %
GFR SERPLBLD CREATININE-BSD FMLA CKD-EPI: 17 ML/MIN/{1.73_M2}
GLUCOSE BLD-MCNC: 115 MG/DL (ref 70–99)
GLUCOSE BLD-MCNC: 185 MG/DL (ref 70–99)
GLUCOSE BLD-MCNC: 307 MG/DL (ref 70–99)
GLUCOSE BLD-MCNC: 81 MG/DL (ref 70–99)
GLUCOSE SERPL-MCNC: 67 MG/DL (ref 70–99)
HCT VFR BLD AUTO: 27.1 % (ref 40.5–52.5)
HGB BLD-MCNC: 8.8 G/DL (ref 13.5–17.5)
LYMPHOCYTES # BLD: 0.8 K/UL (ref 1–5.1)
LYMPHOCYTES NFR BLD: 7.8 %
MCH RBC QN AUTO: 30.1 PG (ref 26–34)
MCHC RBC AUTO-ENTMCNC: 32.4 G/DL (ref 31–36)
MCV RBC AUTO: 93 FL (ref 80–100)
MONOCYTES # BLD: 0.4 K/UL (ref 0–1.3)
MONOCYTES NFR BLD: 3.8 %
NEUTROPHILS # BLD: 9.2 K/UL (ref 1.7–7.7)
NEUTROPHILS NFR BLD: 87.7 %
PERFORMED ON: ABNORMAL
PERFORMED ON: NORMAL
PHOSPHATE SERPL-MCNC: 2.8 MG/DL (ref 2.5–4.9)
PLATELET # BLD AUTO: 132 K/UL (ref 135–450)
PMV BLD AUTO: 8.1 FL (ref 5–10.5)
POTASSIUM SERPL-SCNC: 3.7 MMOL/L (ref 3.5–5.1)
RBC # BLD AUTO: 2.92 M/UL (ref 4.2–5.9)
SODIUM SERPL-SCNC: 144 MMOL/L (ref 136–145)
WBC # BLD AUTO: 10.5 K/UL (ref 4–11)

## 2023-12-17 PROCEDURE — 80069 RENAL FUNCTION PANEL: CPT

## 2023-12-17 PROCEDURE — 6370000000 HC RX 637 (ALT 250 FOR IP): Performed by: PHYSICAL MEDICINE & REHABILITATION

## 2023-12-17 PROCEDURE — 1280000000 HC REHAB R&B

## 2023-12-17 PROCEDURE — 6370000000 HC RX 637 (ALT 250 FOR IP): Performed by: INTERNAL MEDICINE

## 2023-12-17 PROCEDURE — 6370000000 HC RX 637 (ALT 250 FOR IP): Performed by: STUDENT IN AN ORGANIZED HEALTH CARE EDUCATION/TRAINING PROGRAM

## 2023-12-17 PROCEDURE — 85025 COMPLETE CBC W/AUTO DIFF WBC: CPT

## 2023-12-17 PROCEDURE — 36415 COLL VENOUS BLD VENIPUNCTURE: CPT

## 2023-12-17 RX ORDER — LANOLIN ALCOHOL/MO/W.PET/CERES
6 CREAM (GRAM) TOPICAL NIGHTLY
Status: DISCONTINUED | OUTPATIENT
Start: 2023-12-17 | End: 2023-12-21 | Stop reason: HOSPADM

## 2023-12-17 RX ADMIN — MELATONIN TAB 3 MG 6 MG: 3 TAB at 22:27

## 2023-12-17 RX ADMIN — APIXABAN 5 MG: 5 TABLET, FILM COATED ORAL at 21:48

## 2023-12-17 RX ADMIN — APIXABAN 5 MG: 5 TABLET, FILM COATED ORAL at 08:40

## 2023-12-17 RX ADMIN — ATORVASTATIN CALCIUM 40 MG: 40 TABLET, FILM COATED ORAL at 21:48

## 2023-12-17 RX ADMIN — DOXAZOSIN 8 MG: 4 TABLET ORAL at 21:47

## 2023-12-17 RX ADMIN — AMLODIPINE BESYLATE 5 MG: 5 TABLET ORAL at 08:40

## 2023-12-17 RX ADMIN — INSULIN GLARGINE 30 UNITS: 100 INJECTION, SOLUTION SUBCUTANEOUS at 21:44

## 2023-12-17 RX ADMIN — PREDNISONE 80 MG: 20 TABLET ORAL at 08:39

## 2023-12-17 RX ADMIN — INSULIN LISPRO 4 UNITS: 100 INJECTION, SOLUTION INTRAVENOUS; SUBCUTANEOUS at 21:45

## 2023-12-17 RX ADMIN — METOPROLOL SUCCINATE 100 MG: 50 TABLET, EXTENDED RELEASE ORAL at 08:40

## 2023-12-17 RX ADMIN — Medication 1 TABLET: at 08:40

## 2023-12-17 RX ADMIN — ATOVAQUONE 1500 MG: 750 SUSPENSION ORAL at 17:03

## 2023-12-17 RX ADMIN — QUETIAPINE FUMARATE 25 MG: 25 TABLET ORAL at 21:47

## 2023-12-17 RX ADMIN — ASPIRIN 81 MG 81 MG: 81 TABLET ORAL at 08:40

## 2023-12-17 RX ADMIN — PANTOPRAZOLE SODIUM 40 MG: 40 TABLET, DELAYED RELEASE ORAL at 05:34

## 2023-12-17 RX ADMIN — Medication 1 TABLET: at 17:00

## 2023-12-17 ASSESSMENT — PAIN SCALES - GENERAL: PAINLEVEL_OUTOF10: 0

## 2023-12-17 NOTE — PROGRESS NOTES
The pt called to use restroom. Noted the pt's brief heavily saturated with urine. The pt was not aware of it. Assisted with rey care.

## 2023-12-17 NOTE — PLAN OF CARE
Problem: Safety - Adult  Goal: Free from fall injury  12/16/2023 2213 by Iglesia Frey RN  Outcome: Progressing     Problem: ABCDS Injury Assessment  Goal: Absence of physical injury  12/16/2023 2213 by Iglesia Frey RN  Outcome: Progressing

## 2023-12-17 NOTE — PROGRESS NOTES
[facility] Renal ARU Note    Patient Active Problem List   Diagnosis    HLD (hyperlipidemia)    Essential hypertension    Coronary artery disease due to lipid rich plaque    Cardiac dysrhythmia    Diabetes mellitus    Paroxysmal atrial fibrillation (HCC)    Renal mass    Cerebrovascular accident (CVA) ([address])    Remote history of stroke    Benign essential HTN    PAF (paroxysmal atrial fibrillation) (HCC)    CASTRO (acute kidney injury) ([address])    LBBB (left bundle branch block)    H/O right nephrectomy    Acute anemia    Acute ischemic stroke (HCC)    Overweight (BMI 25.0-29. 9)    AIN (acute interstitial nephritis)    Sterile pyuria    Current chronic use of systemic steroids    H/O systemic steroid therapy    Diabetes education, encounter for       Past Medical History:   has a past medical history of Atrial fibrillation ([address]), CAD (coronary artery disease), Cancer of kidney ([address]), Diabetes mellitus ([address]), Hyperlipidemia, Hypertension, Prostate cancer ([address]), and Right renal mass. Past Social History:   reports that he quit smoking about 53 years ago. His smoking use included cigarettes. He started smoking about 55 years ago. He has never used smokeless tobacco. He reports current alcohol use. He reports that he does not use drugs. Subjective:    Urinating well  Crea better today  No complaints    Review of Systems   Constitutional:  Positive for fatigue. Negative for activity change, appetite change, chills, fever and unexpected weight change. HENT:  Negative for congestion and facial swelling. Eyes:  Negative for photophobia, discharge and redness. Respiratory:  Negative for cough, chest tightness and shortness of breath. Cardiovascular:  Negative for chest pain, palpitations and leg swelling. Gastrointestinal:  Negative for abdominal distention, abdominal pain, blood in stool, constipation, diarrhea, nausea and vomiting.    Endocrine: Negative for cold intolerance, heat intolerance and

## 2023-12-18 ENCOUNTER — APPOINTMENT (OUTPATIENT)
Dept: ULTRASOUND IMAGING | Age: 73
DRG: 947 | End: 2023-12-18
Attending: PHYSICAL MEDICINE & REHABILITATION
Payer: MEDICARE

## 2023-12-18 LAB
ANION GAP SERPL CALCULATED.3IONS-SCNC: 8 MMOL/L (ref 3–16)
BUN SERPL-MCNC: 79 MG/DL (ref 7–20)
CALCIUM SERPL-MCNC: 8.8 MG/DL (ref 8.3–10.6)
CHLORIDE SERPL-SCNC: 108 MMOL/L (ref 99–110)
CO2 SERPL-SCNC: 25 MMOL/L (ref 21–32)
CREAT SERPL-MCNC: 3.4 MG/DL (ref 0.8–1.3)
GFR SERPLBLD CREATININE-BSD FMLA CKD-EPI: 18 ML/MIN/{1.73_M2}
GLUCOSE BLD-MCNC: 117 MG/DL (ref 70–99)
GLUCOSE BLD-MCNC: 162 MG/DL (ref 70–99)
GLUCOSE BLD-MCNC: 191 MG/DL (ref 70–99)
GLUCOSE BLD-MCNC: 288 MG/DL (ref 70–99)
GLUCOSE SERPL-MCNC: 135 MG/DL (ref 70–99)
PERFORMED ON: ABNORMAL
POTASSIUM SERPL-SCNC: 3.5 MMOL/L (ref 3.5–5.1)
SODIUM SERPL-SCNC: 141 MMOL/L (ref 136–145)

## 2023-12-18 PROCEDURE — 97130 THER IVNTJ EA ADDL 15 MIN: CPT

## 2023-12-18 PROCEDURE — 6370000000 HC RX 637 (ALT 250 FOR IP): Performed by: STUDENT IN AN ORGANIZED HEALTH CARE EDUCATION/TRAINING PROGRAM

## 2023-12-18 PROCEDURE — 6370000000 HC RX 637 (ALT 250 FOR IP): Performed by: INTERNAL MEDICINE

## 2023-12-18 PROCEDURE — 97530 THERAPEUTIC ACTIVITIES: CPT

## 2023-12-18 PROCEDURE — 1280000000 HC REHAB R&B

## 2023-12-18 PROCEDURE — 97112 NEUROMUSCULAR REEDUCATION: CPT

## 2023-12-18 PROCEDURE — 6370000000 HC RX 637 (ALT 250 FOR IP): Performed by: PHYSICAL MEDICINE & REHABILITATION

## 2023-12-18 PROCEDURE — 97535 SELF CARE MNGMENT TRAINING: CPT

## 2023-12-18 PROCEDURE — 92507 TX SP LANG VOICE COMM INDIV: CPT

## 2023-12-18 PROCEDURE — 76770 US EXAM ABDO BACK WALL COMP: CPT

## 2023-12-18 PROCEDURE — 97116 GAIT TRAINING THERAPY: CPT

## 2023-12-18 PROCEDURE — 2580000003 HC RX 258: Performed by: PHYSICAL MEDICINE & REHABILITATION

## 2023-12-18 PROCEDURE — 97129 THER IVNTJ 1ST 15 MIN: CPT

## 2023-12-18 PROCEDURE — 36415 COLL VENOUS BLD VENIPUNCTURE: CPT

## 2023-12-18 PROCEDURE — 97110 THERAPEUTIC EXERCISES: CPT

## 2023-12-18 PROCEDURE — 80048 BASIC METABOLIC PNL TOTAL CA: CPT

## 2023-12-18 RX ORDER — INSULIN GLARGINE 100 [IU]/ML
20 INJECTION, SOLUTION SUBCUTANEOUS NIGHTLY
Status: DISCONTINUED | OUTPATIENT
Start: 2023-12-18 | End: 2023-12-21 | Stop reason: HOSPADM

## 2023-12-18 RX ORDER — SODIUM CHLORIDE 0.9 % (FLUSH) 0.9 %
5-40 SYRINGE (ML) INJECTION 2 TIMES DAILY
Status: DISCONTINUED | OUTPATIENT
Start: 2023-12-18 | End: 2023-12-21 | Stop reason: HOSPADM

## 2023-12-18 RX ORDER — SODIUM CHLORIDE 0.9 % (FLUSH) 0.9 %
5-40 SYRINGE (ML) INJECTION PRN
Status: DISCONTINUED | OUTPATIENT
Start: 2023-12-18 | End: 2023-12-21 | Stop reason: HOSPADM

## 2023-12-18 RX ORDER — PREDNISONE 20 MG/1
40 TABLET ORAL DAILY
Status: COMPLETED | OUTPATIENT
Start: 2023-12-18 | End: 2023-12-19

## 2023-12-18 RX ADMIN — Medication 1 TABLET: at 16:51

## 2023-12-18 RX ADMIN — ATOVAQUONE 1500 MG: 750 SUSPENSION ORAL at 09:11

## 2023-12-18 RX ADMIN — ATORVASTATIN CALCIUM 40 MG: 40 TABLET, FILM COATED ORAL at 19:47

## 2023-12-18 RX ADMIN — Medication 5 ML: at 19:48

## 2023-12-18 RX ADMIN — APIXABAN 5 MG: 5 TABLET, FILM COATED ORAL at 19:46

## 2023-12-18 RX ADMIN — AMLODIPINE BESYLATE 5 MG: 5 TABLET ORAL at 09:11

## 2023-12-18 RX ADMIN — ASPIRIN 81 MG 81 MG: 81 TABLET ORAL at 09:11

## 2023-12-18 RX ADMIN — METOPROLOL SUCCINATE 100 MG: 50 TABLET, EXTENDED RELEASE ORAL at 09:11

## 2023-12-18 RX ADMIN — PREDNISONE 40 MG: 20 TABLET ORAL at 12:20

## 2023-12-18 RX ADMIN — Medication 1 TABLET: at 12:20

## 2023-12-18 RX ADMIN — INSULIN GLARGINE 20 UNITS: 100 INJECTION, SOLUTION SUBCUTANEOUS at 20:04

## 2023-12-18 RX ADMIN — MELATONIN TAB 3 MG 6 MG: 3 TAB at 19:47

## 2023-12-18 RX ADMIN — APIXABAN 5 MG: 5 TABLET, FILM COATED ORAL at 09:11

## 2023-12-18 RX ADMIN — SODIUM CHLORIDE, PRESERVATIVE FREE 10 ML: 5 INJECTION INTRAVENOUS at 12:27

## 2023-12-18 RX ADMIN — QUETIAPINE FUMARATE 25 MG: 25 TABLET ORAL at 19:48

## 2023-12-18 RX ADMIN — PANTOPRAZOLE SODIUM 40 MG: 40 TABLET, DELAYED RELEASE ORAL at 05:01

## 2023-12-18 RX ADMIN — DOXAZOSIN 8 MG: 4 TABLET ORAL at 19:46

## 2023-12-18 NOTE — PROGRESS NOTES
Nutrition Note    RECOMMENDATIONS  No new nutrition rec's @ this time. NUTRITION ASSESSMENT   Pt reports is eating well with greater than 75% meals conbsumed recently. Pt states he is taking more than 75% Nepro BID as well. Wife @ bedside & requested low k+, [facility] diet education. Reviewed guidelines with pt & spouse. No further nutrition concerns expressed @ this time. Nutrition Related Findings: Gluc 117; LBM 12/18; +3 pitting edema BLE  Wounds: None  Nutrition Education:  Education completed (low k+, CCC)   Nutrition Goals: PO intake 75% or greater, by next RD assessment     MALNUTRITION ASSESSMENT   Acute Illness  Malnutrition Status: No malnutrition    NUTRITION DIAGNOSIS   No nutrition diagnosis at this time     CURRENT NUTRITION THERAPIES  ADULT DIET; Regular; 5 carb choices (75 gm/meal); Low Potassium (Less than 3000 mg/day)  ADULT ORAL NUTRITION SUPPLEMENT; Dinner, Breakfast; Renal Oral Supplement     PO Intake: %   PO Supplement Intake:%    ANTHROPOMETRICS  Current Height: 190.5 cm (6' 3\")  Current Weight - Scale: 97.3 kg (214 lb 6.4 oz)    Admission weight: 102.1 kg (225 lb)  Ideal Body Weight (IBW): 196 lbs  (89 kg)        BMI: 26.7    The patient will be monitored per nutrition standards of care. Consult dietitian if additional nutrition interventions are needed prior to RD reassessment.      Delores Diego RD, LD    Contact: [phone]

## 2023-12-18 NOTE — PROGRESS NOTES
[facility] Renal ARU Note    Patient Active Problem List   Diagnosis    HLD (hyperlipidemia)    Essential hypertension    Coronary artery disease due to lipid rich plaque    Cardiac dysrhythmia    Diabetes mellitus    Paroxysmal atrial fibrillation (HCC)    Renal mass    Cerebrovascular accident (CVA) ([address])    Remote history of stroke    Benign essential HTN    PAF (paroxysmal atrial fibrillation) (HCC)    CASTRO (acute kidney injury) ([address])    LBBB (left bundle branch block)    H/O right nephrectomy    Acute anemia    Acute ischemic stroke (HCC)    Overweight (BMI 25.0-29. 9)    AIN (acute interstitial nephritis)    Sterile pyuria    Current chronic use of systemic steroids    H/O systemic steroid therapy    Diabetes education, encounter for       Past Medical History:   has a past medical history of Atrial fibrillation ([address]), CAD (coronary artery disease), Cancer of kidney ([address]), Diabetes mellitus ([address]), Hyperlipidemia, Hypertension, Prostate cancer ([address]), and Right renal mass. Past Social History:   reports that he quit smoking about 53 years ago. His smoking use included cigarettes. He started smoking about 56 years ago. He has never used smokeless tobacco. He reports current alcohol use. He reports that he does not use drugs. Subjective:    Urinating well  Crea better today  No complaints. Wife at bedside. Review of Systems   Constitutional:  Positive for fatigue. Negative for activity change, appetite change, chills, fever and unexpected weight change. HENT:  Negative for congestion and facial swelling. Eyes:  Negative for photophobia, discharge and redness. Respiratory:  Negative for cough, chest tightness and shortness of breath. Cardiovascular:  Negative for chest pain, palpitations and leg swelling. Gastrointestinal:  Negative for abdominal distention, abdominal pain, blood in stool, constipation, diarrhea, nausea and vomiting.    Endocrine: Negative for cold intolerance,

## 2023-12-18 NOTE — PROGRESS NOTES
Assessment completed. Patient sitting up in chair, alert and oriented and able to make all his needs known. Can be impulsive at times. Family at side. Edema noted to BLE. MCOT in place to JÚNIOR chest. Medications administered as ordered. POC and education reviewed with patient and family and mutually agreed upon. Incontinent at times of bladder. Ambulates with a walker and CGA and tolerates fairly well. Safety interventions in place. Continues on video monitoring. Call light in reach. Will continue to monitor. 1500: Patient was off unit for a renal US and returned at this time via w/c. Transferred to chair. Family at side. Call light in reach. Will continue to monitor.

## 2023-12-18 NOTE — PROGRESS NOTES
[address] [facility] Department   Phone: [phone]    Physical Therapy    [] Initial Evaluation            [x] Daily Treatment Note         [] Discharge Summary      Patient: Unique Bonilla   : 1950   MRN: [id number]   Date of Service:  2023  Admitting Diagnosis: Acute ischemic stroke [facility])    Current Admission Summary: Unique Bonilla is a 75 y.o. male who presents to the emergency department due to feeling weak and fatigued over the last 10 days. He has been going through chemotherapy due to prostate cancer. States that he only has 1 kidney currently as he had cancer spread to his kidney as well. He states that his primary care doctor told him to come to the emergency room due to his numbers being low he is uncertain if it is just his hemoglobin or his kidney function exactly he believes that he was told it was his kidney function but he thinks it may be more. Patient denies any fevers or chills. Denies any cough or shortness of breath. He states that he just finished a round of chemotherapy yesterday. Patient's MRI scan of brain was done and revealed infarct in the right temporal lobe. Previous MRI scan done 2 months ago revealed acute right cerebellar infarction. Past Medical History:  has a past medical history of Atrial fibrillation ([address]), CAD (coronary artery disease), Cancer of kidney ([address]), Diabetes mellitus ([address]), Hyperlipidemia, Hypertension, Prostate cancer ([address]), and Right renal mass. Past Surgical History:  has a past surgical history that includes hernia repair; knee surgery; Cholecystectomy; Nasal septum surgery; eye surgery (Left, 2018); Kidney removal (Right, 2022); and Cataract removal (Bilateral).   Discharge Recommendations: Home with Home Health PT and family assist PRN  DME Required For Discharge: rolling walker   Precautions/Restrictions: high fall risk  Weight Bearing Restrictions: no restrictions    Required

## 2023-12-18 NOTE — PROGRESS NOTES
Order for diabetes educator consult for \"irregular blood sugar level through out a day\"  noted, chart reviewed. Hemoglobin A1C 6.9%, at goal. eGFR 17%. the patient is on appropriate medications at home; Amaryl 4 mg daily, metformin 1 Gram twice daily, Lantus 15 units daily, and Humalog high dose correction three times daily with meals. Blood sugars have been elevated in hospital while on IV then oral steroids, steroids being tapered. Spoke with ABHISHEK Heredia, I will follow blood sugars with taper and make recommendations for in hospital insulin as needed with steroid taper. Paty Guevara RN, BSN, Munira Major.

## 2023-12-18 NOTE — PROGRESS NOTES
On chart review oral steroid decreased by half to 40 mg daily today. eGFR 17%. Blood sugars remain stable < 200 mg/dL today. Dr called & informed of my concern for hypoglycemia on 30 units lantus nightly. New telephone orders for Lantus 20 units nightly received. Winthrop Prader RN, BSN, Mat Carlos.

## 2023-12-18 NOTE — PROGRESS NOTES
[address] [facility] Department   Phone: [phone]    Speech Therapy    [] Initial Evaluation            [x] Daily Treatment Note         [] Discharge Summary      Patient: Fernanda Bell   : 1950   MRN: [id number]   Date of Service:  2023  Admitting Diagnosis: Acute ischemic stroke [facility])  Current Admission Summary: See MD's summary   Past Medical History:  has a past medical history of Atrial fibrillation ([address]), CAD (coronary artery disease), Cancer of kidney ([address]), Diabetes mellitus ([address]), Hyperlipidemia, Hypertension, Prostate cancer ([address]), and Right renal mass. Past Surgical History:  has a past surgical history that includes hernia repair; knee surgery; Cholecystectomy; Nasal septum surgery; eye surgery (Left, 2018); Kidney removal (Right, 2022); and Cataract removal (Bilateral). 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  Vision:    Vision Corrective Device: wears glasses at all times      Subjective  General: Pt pleasant and engaged throughout the session. Wife present for both sessions.     Pain: Denies    Safety Interventions: patient left in chair, chair alarm in place, call light within reach, and patient at risk for falls      Therapeutic Interventions:     Session 1:     Comprehension: Severity: Mild  and To be further assessed   - Adequate throughout session for basic tasks, required ongoing repetition re: utilization of circumlocution strategy during targeted tasks     Expressive Language: Severity: Mild , Moderate , and To be further

## 2023-12-18 NOTE — CARE COORDINATION
Discharge Planning:     (CM) informed [facility] staff, Zion Rodriguez ([phone]), and informed of needed rolling walker for discharge on Thursday (12/21).       Jesus GILLILAND, AKILA, [facility] -   [phone]    Electronically signed by TALAT Yusuf on 12/18/2023 at 1:18 PM

## 2023-12-18 NOTE — PLAN OF CARE
Problem: Discharge Planning  Goal: Discharge to home or other facility with appropriate resources  Outcome: Progressing  Flowsheets (Taken 12/17/2023 2130 by Manny Lawrence, ABHISHEK)  Discharge to home or other facility with appropriate resources: Identify discharge learning needs (meds, wound care, etc)     Problem: Safety - Adult  Goal: Free from fall injury  12/18/2023 0940 by Jacob Lezama RN  Outcome: Progressing     Problem: ABCDS Injury Assessment  Goal: Absence of physical injury  12/18/2023 0940 by Jacob Lezama RN  Outcome: Progressing     Problem: Pain  Goal: Verbalizes/displays adequate comfort level or baseline comfort level  12/18/2023 0940 by Carter Lezama RN  Outcome: Completed

## 2023-12-18 NOTE — PLAN OF CARE
Problem: Discharge Planning  Goal: Discharge to home or other facility with appropriate resources  Recent Flowsheet Documentation  Taken 12/17/2023 2130 by Familia Hutchins RN  Discharge to home or other facility with appropriate resources: Identify discharge learning needs (meds, wound care, etc)  12/17/2023 1220 by Jacob Lezama RN  Outcome: Progressing     Problem: Safety - Adult  Goal: Free from fall injury  12/18/2023 0052 by Familia Hutchins RN  Outcome: Progressing  12/17/2023 1220 by Jacob Lezama RN  Outcome: Progressing     Problem: Pain  Goal: Verbalizes/displays adequate comfort level or baseline comfort level  12/18/2023 0052 by Familia Hutchins RN  Outcome: Progressing  Flowsheets (Taken 12/17/2023 2130)  Verbalizes/displays adequate comfort level or baseline comfort level: Encourage patient to monitor pain and request assistance  12/17/2023 1220 by Jacob Lezama RN  Outcome: Progressing     Problem: ABCDS Injury Assessment  Goal: Absence of physical injury  12/18/2023 0052 by Familia Hutchins RN  Outcome: Progressing  12/17/2023 1220 by Jacob Lezama RN  Outcome: Progressing

## 2023-12-18 NOTE — CARE COORDINATION
Discharge Planning:     (ANISHA) informed that patient would like to be referred to [address] [facility]). CM called [facility] staff, Vicky Neely ([phone]), who reported that she will update CM, Wesley Max, on referral decision in the morning. AKILA Rangel, [facility] -   [phone]    Electronically signed by TALAT Mata on 12/18/2023 at 2:02 PM        Update at 1528:  CM received a call from Vicky Neely at [address] [place] [address],[address] who reported that they accepted the patient. Vicky Neely verbalized understanding of planned discharge for patient on Thursday (12/21).       AKILA Rangel, [facility] -   [phone]    Electronically signed by TALAT Mata on 12/18/2023 at 3:30 PM

## 2023-12-18 NOTE — PROGRESS NOTES
[address] [facility] Department   Phone: [phone]    Occupational Therapy    [] Initial Evaluation            [x] Daily Treatment Note         [] Discharge Summary      Patient: Atul Zamorano   : 1950   MRN: [id number]   Date of Service:  2023    Admitting Diagnosis:  Acute ischemic stroke [facility])  Current Admission Summary:   Atul Zamorano is a 75 y.o. male who presents to the emergency department due to feeling weak and fatigued over the last 10 days. He has been going through chemotherapy due to prostate cancer. States that he only has 1 kidney currently as he had cancer spread to his kidney as well. He states that his primary care doctor told him to come to the emergency room due to his numbers being low he is uncertain if it is just his hemoglobin or his kidney function exactly he believes that he was told it was his kidney function but he thinks it may be more. Patient denies any fevers or chills. Denies any cough or shortness of breath. He states that he just finished a round of chemotherapy yesterday. Patient's MRI scan of brain was done and revealed infarct in the right temporal lobe. Previous MRI scan done 2 months ago revealed acute right cerebellar infarction. Past Medical History:  has a past medical history of Atrial fibrillation ([address]), CAD (coronary artery disease), Cancer of kidney ([address]), Diabetes mellitus ([address]), Hyperlipidemia, Hypertension, Prostate cancer ([address]), and Right renal mass. Past Surgical History:  has a past surgical history that includes hernia repair; knee surgery; Cholecystectomy; Nasal septum surgery; eye surgery (Left, 2018); Kidney removal (Right, 2022); and Cataract removal (Bilateral).     Discharge Recommendations: Home with HHOT and supervision/assist from family pending progress     DME Required For Discharge: shower chair with back, rolling walker pending progress     Precautions/Restrictions: high fall

## 2023-12-19 LAB
GLUCOSE BLD-MCNC: 132 MG/DL (ref 70–99)
GLUCOSE BLD-MCNC: 284 MG/DL (ref 70–99)
GLUCOSE BLD-MCNC: 290 MG/DL (ref 70–99)
GLUCOSE BLD-MCNC: 98 MG/DL (ref 70–99)
PERFORMED ON: ABNORMAL
PERFORMED ON: NORMAL

## 2023-12-19 PROCEDURE — 97112 NEUROMUSCULAR REEDUCATION: CPT

## 2023-12-19 PROCEDURE — 6370000000 HC RX 637 (ALT 250 FOR IP): Performed by: PHYSICAL MEDICINE & REHABILITATION

## 2023-12-19 PROCEDURE — 2580000003 HC RX 258: Performed by: PHYSICAL MEDICINE & REHABILITATION

## 2023-12-19 PROCEDURE — 97530 THERAPEUTIC ACTIVITIES: CPT

## 2023-12-19 PROCEDURE — 1280000000 HC REHAB R&B

## 2023-12-19 PROCEDURE — 6370000000 HC RX 637 (ALT 250 FOR IP): Performed by: INTERNAL MEDICINE

## 2023-12-19 PROCEDURE — 97130 THER IVNTJ EA ADDL 15 MIN: CPT

## 2023-12-19 PROCEDURE — 97110 THERAPEUTIC EXERCISES: CPT

## 2023-12-19 PROCEDURE — 97535 SELF CARE MNGMENT TRAINING: CPT

## 2023-12-19 PROCEDURE — 97116 GAIT TRAINING THERAPY: CPT

## 2023-12-19 PROCEDURE — 92507 TX SP LANG VOICE COMM INDIV: CPT

## 2023-12-19 PROCEDURE — 6370000000 HC RX 637 (ALT 250 FOR IP): Performed by: STUDENT IN AN ORGANIZED HEALTH CARE EDUCATION/TRAINING PROGRAM

## 2023-12-19 PROCEDURE — 97129 THER IVNTJ 1ST 15 MIN: CPT

## 2023-12-19 RX ORDER — METHYLPREDNISOLONE 4 MG/1
TABLET ORAL
Qty: 16 TABLET | Refills: 0 | Status: SHIPPED | OUTPATIENT
Start: 2023-12-19 | End: 2023-12-20 | Stop reason: HOSPADM

## 2023-12-19 RX ORDER — SENNA AND DOCUSATE SODIUM 50; 8.6 MG/1; MG/1
1 TABLET, FILM COATED ORAL 2 TIMES DAILY PRN
Qty: 60 TABLET | Refills: 1 | Status: SHIPPED | OUTPATIENT
Start: 2023-12-19

## 2023-12-19 RX ORDER — AMLODIPINE BESYLATE 5 MG/1
5 TABLET ORAL DAILY
Qty: 30 TABLET | Refills: 3 | Status: SHIPPED | OUTPATIENT
Start: 2023-12-20

## 2023-12-19 RX ORDER — INSULIN DETEMIR 100 [IU]/ML
15 INJECTION, SOLUTION SUBCUTANEOUS NIGHTLY
Qty: 2 EACH | Refills: 3 | Status: SHIPPED | OUTPATIENT
Start: 2023-12-19

## 2023-12-19 RX ORDER — POLYETHYLENE GLYCOL 3350 17 G/17G
17 POWDER, FOR SOLUTION ORAL DAILY PRN
Qty: 527 G | Refills: 1 | Status: SHIPPED | OUTPATIENT
Start: 2023-12-19 | End: 2024-02-19

## 2023-12-19 RX ORDER — METOPROLOL SUCCINATE 100 MG/1
100 TABLET, EXTENDED RELEASE ORAL DAILY
Qty: 30 TABLET | Refills: 3 | Status: SHIPPED | OUTPATIENT
Start: 2023-12-20

## 2023-12-19 RX ORDER — DOXAZOSIN 8 MG/1
8 TABLET ORAL NIGHTLY
Qty: 30 TABLET | Refills: 3 | Status: SHIPPED | OUTPATIENT
Start: 2023-12-19

## 2023-12-19 RX ADMIN — Medication 1 TABLET: at 08:25

## 2023-12-19 RX ADMIN — Medication 1 TABLET: at 18:19

## 2023-12-19 RX ADMIN — INSULIN LISPRO 8 UNITS: 100 INJECTION, SOLUTION INTRAVENOUS; SUBCUTANEOUS at 18:19

## 2023-12-19 RX ADMIN — ASPIRIN 81 MG 81 MG: 81 TABLET ORAL at 08:26

## 2023-12-19 RX ADMIN — PREDNISONE 40 MG: 20 TABLET ORAL at 08:26

## 2023-12-19 RX ADMIN — ATORVASTATIN CALCIUM 40 MG: 40 TABLET, FILM COATED ORAL at 20:42

## 2023-12-19 RX ADMIN — APIXABAN 5 MG: 5 TABLET, FILM COATED ORAL at 20:42

## 2023-12-19 RX ADMIN — MELATONIN TAB 3 MG 6 MG: 3 TAB at 20:42

## 2023-12-19 RX ADMIN — APIXABAN 5 MG: 5 TABLET, FILM COATED ORAL at 08:26

## 2023-12-19 RX ADMIN — PANTOPRAZOLE SODIUM 40 MG: 40 TABLET, DELAYED RELEASE ORAL at 06:09

## 2023-12-19 RX ADMIN — DOXAZOSIN 8 MG: 4 TABLET ORAL at 20:45

## 2023-12-19 RX ADMIN — AMLODIPINE BESYLATE 5 MG: 5 TABLET ORAL at 08:26

## 2023-12-19 RX ADMIN — METOPROLOL SUCCINATE 100 MG: 50 TABLET, EXTENDED RELEASE ORAL at 08:25

## 2023-12-19 RX ADMIN — ATOVAQUONE 1500 MG: 750 SUSPENSION ORAL at 08:26

## 2023-12-19 RX ADMIN — Medication 10 ML: at 20:48

## 2023-12-19 RX ADMIN — INSULIN GLARGINE 20 UNITS: 100 INJECTION, SOLUTION SUBCUTANEOUS at 20:42

## 2023-12-19 RX ADMIN — QUETIAPINE FUMARATE 25 MG: 25 TABLET ORAL at 20:42

## 2023-12-19 RX ADMIN — Medication 10 ML: at 08:26

## 2023-12-19 NOTE — PROGRESS NOTES
[address] [facility] Department   Phone: [phone]    Physical Therapy    [] Initial Evaluation            [x] Daily Treatment Note         [] Discharge Summary      Patient: Niya Beck   : 1950   MRN: [id number]   Date of Service:  2023  Admitting Diagnosis: Acute ischemic stroke [facility])    Current Admission Summary: Niya Beck is a 75 y.o. male who presents to the emergency department due to feeling weak and fatigued over the last 10 days. He has been going through chemotherapy due to prostate cancer. States that he only has 1 kidney currently as he had cancer spread to his kidney as well. He states that his primary care doctor told him to come to the emergency room due to his numbers being low he is uncertain if it is just his hemoglobin or his kidney function exactly he believes that he was told it was his kidney function but he thinks it may be more. Patient denies any fevers or chills. Denies any cough or shortness of breath. He states that he just finished a round of chemotherapy yesterday. Patient's MRI scan of brain was done and revealed infarct in the right temporal lobe. Previous MRI scan done 2 months ago revealed acute right cerebellar infarction. Past Medical History:  has a past medical history of Atrial fibrillation ([address]), CAD (coronary artery disease), Cancer of kidney ([address]), Diabetes mellitus ([address]), Hyperlipidemia, Hypertension, Prostate cancer ([address]), and Right renal mass. Past Surgical History:  has a past surgical history that includes hernia repair; knee surgery; Cholecystectomy; Nasal septum surgery; eye surgery (Left, 2018); Kidney removal (Right, 2022); and Cataract removal (Bilateral).   Discharge Recommendations: Home with Home Health PT and family assist PRN  DME Required For Discharge: rolling walker   Precautions/Restrictions: high fall risk  Weight Bearing Restrictions: no restrictions    Required

## 2023-12-19 NOTE — PROGRESS NOTES
Assessment complete. Alert, oriented x4. Denies pain. Dressing over IV in right forearm noted to be lifting away from patient's skin, likely due to hair around site. Old dressing removed. While trimming patient's arm hair, this RN accidentally superficially cut patient's skin to right of IV site. Patient denies pain at cut. Cut beneath IV dressing and bandage. Superficial wound at top of patient's right foot continues to improve. Site cleansed with saline; new bandage applied. The care plan and education has been reviewed and mutually agreed upon with the patient. In bed, alarm on, bed in lowest position, call light and table within reach. No further needs expressed at this time.

## 2023-12-19 NOTE — PROGRESS NOTES
[address] [facility] Department   Phone: [phone]    Speech Therapy    [] Initial Evaluation            [x] Daily Treatment Note         [] Discharge Summary      Patient: Analilia Saez   : 1950   MRN: [id number]   Date of Service:  2023  Admitting Diagnosis: Acute ischemic stroke [facility])  Current Admission Summary: See MD's summary   Past Medical History:  has a past medical history of Atrial fibrillation ([address]), CAD (coronary artery disease), Cancer of kidney ([address]), Diabetes mellitus ([address]), Hyperlipidemia, Hypertension, Prostate cancer ([address]), and Right renal mass. Past Surgical History:  has a past surgical history that includes hernia repair; knee surgery; Cholecystectomy; Nasal septum surgery; eye surgery (Left, 2018); Kidney removal (Right, 2022); and Cataract removal (Bilateral). 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  Vision:    Vision Corrective Device: wears glasses at all times      Subjective  General: Pt pleasant and engaged throughout sessions. Reports feeling like he is still having a hard time getting out what he wants to say, \"stating he can see the words but is not able to connect all of his thoughts. \"   Pain: Denies    Safety Interventions: patient left in chair, chair alarm in place, call light within reach, and patient at risk for falls      Therapeutic Interventions:     Session 1:     Comprehension: Severity: Mild  and To be further assessed   Auditory Comprehension  - following 3 step/ 6 component verbal directions- pt able to

## 2023-12-19 NOTE — PROGRESS NOTES
use of UE support  Comments:    Other Therapeutic Interventions        Second Session  Pt met seated in recliner - pt pleasant and agreeable to therapy. Pt denied pain. Pt amb to/from therapy room and ADL suite this date w/ CGA and use of RW. Sit><stands completed throughout session w/ SBA and good hand placement. Tub transfer w/ use of TTB completed this date x2 SUP progressing to Abhijit. Ed provided and verb understanding from pt received regarding recommended SUP for tub transfer and showering at d/c. Cog problem solving and sequencing task completed this date seated at table top. Pt completed drawer Micro[employer] activity to organize \"junk drawer\" to match image. Pt required extensive time for task completion and mod cues to problem solve locations of 4 items. Pt required 12:15 min for task completion w/ fair cog problem solving. Pt left seated in recliner w/ chair alarm, call light, and all other needs within reach.        Cognition  Overall Cognitive Status: Impaired  Arousal/Alertness: appropriate responses to stimuli  Following Commands: follows one step commands with repetition, follows one step commands with increased time  Attention Span: appears intact  Memory: decreased recall of recent events, decreased short term memory  Safety Judgement: decreased awareness of need for assistance, decreased awareness of need for safety  Problem Solving: assistance required to generate solutions, assistance required to implement solutions, decreased awareness of errors  Insights: decreased awareness of deficits  Initiation: requires cues for some  Sequencing: requires cues for all  Comments: pt very impulsive; maximal cues for sequencing, safety, and motor planning; increased time for all motor planning; deficits in working and short term memory  Orientation:    oriented to person, oriented to place, oriented to situation, and disoriented to time   2026   Command Following:   impaired     Education  Barriers To

## 2023-12-19 NOTE — PROGRESS NOTES
[facility] Renal ARU Note    Patient Active Problem List   Diagnosis    HLD (hyperlipidemia)    Essential hypertension    Coronary artery disease due to lipid rich plaque    Cardiac dysrhythmia    Diabetes mellitus    Paroxysmal atrial fibrillation (HCC)    Renal mass    Cerebrovascular accident (CVA) ([address])    Remote history of stroke    Benign essential HTN    PAF (paroxysmal atrial fibrillation) (HCC)    CASTRO (acute kidney injury) ([address])    LBBB (left bundle branch block)    H/O right nephrectomy    Acute anemia    Acute ischemic stroke (HCC)    Overweight (BMI 25.0-29. 9)    AIN (acute interstitial nephritis)    Sterile pyuria    Current chronic use of systemic steroids    H/O systemic steroid therapy    Diabetes education, encounter for       Past Medical History:   has a past medical history of Atrial fibrillation ([address]), CAD (coronary artery disease), Cancer of kidney ([address]), Diabetes mellitus ([address]), Hyperlipidemia, Hypertension, Prostate cancer ([address]), and Right renal mass. Past Social History:   reports that he quit smoking about 54 years ago. His smoking use included cigarettes. He started smoking about 56 years ago. He has never used smokeless tobacco. He reports current alcohol use. He reports that he does not use drugs. Subjective:    Urinating well  Crea better  No complaints. Review of Systems   Constitutional:  Positive for fatigue. Negative for activity change, appetite change, chills, fever and unexpected weight change. HENT:  Negative for congestion and facial swelling. Eyes:  Negative for photophobia, discharge and redness. Respiratory:  Negative for cough, chest tightness and shortness of breath. Cardiovascular:  Negative for chest pain, palpitations and leg swelling. Gastrointestinal:  Negative for abdominal distention, abdominal pain, blood in stool, constipation, diarrhea, nausea and vomiting.    Endocrine: Negative for cold intolerance, heat intolerance and

## 2023-12-19 NOTE — PLAN OF CARE
Problem: Discharge Planning  Goal: Discharge to home or other facility with appropriate resources  Outcome: Progressing     Problem: Safety - Adult  Goal: Free from fall injury  12/19/2023 0017 by Blanche Harkins RN  Outcome: Progressing     Problem: ABCDS Injury Assessment  Goal: Absence of physical injury  12/19/2023 0017 by Blnache Harkins, RN  Outcome: Progressing  Flowsheets (Taken 12/19/2023 0015)  Absence of Physical Injury: Implement safety measures based on patient assessment

## 2023-12-19 NOTE — PLAN OF CARE
Problem: Safety - Adult  Goal: Free from fall injury  Outcome: Progressing     Problem: ABCDS Injury Assessment  Goal: Absence of physical injury  Outcome: Progressing  Flowsheets (Taken 12/19/2023 0015)  Absence of Physical Injury: Implement safety measures based on patient assessment

## 2023-12-20 LAB
ANION GAP SERPL CALCULATED.3IONS-SCNC: 8 MMOL/L (ref 3–16)
BUN SERPL-MCNC: 78 MG/DL (ref 7–20)
CALCIUM SERPL-MCNC: 8.6 MG/DL (ref 8.3–10.6)
CHLORIDE SERPL-SCNC: 108 MMOL/L (ref 99–110)
CO2 SERPL-SCNC: 24 MMOL/L (ref 21–32)
CREAT SERPL-MCNC: 3.3 MG/DL (ref 0.8–1.3)
GFR SERPLBLD CREATININE-BSD FMLA CKD-EPI: 19 ML/MIN/{1.73_M2}
GLUCOSE BLD-MCNC: 163 MG/DL (ref 70–99)
GLUCOSE BLD-MCNC: 245 MG/DL (ref 70–99)
GLUCOSE BLD-MCNC: 335 MG/DL (ref 70–99)
GLUCOSE SERPL-MCNC: 154 MG/DL (ref 70–99)
PERFORMED ON: ABNORMAL
POTASSIUM SERPL-SCNC: 3.9 MMOL/L (ref 3.5–5.1)
SODIUM SERPL-SCNC: 140 MMOL/L (ref 136–145)

## 2023-12-20 PROCEDURE — 97129 THER IVNTJ 1ST 15 MIN: CPT

## 2023-12-20 PROCEDURE — 97116 GAIT TRAINING THERAPY: CPT

## 2023-12-20 PROCEDURE — 97110 THERAPEUTIC EXERCISES: CPT

## 2023-12-20 PROCEDURE — 36415 COLL VENOUS BLD VENIPUNCTURE: CPT

## 2023-12-20 PROCEDURE — 92507 TX SP LANG VOICE COMM INDIV: CPT

## 2023-12-20 PROCEDURE — 97530 THERAPEUTIC ACTIVITIES: CPT

## 2023-12-20 PROCEDURE — 6370000000 HC RX 637 (ALT 250 FOR IP): Performed by: INTERNAL MEDICINE

## 2023-12-20 PROCEDURE — 97535 SELF CARE MNGMENT TRAINING: CPT

## 2023-12-20 PROCEDURE — 1280000000 HC REHAB R&B

## 2023-12-20 PROCEDURE — 6370000000 HC RX 637 (ALT 250 FOR IP): Performed by: PHYSICAL MEDICINE & REHABILITATION

## 2023-12-20 PROCEDURE — 6370000000 HC RX 637 (ALT 250 FOR IP): Performed by: STUDENT IN AN ORGANIZED HEALTH CARE EDUCATION/TRAINING PROGRAM

## 2023-12-20 PROCEDURE — 97130 THER IVNTJ EA ADDL 15 MIN: CPT

## 2023-12-20 PROCEDURE — 80048 BASIC METABOLIC PNL TOTAL CA: CPT

## 2023-12-20 PROCEDURE — 2580000003 HC RX 258: Performed by: PHYSICAL MEDICINE & REHABILITATION

## 2023-12-20 RX ORDER — PREDNISONE 10 MG/1
10 TABLET ORAL DAILY
Status: DISCONTINUED | OUTPATIENT
Start: 2024-01-09 | End: 2023-12-21 | Stop reason: HOSPADM

## 2023-12-20 RX ORDER — PREDNISONE 20 MG/1
20 TABLET ORAL DAILY
Qty: 14 TABLET | Refills: 0 | Status: SHIPPED | OUTPATIENT
Start: 2023-12-26 | End: 2024-01-09

## 2023-12-20 RX ORDER — PREDNISONE 10 MG/1
10 TABLET ORAL DAILY
Qty: 7 TABLET | Refills: 0 | Status: SHIPPED | OUTPATIENT
Start: 2024-01-09 | End: 2024-01-16

## 2023-12-20 RX ORDER — PREDNISONE 20 MG/1
20 TABLET ORAL DAILY
Status: DISCONTINUED | OUTPATIENT
Start: 2023-12-26 | End: 2023-12-21 | Stop reason: HOSPADM

## 2023-12-20 RX ORDER — PREDNISONE 20 MG/1
40 TABLET ORAL DAILY
Qty: 10 TABLET | Refills: 0 | Status: SHIPPED | OUTPATIENT
Start: 2023-12-21 | End: 2023-12-26

## 2023-12-20 RX ORDER — PREDNISONE 20 MG/1
40 TABLET ORAL DAILY
Status: DISCONTINUED | OUTPATIENT
Start: 2023-12-21 | End: 2023-12-21 | Stop reason: HOSPADM

## 2023-12-20 RX ORDER — PREDNISONE 20 MG/1
40 TABLET ORAL DAILY
Status: DISCONTINUED | OUTPATIENT
Start: 2023-12-20 | End: 2023-12-20 | Stop reason: CLARIF

## 2023-12-20 RX ORDER — PREDNISONE 20 MG/1
40 TABLET ORAL DAILY
Qty: 10 TABLET | Refills: 0 | Status: SHIPPED | OUTPATIENT
Start: 2023-12-20 | End: 2023-12-25

## 2023-12-20 RX ADMIN — PREDNISONE 30 MG: 20 TABLET ORAL at 10:19

## 2023-12-20 RX ADMIN — APIXABAN 5 MG: 5 TABLET, FILM COATED ORAL at 10:19

## 2023-12-20 RX ADMIN — ATOVAQUONE 1500 MG: 750 SUSPENSION ORAL at 10:19

## 2023-12-20 RX ADMIN — Medication 1 TABLET: at 20:42

## 2023-12-20 RX ADMIN — Medication 1 TABLET: at 10:19

## 2023-12-20 RX ADMIN — APIXABAN 5 MG: 5 TABLET, FILM COATED ORAL at 20:45

## 2023-12-20 RX ADMIN — MELATONIN TAB 3 MG 6 MG: 3 TAB at 20:45

## 2023-12-20 RX ADMIN — AMLODIPINE BESYLATE 5 MG: 5 TABLET ORAL at 10:19

## 2023-12-20 RX ADMIN — METOPROLOL SUCCINATE 100 MG: 50 TABLET, EXTENDED RELEASE ORAL at 10:19

## 2023-12-20 RX ADMIN — Medication 10 ML: at 10:21

## 2023-12-20 RX ADMIN — ATORVASTATIN CALCIUM 40 MG: 40 TABLET, FILM COATED ORAL at 20:45

## 2023-12-20 RX ADMIN — INSULIN GLARGINE 20 UNITS: 100 INJECTION, SOLUTION SUBCUTANEOUS at 20:40

## 2023-12-20 RX ADMIN — QUETIAPINE FUMARATE 25 MG: 25 TABLET ORAL at 20:44

## 2023-12-20 RX ADMIN — ASPIRIN 81 MG 81 MG: 81 TABLET ORAL at 10:19

## 2023-12-20 RX ADMIN — DOXAZOSIN 8 MG: 4 TABLET ORAL at 20:51

## 2023-12-20 RX ADMIN — PANTOPRAZOLE SODIUM 40 MG: 40 TABLET, DELAYED RELEASE ORAL at 05:57

## 2023-12-20 NOTE — PLAN OF CARE
Problem: Discharge Planning  Goal: Discharge to home or other facility with appropriate resources  Outcome: Progressing  Flowsheets (Taken 12/20/2023 1015)  Discharge to home or other facility with appropriate resources: Identify barriers to discharge with patient and caregiver     Problem: Safety - Adult  Goal: Free from fall injury  Outcome: Progressing  Flowsheets (Taken 12/20/2023 1152)  Free From Fall Injury: Instruct family/caregiver on patient safety     Problem: Pain  Goal: Verbalizes/displays adequate comfort level or baseline comfort level  Recent Flowsheet Documentation  Taken 12/20/2023 1015 by Soren Quinones RN  Verbalizes/displays adequate comfort level or baseline comfort level: Encourage patient to monitor pain and request assistance     Problem: ABCDS Injury Assessment  Goal: Absence of physical injury  Outcome: Progressing  Flowsheets (Taken 12/20/2023 1152)  Absence of Physical Injury: Implement safety measures based on patient assessment

## 2023-12-20 NOTE — PROGRESS NOTES
[address] [facility] Department   Phone: [phone]    Physical Therapy    [] Initial Evaluation            [x] Daily Treatment Note         [x] Discharge Summary      Patient: Erin Chaudhry   : 1950   MRN: [id number]   Date of Service:  2023  Admitting Diagnosis: Acute ischemic stroke [facility])    Current Admission Summary: Erin Chaudhry is a 75 y.o. male who presents to the emergency department due to feeling weak and fatigued over the last 10 days. He has been going through chemotherapy due to prostate cancer. States that he only has 1 kidney currently as he had cancer spread to his kidney as well. He states that his primary care doctor told him to come to the emergency room due to his numbers being low he is uncertain if it is just his hemoglobin or his kidney function exactly he believes that he was told it was his kidney function but he thinks it may be more. Patient denies any fevers or chills. Denies any cough or shortness of breath. He states that he just finished a round of chemotherapy yesterday. Patient's MRI scan of brain was done and revealed infarct in the right temporal lobe. Previous MRI scan done 2 months ago revealed acute right cerebellar infarction. Past Medical History:  has a past medical history of Atrial fibrillation ([address]), CAD (coronary artery disease), Cancer of kidney ([address]), Diabetes mellitus ([address]), Hyperlipidemia, Hypertension, Prostate cancer ([address]), and Right renal mass. Past Surgical History:  has a past surgical history that includes hernia repair; knee surgery; Cholecystectomy; Nasal septum surgery; eye surgery (Left, 2018); Kidney removal (Right, 2022); and Cataract removal (Bilateral).   Discharge Recommendations: Home with Home Health PT and family assist PRN  DME Required For Discharge: rolling walker   Precautions/Restrictions: high fall risk  Weight Bearing Restrictions: no restrictions    Required

## 2023-12-20 NOTE — PROGRESS NOTES
aware of errors and makes attempts to self correct via strategies. Recommend ongoing speech therapy at discharge to further target speech-language deficits and maximize independence in home setting. Plan  Frequency: 60 minutes/day; 5 days per week, as tolerated, until goals met, or discharged from ARU. Therapeutic Interventions:  Cognitive-Linguistic intervention , Compensatory Cognitive intervention , and Patient/ Family education   Discharge Recommendations: Home with assistance  Continued SLP at Discharge: Yes     Goals  Patient Goals: \"To get healthy. \"   Time Frame: 7-14 days     Pt will complete word associative tasks to improve mental flexibility, complex thinking, and working memory skills with >85% accuracy. Goal not met  Pt will improve verbal expression for functional expression via graded tasks to > 80% acc, min cues. Goal not met  Pt with improve orientation and functional recall with use of cognitive aids as needed to >80% acc. Goal met  Pt will complete functional problem solving tasks with >80% acc given min cues. Goal met  Pt will complete graded auditory/ reading comprehension tasks with > 80% accuracy. Goal not met    Therapy Session Time      Session 1 Session 2   Time In 0830 1305   Time Out 0900 1335   Time Code Minutes 15 30   Individual Minutes 30 30     Timed Code Treatment Minutes: 45  Total Treatment Minutes:  60    Electronically Signed By:   Session 1:   Sergey Petty M.A., [address]  Speech-Language Pathologist    Session 2:   Kwaku Harley M.A.  CCC-SLP S.P. [id number]  Speech-Language Pathologist   12/20/2023 1:32 PM

## 2023-12-20 NOTE — DISCHARGE INSTRUCTIONS
We hope your stay on rehab has exceeded your expectations. Once again the entire Acute Rehab Staff at [facility] wish to thank you for allowing us the privilege to care for you. A few days after you are discharged from Rehab, you will receive a survey [employer]) in the mail or through email. This is a nationally distributed survey sent to thousands of rehab patients throughout the nation. It is very important to everyone on the rehab unit that we receive feedback based on your experience. Thank you, we wish you good health always,         Acute Rehab Team      Hospital Preference:     [facility] [address] Diagnosis/Conditions    _______________________ (free text)    Emergency Contact:    ________________________________________Phone#________________________      Advanced Directives:    Code Status: ?  []  Full Code  ? []  DNR  ? []  [facility]  ? []  [facility] - Arrest    (as of date of discharge:  _________)      Medical POA: ?  []   Yes ______________________________ ?   []   No                                       (Name and phone number)                     Living Will:   ?   []   Yes    ?  []   No        Insurance Information:    _______________________ (free text)      Individual Responsible  for the coordination of the discharge/follow up:    ______________________________________________________    Functional Status:    VISUAL DEFICITS:    Yes []  No  []       If yes, assisted device:   Wears Glasses Yes []  No  []  Wears Contacts  Yes []  No  []  Legally Blind Yes []  No  []    HEARING DEFICITS:    Yes []  No  []       If yes, assisted device:   Wears Hearing Aids Yes []  No  []  Pocket Talker  Yes []  No  []       Physical Therapist & Contact #: Mirta Ralph PT, [facility] - [phone]  [facility] E #: SELENE Lopez OTR/L, [id number] [phone]  Speech Therapist & Contact #:       Activities of Daily Living:     ADL's -

## 2023-12-20 NOTE — PATIENT CARE CONFERENCE
[facility]  Inpatient Rehabilitation  Weekly Team Conference Note    Patient Name: Ravindra Wilburn        MRN: [id number]    : 1950  (75 y.o.)  Gender: male           The team conference for this patient was held on 23 at 1000 am and led by:  Lonny Wild. MD Stephanie     CASE MANAGEMENT:  Assessment:   Patient is a 75year old male who admitted to ARU on 12/10/2023 with the admitting diagnosis of Acute ischemic stroke ([address] ) . Patient resides at home with his spouse and patient reports that he has children that provide support and care as needed. Patient reports that he was independent with ADLs and IADLs prior to admission and had no need for community support programs or DME. .Patient is retired and is an active . Patient continues to benefit from skilled PT/OT/SLP to promote increased safety and independence in order to return to his prior level of functioning. Patient anticipates discharging home with home health care services and with recommended DME. PHYSICAL THERAPY:     Bed Mobility:  Supine to Sit: Independent  Sit to Supine: Independent  Rolling Left: Independent  Rolling Right: Independent  Comments: Completed on flat bed without HR assist     Transfers:  Sit to stand transfer: modified independent  Stand to sit transfer: modified independent  Stand step transfer: modified independent  Car transfer: modified independent  Comments: All transfers completed with RW. Ambulation Trial 1:   Surface:level surface  Assistive Device: rolling walker  Assistance: stand by assistance (modified independent for straight path ambulation, ongoing SBA for turning secondary to fast rate of turning and periods of feet leaving walker ALFREDO)  Distance: 300 ft + 100 ft x 3 completions  Gait Mechanics: Reciprocal pattern with mild increase in forward flexed posture. Occasional VC to remain inside walker ALFREDO while turning.   Comments:  T-band added to RW to facilitate positioning within

## 2023-12-20 NOTE — CARE COORDINATION
12/20/23 1202   IMM Letter   IMM Letter given to Patient/Family/Significant other/Guardian/POA/by: 2nd IMM Letter given to patient by SW/CM.  [facility]   IMM Letter date given: 12/20/23   IMM Letter time given: 9806

## 2023-12-20 NOTE — PROGRESS NOTES
[address] [facility] Department   Phone: [phone]    Occupational Therapy    [] Initial Evaluation            [x] Daily Treatment Note         [] Discharge Summary      Patient: Barbie Mancilla   : 1950   MRN: [id number]   Date of Service:  2023    Admitting Diagnosis:  Acute ischemic stroke [facility])  Current Admission Summary:   Barbie Mancilla is a 75 y.o. male who presents to the emergency department due to feeling weak and fatigued over the last 10 days. He has been going through chemotherapy due to prostate cancer. States that he only has 1 kidney currently as he had cancer spread to his kidney as well. He states that his primary care doctor told him to come to the emergency room due to his numbers being low he is uncertain if it is just his hemoglobin or his kidney function exactly he believes that he was told it was his kidney function but he thinks it may be more. Patient denies any fevers or chills. Denies any cough or shortness of breath. He states that he just finished a round of chemotherapy yesterday. Patient's MRI scan of brain was done and revealed infarct in the right temporal lobe. Previous MRI scan done 2 months ago revealed acute right cerebellar infarction. Past Medical History:  has a past medical history of Atrial fibrillation ([address]), CAD (coronary artery disease), Cancer of kidney ([address]), Diabetes mellitus ([address]), Hyperlipidemia, Hypertension, Prostate cancer ([address]), and Right renal mass. Past Surgical History:  has a past surgical history that includes hernia repair; knee surgery; Cholecystectomy; Nasal septum surgery; eye surgery (Left, 2018); Kidney removal (Right, 2022); and Cataract removal (Bilateral).     Discharge Recommendations: Home with HHOT and supervision/assist from family    DME Required For Discharge: shower chair with back, rolling walker, rolling walker basket      Precautions/Restrictions: high fall risk  Weight

## 2023-12-20 NOTE — PROGRESS NOTES
nephrectomy. Assessment/Plan:  CASTRO on CKD 3b-baseline crea 1.7-2. 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 Creatinine better today with good blood pressure and no change in urine output. Hold renal biopsy for now. On GI and bone prophylaxis. Discussed with Oncology about steroid tapering. Prednisone 40mg daily for 5 more days, then 20mg daily for 2 weeks then 10mg daily. Renal US showed no obstruction. Electroytes acceptable. HTN-better controlled. Increased Metoprolol XL to 100mg daily with higher HR. Added Amlodipine 5mg daily. Anemia-follow Hgb. Heme following. POONAM subQ given 12/14/23  H/O Renal CA  H/O Prostate CA  H/O A. Fib-rate controlled  H/O DM 2  S/P Right Nephrectomy  Fatigue-now in ARU. Planning for D/C on Thu.  Bambi for D/C from renal perspective and f/u in office in 2 weeks.      Kandace Orr MD

## 2023-12-20 NOTE — PLAN OF CARE
Problem: Discharge Planning  Goal: Discharge to home or other facility with appropriate resources  Recent Flowsheet Documentation  Taken 12/19/2023 2049 by Yan Ramírez RN  Discharge to home or other facility with appropriate resources: Identify barriers to discharge with patient and caregiver  12/19/2023 1150 by Marielena Gong RN  Outcome: Progressing     Problem: Safety - Adult  Goal: Free from fall injury  12/19/2023 1150 by Marielena Gong RN  Outcome: Progressing     Problem: ABCDS Injury Assessment  Goal: Absence of physical injury  12/19/2023 1150 by Marielena Gong RN  Outcome: Progressing

## 2023-12-21 VITALS
OXYGEN SATURATION: 98 % | HEIGHT: 75 IN | WEIGHT: 217 LBS | HEART RATE: 75 BPM | DIASTOLIC BLOOD PRESSURE: 83 MMHG | RESPIRATION RATE: 18 BRPM | TEMPERATURE: 97.1 F | SYSTOLIC BLOOD PRESSURE: 165 MMHG | BODY MASS INDEX: 26.98 KG/M2

## 2023-12-21 LAB
GLUCOSE BLD-MCNC: 131 MG/DL (ref 70–99)
PERFORMED ON: ABNORMAL

## 2023-12-21 PROCEDURE — 6370000000 HC RX 637 (ALT 250 FOR IP): Performed by: PHYSICAL MEDICINE & REHABILITATION

## 2023-12-21 PROCEDURE — 2580000003 HC RX 258: Performed by: PHYSICAL MEDICINE & REHABILITATION

## 2023-12-21 PROCEDURE — 6370000000 HC RX 637 (ALT 250 FOR IP): Performed by: INTERNAL MEDICINE

## 2023-12-21 RX ADMIN — ATOVAQUONE 1500 MG: 750 SUSPENSION ORAL at 09:03

## 2023-12-21 RX ADMIN — Medication 1 TABLET: at 09:04

## 2023-12-21 RX ADMIN — PANTOPRAZOLE SODIUM 40 MG: 40 TABLET, DELAYED RELEASE ORAL at 06:29

## 2023-12-21 RX ADMIN — APIXABAN 5 MG: 5 TABLET, FILM COATED ORAL at 09:05

## 2023-12-21 RX ADMIN — ASPIRIN 81 MG 81 MG: 81 TABLET ORAL at 09:05

## 2023-12-21 RX ADMIN — AMLODIPINE BESYLATE 5 MG: 5 TABLET ORAL at 09:04

## 2023-12-21 RX ADMIN — METOPROLOL SUCCINATE 100 MG: 50 TABLET, EXTENDED RELEASE ORAL at 09:04

## 2023-12-21 RX ADMIN — Medication 10 ML: at 09:05

## 2023-12-21 RX ADMIN — PREDNISONE 40 MG: 20 TABLET ORAL at 09:04

## 2023-12-21 NOTE — PROGRESS NOTES
[facility] Renal ARU Note    Patient Active Problem List   Diagnosis    HLD (hyperlipidemia)    Essential hypertension    Coronary artery disease due to lipid rich plaque    Cardiac dysrhythmia    Diabetes mellitus    Paroxysmal atrial fibrillation (HCC)    Renal mass    Cerebrovascular accident (CVA) ([address])    Remote history of stroke    Benign essential HTN    PAF (paroxysmal atrial fibrillation) (HCC)    CASTRO (acute kidney injury) ([address])    LBBB (left bundle branch block)    H/O right nephrectomy    Acute anemia    Acute ischemic stroke (HCC)    Overweight (BMI 25.0-29. 9)    AIN (acute interstitial nephritis)    Sterile pyuria    Current chronic use of systemic steroids    H/O systemic steroid therapy    Diabetes education, encounter for       Past Medical History:   has a past medical history of Atrial fibrillation ([address]), CAD (coronary artery disease), Cancer of kidney ([address]), Diabetes mellitus ([address]), Hyperlipidemia, Hypertension, Prostate cancer ([address]), and Right renal mass. Past Social History:   reports that he quit smoking about 54 years ago. His smoking use included cigarettes. He started smoking about 56 years ago. He has never used smokeless tobacco. He reports current alcohol use. He reports that he does not use drugs. Subjective:    Urinating well  Crea better  No complaints  For D/C today      Review of Systems   Constitutional:  Positive for fatigue. Negative for activity change, appetite change, chills, fever and unexpected weight change. HENT:  Negative for congestion and facial swelling. Eyes:  Negative for photophobia, discharge and redness. Respiratory:  Negative for cough, chest tightness and shortness of breath. Cardiovascular:  Negative for chest pain, palpitations and leg swelling. Gastrointestinal:  Negative for abdominal distention, abdominal pain, blood in stool, constipation, diarrhea, nausea and vomiting.    Endocrine: Negative for cold intolerance, heat

## 2023-12-21 NOTE — PROGRESS NOTES
Patient's wife instead that she would like to  the discharge medications from outpatient pharmacy and not [facility] where [facility] were originally [facility], charge nurse called the outpatient pharmacy and requested if they can call [facility] and get the prescription sent to the outpatient pharmacy. Patient will  the prescriptions from outpatient pharmacy after 1400.

## 2023-12-21 NOTE — PROGRESS NOTES
Morning assessment complete, alert and oriented, The care plan and education has been reviewed and mutually agreed upon with the patient. Pt remains free from falls. Fall precautions in place--bed in lowest position, call light within reach, bed alarm in use. Pt aware to call for assistance before getting up.

## 2023-12-21 NOTE — CARE COORDINATION
Discharge Planning:     (CM) called [address] [facility]) staff, Sidney Murillo ([phone]), and left a voicemail informing of patient's discharge today. Voicemail left for [facility] staff, Rsoalina Brownlee ([phone]), requesting a callback to confirm that walker was delivered to patient.         Veronica GILLILAND, AKILA, [facility] -   [phone]    Electronically signed by TALAT Justin on 12/21/2023 at 8:32 AM

## 2023-12-21 NOTE — CARE COORDINATION
Davy received a referral to this patient for a rolling walker. Rolling walker has been delivered to the patient's room. Thank you for the referral.  Electronically signed by Geraldine Dailey on 12/21/2023 at 9:30 AM  Cell ph# [phone]    NOTE: After 5:00 pm, Weekends, Holidays: Call [facility] at [phone] to coordinate delivery of home medical equipment.

## 2023-12-21 NOTE — PROGRESS NOTES
ARU Discharge Assessment    Transportation  \"Has lack of transportation kept you from medical appointments, meetings, work, or from getting things needed for daily living? \"Check all that apply:  [] A.  Yes, it has kept me from medical appointments or from getting my medications  [] B.  Yes, it has kept me from non-medical meetings, appointments, work, or from getting things that I need  [x] C.  No  [] X. Patient unable to respond  [] Y. Patient declines to respond    Provision of Current Reconciled Medication List to Subsequent Provider at Discharge  [] No, current reconciled medication list not provided to the subsequent provider. [x] Yes, current reconciled medication list provided to the subsequent provider. (**Select route of transmission below**)   [x] Via Electronic Health Record   [] [facility] [employer]  [] Verbal (e.g. in person, telephone, video conferencing)  [] Paper-based (e.g. fax, copies, printouts)   [] Other Methods (e.g. texting, email, CDs)    Provision of Current Reconciled Medication List to Patient at Discharge  [] No, current reconciled medication list not provided to the patient, family and/or caregiver. [x] Yes, current reconciled medication list provided to the patient, family and/or caregiver.  (**Select route of transmission below**)   [] Via Electronic Health Record (e.g., electronic access to patient portal)   [] Via [employer]  [x] Verbal (e.g. in person, telephone, video conferencing)  [x] Paper-based (e.g. fax, copies, printouts)   [] Other Methods (e.g. texting, email, CDs)    Health Literacy  \"How often do you need to have someone help you when you read instructions, pamphlets, or other written material from your doctor or pharmacy? \"  []  0. Never  [x]  1. Rarely  []  2. Sometimes  []  3. Often  []  4. Always  []  7. Patient declines to respond  []  8.   Patient unable to respond    BIMS - **Must be completed in the

## 2023-12-21 NOTE — DISCHARGE SUMMARY
Physical Medicine & Rehabilitation  Discharge Summary     Patient Identification:  Fernanda Bell  : 1950  Admit date: 12/10/2023  Discharge date: 2023  Attending provider: Christina John DO        Primary care provider: JANET Baires CNP     Discharge Diagnoses:   Patient Active Problem List   Diagnosis    HLD (hyperlipidemia)    Essential hypertension    Coronary artery disease due to lipid rich plaque    Cardiac dysrhythmia    Diabetes mellitus    Paroxysmal atrial fibrillation ([address])    Renal mass    Cerebrovascular accident (CVA) ([address])    Remote history of stroke    Benign essential HTN    PAF (paroxysmal atrial fibrillation) ([address])    CASTRO (acute kidney injury) ([address])    LBBB (left bundle branch block)    H/O right nephrectomy    Acute anemia    Acute ischemic stroke ([address])    Overweight (BMI 25.0-29. 9)    AIN (acute interstitial nephritis)    Sterile pyuria    Current chronic use of systemic steroids    H/O systemic steroid therapy    Diabetes education, encounter for       Discharge Functional Status:      Physical therapy:  Supine to Sit: Independent  Sit to Supine: Independent      Sit to Stand: Independent  Chair/Bed to Chair Transfer: Independent  Car Transfer: Independent     Ambulation 10 ft: Independent  Ambulation 50 ft: Supervision or touching assistance  Ambulation 150 ft: Supervision or touching assistance  Stairs - 1 Step: Independent  Stairs - 4 Step: Independent  Stairs - 12 Step: Independent    Occupational therapy:  Eating: Independent  Oral Hygiene: Independent  Bathing: Supervision or touching assistance  Upper Body Dressing: Independent  Lower Body Dressing: Supervision or touching assistance     Toilet Transfer: Supervision or touching assistance  Toilet Hygiene: Supervision or touching assistance    Speech therapy:    Pt presents with moderate cognitive-linguistic deficits characterized by temporal disorientation, reduced attention, memory, problem solving, and

## 2023-12-21 NOTE — PROGRESS NOTES
CLINICAL PHARMACY NOTE: MEDS TO BEDS    Total # of Prescriptions Filled: 8   The following medications were delivered to the patient:  Levemir flexpen  Stimulant laxative 8.6/50mg  Miralax powder  Doxazosin 4mg (did not have 8mg in stock, patient's daughter was made aware)  Amlodipine 5mg  Eliquis 5mg  Pen needles  Prednisone 20mg    Additional Documentation:     Medications were picked up in the Outpatient Pharmacy by patient's daughter

## 2023-12-21 NOTE — CARE COORDINATION
Team conference held today. Pt is discharging today with [facility]. Called Rakan, [phone], with [facility] and informed of discharge. He asked to fax all orders and discharge paperwork to [phone] which was completed. Confirmed with [facility] that pt's walker was delivered to his room. No further case management needs identified at this time. Discharge Plan:  Home w/[facility] HH and walker from [facility].     Electronically signed by Jannie Harden MSW, LSW on 12/21/2023 at 11:07 AM

## 2024-01-04 ENCOUNTER — TELEPHONE (OUTPATIENT)
Dept: CARDIOLOGY CLINIC | Age: 74
End: 2024-01-04

## 2024-01-04 NOTE — TELEPHONE ENCOUNTER
Urgent MCOT received noting atrial fibrillation with rapid rate of 194 bpm .     Discussed with NPSR. Increase Toprol XL to 100 mg BID    LMOM for patient to return call

## 2024-01-05 RX ORDER — METOPROLOL SUCCINATE 100 MG/1
100 TABLET, EXTENDED RELEASE ORAL 2 TIMES DAILY
Qty: 180 TABLET | Refills: 1 | Status: SHIPPED | OUTPATIENT
Start: 2024-01-05

## 2024-01-05 NOTE — TELEPHONE ENCOUNTER
Spoke with the patient, he denies noticing episode that occurred yesterday. Advised per message below to increase Toprol XL to 100 mg BID. He voiced understanding stating he needs all prescriptions sent to mail order pharmacy ([facility]) Reports he is unable to get in contact with PCP office .     Call placed to PCP office. Spoke with Annette and advised her patient is needing staff to reach out in regards to refills he is requesting. She voiced understanding.

## 2024-01-11 PROCEDURE — 93228 REMOTE 30 DAY ECG REV/REPORT: CPT | Performed by: INTERNAL MEDICINE

## 2024-01-11 RX ORDER — METOPROLOL SUCCINATE 100 MG/1
100 TABLET, EXTENDED RELEASE ORAL 2 TIMES DAILY
Qty: 180 TABLET | Refills: 1 | Status: SHIPPED | OUTPATIENT
Start: 2024-01-11

## 2024-01-11 NOTE — TELEPHONE ENCOUNTER
Medication Question/Concern    What is the name of the medication you need to speak with someone about?  metoprolol succinate (TOPROL XL)   Dosage of the medication:  100 MG extended release tablet   How are you taking this medication:  Take 1 tablet by mouth in the morning and at bedtime   What issues/concerns are you having with this medication:  [facility] is calling to let us know pt is showing as having a sensitivity to beta blocker.    He is verifying we are aware of the sensitivity and that it is okay to send the script to the pt.    Reference number:  [id number]  They are placing the script on hold until they receive the okay to fill from us.    Please advise

## 2024-01-15 DIAGNOSIS — I48.91 ATRIAL FIBRILLATION, UNSPECIFIED TYPE (HCC): ICD-10-CM

## 2024-01-19 ENCOUNTER — TELEPHONE (OUTPATIENT)
Dept: CARDIOLOGY CLINIC | Age: 74
End: 2024-01-19

## 2024-01-19 NOTE — TELEPHONE ENCOUNTER
Dixie is asking about metoprolol medication. States on 1/5 pt states they have an allergy to betablocker. Please advise.

## 2024-01-23 ENCOUNTER — OFFICE VISIT (OUTPATIENT)
Dept: NEUROLOGY | Age: 74
End: 2024-01-23
Payer: MEDICARE

## 2024-01-23 VITALS — SYSTOLIC BLOOD PRESSURE: 125 MMHG | DIASTOLIC BLOOD PRESSURE: 85 MMHG | BODY MASS INDEX: 27.12 KG/M2 | WEIGHT: 217 LBS

## 2024-01-23 DIAGNOSIS — Z86.73 RECENT CEREBROVASCULAR ACCIDENT (CVA): Primary | ICD-10-CM

## 2024-01-23 DIAGNOSIS — E13.8 DM (DIABETES MELLITUS), SECONDARY, WITH COMPLICATIONS (HCC): ICD-10-CM

## 2024-01-23 DIAGNOSIS — I48.0 PAF (PAROXYSMAL ATRIAL FIBRILLATION) (HCC): ICD-10-CM

## 2024-01-23 DIAGNOSIS — I10 HTN (HYPERTENSION), BENIGN: ICD-10-CM

## 2024-01-23 DIAGNOSIS — D64.9 ANEMIA, UNSPECIFIED TYPE: ICD-10-CM

## 2024-01-23 PROCEDURE — 1123F ACP DISCUSS/DSCN MKR DOCD: CPT | Performed by: PSYCHIATRY & NEUROLOGY

## 2024-01-23 PROCEDURE — G8417 CALC BMI ABV UP PARAM F/U: HCPCS | Performed by: PSYCHIATRY & NEUROLOGY

## 2024-01-23 PROCEDURE — 3074F SYST BP LT 130 MM HG: CPT | Performed by: PSYCHIATRY & NEUROLOGY

## 2024-01-23 PROCEDURE — 3017F COLORECTAL CA SCREEN DOC REV: CPT | Performed by: PSYCHIATRY & NEUROLOGY

## 2024-01-23 PROCEDURE — 1036F TOBACCO NON-USER: CPT | Performed by: PSYCHIATRY & NEUROLOGY

## 2024-01-23 PROCEDURE — G8484 FLU IMMUNIZE NO ADMIN: HCPCS | Performed by: PSYCHIATRY & NEUROLOGY

## 2024-01-23 PROCEDURE — 99214 OFFICE O/P EST MOD 30 MIN: CPT | Performed by: PSYCHIATRY & NEUROLOGY

## 2024-01-23 PROCEDURE — G8427 DOCREV CUR MEDS BY ELIG CLIN: HCPCS | Performed by: PSYCHIATRY & NEUROLOGY

## 2024-01-23 PROCEDURE — 3046F HEMOGLOBIN A1C LEVEL >9.0%: CPT | Performed by: PSYCHIATRY & NEUROLOGY

## 2024-01-23 PROCEDURE — 3079F DIAST BP 80-89 MM HG: CPT | Performed by: PSYCHIATRY & NEUROLOGY

## 2024-01-23 RX ORDER — INSULIN GLARGINE 100 [IU]/ML
40 INJECTION, SOLUTION SUBCUTANEOUS NIGHTLY
COMMUNITY

## 2024-01-23 NOTE — PROGRESS NOTES
The patient came today for follow up regarding: Hospital follow-up and recent stroke    The patient was diagnosed recently with small strokes affecting right MCA and similar region.  He was seen over at [facility] twice over the last 2 or 3 months.  He was started on Eliquis and discharged home.  Patient was admitted recently at [facility] with flulike symptoms and was diagnosed with COVID.  Has been having difficulties with anemia since that time but no GI loss.  He came today for follow-up with his family.    He walks with his walker and he is on O2.  He denies today any new symptoms.  No headache, severe dysphagia or dysarthria.  No recent falling or injury blacking out.  He is on Eliquis and statin.  Blood sugar has been waxing and waning but not controlled.  He takes Norvasc and other blood pressure medications.  No sleep issues, insomnia parasomnia.  No severe memory impairment.  Other review of system was unremarkable.          Exam:   Constitutional:   Vitals:    01/23/24 1245   BP: 125/85   Weight: 98.4 kg (217 lb)       General appearance: Looks pale and short of breath.  .   Mental Status:   Oriented to person, place, problem, and time.    Memory: Good immediate recall.  Intact remote memory  Normal attention span and concentration.  Language: intact naming, repeating and fluency   Good fund of Knowledge. Aware of current events and vocabulary   Cranial Nerves:   II: Pupils: equal, round, reactive to light  III,IV,VI: Extra Ocular Movements are intact. No nystagmus  V: Facial sensation is intact   VII: Facial strength and movements: intact and symmetric  XII: Tongue movements are normal  Musculoskeletal: 5/5 in all 4 extremities.   Tone: Normal tone.   Coordination: no tremors or drift  DTR: symmetric 2 in UL and 1+ in his LL  Sensation: normal to all modalities in both arms and legs.  Gait/Posture: steady gait with his walker    ROS : A 10-14 system review of constitutional, cardiovascular,

## 2024-01-25 ENCOUNTER — OFFICE VISIT (OUTPATIENT)
Dept: CARDIOLOGY CLINIC | Age: 74
End: 2024-01-25
Payer: MEDICARE

## 2024-01-25 ENCOUNTER — TELEPHONE (OUTPATIENT)
Dept: CARDIOLOGY CLINIC | Age: 74
End: 2024-01-25

## 2024-01-25 DIAGNOSIS — I25.83 CORONARY ARTERY DISEASE DUE TO LIPID RICH PLAQUE: ICD-10-CM

## 2024-01-25 DIAGNOSIS — I25.10 CORONARY ARTERY DISEASE DUE TO LIPID RICH PLAQUE: ICD-10-CM

## 2024-01-25 DIAGNOSIS — R06.02 SOB (SHORTNESS OF BREATH): ICD-10-CM

## 2024-01-25 DIAGNOSIS — I48.0 PAROXYSMAL ATRIAL FIBRILLATION (HCC): Primary | ICD-10-CM

## 2024-01-25 DIAGNOSIS — I10 ESSENTIAL HYPERTENSION, BENIGN: ICD-10-CM

## 2024-01-25 DIAGNOSIS — E87.79 OTHER HYPERVOLEMIA: ICD-10-CM

## 2024-01-25 PROBLEM — D68.69 SECONDARY HYPERCOAGULABLE STATE (HCC): Status: ACTIVE | Noted: 2024-01-25

## 2024-01-25 PROCEDURE — 99214 OFFICE O/P EST MOD 30 MIN: CPT | Performed by: NURSE PRACTITIONER

## 2024-01-25 PROCEDURE — 3017F COLORECTAL CA SCREEN DOC REV: CPT | Performed by: NURSE PRACTITIONER

## 2024-01-25 PROCEDURE — 3074F SYST BP LT 130 MM HG: CPT | Performed by: NURSE PRACTITIONER

## 2024-01-25 PROCEDURE — 1123F ACP DISCUSS/DSCN MKR DOCD: CPT | Performed by: NURSE PRACTITIONER

## 2024-01-25 PROCEDURE — G8417 CALC BMI ABV UP PARAM F/U: HCPCS | Performed by: NURSE PRACTITIONER

## 2024-01-25 PROCEDURE — 1036F TOBACCO NON-USER: CPT | Performed by: NURSE PRACTITIONER

## 2024-01-25 PROCEDURE — G8484 FLU IMMUNIZE NO ADMIN: HCPCS | Performed by: NURSE PRACTITIONER

## 2024-01-25 PROCEDURE — G8427 DOCREV CUR MEDS BY ELIG CLIN: HCPCS | Performed by: NURSE PRACTITIONER

## 2024-01-25 PROCEDURE — 3078F DIAST BP <80 MM HG: CPT | Performed by: NURSE PRACTITIONER

## 2024-01-25 RX ORDER — ROSUVASTATIN CALCIUM 20 MG/1
20 TABLET, COATED ORAL DAILY
COMMUNITY

## 2024-01-25 RX ORDER — FUROSEMIDE 80 MG
80 TABLET ORAL DAILY
Qty: 30 TABLET | Refills: 3 | Status: SHIPPED | OUTPATIENT
Start: 2024-01-25

## 2024-01-25 RX ORDER — ZINC SULFATE 50(220)MG
50 CAPSULE ORAL DAILY
COMMUNITY

## 2024-01-25 NOTE — PROGRESS NOTES
[facility]     Outpatient Follow Up Note    CHIEF COMPLAINT / HPI: Hospital Follow Up secondary to atrial fibrillation    Hospital record has been reviewed  Hospital Course progressed as follows per discharge summary: 1/6 - 1/17/24    Danny Rock is 73 y.o. male With hx atrial fibrillation type 2 diabetes mellitus GERD hypertension hyperlipidemia prostate cancer renal cell cancer s/p right nephrectomy on immunotherapy recent CASTRO   ~presented to the ED with flulike symptoms. Patient states his wife has been sick for the past 1 week with coughing and wheezing last night patient started having coughing wheezing shortness of breath with exertion and rest patient does not normally wear oxygen brought to the emergency department found to have a COVID-positive denies any other symptoms at this time  On arrival vitals tachycardic  Labs showed BUN 56 serum creatinine 3.85 BNP elevated to 560 troponin elevated to 650 procalcitonin 0.39  WBC count 4.9 INR 1.3 D-dimer 470 flu negative RSV negative COVID-positive  Imaging showed chest x-ray showed extensive bilateral airspace disease throughout both lungs    Hospital Course:   COVID Infection.  Tested positive : 1/6/24     Decadron: since 1/7  Convalescent Plasma:N/A  Remdesivir: 1/7 - 1/10/24.  Baricitinib:n/a  Continue the rocephin, for 7 days  Continue zithromax, for 7 days  Acute hypoxic respiratory failure secondary to above  Pt was on BiPAP, weaned off   Sepsis due to Pneumonia, POA.  CASTRO on CKD III  HD started on 1/9/24, Last dialysis hemodialysis was on 1/13/24.After that we will hold further hemodialysis treatments and monitor for renal recovery. If by Tuesday, major improvement in kidney function we can plan to remove catheter for dialysis  Nephrology consulted  Baseline 1.7  Pt will come for HD catheter removal on Friday.Hold Eliquis.It can be restarted after HD catheter removal  A. Fib  Cardiology consulted.  Placed on cardizem & heparin

## 2024-01-25 NOTE — TELEPHONE ENCOUNTER
The patient was seen by NPTS today and she would like him to see LES in 4 weeks.  The patient can go to [place], [place] or ox if can be 5 weeks 02/2724 or 02/29/24.  Please advise a date/time to schedule.  Thank you    When scheduling, please call Daughter Mckenna (HIPAA) [phone]

## 2024-01-25 NOTE — PATIENT INSTRUCTIONS
Begin lasix / furosemide 80 mg daily     Non-fasting labs in one week    Weigh yourself daily and bring your readings with you to Dr. Boo's appt    Appt with Dr. Boo in 4 weeks

## 2024-01-26 VITALS
DIASTOLIC BLOOD PRESSURE: 70 MMHG | SYSTOLIC BLOOD PRESSURE: 134 MMHG | BODY MASS INDEX: 27.05 KG/M2 | HEART RATE: 87 BPM | WEIGHT: 216.38 LBS

## 2024-01-29 NOTE — TELEPHONE ENCOUNTER
LVM mariely Andres to call back and schedule 4 wk followup for Danny with Dr. Boo. There are appointments on 2/29 in [place] if still available.  kwan

## 2024-01-30 DIAGNOSIS — R06.02 SOB (SHORTNESS OF BREATH): ICD-10-CM

## 2024-01-30 LAB
ALBUMIN SERPL-MCNC: 3 G/DL (ref 3.4–5)
ALBUMIN/GLOB SERPL: 1.3 {RATIO} (ref 1.1–2.2)
ALP SERPL-CCNC: 88 U/L (ref 40–129)
ALT SERPL-CCNC: 18 U/L (ref 10–40)
ANION GAP SERPL CALCULATED.3IONS-SCNC: 14 MMOL/L (ref 3–16)
AST SERPL-CCNC: 18 U/L (ref 15–37)
BILIRUB SERPL-MCNC: 0.4 MG/DL (ref 0–1)
BUN SERPL-MCNC: 62 MG/DL (ref 7–20)
CALCIUM SERPL-MCNC: 9.5 MG/DL (ref 8.3–10.6)
CHLORIDE SERPL-SCNC: 98 MMOL/L (ref 99–110)
CO2 SERPL-SCNC: 25 MMOL/L (ref 21–32)
CREAT SERPL-MCNC: 4 MG/DL (ref 0.8–1.3)
GFR SERPLBLD CREATININE-BSD FMLA CKD-EPI: 15 ML/MIN/{1.73_M2}
GLUCOSE SERPL-MCNC: 205 MG/DL (ref 70–99)
NT-PROBNP SERPL-MCNC: ABNORMAL PG/ML (ref 0–449)
POTASSIUM SERPL-SCNC: 4.3 MMOL/L (ref 3.5–5.1)
PROT SERPL-MCNC: 5.3 G/DL (ref 6.4–8.2)
SODIUM SERPL-SCNC: 137 MMOL/L (ref 136–145)

## 2024-01-30 NOTE — TELEPHONE ENCOUNTER
LVM mariely Andres to call back and schedule 4 wk followup for Danny with Dr. Boo. There are appointments on 2/29 in [place] if still available.  kwan   [Obese] : obese [Poor Appearance] : well-appearing [Acute Distress] : not in acute distress [de-identified] : CONSTITUTIONAL: The patient is a very pleasant individual who is well-nourished and who appears stated age.\par PSYCHIATRIC: The patient is alert and oriented X 3 and in no apparent distress, and participates with orthopedic evaluation well.\par HEAD: Atraumatic and is nonsyndromic in appearance.\par EENT: No visible thyromegaly, EOMI.\par RESPIRATORY: Respiratory rate is regular, not dyspneic on examination.\par LYMPHATICS: There is no inguinal lymphadenopathy\par INTEGUMENTARY: Skin is clean, dry, and intact about the bilateral lower extremities and lumbar spine.\par VASCULAR: There is brisk capillary refill about the bilateral lower extremities.\par NEUROLOGIC: There are no pathologic reflexes. There is no decrease in sensation of the bilateral lower extremities on Wartenberg pinwheel examination. Deep tendon reflexes are well maintained at 2+/4 of the bilateral lower extremities and are symmetric.\par MUSCULOSKELETAL: There is no visible muscular atrophy. Manual motor strength is well maintained in the bilateral lower extremities. Range of motion of lumbar spine is well maintained. The patient ambulates in a non-myelopathic manner. Negative tension sign and straight leg raise bilaterally. Quad extension, ankle dorsiflexion, EHL, plantar flexion, and ankle eversion are well preserved. Normal secondary orthopaedic exam of bilateral hips, greater trochanteric area, knees and ankles \par \par Mechanically orientated low back pain [de-identified] : X-ray of the lumbar spine taken today shows isolated L5-S1 mild DDD.\par \par MRI from Standup MRI from 8/27/2019 shows L3-L4, L4-L5, L5-S1 DDD, worse at L5-S1.

## 2024-02-02 ENCOUNTER — HOSPITAL ENCOUNTER (INPATIENT)
Age: 74
LOS: 8 days | Discharge: HOME | End: 2024-02-10
Attending: INTERNAL MEDICINE | Admitting: INTERNAL MEDICINE
Payer: MEDICARE

## 2024-02-02 ENCOUNTER — APPOINTMENT (OUTPATIENT)
Dept: GENERAL RADIOLOGY | Age: 74
End: 2024-02-02
Payer: MEDICARE

## 2024-02-02 ENCOUNTER — APPOINTMENT (OUTPATIENT)
Dept: ULTRASOUND IMAGING | Age: 74
End: 2024-02-02
Payer: MEDICARE

## 2024-02-02 DIAGNOSIS — R91.8 RIGHT LOWER LOBE PULMONARY INFILTRATE: ICD-10-CM

## 2024-02-02 DIAGNOSIS — R73.9 HYPERGLYCEMIA: ICD-10-CM

## 2024-02-02 DIAGNOSIS — N18.9 ACUTE KIDNEY INJURY SUPERIMPOSED ON CKD (HCC): Primary | ICD-10-CM

## 2024-02-02 DIAGNOSIS — N17.9 ACUTE KIDNEY INJURY SUPERIMPOSED ON CKD (HCC): Primary | ICD-10-CM

## 2024-02-02 DIAGNOSIS — E11.649 HYPOGLYCEMIA ASSOCIATED WITH TYPE 2 DIABETES MELLITUS (HCC): ICD-10-CM

## 2024-02-02 DIAGNOSIS — E87.5 HYPERKALEMIA: ICD-10-CM

## 2024-02-02 LAB
ALBUMIN SERPL-MCNC: 3.2 G/DL (ref 3.4–5)
ALBUMIN/GLOB SERPL: 1.1 {RATIO} (ref 1.1–2.2)
ALP SERPL-CCNC: 96 U/L (ref 40–129)
ALT SERPL-CCNC: 16 U/L (ref 10–40)
ANION GAP SERPL CALCULATED.3IONS-SCNC: 12 MMOL/L (ref 3–16)
AST SERPL-CCNC: 16 U/L (ref 15–37)
BACTERIA URNS QL MICRO: ABNORMAL /HPF
BASOPHILS # BLD: 0.1 K/UL (ref 0–0.2)
BASOPHILS NFR BLD: 0.9 %
BILIRUB SERPL-MCNC: 0.3 MG/DL (ref 0–1)
BILIRUB UR QL STRIP.AUTO: NEGATIVE
BUN SERPL-MCNC: 67 MG/DL (ref 7–20)
CALCIUM SERPL-MCNC: 9.6 MG/DL (ref 8.3–10.6)
CHLORIDE SERPL-SCNC: 95 MMOL/L (ref 99–110)
CLARITY UR: CLEAR
CO2 SERPL-SCNC: 26 MMOL/L (ref 21–32)
COLOR UR: YELLOW
CREAT SERPL-MCNC: 4.1 MG/DL (ref 0.8–1.3)
DEPRECATED RDW RBC AUTO: 22 % (ref 12.4–15.4)
EOSINOPHIL # BLD: 0.1 K/UL (ref 0–0.6)
EOSINOPHIL NFR BLD: 1.3 %
EPI CELLS #/AREA URNS AUTO: 1 /HPF (ref 0–5)
GFR SERPLBLD CREATININE-BSD FMLA CKD-EPI: 14 ML/MIN/{1.73_M2}
GLUCOSE BLD-MCNC: 240 MG/DL (ref 70–99)
GLUCOSE BLD-MCNC: 304 MG/DL (ref 70–99)
GLUCOSE SERPL-MCNC: 330 MG/DL (ref 70–99)
GLUCOSE UR STRIP.AUTO-MCNC: 100 MG/DL
HCT VFR BLD AUTO: 28.8 % (ref 40.5–52.5)
HGB BLD-MCNC: 9.3 G/DL (ref 13.5–17.5)
HGB UR QL STRIP.AUTO: NEGATIVE
HYALINE CASTS #/AREA URNS AUTO: 1 /LPF (ref 0–8)
KETONES UR STRIP.AUTO-MCNC: NEGATIVE MG/DL
LEUKOCYTE ESTERASE UR QL STRIP.AUTO: ABNORMAL
LYMPHOCYTES # BLD: 1.3 K/UL (ref 1–5.1)
LYMPHOCYTES NFR BLD: 23.3 %
MCH RBC QN AUTO: 30.7 PG (ref 26–34)
MCHC RBC AUTO-ENTMCNC: 32.2 G/DL (ref 31–36)
MCV RBC AUTO: 95.3 FL (ref 80–100)
MONOCYTES # BLD: 0.5 K/UL (ref 0–1.3)
MONOCYTES NFR BLD: 8.8 %
NEUTROPHILS # BLD: 3.6 K/UL (ref 1.7–7.7)
NEUTROPHILS NFR BLD: 65.7 %
NITRITE UR QL STRIP.AUTO: NEGATIVE
NT-PROBNP SERPL-MCNC: ABNORMAL PG/ML (ref 0–449)
PERFORMED ON: ABNORMAL
PERFORMED ON: ABNORMAL
PH UR STRIP.AUTO: 5.5 [PH] (ref 5–8)
PHOSPHATE SERPL-MCNC: 5.5 MG/DL (ref 2.5–4.9)
PLATELET # BLD AUTO: 240 K/UL (ref 135–450)
PMV BLD AUTO: 7.3 FL (ref 5–10.5)
POTASSIUM SERPL-SCNC: 5.3 MMOL/L (ref 3.5–5.1)
PROCALCITONIN SERPL IA-MCNC: 0.22 NG/ML (ref 0–0.15)
PROT SERPL-MCNC: 6.2 G/DL (ref 6.4–8.2)
PROT UR STRIP.AUTO-MCNC: ABNORMAL MG/DL
RBC # BLD AUTO: 3.03 M/UL (ref 4.2–5.9)
RBC CLUMPS #/AREA URNS AUTO: 0 /HPF (ref 0–4)
SODIUM SERPL-SCNC: 133 MMOL/L (ref 136–145)
SP GR UR STRIP.AUTO: 1.01 (ref 1–1.03)
UA DIPSTICK W REFLEX MICRO PNL UR: YES
URN SPEC COLLECT METH UR: ABNORMAL
UROBILINOGEN UR STRIP-ACNC: 0.2 E.U./DL
WBC # BLD AUTO: 5.4 K/UL (ref 4–11)
WBC #/AREA URNS AUTO: 9 /HPF (ref 0–5)

## 2024-02-02 PROCEDURE — 83880 ASSAY OF NATRIURETIC PEPTIDE: CPT

## 2024-02-02 PROCEDURE — 93005 ELECTROCARDIOGRAM TRACING: CPT | Performed by: PHYSICIAN ASSISTANT

## 2024-02-02 PROCEDURE — 2580000003 HC RX 258: Performed by: INTERNAL MEDICINE

## 2024-02-02 PROCEDURE — 2500000003 HC RX 250 WO HCPCS: Performed by: INTERNAL MEDICINE

## 2024-02-02 PROCEDURE — 36415 COLL VENOUS BLD VENIPUNCTURE: CPT

## 2024-02-02 PROCEDURE — 6370000000 HC RX 637 (ALT 250 FOR IP): Performed by: INTERNAL MEDICINE

## 2024-02-02 PROCEDURE — 81001 URINALYSIS AUTO W/SCOPE: CPT

## 2024-02-02 PROCEDURE — 6370000000 HC RX 637 (ALT 250 FOR IP): Performed by: PHYSICIAN ASSISTANT

## 2024-02-02 PROCEDURE — 80053 COMPREHEN METABOLIC PANEL: CPT

## 2024-02-02 PROCEDURE — 1200000000 HC SEMI PRIVATE

## 2024-02-02 PROCEDURE — 76770 US EXAM ABDO BACK WALL COMP: CPT

## 2024-02-02 PROCEDURE — 84300 ASSAY OF URINE SODIUM: CPT

## 2024-02-02 PROCEDURE — 99285 EMERGENCY DEPT VISIT HI MDM: CPT

## 2024-02-02 PROCEDURE — 84145 PROCALCITONIN (PCT): CPT

## 2024-02-02 PROCEDURE — 71045 X-RAY EXAM CHEST 1 VIEW: CPT

## 2024-02-02 PROCEDURE — 87040 BLOOD CULTURE FOR BACTERIA: CPT

## 2024-02-02 PROCEDURE — 82570 ASSAY OF URINE CREATININE: CPT

## 2024-02-02 PROCEDURE — 82436 ASSAY OF URINE CHLORIDE: CPT

## 2024-02-02 PROCEDURE — 84156 ASSAY OF PROTEIN URINE: CPT

## 2024-02-02 PROCEDURE — 84100 ASSAY OF PHOSPHORUS: CPT

## 2024-02-02 PROCEDURE — 84133 ASSAY OF URINE POTASSIUM: CPT

## 2024-02-02 PROCEDURE — 85025 COMPLETE CBC W/AUTO DIFF WBC: CPT

## 2024-02-02 RX ORDER — ACETAMINOPHEN 650 MG/1
650 SUPPOSITORY RECTAL EVERY 6 HOURS PRN
Status: DISCONTINUED | OUTPATIENT
Start: 2024-02-02 | End: 2024-02-10 | Stop reason: HOSPADM

## 2024-02-02 RX ORDER — INSULIN GLARGINE 100 [IU]/ML
30 INJECTION, SOLUTION SUBCUTANEOUS NIGHTLY
Status: DISCONTINUED | OUTPATIENT
Start: 2024-02-02 | End: 2024-02-06

## 2024-02-02 RX ORDER — SODIUM CHLORIDE 0.9 % (FLUSH) 0.9 %
5-40 SYRINGE (ML) INJECTION PRN
Status: DISCONTINUED | OUTPATIENT
Start: 2024-02-02 | End: 2024-02-10 | Stop reason: HOSPADM

## 2024-02-02 RX ORDER — INSULIN LISPRO 100 [IU]/ML
0-8 INJECTION, SOLUTION INTRAVENOUS; SUBCUTANEOUS
Status: DISCONTINUED | OUTPATIENT
Start: 2024-02-03 | End: 2024-02-07

## 2024-02-02 RX ORDER — ACETAMINOPHEN 325 MG/1
650 TABLET ORAL EVERY 6 HOURS PRN
Status: DISCONTINUED | OUTPATIENT
Start: 2024-02-02 | End: 2024-02-10 | Stop reason: HOSPADM

## 2024-02-02 RX ORDER — SODIUM CHLORIDE 9 MG/ML
INJECTION, SOLUTION INTRAVENOUS PRN
Status: DISCONTINUED | OUTPATIENT
Start: 2024-02-02 | End: 2024-02-10 | Stop reason: HOSPADM

## 2024-02-02 RX ORDER — INSULIN LISPRO 100 [IU]/ML
0-4 INJECTION, SOLUTION INTRAVENOUS; SUBCUTANEOUS NIGHTLY
Status: DISCONTINUED | OUTPATIENT
Start: 2024-02-02 | End: 2024-02-07

## 2024-02-02 RX ORDER — CEFTRIAXONE 1 G/1
1000 INJECTION, POWDER, FOR SOLUTION INTRAMUSCULAR; INTRAVENOUS ONCE
Status: DISCONTINUED | OUTPATIENT
Start: 2024-02-02 | End: 2024-02-02

## 2024-02-02 RX ORDER — ASPIRIN 81 MG/1
81 TABLET, CHEWABLE ORAL DAILY
Status: DISCONTINUED | OUTPATIENT
Start: 2024-02-03 | End: 2024-02-10 | Stop reason: HOSPADM

## 2024-02-02 RX ORDER — PANTOPRAZOLE SODIUM 40 MG/1
40 TABLET, DELAYED RELEASE ORAL
Status: DISCONTINUED | OUTPATIENT
Start: 2024-02-03 | End: 2024-02-07

## 2024-02-02 RX ORDER — POLYETHYLENE GLYCOL 3350 17 G/17G
17 POWDER, FOR SOLUTION ORAL DAILY PRN
Status: DISCONTINUED | OUTPATIENT
Start: 2024-02-02 | End: 2024-02-10 | Stop reason: HOSPADM

## 2024-02-02 RX ORDER — SODIUM CHLORIDE 0.9 % (FLUSH) 0.9 %
5-40 SYRINGE (ML) INJECTION EVERY 12 HOURS SCHEDULED
Status: DISCONTINUED | OUTPATIENT
Start: 2024-02-02 | End: 2024-02-10 | Stop reason: HOSPADM

## 2024-02-02 RX ORDER — DEXTROSE MONOHYDRATE 100 MG/ML
INJECTION, SOLUTION INTRAVENOUS CONTINUOUS PRN
Status: DISCONTINUED | OUTPATIENT
Start: 2024-02-02 | End: 2024-02-10 | Stop reason: HOSPADM

## 2024-02-02 RX ADMIN — SODIUM ZIRCONIUM CYCLOSILICATE 5 G: 5 POWDER, FOR SUSPENSION ORAL at 18:05

## 2024-02-02 RX ADMIN — APIXABAN 5 MG: 5 TABLET, FILM COATED ORAL at 21:16

## 2024-02-02 RX ADMIN — INSULIN HUMAN 10 UNITS: 100 INJECTION, SOLUTION PARENTERAL at 18:06

## 2024-02-02 RX ADMIN — SODIUM CHLORIDE, PRESERVATIVE FREE 10 ML: 5 INJECTION INTRAVENOUS at 21:19

## 2024-02-02 RX ADMIN — METOPROLOL TARTRATE 25 MG: 25 TABLET, FILM COATED ORAL at 21:16

## 2024-02-02 RX ADMIN — SODIUM BICARBONATE: 84 INJECTION, SOLUTION INTRAVENOUS at 18:13

## 2024-02-02 RX ADMIN — SODIUM ZIRCONIUM CYCLOSILICATE 5 G: 5 POWDER, FOR SUSPENSION ORAL at 21:15

## 2024-02-02 RX ADMIN — INSULIN GLARGINE 30 UNITS: 100 INJECTION, SOLUTION SUBCUTANEOUS at 21:30

## 2024-02-02 ASSESSMENT — PAIN SCALES - GENERAL
PAINLEVEL_OUTOF10: 0
PAINLEVEL_OUTOF10: 0

## 2024-02-02 NOTE — ACP (ADVANCE CARE PLANNING)
Advanced Care Planning Note.    Purpose of Encounter: Advanced care planning in light of hospitalization  Parties In Attendance: Patient,    Decisional Capacity: Yes  Subjective: Patient  understand that this conversation is to address long term care goal  Objective: Admitted to the hospital with CASTRO on CKD 4 history of systolic heart failure  Goals of Care Determination: Patient would pursue CPR and Intubation if required.  Would consider dialysis if required  Code Status: full code  Time spent on Advanced care Plannin minutes  Advanced Care Planning Documents: documented patient's wishes, would like Wife Amanda to make medical decisions if unable to make decisions    Chad Mendoza MD  2024 5:45 PM

## 2024-02-02 NOTE — H&P
HOSPITALISTS HISTORY AND PHYSICAL    2/2/2024 5:43 PM    Patient Information:  DANNY TORRES is a 73 y.o. male [id number]  PCP:  Yesi Rosa APRN - CNP (Tel: [phone] )    Chief complaint:    Chief Complaint   Patient presents with    Referral - General     Pt to ED c/o bilateral leg swelling L>R. PT sts sent in by Dr. Perez for worsening CKD and leg edema. PT sts + urine production. PT denies SOB.        History of Present Illness:  Danny Torres is a 73 y.o. male history of CKD 4 had temporary dialysis at [facility] last month.  Patient increased leg swelling thus was started on increased dose of Lasix 80 mg.  Patient kidney function deteriorated thus was seen by nephrologist and sent to the ED patient has any chest pain fever chills nausea vomiting or shortness of breath no difficulty passing urine      REVIEW OF SYSTEMS:   Constitutional: Negative for fever,chills or night sweats  ENT: Negative for rhinorrhea, epistaxis, hoarseness, sore throat.  Respiratory: Negative for shortness of breath,wheezing  Cardiovascular: Negative for chest pain, palpitations   Gastrointestinal: Negative for nausea, vomiting, diarrhea  Genitourinary: Negative for polyuria, dysuria   Hematologic/Lymphatic: Negative for bleeding tendency, easy bruising  Musculoskeletal: Negative for myalgias and arthralgias  Neurologic: Negative for confusion,dysarthria.  Skin: Negative for itching,rash  Psychiatric: Negative for depression,anxiety, agitation.  Endocrine: Negative for polydipsia,polyuria,heat /cold intolerance.    Past Medical History:   has a past medical history of Atrial fibrillation (HCC), CAD (coronary artery disease), Cancer of kidney (HCC), Diabetes mellitus (HCC), Hyperlipidemia, Hypertension, Prostate cancer (HCC), and Right renal mass.     Past Surgical History:   has a past surgical history that includes hernia  of the above medically necessary diagnoses.    Please note that some part of this chart was generated using Dragon dictation software. Although every effort was made to ensure the accuracy of this automated transcription, some errors in transcription may have occurred inadvertently. If you may need any clarification, please do not hesitate to contact me through EPIC.       Chad Mendoza MD    2/2/2024 5:43 PM

## 2024-02-02 NOTE — ED PROVIDER NOTES
care time provided, excluding separately reportable procedures.           EMERGENCY DEPARTMENT COURSE and DIFFERENTIAL DIAGNOSIS/MDM:   Vitals:    Vitals:    02/02/24 1459   Weight: 97.5 kg (215 lb)   Height: 1.905 m (6' 3\")       Patient was given the following medications:  Medications   cefTRIAXone (ROCEPHIN) injection 1,000 mg (has no administration in time range)   insulin regular (HUMULIN R;NOVOLIN R) injection 10 Units (has no administration in time range)   sodium zirconium cyclosilicate (LOKELMA) oral suspension 5 g (has no administration in time range)   sodium bicarbonate 50 mEq in sodium chloride 0.45 % 1,000 mL infusion (has no administration in time range)             Is this patient to be included in the SEP-1 Core Measure due to severe sepsis or septic shock?   No   Exclusion criteria - the patient is NOT to be included for SEP-1 Core Measure due to:  Infection is not suspected    Chronic Conditions affecting care:    has a past medical history of Atrial fibrillation (HCC), CAD (coronary artery disease), Cancer of kidney (HCC), Diabetes mellitus (HCC), Hyperlipidemia, Hypertension, Prostate cancer (HCC), and Right renal mass.    CONSULTS: (Who and What was discussed)  IP CONSULT TO NEPHROLOGY      Social Determinants : Patient has significant healthcare illiteracy    Records Reviewed (Source):     CC/HPI Summary, DDx, ED Course, and Reassessment:     Danny Rock is a 73 y.o. male with past medical history of CKD, diabetes, hypertension, A-fib who presents with worsening creatinine, sent here for admission by nephrology.  Patient saw his nephrologist, Dr. Rolle today, directed to the ER for creatinine of 4.  Appears he had CASTRO recently, creatinine had trended down to 2, up to 4 now.  Patient has on increased Lasix and states his lower extremity swelling is actually slightly improved.  States mild shortness of breath with exertion.    On exam, vitals nonemergent, nontoxic-appearing, no

## 2024-02-02 NOTE — PROGRESS NOTES
Pharmacy Home Medication Reconciliation Note    A medication reconciliation has been completed for Danny Rock 1950    Pharmacy: [facility] [address] [place], [place]    Information provided by: Wife    The patient's home medication list is as follows:  No current facility-administered medications on file prior to encounter.     Current Outpatient Medications on File Prior to Encounter   Medication Sig Dispense Refill    Alpha-Lipoic Acid 100 MG CAPS Take 1 capsule by mouth Daily      Chromium-Cinnamon (CINNAMON PLUS CHROMIUM) 200-1000 MCG-MG CAPS Take 1 capsule by mouth Daily      melatonin 3 MG TABS tablet Take 1 tablet by mouth daily      Chromium Picolinate 500 MCG CAPS Take 1 capsule by mouth Daily      metoprolol tartrate (LOPRESSOR) 25 MG tablet Take 1 tablet by mouth 2 times daily      zinc sulfate (ZINCATE) 220 (50 Zn)  mg capsule - elemental zinc Take 1 capsule by mouth daily      rosuvastatin (CRESTOR) 20 MG tablet Take 1 tablet by mouth daily      Cyanocobalamin (VITAMIN B-12 PO) Take 1 tablet by mouth daily      furosemide (LASIX) 80 MG tablet Take 1 tablet by mouth daily 30 tablet 3    insulin glargine (LANTUS) 100 UNIT/ML injection vial Inject 40 Units into the skin nightly      insulin lispro protamine & lispro (HUMALOG MIX) (75-25) 100 UNIT per ML SUSP injection vial Inject 100 Units into the skin 2 times daily (with meals)      apixaban (ELIQUIS) 5 MG TABS tablet Take 1 tablet by mouth 2 times daily 180 tablet 1    amLODIPine (NORVASC) 5 MG tablet Take 1 tablet by mouth daily (Patient not taking: Reported on 2/2/2024) 30 tablet 3    blood glucose monitor supplies Alcohol Swabs, Lancets, Lancet Device, Control Solution,  Test 3 times a day & as needed for symptoms of irregular blood glucose. Dispense sufficient amount for indicated testing frequency plus additional to accommodate PRN testing needs. 1 each 0    blood glucose monitor kit and supplies Dispense sufficient amount  for indicated testing frequency plus additional to accommodate PRN testing needs. Dispense all needed supplies to include: monitor, strips, lancing device, lancets, control solutions, alcohol swabs. 1 kit 0    aspirin 81 MG chewable tablet Take 1 tablet by mouth daily 30 tablet 0    atorvastatin (LIPITOR) 40 MG tablet Take 1 tablet by mouth nightly (Patient not taking: Reported on 2/2/2024) 30 tablet 0    Calcium Carb-Cholecalciferol (OYSTER SHELL CALCIUM W/D) 500-5 MG-MCG TABS tablet Take 1 tablet by mouth 2 times daily (with meals) (Patient not taking: Reported on 2/2/2024) 6 tablet 0    folic acid (FOLVITE) 1 MG tablet Take 1 tablet by mouth daily (Patient not taking: Reported on 2/2/2024)      ascorbic acid (VITAMIN C) 500 MG tablet Take 1 tablet by mouth daily      vitamin D3 (CHOLECALCIFEROL) 125 MCG (5000 UT) TABS tablet Take 1 tablet by mouth daily      Multiple Vitamins-Minerals (MULTIVITAMIN ADULTS) TABS Take 1 tablet by mouth daily      [DISCONTINUED] Naproxen Sodium 220 MG CAPS Take 220 mg by mouth as needed for Pain      omeprazole (PRILOSEC) 20 MG capsule Take 1 capsule by mouth every other day         Patient is no longer taking Norvasc 5mg. Lipitor 40mg, Oyster Shell Calcium W/D 500-5mg, or Naproxen Sodium 220mg    Of note, Patient takes Eliquis 5mg tab BID; Most recent dose was taken 1200 on 02/02.    Timing of last doses updated.    Thank you,  Adin Mac CPhT

## 2024-02-02 NOTE — ED NOTES
ED TO INPATIENT SBAR HANDOFF    Patient Name: Danny Rock   :  1950  73 y.o.   MRN:  [id number]  Preferred Name  Danny  ED Room #:  ED-0027/27  Family/Caregiver Present yes   Restraints no   Sitter no   Sepsis Risk Score Sepsis Risk Score: 3.59    Situation  Code Status: Prior No additional code details.    Allergies: Betapace [sotalol], Amoxicillin, Bisoprolol-hydrochlorothiazide, Cisapride, Penicillins, and Pioglitazone  Weight: Patient Vitals for the past 96 hrs (Last 3 readings):   Weight   24 1459 97.5 kg (215 lb)     Arrived from: home  Chief Complaint:   Chief Complaint   Patient presents with    Referral - General     Pt to ED c/o bilateral leg swelling L>R. PT sts sent in by Dr. Perez for worsening CKD and leg edema. PT sts + urine production. PT denies SOB.      Hospital Problem/Diagnosis:  Principal Problem:    CASTRO (acute kidney injury) (HCC)  Resolved Problems:    * No resolved hospital problems. *    Imaging:   US RENAL COMPLETE   Final Result   1. Patient status post right nephrectomy.   2. Multiple simple cysts within the left kidney lower pole.   3. 1.4 cm mildly complicated cyst near the left renal pelvis, demonstrating   low level internal echoes.  Consider more detailed characterization with a   dedicated renal mass protocol CT or MRI study.   4. Unremarkable sonographic appearance of the urinary bladder.         XR CHEST PORTABLE   Final Result   Right mid to lower lung airspace disease.  Recommend follow-up to resolution.           Abnormal labs:   Abnormal Labs Reviewed   CBC WITH AUTO DIFFERENTIAL - Abnormal; Notable for the following components:       Result Value    RBC 3.03 (*)     Hemoglobin 9.3 (*)     Hematocrit 28.8 (*)     RDW 22.0 (*)     All other components within normal limits   COMPREHENSIVE METABOLIC PANEL W/ REFLEX TO MG FOR LOW K - Abnormal; Notable for the following components:    Sodium 133 (*)     Potassium reflex Magnesium 5.3 (*)     Chloride 95 (*)

## 2024-02-02 NOTE — CONSULTS
MD Pratibha Guzmán MD Samir Brahmbhatt, MD                                  Office: [phone]                 Fax: [phone]          [employer]                     NEPHROLOGY CONSULTATION NOTE:     PATIENT NAME: Danny Rock  : 1950  MRN: [id number]      Name:  Danny Rock Date/Time of Admission: 2024  2:43 PM    CSN: [id number] Attending Provider: No att. providers found   Room/Bed: ED- : 1950 Age: 73 y.o.             Reason for Nephrology consult :  Evaluation of patient with worsening renal failure.            History of Presenting complaint:       Danny Rock 73 y.o.    Has been sent to the emergency room for evaluation of worsening creatinine levels.  BUN is 67 and a creatinine of 4.1.  Sodium is 133 and a potassium of 5.3.    Patient underwent right nephrectomy for renal carcinoma in 2023.  Patient then developed severe renal failure which was thought to be secondary due to immunotherapy.    Patient eventually made good improvement without needing dialysis and his baseline creatinine was approximately 2.2.    3 weeks ago patient was admitted to [facility] with COVID infection and pneumonia.    He developed severe renal failure and had to be started on hemodialysis treatment.    Patient received approximately 8 dialysis treatments and made good recovery.  - His dialysis was discontinued.    His creatinine at the time of discharge was approximately 2.8.    His main complaint is generalized weakness.    Home medications reviewed.  Patient takes Lasix 80 mg daily.  On Eliquis.  Amlodipine 5 mg daily.  Naproxen as needed.      Medications reviewed.  Medical records reviewed.          Past Medical History :         Past Medical History:   Diagnosis Date    Atrial fibrillation (HCC)     CAD (coronary artery disease)     Cancer of kidney (HCC)     Currently on Immunotherapy: just finished treatment 23    Diabetes mellitus

## 2024-02-03 LAB
ALBUMIN SERPL-MCNC: 2.7 G/DL (ref 3.4–5)
ANA SER QL IA: NEGATIVE
ANION GAP SERPL CALCULATED.3IONS-SCNC: 13 MMOL/L (ref 3–16)
BASOPHILS # BLD: 0.1 K/UL (ref 0–0.2)
BASOPHILS NFR BLD: 1.1 %
BUN SERPL-MCNC: 61 MG/DL (ref 7–20)
C3 SERPL-MCNC: 79.1 MG/DL (ref 90–180)
C4 SERPL-MCNC: 18.2 MG/DL (ref 10–40)
CALCIUM SERPL-MCNC: 8.9 MG/DL (ref 8.3–10.6)
CHLORIDE SERPL-SCNC: 98 MMOL/L (ref 99–110)
CO2 SERPL-SCNC: 27 MMOL/L (ref 21–32)
CREAT SERPL-MCNC: 3.7 MG/DL (ref 0.8–1.3)
CREAT UR-MCNC: 45.4 MG/DL (ref 39–259)
DEPRECATED RDW RBC AUTO: 20.9 % (ref 12.4–15.4)
EKG ATRIAL RATE: 78 BPM
EKG DIAGNOSIS: NORMAL
EKG Q-T INTERVAL: 458 MS
EKG QRS DURATION: 142 MS
EKG QTC CALCULATION (BAZETT): 468 MS
EKG R AXIS: 112 DEGREES
EKG T AXIS: -32 DEGREES
EKG VENTRICULAR RATE: 63 BPM
EOSINOPHIL # BLD: 0.2 K/UL (ref 0–0.6)
EOSINOPHIL NFR BLD: 3.6 %
GFR SERPLBLD CREATININE-BSD FMLA CKD-EPI: 16 ML/MIN/{1.73_M2}
GLUCOSE BLD-MCNC: 154 MG/DL (ref 70–99)
GLUCOSE BLD-MCNC: 217 MG/DL (ref 70–99)
GLUCOSE BLD-MCNC: 218 MG/DL (ref 70–99)
GLUCOSE BLD-MCNC: 268 MG/DL (ref 70–99)
GLUCOSE SERPL-MCNC: 149 MG/DL (ref 70–99)
HCT VFR BLD AUTO: 25.8 % (ref 40.5–52.5)
HGB BLD-MCNC: 8.4 G/DL (ref 13.5–17.5)
LYMPHOCYTES # BLD: 1.2 K/UL (ref 1–5.1)
LYMPHOCYTES NFR BLD: 24.3 %
MCH RBC QN AUTO: 30.8 PG (ref 26–34)
MCHC RBC AUTO-ENTMCNC: 32.8 G/DL (ref 31–36)
MCV RBC AUTO: 94.1 FL (ref 80–100)
MONOCYTES # BLD: 0.5 K/UL (ref 0–1.3)
MONOCYTES NFR BLD: 9.8 %
NEUTROPHILS # BLD: 3.1 K/UL (ref 1.7–7.7)
NEUTROPHILS NFR BLD: 61.2 %
PERFORMED ON: ABNORMAL
PHOSPHATE SERPL-MCNC: 4.4 MG/DL (ref 2.5–4.9)
PLATELET # BLD AUTO: 211 K/UL (ref 135–450)
PMV BLD AUTO: 7.1 FL (ref 5–10.5)
POTASSIUM SERPL-SCNC: 3.8 MMOL/L (ref 3.5–5.1)
POTASSIUM SERPL-SCNC: 3.8 MMOL/L (ref 3.5–5.1)
PROT UR-MCNC: 14 MG/DL
RBC # BLD AUTO: 2.74 M/UL (ref 4.2–5.9)
SODIUM SERPL-SCNC: 138 MMOL/L (ref 136–145)
URATE SERPL-MCNC: 11.7 MG/DL (ref 3.5–7.2)
WBC # BLD AUTO: 5 K/UL (ref 4–11)

## 2024-02-03 PROCEDURE — 85025 COMPLETE CBC W/AUTO DIFF WBC: CPT

## 2024-02-03 PROCEDURE — 93010 ELECTROCARDIOGRAM REPORT: CPT | Performed by: INTERNAL MEDICINE

## 2024-02-03 PROCEDURE — 2500000003 HC RX 250 WO HCPCS: Performed by: INTERNAL MEDICINE

## 2024-02-03 PROCEDURE — 80069 RENAL FUNCTION PANEL: CPT

## 2024-02-03 PROCEDURE — 51701 INSERT BLADDER CATHETER: CPT

## 2024-02-03 PROCEDURE — 86160 COMPLEMENT ANTIGEN: CPT

## 2024-02-03 PROCEDURE — 6370000000 HC RX 637 (ALT 250 FOR IP): Performed by: INTERNAL MEDICINE

## 2024-02-03 PROCEDURE — 6370000000 HC RX 637 (ALT 250 FOR IP): Performed by: NURSE PRACTITIONER

## 2024-02-03 PROCEDURE — 2580000003 HC RX 258: Performed by: INTERNAL MEDICINE

## 2024-02-03 PROCEDURE — 84550 ASSAY OF BLOOD/URIC ACID: CPT

## 2024-02-03 PROCEDURE — 1200000000 HC SEMI PRIVATE

## 2024-02-03 PROCEDURE — 51798 US URINE CAPACITY MEASURE: CPT

## 2024-02-03 PROCEDURE — 36415 COLL VENOUS BLD VENIPUNCTURE: CPT

## 2024-02-03 PROCEDURE — 86038 ANTINUCLEAR ANTIBODIES: CPT

## 2024-02-03 RX ORDER — LANOLIN ALCOHOL/MO/W.PET/CERES
3 CREAM (GRAM) TOPICAL NIGHTLY PRN
Status: DISCONTINUED | OUTPATIENT
Start: 2024-02-03 | End: 2024-02-08

## 2024-02-03 RX ORDER — QUETIAPINE FUMARATE 25 MG/1
25 TABLET, FILM COATED ORAL NIGHTLY
Status: COMPLETED | OUTPATIENT
Start: 2024-02-03 | End: 2024-02-03

## 2024-02-03 RX ADMIN — INSULIN GLARGINE 30 UNITS: 100 INJECTION, SOLUTION SUBCUTANEOUS at 20:38

## 2024-02-03 RX ADMIN — SODIUM CHLORIDE, PRESERVATIVE FREE 10 ML: 5 INJECTION INTRAVENOUS at 10:08

## 2024-02-03 RX ADMIN — METOPROLOL TARTRATE 25 MG: 25 TABLET, FILM COATED ORAL at 20:34

## 2024-02-03 RX ADMIN — ASPIRIN 81 MG 81 MG: 81 TABLET ORAL at 10:07

## 2024-02-03 RX ADMIN — MELATONIN TAB 3 MG 3 MG: 3 TAB at 20:35

## 2024-02-03 RX ADMIN — QUETIAPINE FUMARATE 25 MG: 25 TABLET ORAL at 20:34

## 2024-02-03 RX ADMIN — SODIUM ZIRCONIUM CYCLOSILICATE 5 G: 5 POWDER, FOR SUSPENSION ORAL at 10:06

## 2024-02-03 RX ADMIN — METOPROLOL TARTRATE 25 MG: 25 TABLET, FILM COATED ORAL at 10:07

## 2024-02-03 RX ADMIN — APIXABAN 5 MG: 5 TABLET, FILM COATED ORAL at 10:07

## 2024-02-03 RX ADMIN — APIXABAN 5 MG: 5 TABLET, FILM COATED ORAL at 20:34

## 2024-02-03 RX ADMIN — INSULIN LISPRO 4 UNITS: 100 INJECTION, SOLUTION INTRAVENOUS; SUBCUTANEOUS at 12:18

## 2024-02-03 RX ADMIN — SODIUM BICARBONATE: 84 INJECTION, SOLUTION INTRAVENOUS at 10:09

## 2024-02-03 RX ADMIN — SODIUM BICARBONATE: 84 INJECTION, SOLUTION INTRAVENOUS at 18:41

## 2024-02-03 RX ADMIN — INSULIN LISPRO 2 UNITS: 100 INJECTION, SOLUTION INTRAVENOUS; SUBCUTANEOUS at 18:39

## 2024-02-03 RX ADMIN — SODIUM BICARBONATE: 84 INJECTION, SOLUTION INTRAVENOUS at 17:09

## 2024-02-03 RX ADMIN — PANTOPRAZOLE SODIUM 40 MG: 40 TABLET, DELAYED RELEASE ORAL at 05:35

## 2024-02-03 NOTE — PROGRESS NOTES
[facility]    2/3/2024 12:18 PM        Name: Danny Rock .              Admitted: 2/2/2024  Primary Care Provider: Yesi Rosa APRN - CNP (Tel: [phone])      Subjective:    Patient lying in bed no nausea vomiting or chest pain    Reviewed interval ancillary notes    Current Medications  [Held by provider] sodium zirconium cyclosilicate (LOKELMA) oral suspension 5 g, TID  sodium bicarbonate 50 mEq in sodium chloride 0.45 % 1,000 mL infusion, Continuous  apixaban (ELIQUIS) tablet 5 mg, BID  aspirin chewable tablet 81 mg, Daily  insulin glargine (LANTUS) injection vial 30 Units, Nightly  insulin lispro (HUMALOG) injection vial 0-8 Units, TID WC  insulin lispro (HUMALOG) injection vial 0-4 Units, Nightly  metoprolol tartrate (LOPRESSOR) tablet 25 mg, BID  pantoprazole (PROTONIX) tablet 40 mg, QAM AC  sodium chloride flush 0.9 % injection 5-40 mL, 2 times per day  sodium chloride flush 0.9 % injection 5-40 mL, PRN  0.9 % sodium chloride infusion, PRN  polyethylene glycol (GLYCOLAX) packet 17 g, Daily PRN  acetaminophen (TYLENOL) tablet 650 mg, Q6H PRN   Or  acetaminophen (TYLENOL) suppository 650 mg, Q6H PRN  dextrose bolus 10% 125 mL, PRN   Or  dextrose bolus 10% 250 mL, PRN  dextrose 10 % infusion, Continuous PRN        Objective:  BP (!) 131/54   Pulse 83   Temp 97.4 °F (36.3 °C) (Oral)   Resp 18   Ht 1.905 m (6' 3\")   Wt 89.8 kg (197 lb 14.4 oz)   SpO2 91%   BMI 24.74 kg/m²     Intake/Output Summary (Last 24 hours) at 2/3/2024 1218  Last data filed at 2/3/2024 1139  Gross per 24 hour   Intake 1435.26 ml   Output 200 ml   Net 1235.26 ml      Wt Readings from Last 3 Encounters:   02/02/24 89.8 kg (197 lb 14.4 oz)   02/02/24 98 kg (216 lb)   01/25/24 98.1 kg (216 lb 6 oz)       General appearance:  Appears comfortable. AAOx3  HEENT: atraumatic, Pupils equal, muscous membranes moist, no masses

## 2024-02-03 NOTE — PROGRESS NOTES
.4 Eyes Skin Assessment     NAME:  Danny Rock  YOB: 1950  MEDICAL RECORD NUMBER:  [id number]    The patient is being assessed for  Admission    I agree that at least one RN has performed a thorough Head to Toe Skin Assessment on the patient. ALL assessment sites listed below have been assessed.      Areas assessed by both nurses:    Head, Face, Ears, Shoulders, Back, Chest, Arms, Elbows, Hands, Sacrum. Buttock, Coccyx, Ischium, Legs. Feet and Heels, Under Medical Devices , and Other          Does the Patient have a Wound? No noted wound(s)       Price Prevention initiated by RN: Yes  Wound Care Orders initiated by RN: No    Pressure Injury (Stage 3,4, Unstageable, DTI, NWPT, and Complex wounds) if present, place Wound referral order by RN under : No    New Ostomies, if present place, Ostomy referral order under : No     Nurse 1 eSignature: Electronically signed by Susanna Lazo RN on 2/3/24 at 2:54 AM EST    **SHARE this note so that the co-signing nurse can place an eSignature**    Nurse 2 eSignature: {Esignature:[id number]}

## 2024-02-03 NOTE — PROGRESS NOTES
MD Pratibha Guzmán MD Samir Brahmbhatt, MD                                  Office: [phone]                 Fax: [phone]          [employer]                     NEPHROLOGY  NOTE:     PATIENT NAME: Danny Rock  : 1950  MRN: [id number]      Name:  Danny Rock Date/Time of Admission: 2024  2:43 PM    CSN: [id number] Attending Provider: Chad Mendoza MD   Room/Bed: [facility]-5560/5560-01 : 1950 Age: 73 y.o.             Reason for Nephrology consult :  Evaluation of patient with worsening renal failure.           Doing better.  Lower extremity edema improved.  Diuretics on hold.  From gentle IV hydration.    Improved urine output.       History of Presenting complaint:       Danny Rock 73 y.o.    Has been sent to the emergency room for evaluation of worsening creatinine levels.  BUN is 67 and a creatinine of 4.1.  Sodium is 133 and a potassium of 5.3.    Patient underwent right nephrectomy for renal carcinoma in 2023.  Patient then developed severe renal failure which was thought to be secondary due to immunotherapy.    Patient eventually made good improvement without needing dialysis and his baseline creatinine was approximately 2.2.    3 weeks ago patient was admitted to [facility] with COVID infection and pneumonia.    He developed severe renal failure and had to be started on hemodialysis treatment.    Patient received approximately 8 dialysis treatments and made good recovery.  - His dialysis was discontinued.    His creatinine at the time of discharge was approximately 2.8.    His main complaint is generalized weakness.    Home medications reviewed.  Patient takes Lasix 80 mg daily.  On Eliquis.  Amlodipine 5 mg daily.  Naproxen as needed.      Medications reviewed.  Medical records reviewed.          Past Medical History :         Past Medical History:   Diagnosis Date    Atrial fibrillation (HCC)     CAD (coronary artery

## 2024-02-03 NOTE — PROGRESS NOTES
Shift assessment completed and charted. VSS. A/o x4. Standard safety measures in place, wife at bedside. Bed locked and in lowest position, call light within reach. SpO2 > 90% on RA. Bilateral extremities edematous with open areas that weep - RN cleansed with chlorhexidine and left them open to air, MD notified. I/O's monitored. Pt stable and denied needs when writer left room.

## 2024-02-04 LAB
ALBUMIN SERPL-MCNC: 2.8 G/DL (ref 3.4–5)
AMMONIA PLAS-SCNC: 30 UMOL/L (ref 16–60)
ANION GAP SERPL CALCULATED.3IONS-SCNC: 10 MMOL/L (ref 3–16)
BASOPHILS # BLD: 0.1 K/UL (ref 0–0.2)
BASOPHILS NFR BLD: 1.3 %
BUN SERPL-MCNC: 56 MG/DL (ref 7–20)
CALCIUM SERPL-MCNC: 8.2 MG/DL (ref 8.3–10.6)
CHLORIDE SERPL-SCNC: 99 MMOL/L (ref 99–110)
CHLORIDE UR-SCNC: 64 MMOL/L
CO2 SERPL-SCNC: 30 MMOL/L (ref 21–32)
CREAT SERPL-MCNC: 4.2 MG/DL (ref 0.8–1.3)
DEPRECATED RDW RBC AUTO: 21.2 % (ref 12.4–15.4)
EOSINOPHIL # BLD: 0.1 K/UL (ref 0–0.6)
EOSINOPHIL NFR BLD: 1.6 %
GFR SERPLBLD CREATININE-BSD FMLA CKD-EPI: 14 ML/MIN/{1.73_M2}
GLUCOSE BLD-MCNC: 136 MG/DL (ref 70–99)
GLUCOSE BLD-MCNC: 164 MG/DL (ref 70–99)
GLUCOSE BLD-MCNC: 169 MG/DL (ref 70–99)
GLUCOSE BLD-MCNC: 170 MG/DL (ref 70–99)
GLUCOSE SERPL-MCNC: 141 MG/DL (ref 70–99)
HCT VFR BLD AUTO: 25.8 % (ref 40.5–52.5)
HGB BLD-MCNC: 8.3 G/DL (ref 13.5–17.5)
LYMPHOCYTES # BLD: 1.1 K/UL (ref 1–5.1)
LYMPHOCYTES NFR BLD: 20.4 %
MAGNESIUM SERPL-MCNC: 1.7 MG/DL (ref 1.8–2.4)
MCH RBC QN AUTO: 30.5 PG (ref 26–34)
MCHC RBC AUTO-ENTMCNC: 32.2 G/DL (ref 31–36)
MCV RBC AUTO: 94.6 FL (ref 80–100)
MONOCYTES # BLD: 0.5 K/UL (ref 0–1.3)
MONOCYTES NFR BLD: 8.9 %
NEUTROPHILS # BLD: 3.7 K/UL (ref 1.7–7.7)
NEUTROPHILS NFR BLD: 67.8 %
PERFORMED ON: ABNORMAL
PHOSPHATE SERPL-MCNC: 4.9 MG/DL (ref 2.5–4.9)
PLATELET # BLD AUTO: 200 K/UL (ref 135–450)
PMV BLD AUTO: 7 FL (ref 5–10.5)
POTASSIUM SERPL-SCNC: 3.1 MMOL/L (ref 3.5–5.1)
POTASSIUM UR-SCNC: 24 MMOL/L
RBC # BLD AUTO: 2.72 M/UL (ref 4.2–5.9)
SODIUM SERPL-SCNC: 139 MMOL/L (ref 136–145)
SODIUM UR-SCNC: 79 MMOL/L
WBC # BLD AUTO: 5.4 K/UL (ref 4–11)

## 2024-02-04 PROCEDURE — 82140 ASSAY OF AMMONIA: CPT

## 2024-02-04 PROCEDURE — 85025 COMPLETE CBC W/AUTO DIFF WBC: CPT

## 2024-02-04 PROCEDURE — 80069 RENAL FUNCTION PANEL: CPT

## 2024-02-04 PROCEDURE — 1200000000 HC SEMI PRIVATE

## 2024-02-04 PROCEDURE — 36415 COLL VENOUS BLD VENIPUNCTURE: CPT

## 2024-02-04 PROCEDURE — 6370000000 HC RX 637 (ALT 250 FOR IP): Performed by: INTERNAL MEDICINE

## 2024-02-04 PROCEDURE — 83735 ASSAY OF MAGNESIUM: CPT

## 2024-02-04 PROCEDURE — 6360000002 HC RX W HCPCS: Performed by: INTERNAL MEDICINE

## 2024-02-04 PROCEDURE — 2580000003 HC RX 258: Performed by: INTERNAL MEDICINE

## 2024-02-04 PROCEDURE — 6370000000 HC RX 637 (ALT 250 FOR IP): Performed by: NURSE PRACTITIONER

## 2024-02-04 PROCEDURE — 2500000003 HC RX 250 WO HCPCS: Performed by: INTERNAL MEDICINE

## 2024-02-04 RX ORDER — POTASSIUM CHLORIDE 20 MEQ/1
40 TABLET, EXTENDED RELEASE ORAL ONCE
Status: COMPLETED | OUTPATIENT
Start: 2024-02-04 | End: 2024-02-04

## 2024-02-04 RX ORDER — MAGNESIUM SULFATE 1 G/100ML
1000 INJECTION INTRAVENOUS ONCE
Status: COMPLETED | OUTPATIENT
Start: 2024-02-04 | End: 2024-02-04

## 2024-02-04 RX ORDER — SODIUM CHLORIDE 9 MG/ML
INJECTION, SOLUTION INTRAVENOUS CONTINUOUS
Status: DISCONTINUED | OUTPATIENT
Start: 2024-02-04 | End: 2024-02-05

## 2024-02-04 RX ADMIN — MELATONIN TAB 3 MG 3 MG: 3 TAB at 20:56

## 2024-02-04 RX ADMIN — ASPIRIN 81 MG 81 MG: 81 TABLET ORAL at 08:49

## 2024-02-04 RX ADMIN — SODIUM BICARBONATE: 84 INJECTION, SOLUTION INTRAVENOUS at 05:59

## 2024-02-04 RX ADMIN — MAGNESIUM SULFATE HEPTAHYDRATE 1000 MG: 1 INJECTION, SOLUTION INTRAVENOUS at 11:36

## 2024-02-04 RX ADMIN — ACETAMINOPHEN 650 MG: 325 TABLET ORAL at 06:01

## 2024-02-04 RX ADMIN — POTASSIUM CHLORIDE 40 MEQ: 1500 TABLET, EXTENDED RELEASE ORAL at 11:37

## 2024-02-04 RX ADMIN — PANTOPRAZOLE SODIUM 40 MG: 40 TABLET, DELAYED RELEASE ORAL at 06:01

## 2024-02-04 RX ADMIN — APIXABAN 5 MG: 5 TABLET, FILM COATED ORAL at 08:49

## 2024-02-04 RX ADMIN — METOPROLOL TARTRATE 25 MG: 25 TABLET, FILM COATED ORAL at 08:49

## 2024-02-04 RX ADMIN — INSULIN GLARGINE 30 UNITS: 100 INJECTION, SOLUTION SUBCUTANEOUS at 20:56

## 2024-02-04 RX ADMIN — SODIUM CHLORIDE, PRESERVATIVE FREE 10 ML: 5 INJECTION INTRAVENOUS at 21:00

## 2024-02-04 RX ADMIN — ACETAMINOPHEN 650 MG: 325 TABLET ORAL at 20:56

## 2024-02-04 RX ADMIN — ACETAMINOPHEN 650 MG: 650 SUPPOSITORY RECTAL at 11:41

## 2024-02-04 RX ADMIN — APIXABAN 5 MG: 5 TABLET, FILM COATED ORAL at 20:56

## 2024-02-04 RX ADMIN — SODIUM CHLORIDE: 9 INJECTION, SOLUTION INTRAVENOUS at 11:35

## 2024-02-04 RX ADMIN — METOPROLOL TARTRATE 25 MG: 25 TABLET, FILM COATED ORAL at 20:56

## 2024-02-04 ASSESSMENT — PAIN DESCRIPTION - LOCATION
LOCATION: BACK;HIP
LOCATION: BACK

## 2024-02-04 ASSESSMENT — PAIN DESCRIPTION - ONSET: ONSET: ON-GOING

## 2024-02-04 ASSESSMENT — PAIN DESCRIPTION - DESCRIPTORS: DESCRIPTORS: ACHING

## 2024-02-04 ASSESSMENT — PAIN SCALES - GENERAL
PAINLEVEL_OUTOF10: 0
PAINLEVEL_OUTOF10: 5
PAINLEVEL_OUTOF10: 10
PAINLEVEL_OUTOF10: 6

## 2024-02-04 ASSESSMENT — PAIN DESCRIPTION - ORIENTATION
ORIENTATION: LEFT;MID
ORIENTATION: RIGHT;LEFT;LOWER

## 2024-02-04 ASSESSMENT — PAIN SCALES - WONG BAKER
WONGBAKER_NUMERICALRESPONSE: 4;6
WONGBAKER_NUMERICALRESPONSE: 4

## 2024-02-04 ASSESSMENT — PAIN DESCRIPTION - PAIN TYPE: TYPE: ACUTE PAIN

## 2024-02-04 ASSESSMENT — PAIN - FUNCTIONAL ASSESSMENT: PAIN_FUNCTIONAL_ASSESSMENT: ACTIVITIES ARE NOT PREVENTED

## 2024-02-04 NOTE — PROGRESS NOTES
Vitals:    02/04/24 0844   BP: (!) 141/61   Pulse: 82   Resp: 16   Temp: 98.4 °F (36.9 °C)   SpO2: 93%     Patient confused this AM- oriented to self. Patient stated, \"I don't know why I'm here or what I'm doing here and I want to go home.\" RN reoriented patient several times and offered emotional support. That seemed to calm him down. Patient up to chair and bathroom and now resting comfortably in chair. NaBicarb infusing. K is 3.1 and notified provider for orders. PCA at bedside. Patient has poor appetite but ate about 45% of breakfast. Patient's wife updated and RN talked to her and she said, \"He is probably agitated because he received seroquel last night. He doesn't do well with that.\" RN told her that was a once time order and will pass on that info. Patient on 2 liters O2 when sleeping- sats drop to 84%. When awake he is 91% on room air.  Will monitor.

## 2024-02-04 NOTE — PROGRESS NOTES
[facility]    2/4/2024 12:26 PM        Name: Danny Rock .              Admitted: 2/2/2024  Primary Care Provider: Yesi Rosa APRN - CNP (Tel: [phone])      Subjective:    In bed no nausea vomiting or chest pain  Reviewed interval ancillary notes    Current Medications  0.9 % sodium chloride infusion, Continuous  magnesium sulfate 1000 mg in dextrose 5% 100 mL IVPB, Once  melatonin tablet 3 mg, Nightly PRN  apixaban (ELIQUIS) tablet 5 mg, BID  aspirin chewable tablet 81 mg, Daily  insulin glargine (LANTUS) injection vial 30 Units, Nightly  insulin lispro (HUMALOG) injection vial 0-8 Units, TID WC  insulin lispro (HUMALOG) injection vial 0-4 Units, Nightly  metoprolol tartrate (LOPRESSOR) tablet 25 mg, BID  pantoprazole (PROTONIX) tablet 40 mg, QAM AC  sodium chloride flush 0.9 % injection 5-40 mL, 2 times per day  sodium chloride flush 0.9 % injection 5-40 mL, PRN  0.9 % sodium chloride infusion, PRN  polyethylene glycol (GLYCOLAX) packet 17 g, Daily PRN  acetaminophen (TYLENOL) tablet 650 mg, Q6H PRN   Or  acetaminophen (TYLENOL) suppository 650 mg, Q6H PRN  dextrose bolus 10% 125 mL, PRN   Or  dextrose bolus 10% 250 mL, PRN  dextrose 10 % infusion, Continuous PRN        Objective:  BP (!) 141/61   Pulse 82   Temp 98.4 °F (36.9 °C) (Oral)   Resp 16   Ht 1.905 m (6' 3\")   Wt 89.8 kg (197 lb 14.4 oz)   SpO2 93%   BMI 24.74 kg/m²     Intake/Output Summary (Last 24 hours) at 2/4/2024 1226  Last data filed at 2/4/2024 0910  Gross per 24 hour   Intake 283.47 ml   Output 350 ml   Net -66.53 ml        Wt Readings from Last 3 Encounters:   02/02/24 89.8 kg (197 lb 14.4 oz)   02/02/24 98 kg (216 lb)   01/25/24 98.1 kg (216 lb 6 oz)       General appearance:  Appears comfortable. AAOx3  HEENT: atraumatic, Pupils equal, muscous membranes moist, no masses appreciated  Cardiovascular: Regular rate and rhythm

## 2024-02-04 NOTE — PROGRESS NOTES
MD Pratibha Guzmán MD Samir Brahmbhatt, MD                                  Office: [phone]                 Fax: [phone]          [employer]                     NEPHROLOGY  NOTE:     PATIENT NAME: Danny Rock  : 1950  MRN: [id number]      Name:  Danny Rock Date/Time of Admission: 2024  2:43 PM    CSN: [id number] Attending Provider: Chad Mendoza MD   Room/Bed: [facility]-5560/5560-01 : 1950 Age: 73 y.o.             Reason for Nephrology consult :  Evaluation of patient with worsening renal failure.           Patient is weak and lethargic.  Appears confused.    Sitter present at bedside.    No family at bedside.    Lower extremity edema improved.  Diuretics on hold.  From gentle IV hydration.    Improved urine output.               History of Presenting complaint:       Danny Rock 73 y.o.    Has been sent to the emergency room for evaluation of worsening creatinine levels.  BUN is 67 and a creatinine of 4.1.  Sodium is 133 and a potassium of 5.3.    Patient underwent right nephrectomy for renal carcinoma in 2023.  Patient then developed severe renal failure which was thought to be secondary due to immunotherapy.    Patient eventually made good improvement without needing dialysis and his baseline creatinine was approximately 2.2.    3 weeks ago patient was admitted to [facility] with COVID infection and pneumonia.    He developed severe renal failure and had to be started on hemodialysis treatment.    Patient received approximately 8 dialysis treatments and made good recovery.  - His dialysis was discontinued.    His creatinine at the time of discharge was approximately 2.8.    His main complaint is generalized weakness.    Home medications reviewed.  Patient takes Lasix 80 mg daily.  On Eliquis.  Amlodipine 5 mg daily.  Naproxen as needed.      Medications reviewed.  Medical records reviewed.          Past Medical History :       Quit date: 1970     Years since quittin.1    Smokeless tobacco: Never   Vaping Use    Vaping Use: Never used   Substance and Sexual Activity    Alcohol use: Yes     Comment: rarely    Drug use: No    Sexual activity: Yes     Partners: Female   Other Topics Concern    Not on file   Social History Narrative    Not on file     Social Determinants of Health     Financial Resource Strain: Not on file   Food Insecurity: No Food Insecurity (12/10/2023)    Hunger Vital Sign     Worried About Running Out of Food in the Last Year: Never true     Ran Out of Food in the Last Year: Never true   Transportation Needs: No Transportation Needs (12/10/2023)    PRAPARE - Transportation     Lack of Transportation (Medical): No     Lack of Transportation (Non-Medical): No   Physical Activity: Not on file   Stress: Not on file   Social Connections: Not on file   Intimate Partner Violence: Not on file   Housing Stability: Low Risk  (12/10/2023)    Housing Stability Vital Sign     Unable to Pay for Housing in the Last Year: No     Number of Places Lived in the Last Year: 1     Unstable Housing in the Last Year: No       Family History    Family History   Problem Relation Age of Onset    Heart Disease Mother     Skin Cancer Father     Heart Disease Brother     Stroke Brother        Review of Systems.     I have reviewed the 10 system review with admitting physician and other consultants on the case       PHYSICAL EXAMINATION.         BP (!) 141/61   Pulse 82   Temp 98.4 °F (36.9 °C) (Oral)   Resp 16   Ht 1.905 m (6' 3\")   Wt 89.8 kg (197 lb 14.4 oz)   SpO2 93%   BMI 24.74 kg/m²     Intake/Output Summary (Last 24 hours) at 2024 1111  Last data filed at 2024 0910  Gross per 24 hour   Intake 1718.73 ml   Output 350 ml   Net 1368.73 ml         INTAKE/OUTPUT:  I/O last 3 completed shifts:  In: 1808.7 [P.O.:600; I.V.:1208.7]  Out: 550 [Urine:550]  I/O this shift:  In: 150 [P.O.:150]  Out: -        Ill Appearing.

## 2024-02-04 NOTE — PLAN OF CARE
Problem: Pain  Goal: Verbalizes/displays adequate comfort level or baseline comfort level  2/4/2024 0912 by Sarah Melara, RN  Outcome: Progressing     Problem: Safety - Adult  Goal: Free from fall injury  2/4/2024 0912 by Sarah Melara, RN  Outcome: Progressing

## 2024-02-05 LAB
ALBUMIN SERPL-MCNC: 2.6 G/DL (ref 3.4–5)
ANION GAP SERPL CALCULATED.3IONS-SCNC: 12 MMOL/L (ref 3–16)
BASOPHILS # BLD: 0.1 K/UL (ref 0–0.2)
BASOPHILS NFR BLD: 0.7 %
BUN SERPL-MCNC: 64 MG/DL (ref 7–20)
CALCIUM SERPL-MCNC: 7.8 MG/DL (ref 8.3–10.6)
CHLORIDE SERPL-SCNC: 102 MMOL/L (ref 99–110)
CO2 SERPL-SCNC: 27 MMOL/L (ref 21–32)
CREAT SERPL-MCNC: 5.9 MG/DL (ref 0.8–1.3)
DEPRECATED RDW RBC AUTO: 20.8 % (ref 12.4–15.4)
EOSINOPHIL # BLD: 0.1 K/UL (ref 0–0.6)
EOSINOPHIL NFR BLD: 1.5 %
FERRITIN SERPL IA-MCNC: 211.4 NG/ML (ref 30–400)
FOLATE SERPL-MCNC: >20 NG/ML (ref 4.78–24.2)
GFR SERPLBLD CREATININE-BSD FMLA CKD-EPI: 9 ML/MIN/{1.73_M2}
GLUCOSE BLD-MCNC: 117 MG/DL (ref 70–99)
GLUCOSE BLD-MCNC: 125 MG/DL (ref 70–99)
GLUCOSE BLD-MCNC: 153 MG/DL (ref 70–99)
GLUCOSE BLD-MCNC: 155 MG/DL (ref 70–99)
GLUCOSE BLD-MCNC: 56 MG/DL (ref 70–99)
GLUCOSE BLD-MCNC: 65 MG/DL (ref 70–99)
GLUCOSE BLD-MCNC: 91 MG/DL (ref 70–99)
GLUCOSE SERPL-MCNC: 63 MG/DL (ref 70–99)
HCT VFR BLD AUTO: 26.9 % (ref 40.5–52.5)
HGB BLD-MCNC: 8.6 G/DL (ref 13.5–17.5)
IRON SATN MFR SERPL: 19 % (ref 20–50)
IRON SERPL-MCNC: 40 UG/DL (ref 59–158)
LYMPHOCYTES # BLD: 2.1 K/UL (ref 1–5.1)
LYMPHOCYTES NFR BLD: 25.1 %
MAGNESIUM SERPL-MCNC: 2 MG/DL (ref 1.8–2.4)
MCH RBC QN AUTO: 30.4 PG (ref 26–34)
MCHC RBC AUTO-ENTMCNC: 31.8 G/DL (ref 31–36)
MCV RBC AUTO: 95.6 FL (ref 80–100)
MONOCYTES # BLD: 0.8 K/UL (ref 0–1.3)
MONOCYTES NFR BLD: 9.1 %
NEUTROPHILS # BLD: 5.3 K/UL (ref 1.7–7.7)
NEUTROPHILS NFR BLD: 63.6 %
PERFORMED ON: ABNORMAL
PERFORMED ON: NORMAL
PHOSPHATE SERPL-MCNC: 5.6 MG/DL (ref 2.5–4.9)
PLATELET # BLD AUTO: 215 K/UL (ref 135–450)
PMV BLD AUTO: 6.8 FL (ref 5–10.5)
POTASSIUM SERPL-SCNC: 4.8 MMOL/L (ref 3.5–5.1)
RBC # BLD AUTO: 2.82 M/UL (ref 4.2–5.9)
SODIUM SERPL-SCNC: 141 MMOL/L (ref 136–145)
TIBC SERPL-MCNC: 208 UG/DL (ref 260–445)
VIT B12 SERPL-MCNC: 701 PG/ML (ref 211–911)
WBC # BLD AUTO: 8.4 K/UL (ref 4–11)

## 2024-02-05 PROCEDURE — 83550 IRON BINDING TEST: CPT

## 2024-02-05 PROCEDURE — 97530 THERAPEUTIC ACTIVITIES: CPT

## 2024-02-05 PROCEDURE — 6370000000 HC RX 637 (ALT 250 FOR IP): Performed by: NURSE PRACTITIONER

## 2024-02-05 PROCEDURE — 85025 COMPLETE CBC W/AUTO DIFF WBC: CPT

## 2024-02-05 PROCEDURE — 82746 ASSAY OF FOLIC ACID SERUM: CPT

## 2024-02-05 PROCEDURE — 83540 ASSAY OF IRON: CPT

## 2024-02-05 PROCEDURE — 83735 ASSAY OF MAGNESIUM: CPT

## 2024-02-05 PROCEDURE — 36415 COLL VENOUS BLD VENIPUNCTURE: CPT

## 2024-02-05 PROCEDURE — 97116 GAIT TRAINING THERAPY: CPT

## 2024-02-05 PROCEDURE — 2580000003 HC RX 258: Performed by: INTERNAL MEDICINE

## 2024-02-05 PROCEDURE — 97166 OT EVAL MOD COMPLEX 45 MIN: CPT

## 2024-02-05 PROCEDURE — 80069 RENAL FUNCTION PANEL: CPT

## 2024-02-05 PROCEDURE — 97162 PT EVAL MOD COMPLEX 30 MIN: CPT

## 2024-02-05 PROCEDURE — 97535 SELF CARE MNGMENT TRAINING: CPT

## 2024-02-05 PROCEDURE — 82728 ASSAY OF FERRITIN: CPT

## 2024-02-05 PROCEDURE — 6360000002 HC RX W HCPCS: Performed by: INTERNAL MEDICINE

## 2024-02-05 PROCEDURE — 6370000000 HC RX 637 (ALT 250 FOR IP): Performed by: INTERNAL MEDICINE

## 2024-02-05 PROCEDURE — 1200000000 HC SEMI PRIVATE

## 2024-02-05 PROCEDURE — 82607 VITAMIN B-12: CPT

## 2024-02-05 RX ADMIN — SODIUM CHLORIDE, PRESERVATIVE FREE 10 ML: 5 INJECTION INTRAVENOUS at 20:58

## 2024-02-05 RX ADMIN — SODIUM CHLORIDE: 9 INJECTION, SOLUTION INTRAVENOUS at 00:52

## 2024-02-05 RX ADMIN — SODIUM CHLORIDE 200 MG: 9 INJECTION, SOLUTION INTRAVENOUS at 21:03

## 2024-02-05 RX ADMIN — SODIUM CHLORIDE, PRESERVATIVE FREE 10 ML: 5 INJECTION INTRAVENOUS at 08:23

## 2024-02-05 RX ADMIN — METOPROLOL TARTRATE 25 MG: 25 TABLET, FILM COATED ORAL at 08:23

## 2024-02-05 RX ADMIN — MELATONIN TAB 3 MG 3 MG: 3 TAB at 20:57

## 2024-02-05 RX ADMIN — ACETAMINOPHEN 650 MG: 325 TABLET ORAL at 15:17

## 2024-02-05 RX ADMIN — INSULIN GLARGINE 30 UNITS: 100 INJECTION, SOLUTION SUBCUTANEOUS at 20:57

## 2024-02-05 RX ADMIN — APIXABAN 5 MG: 5 TABLET, FILM COATED ORAL at 08:23

## 2024-02-05 RX ADMIN — METOPROLOL TARTRATE 25 MG: 25 TABLET, FILM COATED ORAL at 20:57

## 2024-02-05 RX ADMIN — ASPIRIN 81 MG 81 MG: 81 TABLET ORAL at 08:23

## 2024-02-05 RX ADMIN — PANTOPRAZOLE SODIUM 40 MG: 40 TABLET, DELAYED RELEASE ORAL at 05:50

## 2024-02-05 ASSESSMENT — PAIN SCALES - WONG BAKER: WONGBAKER_NUMERICALRESPONSE: 4

## 2024-02-05 NOTE — CARE COORDINATION
Case Management Assessment  Initial Evaluation    Date/Time of Evaluation: 2/5/2024 4:10 PM  Assessment Completed by: Renetta Germain    If patient is discharged prior to next notation, then this note serves as note for discharge by case management.    Patient Name: Danny Rock                   YOB: 1950  Diagnosis: Hyperkalemia [E87.5]  Hyperglycemia [R73.9]  CASTRO (acute kidney injury) (HCC) [N17.9]  Acute kidney injury superimposed on CKD (HCC) [N17.9, N18.9]  Right lower lobe pulmonary infiltrate [R91.8]                   Date / Time: 2/2/2024  2:43 PM    Patient Admission Status: Inpatient   Readmission Risk (Low < 19, Mod (19-27), High > 27): Readmission Risk Score: 29.2    Current PCP: Yesi Rosa APRN - CNP  PCP verified by CM? Yes    Chart Reviewed: Yes      History Provided by: Patient, Significant Other  Patient Orientation: Alert and Oriented    Patient Cognition: Alert    Hospitalization in the last 30 days (Readmission):  No    If yes, Readmission Assessment in CM Navigator will be completed.    Advance Directives:      Code Status: Full Code   Patient's Primary Decision Maker is: Legal Next of Kin    Primary Decision Maker: Tammy Rock - Spouse - [phone]    Discharge Planning:    Patient lives with: Spouse/Significant Other Type of Home: House  Primary Care Giver: Self  Patient Support Systems include: Spouse/Significant Other   Current Financial resources: Medicare  Current community resources: None  Current services prior to admission: Home Care ([facility])            Current DME:              Type of Home Care services:  PT, OT    ADLS  Prior functional level: Independent in ADLs/IADLs, Bathing, Dressing, Toileting, Mobility (RW)  Current functional level: Assistance with the following:, Bathing, Dressing, Toileting, Mobility    PT AM-PAC: 17 /24  OT AM-PAC: 16 /24    Family can provide assistance at DC: Yes  Would you like Case Management to discuss the

## 2024-02-05 NOTE — PROGRESS NOTES
Consult for dexcom education seen. Met with the patient & wife. Both informed that only CGM available at our outpatient pharmacy is Freestyle Kristin 3, & that they must download the FreestKipoe 3 bobby from their smart phone bobby store tp be able to use it. Both stated that their child or children will help with accessing apple bobby store to download bobby, neither one has ever accessed bobby store before. This [facility] informed them that we can assist with review of information on bobby after it is downloaded. Will follow tomorrow as schedule allows. Radha Owusu RN, BSN, [facility].

## 2024-02-05 NOTE — CONSULTS
Please refer to the initial consult note done  By Srinivas Romero MD on  Date of Service: 2/2/2024  4:39 PM        Thank you for allowing me to participate in this patient's care. Please do not hesitate to contact me for any questions/concerns. We will follow along with you.     Gordon Rolle MD  [facility]   Phone: [phone] or [facility] [employer]  Fax: [phone]

## 2024-02-05 NOTE — PROGRESS NOTES
[facility] - Inpatient Rehabilitation Department   Phone: [phone]    Physical Therapy    [x] Initial Evaluation            [] Daily Treatment Note         [] Discharge Summary      Patient: Danny Rock   : 1950   MRN: [id number]   Date of Service:  2024  Admitting Diagnosis: CASTRO (acute kidney injury) (HCC)  Current Admission Summary:  Danny Rock is a 73 y.o. male history of CKD 4 had temporary dialysis at [facility] last month.  Patient increased leg swelling thus was started on increased dose of Lasix 80 mg.  Patient kidney function deteriorated thus was seen by nephrologist and sent to the ED patient has any chest pain fever chills nausea vomiting or shortness of breath no difficulty passing urine.   Past Medical History:  has a past medical history of Atrial fibrillation (HCC), CAD (coronary artery disease), Cancer of kidney (HCC), Diabetes mellitus (HCC), Hyperlipidemia, Hypertension, Prostate cancer (HCC), and Right renal mass.  Past Surgical History:  has a past surgical history that includes hernia repair; knee surgery; Cholecystectomy; Nasal septum surgery; eye surgery (Left, 2018); Kidney removal (Right, 2022); and Cataract removal (Bilateral).  Discharge Recommendations: Danny Rock scored a 17/24 on the AM-PAC short mobility form. Current research shows that an AM-PAC score of 17 or less is typically not associated with a discharge to the patient's home setting. Based on the patient's AM-PAC score and their current functional mobility deficits, it is recommended that the patient have 5-7 sessions per week of Physical Therapy at d/c to increase the patient's independence.  At this time, this patient demonstrates complex nursing, medical, and rehabilitative needs, and would benefit from intensive rehabilitation services upon discharge from the Inpatient setting.  This patient demonstrates the ability to participate in and benefit from an intensive  baseline  Stair Mobility:  Stair mobility not completed on this date.  Comments:  Wheelchair Mobility:  No w/c mobility completed on this date.  Comments:  Balance:  Static Sitting Balance: good: independent with functional balance in unsupported position  Dynamic Sitting Balance: fair (+): maintains balance at SBA/supervision without use of UE support  Static Standing Balance: fair (-): maintains balance at CGA with use of UE support  Dynamic Standing Balance: fair (-): maintains balance at CGA with use of UE support  Comments: No overt LOB throughout; pt noted his balance and strength in LEs is decreased    Other Therapeutic Interventions  Pt completed ADLs with OT  Pt completed dressing with PT/OT including donning of compression socks at end of session  Functional Outcomes  AM-PAC Inpatient Mobility Raw Score : 17              Cognition  Overall Cognitive Status: Impaired  Following Commands: follows one step commands with repetition  Memory: decreased recall of recent events, decreased short term memory  Safety Judgement: decreased awareness of need for assistance  Insights: decreased awareness of deficits  Initiation: requires cues for some  Sequencing: requires cues for some  Orientation:    oriented to person, oriented to place, oriented to time, and disoriented to situation  Command Following:   accurately follows one step commands    Education  Barriers To Learning: cognition  Patient Education: patient educated on goals, PT role and benefits, plan of care, functional mobility training, proper use of assistive device/equipment, transfer training, discharge recommendations  Learning Assessment:  patient verbalizes understanding, would benefit from continued reinforcement    Assessment  Activity Tolerance: Fair: Pt currently requiring increased supplemental O2, demos decreased strength and balance, and increased SOB  Vitals: SpO2 85%, HR 44=698cte sitting following bathroom and ADLs on RA, pt placed back on

## 2024-02-05 NOTE — PROGRESS NOTES
Physician Progress Note      PATIENT:               MARCOS TORRES  CSN #:                  [id number]  :                       1950  ADMIT DATE:       2024 2:43 PM  DISCH DATE:  RESPONDING  PROVIDER #:        Lilly Cotto MD          QUERY TEXT:    Patient was admitted with CASTRO, noted to have \"Paroxysmal atrial fibrillation\"   in H&P and is maintained on Eliquis. If possible, please document in progress   notes and discharge summary if you are evaluating and/or treating any of the   following:    The medical record reflects the following:  Risk Factors: Age 73M, HTN, CHF, DM  Clinical Indicators:  H&P noted \"Paroxysmal atrial fibrillation\" and is   maintained on Eliquis.  Treatment: Eliquis  Options provided:  -- Secondary hypercoagulable state in a patient with atrial fibrillation  -- Other - I will add my own diagnosis  -- Disagree - Not applicable / Not valid  -- Disagree - Clinically unable to determine / Unknown  -- Refer to Clinical Documentation Reviewer    PROVIDER RESPONSE TEXT:    This patient has secondary hypercoagulable state in a patient with atrial   fibrillation.    Query created by: Milvia Mathis on 2024 9:43 AM      Electronically signed by:  Lilly Cotto MD 2024 11:14 AM

## 2024-02-05 NOTE — PROGRESS NOTES
MD Pratibha Guzmán MD Samir Brahmbhatt, MD                                  Office: [phone]                 Fax: [phone]          [employer]                     NEPHROLOGY  INPATIENT PROGRESS  NOTE:     PATIENT NAME: Danny Rock  : 1950  MRN: [id number]  Name:  Danny Rock Date/Time of Admission: 2024  2:43 PM    CSN: [id number] Attending Provider: Lilly Cotto MD   Room/Bed: [facility]-5560/5560-01 : 1950 Age: 73 y.o.            PLAN   Recent hospital admission 2024-[facility]-COVID-pneumonia and acute renal failure requiring dialysis.  Was recently taken off    Stop IVFs trial  Continue holding lasix     I talked to oncologist - Dr Dupont    Will consider another form of IS - such as Infliximab or MMF     Immunotherapy has been on hold    In next 48 hrs, if no major renal improvement, then re-start dialysis  Hold Eliquis and aspirin, in anticipation of ordering dialysis catheter placement in next 24-48 hours    No more Lokelma needed    Metoprolol for hypertension control  Insulin sliding scale for blood sugar control      Medical decision making- high complexity. Multiple complex health problems.   Discussed with patient and and patient's wife  Discussed with treatment team, Lilly Cotto MD , oncologist, nursing team,  Thank you for allowing me to participate in this patient's care. Please do not hesitate to contact me for any questions/concerns. We will follow along with you.     Gordon Rolle MD  [facility]   Phone: [phone] or [facility] [employer]  Fax: [phone]    Severally ill, at risk of impending organ failure needing  higher level of care/monitoring.   Time spent that around 40 minutes included face-to-face meeting/discussion with patient, patient's family -as available, and treatment team (including primary/referring team and other consultants; included coordination of care with the treatment  PRN  dextrose 10 % infusion, , IntraVENous, Continuous PRN   sodium chloride 75 mL/hr at 02/05/24 0052    sodium chloride      dextrose           PHYSICAL EXAMINATION.         /81   Pulse 84   Temp 98.2 °F (36.8 °C) (Oral)   Resp 20   Ht 1.905 m (6' 3\")   Wt 89.8 kg (197 lb 14.4 oz)   SpO2 92%   BMI 24.74 kg/m²     Intake/Output Summary (Last 24 hours) at 2/5/2024 0916  Last data filed at 2/5/2024 0213  Gross per 24 hour   Intake 1619.08 ml   Output --   Net 1619.08 ml         INTAKE/OUTPUT:  I/O last 3 completed shifts:  In: 1902.6 [P.O.:270; I.V.:1632.6]  Out: -   No intake/output data recorded.      General: Awake, no acute distress   HENT: Atraumatic, normocephalic   Eyes: Normal conjunctiva, Non-incteral sclera.   Neck: Supple, JVD not visible.   CVS:  Heart sounds S1-S2 present  RS: Normal respiratory effort, Breat sound: diminished at bases.     Abd: Soft , bowel sounds are normal,   distension and no tenderness  .   Skin: No rash , some bruises,    Extremities/MSK:   Edema,    Labs reviewed by me       CBC:   Recent Labs     02/03/24  0604 02/04/24  0646 02/05/24  0600   WBC 5.0 5.4 8.4   HGB 8.4* 8.3* 8.6*   HCT 25.8* 25.8* 26.9*   MCV 94.1 94.6 95.6    200 215       BMP:   Recent Labs     02/03/24  0604 02/04/24  0646 02/05/24  0600    139 141   K 3.8  3.8 3.1* 4.8   CL 98* 99 102   CO2 27 30 27   PHOS 4.4 4.9 5.6*   BUN 61* 56* 64*   CREATININE 3.7* 4.2* 5.9*       Magnesium:   Lab Results   Component Value Date/Time    MG 2.00 02/05/2024 06:00 AM    MG 1.70 02/04/2024 06:46 AM    MG 1.70 12/07/2023 06:32 AM                      Arterial Blood Gasses  No results for input(s): \"PH\", \"PCO2\", \"PO2\" in the last 72 hours.    Invalid input(s): \"[id number]\", \"INSPIREDO2\"    UA:  Recent Labs     02/02/24  1732   COLORU Yellow   PHUR 5.5   WBCUA 9*   RBCUA 0   BACTERIA None Seen   CLARITYU Clear   SPECGRAV 1.010   LEUKOCYTESUR SMALL*   UROBILINOGEN 0.2   BILIRUBINUR Negative   BLOODU

## 2024-02-05 NOTE — PROGRESS NOTES
[facility] - Inpatient Rehabilitation Department   Phone: [phone]    Occupational Therapy    [x] Initial Evaluation            [] Daily Treatment Note         [] Discharge Summary      Patient: Danny Rock   : 1950   MRN: [id number]   Date of Service:  2024    Admitting Diagnosis:  CASTRO (acute kidney injury) (HCC)  Current Admission Summary: Danny Rock is a 73 y.o. male history of CKD 4 had temporary dialysis at [facility] last month.  Patient increased leg swelling thus was started on increased dose of Lasix 80 mg.  Patient kidney function deteriorated thus was seen by nephrologist and sent to the ED patient has any chest pain fever chills nausea vomiting or shortness of breath no difficulty passing urine   Past Medical History:  has a past medical history of Atrial fibrillation (HCC), CAD (coronary artery disease), Cancer of kidney (HCC), Diabetes mellitus (HCC), Hyperlipidemia, Hypertension, Prostate cancer (HCC), and Right renal mass., (R) cerebellar CVA  Past Surgical History:  has a past surgical history that includes hernia repair; knee surgery; Cholecystectomy; Nasal septum surgery; eye surgery (Left, 2018); Kidney removal (Right, 2022); and Cataract removal (Bilateral).    Discharge Recommendations: Danny Rock scored a 16/24 on the AM-PAC ADL Inpatient form. Current research shows that an AM-PAC score of 17 or less is typically not associated with a discharge to the patient's home setting. Based on the patient's AM-PAC score and their current ADL deficits, it is recommended that the patient have 5-7 sessions per week of Occupational Therapy at d/c to increase the patient's independence.  At this time, this patient demonstrates complex nursing, medical, and rehabilitative needs, and would benefit from intensive rehabilitation services upon discharge from the Inpatient setting.  This patient demonstrates the ability to participate in and benefit from an  toilet  Instrumental Activities of Daily Living  No IADL completed on this date.    Functional Mobility  Bed Mobility:  Bed mobility not completed on this date.  Comments:  Transfers:  Sit to stand transfer:contact guard assistance  Stand to sit transfer: contact guard assistance  Toilet transfer: contact guard assistance  Toilet transfer equipment: standard toilet  Toilet transfer comments: use of BSC placed over toilet for increased height  Comments: good awareness of hand placement with each transition.   Functional Mobility  Functional Mobility Activity: long bout of household distance ambulation in hallway, 1 extended seated rest break utilized   Device Use: rolling walker  Required Assistance: contact guard assistance  Comment: no overt LOB, forward flexed posture, slow oriana  Balance:  Static Sitting Balance: fair (+): maintains balance at SBA/supervision without use of UE support  Dynamic Sitting Balance: fair (+): maintains balance at SBA/supervision without use of UE support  Static Standing Balance: fair (-): maintains balance at CGA with use of UE support  Dynamic Standing Balance: fair (-): maintains balance at CGA with use of UE support  Comments:    Other Therapeutic Interventions    Functional Outcomes  AM-PAC Inpatient Daily Activity Raw Score: 16    Cognition  Overall Cognitive Status: Impaired  Following Commands: follows one step commands with repetition  Memory: decreased recall of recent events, decreased short term memory  Safety Judgement: decreased awareness of need for assistance  Insights: decreased awareness of deficits  Initiation: requires cues for some  Sequencing: requires cues for some  Orientation:    oriented to person, oriented to place, oriented to time, and disoriented to situation  Command Following:   accurately follows one step commands     Education  Barriers To Learning: cognition  Patient Education: patient educated on goals, OT role and benefits, plan of care, energy

## 2024-02-05 NOTE — PROGRESS NOTES
02/05/24 1126   Encounter Summary   Encounter Overview/Reason  Spiritual/Emotional Needs   Service Provided For: Patient   Referral/Consult From: Other    Support System Spouse   Last Encounter  02/05/24  ( conversed and prayed with pt)   Complexity of Encounter Moderate   Begin Time 1105   End Time  1126   Total Time Calculated 21 min   Spiritual/Emotional needs   Type Spiritual Support   Assessment/Intervention/Outcome   Assessment Anxious;Coping   Intervention Active listening;Prayer (assurance of)/[place]   Outcome Comfort;Expressed Gratitude

## 2024-02-06 LAB
ALBUMIN SERPL-MCNC: 2.4 G/DL (ref 3.4–5)
ANION GAP SERPL CALCULATED.3IONS-SCNC: 16 MMOL/L (ref 3–16)
BACTERIA BLD CULT ORG #2: NORMAL
BACTERIA BLD CULT: NORMAL
BASOPHILS # BLD: 0.1 K/UL (ref 0–0.2)
BASOPHILS NFR BLD: 0.7 %
BUN SERPL-MCNC: 81 MG/DL (ref 7–20)
CALCIUM SERPL-MCNC: 7.6 MG/DL (ref 8.3–10.6)
CHLORIDE SERPL-SCNC: 99 MMOL/L (ref 99–110)
CO2 SERPL-SCNC: 22 MMOL/L (ref 21–32)
CREAT SERPL-MCNC: 7 MG/DL (ref 0.8–1.3)
DEPRECATED RDW RBC AUTO: 20.4 % (ref 12.4–15.4)
EOSINOPHIL # BLD: 0.1 K/UL (ref 0–0.6)
EOSINOPHIL NFR BLD: 0.7 %
EST. AVERAGE GLUCOSE BLD GHB EST-MCNC: 119.8 MG/DL
GFR SERPLBLD CREATININE-BSD FMLA CKD-EPI: 8 ML/MIN/{1.73_M2}
GLUCOSE BLD-MCNC: 165 MG/DL (ref 70–99)
GLUCOSE BLD-MCNC: 207 MG/DL (ref 70–99)
GLUCOSE BLD-MCNC: 40 MG/DL (ref 70–99)
GLUCOSE BLD-MCNC: 64 MG/DL (ref 70–99)
GLUCOSE BLD-MCNC: 74 MG/DL (ref 70–99)
GLUCOSE BLD-MCNC: 84 MG/DL (ref 70–99)
GLUCOSE BLD-MCNC: 87 MG/DL (ref 70–99)
GLUCOSE SERPL-MCNC: 37 MG/DL (ref 70–99)
HBA1C MFR BLD: 5.8 %
HCT VFR BLD AUTO: 25.9 % (ref 40.5–52.5)
HGB BLD-MCNC: 8.5 G/DL (ref 13.5–17.5)
LYMPHOCYTES # BLD: 1.9 K/UL (ref 1–5.1)
LYMPHOCYTES NFR BLD: 19.1 %
MCH RBC QN AUTO: 30.8 PG (ref 26–34)
MCHC RBC AUTO-ENTMCNC: 32.7 G/DL (ref 31–36)
MCV RBC AUTO: 94.3 FL (ref 80–100)
MONOCYTES # BLD: 0.9 K/UL (ref 0–1.3)
MONOCYTES NFR BLD: 8.8 %
NEUTROPHILS # BLD: 6.9 K/UL (ref 1.7–7.7)
NEUTROPHILS NFR BLD: 70.7 %
PERFORMED ON: ABNORMAL
PERFORMED ON: NORMAL
PHOSPHATE SERPL-MCNC: 6.5 MG/DL (ref 2.5–4.9)
PLATELET # BLD AUTO: 225 K/UL (ref 135–450)
PMV BLD AUTO: 7.1 FL (ref 5–10.5)
POTASSIUM SERPL-SCNC: 4 MMOL/L (ref 3.5–5.1)
RBC # BLD AUTO: 2.74 M/UL (ref 4.2–5.9)
SODIUM SERPL-SCNC: 137 MMOL/L (ref 136–145)
WBC # BLD AUTO: 9.7 K/UL (ref 4–11)

## 2024-02-06 PROCEDURE — 2580000003 HC RX 258: Performed by: INTERNAL MEDICINE

## 2024-02-06 PROCEDURE — 1200000000 HC SEMI PRIVATE

## 2024-02-06 PROCEDURE — 83036 HEMOGLOBIN GLYCOSYLATED A1C: CPT

## 2024-02-06 PROCEDURE — 97530 THERAPEUTIC ACTIVITIES: CPT

## 2024-02-06 PROCEDURE — 6370000000 HC RX 637 (ALT 250 FOR IP): Performed by: NURSE PRACTITIONER

## 2024-02-06 PROCEDURE — 80069 RENAL FUNCTION PANEL: CPT

## 2024-02-06 PROCEDURE — 97116 GAIT TRAINING THERAPY: CPT

## 2024-02-06 PROCEDURE — 6360000002 HC RX W HCPCS: Performed by: INTERNAL MEDICINE

## 2024-02-06 PROCEDURE — 51798 US URINE CAPACITY MEASURE: CPT

## 2024-02-06 PROCEDURE — 6370000000 HC RX 637 (ALT 250 FOR IP): Performed by: INTERNAL MEDICINE

## 2024-02-06 PROCEDURE — 85025 COMPLETE CBC W/AUTO DIFF WBC: CPT

## 2024-02-06 PROCEDURE — 36415 COLL VENOUS BLD VENIPUNCTURE: CPT

## 2024-02-06 RX ORDER — BLOOD-GLUCOSE SENSOR
1 EACH MISCELLANEOUS
Qty: 2 EACH | Refills: 3 | Status: SHIPPED | OUTPATIENT
Start: 2024-02-06 | End: 2024-02-07

## 2024-02-06 RX ORDER — INSULIN GLARGINE 100 [IU]/ML
20 INJECTION, SOLUTION SUBCUTANEOUS NIGHTLY
Status: DISCONTINUED | OUTPATIENT
Start: 2024-02-06 | End: 2024-02-07

## 2024-02-06 RX ADMIN — MELATONIN TAB 3 MG 3 MG: 3 TAB at 21:28

## 2024-02-06 RX ADMIN — PANTOPRAZOLE SODIUM 40 MG: 40 TABLET, DELAYED RELEASE ORAL at 05:59

## 2024-02-06 RX ADMIN — METOPROLOL TARTRATE 25 MG: 25 TABLET, FILM COATED ORAL at 08:01

## 2024-02-06 RX ADMIN — INFLIXIMAB 450 MG: 100 INJECTION, POWDER, LYOPHILIZED, FOR SOLUTION INTRAVENOUS at 12:23

## 2024-02-06 RX ADMIN — SODIUM CHLORIDE 200 MG: 9 INJECTION, SOLUTION INTRAVENOUS at 21:23

## 2024-02-06 RX ADMIN — INSULIN GLARGINE 20 UNITS: 100 INJECTION, SOLUTION SUBCUTANEOUS at 21:28

## 2024-02-06 RX ADMIN — SODIUM CHLORIDE, PRESERVATIVE FREE 10 ML: 5 INJECTION INTRAVENOUS at 08:11

## 2024-02-06 RX ADMIN — METOPROLOL TARTRATE 25 MG: 25 TABLET, FILM COATED ORAL at 21:29

## 2024-02-06 RX ADMIN — DEXTROSE MONOHYDRATE 125 ML: 100 INJECTION, SOLUTION INTRAVENOUS at 08:00

## 2024-02-06 RX ADMIN — DEXTROSE MONOHYDRATE 125 ML: 100 INJECTION, SOLUTION INTRAVENOUS at 06:57

## 2024-02-06 NOTE — PROGRESS NOTES
[facility]    2/5/2024 7:03 PM        Name: Danny Rock .              Admitted: 2/2/2024  Primary Care Provider: Yesi Rosa APRN - CNP (Tel: [phone])      Subjective:    Patient seen and examined with spouse at bedside.   Worsening renal function.  Denies any complaints. No pain, fever or chills.       Current Medications  [START ON 2/6/2024] inFLIXimab (REMICADE) 450 mg in sodium chloride 0.9 % 250 mL IVPB, Once  melatonin tablet 3 mg, Nightly PRN  [Held by provider] apixaban (ELIQUIS) tablet 5 mg, BID  [Held by provider] aspirin chewable tablet 81 mg, Daily  insulin glargine (LANTUS) injection vial 30 Units, Nightly  insulin lispro (HUMALOG) injection vial 0-8 Units, TID WC  insulin lispro (HUMALOG) injection vial 0-4 Units, Nightly  metoprolol tartrate (LOPRESSOR) tablet 25 mg, BID  pantoprazole (PROTONIX) tablet 40 mg, QAM AC  sodium chloride flush 0.9 % injection 5-40 mL, 2 times per day  sodium chloride flush 0.9 % injection 5-40 mL, PRN  0.9 % sodium chloride infusion, PRN  polyethylene glycol (GLYCOLAX) packet 17 g, Daily PRN  acetaminophen (TYLENOL) tablet 650 mg, Q6H PRN   Or  acetaminophen (TYLENOL) suppository 650 mg, Q6H PRN  dextrose bolus 10% 125 mL, PRN   Or  dextrose bolus 10% 250 mL, PRN  dextrose 10 % infusion, Continuous PRN        Objective:  BP (!) 129/55   Pulse 66   Temp 98.2 °F (36.8 °C) (Oral)   Resp 20   Ht 1.905 m (6' 3\")   Wt 89.8 kg (197 lb 14.4 oz)   SpO2 98%   BMI 24.74 kg/m²     Intake/Output Summary (Last 24 hours) at 2/5/2024 1903  Last data filed at 2/5/2024 1342  Gross per 24 hour   Intake 1859.08 ml   Output 25 ml   Net 1834.08 ml      Wt Readings from Last 3 Encounters:   02/02/24 89.8 kg (197 lb 14.4 oz)   02/02/24 98 kg (216 lb)   01/25/24 98.1 kg (216 lb 6 oz)       General appearance:  Appears comfortable. AAOx3  HEENT: atraumatic, Pupils equal, muscous  decompensation.  Echo 12/6/2023: LV EF 40 to 45%.  Lasix on hold.      Renal cell carcinoma s/p right nephrectomy 12/2023.      Hypokalemia/hypomagnesemia: replaced.  Monitor electrolytes and replace as needed.         T2DM on long-term insulin:  Monitor blood glucose on insulin.  Follow-up A1c.       PAF: Eliquis held for dialysis catheter placement.        DVT prophylaxis Eliquis  Code status full code      Lilly Cotto MD   2/5/2024 7:03 PM

## 2024-02-06 NOTE — PROGRESS NOTES
MD Pratibha Guzmán MD Samir Brahmbhatt, MD                                  Office: [phone]                 Fax: [phone]          [email or web address]                     NEPHROLOGY  INPATIENT PROGRESS  NOTE:     PATIENT NAME: Danny Rock  : 1950  MRN: [id number]  Name:  Danny Rock Date/Time of Admission: 2024  2:43 PM    CSN: [id number] Attending Provider: Chad Mendoza MD   Room/Bed: 5T-5560/5560-01 : 1950 Age: 73 y.o.            PLAN   Recent hospital admission 2024-[facility]-COVID-pneumonia and acute renal failure requiring dialysis.  Was recently taken off    Stopped IVFs trial  Continue holding lasix      Discussed in details with oncologist - Dr Dupont    -We decided with another form of IS - such as Infliximab, first dose on 2024, then second dose after 2 weeks.  -(Vs CellCept-) -as a second line of immunosuppression for possible AIN underlying.  - Appreciated assistance by oncologist.    Immunotherapy has been on hold    In next 24-48 hrs, if no major renal improvement, then re-start dialysis  Hold Eliquis and aspirin, in anticipation of ordering dialysis catheter placement in next 24-48 hours    No more Lokelma needed    Hyperphosphatemia-with CASTRO, follow-up with renal recovery  Renal diet    Anemia, of chronic disease  Hematology following-  appreciated their assistance    Metoprolol for hypertension control  Insulin sliding scale for blood sugar control-per primary team      Medical decision making- high complexity. Multiple complex health problems.   Discussed with patient and and patient's wife  Discussed with treatment team, Chad Mendoza MD , oncologist, nursing team,  Thank you for allowing me to participate in this patient's care. Please do not hesitate to contact me for any questions/concerns. We will follow along with you.     Gordon Rolle MD  [facility]   Phone: [phone] or Via

## 2024-02-06 NOTE — PROGRESS NOTES
Physician Progress Note      PATIENT:               MARCOS TORRES  CSN #:                  [id number]  :                       1950  ADMIT DATE:       2024 2:43 PM  DISCH DATE:  RESPONDING  PROVIDER #:        Chad Mendoza MD          QUERY TEXT:    Patient admitted with bilateral leg swelling.   Noted documentation of Acute   systolic chf ef 45% in PN 2/4 and Chronic Systolic CHF: No acute   decompensation. in PN 2.  If possible, please document in progress notes and discharge summary if you   are evaluating and /or treating any of the following:    The medical record reflects the following:  Risk Factors: 72 yo w/ bilat leg swelling, SOB w/ exertion  Clinical Indicators: Pro-BNP 14,352. Per H&P: Acute systolic chf ef 45%.  hold   lasix for now. Per PN : Chronic Systolic CHF: No acute decompensation.  Treatment: Lab monitoring, Daily weight ordered, Strict I&O's  Options provided:  -- Acute systolic chf confirmed and Chronic Systolic CHF ruled out  -- Chronic Systolic CHF confirmed and Acute systolic chf ruled out  -- Other - I will add my own diagnosis  -- Disagree - Not applicable / Not valid  -- Disagree - Clinically unable to determine / Unknown  -- Refer to Clinical Documentation Reviewer    PROVIDER RESPONSE TEXT:    After study, Chronic Systolic CHF confirmed and Acute systolic chf ruled out.    Query created by: Milvia Mathis on 2024 6:37 AM      Electronically signed by:  Chad Mendoza MD 2024 7:48 AM

## 2024-02-06 NOTE — CONSULTS
Past Surgical History:   Procedure Laterality Date    CATARACT REMOVAL Bilateral     ~  with lens implanted    CHOLECYSTECTOMY      EYE SURGERY Left 2018    Cataract    HERNIA REPAIR      KIDNEY REMOVAL Right 2022    ROBOTIC LAPAROSCOPIC RADICAL RIGHT NEPHRECTOMY performed by Larry Olsen MD at [facility] OR    KNEE SURGERY      both knees: scope for cartilage    NASAL SEPTUM SURGERY         Social History     Tobacco Use    Smoking status: Former     Current packs/day: 0.00     Types: Cigarettes     Start date:      Quit date: 1970     Years since quittin.1    Smokeless tobacco: Never   Vaping Use    Vaping Use: Never used   Substance Use Topics    Alcohol use: Yes     Comment: rarely    Drug use: No       Prior Level of Function:   Mod-I w/ RW for ambulation. Ind w/ ADLs. Wife helps with IADLs.       Objective:  Patient Vitals for the past 24 hrs:   BP Temp Temp src Pulse Resp SpO2   24 0759 135/72 -- -- 78 -- --   24 0708 123/69 -- -- 88 18 95 %   24 0545 (!) 132/58 97.3 °F (36.3 °C) Oral 78 18 93 %   24 0013 (!) 164/69 97.8 °F (36.6 °C) Oral 90 21 95 %   24 2004 (!) 163/87 97.4 °F (36.3 °C) Oral 71 19 100 %   24 1100 (!) 129/55 98.2 °F (36.8 °C) Oral 66 20 98 %     Gen: No distress, pleasant.   HEENT: Normocephalic, atraumatic.   CV: No audible murmurs, well perfused extremities  Resp: No respiratory distress. No increased WOB. On 2L O2 via NC.   Abd: Soft, nontender nondistended  Ext: No edema.   Neuro: Alert, oriented, appropriately interactive. 5/5 strength in bilateral upper and lower extremities. Sensation intact to light touch in bilateral upper and lower extremities.     Laboratory data: Available via EMR.     Therapy progress:    PT    Rolling: Level of difficulty - A Little   Sit to Stand from a Chair: Level of difficulty - A Little  Supine to Sit: Level of difficulty - A Little     Bed to Chair: Physical Assistance Required - A  Little  Ambulate Across Room: Physical Assistance Required - A Little  Ascend 3-5 Steps With HR: Physical Assistance Required - A Little    OT    Eating: Physical Assistance Required - None  Grooming: Physical Assistance Required - A Little  LB Dressing: Physical Assistance Required - A Lot  UB Dressing: Physical Assistance Required - A Little  Bathing: Physical Assistance Required - A Lot  Toileting: Physical Assistance Required - A Lot    SLP         Body mass index is 24.74 kg/m².    Assessment:  Patient Active Problem List   Diagnosis    HLD (hyperlipidemia)    Essential hypertension    Coronary artery disease due to lipid rich plaque    Cardiac dysrhythmia    Diabetes mellitus    Paroxysmal atrial fibrillation (HCC)    Renal mass    Cerebrovascular accident (CVA) (HCC)    Recent cerebrovascular accident (CVA)    HTN (hypertension), benign    PAF (paroxysmal atrial fibrillation) (HCC)    CASTRO (acute kidney injury) ([facility])    LBBB (left bundle branch block)    H/O right nephrectomy    Anemia    Acute ischemic stroke ([facility])    Overweight (BMI 25.0-29.9)    AIN (acute interstitial nephritis)    Sterile pyuria    Current chronic use of systemic steroids    H/O systemic steroid therapy    Diabetes education, encounter for    Secondary hypercoagulable state (HCC)       Plan:   Patient is currently below his functional baseline with regard to mobility and self care. He is likely a candidate for acute inpatient rehab pending clarification of the plan for immunosuppression for management of possible underlying AIN, and need for possible dialysis catheter placement. He will benefit from ongoing therapy assessments while inpatient, if he continues to have skilled therapy needs anticipate he will tolerate and benefit from intensive PT/OT for 3 hrs/day, 5 days per week in acute inpatient rehab.     Garret Ponce MD 2/6/2024, 9:50 AM    * This document was created using dictation software.  While all precautions were taken to

## 2024-02-06 NOTE — PROGRESS NOTES
On chart review FBS 37 mg/dL this am, eGFR 8% today. Secure message sent to Dr to please consider holding lantus while eGFR < 30% to prevent profound hypoglycemia and covering hyperglycemia with humalog correction only. Radha Owusu RN, BSN, [facility].

## 2024-02-06 NOTE — PROGRESS NOTES
Lab called and reported critical BS 37mg/dl and recheck finger stick was 40mg/dl hence, D10 125ml administered bolus. To recheck BS in 15mins.

## 2024-02-06 NOTE — PROGRESS NOTES
Per wife the patient's daughter downloaded the freestyle selin 3 bobby onto the wife's phone, she doesn't know how to open the bobby. She states she will have daughter who is an LPN come in tomorrow or Thursday afternoon to assist her with bobby and with diabetes educator assistance with CGM education. Outpatient pharmacy has [facility] selin 3 sensors, sensor ordered for the patient Radha Owusu RN, BSN, [facility].

## 2024-02-06 NOTE — PLAN OF CARE
Problem: Pain  Goal: Verbalizes/displays adequate comfort level or baseline comfort level  2/6/2024 1236 by Lady Ahn RN  Outcome: Progressing  2/6/2024 0356 by Freddy Gonzalez RN  Outcome: Progressing     Problem: Safety - Adult  Goal: Free from fall injury  2/6/2024 1236 by Lady Ahn RN  Outcome: Progressing  2/6/2024 0356 by Freddy Gonzalez RN  Outcome: Progressing     Problem: Chronic Conditions and Co-morbidities  Goal: Patient's chronic conditions and co-morbidity symptoms are monitored and maintained or improved  2/6/2024 1236 by Lady Ahn RN  Outcome: Progressing  2/6/2024 0356 by Freddy Gonzalez RN  Outcome: Progressing

## 2024-02-06 NOTE — PROGRESS NOTES
appreciated  Cardiovascular: Regular rate and rhythm no murmurs appreciated  Respiratory: CTAB no wheezing  Gastrointestinal: Abdomen soft, non-tender, BS+  EXT: 1+BL edema  Neurology: no gross focal deficts  Psychiatry: Appropriate affect. Not agitated  Skin: Warm, dry, no rashes appreciated    Labs and Tests:  CBC:   Recent Labs     02/04/24  0646 02/05/24  0600 02/06/24  0523   WBC 5.4 8.4 9.7   HGB 8.3* 8.6* 8.5*    215 225       BMP:    Recent Labs     02/04/24  0646 02/05/24  0600 02/06/24  0523    141 137   K 3.1* 4.8 4.0   CL 99 102 99   CO2 30 27 22   BUN 56* 64* 81*   CREATININE 4.2* 5.9* 7.0*   GLUCOSE 141* 63* 37*       Hepatic:   No results for input(s): \"AST\", \"ALT\", \"ALB\", \"BILITOT\", \"ALKPHOS\" in the last 72 hours.  US RENAL COMPLETE   Final Result   1. Patient status post right nephrectomy.   2. Multiple simple cysts within the left kidney lower pole.   3. 1.4 cm mildly complicated cyst near the left renal pelvis, demonstrating   low level internal echoes.  Consider more detailed characterization with a   dedicated renal mass protocol CT or MRI study.   4. Unremarkable sonographic appearance of the urinary bladder.         XR CHEST PORTABLE   Final Result   Right mid to lower lung airspace disease.  Recommend follow-up to resolution.             Recent imaging reviewed    Problem List  Principal Problem:    CASTRO (acute kidney injury) (HCC)  Resolved Problems:    * No resolved hospital problems. *          Assessment/Plan:     CASTRO on CKD 4:  Recently diagnosed with immunotherapy related CASTRO   Trial of Infliximab   Cr up to 7.0 monior I/o bmp in am for cr and k  May need didalysis will discuss with nephro      Chronic Systolic CHF: No acute decompensation.  Echo 12/6/2023: LV EF 40 to 45%.  Lasix on hold.      Renal cell carcinoma s/p right nephrectomy 12/2023.       T2DM on long-term insulin:  With hypgolycemia d50, decrease lantus and monitor       PAF: Eliquis held for dialysis catheter  placement.        DVT prophylaxis Eliquis  Code status full code      Chad Mendoza MD   2/6/2024 10:15 AM

## 2024-02-06 NOTE — PROGRESS NOTES
Physician Progress Note      PATIENT:               MARCOS TORRES  CSN #:                  [id number]  :                       1950  ADMIT DATE:       2024 2:43 PM  DISCH DATE:  RESPONDING  PROVIDER #:        Gordon Rolle MD          QUERY TEXT:    Pt admitted with CASTRO and underwent right nephrectomy 2023.? If possible,   please document in progress notes and discharge summary the relationship if   any between the CASTRO and the right nephrectomy:    The medical record reflects the following:  Risk Factors: 72 yo w/ CASTRO S/P R nephrectomy 2023, hx of 8 dialysis   sessions about 3 weeks ago, CKD III, immunotherapy  Clinical Indicators: Per PN :  underwent right nephrectomy for renal   carcinoma in 2023.  Acute renal failure-severe in   nature-worsening-with decreased urine output.- Creatinine on admission is 4.4.  - His new baseline is approximately 2.6.  Etiology of acute renal failure   likely multifactorial.  Treatment: Nephrology consult, US, lab monitoring, IVF  Options provided:  -- CASTRO is due to the R nephrectomy  -- CASTRO is not due to the R nephrectomy  -- Other - I will add my own diagnosis  -- Disagree - Not applicable / Not valid  -- Disagree - Clinically unable to determine / Unknown  -- Refer to Clinical Documentation Reviewer    PROVIDER RESPONSE TEXT:    Provider disagreed with this query.    Query created by: Milvia Mathis on 2024 6:15 AM      Electronically signed by:  Gordon Rolle MD 2024 1:44 PM

## 2024-02-06 NOTE — PROGRESS NOTES
[facility] - Inpatient Rehabilitation Department   Phone: [phone]    Physical Therapy    [] Initial Evaluation            [x] Daily Treatment Note         [] Discharge Summary      Patient: Danny Rock   : 1950   MRN: [id number]   Date of Service:  2024  Admitting Diagnosis: CASTRO (acute kidney injury) (HCC)  Current Admission Summary:  Danny Rock is a 73 y.o. male history of CKD 4 had temporary dialysis at [facility] last month.  Patient increased leg swelling thus was started on increased dose of Lasix 80 mg.  Patient kidney function deteriorated thus was seen by nephrologist and sent to the ED patient has any chest pain fever chills nausea vomiting or shortness of breath no difficulty passing urine.   Past Medical History:  has a past medical history of Atrial fibrillation (HCC), CAD (coronary artery disease), Cancer of kidney (HCC), Diabetes mellitus (HCC), Hyperlipidemia, Hypertension, Prostate cancer (HCC), Right renal mass, and Systolic CHF (HCC).  Past Surgical History:  has a past surgical history that includes hernia repair; knee surgery; Cholecystectomy; Nasal septum surgery; eye surgery (Left, 2018); Kidney removal (Right, 2022); and Cataract removal (Bilateral).  Discharge Recommendations: Danny Rock scored a 18/24 on the AM-PAC short mobility form. Current research shows that an AM-PAC score of 17 or less is typically not associated with a discharge to the patient's home setting. Based on the patient's AM-PAC score and their current functional mobility deficits, it is recommended that the patient have 5-7 sessions per week of Physical Therapy at d/c to increase the patient's independence.  At this time, this patient demonstrates complex nursing, medical, and rehabilitative needs, and would benefit from intensive rehabilitation services upon discharge from the Inpatient setting.  This patient demonstrates the ability to participate in and benefit from  returned to seated up in chair.   Stair Mobility:  Stair mobility not completed on this date.  Comments:  Wheelchair Mobility:  No w/c mobility completed on this date.  Comments:  Balance:  Static Sitting Balance: good: independent with functional balance in unsupported position  Dynamic Sitting Balance: fair (+): maintains balance at SBA/supervision without use of UE support  Static Standing Balance: fair (-): maintains balance at CGA with use of UE support  Dynamic Standing Balance: fair (-): maintains balance at CGA with use of UE support  Comments: No overt LOB throughout; pt noted his balance and strength in LEs is decreased    Other Therapeutic Interventions  Pt to bathroom and had large BM, needed some assist with brief up/down and hygiene. Pt stood at sink for hand hygiene with CGA. Pt on 2LO2 for activity and dropped to 86%. Once seated after walking, pt back up to 90s% within 30 seconds. Pt left seated up in chair on 1L and sats at 97%. Nursing with pt at end of session. Pt positioned in chair for postural support and comfort with needs placed in reach.    Functional Outcomes  AM-PAC Inpatient Mobility Raw Score : 18              Cognition  Overall Cognitive Status: Impaired  Following Commands: follows one step commands with repetition  Memory: decreased recall of recent events, decreased short term memory  Safety Judgement: decreased awareness of need for assistance  Insights: decreased awareness of deficits  Initiation: requires cues for some  Sequencing: requires cues for some  Orientation:    oriented to person, oriented to place, oriented to time, and disoriented to situation  Command Following:   accurately follows one step commands    Education  Barriers To Learning: cognition  Patient Education: patient educated on goals, PT role and benefits, plan of care, functional mobility training, proper use of assistive device/equipment, transfer training, discharge recommendations  Learning Assessment:

## 2024-02-06 NOTE — PROGRESS NOTES
Remicade started. New IV placed in R wrist with positive blood return. RN at bedside.     1149: Patient tolerated first 15 minutes of infusion without any complications. VSS. RN at bedside

## 2024-02-06 NOTE — CONSULTS
ONCOLOGY HEMATOLOGY CARE PROGRESS NOTE      SUBJECTIVE:    Patient known to me.  He has history of stage II renal cell carcinoma, status post right nephrectomy.  Patient completed adjuvant pembrolizumab in November 2023.  Subsequently he was hospitalized with CASTRO thought to be secondary to pembrolizumab.  He was given high doses of steroids which were subsequently tapered over 5 weeks.  Around that time patient spent some time in rehab.    Subsequently he was discharged.    He was recently hospitalized in [facility] with COVID infection and pneumonia.  At that time developed severe renal failure and was started on hemodialysis on recovery creatinine.    The patient is now rehospitalized with leg swelling.  Creatinine is elevated at 5.9.  He is not on dialysis currently.      ROS:         OBJECTIVE        Physical    VITALS:  Patient Vitals for the past 24 hrs:   BP Temp Temp src Pulse Resp SpO2   02/05/24 2004 (!) 163/87 97.4 °F (36.3 °C) Oral 71 19 100 %   02/05/24 1100 (!) 129/55 98.2 °F (36.8 °C) Oral 66 20 98 %   02/05/24 0915 -- -- -- 84 -- --   02/05/24 0815 132/81 98.2 °F (36.8 °C) Oral 84 20 92 %   02/05/24 0545 (!) 165/81 -- -- 90 18 96 %   02/05/24 0009 131/69 97.7 °F (36.5 °C) Oral 68 -- 97 %       24HR INTAKE/OUTPUT:    Intake/Output Summary (Last 24 hours) at 2/5/2024 2125  Last data filed at 2/5/2024 1904  Gross per 24 hour   Intake 2079.08 ml   Output 25 ml   Net 2054.08 ml     Conscious alert and oriented.  Mild pallor is present.  Neck is supple  Few basal crackles were heard.  Respiratory efforts are normal.  Abdomen is soft bowel sounds are heard  Extremities mild edema noted.      DATA:  CBC:    Recent Labs     02/05/24  0600 02/04/24  0646 02/03/24  0604 02/02/24  1527   WBC 8.4 5.4 5.0 5.4   NEUTROABS 5.3 3.7 3.1 3.6   LYMPHOPCT 25.1 20.4 24.3 23.3   RBC 2.82* 2.72* 2.74* 3.03*   HGB 8.6* 8.3* 8.4* 9.3*   HCT 26.9* 25.8* 25.8* 28.8*   MCV 95.6

## 2024-02-06 NOTE — PROGRESS NOTES
Shift assessment completed. Routine vitals stable. Scheduled medications given. Patient is awake, alert and oriented. Respirations are easy and unlabored. Patient does not appear to be in distress, resting comfortably at this time. Call light within reach.

## 2024-02-07 ENCOUNTER — APPOINTMENT (OUTPATIENT)
Dept: INTERVENTIONAL RADIOLOGY/VASCULAR | Age: 74
End: 2024-02-07
Payer: MEDICARE

## 2024-02-07 LAB
ABO + RH BLD: NORMAL
ALBUMIN SERPL-MCNC: 2.8 G/DL (ref 3.4–5)
ANION GAP SERPL CALCULATED.3IONS-SCNC: 19 MMOL/L (ref 3–16)
BASOPHILS # BLD: 0.1 K/UL (ref 0–0.2)
BASOPHILS NFR BLD: 0.7 %
BLD GP AB SCN SERPL QL: NORMAL
BUN SERPL-MCNC: 83 MG/DL (ref 7–20)
CALCIUM SERPL-MCNC: 7.9 MG/DL (ref 8.3–10.6)
CHLORIDE SERPL-SCNC: 97 MMOL/L (ref 99–110)
CO2 SERPL-SCNC: 21 MMOL/L (ref 21–32)
CREAT SERPL-MCNC: 7.3 MG/DL (ref 0.8–1.3)
DEPRECATED RDW RBC AUTO: 20.4 % (ref 12.4–15.4)
EOSINOPHIL # BLD: 0.2 K/UL (ref 0–0.6)
EOSINOPHIL NFR BLD: 2.2 %
GFR SERPLBLD CREATININE-BSD FMLA CKD-EPI: 7 ML/MIN/{1.73_M2}
GLUCOSE BLD-MCNC: 123 MG/DL (ref 70–99)
GLUCOSE BLD-MCNC: 124 MG/DL (ref 70–99)
GLUCOSE BLD-MCNC: 127 MG/DL (ref 70–99)
GLUCOSE BLD-MCNC: 42 MG/DL (ref 70–99)
GLUCOSE BLD-MCNC: 50 MG/DL (ref 70–99)
GLUCOSE BLD-MCNC: 54 MG/DL (ref 70–99)
GLUCOSE BLD-MCNC: 58 MG/DL (ref 70–99)
GLUCOSE BLD-MCNC: 65 MG/DL (ref 70–99)
GLUCOSE BLD-MCNC: 69 MG/DL (ref 70–99)
GLUCOSE BLD-MCNC: 73 MG/DL (ref 70–99)
GLUCOSE BLD-MCNC: 80 MG/DL (ref 70–99)
GLUCOSE BLD-MCNC: 82 MG/DL (ref 70–99)
GLUCOSE BLD-MCNC: 82 MG/DL (ref 70–99)
GLUCOSE SERPL-MCNC: 45 MG/DL (ref 70–99)
HAV IGM SERPL QL IA: NORMAL
HBV CORE IGM SERPL QL IA: NORMAL
HBV SURFACE AB SERPL IA-ACNC: <3.5 MIU/ML
HBV SURFACE AG SERPL QL IA: NORMAL
HCT VFR BLD AUTO: 25.3 % (ref 40.5–52.5)
HCT VFR BLD AUTO: 25.3 % (ref 40.5–52.5)
HCT VFR BLD AUTO: 28.4 % (ref 40.5–52.5)
HCV AB SERPL QL IA: NORMAL
HEMOCCULT STL QL: ABNORMAL
HGB BLD-MCNC: 8.1 G/DL (ref 13.5–17.5)
HGB BLD-MCNC: 8.3 G/DL (ref 13.5–17.5)
HGB BLD-MCNC: 9.1 G/DL (ref 13.5–17.5)
INR PPP: 1.26 (ref 0.84–1.16)
LACTATE BLDV-SCNC: 1.9 MMOL/L (ref 0.4–2)
LYMPHOCYTES # BLD: 3.3 K/UL (ref 1–5.1)
LYMPHOCYTES NFR BLD: 30.3 %
MAGNESIUM SERPL-MCNC: 2 MG/DL (ref 1.8–2.4)
MCH RBC QN AUTO: 30.4 PG (ref 26–34)
MCHC RBC AUTO-ENTMCNC: 32.1 G/DL (ref 31–36)
MCV RBC AUTO: 94.7 FL (ref 80–100)
MONOCYTES # BLD: 0.9 K/UL (ref 0–1.3)
MONOCYTES NFR BLD: 8.2 %
NEUTROPHILS # BLD: 6.4 K/UL (ref 1.7–7.7)
NEUTROPHILS NFR BLD: 58.6 %
PERFORMED ON: ABNORMAL
PERFORMED ON: NORMAL
PHOSPHATE SERPL-MCNC: 7.2 MG/DL (ref 2.5–4.9)
PLATELET # BLD AUTO: 301 K/UL (ref 135–450)
PMV BLD AUTO: 6.9 FL (ref 5–10.5)
POTASSIUM SERPL-SCNC: 4.3 MMOL/L (ref 3.5–5.1)
PROTHROMBIN TIME: 15.8 SEC (ref 11.5–14.8)
RBC # BLD AUTO: 3 M/UL (ref 4.2–5.9)
SODIUM SERPL-SCNC: 137 MMOL/L (ref 136–145)
WBC # BLD AUTO: 10.9 K/UL (ref 4–11)

## 2024-02-07 PROCEDURE — 2580000003 HC RX 258: Performed by: INTERNAL MEDICINE

## 2024-02-07 PROCEDURE — 6360000002 HC RX W HCPCS: Performed by: INTERNAL MEDICINE

## 2024-02-07 PROCEDURE — 85014 HEMATOCRIT: CPT

## 2024-02-07 PROCEDURE — 85025 COMPLETE CBC W/AUTO DIFF WBC: CPT

## 2024-02-07 PROCEDURE — 36558 INSERT TUNNELED CV CATH: CPT

## 2024-02-07 PROCEDURE — 0JH60XZ INSERTION OF TUNNELED VASCULAR ACCESS DEVICE INTO CHEST SUBCUTANEOUS TISSUE AND FASCIA, OPEN APPROACH: ICD-10-PCS | Performed by: INTERNAL MEDICINE

## 2024-02-07 PROCEDURE — 2500000003 HC RX 250 WO HCPCS: Performed by: INTERNAL MEDICINE

## 2024-02-07 PROCEDURE — 6360000002 HC RX W HCPCS: Performed by: RADIOLOGY

## 2024-02-07 PROCEDURE — 1200000000 HC SEMI PRIVATE

## 2024-02-07 PROCEDURE — 80074 ACUTE HEPATITIS PANEL: CPT

## 2024-02-07 PROCEDURE — 6370000000 HC RX 637 (ALT 250 FOR IP): Performed by: INTERNAL MEDICINE

## 2024-02-07 PROCEDURE — 36415 COLL VENOUS BLD VENIPUNCTURE: CPT

## 2024-02-07 PROCEDURE — C9113 INJ PANTOPRAZOLE SODIUM, VIA: HCPCS | Performed by: INTERNAL MEDICINE

## 2024-02-07 PROCEDURE — 85610 PROTHROMBIN TIME: CPT

## 2024-02-07 PROCEDURE — 76937 US GUIDE VASCULAR ACCESS: CPT

## 2024-02-07 PROCEDURE — 86706 HEP B SURFACE ANTIBODY: CPT

## 2024-02-07 PROCEDURE — 83735 ASSAY OF MAGNESIUM: CPT

## 2024-02-07 PROCEDURE — 5A1D70Z PERFORMANCE OF URINARY FILTRATION, INTERMITTENT, LESS THAN 6 HOURS PER DAY: ICD-10-PCS | Performed by: INTERNAL MEDICINE

## 2024-02-07 PROCEDURE — 6370000000 HC RX 637 (ALT 250 FOR IP): Performed by: PHYSICIAN ASSISTANT

## 2024-02-07 PROCEDURE — 86850 RBC ANTIBODY SCREEN: CPT

## 2024-02-07 PROCEDURE — 99152 MOD SED SAME PHYS/QHP 5/>YRS: CPT

## 2024-02-07 PROCEDURE — 86901 BLOOD TYPING SEROLOGIC RH(D): CPT

## 2024-02-07 PROCEDURE — 85018 HEMOGLOBIN: CPT

## 2024-02-07 PROCEDURE — 90935 HEMODIALYSIS ONE EVALUATION: CPT

## 2024-02-07 PROCEDURE — 82270 OCCULT BLOOD FECES: CPT

## 2024-02-07 PROCEDURE — 02HV33Z INSERTION OF INFUSION DEVICE INTO SUPERIOR VENA CAVA, PERCUTANEOUS APPROACH: ICD-10-PCS | Performed by: INTERNAL MEDICINE

## 2024-02-07 PROCEDURE — 86900 BLOOD TYPING SEROLOGIC ABO: CPT

## 2024-02-07 PROCEDURE — 6370000000 HC RX 637 (ALT 250 FOR IP): Performed by: NURSE PRACTITIONER

## 2024-02-07 PROCEDURE — 83605 ASSAY OF LACTIC ACID: CPT

## 2024-02-07 PROCEDURE — C1750 CATH, HEMODIALYSIS,LONG-TERM: HCPCS

## 2024-02-07 PROCEDURE — 77001 FLUOROGUIDE FOR VEIN DEVICE: CPT

## 2024-02-07 PROCEDURE — 80069 RENAL FUNCTION PANEL: CPT

## 2024-02-07 RX ORDER — FENTANYL CITRATE 50 UG/ML
INJECTION, SOLUTION INTRAMUSCULAR; INTRAVENOUS PRN
Status: COMPLETED | OUTPATIENT
Start: 2024-02-07 | End: 2024-02-07

## 2024-02-07 RX ORDER — ALBUMIN (HUMAN) 12.5 G/50ML
25 SOLUTION INTRAVENOUS
Status: ACTIVE | OUTPATIENT
Start: 2024-02-07 | End: 2024-02-08

## 2024-02-07 RX ORDER — MIDAZOLAM HYDROCHLORIDE 2 MG/2ML
INJECTION, SOLUTION INTRAMUSCULAR; INTRAVENOUS PRN
Status: COMPLETED | OUTPATIENT
Start: 2024-02-07 | End: 2024-02-07

## 2024-02-07 RX ORDER — GLUCAGON 1 MG/ML
1 KIT INJECTION PRN
Status: DISCONTINUED | OUTPATIENT
Start: 2024-02-07 | End: 2024-02-10 | Stop reason: HOSPADM

## 2024-02-07 RX ORDER — DEXTROSE, SODIUM CHLORIDE, SODIUM LACTATE, POTASSIUM CHLORIDE, AND CALCIUM CHLORIDE 5; .6; .31; .03; .02 G/100ML; G/100ML; G/100ML; G/100ML; G/100ML
INJECTION, SOLUTION INTRAVENOUS CONTINUOUS
Status: DISCONTINUED | OUTPATIENT
Start: 2024-02-07 | End: 2024-02-07

## 2024-02-07 RX ORDER — INSULIN GLARGINE 100 [IU]/ML
10 INJECTION, SOLUTION SUBCUTANEOUS NIGHTLY
Status: DISCONTINUED | OUTPATIENT
Start: 2024-02-07 | End: 2024-02-07

## 2024-02-07 RX ORDER — DIPHENHYDRAMINE HYDROCHLORIDE 50 MG/ML
25 INJECTION INTRAMUSCULAR; INTRAVENOUS
Status: ACTIVE | OUTPATIENT
Start: 2024-02-07 | End: 2024-02-08

## 2024-02-07 RX ORDER — INSULIN LISPRO 100 [IU]/ML
0-8 INJECTION, SOLUTION INTRAVENOUS; SUBCUTANEOUS
Status: DISCONTINUED | OUTPATIENT
Start: 2024-02-07 | End: 2024-02-10 | Stop reason: HOSPADM

## 2024-02-07 RX ORDER — INSULIN LISPRO 100 [IU]/ML
0-4 INJECTION, SOLUTION INTRAVENOUS; SUBCUTANEOUS NIGHTLY
Status: DISCONTINUED | OUTPATIENT
Start: 2024-02-07 | End: 2024-02-10 | Stop reason: HOSPADM

## 2024-02-07 RX ORDER — LIDOCAINE 40 MG/G
CREAM TOPICAL PRN
Status: DISCONTINUED | OUTPATIENT
Start: 2024-02-07 | End: 2024-02-10 | Stop reason: HOSPADM

## 2024-02-07 RX ORDER — LIDOCAINE HYDROCHLORIDE 10 MG/ML
10 INJECTION, SOLUTION EPIDURAL; INFILTRATION; INTRACAUDAL; PERINEURAL ONCE
Status: COMPLETED | OUTPATIENT
Start: 2024-02-07 | End: 2024-02-07

## 2024-02-07 RX ORDER — BLOOD-GLUCOSE SENSOR
1 EACH MISCELLANEOUS
Qty: 2 EACH | Refills: 3 | Status: SHIPPED | OUTPATIENT
Start: 2024-02-07

## 2024-02-07 RX ORDER — CLINDAMYCIN PHOSPHATE 900 MG/50ML
INJECTION, SOLUTION INTRAVENOUS CONTINUOUS PRN
Status: COMPLETED | OUTPATIENT
Start: 2024-02-07 | End: 2024-02-07

## 2024-02-07 RX ORDER — DEXTROSE MONOHYDRATE 100 MG/ML
INJECTION, SOLUTION INTRAVENOUS CONTINUOUS PRN
Status: DISCONTINUED | OUTPATIENT
Start: 2024-02-07 | End: 2024-02-07 | Stop reason: SDUPTHER

## 2024-02-07 RX ORDER — LIDOCAINE HYDROCHLORIDE AND EPINEPHRINE BITARTRATE 20; .01 MG/ML; MG/ML
20 INJECTION, SOLUTION SUBCUTANEOUS ONCE
Status: COMPLETED | OUTPATIENT
Start: 2024-02-07 | End: 2024-02-07

## 2024-02-07 RX ORDER — PANTOPRAZOLE SODIUM 40 MG/10ML
40 INJECTION, POWDER, LYOPHILIZED, FOR SOLUTION INTRAVENOUS 2 TIMES DAILY
Status: DISCONTINUED | OUTPATIENT
Start: 2024-02-07 | End: 2024-02-10 | Stop reason: HOSPADM

## 2024-02-07 RX ORDER — CLINDAMYCIN PHOSPHATE 900 MG/50ML
900 INJECTION, SOLUTION INTRAVENOUS ONCE
Status: DISCONTINUED | OUTPATIENT
Start: 2024-02-07 | End: 2024-02-10 | Stop reason: HOSPADM

## 2024-02-07 RX ORDER — DEXTROSE MONOHYDRATE 100 MG/ML
INJECTION, SOLUTION INTRAVENOUS CONTINUOUS
Status: DISPENSED | OUTPATIENT
Start: 2024-02-07 | End: 2024-02-08

## 2024-02-07 RX ORDER — HYDROCORTISONE ACETATE 25 MG/1
25 SUPPOSITORY RECTAL 2 TIMES DAILY
Status: DISCONTINUED | OUTPATIENT
Start: 2024-02-07 | End: 2024-02-10 | Stop reason: HOSPADM

## 2024-02-07 RX ADMIN — DEXTROSE MONOHYDRATE: 100 INJECTION, SOLUTION INTRAVENOUS at 11:44

## 2024-02-07 RX ADMIN — SODIUM CHLORIDE, PRESERVATIVE FREE 10 ML: 5 INJECTION INTRAVENOUS at 07:56

## 2024-02-07 RX ADMIN — MIDAZOLAM HYDROCHLORIDE 0.5 MG: 1 INJECTION, SOLUTION INTRAMUSCULAR; INTRAVENOUS at 15:09

## 2024-02-07 RX ADMIN — METOPROLOL TARTRATE 25 MG: 25 TABLET, FILM COATED ORAL at 21:22

## 2024-02-07 RX ADMIN — FENTANYL CITRATE 25 MCG: 50 INJECTION, SOLUTION INTRAMUSCULAR; INTRAVENOUS at 15:09

## 2024-02-07 RX ADMIN — CLINDAMYCIN PHOSPHATE 900 MG: 900 INJECTION, SOLUTION INTRAVENOUS at 15:06

## 2024-02-07 RX ADMIN — MIDAZOLAM HYDROCHLORIDE 1 MG: 1 INJECTION, SOLUTION INTRAMUSCULAR; INTRAVENOUS at 15:06

## 2024-02-07 RX ADMIN — Medication: at 15:26

## 2024-02-07 RX ADMIN — LIDOCAINE HYDROCHLORIDE,EPINEPHRINE BITARTRATE 20 ML: 20; .01 INJECTION, SOLUTION INFILTRATION; PERINEURAL at 15:26

## 2024-02-07 RX ADMIN — MELATONIN TAB 3 MG 3 MG: 3 TAB at 21:22

## 2024-02-07 RX ADMIN — SODIUM CHLORIDE, SODIUM LACTATE, POTASSIUM CHLORIDE, CALCIUM CHLORIDE AND DEXTROSE MONOHYDRATE: 5; 600; 310; 30; 20 INJECTION, SOLUTION INTRAVENOUS at 06:43

## 2024-02-07 RX ADMIN — HYDROCORTISONE ACETATE 25 MG: 25 SUPPOSITORY RECTAL at 21:28

## 2024-02-07 RX ADMIN — SODIUM CHLORIDE 200 MG: 9 INJECTION, SOLUTION INTRAVENOUS at 21:26

## 2024-02-07 RX ADMIN — LIDOCAINE HYDROCHLORIDE 10 ML: 10 INJECTION, SOLUTION EPIDURAL; INFILTRATION; INTRACAUDAL; PERINEURAL at 15:24

## 2024-02-07 RX ADMIN — METOPROLOL TARTRATE 25 MG: 25 TABLET, FILM COATED ORAL at 07:56

## 2024-02-07 RX ADMIN — DEXTROSE MONOHYDRATE 125 ML: 100 INJECTION, SOLUTION INTRAVENOUS at 06:26

## 2024-02-07 RX ADMIN — PANTOPRAZOLE SODIUM 40 MG: 40 INJECTION, POWDER, FOR SOLUTION INTRAVENOUS at 21:22

## 2024-02-07 RX ADMIN — PANTOPRAZOLE SODIUM 40 MG: 40 INJECTION, POWDER, FOR SOLUTION INTRAVENOUS at 05:35

## 2024-02-07 ASSESSMENT — PAIN SCALES - GENERAL: PAINLEVEL_OUTOF10: 0

## 2024-02-07 NOTE — DIALYSIS
Treatment time: 2 hours  Net UF: 0 ml     Pre weight: 89.9 kg  Post weight:89.9 kg  EDW: TBD kg    Access used: R TDC     Access function: well with BFR  ml/min     Medications or blood products given: none      Regular outpatient schedule: CASTRO, TBD     Summary of response to treatment: Patient tolerated treatment well and without any complications. Patient remained stable throughout entire treatment and upon the exiting the dialysis suite via transport.     Report given to Lady Ahn RN and copy of dialysis treatment record placed in chart, to be scanned into EMR.

## 2024-02-07 NOTE — PRE SEDATION
Swabs, Lancets, Lancet Device, Control Solution,  Test 3 times a day & as needed for symptoms of irregular blood glucose. Dispense sufficient amount for indicated testing frequency plus additional to accommodate PRN testing needs. 12/19/23   Cortez Brown DO   blood glucose monitor kit and supplies Dispense sufficient amount for indicated testing frequency plus additional to accommodate PRN testing needs. Dispense all needed supplies to include: monitor, strips, lancing device, lancets, control solutions, alcohol swabs. 12/19/23   Cortez Brown DO   aspirin 81 MG chewable tablet Take 1 tablet by mouth daily 12/11/23   Mikael Coronado MD   atorvastatin (LIPITOR) 40 MG tablet Take 1 tablet by mouth nightly  Patient not taking: Reported on 2/2/2024 12/10/23   Mikael Coronado MD   Calcium Carb-Cholecalciferol (OYSTER SHELL CALCIUM W/D) 500-5 MG-MCG TABS tablet Take 1 tablet by mouth 2 times daily (with meals)  Patient not taking: Reported on 2/2/2024 12/10/23   Mikael Coronado MD   folic acid (FOLVITE) 1 MG tablet Take 1 tablet by mouth daily  Patient not taking: Reported on 2/2/2024    Svetlana Deleon MD   ascorbic acid (VITAMIN C) 500 MG tablet Take 1 tablet by mouth daily    Svetlana Deleon MD   vitamin D3 (CHOLECALCIFEROL) 125 MCG (5000 UT) TABS tablet Take 1 tablet by mouth daily    Svetlana Deleon MD   Multiple Vitamins-Minerals (MULTIVITAMIN ADULTS) TABS Take 1 tablet by mouth daily    Svetlana Deleon MD   Naproxen Sodium 220 MG CAPS Take 220 mg by mouth as needed for Pain  7/25/22  Svetlana Deleon MD   omeprazole (PRILOSEC) 20 MG capsule Take 1 capsule by mouth every other day    Svetlana Deleon MD     Coumadin Use Last 7 Days:  no  Antiplatelet drug therapy use last 7 days: no  Other anticoagulant use last 7 days: no  Additional Medication Information:  n/a      Pre-Sedation Documentation and Exam:   I have reviewed the patient's history and review of systems.    Mallampati  Airway Assessment:  Mallampati Class II - (soft palate, fauces & uvula are visible)    Prior History of Anesthesia Complications:   none    ASA Classification:  Class 2 - A normal healthy patient with mild systemic disease    Sedation/ Anesthesia Plan:   intravenous sedation    Medications Planned:   midazolam (Versed) intravenously and fentanyl intravenously    Patient is an appropriate candidate for plan of sedation: yes    Electronically signed by Kosta Baugh MD on 2/7/2024 at 3:28 PM

## 2024-02-07 NOTE — PROGRESS NOTES
Assessment complete. VSS. Patient up to bathroom and then to chair.  Respirations even and unlabored on 1L O2 via NC. Bed linens and gown changed. Call light within reach. Fall precautions in place. Camera in room. Bed in low, locked position. No additional needs noted at this time.

## 2024-02-07 NOTE — PROGRESS NOTES
[facility]    2/7/2024 10:15 AM        Name: Danny Rock .              Admitted: 2/2/2024  Primary Care Provider: Yesi Rosa APRN - CNP (Tel: [phone])      Subjective:    With episodes of bright red blood per rectum and episode of hypoglycemia overnight no nausea vomiting chest pain  Current Medications  pantoprazole (PROTONIX) injection 40 mg, BID  dextrose 5 % in lactated ringers infusion, Continuous  glucagon injection 1 mg, PRN  insulin lispro (HUMALOG) injection vial 0-8 Units, TID WC  insulin lispro (HUMALOG) injection vial 0-4 Units, Nightly  epoetin emely-epbx (RETACRIT) injection 10,000 Units, Once per day on Mon Wed Fri  albumin human 25% IV solution 25 g, Once PRN  lidocaine (LMX) 4 % cream, PRN  diphenhydrAMINE (BENADRYL) injection 25 mg, Once PRN  iron sucrose (VENOFER) 200 mg in sodium chloride 0.9 % 100 mL IVPB, Q24H  melatonin tablet 3 mg, Nightly PRN  [Held by provider] apixaban (ELIQUIS) tablet 5 mg, BID  [Held by provider] aspirin chewable tablet 81 mg, Daily  metoprolol tartrate (LOPRESSOR) tablet 25 mg, BID  sodium chloride flush 0.9 % injection 5-40 mL, 2 times per day  sodium chloride flush 0.9 % injection 5-40 mL, PRN  0.9 % sodium chloride infusion, PRN  polyethylene glycol (GLYCOLAX) packet 17 g, Daily PRN  acetaminophen (TYLENOL) tablet 650 mg, Q6H PRN   Or  acetaminophen (TYLENOL) suppository 650 mg, Q6H PRN  dextrose bolus 10% 125 mL, PRN   Or  dextrose bolus 10% 250 mL, PRN  dextrose 10 % infusion, Continuous PRN        Objective:  /83   Pulse 81   Temp 97.1 °F (36.2 °C) (Oral)   Resp 18   Ht 1.905 m (6' 3\")   Wt 89.8 kg (197 lb 14.4 oz)   SpO2 97%   BMI 24.74 kg/m²     Intake/Output Summary (Last 24 hours) at 2/7/2024 1015  Last data filed at 2/7/2024 0756  Gross per 24 hour   Intake 410 ml   Output 550 ml   Net -140 ml        Wt Readings from Last 3

## 2024-02-07 NOTE — PROGRESS NOTES
[facility]  Diabetes Education   Progress Note       NAME:  Danny Rock  MEDICAL RECORD NUMBER:  [id number]  AGE: 73 y.o.   GENDER: male  : 1950  TODAY'S DATE:  2024    Followed up with pt and spouse regarding Freestyle Kristin CGM.  Per NAVEEN Garcia, [facility] outpatient pharmacy stated yesterday that they're not able to bill Medicare for CGM.  Pharmacy states OOP cost is $162 for 2 sensors.    Pt's spouse states they have orders for a different model CGM at [facility] and Medicare was covering it.  I called [facility] who states they currently have orders for a Kristin 2 reader and the original 14 day Kristin sensors with Medicare Advantage coverage but the original 14 day Kristin sensors are no longer available/manufactured.  [facility] states they have Kristin 3 sensors in stock and would need a new order for those.  Order has been sent.    Will continue to follow for DM related discharge needs.    Electronically signed by ABHISHEK Arango on 2024 at 11:08 AM

## 2024-02-07 NOTE — PROGRESS NOTES
Pt went into the bathroom and was found a wobbly as he was trying to make his way back into the room. BS was 50mg/dl. Patient was given a cup of apple juice and recheck done was 64mg/dl. Another cup of juice given. Pt also c/o numbness in his lips and he was assessed for signs of strole which was negative. Will continue to monitor pt

## 2024-02-07 NOTE — PROGRESS NOTES
[facility]  Diabetes Education   Progress Note       NAME:  Danny Rock  MEDICAL RECORD NUMBER:  [id number]  AGE: 73 y.o.   GENDER: male  : 1950  TODAY'S DATE:  2024    Followed up with [facility] regarding cost of Freestyle Kristin CGM.  [facility] states it's not billable through Medicare Part D.    Medicare Standard Written Order form for Freestyle Kristin printed and faxed to [employer] [facility] for completion by PCP.  [facility] call center states written communication is facilitated by medical records at fax # [phone] or email [email or web address].  Fax line has been busy so form was sent via email with ATTN Yesi Rosa NP.    Electronically signed by Stacy Lynn RN [facility] on 2024 at 2:48 PM

## 2024-02-07 NOTE — PLAN OF CARE
Problem: Pain  Goal: Verbalizes/displays adequate comfort level or baseline comfort level  2/7/2024 1631 by Lady Ahn RN  Outcome: Progressing  2/7/2024 0326 by Freddy Gonzalez RN  Outcome: Progressing     Problem: Safety - Adult  Goal: Free from fall injury  2/7/2024 1631 by Lady Ahn RN  Outcome: Progressing  2/7/2024 0326 by Freddy Gonzalez RN  Outcome: Progressing     Problem: Chronic Conditions and Co-morbidities  Goal: Patient's chronic conditions and co-morbidity symptoms are monitored and maintained or improved  2/7/2024 1631 by Lady Ahn RN  Outcome: Progressing  2/7/2024 0326 by Freddy Gonzalez RN  Outcome: Progressing     Problem: Cardiovascular - Adult  Goal: Maintains optimal cardiac output and hemodynamic stability  2/7/2024 1631 by Lady Ahn RN  Outcome: Progressing  2/7/2024 0326 by Freddy Gonzalez RN  Outcome: Progressing     Problem: Skin/Tissue Integrity - Adult  Goal: Skin integrity remains intact  2/7/2024 1631 by Lady Ahn RN  Outcome: Progressing  2/7/2024 0326 by Freddy Gonzalez RN  Outcome: Progressing     Problem: Musculoskeletal - Adult  Goal: Return mobility to safest level of function  2/7/2024 1631 by Lady Ahn RN  Outcome: Progressing  2/7/2024 0326 by Freddy Gonzalez RN  Outcome: Progressing  Goal: Return ADL status to a safe level of function  2/7/2024 1631 by Lady Ahn RN  Outcome: Progressing  2/7/2024 0326 by Freddy Gonzalez RN  Outcome: Progressing     Problem: Metabolic/Fluid and Electrolytes - Adult  Goal: Electrolytes maintained within normal limits  2/7/2024 1631 by Lady Ahn RN  Outcome: Progressing  2/7/2024 0326 by Freddy Gonzalez RN  Outcome: Progressing  Goal: Hemodynamic stability and optimal renal function maintained  2/7/2024 1631 by Lady Ahn RN  Outcome: Progressing  2/7/2024 0326 by Freddy Gonzalez RN  Outcome: Progressing  Goal: Glucose maintained within prescribed range  2/7/2024 1631 by  Lady Ahn, RN  Outcome: Progressing  2/7/2024 0326 by Freddy Gonzalez, RN  Outcome: Progressing     Problem: Genitourinary - Adult  Goal: Absence of urinary retention  Outcome: Progressing

## 2024-02-07 NOTE — PROGRESS NOTES
daily.  Naproxen as needed.      Medications reviewed.  Medical records reviewed.        Past Medical History :         Past Medical History:   Diagnosis Date    Atrial fibrillation (HCC)     CAD (coronary artery disease)     Cancer of kidney (HCC)     Currently on Immunotherapy: just finished treatment 11-22-23    Diabetes mellitus (HCC)     Hyperlipidemia     Hypertension     Prostate cancer (HCC)     radiation    Right renal mass     Systolic CHF (HCC)        Medications  Which i have  Reviewed, also have reviewed home medications  Prior to Admission medications    Medication Sig Start Date End Date Taking? Authorizing Provider   Continuous Blood Gluc Sensor (FREESTYLE MATILDE 3 SENSOR) [facility] 1 applicator by Does not apply route every 14 days 2/6/24  Yes Chad Mendoza MD   Alpha-Lipoic Acid 100 MG CAPS Take 1 capsule by mouth Daily    Svetlana Deelon MD   Chromium-Cinnamon (CINNAMON PLUS CHROMIUM) 200-1000 MCG-MG CAPS Take 1 capsule by mouth Daily    Svetlana Deleon MD   melatonin 3 MG TABS tablet Take 1 tablet by mouth daily    Svetlana Deleon MD   Chromium Picolinate 500 MCG CAPS Take 1 capsule by mouth Daily    Svetlana Deleon MD   metoprolol tartrate (LOPRESSOR) 25 MG tablet Take 1 tablet by mouth 2 times daily    Svetlana Deleon MD   zinc sulfate (ZINCATE) 220 (50 Zn)  mg capsule - elemental zinc Take 1 capsule by mouth daily    Svetlana Deleon MD   rosuvastatin (CRESTOR) 20 MG tablet Take 1 tablet by mouth daily    Svetlana Deleon MD   Cyanocobalamin (VITAMIN B-12 PO) Take 1 tablet by mouth daily    Svetlana Deleon MD   furosemide (LASIX) 80 MG tablet Take 1 tablet by mouth daily 1/25/24   Rianna Milligan APRN - CNP   insulin glargine (LANTUS) 100 UNIT/ML injection vial Inject 40 Units into the skin nightly    Svetlana Deleon MD   insulin lispro protamine & lispro (HUMALOG MIX) (75-25) 100 UNIT per ML SUSP injection vial Inject 100 Units into  capsule by mouth every other day    Provider, MD Svetlana     Current Facility-Administered Medications: pantoprazole (PROTONIX) injection 40 mg, 40 mg, IntraVENous, BID  dextrose 5 % in lactated ringers infusion, , IntraVENous, Continuous  glucagon injection 1 mg, 1 mg, SubCUTAneous, PRN  insulin lispro (HUMALOG) injection vial 0-8 Units, 0-8 Units, SubCUTAneous, TID WC  insulin lispro (HUMALOG) injection vial 0-4 Units, 0-4 Units, SubCUTAneous, Nightly  iron sucrose (VENOFER) 200 mg in sodium chloride 0.9 % 100 mL IVPB, 200 mg, IntraVENous, Q24H  melatonin tablet 3 mg, 3 mg, Oral, Nightly PRN  [Held by provider] apixaban (ELIQUIS) tablet 5 mg, 5 mg, Oral, BID  [Held by provider] aspirin chewable tablet 81 mg, 81 mg, Oral, Daily  metoprolol tartrate (LOPRESSOR) tablet 25 mg, 25 mg, Oral, BID  sodium chloride flush 0.9 % injection 5-40 mL, 5-40 mL, IntraVENous, 2 times per day  sodium chloride flush 0.9 % injection 5-40 mL, 5-40 mL, IntraVENous, PRN  0.9 % sodium chloride infusion, , IntraVENous, PRN  polyethylene glycol (GLYCOLAX) packet 17 g, 17 g, Oral, Daily PRN  acetaminophen (TYLENOL) tablet 650 mg, 650 mg, Oral, Q6H PRN **OR** acetaminophen (TYLENOL) suppository 650 mg, 650 mg, Rectal, Q6H PRN  dextrose bolus 10% 125 mL, 125 mL, IntraVENous, PRN **OR** dextrose bolus 10% 250 mL, 250 mL, IntraVENous, PRN  dextrose 10 % infusion, , IntraVENous, Continuous PRN   dextrose 5% in lactated ringers 100 mL/hr at 02/07/24 0643    sodium chloride      dextrose           PHYSICAL EXAMINATION.         /83   Pulse 81   Temp 97.1 °F (36.2 °C) (Oral)   Resp 18   Ht 1.905 m (6' 3\")   Wt 89.8 kg (197 lb 14.4 oz)   SpO2 97%   BMI 24.74 kg/m²     Intake/Output Summary (Last 24 hours) at 2/7/2024 0933  Last data filed at 2/7/2024 0756  Gross per 24 hour   Intake 410 ml   Output 550 ml   Net -140 ml         INTAKE/OUTPUT:  I/O last 3 completed shifts:  In: 620 [P.O.:620]  Out: 550 [Urine:550]  I/O this shift:  In:

## 2024-02-07 NOTE — BRIEF OP NOTE
Brief Postoperative Note    Danny Rock  YOB: 1950  [id number]    Pre-operative Diagnosis: CASTRO on CKD    Post-operative Diagnosis: Same    Procedure: Tunneled hemodialysis catheter placement    Anesthesia: Moderate Sedation    Surgeons: Kosta Baugh MD    Estimated Blood Loss: Less than 5 mL    Complications: None    Specimens: Was Not Obtained    Findings: Successful placement of a right IJ tunneled hemodialysis catheter.    Electronically signed by Kosta Baugh MD on 2/7/2024 at 3:29 PM

## 2024-02-07 NOTE — PROGRESS NOTES
Patient to go to IR for dialysis catheter placed. Patient bathed with CHG wipes. New gown, depends and socks placed on patient.

## 2024-02-07 NOTE — SIGNIFICANT EVENT
Please avoid patient having rectal movements that are bloody diarrhea.  Hemoccult ordered, Eliquis aspirin held.  GI consulted, Protonix 40 IV twice daily.  Stat type and screen and a.m. labs

## 2024-02-07 NOTE — PLAN OF CARE
Problem: Pain  Goal: Verbalizes/displays adequate comfort level or baseline comfort level  Outcome: Progressing     Problem: Safety - Adult  Goal: Free from fall injury  Outcome: Progressing     Problem: Chronic Conditions and Co-morbidities  Goal: Patient's chronic conditions and co-morbidity symptoms are monitored and maintained or improved  Outcome: Progressing     Problem: Cardiovascular - Adult  Goal: Maintains optimal cardiac output and hemodynamic stability  Outcome: Progressing     Problem: Skin/Tissue Integrity - Adult  Goal: Skin integrity remains intact  Outcome: Progressing     Problem: Musculoskeletal - Adult  Goal: Return mobility to safest level of function  Outcome: Progressing     Problem: Musculoskeletal - Adult  Goal: Return ADL status to a safe level of function  Outcome: Progressing     Problem: Metabolic/Fluid and Electrolytes - Adult  Goal: Electrolytes maintained within normal limits  Outcome: Progressing     Problem: Metabolic/Fluid and Electrolytes - Adult  Goal: Hemodynamic stability and optimal renal function maintained  Outcome: Progressing     Problem: Metabolic/Fluid and Electrolytes - Adult  Goal: Glucose maintained within prescribed range  Outcome: Progressing

## 2024-02-07 NOTE — SIGNIFICANT EVENT
Paged about hypoglycemia. Started D5LR, decreased long acting by 50% for the night and accucehck in 1 hour

## 2024-02-07 NOTE — CONSULTS
results for input(s): \"AST\", \"ALT\", \"ALB\", \"BILIDIR\", \"BILITOT\", \"ALKPHOS\" in the last 72 hours.  No results for input(s): \"LIPASE\", \"AMYLASE\" in the last 72 hours.  No results for input(s): \"PROTIME\", \"INR\" in the last 72 hours.  No results for input(s): \"PTT\" in the last 72 hours.  No results for input(s): \"OCCULTBLD\" in the last 72 hours.                      Assessment/Plan:     BRBPR -occurred once overnight.  Had nonbloody formed bowel movements after.  Hgb stable at baseline.  Last colonoscopy was June 2022 at [place].  He did have hemorrhoids then.  May have been hemorrhoidal bleeding.  Keytruda can cause an immune mediated colitis but he is not having any diarrhea or abdominal pain so no clinical colitis.  -Monitor for any further bleeding  -Fiber supplement  -Anusol suppositories twice daily    CASTRO -felt to possibly be from Keytruda.  Received infliximab 2/6/2024.  Nephrology following.  To have dialysis catheter placement today.    History of stage II RCC -oncology following.  Was treated with Keytruda which he completed in November 2023.    Karen -Eliquis held currently for dialysis catheter placement    Discussed with Dr. Jacinto Mccray, PA-C  [place]

## 2024-02-07 NOTE — PROGRESS NOTES
Physical Therapy  Danny Rock  PT reviewed patient's chart. Patient currently LETA for dialysis catheter placement. Will re-attempt as the schedule allows.  Thank you.  Sommer Israel PT, DPT, [id number]

## 2024-02-08 LAB
ALBUMIN SERPL-MCNC: 2.7 G/DL (ref 3.4–5)
ANION GAP SERPL CALCULATED.3IONS-SCNC: 11 MMOL/L (ref 3–16)
BASOPHILS # BLD: 0.1 K/UL (ref 0–0.2)
BASOPHILS NFR BLD: 1.2 %
BUN SERPL-MCNC: 51 MG/DL (ref 7–20)
CALCIUM SERPL-MCNC: 8.7 MG/DL (ref 8.3–10.6)
CHLORIDE SERPL-SCNC: 103 MMOL/L (ref 99–110)
CO2 SERPL-SCNC: 25 MMOL/L (ref 21–32)
CREAT SERPL-MCNC: 5.2 MG/DL (ref 0.8–1.3)
DEPRECATED RDW RBC AUTO: 20.4 % (ref 12.4–15.4)
EOSINOPHIL # BLD: 0.2 K/UL (ref 0–0.6)
EOSINOPHIL NFR BLD: 3.1 %
GFR SERPLBLD CREATININE-BSD FMLA CKD-EPI: 11 ML/MIN/{1.73_M2}
GLUCOSE BLD-MCNC: 124 MG/DL (ref 70–99)
GLUCOSE BLD-MCNC: 155 MG/DL (ref 70–99)
GLUCOSE BLD-MCNC: 197 MG/DL (ref 70–99)
GLUCOSE BLD-MCNC: 227 MG/DL (ref 70–99)
GLUCOSE BLD-MCNC: 311 MG/DL (ref 70–99)
GLUCOSE BLD-MCNC: 321 MG/DL (ref 70–99)
GLUCOSE SERPL-MCNC: 108 MG/DL (ref 70–99)
HCT VFR BLD AUTO: 24.6 % (ref 40.5–52.5)
HCT VFR BLD AUTO: 24.6 % (ref 40.5–52.5)
HCT VFR BLD AUTO: 25.5 % (ref 40.5–52.5)
HGB BLD-MCNC: 7.8 G/DL (ref 13.5–17.5)
HGB BLD-MCNC: 8.1 G/DL (ref 13.5–17.5)
HGB BLD-MCNC: 8.2 G/DL (ref 13.5–17.5)
LYMPHOCYTES # BLD: 1.4 K/UL (ref 1–5.1)
LYMPHOCYTES NFR BLD: 24.9 %
MAGNESIUM SERPL-MCNC: 2 MG/DL (ref 1.8–2.4)
MCH RBC QN AUTO: 30.9 PG (ref 26–34)
MCHC RBC AUTO-ENTMCNC: 32.8 G/DL (ref 31–36)
MCV RBC AUTO: 94.2 FL (ref 80–100)
MONOCYTES # BLD: 0.6 K/UL (ref 0–1.3)
MONOCYTES NFR BLD: 11.7 %
NEUTROPHILS # BLD: 3.2 K/UL (ref 1.7–7.7)
NEUTROPHILS NFR BLD: 59.1 %
PERFORMED ON: ABNORMAL
PHOSPHATE SERPL-MCNC: 5.5 MG/DL (ref 2.5–4.9)
PLATELET # BLD AUTO: 240 K/UL (ref 135–450)
PMV BLD AUTO: 6.5 FL (ref 5–10.5)
POTASSIUM SERPL-SCNC: 4.4 MMOL/L (ref 3.5–5.1)
RBC # BLD AUTO: 2.61 M/UL (ref 4.2–5.9)
SODIUM SERPL-SCNC: 139 MMOL/L (ref 136–145)
WBC # BLD AUTO: 5.4 K/UL (ref 4–11)

## 2024-02-08 PROCEDURE — 85018 HEMOGLOBIN: CPT

## 2024-02-08 PROCEDURE — 2700000000 HC OXYGEN THERAPY PER DAY

## 2024-02-08 PROCEDURE — 85025 COMPLETE CBC W/AUTO DIFF WBC: CPT

## 2024-02-08 PROCEDURE — 83735 ASSAY OF MAGNESIUM: CPT

## 2024-02-08 PROCEDURE — 80069 RENAL FUNCTION PANEL: CPT

## 2024-02-08 PROCEDURE — 6370000000 HC RX 637 (ALT 250 FOR IP): Performed by: INTERNAL MEDICINE

## 2024-02-08 PROCEDURE — 94760 N-INVAS EAR/PLS OXIMETRY 1: CPT

## 2024-02-08 PROCEDURE — 85014 HEMATOCRIT: CPT

## 2024-02-08 PROCEDURE — 6360000002 HC RX W HCPCS: Performed by: INTERNAL MEDICINE

## 2024-02-08 PROCEDURE — 36415 COLL VENOUS BLD VENIPUNCTURE: CPT

## 2024-02-08 PROCEDURE — 6370000000 HC RX 637 (ALT 250 FOR IP): Performed by: NURSE PRACTITIONER

## 2024-02-08 PROCEDURE — 97530 THERAPEUTIC ACTIVITIES: CPT

## 2024-02-08 PROCEDURE — 2580000003 HC RX 258: Performed by: INTERNAL MEDICINE

## 2024-02-08 PROCEDURE — 97110 THERAPEUTIC EXERCISES: CPT

## 2024-02-08 PROCEDURE — 90935 HEMODIALYSIS ONE EVALUATION: CPT

## 2024-02-08 PROCEDURE — C9113 INJ PANTOPRAZOLE SODIUM, VIA: HCPCS | Performed by: INTERNAL MEDICINE

## 2024-02-08 PROCEDURE — 1200000000 HC SEMI PRIVATE

## 2024-02-08 PROCEDURE — 6370000000 HC RX 637 (ALT 250 FOR IP): Performed by: PHYSICIAN ASSISTANT

## 2024-02-08 RX ORDER — LANOLIN ALCOHOL/MO/W.PET/CERES
9 CREAM (GRAM) TOPICAL NIGHTLY PRN
Status: DISCONTINUED | OUTPATIENT
Start: 2024-02-08 | End: 2024-02-10 | Stop reason: HOSPADM

## 2024-02-08 RX ORDER — QUETIAPINE FUMARATE 25 MG/1
25 TABLET, FILM COATED ORAL ONCE
Status: COMPLETED | OUTPATIENT
Start: 2024-02-09 | End: 2024-02-09

## 2024-02-08 RX ADMIN — METOPROLOL TARTRATE 25 MG: 25 TABLET, FILM COATED ORAL at 21:37

## 2024-02-08 RX ADMIN — HYDROCORTISONE ACETATE 25 MG: 25 SUPPOSITORY RECTAL at 08:40

## 2024-02-08 RX ADMIN — INSULIN LISPRO 4 UNITS: 100 INJECTION, SOLUTION INTRAVENOUS; SUBCUTANEOUS at 22:00

## 2024-02-08 RX ADMIN — PANTOPRAZOLE SODIUM 40 MG: 40 INJECTION, POWDER, FOR SOLUTION INTRAVENOUS at 21:37

## 2024-02-08 RX ADMIN — METOPROLOL TARTRATE 25 MG: 25 TABLET, FILM COATED ORAL at 08:39

## 2024-02-08 RX ADMIN — SODIUM CHLORIDE, PRESERVATIVE FREE 10 ML: 5 INJECTION INTRAVENOUS at 21:37

## 2024-02-08 RX ADMIN — Medication 1 PACKET: at 08:40

## 2024-02-08 RX ADMIN — MELATONIN TAB 3 MG 9 MG: 3 TAB at 21:37

## 2024-02-08 RX ADMIN — HYDROCORTISONE ACETATE 25 MG: 25 SUPPOSITORY RECTAL at 22:01

## 2024-02-08 RX ADMIN — PANTOPRAZOLE SODIUM 40 MG: 40 INJECTION, POWDER, FOR SOLUTION INTRAVENOUS at 11:04

## 2024-02-08 NOTE — CARE COORDINATION
CM submitted required documents for new HD chair in [facility] portal for [facility] location.     Wilda Santoyo RN, BSN  [phone]

## 2024-02-08 NOTE — PROGRESS NOTES
[facility]    2/8/2024 12:38 PM        Name: Danny Rock .              Admitted: 2/2/2024  Primary Care Provider: Yesi Rosa APRN - CNP (Tel: [phone])      Subjective:    With no further bloody diarrhea some shortness of breath no nausea vomiting chest pain  Current Medications  pantoprazole (PROTONIX) injection 40 mg, BID  glucagon injection 1 mg, PRN  insulin lispro (HUMALOG) injection vial 0-8 Units, TID WC  insulin lispro (HUMALOG) injection vial 0-4 Units, Nightly  epoetin emely-epbx (RETACRIT) injection 10,000 Units, Once per day on Mon Wed Fri  lidocaine (LMX) 4 % cream, PRN  clindamycin (CLEOCIN) 900 mg in dextrose 5 % 50 mL IVPB, Once  hydrocortisone (ANUSOL-HC) suppository 25 mg, BID  psyllium (HYDROCIL) 95 % packet 1 packet, Daily  melatonin tablet 3 mg, Nightly PRN  [Held by provider] apixaban (ELIQUIS) tablet 5 mg, BID  [Held by provider] aspirin chewable tablet 81 mg, Daily  metoprolol tartrate (LOPRESSOR) tablet 25 mg, BID  sodium chloride flush 0.9 % injection 5-40 mL, 2 times per day  sodium chloride flush 0.9 % injection 5-40 mL, PRN  0.9 % sodium chloride infusion, PRN  polyethylene glycol (GLYCOLAX) packet 17 g, Daily PRN  acetaminophen (TYLENOL) tablet 650 mg, Q6H PRN   Or  acetaminophen (TYLENOL) suppository 650 mg, Q6H PRN  dextrose bolus 10% 125 mL, PRN   Or  dextrose bolus 10% 250 mL, PRN  dextrose 10 % infusion, Continuous PRN        Objective:  /70   Pulse 98   Temp 97.6 °F (36.4 °C) (Oral)   Resp 18   Ht 1.905 m (6' 3\")   Wt 88.5 kg (195 lb 1.7 oz)   SpO2 93%   BMI 24.39 kg/m²     Intake/Output Summary (Last 24 hours) at 2/8/2024 1238  Last data filed at 2/8/2024 0839  Gross per 24 hour   Intake 1657.92 ml   Output 500 ml   Net 1157.92 ml        Wt Readings from Last 3 Encounters:   02/08/24 88.5 kg (195 lb 1.7 oz)   02/02/24 98 kg (216 lb)   01/25/24 98.1 kg  4:  Recently diagnosed with immunotherapy related CASTRO   S/p remicade  Vascath placed plan to start dialysis toda  Y- bmp in am      Chronic Systolic CHF: No acute decompensation.  Echo 12/6/2023: LV EF 40 to 45%.  Lasix on hold.      Renal cell carcinoma s/p right nephrectomy 12/2023.       T2DM on long-term insulin:  Hold lantus   ssi       PAF: eliquis  acute gi bleed rgi consulted likely hemorrhoidal  Anusol suppositories twice daily   monitor for futher bleeding        DVT prophylaxis Eliquis  Code status full code      Chad Mendoza MD   2/8/2024 12:38 PM

## 2024-02-08 NOTE — PROGRESS NOTES
Gastroenterology Progress Note    Danny Rock is a 73 y.o. male patient.  1. Acute kidney injury superimposed on CKD (HCC)    2. Hyperkalemia    3. Hyperglycemia    4. Right lower lobe pulmonary infiltrate    5. Hypoglycemia associated with type 2 diabetes mellitus (HCC)        Admission Date: 2024  Hospital Day: Hospital Day: 7  Attending: Chad Mendoza MD  Date of service: 24    SUBJECTIVE:    No rectal bleeding since 2 days ago. He has intermittent diarrhea. Denies abdominal pain    ROS:  Cardiovascular ROS: no chest pain or dyspnea on exertion  Gastrointestinal ROS: see above  Respiratory ROS: no cough, shortness of breath, or wheezing    Physical    VITALS:  BP (!) 149/85   Pulse 78   Temp 97.3 °F (36.3 °C) (Oral)   Resp 18   Ht 1.905 m (6' 3\")   Wt 88.5 kg (195 lb 1.7 oz)   SpO2 96%   BMI 24.39 kg/m²   TEMPERATURE:  Current - Temp: 97.3 °F (36.3 °C); Max - Temp  Av.3 °F (36.3 °C)  Min: 97.1 °F (36.2 °C)  Max: 97.6 °F (36.4 °C)    NAD  RRR, Nl s1s2  Lungs CTA Bilaterally, normal effort  Abdomen soft, ND, NT, no HSM, Bowel sounds normal  AAOx3, No asterixis     Data      CBC:   Recent Labs     24  0523 24  0526 24  1049 24  0023 24  0605 24  1200   WBC 9.7 10.9  --   --  5.4  --    RBC 2.74* 3.00*  --   --  2.61*  --    HGB 8.5* 9.1*   < > 7.8* 8.1* 8.2*   HCT 25.9* 28.4*   < > 24.6* 24.6* 25.5*    301  --   --  240  --    MCV 94.3 94.7  --   --  94.2  --    MCH 30.8 30.4  --   --  30.9  --    MCHC 32.7 32.1  --   --  32.8  --    RDW 20.4* 20.4*  --   --  20.4*  --     < > = values in this interval not displayed.        BMP:  Recent Labs     24  0523 24  0526 24  0605    137 139   K 4.0 4.3 4.4   CL 99 97* 103   CO2 22 21 25   BUN 81* 83* 51*   CREATININE 7.0* 7.3* 5.2*   CALCIUM 7.6* 7.9* 8.7   GLUCOSE 37* 45* 108*        Hepatic Function Panel:   No results for input(s): \"AST\", \"ALT\", \"BILIDIR\", \"BILITOT\",  \"ALKPHOS\" in the last 72 hours.    No results for input(s): \"LIPASE\", \"AMYLASE\" in the last 72 hours.  Recent Labs     02/07/24  1049   PROTIME 15.8*   INR 1.26*     No results for input(s): \"PTT\" in the last 72 hours.  No results for input(s): \"OCCULTBLD\" in the last 72 hours.      Radiology Review:    IR TUNNELED CVC PLACE WO SQ PORT/PUMP > 5 YEARS   Final Result   Successful ultrasound and fluoroscopy guided right IJ 23 cm tunneled   hemodialysis catheter placement.         US RENAL COMPLETE   Final Result   1. Patient status post right nephrectomy.   2. Multiple simple cysts within the left kidney lower pole.   3. 1.4 cm mildly complicated cyst near the left renal pelvis, demonstrating   low level internal echoes.  Consider more detailed characterization with a   dedicated renal mass protocol CT or MRI study.   4. Unremarkable sonographic appearance of the urinary bladder.         XR CHEST PORTABLE   Final Result   Right mid to lower lung airspace disease.  Recommend follow-up to resolution.                Assessment/Plan:      BRBPR/hemorrhoidal bleeding -occurred once 2 days ago.  Hgb stable at baseline.  Last colonoscopy was June 2022 at [place].  He did have hemorrhoids then. May have been hemorrhoidal bleeding.      -Monitor for any further bleeding  -Fiber supplement  -Anusol suppositories twice daily  -GI will sign off. Please call if you have questions.     Chronic anemia.  Anemia of chronic disease and intermittent hemorrhoidal bleeding.    CASTRO -felt to possibly be from Keytruda.  Received infliximab 2/6/2024.  Nephrology following.  To have dialysis catheter placement today.     History of stage II RCC -oncology following.  Was treated with Keytruda which he completed in November 2023.     A-fib -Eliquis held currently for dialysis catheter placement    Gennaro Casey MD, MSc  [facility]  02/08/24

## 2024-02-08 NOTE — PROGRESS NOTES
Treatment time: 2 hours    Net UF: 0 ml    Pre weight: 88.5 kg  Post weight: 88.5 kg    Access used: R Upper Chest Wall TDC  Access function:  Access runs without difficulty.     Medications or blood products given: Epogen 10,000 units    Regular outpatient schedule: TTS    Summary of response to treatment: Pt. Tolerates treatment well today.     Copy of dialysis treatment record placed in chart, to be scanned into EMR.

## 2024-02-08 NOTE — PLAN OF CARE
Problem: Pain  Goal: Verbalizes/displays adequate comfort level or baseline comfort level  2/8/2024 0330 by Freddy Gonzalez RN  Outcome: Progressing  2/7/2024 1631 by Lady Ahn RN  Outcome: Progressing     Problem: Safety - Adult  Goal: Free from fall injury  2/8/2024 0330 by Freddy Gonzalez RN  Outcome: Progressing  2/7/2024 1631 by Lady Ahn RN  Outcome: Progressing     Problem: Chronic Conditions and Co-morbidities  Goal: Patient's chronic conditions and co-morbidity symptoms are monitored and maintained or improved  2/7/2024 1631 by Lady Anh RN  Outcome: Progressing     Problem: Cardiovascular - Adult  Goal: Maintains optimal cardiac output and hemodynamic stability    2/7/2024 1631 by Lady Ahn RN  Outcome: Progressing     Problem: Metabolic/Fluid and Electrolytes - Adult  Goal: Electrolytes maintained within normal limits  g  2/7/2024 1631 by Lady Ahn RN  Outcome: Progressing     Problem: Musculoskeletal - Adult  Goal: Return mobility to safest level of function  2/7/2024 1631 by Lady Ahn RN  Outcome: Progressing

## 2024-02-08 NOTE — PROGRESS NOTES
MD Pratibha Guzmán MD Samir Brahmbhatt, MD                                  Office: [phone]                 Fax: [phone]          [facility]                     NEPHROLOGY  INPATIENT PROGRESS  NOTE:     PATIENT NAME: Danny Rock  : 1950  MRN: [id number]  Name:  Danny Rock Date/Time of Admission: 2024  2:43 PM    CSN: [id number] Attending Provider: Chad Mendoza MD   Room/Bed: 5TN-5560/5560-01 : 1950 Age: 73 y.o.          PLAN       After discussed in details with oncologist - Dr Dupont    -decided with another form of IS - such as Infliximab, first dose on 2024, then second dose after 2 weeks.  -(Vs CellCept-) -as a second line of immunosuppression for possible AIN underlying.  - Appreciated assistance by oncologist.    Immunotherapy has been on hold     W/ no major renal improvement, then re-start dialysis  Dialysis #1 on 2024  #2 today   Plan for 3 sessions ordered duration back-to-back for slow clearance, as at high risk of urea disequilibrium syndrome      Holding Eliquis and aspirin, while dialysis catheter placement     S/P Successful placement of a right IJ tunneled hemodialysis catheter. on 2024     No more Lokelma needed    Hyperphosphatemia-with CASTRO, follow-up with renal recovery  Renal diet    Anemia, of chronic disease  Hematology following-  appreciated their assistance    Metoprolol for hypertension control  Insulin sliding scale for blood sugar control-per primary team    D5 as per primary team and insulin adjustment per them.  -GG 2 higher concentration D10, at lower rate, elevated risk of fluid overload    GI bleeding, concern.  GI consulted.      Discharge plan-from renal standpoint-later this week or early next week  - Refer [facility], closer to his home, while monitor for renal recovery, at outpatient dialysis set up      Medical decision making- high complexity. Multiple complex health problems.  for input(s): \"AST\", \"ALT\", \"LIPASE\", \"AMYLASE\", \"ALB\", \"BILIDIR\", \"BILITOT\", \"ALKPHOS\" in the last 72 hours.    PT/INR:    Lab Results   Component Value Date/Time    PROTIME 15.8 02/07/2024 10:49 AM    PROTIME 14.3 07/08/2022 09:28 AM    PROTIME 10.6 08/12/2015 02:27 AM    INR 1.26 02/07/2024 10:49 AM    INR 1.11 07/08/2022 09:28 AM    INR 0.98 08/12/2015 02:27 AM     PTT:    Lab Results   Component Value Date/Time    APTT 32.1 07/08/2022 09:28 AM    APTT 25.8 08/12/2015 02:27 AM     TELMA:    Lab Results   Component Value Date/Time    TELMA Negative 02/03/2024 06:04 AM           RADIOLOGY:    XR CHEST PORTABLE    Result Date: 2/2/2024  EXAMINATION: ONE XRAY VIEW OF THE CHEST 2/2/2024 3:27 pm COMPARISON: Chest x-ray dated 12/03/2023. HISTORY: ORDERING SYSTEM PROVIDED HISTORY: sob, LE edema TECHNOLOGIST PROVIDED HISTORY: Reason for exam:->sob, LE edema Reason for Exam: SOB FINDINGS: HEART/MEDIASTINUM: The cardiomediastinal silhouette is stable. PLEURA/LUNGS: There is right mid to lower lung airspace disease.  There is no appreciable pneumothorax. BONES/SOFT TISSUE: No acute abnormality.     Right mid to lower lung airspace disease.  Recommend follow-up to resolution.       Imaging Results.  Chest X Ray reviwed by me          Electronically Signed:  Gordon Rolle MD 2/8/2024

## 2024-02-08 NOTE — PROGRESS NOTES
[facility] - Inpatient Rehabilitation Department   Phone: [phone]    Physical Therapy    [] Initial Evaluation            [x] Daily Treatment Note         [] Discharge Summary      Patient: Danny Rock   : 1950   MRN: [id number]   Date of Service:  2024  Admitting Diagnosis: CASTRO (acute kidney injury) (HCC)  Current Admission Summary:  Danny Rock is a 73 y.o. male history of CKD 4 had temporary dialysis at [facility] last month.  Patient increased leg swelling thus was started on increased dose of Lasix 80 mg.  Patient kidney function deteriorated thus was seen by nephrologist and sent to the ED patient has any chest pain fever chills nausea vomiting or shortness of breath no difficulty passing urine.   Past Medical History:  has a past medical history of Atrial fibrillation (HCC), CAD (coronary artery disease), Cancer of kidney (HCC), Diabetes mellitus (HCC), Hyperlipidemia, Hypertension, Prostate cancer (HCC), Right renal mass, and Systolic CHF (HCC).  Past Surgical History:  has a past surgical history that includes hernia repair; knee surgery; Cholecystectomy; Nasal septum surgery; eye surgery (Left, 2018); Kidney removal (Right, 2022); Cataract removal (Bilateral); and IR TUNNELED CVC PLACE WO SQ PORT/PUMP > 5 YEARS (2024).  Discharge Recommendations: Danny Rock scored a 18/24 on the AM-PAC short mobility form. Current research shows that an AM-PAC score of 17 or less is typically not associated with a discharge to the patient's home setting. Based on the patient's AM-PAC score and their current functional mobility deficits, it is recommended that the patient have 5-7 sessions per week of Physical Therapy at d/c to increase the patient's independence.  At this time, this patient demonstrates complex nursing, medical, and rehabilitative needs, and would benefit from intensive rehabilitation services upon discharge from the Inpatient setting.  This patient  Balance: fair (-): maintains balance at CGA with use of UE support  Comments: No overt LOB throughout; pt noted his balance and strength in LEs is decreased    Other Therapeutic Interventions  Patient has BM at toilet, completes pericare at SBA, assistance provided for donning of new brief   Seated Exercises: GENNA HARKINS LAQ and marching 2x10     Functional Outcomes  AM-PAC Inpatient Mobility Raw Score : 18              Cognition  Overall Cognitive Status: Impaired  Following Commands: follows one step commands with repetition  Memory: decreased recall of recent events, decreased short term memory  Safety Judgement: decreased awareness of need for assistance  Insights: decreased awareness of deficits  Initiation: requires cues for some  Sequencing: requires cues for some  Orientation:    oriented to person, oriented to place, oriented to time, and disoriented to situation  Command Following:   accurately follows one step commands    Education  Barriers To Learning: cognition  Patient Education: patient educated on goals, PT role and benefits, plan of care, functional mobility training, proper use of assistive device/equipment, transfer training, discharge recommendations  Learning Assessment:  patient verbalizes understanding, would benefit from continued reinforcement    Assessment  Activity Tolerance: Fair: Pt currently requiring increased supplemental O2, demos decreased strength and balance, and increased SOB    Impairments Requiring Therapeutic Intervention: decreased functional mobility, decreased strength, decreased endurance, decreased balance, decreased IADL, increased pain, decreased posture  Prognosis: fair; hx of multiple recent hospital stays  Clinical Assessment: Patent is a 72 yo male who presents to [facility] due to hyperkalemia with hx of recent hospital admissions. Patient's PLOF includes being mod I with use of RW for ambulation. Today, patient requires use of RW for transfers and ambulation CGA, intermittent

## 2024-02-08 NOTE — PROGRESS NOTES
ONCOLOGY HEMATOLOGY CARE PROGRESS NOTE      HPI:    Patient known to me.  He has history of stage II renal cell carcinoma, status post right nephrectomy.  Patient completed adjuvant pembrolizumab in November 2023.  Subsequently he was hospitalized with CASTRO thought to be secondary to pembrolizumab.  He was given high doses of steroids which were subsequently tapered over 5 weeks.  Around that time patient spent some time in rehab.    Subsequently he was discharged.    He was recently hospitalized in [facility] with COVID infection and pneumonia.  At that time developed severe renal failure and was started on hemodialysis on recovery creatinine.    The patient is now rehospitalized with leg swelling.  Creatinine is elevated at 5.9.  He is not on dialysis currently.    SUBJECTIVE:    Events noted  Had Remicade yesterday  Patient had bleeding per rectum and will be evaluated by gastroenterologist.      ROS:         OBJECTIVE        Physical    VITALS:  Patient Vitals for the past 24 hrs:   BP Temp Temp src Pulse Resp SpO2 Weight   02/07/24 2030 (!) 110/57 97.5 °F (36.4 °C) Oral 92 19 -- --   02/07/24 1837 (!) 158/84 -- -- -- 20 -- --   02/07/24 1800 (!) 149/81 97.1 °F (36.2 °C) -- 92 18 -- 89.8 kg (197 lb 15.6 oz)   02/07/24 1538 138/73 97.1 °F (36.2 °C) -- 84 18 -- 89.8 kg (197 lb 15.6 oz)   02/07/24 1515 (!) 141/88 -- -- 82 24 (!) 88 % --   02/07/24 1510 -- -- -- 81 26 96 % --   02/07/24 1505 (!) 162/101 -- -- 88 26 97 % --   02/07/24 1143 (!) 146/65 -- -- 76 16 97 % --   02/07/24 0754 138/83 -- -- 81 -- -- --   02/07/24 0702 (!) 178/87 97.1 °F (36.2 °C) Oral 82 18 97 % --   02/07/24 0400 (!) 163/64 97.4 °F (36.3 °C) Oral 99 19 96 % --   02/06/24 2333 136/66 98 °F (36.7 °C) Oral 91 18 91 % --         24HR INTAKE/OUTPUT:    Intake/Output Summary (Last 24 hours) at 2/7/2024 2105  Last data filed at 2/7/2024 1951  Gross per 24 hour   Intake 1627.92 ml   Output 500 ml

## 2024-02-09 LAB
ALBUMIN SERPL-MCNC: 2.7 G/DL (ref 3.4–5)
ANION GAP SERPL CALCULATED.3IONS-SCNC: 12 MMOL/L (ref 3–16)
BASOPHILS # BLD: 0.1 K/UL (ref 0–0.2)
BASOPHILS NFR BLD: 1.8 %
BUN SERPL-MCNC: 38 MG/DL (ref 7–20)
CALCIUM SERPL-MCNC: 8.9 MG/DL (ref 8.3–10.6)
CHLORIDE SERPL-SCNC: 109 MMOL/L (ref 99–110)
CO2 SERPL-SCNC: 23 MMOL/L (ref 21–32)
CREAT SERPL-MCNC: 3.9 MG/DL (ref 0.8–1.3)
DEPRECATED RDW RBC AUTO: 20.2 % (ref 12.4–15.4)
EOSINOPHIL # BLD: 0.2 K/UL (ref 0–0.6)
EOSINOPHIL NFR BLD: 3.9 %
GFR SERPLBLD CREATININE-BSD FMLA CKD-EPI: 15 ML/MIN/{1.73_M2}
GLUCOSE BLD-MCNC: 109 MG/DL (ref 70–99)
GLUCOSE BLD-MCNC: 214 MG/DL (ref 70–99)
GLUCOSE BLD-MCNC: 238 MG/DL (ref 70–99)
GLUCOSE BLD-MCNC: 259 MG/DL (ref 70–99)
GLUCOSE BLD-MCNC: 96 MG/DL (ref 70–99)
GLUCOSE SERPL-MCNC: 102 MG/DL (ref 70–99)
HCT VFR BLD AUTO: 24.7 % (ref 40.5–52.5)
HGB BLD-MCNC: 7.9 G/DL (ref 13.5–17.5)
LYMPHOCYTES # BLD: 1.8 K/UL (ref 1–5.1)
LYMPHOCYTES NFR BLD: 33.1 %
MAGNESIUM SERPL-MCNC: 1.7 MG/DL (ref 1.8–2.4)
MCH RBC QN AUTO: 30.9 PG (ref 26–34)
MCHC RBC AUTO-ENTMCNC: 31.9 G/DL (ref 31–36)
MCV RBC AUTO: 96.7 FL (ref 80–100)
MONOCYTES # BLD: 0.6 K/UL (ref 0–1.3)
MONOCYTES NFR BLD: 10.6 %
NEUTROPHILS # BLD: 2.7 K/UL (ref 1.7–7.7)
NEUTROPHILS NFR BLD: 50.6 %
PERFORMED ON: ABNORMAL
PERFORMED ON: NORMAL
PHOSPHATE SERPL-MCNC: 4.5 MG/DL (ref 2.5–4.9)
PLATELET # BLD AUTO: 230 K/UL (ref 135–450)
PMV BLD AUTO: 6.7 FL (ref 5–10.5)
POTASSIUM SERPL-SCNC: 4.1 MMOL/L (ref 3.5–5.1)
RBC # BLD AUTO: 2.55 M/UL (ref 4.2–5.9)
SODIUM SERPL-SCNC: 144 MMOL/L (ref 136–145)
WBC # BLD AUTO: 5.4 K/UL (ref 4–11)

## 2024-02-09 PROCEDURE — 36415 COLL VENOUS BLD VENIPUNCTURE: CPT

## 2024-02-09 PROCEDURE — 2580000003 HC RX 258: Performed by: INTERNAL MEDICINE

## 2024-02-09 PROCEDURE — C9113 INJ PANTOPRAZOLE SODIUM, VIA: HCPCS | Performed by: INTERNAL MEDICINE

## 2024-02-09 PROCEDURE — 6370000000 HC RX 637 (ALT 250 FOR IP): Performed by: INTERNAL MEDICINE

## 2024-02-09 PROCEDURE — 85025 COMPLETE CBC W/AUTO DIFF WBC: CPT

## 2024-02-09 PROCEDURE — 6360000002 HC RX W HCPCS: Performed by: INTERNAL MEDICINE

## 2024-02-09 PROCEDURE — 6370000000 HC RX 637 (ALT 250 FOR IP): Performed by: PHYSICIAN ASSISTANT

## 2024-02-09 PROCEDURE — 90935 HEMODIALYSIS ONE EVALUATION: CPT

## 2024-02-09 PROCEDURE — 83735 ASSAY OF MAGNESIUM: CPT

## 2024-02-09 PROCEDURE — 80069 RENAL FUNCTION PANEL: CPT

## 2024-02-09 PROCEDURE — 1200000000 HC SEMI PRIVATE

## 2024-02-09 PROCEDURE — 6370000000 HC RX 637 (ALT 250 FOR IP): Performed by: NURSE PRACTITIONER

## 2024-02-09 RX ORDER — MAGNESIUM SULFATE 1 G/100ML
1000 INJECTION INTRAVENOUS ONCE
Status: COMPLETED | OUTPATIENT
Start: 2024-02-09 | End: 2024-02-09

## 2024-02-09 RX ADMIN — INSULIN LISPRO 2 UNITS: 100 INJECTION, SOLUTION INTRAVENOUS; SUBCUTANEOUS at 13:06

## 2024-02-09 RX ADMIN — INSULIN LISPRO 2 UNITS: 100 INJECTION, SOLUTION INTRAVENOUS; SUBCUTANEOUS at 17:19

## 2024-02-09 RX ADMIN — PANTOPRAZOLE SODIUM 40 MG: 40 INJECTION, POWDER, FOR SOLUTION INTRAVENOUS at 11:34

## 2024-02-09 RX ADMIN — QUETIAPINE FUMARATE 25 MG: 25 TABLET ORAL at 00:34

## 2024-02-09 RX ADMIN — SODIUM CHLORIDE, PRESERVATIVE FREE 10 ML: 5 INJECTION INTRAVENOUS at 21:42

## 2024-02-09 RX ADMIN — METOPROLOL TARTRATE 25 MG: 25 TABLET, FILM COATED ORAL at 11:36

## 2024-02-09 RX ADMIN — HYDROCORTISONE ACETATE 25 MG: 25 SUPPOSITORY RECTAL at 21:42

## 2024-02-09 RX ADMIN — APIXABAN 5 MG: 5 TABLET, FILM COATED ORAL at 21:41

## 2024-02-09 RX ADMIN — PANTOPRAZOLE SODIUM 40 MG: 40 INJECTION, POWDER, FOR SOLUTION INTRAVENOUS at 21:41

## 2024-02-09 RX ADMIN — HYDROCORTISONE ACETATE 25 MG: 25 SUPPOSITORY RECTAL at 11:35

## 2024-02-09 RX ADMIN — MAGNESIUM SULFATE HEPTAHYDRATE 1000 MG: 1 INJECTION, SOLUTION INTRAVENOUS at 11:40

## 2024-02-09 RX ADMIN — Medication 1 PACKET: at 11:33

## 2024-02-09 RX ADMIN — METOPROLOL TARTRATE 25 MG: 25 TABLET, FILM COATED ORAL at 21:41

## 2024-02-09 RX ADMIN — MELATONIN TAB 3 MG 9 MG: 3 TAB at 21:41

## 2024-02-09 NOTE — PROGRESS NOTES
Nutrition Note    RECOMMENDATIONS  PO Diet: Continue current diet   ONS: None  Nutrition Support: None     NUTRITION ASSESSMENT   Pt assessed for nutrition risk. Pt with good PO intake, consuming % of most meals. Weight with fluctuations as expected with CHF but is stable overall. No significant wounds noted. Deemed to be at low nutrition risk at this time. Will continue to monitor for changes in status.     Nutrition Related Findings: Mg+ 1.7. +3 BLE edema. LBM 2/7.  Wounds: None  Nutrition Education:  Education not indicated    MALNUTRITION ASSESSMENT   Malnutrition Status: No malnutrition    NUTRITION DIAGNOSIS   No nutrition diagnosis at this time     CURRENT NUTRITION THERAPIES  ADULT DIET; Regular; Low Sodium (2 gm); Low Potassium (Less than 3000 mg/day); Low Phosphorus (Less than 1000 mg)     PO Intake: %   PO Supplement Intake:None Ordered    ANTHROPOMETRICS  Current Height: 190.5 cm (6' 3\")  Current Weight - Scale: 105.9 kg (233 lb 8 oz)    Ideal Body Weight (IBW): 196 lbs  (89 kg)        BMI: 29.2    The patient will be monitored per nutrition standards of care. Consult dietitian if additional nutrition interventions are needed prior to RD reassessment.     Josey Aguirre MS, RD, LD    Contact: [phone]

## 2024-02-09 NOTE — CARE COORDINATION
Discharge Planning:     (CM) patient is set up for HD.     [facility]  [address] [place] [address]  Phone: [phone]  Fax: [phone]  Patient Schedule: Tuesday, Thursday and Saturday 1:45 pm.  Confirmed with the patient/family and Dr Rolle. The plan right now is to discharge the patient after HD tomorrow.    Electronically signed by Renetta Germain on 2/9/24 at 9:03 AM EST

## 2024-02-09 NOTE — PROGRESS NOTES
MD Pratibha Guzmán MD Samir Brahmbhatt, MD                                  Office: [phone]                 Fax: [phone]          [facility]                     NEPHROLOGY  INPATIENT PROGRESS  NOTE:     PATIENT NAME: Danny Rock  : 1950  MRN: [id number]  Name:  Danny Rock Date/Time of Admission: 2024  2:43 PM    CSN: [id number] Attending Provider: Chad Mendoza MD   Room/Bed: 5TN-5560/5560-01 : 1950 Age: 73 y.o.          PLAN       After discussed in details with oncologist - Dr Dupont    -decided with another form of IS - such as Infliximab, first dose on 2024, then second dose-on  24.  Will arrange.  -(Vs CellCept-) -as a second line of immunosuppression for possible AIN underlying.  - Appreciated assistance by oncologist.    Immunotherapy has been on hold     W/ no major renal improvement, then re-start dialysis  Dialysis restarted again #1 on 2024    Patient is now scheduled for outpatient   Next Allises will be as needed inpatient  Thank admit history of for renal standpoint      - on shorter duration back-to-back for slow clearance, as at high risk of urea disequilibrium syndrome      Holding Eliquis and aspirin, while dialysis catheter placement   -Okay to resume from renal standpoint    S/P Successful placement of a right IJ tunneled hemodialysis catheter. on 2024     No more Lokelma needed    Hyperphosphatemia-with CASTRO, follow-up with renal recovery  Renal diet    Anemia, of chronic disease  Hematology following-  appreciated their assistance    Metoprolol for hypertension control  Insulin sliding scale for blood sugar control-per primary team    D5 as per primary team and insulin adjustment per them.  -GG 2 higher concentration D10, at lower rate, elevated risk of fluid overload    GI bleeding, concern.  GI consulted.      Discharge plan-from renal standpoint-  Saturday-        Medical  14.3 07/08/2022 09:28 AM    PROTIME 10.6 08/12/2015 02:27 AM    INR 1.26 02/07/2024 10:49 AM    INR 1.11 07/08/2022 09:28 AM    INR 0.98 08/12/2015 02:27 AM     PTT:    Lab Results   Component Value Date/Time    APTT 32.1 07/08/2022 09:28 AM    APTT 25.8 08/12/2015 02:27 AM     TELMA:    Lab Results   Component Value Date/Time    TELMA Negative 02/03/2024 06:04 AM           RADIOLOGY:    XR CHEST PORTABLE    Result Date: 2/2/2024  EXAMINATION: ONE XRAY VIEW OF THE CHEST 2/2/2024 3:27 pm COMPARISON: Chest x-ray dated 12/03/2023. HISTORY: ORDERING SYSTEM PROVIDED HISTORY: sob, LE edema TECHNOLOGIST PROVIDED HISTORY: Reason for exam:->sob, LE edema Reason for Exam: SOB FINDINGS: HEART/MEDIASTINUM: The cardiomediastinal silhouette is stable. PLEURA/LUNGS: There is right mid to lower lung airspace disease.  There is no appreciable pneumothorax. BONES/SOFT TISSUE: No acute abnormality.     Right mid to lower lung airspace disease.  Recommend follow-up to resolution.       Imaging Results.  Chest X Ray reviwed by me          Electronically Signed:  Gordon Rolle MD 2/9/2024

## 2024-02-09 NOTE — PROGRESS NOTES
Shift assessment completed. Routine vitals obtained. Scheduled medications given. Patient is awake, alert and oriented. Respirations are easy and unlabored. Patient does not appear to be in distress, resting comfortably at this time. Call light within reach. Fall precautions in place.

## 2024-02-09 NOTE — PROGRESS NOTES
Confirmed that [facility] received CGM form from the patient's PCP. Wife notified that freestyle Kristin sensors are ready for . Radha Owusu RN, BSN, [facility].

## 2024-02-09 NOTE — PROGRESS NOTES
Treatment time: 2 hours    Net UF: 0 ml    Pre weight: 105.9 kg  Post weight: 105.9 kg    Access used: R Upper Chest Wall   Access function: . Access runs without malfunction.     Medications or blood products given: Epogen 10,000 units    Regular outpatient schedule: MWF    Summary of response to treatment: Pt. Tolerates treatment well.     Copy of dialysis treatment record placed in chart, to be scanned into EMR.

## 2024-02-09 NOTE — CARE COORDINATION
02/09/24 1210   IMM Letter   IMM Letter given to Patient/Family/Significant other/Guardian/POA/by: Given to patient/spouse by Halima WEISS   IMM Letter date given: 02/09/24   IMM Letter time given: 1120     Electronically signed by Renetta Germain on 2/9/24 at 12:11 PM EST

## 2024-02-09 NOTE — PLAN OF CARE
Problem: Pain  Goal: Verbalizes/displays adequate comfort level or baseline comfort level  Outcome: Progressing  Flowsheets (Taken 2/9/2024 1112)  Verbalizes/displays adequate comfort level or baseline comfort level: Encourage patient to monitor pain and request assistance     Problem: Safety - Adult  Goal: Free from fall injury  Outcome: Progressing

## 2024-02-09 NOTE — PROGRESS NOTES
LDH:No results for input(s): \"LDH\" in the last 720 hours.    Radiology Review:  IR TUNNELED CVC PLACE WO SQ PORT/PUMP > 5 YEARS  Narrative: PROCEDURE:  ULTRASOUND GUIDED VASCULAR ACCESS.  FLUOROSCOPY GUIDED PLACEMENT OF A TUNNELED HEMODIALYSIS CATHETER.    MODERATE CONSCIOUS SEDATION    2/7/2024.    HISTORY:  ORDERING SYSTEM PROVIDED HISTORY: Tunnled dialysis catheter (For CASTRO)  TECHNOLOGIST PROVIDED HISTORY:  Reason for exam:->Tunnled dialysis catheter (For CASTRO)  How many lumens are being requested?->2  What side should this line be placed?->Either  What site is the preferred site?->No preference    SEDATION:  1.5 mgversed and 25 mcg fentanyl were titrated intravenously for moderate  sedation monitored under my direction.  Total intraservice time of sedation  was 8 minutes.  The patient's vital signs were monitored throughout the  procedure and recorded in the patient's medical record by the nurse.    FLUOROSCOPY DOSE AND TYPE:    Fluoro time: 0.1 minutes    Cumulative air kerma: 1.07 mGy    TECHNIQUE:  Informed consent was obtained after a detailed explanation of the procedure  including risks, benefits, and alternatives. All aspects of maximum sterile  barrier technique were used including washing hands with conventional soap  and water or with alcohol-based hand rubs (ABHR), skin preparation, cap,  mask, sterile gown, sterile gloves, and sterile full body drape. Local  anesthesia was achieved with lidocaine. A micropuncture needle was used to  access the right internal jugular vein using ultrasound guidance.  An  ultrasound image demonstrating patency of the vein with needle tip located  within it was obtained and stored in PACS.  A 0.035 guidewire was used to  place a peel-away sheath.  A subcutaneous tunnel was created to the  infraclavicular region and a tunneled 23 cm hemodialysis catheter was pulled  through the subcutaneous tunnel to the venotomy site and advanced through the  peel-away sheath under  fluoroscopic guidance to the right atrium.  The  catheter flushed easily and there was a good blood return.  The catheter was  sutured to the skin.  The catheter was locked with heparinized saline. The  patient tolerated the procedure well and there were no immediate  complications.    Estimated blood loss: Less than 5 cc    FINDINGS:  Fluoroscopic image demonstrates the tip of the catheter in the right atrium.  Impression: Successful ultrasound and fluoroscopy guided right IJ 23 cm tunneled  hemodialysis catheter placement.      Problem List  Patient Active Problem List   Diagnosis    HLD (hyperlipidemia)    Essential hypertension    Coronary artery disease due to lipid rich plaque    Cardiac dysrhythmia    Diabetes mellitus    Paroxysmal atrial fibrillation (HCC)    Renal mass    Cerebrovascular accident (CVA) (HCC)    Recent cerebrovascular accident (CVA)    HTN (hypertension), benign    PAF (paroxysmal atrial fibrillation) (HCC)    CASTRO (acute kidney injury) (HCC)    LBBB (left bundle branch block)    H/O right nephrectomy    Anemia    Acute ischemic stroke (HCC)    Overweight (BMI 25.0-29.9)    AIN (acute interstitial nephritis)    Sterile pyuria    Current chronic use of systemic steroids    H/O systemic steroid therapy    Diabetes education, encounter for    Secondary hypercoagulable state (HCC)       ASSESSMENT AND PLAN:    73-year-old gentleman with multiple medical problems has following oncology issues    1.  History of stage II RCC:    -Had right-sided radical nephrectomy in July 2022  -Completed 1 year of adjuvant pembrolizumab in November 2023    2.  Acute kidney injury:    -Originally noted in November 2023 and was thought to be secondary to pembrolizumab  -Creatinine improved significantly on steroids.  -Prednisone was eventually tapered off after 5 weeks.    - Dialysis plan per nephrology    3.  Recurrence of acute kidney injury:    -This appears to be multifactorial  -But pembrolizumab induced

## 2024-02-09 NOTE — DISCHARGE INSTR - COC
Other Treatments After Discharge: none     Physician Certification: I certify the above information and transfer of Danny Rock  is necessary for the continuing treatment of the diagnosis listed and that he requires Home Care for greater 30 days.     Update Admission H&P: No change in H&P    PHYSICIAN SIGNATURE:  Electronically signed by Stacey Shelby MD on 2/10/24 at 12:08 PM EST

## 2024-02-09 NOTE — PROGRESS NOTES
[facility]    2/9/2024 11:29 AM        Name: Danny Rock .              Admitted: 2/2/2024  Primary Care Provider: Yesi Rosa APRN - CNP (Tel: [phone])      Subjective:    Tolerating diet having creased urgency to urinate no nausea vomiting chest pain  Current Medications  magnesium sulfate 1000 mg in dextrose 5% 100 mL IVPB, Once  melatonin tablet 9 mg, Nightly PRN  pantoprazole (PROTONIX) injection 40 mg, BID  glucagon injection 1 mg, PRN  insulin lispro (HUMALOG) injection vial 0-8 Units, TID WC  insulin lispro (HUMALOG) injection vial 0-4 Units, Nightly  epoetin emely-epbx (RETACRIT) injection 10,000 Units, Once per day on Mon Wed Fri  lidocaine (LMX) 4 % cream, PRN  clindamycin (CLEOCIN) 900 mg in dextrose 5 % 50 mL IVPB, Once  hydrocortisone (ANUSOL-HC) suppository 25 mg, BID  psyllium (HYDROCIL) 95 % packet 1 packet, Daily  apixaban (ELIQUIS) tablet 5 mg, BID  aspirin chewable tablet 81 mg, Daily  metoprolol tartrate (LOPRESSOR) tablet 25 mg, BID  sodium chloride flush 0.9 % injection 5-40 mL, 2 times per day  sodium chloride flush 0.9 % injection 5-40 mL, PRN  0.9 % sodium chloride infusion, PRN  polyethylene glycol (GLYCOLAX) packet 17 g, Daily PRN  acetaminophen (TYLENOL) tablet 650 mg, Q6H PRN   Or  acetaminophen (TYLENOL) suppository 650 mg, Q6H PRN  dextrose bolus 10% 125 mL, PRN   Or  dextrose bolus 10% 250 mL, PRN  dextrose 10 % infusion, Continuous PRN        Objective:  BP (!) 160/79   Pulse 100   Temp 97.2 °F (36.2 °C) (Axillary)   Resp 16   Ht 1.905 m (6' 3\")   Wt 105.9 kg (233 lb 8 oz)   SpO2 98%   BMI 29.19 kg/m²     Intake/Output Summary (Last 24 hours) at 2/9/2024 1129  Last data filed at 2/8/2024 1403  Gross per 24 hour   Intake 240 ml   Output --   Net 240 ml        Wt Readings from Last 3 Encounters:   02/09/24 105.9 kg (233 lb 8 oz)   02/02/24 98 kg (216 lb)   01/25/24

## 2024-02-09 NOTE — CARE COORDINATION
Patient's functional status has improved and coupled with patient;s wishes are for d/c to home ARU will sign off.  Primary and CM updated.    If patient'[s functional status declines and his needs change please re-consult PM&R.    Dr. Ponce updated.    ARU will continue to follow progress and update discharge plan as needed.    BLADE CardonaN, .[phone]

## 2024-02-10 VITALS
BODY MASS INDEX: 29.03 KG/M2 | HEART RATE: 89 BPM | HEIGHT: 75 IN | TEMPERATURE: 97.8 F | WEIGHT: 233.5 LBS | RESPIRATION RATE: 18 BRPM | OXYGEN SATURATION: 98 % | SYSTOLIC BLOOD PRESSURE: 160 MMHG | DIASTOLIC BLOOD PRESSURE: 82 MMHG

## 2024-02-10 LAB
ALBUMIN SERPL-MCNC: 3.1 G/DL (ref 3.4–5)
ANION GAP SERPL CALCULATED.3IONS-SCNC: 12 MMOL/L (ref 3–16)
BASOPHILS # BLD: 0.1 K/UL (ref 0–0.2)
BASOPHILS NFR BLD: 0.6 %
BUN SERPL-MCNC: 28 MG/DL (ref 7–20)
CALCIUM SERPL-MCNC: 9.4 MG/DL (ref 8.3–10.6)
CHLORIDE SERPL-SCNC: 107 MMOL/L (ref 99–110)
CO2 SERPL-SCNC: 23 MMOL/L (ref 21–32)
CREAT SERPL-MCNC: 3.6 MG/DL (ref 0.8–1.3)
DEPRECATED RDW RBC AUTO: 19.9 % (ref 12.4–15.4)
EOSINOPHIL # BLD: 0.2 K/UL (ref 0–0.6)
EOSINOPHIL NFR BLD: 1.8 %
GFR SERPLBLD CREATININE-BSD FMLA CKD-EPI: 17 ML/MIN/{1.73_M2}
GLUCOSE BLD-MCNC: 202 MG/DL (ref 70–99)
GLUCOSE BLD-MCNC: 228 MG/DL (ref 70–99)
GLUCOSE SERPL-MCNC: 209 MG/DL (ref 70–99)
HCT VFR BLD AUTO: 28.5 % (ref 40.5–52.5)
HGB BLD-MCNC: 9 G/DL (ref 13.5–17.5)
LYMPHOCYTES # BLD: 2.4 K/UL (ref 1–5.1)
LYMPHOCYTES NFR BLD: 26.9 %
MCH RBC QN AUTO: 30.6 PG (ref 26–34)
MCHC RBC AUTO-ENTMCNC: 31.7 G/DL (ref 31–36)
MCV RBC AUTO: 96.6 FL (ref 80–100)
MONOCYTES # BLD: 0.8 K/UL (ref 0–1.3)
MONOCYTES NFR BLD: 9.6 %
NEUTROPHILS # BLD: 5.4 K/UL (ref 1.7–7.7)
NEUTROPHILS NFR BLD: 61.1 %
PERFORMED ON: ABNORMAL
PERFORMED ON: ABNORMAL
PHOSPHATE SERPL-MCNC: 3.7 MG/DL (ref 2.5–4.9)
PLATELET # BLD AUTO: 252 K/UL (ref 135–450)
PMV BLD AUTO: 6.8 FL (ref 5–10.5)
POTASSIUM SERPL-SCNC: 4.4 MMOL/L (ref 3.5–5.1)
RBC # BLD AUTO: 2.95 M/UL (ref 4.2–5.9)
SODIUM SERPL-SCNC: 142 MMOL/L (ref 136–145)
WBC # BLD AUTO: 8.8 K/UL (ref 4–11)

## 2024-02-10 PROCEDURE — 6360000002 HC RX W HCPCS: Performed by: INTERNAL MEDICINE

## 2024-02-10 PROCEDURE — 85025 COMPLETE CBC W/AUTO DIFF WBC: CPT

## 2024-02-10 PROCEDURE — C9113 INJ PANTOPRAZOLE SODIUM, VIA: HCPCS | Performed by: INTERNAL MEDICINE

## 2024-02-10 PROCEDURE — 2580000003 HC RX 258: Performed by: INTERNAL MEDICINE

## 2024-02-10 PROCEDURE — 6370000000 HC RX 637 (ALT 250 FOR IP): Performed by: INTERNAL MEDICINE

## 2024-02-10 PROCEDURE — 80069 RENAL FUNCTION PANEL: CPT

## 2024-02-10 PROCEDURE — 36415 COLL VENOUS BLD VENIPUNCTURE: CPT

## 2024-02-10 PROCEDURE — 90935 HEMODIALYSIS ONE EVALUATION: CPT

## 2024-02-10 PROCEDURE — 6370000000 HC RX 637 (ALT 250 FOR IP): Performed by: PHYSICIAN ASSISTANT

## 2024-02-10 RX ORDER — INSULIN LISPRO 100 [IU]/ML
INJECTION, SOLUTION INTRAVENOUS; SUBCUTANEOUS
Qty: 3 ADJUSTABLE DOSE PRE-FILLED PEN SYRINGE | Refills: 0 | Status: SHIPPED | OUTPATIENT
Start: 2024-02-10

## 2024-02-10 RX ORDER — INSULIN GLARGINE 100 [IU]/ML
10 INJECTION, SOLUTION SUBCUTANEOUS NIGHTLY
Qty: 10 ML | Refills: 3 | Status: SHIPPED | OUTPATIENT
Start: 2024-02-10

## 2024-02-10 RX ORDER — HYDROCORTISONE ACETATE 25 MG/1
25 SUPPOSITORY RECTAL 2 TIMES DAILY
Qty: 9 SUPPOSITORY | Refills: 0 | Status: SHIPPED | OUTPATIENT
Start: 2024-02-10 | End: 2024-02-15

## 2024-02-10 RX ADMIN — SODIUM CHLORIDE, PRESERVATIVE FREE 10 ML: 5 INJECTION INTRAVENOUS at 08:32

## 2024-02-10 RX ADMIN — APIXABAN 5 MG: 5 TABLET, FILM COATED ORAL at 08:32

## 2024-02-10 RX ADMIN — PANTOPRAZOLE SODIUM 40 MG: 40 INJECTION, POWDER, FOR SOLUTION INTRAVENOUS at 08:31

## 2024-02-10 RX ADMIN — INSULIN LISPRO 2 UNITS: 100 INJECTION, SOLUTION INTRAVENOUS; SUBCUTANEOUS at 08:31

## 2024-02-10 RX ADMIN — Medication 1 PACKET: at 08:31

## 2024-02-10 RX ADMIN — ASPIRIN 81 MG 81 MG: 81 TABLET ORAL at 08:32

## 2024-02-10 NOTE — DISCHARGE SUMMARY
Patient: Danny Rock     Gender: male  : 1950   Age: 73 y.o.  MRN: [id number]    Admitting Physician: Chad Mendoza MD  Discharge Physician: Stacey Shelby MD     Code Status: Full Code     Admit Date: 2024   Discharge Date:2/10/2024       Disposition:  Home    Discharge Diagnoses: End-stage renal disease now on hemodialysis  Chronic moderate systolic heart failure  Renal cell carcinoma s/p right nephrectomy in 23  Type 2 diabetes mellitus on insulin  Paroxysmal atrial fibrillation on Eliquis  GI bleed secondary to hemorrhoids      Active Hospital Problems    Diagnosis Date Noted    CASTRO (acute kidney injury) (HCC) [N17.9] 2023       Follow-up appointments:  one week    Outpatient to do list: none     Condition at Discharge:  Stable    Hospital Course:   73-year-old admitted to the hospital with worsening renal functions he has known CKD stage IV he recently had CASTRO which had improved and was felt secondary to pembrolizumab which was on 4 renal cell cancer stage II and had a radical nephrectomy in   Previously had received a course of steroids with his worsening renal functions was probably felt due to progression of his renal disease transitional disease on this occasion he had tunneled dialysis catheter placed had dialysis in house which he tolerated well outpatient dialysis has been arranged he was seen by both oncology and nephrology as an in-house  He had also some lower GI bleed which was felt secondary to hemorrhoids she was started on Anusol suppositories which have centimeter prescription for he was also seen by GI  For his type 2 diabetes mellitus he did not did not require any Lantus while in-house at home he is on Lantus 40 units absent amount and 10 units with sliding scale currently given and prescriptions for that he has a continuous glucose monitor anemia is multifactorial hemoglobin is stable he is on anticoagulation for his atrial fibrillation overall patient doing  Temp 97.8 °F (36.6 °C) (Oral)   Resp 18   Ht 1.905 m (6' 3\")   Wt 105.9 kg (233 lb 8 oz)   SpO2 98%   BMI 29.19 kg/m²   General appearance:  NAD  HEENT:   Normal cephalic, atraumatic, moist mucous membranes, no oropharyngeal erythema or exudate  Heart:: Normal s1/s2, RRR, no murmurs, gallops, or rubs. no leg edema  Lungs:  Normal respiratory effort. Clear to auscultation, bilaterally without Rales/Wheezes/Rhonchi.  Abdomen: Soft, non-tender, non-distended, bowel sounds present, no masses  Musculoskeletal:  No clubbing, no cyanosis, *  Neurologic:  Neurovascularly intact without any focal sensory/motor deficits. Cranial nerves: II-XII intact, grossly non-focal.    Labs: For convenience and continuity at follow-up the following most recent labs are provided:    Lab Results   Component Value Date/Time    WBC 8.8 02/10/2024 06:19 AM    HGB 9.0 02/10/2024 06:19 AM    HCT 28.5 02/10/2024 06:19 AM    MCV 96.6 02/10/2024 06:19 AM     02/10/2024 06:19 AM     02/10/2024 06:19 AM    K 4.4 02/10/2024 06:19 AM    K 3.8 02/03/2024 06:04 AM     02/10/2024 06:19 AM    CO2 23 02/10/2024 06:19 AM    BUN 28 02/10/2024 06:19 AM    CREATININE 3.6 02/10/2024 06:19 AM    CALCIUM 9.4 02/10/2024 06:19 AM    PHOS 3.7 02/10/2024 06:19 AM    ALKPHOS 96 02/02/2024 03:27 PM    ALT 16 02/02/2024 03:27 PM    AST 16 02/02/2024 03:27 PM    BILITOT 0.3 02/02/2024 03:27 PM    BILIDIR <0.2 03/16/2021 09:41 AM    LABALBU 3.1 02/10/2024 06:19 AM    LDLCALC 78 12/05/2023 05:17 AM    TRIG 152 12/05/2023 05:17 AM     Lab Results   Component Value Date    INR 1.26 (H) 02/07/2024    INR 1.11 07/08/2022    INR 0.98 08/12/2015           The patient was seen and examined on day of discharge and this discharge summary is in conjunction with any daily progress note from day of discharge.Time Spent on discharge is 45 minutes  in the examination, evaluation, counseling and review of medications and discharge plan.      Note that greater  than

## 2024-02-10 NOTE — CARE COORDINATION
Case Management -  Discharge Note      Patient Name: Danny Rock                   YOB: 1950            Readmission Risk (Low < 19, Mod (19-27), High > 27): Readmission Risk Score: 30    Current PCP: Yesi Rosa APRN - CNP    ([facility]) Important Message from Medicare:    Date: 2/09/2024    PT AM-PAC: 18 /24  OT AM-PAC: 16 /24    Home Care Information:   Is patient resuming current home health care services: Yes - [facility] [place] [address]  Phone: [phone]  Fax: [phone]           Services: PT/OT/Nursing  Home Health Order Obtained: Yes    Home health agency notified of discharge- CM called Alf with [facility] and the main number and LVM on both numbers advising of Patient D/C home today. CM also faxed the [facility] order to [phone] and wrote a note advising of D/C home today.      Patient is starting HD this Tuesday 02/13/2024 at 12:45PM.    [address] [place] [id number]  Phone: [phone]  Fax: [phone]  Patient Schedule: Tuesday, Thursday and Saturday 1:45 pm.        Financial    Payor: MEDICARE / Plan: MEDICARE PART A AND B / Product Type: *No Product type* /     Pharmacy:  Potential assistance Purchasing Medications: Yes  Meds-to-Beds request: Yes      [id number] - [place], [place] - [address] [phone] - F [phone]  [address] [facility] [address]  Phone: [phone] Fax: [phone]    [facility] [place] [facility] [address] [facility] [address] -  [phone] - F [phone]  [facility]  [place] [address]  Phone: [phone] Fax: [phone]      Notes:    Additional Case Management Notes: All CM needs met. Family to transport home.         Electronically signed by TALAT Montenegro on 2/10/2024 at 1:13 PM]

## 2024-02-10 NOTE — PLAN OF CARE
Problem: Pain  Goal: Verbalizes/displays adequate comfort level or baseline comfort level  Recent Flowsheet Documentation  Taken 2/10/2024 0815 by Linda Pagan RN  Verbalizes/displays adequate comfort level or baseline comfort level:   Encourage patient to monitor pain and request assistance   Assess pain using appropriate pain scale     Problem: Chronic Conditions and Co-morbidities  Goal: Patient's chronic conditions and co-morbidity symptoms are monitored and maintained or improved  Recent Flowsheet Documentation  Taken 2/10/2024 0815 by Linda Pagan, RN  Care Plan - Patient's Chronic Conditions and Co-Morbidity Symptoms are Monitored and Maintained or Improved: Monitor and assess patient's chronic conditions and comorbid symptoms for stability, deterioration, or improvement     Problem: Cardiovascular - Adult  Goal: Maintains optimal cardiac output and hemodynamic stability  Outcome: Progressing  Flowsheets (Taken 2/10/2024 0815)  Maintains optimal cardiac output and hemodynamic stability:   Monitor blood pressure and heart rate   Monitor urine output and notify Licensed Independent Practitioner for values outside of normal range     Problem: Skin/Tissue Integrity - Adult  Goal: Skin integrity remains intact  Outcome: Progressing  Flowsheets (Taken 2/10/2024 0815)  Skin Integrity Remains Intact: Monitor for areas of redness and/or skin breakdown     Problem: Musculoskeletal - Adult  Goal: Return mobility to safest level of function  Outcome: Progressing  Flowsheets (Taken 2/10/2024 0815)  Return Mobility to Safest Level of Function:   Assess patient stability and activity tolerance for standing, transferring and ambulating with or without assistive devices   Assist with transfers and ambulation using safe patient handling equipment as needed  Goal: Return ADL status to a safe level of function  Outcome: Progressing  Flowsheets (Taken 2/10/2024 0815)  Return ADL Status to a Safe Level of Function:

## 2024-02-10 NOTE — PROGRESS NOTES
Treatment time: 2 hours    Net UF: 0 ml    Pre weight: 93.5 kg  Post weight: 93.5 kg    Access used: R Upper Chest Wall TDC  Access function: . Access runs without difficulty.     Medications or blood products given: Epogen 10,000 units    Regular outpatient schedule: TTS    Summary of response to treatment: Pt. Tolerates treatment well today.     Copy of dialysis treatment record placed in chart, to be scanned into EMR.

## 2024-02-10 NOTE — PROGRESS NOTES
Shift assessment completed. Neuro WNL. Denies any pain at this time. AM meds administered per MAR. The care plan and education has been reviewed and mutually agreed upon with the patient. Standard safety measures in place.    4:23 PM  IV removed prior dc. The care plan and education has been resolved and completed. Discharge instructions and medications reviewed with patient. Patient voices understanding and denies further concerns at this time. Patient discharged home per order with all personal belongings.

## 2024-02-10 NOTE — DISCHARGE INSTRUCTIONS
Sliding scale insulin    Glucose: Dose:               No Insulin  140-199 1 Unit  200-249 2 Units  250-299 3 Units  300-349 4 Units  350-399 5 Units  Over 399 6 Units

## 2024-02-10 NOTE — PROGRESS NOTES
25.5* 24.7* 28.5*   MCV 94.2  --  96.7 96.6     --  230 252       BMP:   Recent Labs     02/08/24  0605 02/09/24  0532 02/10/24  0619    144 142   K 4.4 4.1 4.4    109 107   CO2 25 23 23   PHOS 5.5* 4.5 3.7   BUN 51* 38* 28*   CREATININE 5.2* 3.9* 3.6*       Magnesium:   Lab Results   Component Value Date/Time    MG 1.70 02/09/2024 05:32 AM    MG 2.00 02/08/2024 06:05 AM    MG 2.00 02/07/2024 05:26 AM                      Arterial Blood Gasses  No results for input(s): \"PH\", \"PCO2\", \"PO2\" in the last 72 hours.    Invalid input(s): \"[id number]\", \"INSPIREDO2\"    UA:No results for input(s): \"NITRITE\", \"COLORU\", \"PHUR\", \"LABCAST\", \"WBCUA\", \"RBCUA\", \"MUCUS\", \"TRICHOMONAS\", \"YEAST\", \"BACTERIA\", \"CLARITYU\", \"SPECGRAV\", \"LEUKOCYTESUR\", \"UROBILINOGEN\", \"BILIRUBINUR\", \"BLOODU\", \"GLUCOSEU\", \"AMORPHOUS\" in the last 72 hours.    Invalid input(s): \"KETONESU\"      LIVER PROFILE:   No results for input(s): \"AST\", \"ALT\", \"LIPASE\", \"AMYLASE\", \"ALB\", \"BILIDIR\", \"BILITOT\", \"ALKPHOS\" in the last 72 hours.    PT/INR:    Lab Results   Component Value Date/Time    PROTIME 15.8 02/07/2024 10:49 AM    PROTIME 14.3 07/08/2022 09:28 AM    PROTIME 10.6 08/12/2015 02:27 AM    INR 1.26 02/07/2024 10:49 AM    INR 1.11 07/08/2022 09:28 AM    INR 0.98 08/12/2015 02:27 AM     PTT:    Lab Results   Component Value Date/Time    APTT 32.1 07/08/2022 09:28 AM    APTT 25.8 08/12/2015 02:27 AM     TELMA:    Lab Results   Component Value Date/Time    TELMA Negative 02/03/2024 06:04 AM           RADIOLOGY:    XR CHEST PORTABLE    Result Date: 2/2/2024  EXAMINATION: ONE XRAY VIEW OF THE CHEST 2/2/2024 3:27 pm COMPARISON: Chest x-ray dated 12/03/2023. HISTORY: ORDERING SYSTEM PROVIDED HISTORY: sob, LE edema TECHNOLOGIST PROVIDED HISTORY: Reason for exam:->sob, LE edema Reason for Exam: SOB FINDINGS: HEART/MEDIASTINUM: The cardiomediastinal silhouette is stable. PLEURA/LUNGS: There is right mid to lower lung airspace disease.  There is no

## 2024-02-12 ENCOUNTER — CARE COORDINATION (OUTPATIENT)
Dept: CASE MANAGEMENT | Age: 74
End: 2024-02-12

## 2024-02-12 NOTE — CARE COORDINATION
Care Transitions Initial Follow Up Call    Call within 2 business days of discharge: Yes    Patient: Danny Rock Patient : 1950   MRN: [id number]  Reason for Admission: CASTRO (acute kidney injury)  Discharge Date: 2/10/24 RARS: Readmission Risk Score: 29.3      Last Discharge Facility       Date Complaint Diagnosis Description Type Department Provider    24 Referral - General Acute kidney injury superimposed on CKD (HCC) ... ED to Hosp-Admission (Discharged) (ADMITTED) [facility] Stacey Shelby MD; Simba Mendoza...          Attempted to reach patient via phone for initial post hospital transition call.  VM left stating purpose of call along with my contact information requesting a return call.      Care Transitions 24 Hour Call    Care Transitions Interventions         Follow Up  Future Appointments   Date Time Provider Department Center   2024  3:15 PM Gordon Rolle MD [facility]   2024 10:00 AM Rene Boo MD [facility]       Carolyn Valiente RN

## 2024-02-13 ENCOUNTER — CARE COORDINATION (OUTPATIENT)
Dept: CASE MANAGEMENT | Age: 74
End: 2024-02-13

## 2024-02-13 NOTE — CARE COORDINATION
Care Transitions Initial Follow Up Call    Call within 2 business days of discharge: Yes    Patient Current Location:  Home: [address] [place] [address] [address] [place] [address]    Care Transition Nurse contacted the patient by telephone to perform post hospital discharge assessment. Verified name and  with patient as identifiers. Provided introduction to self, and explanation of the Care Transition Nurse role.     Patient: Danny Rock Patient : 1950   MRN: [id number]  Reason for Admission: CASTRO superimposed on CKD  Discharge Date: 2/10/24 RARS: Readmission Risk Score: 29.3      Last Discharge Facility       Date Complaint Diagnosis Description Type Department Provider    24 Referral - General Acute kidney injury superimposed on CKD (HCC) ... ED to Hosp-Admission (Discharged) (ADMITTED) [facility] Stacey Shelby MD; Simba Mendoza...          Second attempt made to reach patient for initial post hospital transition call.  Patient answered call, but stated it was not a good time to talk. Patient had appointment this morning and was getting ready to leave. Offered to call this afternoon and patient was agreeable. Rescheduled call    Call placed to [facility] and spoke to Rut. Referral for services was received, but SOC has not been scheduled yet    Care Transitions 24 Hour Call    Care Transitions Interventions           Follow Up  Future Appointments   Date Time Provider Department Center   2024  3:15 PM Gordon Rolle MD [facility]   2024 10:00 AM Rene Boo MD [facility] EMMANUEL Valiente, ABHISHEK

## 2024-02-13 NOTE — CARE COORDINATION
Care Transitions Initial Follow Up Call    Call within 2 business days of discharge: Yes    Patient: Danny Rock Patient : 1950   MRN: [id number]  Reason for Admission: CASTRO  Discharge Date: 2/10/24 RARS: Readmission Risk Score: 29.3      Last Discharge Facility       Date Complaint Diagnosis Description Type Department Provider    24 Referral - General Acute kidney injury superimposed on CKD (HCC) ... ED to Hosp-Admission (Discharged) (ADMITTED) [facility] Stacey Shelby MD; Simba Mendoza...        Second and final attempt made to reach patient for initial post hospital transition call.  VM left stating purpose of call along with my contact information requesting a return call.      Care Transitions 24 Hour Call    Care Transitions Interventions         Follow Up  Future Appointments   Date Time Provider Department Center   2024  3:15 PM Gordon Rolle MD [facility]   2024 10:00 AM Rene Boo MD [facility]     Carolyn Valiente RN     
Extra-ocular movement intact, eyes are clear b/l

## 2024-02-16 NOTE — PROGRESS NOTES
[facility]     Outpatient Follow Up Note      Chief Complaint   Patient presents with    Coronary Artery Disease    Hypertension    Hyperlipidemia    Atrial Fibrillation    Shortness of Breath     HPI:  Mr. Rock 74 y.o. male who's history includes CAD, hypertension, hyperlipidemia, PAF. He wore a Holter monitor May 2018 which showed no a. Fib; his Xarelto was stopped and baby aspirin started.    Surgery in July 2022 for a right Nephrectomy. The Urologist and Oncologist have concerns for cancer in the kidney.    Pt established with Dr Blair 4/19/23 for ascending aortic and aortic root aneurysm who recommends ongoing surveillance over time.     10/9/23 - Work up for stroke due to confusion and forgetfullness    11/20/23 - ER for hypoglycemia, unresponsiveness and renal insufficiency.      11/28/23-12/10/23 - Hospital admit for acute kidney injury.  He was followed by Urology, Renal, Oncology, Neurology, Cardiology and PMR.  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  Started on IV steroids and transitioned to oral Prednisone.  Small chronic infarct involving left frontal lobe.  Started on Eliquis on 12/8/23 for afib with acute CVA.  Notated that Dr. Chappell to assist with steroids in ARU and Dr. Garner will follow for CASTRO on CKD in ARU.  Medications started at discharge - aspirin 81mg, eliquis 5mg, atorvastatin 40mg, seroquel 25mg, toprol XL 50mg, amlodipine 10mg, prednisone 50mg, ferrous sulfate 325mg, calcium [id number] 500-5mg-mcg.      1/6/24 - [facility] for flu-like symptoms, diagnosed with Covid.      1/23/24 - Seen Neurology with notation that he has had trouble with anemia since being diagnosed with Covid.  No GI bleeding noted.      1/25/24 - Seen by Carolyn Ventura NP - Lasix 80mg started with notation that it is okay to take 40mg BID depending on tolerability to a single dose daily.      2/2/24 - Seen Nephrology with notation that despite starting Lasix 80mg daily there

## 2024-02-20 RX ORDER — EPINEPHRINE 1 MG/ML
0.3 INJECTION, SOLUTION INTRAMUSCULAR; SUBCUTANEOUS PRN
OUTPATIENT
Start: 2024-02-20

## 2024-02-20 RX ORDER — SODIUM CHLORIDE 9 MG/ML
5-250 INJECTION, SOLUTION INTRAVENOUS PRN
OUTPATIENT
Start: 2024-02-20

## 2024-02-20 RX ORDER — SODIUM CHLORIDE 9 MG/ML
INJECTION, SOLUTION INTRAVENOUS CONTINUOUS
OUTPATIENT
Start: 2024-02-20

## 2024-02-20 RX ORDER — ALBUTEROL SULFATE 90 UG/1
4 AEROSOL, METERED RESPIRATORY (INHALATION) PRN
OUTPATIENT
Start: 2024-02-20

## 2024-02-20 RX ORDER — ONDANSETRON 2 MG/ML
8 INJECTION INTRAMUSCULAR; INTRAVENOUS
OUTPATIENT
Start: 2024-02-20

## 2024-02-20 RX ORDER — DIPHENHYDRAMINE HYDROCHLORIDE 50 MG/ML
50 INJECTION INTRAMUSCULAR; INTRAVENOUS
OUTPATIENT
Start: 2024-02-20

## 2024-02-20 RX ORDER — ACETAMINOPHEN 325 MG/1
650 TABLET ORAL
OUTPATIENT
Start: 2024-02-20

## 2024-02-20 RX ORDER — SODIUM CHLORIDE 0.9 % (FLUSH) 0.9 %
5-40 SYRINGE (ML) INJECTION PRN
OUTPATIENT
Start: 2024-02-20

## 2024-02-26 NOTE — DISCHARGE INSTR - COC
Continuity of Care Form    Patient Name: Trip Parekh   :  1950  MRN:  [id number]    Admit date:  12/10/2023  Discharge date:  2023    Code Status Order: Full Code   Advance Directives:     Admitting Physician:  Shonda St DO  PCP: JANET Harmon CNP    Discharging Nurse: [address] Unit/Room#: CZL-6556/4488-99  Discharging Unit Phone Number: [phone]    Emergency Contact:   Extended Emergency Contact Information  Primary Emergency Contact: Radha Duarte  Address: [address], [address] [place] [address] Sandra Man[place] of [address] Phone: [phone]  Mobile Phone: [phone]  Relation: Spouse    Past Surgical History:  Past Surgical History:   Procedure Laterality Date    CATARACT REMOVAL Bilateral     ~  with lens implanted    CHOLECYSTECTOMY      EYE SURGERY Left 2018    Cataract    HERNIA REPAIR      KIDNEY REMOVAL Right 2022    ROBOTIC LAPAROSCOPIC RADICAL RIGHT NEPHRECTOMY performed by Luisa Guardado MD at [facility]      both knees: scope for cartilage    NASAL SEPTUM SURGERY         Immunization History: There is no immunization history for the selected administration types on file for this patient. Active Problems:  Patient Active Problem List   Diagnosis Code    HLD (hyperlipidemia) E78.5    Essential hypertension I10    Coronary artery disease due to lipid rich plaque I25.10, I25.83    Cardiac dysrhythmia I49.9    Diabetes mellitus E11.9    Paroxysmal atrial fibrillation (HCC) I48.0    Renal mass N28.89    Cerebrovascular accident (CVA) ([address]) I63.9    Remote history of stroke Z86.73    Benign essential HTN I10    PAF (paroxysmal atrial fibrillation) (HCC) I48.0    CASTRO (acute kidney injury) ([address]) N17.9    LBBB (left bundle branch block) I44.7    H/O right nephrectomy Z90.5    Acute anemia D64.9    Acute ischemic stroke ([address]) I63.9    Overweight (BMI 25.0-29. 9) E66.3    AIN (acute interstitial
N/A

## 2024-02-27 ENCOUNTER — OFFICE VISIT (OUTPATIENT)
Dept: CARDIOLOGY CLINIC | Age: 74
End: 2024-02-27
Payer: MEDICARE

## 2024-02-27 VITALS
SYSTOLIC BLOOD PRESSURE: 124 MMHG | DIASTOLIC BLOOD PRESSURE: 62 MMHG | HEART RATE: 114 BPM | WEIGHT: 196 LBS | OXYGEN SATURATION: 97 % | HEIGHT: 75 IN | BODY MASS INDEX: 24.37 KG/M2

## 2024-02-27 DIAGNOSIS — I25.10 CORONARY ARTERY DISEASE DUE TO LIPID RICH PLAQUE: Primary | ICD-10-CM

## 2024-02-27 DIAGNOSIS — E78.49 OTHER HYPERLIPIDEMIA: ICD-10-CM

## 2024-02-27 DIAGNOSIS — I48.0 PAROXYSMAL ATRIAL FIBRILLATION (HCC): ICD-10-CM

## 2024-02-27 DIAGNOSIS — I10 ESSENTIAL HYPERTENSION: ICD-10-CM

## 2024-02-27 DIAGNOSIS — I25.83 CORONARY ARTERY DISEASE DUE TO LIPID RICH PLAQUE: Primary | ICD-10-CM

## 2024-02-27 PROCEDURE — 3017F COLORECTAL CA SCREEN DOC REV: CPT | Performed by: INTERNAL MEDICINE

## 2024-02-27 PROCEDURE — 93000 ELECTROCARDIOGRAM COMPLETE: CPT | Performed by: INTERNAL MEDICINE

## 2024-02-27 PROCEDURE — 3074F SYST BP LT 130 MM HG: CPT | Performed by: INTERNAL MEDICINE

## 2024-02-27 PROCEDURE — G8427 DOCREV CUR MEDS BY ELIG CLIN: HCPCS | Performed by: INTERNAL MEDICINE

## 2024-02-27 PROCEDURE — 3078F DIAST BP <80 MM HG: CPT | Performed by: INTERNAL MEDICINE

## 2024-02-27 PROCEDURE — 1111F DSCHRG MED/CURRENT MED MERGE: CPT | Performed by: INTERNAL MEDICINE

## 2024-02-27 PROCEDURE — G8420 CALC BMI NORM PARAMETERS: HCPCS | Performed by: INTERNAL MEDICINE

## 2024-02-27 PROCEDURE — G8484 FLU IMMUNIZE NO ADMIN: HCPCS | Performed by: INTERNAL MEDICINE

## 2024-02-27 PROCEDURE — 1036F TOBACCO NON-USER: CPT | Performed by: INTERNAL MEDICINE

## 2024-02-27 PROCEDURE — 1123F ACP DISCUSS/DSCN MKR DOCD: CPT | Performed by: INTERNAL MEDICINE

## 2024-02-27 PROCEDURE — 99214 OFFICE O/P EST MOD 30 MIN: CPT | Performed by: INTERNAL MEDICINE

## 2024-02-27 RX ORDER — METOPROLOL TARTRATE 50 MG/1
50 TABLET, FILM COATED ORAL 2 TIMES DAILY
Qty: 180 TABLET | Refills: 3 | Status: SHIPPED | OUTPATIENT
Start: 2024-02-27

## 2024-02-27 NOTE — PATIENT INSTRUCTIONS
Discuss the need for Lasix with Dr. Rolle since starting dialysis    Increase Metoprolol Tartrate to 50mg twice a day - you can take 2 of the 25mg until you  the new prescription then only take 1 of the 50mg    Follow up with Dr. Boo in 3 months

## 2024-02-28 ENCOUNTER — HOSPITAL ENCOUNTER (OUTPATIENT)
Dept: ONCOLOGY | Age: 74
Setting detail: INFUSION SERIES
Discharge: HOME | End: 2024-02-28
Payer: MEDICARE

## 2024-02-28 VITALS
OXYGEN SATURATION: 100 % | TEMPERATURE: 97.7 F | RESPIRATION RATE: 16 BRPM | DIASTOLIC BLOOD PRESSURE: 69 MMHG | SYSTOLIC BLOOD PRESSURE: 133 MMHG | HEART RATE: 87 BPM

## 2024-02-28 DIAGNOSIS — N10 AIN (ACUTE INTERSTITIAL NEPHRITIS): Primary | ICD-10-CM

## 2024-02-28 PROCEDURE — 96366 THER/PROPH/DIAG IV INF ADDON: CPT

## 2024-02-28 PROCEDURE — 6360000002 HC RX W HCPCS: Performed by: INTERNAL MEDICINE

## 2024-02-28 PROCEDURE — 96365 THER/PROPH/DIAG IV INF INIT: CPT

## 2024-02-28 PROCEDURE — 99211 OFF/OP EST MAY X REQ PHY/QHP: CPT

## 2024-02-28 PROCEDURE — 2580000003 HC RX 258: Performed by: INTERNAL MEDICINE

## 2024-02-28 RX ORDER — ONDANSETRON 2 MG/ML
8 INJECTION INTRAMUSCULAR; INTRAVENOUS
OUTPATIENT
Start: 2024-02-28

## 2024-02-28 RX ORDER — SODIUM CHLORIDE 9 MG/ML
5-250 INJECTION, SOLUTION INTRAVENOUS PRN
Status: CANCELLED | OUTPATIENT
Start: 2024-02-28

## 2024-02-28 RX ORDER — ACETAMINOPHEN 325 MG/1
650 TABLET ORAL
OUTPATIENT
Start: 2024-02-28

## 2024-02-28 RX ORDER — DIPHENHYDRAMINE HYDROCHLORIDE 50 MG/ML
50 INJECTION INTRAMUSCULAR; INTRAVENOUS
OUTPATIENT
Start: 2024-02-28

## 2024-02-28 RX ORDER — SODIUM CHLORIDE 0.9 % (FLUSH) 0.9 %
5-40 SYRINGE (ML) INJECTION PRN
Status: CANCELLED | OUTPATIENT
Start: 2024-02-28

## 2024-02-28 RX ORDER — EPINEPHRINE 1 MG/ML
0.3 INJECTION, SOLUTION INTRAMUSCULAR; SUBCUTANEOUS PRN
OUTPATIENT
Start: 2024-02-28

## 2024-02-28 RX ORDER — SODIUM CHLORIDE 9 MG/ML
INJECTION, SOLUTION INTRAVENOUS CONTINUOUS
OUTPATIENT
Start: 2024-02-28

## 2024-02-28 RX ORDER — SODIUM CHLORIDE 9 MG/ML
5-250 INJECTION, SOLUTION INTRAVENOUS PRN
Status: DISCONTINUED | OUTPATIENT
Start: 2024-02-28 | End: 2024-02-29 | Stop reason: HOSPADM

## 2024-02-28 RX ORDER — ALBUTEROL SULFATE 90 UG/1
4 AEROSOL, METERED RESPIRATORY (INHALATION) PRN
OUTPATIENT
Start: 2024-02-28

## 2024-02-28 RX ORDER — SODIUM CHLORIDE 0.9 % (FLUSH) 0.9 %
5-40 SYRINGE (ML) INJECTION PRN
Status: DISCONTINUED | OUTPATIENT
Start: 2024-02-28 | End: 2024-02-29 | Stop reason: HOSPADM

## 2024-02-28 RX ADMIN — INFLIXIMAB 450 MG: 100 INJECTION, POWDER, LYOPHILIZED, FOR SOLUTION INTRAVENOUS at 10:32

## 2024-02-28 RX ADMIN — Medication 10 ML: at 10:30

## 2024-02-28 RX ADMIN — SODIUM CHLORIDE 125 ML/HR: 9 INJECTION, SOLUTION INTRAVENOUS at 10:31

## 2024-02-28 NOTE — PROGRESS NOTES
Patient ambulatory for second time Remicade infusion. First infusion given as inpatient in hospital with no pre meds over 2 hours. Tolerated infusion well over 2 hours per orders To follow up with provider for any further plan of care.  Given AVS with remicade information, questions answered, wife at side.

## 2024-03-22 RX ORDER — SODIUM CHLORIDE 9 MG/ML
5-250 INJECTION, SOLUTION INTRAVENOUS PRN
OUTPATIENT
Start: 2024-03-22

## 2024-03-22 RX ORDER — SODIUM CHLORIDE 0.9 % (FLUSH) 0.9 %
5-40 SYRINGE (ML) INJECTION PRN
OUTPATIENT
Start: 2024-03-22

## 2024-03-27 ENCOUNTER — CLINICAL DOCUMENTATION (OUTPATIENT)
Dept: ONCOLOGY | Age: 74
End: 2024-03-27

## 2024-04-03 ENCOUNTER — TELEPHONE (OUTPATIENT)
Dept: CARDIOLOGY CLINIC | Age: 74
End: 2024-04-03

## 2024-04-03 NOTE — TELEPHONE ENCOUNTER
I spoke with Marija. She stated that t is fatigued and freezing and aching all over  She states that he's had a 15 lb weight loss. They held morning dose of Metoprolol.

## 2024-04-03 NOTE — TELEPHONE ENCOUNTER
Betty with [facility] called stating the pt BP is running low 97/55 after PT. Betty stated last office visit metoprolol was increase 25 mg to 50 mg, pt has not taken any BP medications today.    Pt has had wt loss since last visit Current wt is 170 lbs, pt stated the are in constant pain cannot get comfortable and is always freezing.      Pls advise pt CB # [phone]    If there are anymore questions for Betty pls call [phone]

## 2024-04-05 ENCOUNTER — HOSPITAL ENCOUNTER (OUTPATIENT)
Dept: ONCOLOGY | Age: 74
Setting detail: INFUSION SERIES
Discharge: HOME | End: 2024-04-05
Payer: MEDICARE

## 2024-04-05 VITALS
RESPIRATION RATE: 16 BRPM | WEIGHT: 185 LBS | HEART RATE: 59 BPM | TEMPERATURE: 97.9 F | SYSTOLIC BLOOD PRESSURE: 108 MMHG | BODY MASS INDEX: 22.52 KG/M2 | DIASTOLIC BLOOD PRESSURE: 67 MMHG

## 2024-04-05 DIAGNOSIS — N10 AIN (ACUTE INTERSTITIAL NEPHRITIS): Primary | ICD-10-CM

## 2024-04-05 PROCEDURE — 6360000002 HC RX W HCPCS: Performed by: INTERNAL MEDICINE

## 2024-04-05 PROCEDURE — 96365 THER/PROPH/DIAG IV INF INIT: CPT

## 2024-04-05 PROCEDURE — 99211 OFF/OP EST MAY X REQ PHY/QHP: CPT

## 2024-04-05 PROCEDURE — 2580000003 HC RX 258: Performed by: INTERNAL MEDICINE

## 2024-04-05 PROCEDURE — 96366 THER/PROPH/DIAG IV INF ADDON: CPT

## 2024-04-05 RX ORDER — SODIUM CHLORIDE 0.9 % (FLUSH) 0.9 %
5-40 SYRINGE (ML) INJECTION PRN
Status: CANCELLED | OUTPATIENT
Start: 2024-04-05

## 2024-04-05 RX ORDER — SODIUM CHLORIDE 0.9 % (FLUSH) 0.9 %
5-40 SYRINGE (ML) INJECTION PRN
Status: DISCONTINUED | OUTPATIENT
Start: 2024-04-05 | End: 2024-04-05

## 2024-04-05 RX ORDER — SODIUM CHLORIDE 9 MG/ML
5-250 INJECTION, SOLUTION INTRAVENOUS PRN
Status: DISCONTINUED | OUTPATIENT
Start: 2024-04-05 | End: 2024-04-05

## 2024-04-05 RX ORDER — SODIUM CHLORIDE 9 MG/ML
5-250 INJECTION, SOLUTION INTRAVENOUS PRN
Status: CANCELLED | OUTPATIENT
Start: 2024-04-05

## 2024-04-05 RX ADMIN — SODIUM CHLORIDE 125 ML/HR: 9 INJECTION, SOLUTION INTRAVENOUS at 10:22

## 2024-04-05 RX ADMIN — INFLIXIMAB 450 MG: 100 INJECTION, POWDER, LYOPHILIZED, FOR SOLUTION INTRAVENOUS at 10:22

## 2024-04-05 RX ADMIN — Medication 10 ML: at 10:23

## 2024-04-05 NOTE — PROGRESS NOTES
Patient to bathroom complaint of loose bowel movements, states these episodes are not new for him, he has been having bouts explosive diarrhea.  Patient taken to bathroom in wheelchair, tolerated well.  Then back to chair, blood pressure 113/70 after going to bath room.  States his dizziness is gone and he is feeling much better now.   Will continue to monitor.

## 2024-04-05 NOTE — PROGRESS NOTES
Patient with complaints of dizziness, blood pressure 84/50 at this time.  Patient and wife said it has been dropping lately, told not to take certain medications on dialysis day.  Strongly encouraged wife to keep log of blood pressure and call Dr. Boo so that adjustments can be made as necessary.  Repositioned patient into laying back in chair, states he is already feeling better , only lasted a few minutes. Blood pressure rechecked, 100/50.  Will continue to monitor.

## 2024-04-05 NOTE — PROGRESS NOTES
Patient tolerated remicade infusion, no signs/symptoms of reaction.  Patient and wife encouraged to monitor blood pressure and call Dr. Boo to let them know that he has still been feeling dizzy and blood pressure running low even after recent change in medication regimen.  Patient to follow up with Dr. Rolle regarding any further plan of care for remicade infusions, this order only a one time order for today.

## 2024-04-10 DIAGNOSIS — Z90.5 H/O RIGHT NEPHRECTOMY: ICD-10-CM

## 2024-04-10 DIAGNOSIS — Z92.241 H/O SYSTEMIC STEROID THERAPY: ICD-10-CM

## 2024-04-10 DIAGNOSIS — R60.0 EDEMA LEG: ICD-10-CM

## 2024-04-10 DIAGNOSIS — N10 AIN (ACUTE INTERSTITIAL NEPHRITIS): ICD-10-CM

## 2024-04-10 DIAGNOSIS — Z99.2 ACUTE RENAL FAILURE ON DIALYSIS (HCC): ICD-10-CM

## 2024-04-10 DIAGNOSIS — N17.9 ACUTE RENAL FAILURE ON DIALYSIS (HCC): ICD-10-CM

## 2024-04-10 LAB
ALBUMIN SERPL-MCNC: 3.5 G/DL (ref 3.4–5)
ANION GAP SERPL CALCULATED.3IONS-SCNC: 11 MMOL/L (ref 3–16)
BUN SERPL-MCNC: 36 MG/DL (ref 7–20)
CALCIUM SERPL-MCNC: 9.7 MG/DL (ref 8.3–10.6)
CHLORIDE SERPL-SCNC: 99 MMOL/L (ref 99–110)
CO2 SERPL-SCNC: 28 MMOL/L (ref 21–32)
CREAT SERPL-MCNC: 2.5 MG/DL (ref 0.8–1.3)
DEPRECATED RDW RBC AUTO: 17.3 % (ref 12.4–15.4)
GFR SERPLBLD CREATININE-BSD FMLA CKD-EPI: 26 ML/MIN/{1.73_M2}
GLUCOSE SERPL-MCNC: 155 MG/DL (ref 70–99)
HCT VFR BLD AUTO: 33.4 % (ref 40.5–52.5)
HGB BLD-MCNC: 10.6 G/DL (ref 13.5–17.5)
MCH RBC QN AUTO: 29.3 PG (ref 26–34)
MCHC RBC AUTO-ENTMCNC: 31.7 G/DL (ref 31–36)
MCV RBC AUTO: 92.4 FL (ref 80–100)
PHOSPHATE SERPL-MCNC: 3.3 MG/DL (ref 2.5–4.9)
PLATELET # BLD AUTO: 245 K/UL (ref 135–450)
PMV BLD AUTO: 8.2 FL (ref 5–10.5)
POTASSIUM SERPL-SCNC: 4.2 MMOL/L (ref 3.5–5.1)
RBC # BLD AUTO: 3.62 M/UL (ref 4.2–5.9)
SODIUM SERPL-SCNC: 138 MMOL/L (ref 136–145)
WBC # BLD AUTO: 10 K/UL (ref 4–11)

## 2024-05-01 NOTE — PROGRESS NOTES
[facility]     Outpatient Follow Up Note      Chief Complaint   Patient presents with    3 Month Follow-Up    Hyperlipidemia    Hypertension    Coronary Artery Disease     HPI:  Mr. Rock 74 y.o. male who's history includes CAD, hypertension, hyperlipidemia, PAF. He wore a Holter monitor May 2018 which showed no a. Fib; his Xarelto was stopped and baby aspirin started.    Surgery in July 2022 for a right Nephrectomy. The Urologist and Oncologist have concerns for cancer in the kidney.    Pt established with Dr Blair 4/19/23 for ascending aortic and aortic root aneurysm who recommends ongoing surveillance over time.     10/9/23 - Work up for stroke due to confusion and forgetfullness    11/20/23 - ER for hypoglycemia, unresponsiveness and renal insufficiency.      11/28/23-12/10/23 - Hospital admit for acute kidney injury.  He was followed by Urology, Renal, Oncology, Neurology, Cardiology and PMR.  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  Started on IV steroids and transitioned to oral Prednisone.  Small chronic infarct involving left frontal lobe.  Started on Eliquis on 12/8/23 for afib with acute CVA.  Notated that Dr. Chappell to assist with steroids in ARU and Dr. Garner will follow for CASTRO on CKD in ARU.  Medications started at discharge - aspirin 81mg, eliquis 5mg, atorvastatin 40mg, seroquel 25mg, toprol XL 50mg, amlodipine 10mg, prednisone 50mg, ferrous sulfate 325mg, calcium [id number] 500-5mg-mcg.      1/6/24 - [facility] for flu-like symptoms, diagnosed with Covid.      1/23/24 - Seen Neurology with notation that he has had trouble with anemia since being diagnosed with Covid.  No GI bleeding noted.      1/25/24 - Seen by Carolyn Ventura NP - Lasix 80mg started with notation that it is okay to take 40mg BID depending on tolerability to a single dose daily.      2/2/24 - Seen Nephrology with notation that despite starting Lasix 80mg daily there was no improvement in leg

## 2024-05-06 ENCOUNTER — HOSPITAL ENCOUNTER (OUTPATIENT)
Age: 74
Discharge: HOME | End: 2024-05-06
Payer: MEDICARE

## 2024-05-06 LAB
ALBUMIN SERPL-MCNC: 3.6 G/DL (ref 3.4–5)
ANION GAP SERPL CALCULATED.3IONS-SCNC: 14 MMOL/L (ref 3–16)
BUN SERPL-MCNC: 60 MG/DL (ref 7–20)
CALCIUM SERPL-MCNC: 10.5 MG/DL (ref 8.3–10.6)
CHLORIDE SERPL-SCNC: 97 MMOL/L (ref 99–110)
CO2 SERPL-SCNC: 26 MMOL/L (ref 21–32)
CREAT SERPL-MCNC: 3.6 MG/DL (ref 0.8–1.3)
DEPRECATED RDW RBC AUTO: 17.8 % (ref 12.4–15.4)
GFR SERPLBLD CREATININE-BSD FMLA CKD-EPI: 17 ML/MIN/{1.73_M2}
GLUCOSE SERPL-MCNC: 204 MG/DL (ref 70–99)
HCT VFR BLD AUTO: 35.4 % (ref 40.5–52.5)
HGB BLD-MCNC: 11.5 G/DL (ref 13.5–17.5)
MCH RBC QN AUTO: 29.5 PG (ref 26–34)
MCHC RBC AUTO-ENTMCNC: 32.6 G/DL (ref 31–36)
MCV RBC AUTO: 90.5 FL (ref 80–100)
PHOSPHATE SERPL-MCNC: 5 MG/DL (ref 2.5–4.9)
PLATELET # BLD AUTO: 204 K/UL (ref 135–450)
PMV BLD AUTO: 7.9 FL (ref 5–10.5)
POTASSIUM SERPL-SCNC: 4.9 MMOL/L (ref 3.5–5.1)
RBC # BLD AUTO: 3.91 M/UL (ref 4.2–5.9)
SODIUM SERPL-SCNC: 137 MMOL/L (ref 136–145)
WBC # BLD AUTO: 8.4 K/UL (ref 4–11)

## 2024-05-06 PROCEDURE — 80069 RENAL FUNCTION PANEL: CPT

## 2024-05-06 PROCEDURE — 36415 COLL VENOUS BLD VENIPUNCTURE: CPT

## 2024-05-06 PROCEDURE — 85027 COMPLETE CBC AUTOMATED: CPT

## 2024-05-29 ENCOUNTER — OFFICE VISIT (OUTPATIENT)
Dept: CARDIOLOGY CLINIC | Age: 74
End: 2024-05-29
Payer: MEDICARE

## 2024-05-29 VITALS
WEIGHT: 179 LBS | HEART RATE: 94 BPM | DIASTOLIC BLOOD PRESSURE: 54 MMHG | HEIGHT: 75 IN | OXYGEN SATURATION: 98 % | BODY MASS INDEX: 22.26 KG/M2 | SYSTOLIC BLOOD PRESSURE: 100 MMHG

## 2024-05-29 DIAGNOSIS — E78.49 OTHER HYPERLIPIDEMIA: ICD-10-CM

## 2024-05-29 DIAGNOSIS — I25.10 CORONARY ARTERY DISEASE DUE TO LIPID RICH PLAQUE: Primary | ICD-10-CM

## 2024-05-29 DIAGNOSIS — I25.83 CORONARY ARTERY DISEASE DUE TO LIPID RICH PLAQUE: Primary | ICD-10-CM

## 2024-05-29 DIAGNOSIS — I10 ESSENTIAL HYPERTENSION: ICD-10-CM

## 2024-05-29 DIAGNOSIS — I48.0 PAROXYSMAL ATRIAL FIBRILLATION (HCC): ICD-10-CM

## 2024-05-29 PROCEDURE — 3074F SYST BP LT 130 MM HG: CPT | Performed by: INTERNAL MEDICINE

## 2024-05-29 PROCEDURE — 3017F COLORECTAL CA SCREEN DOC REV: CPT | Performed by: INTERNAL MEDICINE

## 2024-05-29 PROCEDURE — G8420 CALC BMI NORM PARAMETERS: HCPCS | Performed by: INTERNAL MEDICINE

## 2024-05-29 PROCEDURE — 3078F DIAST BP <80 MM HG: CPT | Performed by: INTERNAL MEDICINE

## 2024-05-29 PROCEDURE — G8427 DOCREV CUR MEDS BY ELIG CLIN: HCPCS | Performed by: INTERNAL MEDICINE

## 2024-05-29 PROCEDURE — 1036F TOBACCO NON-USER: CPT | Performed by: INTERNAL MEDICINE

## 2024-05-29 PROCEDURE — 1123F ACP DISCUSS/DSCN MKR DOCD: CPT | Performed by: INTERNAL MEDICINE

## 2024-05-29 PROCEDURE — 99214 OFFICE O/P EST MOD 30 MIN: CPT | Performed by: INTERNAL MEDICINE

## 2024-05-29 RX ORDER — ATORVASTATIN CALCIUM 40 MG/1
TABLET, FILM COATED ORAL
COMMUNITY
Start: 2024-02-10

## 2024-05-29 RX ORDER — ATORVASTATIN CALCIUM 40 MG/1
40 TABLET, FILM COATED ORAL DAILY
Qty: 90 TABLET | Refills: 0
Start: 2024-05-29

## 2024-05-29 NOTE — PATIENT INSTRUCTIONS
Hold lasix this Friday and see if BP drops during dialysis on Saturday  Continue risk factor modifications.   Call for any change in symptoms, call to report any changes in shortness of breath or development of chest pain with activity.    Follow up in 4 mos

## 2024-06-25 ENCOUNTER — HOSPITAL ENCOUNTER (OUTPATIENT)
Dept: CT IMAGING | Age: 74
Discharge: HOME | End: 2024-06-25
Attending: INTERNAL MEDICINE
Payer: MEDICARE

## 2024-06-25 DIAGNOSIS — C64.1 MALIGNANT NEOPLASM OF RIGHT KIDNEY, EXCEPT RENAL PELVIS (HCC): ICD-10-CM

## 2024-06-25 PROCEDURE — 74176 CT ABD & PELVIS W/O CONTRAST: CPT

## 2024-07-08 ENCOUNTER — TELEPHONE (OUTPATIENT)
Dept: CARDIOLOGY CLINIC | Age: 74
End: 2024-07-08

## 2024-07-08 NOTE — TELEPHONE ENCOUNTER
Received refill request for Apixaban 5mg from [facility] pharmacy.     Last OV: 5/29/2024 LES     Next OV: 10/1/2024 LES    Last Labs: 5/6/2024 CBC    Last Filled: 1/5/2024 RMM

## 2024-07-08 NOTE — TELEPHONE ENCOUNTER
Medication Refill    Medication needing refilled:  ELIQUIS     Dosage of the medication:  5mg    How are you taking this medication (QD, BID, TID, QID, PRN):  Take 1 tablet by mouth 2 times daily     30 or 90 day supply called in: 180    When will you run out of your medication:    Which Pharmacy are we sending the medication to?:    [facility] [place] [address]  Phone:   [phone]   Fax:   [phone]     NOTE: Per Pt he only saw RMM in the Hospital and ZACH is his Provider

## 2024-07-16 ENCOUNTER — HOSPITAL ENCOUNTER (OUTPATIENT)
Dept: MRI IMAGING | Age: 74
Discharge: HOME | End: 2024-07-16
Attending: INTERNAL MEDICINE
Payer: MEDICARE

## 2024-07-16 DIAGNOSIS — C64.1 MALIGNANT NEOPLASM OF RIGHT KIDNEY, EXCEPT RENAL PELVIS (HCC): ICD-10-CM

## 2024-07-16 PROCEDURE — 74181 MRI ABDOMEN W/O CONTRAST: CPT

## 2024-08-01 ENCOUNTER — HOSPITAL ENCOUNTER (OUTPATIENT)
Dept: CT IMAGING | Age: 74
Discharge: HOME | End: 2024-08-01
Attending: INTERNAL MEDICINE
Payer: MEDICARE

## 2024-08-01 VITALS
OXYGEN SATURATION: 95 % | WEIGHT: 180 LBS | TEMPERATURE: 96.8 F | RESPIRATION RATE: 18 BRPM | SYSTOLIC BLOOD PRESSURE: 136 MMHG | BODY MASS INDEX: 21.92 KG/M2 | HEART RATE: 72 BPM | DIASTOLIC BLOOD PRESSURE: 53 MMHG | HEIGHT: 76 IN

## 2024-08-01 DIAGNOSIS — C64.1 MALIGNANT NEOPLASM OF RIGHT KIDNEY, EXCEPT RENAL PELVIS (HCC): ICD-10-CM

## 2024-08-01 LAB
APTT BLD: 31.8 SEC (ref 22.1–36.4)
INR PPP: 1.07 (ref 0.85–1.15)
PLATELET # BLD AUTO: 206 K/UL (ref 135–450)
PROTHROMBIN TIME: 14.1 SEC (ref 11.9–14.9)

## 2024-08-01 PROCEDURE — 88305 TISSUE EXAM BY PATHOLOGIST: CPT

## 2024-08-01 PROCEDURE — 88341 IMHCHEM/IMCYTCHM EA ADD ANTB: CPT

## 2024-08-01 PROCEDURE — 85610 PROTHROMBIN TIME: CPT

## 2024-08-01 PROCEDURE — 6360000002 HC RX W HCPCS: Performed by: RADIOLOGY

## 2024-08-01 PROCEDURE — 99152 MOD SED SAME PHYS/QHP 5/>YRS: CPT

## 2024-08-01 PROCEDURE — 85049 AUTOMATED PLATELET COUNT: CPT

## 2024-08-01 PROCEDURE — 85730 THROMBOPLASTIN TIME PARTIAL: CPT

## 2024-08-01 PROCEDURE — 36415 COLL VENOUS BLD VENIPUNCTURE: CPT

## 2024-08-01 PROCEDURE — 88342 IMHCHEM/IMCYTCHM 1ST ANTB: CPT

## 2024-08-01 PROCEDURE — 2500000003 HC RX 250 WO HCPCS: Performed by: RADIOLOGY

## 2024-08-01 RX ORDER — LIDOCAINE HYDROCHLORIDE 10 MG/ML
INJECTION, SOLUTION EPIDURAL; INFILTRATION; INTRACAUDAL; PERINEURAL PRN
Status: DISCONTINUED | OUTPATIENT
Start: 2024-08-01 | End: 2024-08-02 | Stop reason: HOSPADM

## 2024-08-01 RX ORDER — FENTANYL CITRATE 50 UG/ML
INJECTION, SOLUTION INTRAMUSCULAR; INTRAVENOUS PRN
Status: DISCONTINUED | OUTPATIENT
Start: 2024-08-01 | End: 2024-08-02 | Stop reason: HOSPADM

## 2024-08-01 RX ORDER — MIDAZOLAM HYDROCHLORIDE 1 MG/ML
INJECTION INTRAMUSCULAR; INTRAVENOUS PRN
Status: DISCONTINUED | OUTPATIENT
Start: 2024-08-01 | End: 2024-08-02 | Stop reason: HOSPADM

## 2024-08-01 RX ADMIN — LIDOCAINE HYDROCHLORIDE 15 ML: 10 INJECTION, SOLUTION EPIDURAL; INFILTRATION; INTRACAUDAL; PERINEURAL at 12:39

## 2024-08-01 RX ADMIN — FENTANYL CITRATE 25 MCG: 50 INJECTION, SOLUTION INTRAMUSCULAR; INTRAVENOUS at 12:36

## 2024-08-01 RX ADMIN — MIDAZOLAM HYDROCHLORIDE 1 MG: 2 INJECTION, SOLUTION INTRAMUSCULAR; INTRAVENOUS at 12:36

## 2024-08-01 ASSESSMENT — PAIN - FUNCTIONAL ASSESSMENT
PAIN_FUNCTIONAL_ASSESSMENT: NONE - DENIES PAIN
PAIN_FUNCTIONAL_ASSESSMENT: 0-10

## 2024-08-01 NOTE — FLOWSHEET NOTE
Received pt after retroperitoneal biopsy. Pt alert and oriented x 4. Dressing to low right side of back clean dry and intact. No swelling, redness or drainage noted.   Pt wife at bedside and supportive.

## 2024-08-01 NOTE — DISCHARGE INSTRUCTIONS
you're given a shot to numb the biopsy area. You may feel some pressure as the biopsy needle removes samples. But if you have general anesthesia, you won't feel anything during the procedure.  How long does it take?  A core needle biopsy can take 15 to 60 minutes. It may take longer depending on what part of your body is being tested. It also depends on the type of anesthesia used or if you need to be monitored afterward.  What happens after the test?  You'll be told how long it may take to get your results back.  You may be able to go home right away. But this will depend on the location of the biopsy and the type of anesthesia used.  After the doctor looks at the biopsy sample, their office will let you know the results.  If the test results aren't clear, you may have another biopsy or test.  How can you care for yourself at home?  Your doctor will tell you when you can resume your normal activities.  The site may be tender for 2 or 3 days. You may also have some bruising, swelling, or slight bleeding.  Most people like to use ice on the sore area after a procedure or surgery. Find out what your doctor recommends.  Ask your doctor if you can take an over-the-counter pain medicine, such as acetaminophen (Tylenol), ibuprofen (Advil, Motrin), or naproxen (Aleve). Be safe with medicines. Read and follow all instructions on the label.  When should you call for help?   Call 911  anytime you think you may need emergency care. For example, call if:    You passed out (lost consciousness).     You have severe trouble breathing.     You have severe pain in your belly or chest.   Call your doctor now or seek medical care if:    You have new or worse pain or swelling.     Bright red blood has soaked through the bandage over the biopsy site.     You cough up blood.     You are sick to your stomach or cannot keep fluids down.     You are dizzy or lightheaded, or you feel like you may faint.     You have signs of infection, such

## 2024-08-01 NOTE — FLOWSHEET NOTE
Ambulatory Surgery/Procedure Discharge Note    Vitals:    08/01/24 1345   BP: (!) 136/53   Pulse: 72   Resp: 18   Temp:    SpO2: 95%       No intake/output data recorded.    Restroom use offered before discharge.  Yes    Pain assessment:  level of pain (1-10, 10 severe),   Pain Level: 0  BP within 20% of admission BP    Pt and wife states \"ready to go home\". Pt alert and oriented x4. IV removed. Denies N/V or pain. Dressing to lower back right side clean dry and intact. No swelling, redness or drainage noted. Voided prior to discharge. Pt tolerating po intake. Discharge instructions given to pt and wife with pt permission. Pt and wife verbalized understanding of all instructions. Left with all belongings and discharge instructions.   Patient discharged to home/self care. Patient discharged via wheel chair by transporter to waiting family.       8/1/2024 1:55 PM

## 2024-08-01 NOTE — PROGRESS NOTES
IMAGING SERVICES NURSING PROGRESS NOTE    Procedure:  retroperitoneal biopsy  August 1, 2024  Danny Rock      Allergies:    Allergies   Allergen Reactions    Amoxicillin Other (See Comments)     Urethritis, rash    Bisoprolol-Hydrochlorothiazide Other (See Comments)     G.I. rash    Cisapride Other (See Comments)     diarrhea    Penicillins      Un aware of pcn allergy    Pioglitazone Other (See Comments)     G.I upset (long time ago)       Vitals:    08/01/24 1036   BP: 126/71   Pulse: 68   Resp: 18   Temp: 98 °F (36.7 °C)   SpO2: 99%       Recent lab work reviewed with MD: yes    Procedure explained to patient by MD: yes   Informed consent obtained:yes  Family with patient:yes    Mental Status:  Normal  Readiness to learn:  Yes  Barriers to learning: No    Pain Assessment Pre-Procedure:  Pain Present:  no  Pain Score:  0  Pain Quality/Description:  na    Time out Procedure Verification with:  [x] RN  [x] Physician  [x] Patient  [x] Other: CT Technologist  Procedure site marked, if applicable:  Yes    Note: Patient arrived A & O x 4, denies pain, breathing easily on room air, Spoke to Dr. Rao & Barby Flower NP prior to procedure.  Procedural sedation:  Fentanyl:  25mcg  Versed:    1mg  Post Procedureal Note:  Patient tolerated procedure well.  Breathing easily on room air.  Report given to \[facility]\""RN.  Patient transported in stable conditon to room 4.    Pain Assessment Post-Procedure:  Pain Present:  no  Pain Score:  0  Pain Quality/Description:  na    Plan of Care Goals:  Safety measures met:  Yes  Patient understands explanation of procedure:  Yes    Time in:  1234  Time out:  1254  Elsie Alford RN.  R.N. 8/1/2024

## 2024-09-05 NOTE — PROGRESS NOTES
[facility]     Outpatient Follow Up Note      No chief complaint on file.    HPI:  Mr. Rock 74 y.o. male who's history includes CAD, hypertension, hyperlipidemia, PAF. He wore a Holter monitor May 2018 which showed no a. Fib; his Xarelto was stopped and baby aspirin started.    Surgery in July 2022 for a right Nephrectomy. The Urologist and Oncologist have concerns for cancer in the kidney.    Pt established with Dr Blair 4/19/23 for ascending aortic and aortic root aneurysm who recommends ongoing surveillance over time.     10/9/23 - Work up for stroke due to confusion and forgetfullness    11/20/23 - ER for hypoglycemia, unresponsiveness and renal insufficiency.      11/28/23-12/10/23 - Hospital admit for acute kidney injury.  He was followed by Urology, Renal, Oncology, Neurology, Cardiology and PMR.  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  Started on IV steroids and transitioned to oral Prednisone.  Small chronic infarct involving left frontal lobe.  Started on Eliquis on 12/8/23 for afib with acute CVA.  Notated that Dr. Chappell to assist with steroids in ARU and Dr. Garner will follow for CASTRO on CKD in ARU.  Medications started at discharge - aspirin 81mg, eliquis 5mg, atorvastatin 40mg, seroquel 25mg, toprol XL 50mg, amlodipine 10mg, prednisone 50mg, ferrous sulfate 325mg, calcium [id number] 500-5mg-mcg.      1/6/24 - [facility] for flu-like symptoms, diagnosed with Covid.      1/23/24 - Seen Neurology with notation that he has had trouble with anemia since being diagnosed with Covid.  No GI bleeding noted.      1/25/24 - Seen by Carolyn Ventura NP - Lasix 80mg started with notation that it is okay to take 40mg BID depending on tolerability to a single dose daily.      2/2/24 - Seen Nephrology with notation that despite starting Lasix 80mg daily there was no improvement in leg swelling, BNP 90281b, Creat worsening to 4 from 2.4.  He was advised to go to the ER at that

## 2024-09-16 NOTE — PROGRESS NOTES
[facility]     Outpatient Follow Up Note      Chief Complaint   Patient presents with    4 mo fu    Hyperlipidemia    Hypertension    Coronary Artery Disease     HPI:  Mr. Rock 74 y.o. male who's history includes CAD, hypertension, hyperlipidemia, PAF. He wore a Holter monitor May 2018 which showed no a. Fib; his Xarelto was stopped and baby aspirin started.    Surgery in July 2022 for a right Nephrectomy. The Urologist and Oncologist have concerns for cancer in the kidney.    Pt established with Dr Blair 4/19/23 for ascending aortic and aortic root aneurysm who recommends ongoing surveillance over time.     10/9/23 - Work up for stroke due to confusion and forgetfullness    11/20/23 - ER for hypoglycemia, unresponsiveness and renal insufficiency.      11/28/23-12/10/23 - Hospital admit for acute kidney injury.  He was followed by Urology, Renal, Oncology, Neurology, Cardiology and PMR.  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  Started on IV steroids and transitioned to oral Prednisone.  Small chronic infarct involving left frontal lobe.  Started on Eliquis on 12/8/23 for afib with acute CVA.  Notated that Dr. Chappell to assist with steroids in ARU and Dr. Garner will follow for CASTRO on CKD in ARU.  Medications started at discharge - aspirin 81mg, eliquis 5mg, atorvastatin 40mg, seroquel 25mg, toprol XL 50mg, amlodipine 10mg, prednisone 50mg, ferrous sulfate 325mg, calcium [id number] 500-5mg-mcg.      1/6/24 - [facility] for flu-like symptoms, diagnosed with Covid.      1/23/24 - Seen Neurology with notation that he has had trouble with anemia since being diagnosed with Covid.  No GI bleeding noted.      1/25/24 - Seen by Carolyn Ventura NP - Lasix 80mg started with notation that it is okay to take 40mg BID depending on tolerability to a single dose daily.      2/2/24 - Seen Nephrology with notation that despite starting Lasix 80mg daily there was no improvement in leg swelling,

## 2024-09-17 ENCOUNTER — HOSPITAL ENCOUNTER (OUTPATIENT)
Dept: GENERAL RADIOLOGY | Age: 74
Discharge: HOME | End: 2024-09-17
Payer: MEDICARE

## 2024-09-17 ENCOUNTER — HOSPITAL ENCOUNTER (OUTPATIENT)
Age: 74
Discharge: HOME | End: 2024-09-17
Payer: MEDICARE

## 2024-09-17 DIAGNOSIS — T85.691A MECHANICAL COMPLICATION DUE TO PERITONEAL DIALYSIS CATHETER, INITIAL ENCOUNTER (HCC): ICD-10-CM

## 2024-09-17 DIAGNOSIS — R10.9 ACUTE ABDOMINAL PAIN: ICD-10-CM

## 2024-09-17 PROCEDURE — 74018 RADEX ABDOMEN 1 VIEW: CPT

## 2024-09-25 ENCOUNTER — HOSPITAL ENCOUNTER (OUTPATIENT)
Age: 74
Discharge: HOME | End: 2024-09-25
Payer: MEDICARE

## 2024-09-25 ENCOUNTER — HOSPITAL ENCOUNTER (OUTPATIENT)
Dept: GENERAL RADIOLOGY | Age: 74
Discharge: HOME | End: 2024-09-25
Payer: MEDICARE

## 2024-09-25 DIAGNOSIS — R52 PAIN: ICD-10-CM

## 2024-09-25 PROCEDURE — 74018 RADEX ABDOMEN 1 VIEW: CPT

## 2024-10-01 ENCOUNTER — OFFICE VISIT (OUTPATIENT)
Dept: CARDIOLOGY CLINIC | Age: 74
End: 2024-10-01
Payer: MEDICARE

## 2024-10-01 VITALS
SYSTOLIC BLOOD PRESSURE: 110 MMHG | HEART RATE: 78 BPM | OXYGEN SATURATION: 96 % | WEIGHT: 169.8 LBS | HEIGHT: 76 IN | DIASTOLIC BLOOD PRESSURE: 60 MMHG | BODY MASS INDEX: 20.68 KG/M2

## 2024-10-01 DIAGNOSIS — E78.49 OTHER HYPERLIPIDEMIA: ICD-10-CM

## 2024-10-01 DIAGNOSIS — I10 ESSENTIAL HYPERTENSION: ICD-10-CM

## 2024-10-01 DIAGNOSIS — I25.10 ATHEROSCLEROSIS OF NATIVE CORONARY ARTERY OF NATIVE HEART WITHOUT ANGINA PECTORIS: Primary | ICD-10-CM

## 2024-10-01 DIAGNOSIS — I48.0 PAROXYSMAL ATRIAL FIBRILLATION (HCC): ICD-10-CM

## 2024-10-01 PROCEDURE — 3078F DIAST BP <80 MM HG: CPT | Performed by: INTERNAL MEDICINE

## 2024-10-01 PROCEDURE — 99214 OFFICE O/P EST MOD 30 MIN: CPT | Performed by: INTERNAL MEDICINE

## 2024-10-01 PROCEDURE — 1036F TOBACCO NON-USER: CPT | Performed by: INTERNAL MEDICINE

## 2024-10-01 PROCEDURE — G8420 CALC BMI NORM PARAMETERS: HCPCS | Performed by: INTERNAL MEDICINE

## 2024-10-01 PROCEDURE — 1123F ACP DISCUSS/DSCN MKR DOCD: CPT | Performed by: INTERNAL MEDICINE

## 2024-10-01 PROCEDURE — G8484 FLU IMMUNIZE NO ADMIN: HCPCS | Performed by: INTERNAL MEDICINE

## 2024-10-01 PROCEDURE — 3017F COLORECTAL CA SCREEN DOC REV: CPT | Performed by: INTERNAL MEDICINE

## 2024-10-01 PROCEDURE — G8427 DOCREV CUR MEDS BY ELIG CLIN: HCPCS | Performed by: INTERNAL MEDICINE

## 2024-10-01 PROCEDURE — 3074F SYST BP LT 130 MM HG: CPT | Performed by: INTERNAL MEDICINE

## 2024-10-01 RX ORDER — ROSUVASTATIN CALCIUM 10 MG/1
TABLET, COATED ORAL
Qty: 45 TABLET | Refills: 3 | Status: SHIPPED | OUTPATIENT
Start: 2024-10-01

## 2024-10-01 NOTE — PATIENT INSTRUCTIONS
No medication changes today - New prescription for Rosuvastatin 10 mg sent to the Pharmacy - Take every other day     Continue risk factor modifications.  Call for any change in symptoms, call to report any changes in shortness of breath or development of chest pain with activity.    Follow up with Dr. Boo in 6 months

## 2024-11-04 ENCOUNTER — HOSPITAL ENCOUNTER (OUTPATIENT)
Dept: CT IMAGING | Age: 74
Discharge: HOME | End: 2024-11-04
Payer: MEDICARE

## 2024-11-04 DIAGNOSIS — K76.89 LIVER NODULE: ICD-10-CM

## 2024-11-04 DIAGNOSIS — C64.1 MALIGNANT NEOPLASM OF RIGHT KIDNEY, EXCEPT RENAL PELVIS (HCC): ICD-10-CM

## 2024-11-04 PROCEDURE — 74176 CT ABD & PELVIS W/O CONTRAST: CPT

## 2024-11-13 ENCOUNTER — TELEPHONE (OUTPATIENT)
Dept: INTERVENTIONAL RADIOLOGY/VASCULAR | Age: 74
End: 2024-11-13

## 2024-11-18 ENCOUNTER — HOSPITAL ENCOUNTER (OUTPATIENT)
Dept: CT IMAGING | Age: 74
Discharge: HOME | DRG: 199 | End: 2024-11-18
Payer: MEDICARE

## 2024-11-18 ENCOUNTER — HOSPITAL ENCOUNTER (OUTPATIENT)
Dept: GENERAL RADIOLOGY | Age: 74
Discharge: HOME | DRG: 199 | End: 2024-11-18
Payer: MEDICARE

## 2024-11-18 ENCOUNTER — HOSPITAL ENCOUNTER (INPATIENT)
Age: 74
LOS: 1 days | Discharge: HOME | DRG: 199 | End: 2024-11-20
Attending: HOSPITALIST | Admitting: INTERNAL MEDICINE
Payer: MEDICARE

## 2024-11-18 ENCOUNTER — HOSPITAL ENCOUNTER (OUTPATIENT)
Dept: CT IMAGING | Age: 74
Setting detail: OBSERVATION
Discharge: HOME | DRG: 199 | End: 2024-11-18
Attending: HOSPITALIST | Admitting: HOSPITALIST
Payer: MEDICARE

## 2024-11-18 VITALS
TEMPERATURE: 98 F | HEART RATE: 82 BPM | BODY MASS INDEX: 21.14 KG/M2 | OXYGEN SATURATION: 98 % | RESPIRATION RATE: 16 BRPM | SYSTOLIC BLOOD PRESSURE: 108 MMHG | DIASTOLIC BLOOD PRESSURE: 58 MMHG | HEIGHT: 75 IN | WEIGHT: 170 LBS

## 2024-11-18 VITALS
OXYGEN SATURATION: 95 % | HEART RATE: 56 BPM | RESPIRATION RATE: 18 BRPM | DIASTOLIC BLOOD PRESSURE: 62 MMHG | SYSTOLIC BLOOD PRESSURE: 118 MMHG

## 2024-11-18 DIAGNOSIS — C64.9 CANCER OF KIDNEY, UNSPECIFIED LATERALITY (HCC): ICD-10-CM

## 2024-11-18 PROBLEM — J93.9 PNEUMOTHORAX: Status: ACTIVE | Noted: 2024-11-18

## 2024-11-18 LAB
ANION GAP SERPL CALCULATED.3IONS-SCNC: 12 MMOL/L (ref 3–16)
BUN SERPL-MCNC: 50 MG/DL (ref 7–20)
CALCIUM SERPL-MCNC: 9.2 MG/DL (ref 8.3–10.6)
CHLORIDE SERPL-SCNC: 99 MMOL/L (ref 99–110)
CO2 SERPL-SCNC: 27 MMOL/L (ref 21–32)
CREAT SERPL-MCNC: 3.9 MG/DL (ref 0.8–1.3)
DEPRECATED RDW RBC AUTO: 14.3 % (ref 12.4–15.4)
EST. AVERAGE GLUCOSE BLD GHB EST-MCNC: 171.4 MG/DL
GFR SERPLBLD CREATININE-BSD FMLA CKD-EPI: 15 ML/MIN/{1.73_M2}
GLUCOSE BLD-MCNC: 176 MG/DL (ref 70–99)
GLUCOSE BLD-MCNC: 270 MG/DL (ref 70–99)
GLUCOSE SERPL-MCNC: 178 MG/DL (ref 70–99)
HBA1C MFR BLD: 7.6 %
HCT VFR BLD AUTO: 34.2 % (ref 40.5–52.5)
HGB BLD-MCNC: 11.3 G/DL (ref 13.5–17.5)
INR PPP: 1.03 (ref 0.85–1.15)
MCH RBC QN AUTO: 32.1 PG (ref 26–34)
MCHC RBC AUTO-ENTMCNC: 32.9 G/DL (ref 31–36)
MCV RBC AUTO: 97.3 FL (ref 80–100)
PERFORMED ON: ABNORMAL
PERFORMED ON: ABNORMAL
PLATELET # BLD AUTO: 214 K/UL (ref 135–450)
PMV BLD AUTO: 7.2 FL (ref 5–10.5)
POTASSIUM SERPL-SCNC: 4.9 MMOL/L (ref 3.5–5.1)
PROTHROMBIN TIME: 13.7 SEC (ref 11.9–14.9)
RBC # BLD AUTO: 3.52 M/UL (ref 4.2–5.9)
SODIUM SERPL-SCNC: 138 MMOL/L (ref 136–145)
WBC # BLD AUTO: 8.5 K/UL (ref 4–11)

## 2024-11-18 PROCEDURE — 88307 TISSUE EXAM BY PATHOLOGIST: CPT

## 2024-11-18 PROCEDURE — 85027 COMPLETE CBC AUTOMATED: CPT

## 2024-11-18 PROCEDURE — 88342 IMHCHEM/IMCYTCHM 1ST ANTB: CPT

## 2024-11-18 PROCEDURE — 2580000003 HC RX 258: Performed by: HOSPITALIST

## 2024-11-18 PROCEDURE — 7100000010 HC PHASE II RECOVERY - FIRST 15 MIN: Performed by: INTERNAL MEDICINE

## 2024-11-18 PROCEDURE — 77012 CT SCAN FOR NEEDLE BIOPSY: CPT

## 2024-11-18 PROCEDURE — 0W9G3ZX DRAINAGE OF PERITONEAL CAVITY, PERCUTANEOUS APPROACH, DIAGNOSTIC: ICD-10-PCS | Performed by: INTERNAL MEDICINE

## 2024-11-18 PROCEDURE — 36415 COLL VENOUS BLD VENIPUNCTURE: CPT

## 2024-11-18 PROCEDURE — 6360000002 HC RX W HCPCS: Performed by: INTERNAL MEDICINE

## 2024-11-18 PROCEDURE — 83036 HEMOGLOBIN GLYCOSYLATED A1C: CPT

## 2024-11-18 PROCEDURE — 71045 X-RAY EXAM CHEST 1 VIEW: CPT

## 2024-11-18 PROCEDURE — 88341 IMHCHEM/IMCYTCHM EA ADD ANTB: CPT

## 2024-11-18 PROCEDURE — 80048 BASIC METABOLIC PNL TOTAL CA: CPT

## 2024-11-18 PROCEDURE — G0379 DIRECT REFER HOSPITAL OBSERV: HCPCS

## 2024-11-18 PROCEDURE — 88333 PATH CONSLTJ SURG CYTO XM 1: CPT

## 2024-11-18 PROCEDURE — 7100000011 HC PHASE II RECOVERY - ADDTL 15 MIN: Performed by: INTERNAL MEDICINE

## 2024-11-18 PROCEDURE — G0378 HOSPITAL OBSERVATION PER HR: HCPCS

## 2024-11-18 PROCEDURE — 6370000000 HC RX 637 (ALT 250 FOR IP): Performed by: HOSPITALIST

## 2024-11-18 PROCEDURE — 85610 PROTHROMBIN TIME: CPT

## 2024-11-18 RX ORDER — POLYETHYLENE GLYCOL 3350 17 G/17G
17 POWDER, FOR SOLUTION ORAL DAILY PRN
Status: DISCONTINUED | OUTPATIENT
Start: 2024-11-18 | End: 2024-11-20 | Stop reason: HOSPADM

## 2024-11-18 RX ORDER — ACETAMINOPHEN 325 MG/1
650 TABLET ORAL EVERY 6 HOURS PRN
Status: DISCONTINUED | OUTPATIENT
Start: 2024-11-18 | End: 2024-11-20 | Stop reason: HOSPADM

## 2024-11-18 RX ORDER — FENTANYL CITRATE 50 UG/ML
INJECTION, SOLUTION INTRAMUSCULAR; INTRAVENOUS PRN
Status: COMPLETED | OUTPATIENT
Start: 2024-11-18 | End: 2024-11-18

## 2024-11-18 RX ORDER — ONDANSETRON 2 MG/ML
4 INJECTION INTRAMUSCULAR; INTRAVENOUS EVERY 6 HOURS PRN
Status: DISCONTINUED | OUTPATIENT
Start: 2024-11-18 | End: 2024-11-20 | Stop reason: HOSPADM

## 2024-11-18 RX ORDER — ACETAMINOPHEN 325 MG/1
650 TABLET ORAL EVERY 4 HOURS PRN
Status: DISCONTINUED | OUTPATIENT
Start: 2024-11-18 | End: 2024-11-19 | Stop reason: HOSPADM

## 2024-11-18 RX ORDER — ASPIRIN 81 MG/1
81 TABLET, CHEWABLE ORAL DAILY
Status: DISCONTINUED | OUTPATIENT
Start: 2024-11-18 | End: 2024-11-20 | Stop reason: HOSPADM

## 2024-11-18 RX ORDER — ACETAMINOPHEN 650 MG/1
650 SUPPOSITORY RECTAL EVERY 6 HOURS PRN
Status: DISCONTINUED | OUTPATIENT
Start: 2024-11-18 | End: 2024-11-20 | Stop reason: HOSPADM

## 2024-11-18 RX ORDER — SODIUM CHLORIDE 0.9 % (FLUSH) 0.9 %
5-40 SYRINGE (ML) INJECTION EVERY 12 HOURS SCHEDULED
Status: DISCONTINUED | OUTPATIENT
Start: 2024-11-18 | End: 2024-11-20 | Stop reason: HOSPADM

## 2024-11-18 RX ORDER — GLUCAGON 1 MG/ML
1 KIT INJECTION PRN
Status: DISCONTINUED | OUTPATIENT
Start: 2024-11-18 | End: 2024-11-20 | Stop reason: HOSPADM

## 2024-11-18 RX ORDER — ONDANSETRON 4 MG/1
4 TABLET, ORALLY DISINTEGRATING ORAL EVERY 8 HOURS PRN
Status: DISCONTINUED | OUTPATIENT
Start: 2024-11-18 | End: 2024-11-20 | Stop reason: HOSPADM

## 2024-11-18 RX ORDER — MIDAZOLAM HYDROCHLORIDE 2 MG/2ML
INJECTION, SOLUTION INTRAMUSCULAR; INTRAVENOUS PRN
Status: COMPLETED | OUTPATIENT
Start: 2024-11-18 | End: 2024-11-18

## 2024-11-18 RX ORDER — DEXTROSE MONOHYDRATE 100 MG/ML
INJECTION, SOLUTION INTRAVENOUS CONTINUOUS PRN
Status: DISCONTINUED | OUTPATIENT
Start: 2024-11-18 | End: 2024-11-20 | Stop reason: HOSPADM

## 2024-11-18 RX ORDER — SEVELAMER CARBONATE 800 MG/1
1 TABLET, FILM COATED ORAL
COMMUNITY

## 2024-11-18 RX ORDER — SODIUM CHLORIDE 0.9 % (FLUSH) 0.9 %
5-40 SYRINGE (ML) INJECTION PRN
Status: DISCONTINUED | OUTPATIENT
Start: 2024-11-18 | End: 2024-11-20 | Stop reason: HOSPADM

## 2024-11-18 RX ORDER — OXYCODONE AND ACETAMINOPHEN 5; 325 MG/1; MG/1
1 TABLET ORAL EVERY 4 HOURS PRN
Status: DISCONTINUED | OUTPATIENT
Start: 2024-11-18 | End: 2024-11-20 | Stop reason: HOSPADM

## 2024-11-18 RX ORDER — ROSUVASTATIN CALCIUM 10 MG/1
5 TABLET, COATED ORAL NIGHTLY
Status: DISCONTINUED | OUTPATIENT
Start: 2024-11-18 | End: 2024-11-20 | Stop reason: HOSPADM

## 2024-11-18 RX ORDER — INSULIN LISPRO 100 [IU]/ML
0-8 INJECTION, SOLUTION INTRAVENOUS; SUBCUTANEOUS
Status: DISCONTINUED | OUTPATIENT
Start: 2024-11-18 | End: 2024-11-20 | Stop reason: HOSPADM

## 2024-11-18 RX ORDER — SODIUM CHLORIDE 9 MG/ML
INJECTION, SOLUTION INTRAVENOUS PRN
Status: DISCONTINUED | OUTPATIENT
Start: 2024-11-18 | End: 2024-11-20 | Stop reason: HOSPADM

## 2024-11-18 RX ADMIN — MIDAZOLAM HYDROCHLORIDE 0.5 MG: 1 INJECTION, SOLUTION INTRAMUSCULAR; INTRAVENOUS at 10:30

## 2024-11-18 RX ADMIN — FENTANYL CITRATE 50 MCG: 50 INJECTION, SOLUTION INTRAMUSCULAR; INTRAVENOUS at 10:08

## 2024-11-18 RX ADMIN — MIDAZOLAM HYDROCHLORIDE 0.5 MG: 1 INJECTION, SOLUTION INTRAMUSCULAR; INTRAVENOUS at 10:11

## 2024-11-18 RX ADMIN — Medication 10 ML: at 21:19

## 2024-11-18 RX ADMIN — ROSUVASTATIN CALCIUM 5 MG: 10 TABLET, FILM COATED ORAL at 21:17

## 2024-11-18 RX ADMIN — MIDAZOLAM HYDROCHLORIDE 1 MG: 1 INJECTION, SOLUTION INTRAMUSCULAR; INTRAVENOUS at 13:28

## 2024-11-18 RX ADMIN — ASPIRIN 81 MG 81 MG: 81 TABLET ORAL at 18:26

## 2024-11-18 RX ADMIN — MIDAZOLAM HYDROCHLORIDE 1 MG: 1 INJECTION, SOLUTION INTRAMUSCULAR; INTRAVENOUS at 10:04

## 2024-11-18 RX ADMIN — MIDAZOLAM HYDROCHLORIDE 0.5 MG: 1 INJECTION, SOLUTION INTRAMUSCULAR; INTRAVENOUS at 10:23

## 2024-11-18 RX ADMIN — FENTANYL CITRATE 50 MCG: 50 INJECTION, SOLUTION INTRAMUSCULAR; INTRAVENOUS at 13:37

## 2024-11-18 RX ADMIN — MIDAZOLAM HYDROCHLORIDE 0.5 MG: 1 INJECTION, SOLUTION INTRAMUSCULAR; INTRAVENOUS at 10:18

## 2024-11-18 RX ADMIN — FENTANYL CITRATE 25 MCG: 50 INJECTION, SOLUTION INTRAMUSCULAR; INTRAVENOUS at 10:17

## 2024-11-18 RX ADMIN — INSULIN LISPRO 4 UNITS: 100 INJECTION, SOLUTION INTRAVENOUS; SUBCUTANEOUS at 18:26

## 2024-11-18 RX ADMIN — FENTANYL CITRATE 25 MCG: 50 INJECTION, SOLUTION INTRAMUSCULAR; INTRAVENOUS at 10:23

## 2024-11-18 RX ADMIN — FENTANYL CITRATE 50 MCG: 50 INJECTION, SOLUTION INTRAMUSCULAR; INTRAVENOUS at 13:28

## 2024-11-18 RX ADMIN — MIDAZOLAM HYDROCHLORIDE 0.5 MG: 1 INJECTION, SOLUTION INTRAMUSCULAR; INTRAVENOUS at 10:08

## 2024-11-18 RX ADMIN — FENTANYL CITRATE 50 MCG: 50 INJECTION, SOLUTION INTRAMUSCULAR; INTRAVENOUS at 10:39

## 2024-11-18 ASSESSMENT — PAIN DESCRIPTION - DESCRIPTORS: DESCRIPTORS: ACHING

## 2024-11-18 ASSESSMENT — PAIN DESCRIPTION - FREQUENCY: FREQUENCY: INTERMITTENT

## 2024-11-18 ASSESSMENT — PAIN - FUNCTIONAL ASSESSMENT
PAIN_FUNCTIONAL_ASSESSMENT: 0-10
PAIN_FUNCTIONAL_ASSESSMENT: ACTIVITIES ARE NOT PREVENTED

## 2024-11-18 ASSESSMENT — PAIN DESCRIPTION - PAIN TYPE: TYPE: SURGICAL PAIN

## 2024-11-18 ASSESSMENT — PAIN DESCRIPTION - ORIENTATION: ORIENTATION: RIGHT

## 2024-11-18 ASSESSMENT — PAIN DESCRIPTION - LOCATION: LOCATION: CHEST

## 2024-11-18 ASSESSMENT — PAIN SCALES - GENERAL
PAINLEVEL_OUTOF10: 2
PAINLEVEL_OUTOF10: 0

## 2024-11-18 NOTE — PROGRESS NOTES
Patient returns to phase 2 from IR s/p placement of Right sided chest tube. Placed on low suction by IR RN, patient fully alert, oriented and without complaint. Awaiting readiness of hospital bed to be transferred to floor. Wife remains at bedside.

## 2024-11-18 NOTE — PRE SEDATION
Sedation Pre-Procedure Note    Patient Name: Danny Rock   YOB: 1950  Room/Bed: Room/bed info not found  Medical Record Number: [id number]  Date: 11/18/2024   Time: 9:50 AM       Indication:  Peritoneal nodule - biopsy    Consent: I have discussed with the patient and/or the patient representative the indication, alternatives, and the possible risks and/or complications of the planned procedure and the anesthesia methods. The patient and/or patient representative appear to understand and agree to proceed.    Vital Signs:   Vitals:    11/18/24 0915   BP: (!) 94/50   Pulse: 65   Resp: 18   Temp: 98.6 °F (37 °C)   SpO2: 100%       Past Medical History:   has a past medical history of Atrial fibrillation (HCC), CAD (coronary artery disease), Cancer of kidney (HCC), Diabetes mellitus (HCC), Hyperlipidemia, Hypertension, Prostate cancer (HCC), Right renal mass, and Systolic CHF (HCC).    Past Surgical History:   has a past surgical history that includes hernia repair; knee surgery; Cholecystectomy; Nasal septum surgery; eye surgery (Left, 04/19/2018); Kidney removal (Right, 07/25/2022); Cataract removal (Bilateral); IR TUNNELED CVC PLACE WO SQ PORT/PUMP > 5 YEARS (02/07/2024); CT NEEDLE BIOPSY LIVER PERCUTANEOUS (08/01/2024); and other surgical history (09/30/2024).    Medications:   Scheduled Meds:   Continuous Infusions:   PRN Meds:   Home Meds:   Prior to Admission medications    Medication Sig Start Date End Date Taking? Authorizing Provider   sevelamer (RENVELA) 800 MG tablet Take 1 tablet by mouth 3 times daily (with meals)   Yes ProviderSvetlana MD   rosuvastatin (CRESTOR) 10 MG tablet Take 10 mg every other day 10/1/24  Yes Rene Boo MD   apixaban (ELIQUIS) 5 MG TABS tablet Take 1 tablet by mouth 2 times daily 7/8/24  Yes Rene Boo MD   furosemide (LASIX) 40 MG tablet Take 1 tablet by mouth See Admin Instructions Take 2 times on non-dialysis days.  Patient taking differently: Take

## 2024-11-18 NOTE — H&P
HOSPITALISTS HISTORY AND PHYSICAL    11/18/2024 1:58 PM    Patient Information:  DANNY TORRES is a 74 y.o. male [id number]  PCP:  Yesi Rosa APRN - CNP (Tel: [phone] )    Chief complaint:  No chief complaint on file.  Pneumothorax    History of Present Illness:  Danny Torres is a 74 y.o. male who presented with history of intra peritoneal nodule.  Patient with history of RCC.  Patient apparently presented outpatient for transpleural intraperitoneal nodule biopsy.  Postbiopsy patient found to have pneumothorax.  Patient had chest tube placed by IR.  Patient currently resting calmly not hypoxic.  Pain control patient admitted for further evaluation.  History of hypertension hyperlipidemia CAD paroxysmal A-fib history of CVA secondary hypercoagulable state.  Patient chest tube in place  REVIEW OF SYSTEMS:   Constitutional: Negative for fever,chills or night sweats  ENT: Negative for rhinorrhea, epistaxis, hoarseness, sore throat.  Respiratory: Negative for shortness of breath,wheezing  Cardiovascular: Negative for chest pain, palpitations   Gastrointestinal: Negative for nausea, vomiting, diarrhea  Genitourinary: Negative for polyuria, dysuria   Hematologic/Lymphatic: Negative for bleeding tendency, easy bruising  Musculoskeletal: Negative for myalgias and arthralgias  Neurologic: Negative for confusion,dysarthria.  Skin: Negative for itching,rash, good capillary refill.   Psychiatric: Negative for depression,anxiety, agitation.  Endocrine: Negative for polydipsia,polyuria,heat /cold intolerance.    Past Medical History:   has a past medical history of Atrial fibrillation (HCC), CAD (coronary artery disease), Cancer of kidney (HCC), Diabetes mellitus (HCC), Hyperlipidemia, Hypertension, Prostate cancer (HCC), Right renal mass, and Systolic CHF (HCC).

## 2024-11-18 NOTE — PROGRESS NOTES
Patient resting in position of comfort, VSS, respirations are easy and non-labored. Continues to be asymptomatic. Remains on monitor, wife at bedside.

## 2024-11-18 NOTE — PROGRESS NOTES
Patient arrives to phase 2 from IR s/p biopsy of lymph node near Right lung/liver. Dressing to Right mid back/flank is clean-dry-intact. Patient is fully alert, oriented and currently asymptomatic. VSS, heart rate does fluctuate to high 40's-60's which is reportedly his baseline, everything else is in normal limits. Concern for possibly pneumothorax, patient educated that he is currently npo until verified he wont require intervention. He is on prescribed bedrest for 2-hours, repeat portable cxr ordered by IR for 1300. Remains on monitoring, wife at bedside.

## 2024-11-18 NOTE — PROGRESS NOTES
Confirmed with Dr. Coronado that admission orders have been written. Patient taken to room 5585 via gurney with belongings and family, bedside report given to Dinorah WEISS

## 2024-11-18 NOTE — BRIEF OP NOTE
Brief Postoperative Note    Danny Rock  YOB: 1950  [id number]    Pre-operative Diagnosis: Intraperitoneal nodule, history of RCC    Post-operative Diagnosis: Same    Procedure: Transpleural intraperitoneal nodule biopsy    Anesthesia: Local and Moderate Sedation    Surgeons/Assistants: Adin Barnett M.D.    Estimated Blood Loss: less than 50     Complications: None    Specimens: Was Obtained: 4 20 gauge cores    Findings: Successful CT guided transpleural nodule biopsy    Electronically signed by Adin Barnett MD on 11/18/2024 at 10:59 AM

## 2024-11-18 NOTE — SIGNIFICANT EVENT
Status post transpleural biopsy of the intraperitoneal nodule, the patient has a moderate pneumothorax on chest x ray. I went to the bedside to evaluate him and he is saturating on room air, in no pain, speaking in full sentences and in no distress. I discussed placing a chest tube and the likelihood that he will have to be admitted overnight. He was agreeable to the possibility. For now we will watch and see the repeat chest x ray in 2 hours. Patient's same day surgery nurse was updated as well to watch closely.    Adin Barnett MD  11/18/2024, 11:23 AM  Interventional Radiology  [phone] ([phone])

## 2024-11-18 NOTE — BRIEF OP NOTE
Brief Postoperative Note    Patient: Danny Rock  YOB: 1950  MRN: [id number]      Pre-operative Diagnosis: Pneumothorax s/p biopsy    Post-operative Diagnosis: Same    Procedure: CT guided chest tube placement    Anesthesia: Local and Moderate Sedation    Surgeons/Assistants: Adin Barnett MD    Estimated Blood Loss: less than 50     Complications: None    Findings:   Successful CT guided chest tube placement into the right chest cavity  Patient to be admitted for obs under Dr. Coronado  Free intraperitoneal air noted on CT, this is likely related to pneumothorax. Low suspicion of perforated viscus (asymptomatic, violated pleura)      Adin Barnett MD  11/18/2024, 1:50 PM  Interventional Radiology  [phone] ([id number])

## 2024-11-19 ENCOUNTER — APPOINTMENT (OUTPATIENT)
Dept: GENERAL RADIOLOGY | Age: 74
DRG: 199 | End: 2024-11-19
Attending: HOSPITALIST
Payer: MEDICARE

## 2024-11-19 LAB
ANION GAP SERPL CALCULATED.3IONS-SCNC: 12 MMOL/L (ref 3–16)
BASOPHILS # BLD: 0 K/UL (ref 0–0.2)
BASOPHILS NFR BLD: 0.4 %
BUN SERPL-MCNC: 56 MG/DL (ref 7–20)
CALCIUM SERPL-MCNC: 10 MG/DL (ref 8.3–10.6)
CHLORIDE SERPL-SCNC: 104 MMOL/L (ref 99–110)
CO2 SERPL-SCNC: 26 MMOL/L (ref 21–32)
CREAT SERPL-MCNC: 3.9 MG/DL (ref 0.8–1.3)
DEPRECATED RDW RBC AUTO: 14.3 % (ref 12.4–15.4)
EOSINOPHIL # BLD: 0.4 K/UL (ref 0–0.6)
EOSINOPHIL NFR BLD: 5.2 %
GFR SERPLBLD CREATININE-BSD FMLA CKD-EPI: 15 ML/MIN/{1.73_M2}
GLUCOSE BLD-MCNC: 148 MG/DL (ref 70–99)
GLUCOSE BLD-MCNC: 179 MG/DL (ref 70–99)
GLUCOSE BLD-MCNC: 217 MG/DL (ref 70–99)
GLUCOSE BLD-MCNC: 223 MG/DL (ref 70–99)
GLUCOSE SERPL-MCNC: 133 MG/DL (ref 70–99)
HCT VFR BLD AUTO: 27.9 % (ref 40.5–52.5)
HGB BLD-MCNC: 9.4 G/DL (ref 13.5–17.5)
LYMPHOCYTES # BLD: 1.2 K/UL (ref 1–5.1)
LYMPHOCYTES NFR BLD: 16.4 %
MCH RBC QN AUTO: 32.6 PG (ref 26–34)
MCHC RBC AUTO-ENTMCNC: 33.8 G/DL (ref 31–36)
MCV RBC AUTO: 96.5 FL (ref 80–100)
MONOCYTES # BLD: 0.5 K/UL (ref 0–1.3)
MONOCYTES NFR BLD: 7 %
NEUTROPHILS # BLD: 5.2 K/UL (ref 1.7–7.7)
NEUTROPHILS NFR BLD: 71 %
PERFORMED ON: ABNORMAL
PLATELET # BLD AUTO: 166 K/UL (ref 135–450)
PMV BLD AUTO: 7.5 FL (ref 5–10.5)
POTASSIUM SERPL-SCNC: 4.3 MMOL/L (ref 3.5–5.1)
RBC # BLD AUTO: 2.89 M/UL (ref 4.2–5.9)
SODIUM SERPL-SCNC: 142 MMOL/L (ref 136–145)
WBC # BLD AUTO: 7.4 K/UL (ref 4–11)

## 2024-11-19 PROCEDURE — G0378 HOSPITAL OBSERVATION PER HR: HCPCS

## 2024-11-19 PROCEDURE — 2580000003 HC RX 258: Performed by: HOSPITALIST

## 2024-11-19 PROCEDURE — 0W9930Z DRAINAGE OF RIGHT PLEURAL CAVITY WITH DRAINAGE DEVICE, PERCUTANEOUS APPROACH: ICD-10-PCS | Performed by: INTERNAL MEDICINE

## 2024-11-19 PROCEDURE — 6370000000 HC RX 637 (ALT 250 FOR IP): Performed by: HOSPITALIST

## 2024-11-19 PROCEDURE — 1200000000 HC SEMI PRIVATE

## 2024-11-19 PROCEDURE — 99223 1ST HOSP IP/OBS HIGH 75: CPT | Performed by: STUDENT IN AN ORGANIZED HEALTH CARE EDUCATION/TRAINING PROGRAM

## 2024-11-19 PROCEDURE — 85025 COMPLETE CBC W/AUTO DIFF WBC: CPT

## 2024-11-19 PROCEDURE — 36415 COLL VENOUS BLD VENIPUNCTURE: CPT

## 2024-11-19 PROCEDURE — 71045 X-RAY EXAM CHEST 1 VIEW: CPT

## 2024-11-19 PROCEDURE — 80048 BASIC METABOLIC PNL TOTAL CA: CPT

## 2024-11-19 RX ADMIN — INSULIN LISPRO 2 UNITS: 100 INJECTION, SOLUTION INTRAVENOUS; SUBCUTANEOUS at 18:19

## 2024-11-19 RX ADMIN — ROSUVASTATIN CALCIUM 5 MG: 10 TABLET, FILM COATED ORAL at 21:44

## 2024-11-19 RX ADMIN — APIXABAN 5 MG: 5 TABLET, FILM COATED ORAL at 08:11

## 2024-11-19 RX ADMIN — ASPIRIN 81 MG 81 MG: 81 TABLET ORAL at 08:11

## 2024-11-19 RX ADMIN — Medication 10 ML: at 08:11

## 2024-11-19 RX ADMIN — INSULIN LISPRO 2 UNITS: 100 INJECTION, SOLUTION INTRAVENOUS; SUBCUTANEOUS at 21:46

## 2024-11-19 RX ADMIN — APIXABAN 5 MG: 5 TABLET, FILM COATED ORAL at 21:46

## 2024-11-19 ASSESSMENT — PAIN SCALES - GENERAL: PAINLEVEL_OUTOF10: 0

## 2024-11-19 NOTE — CONSULTS
Pulmonary and Critical Care Medicine    CC: PTX   Date: 2024  Admit Date:  2024  Reason for Consultation: PTX  Consult Requesting Physician: Radha Gifford MD     HPI     This is a 75 y/o M w/ a hx of RCC, intra peritoneal nodule who presented post procedure PTX. Patient presented to obtain CT guided bx of a intraperitoneal nodule bx, post procedure patient developed expected PTX, underwent Chest tube placement by IR. This AM pulmonary is consulted to help with the management, on my exam patient is sitting on chair, he does not have any air leak even with cough, respiratory wise no complains, this AM CXR shows near resolution of PTX.     ROS: negative except stated above    PMH:  has a past medical history of Atrial fibrillation (HCC), CAD (coronary artery disease), Cancer of kidney (HCC), Diabetes mellitus (HCC), Hyperlipidemia, Hypertension, Prostate cancer (HCC), Right renal mass, and Systolic CHF (HCC).   PSH:  has a past surgical history that includes hernia repair; knee surgery; Cholecystectomy; Nasal septum surgery; eye surgery (Left, 2018); Kidney removal (Right, 2022); Cataract removal (Bilateral); IR TUNNELED CVC PLACE WO SQ PORT/PUMP > 5 YEARS (2024); CT NEEDLE BIOPSY LIVER PERCUTANEOUS (2024); other surgical history (2024); CT NEEDLE BIOPSY LIVER PERCUTANEOUS (2024); and CT GUIDED CHEST TUBE (2024).    SH:  reports that he quit smoking about 54 years ago. His smoking use included cigarettes. He started smoking about 56 years ago. He has never used smokeless tobacco. He reports current alcohol use. He reports that he does not use drugs.   FH: family history includes Heart Disease in his brother and mother; Skin Cancer in his father; Stroke in his brother.    Allergies: Amoxicillin, Bisoprolol-hydrochlorothiazide, Cisapride, Penicillins, and Pioglitazone     OBJECTIVE DATA       PHYSICAL EXAM:   Temp  Av.6 °F (36.4 °C)  Min: 97.3 °F (36.3 °C)

## 2024-11-19 NOTE — PLAN OF CARE
Problem: Chronic Conditions and Co-morbidities  Goal: Patient's chronic conditions and co-morbidity symptoms are monitored and maintained or improved  Outcome: Progressing     Problem: Safety - Adult  Goal: Free from fall injury  Outcome: Progressing     Problem: ABCDS Injury Assessment  Goal: Absence of physical injury  Outcome: Progressing     Problem: Pain  Goal: Verbalizes/displays adequate comfort level or baseline comfort level  Outcome: Progressing     Problem: Respiratory - Adult  Goal: Achieves optimal ventilation and oxygenation  Outcome: Progressing     Problem: Infection - Adult  Goal: Absence of infection at discharge  Outcome: Progressing  Goal: Absence of infection during hospitalization  Outcome: Progressing     Problem: Metabolic/Fluid and Electrolytes - Adult  Goal: Glucose maintained within prescribed range  Outcome: Progressing

## 2024-11-19 NOTE — FLOWSHEET NOTE
Post procedure chart reviewed. Please call IR ABHISHEK [phone] with any questions/concerns r/t biopsy/chest tube on 11/18

## 2024-11-19 NOTE — CARE COORDINATION
11/19/24 1554   IMM Letter   IMM Letter given to Patient/Family/Significant other/Guardian/POA/by: Wilda Santoyo RN CM - spouse at bedside signed d/t pt in bathroom at this time   IMM Letter date given: 11/19/24   IMM Letter time given: 1550  (copy made for hard chart)

## 2024-11-19 NOTE — CARE COORDINATION
Patient admitted as Observation/OPIB with an anticipated short hospitalization length of stay. Chart reviewed and it appears that patient has minimal needs for discharge at this time. Discussed with patient’s nurse and requested that case management be notified if discharge needs are identified.     *Case management will continue to follow progress and update discharge plan as needed.       Chart reviewed. Pt from home, independent. Has chest tube at this time. CM will follow for discharge needs.    Wilda Santoyo RN, BSN  [phone]

## 2024-11-19 NOTE — PLAN OF CARE
Problem: Chronic Conditions and Co-morbidities  Goal: Patient's chronic conditions and co-morbidity symptoms are monitored and maintained or improved  11/19/2024 0001 by Adalgisa Garcia RN  Outcome: Progressing     Problem: Safety - Adult  Goal: Free from fall injury  11/19/2024 0001 by Adalgisa Garcia RN  Outcome: Progressing     Problem: ABCDS Injury Assessment  Goal: Absence of physical injury  11/19/2024 0001 by Adalgisa Garcia RN  Outcome: Progressing     Problem: Pain  Goal: Verbalizes/displays adequate comfort level or baseline comfort level  Outcome: Progressing     Problem: Respiratory - Adult  Goal: Achieves optimal ventilation and oxygenation  Outcome: Progressing     Problem: Infection - Adult  Goal: Absence of infection at discharge  Outcome: Progressing  Goal: Absence of infection during hospitalization  Outcome: Progressing     Problem: Metabolic/Fluid and Electrolytes - Adult  Goal: Glucose maintained within prescribed range  Outcome: Progressing

## 2024-11-20 ENCOUNTER — APPOINTMENT (OUTPATIENT)
Dept: GENERAL RADIOLOGY | Age: 74
DRG: 199 | End: 2024-11-20
Attending: HOSPITALIST
Payer: MEDICARE

## 2024-11-20 VITALS
RESPIRATION RATE: 16 BRPM | TEMPERATURE: 97.6 F | OXYGEN SATURATION: 94 % | HEART RATE: 74 BPM | WEIGHT: 170.42 LBS | DIASTOLIC BLOOD PRESSURE: 63 MMHG | SYSTOLIC BLOOD PRESSURE: 116 MMHG | BODY MASS INDEX: 21.19 KG/M2 | HEIGHT: 75 IN

## 2024-11-20 LAB
ANION GAP SERPL CALCULATED.3IONS-SCNC: 12 MMOL/L (ref 3–16)
BASOPHILS # BLD: 0.1 K/UL (ref 0–0.2)
BASOPHILS NFR BLD: 0.7 %
BUN SERPL-MCNC: 67 MG/DL (ref 7–20)
CALCIUM SERPL-MCNC: 10.1 MG/DL (ref 8.3–10.6)
CHLORIDE SERPL-SCNC: 105 MMOL/L (ref 99–110)
CO2 SERPL-SCNC: 23 MMOL/L (ref 21–32)
CREAT SERPL-MCNC: 3.9 MG/DL (ref 0.8–1.3)
DEPRECATED RDW RBC AUTO: 14.1 % (ref 12.4–15.4)
EOSINOPHIL # BLD: 0.3 K/UL (ref 0–0.6)
EOSINOPHIL NFR BLD: 4.3 %
GFR SERPLBLD CREATININE-BSD FMLA CKD-EPI: 15 ML/MIN/{1.73_M2}
GLUCOSE BLD-MCNC: 121 MG/DL (ref 70–99)
GLUCOSE BLD-MCNC: 149 MG/DL (ref 70–99)
GLUCOSE BLD-MCNC: 190 MG/DL (ref 70–99)
GLUCOSE SERPL-MCNC: 154 MG/DL (ref 70–99)
HBV SURFACE AB SERPL IA-ACNC: 17.7 MIU/ML
HBV SURFACE AG SERPL QL IA: NORMAL
HCT VFR BLD AUTO: 27.3 % (ref 40.5–52.5)
HGB BLD-MCNC: 9.2 G/DL (ref 13.5–17.5)
LYMPHOCYTES # BLD: 1.2 K/UL (ref 1–5.1)
LYMPHOCYTES NFR BLD: 14.8 %
MCH RBC QN AUTO: 32.6 PG (ref 26–34)
MCHC RBC AUTO-ENTMCNC: 33.5 G/DL (ref 31–36)
MCV RBC AUTO: 97.1 FL (ref 80–100)
MONOCYTES # BLD: 0.6 K/UL (ref 0–1.3)
MONOCYTES NFR BLD: 7.6 %
NEUTROPHILS # BLD: 5.9 K/UL (ref 1.7–7.7)
NEUTROPHILS NFR BLD: 72.6 %
PERFORMED ON: ABNORMAL
PLATELET # BLD AUTO: 158 K/UL (ref 135–450)
PMV BLD AUTO: 7.4 FL (ref 5–10.5)
POTASSIUM SERPL-SCNC: 4.3 MMOL/L (ref 3.5–5.1)
RBC # BLD AUTO: 2.82 M/UL (ref 4.2–5.9)
SODIUM SERPL-SCNC: 140 MMOL/L (ref 136–145)
WBC # BLD AUTO: 8.1 K/UL (ref 4–11)

## 2024-11-20 PROCEDURE — 5A1D70Z PERFORMANCE OF URINARY FILTRATION, INTERMITTENT, LESS THAN 6 HOURS PER DAY: ICD-10-PCS | Performed by: INTERNAL MEDICINE

## 2024-11-20 PROCEDURE — 80048 BASIC METABOLIC PNL TOTAL CA: CPT

## 2024-11-20 PROCEDURE — 36415 COLL VENOUS BLD VENIPUNCTURE: CPT

## 2024-11-20 PROCEDURE — 87340 HEPATITIS B SURFACE AG IA: CPT

## 2024-11-20 PROCEDURE — 71045 X-RAY EXAM CHEST 1 VIEW: CPT

## 2024-11-20 PROCEDURE — 90935 HEMODIALYSIS ONE EVALUATION: CPT

## 2024-11-20 PROCEDURE — 6370000000 HC RX 637 (ALT 250 FOR IP): Performed by: HOSPITALIST

## 2024-11-20 PROCEDURE — 86706 HEP B SURFACE ANTIBODY: CPT

## 2024-11-20 PROCEDURE — 36556 INSERT NON-TUNNEL CV CATH: CPT

## 2024-11-20 PROCEDURE — 99233 SBSQ HOSP IP/OBS HIGH 50: CPT | Performed by: STUDENT IN AN ORGANIZED HEALTH CARE EDUCATION/TRAINING PROGRAM

## 2024-11-20 PROCEDURE — 2580000003 HC RX 258: Performed by: HOSPITALIST

## 2024-11-20 PROCEDURE — 85025 COMPLETE CBC W/AUTO DIFF WBC: CPT

## 2024-11-20 RX ADMIN — ASPIRIN 81 MG 81 MG: 81 TABLET ORAL at 08:44

## 2024-11-20 RX ADMIN — Medication 10 ML: at 10:55

## 2024-11-20 RX ADMIN — APIXABAN 5 MG: 5 TABLET, FILM COATED ORAL at 08:44

## 2024-11-20 NOTE — PLAN OF CARE
Problem: Chronic Conditions and Co-morbidities  Goal: Patient's chronic conditions and co-morbidity symptoms are monitored and maintained or improved  11/20/2024 1501 by Yeny Winslow RN  Outcome: Adequate for Discharge  11/20/2024 1113 by Yeny Winslow RN  Outcome: Progressing  11/20/2024 0345 by Susanna Lazo RN  Outcome: Progressing     Problem: Safety - Adult  Goal: Free from fall injury  11/20/2024 1501 by Yeny Winslow RN  Outcome: Adequate for Discharge  11/20/2024 1113 by Yeny Winslow RN  Outcome: Progressing  11/20/2024 0345 by Susanna Lazo RN  Outcome: Progressing     Problem: ABCDS Injury Assessment  Goal: Absence of physical injury  11/20/2024 1501 by Yeny Winslow RN  Outcome: Adequate for Discharge  11/20/2024 1113 by Yeny Winslow RN  Outcome: Progressing  11/20/2024 0345 by Susanna Lazo RN  Outcome: Progressing     Problem: Pain  Goal: Verbalizes/displays adequate comfort level or baseline comfort level  11/20/2024 1501 by Yeny Winslow RN  Outcome: Adequate for Discharge  11/20/2024 1113 by Yeny Winslow RN  Outcome: Progressing  11/20/2024 0345 by Susanna Lazo RN  Outcome: Progressing     Problem: Respiratory - Adult  Goal: Achieves optimal ventilation and oxygenation  11/20/2024 1501 by Yeny Winslow RN  Outcome: Adequate for Discharge  11/20/2024 1113 by Yeny Winslow RN  Outcome: Progressing  11/20/2024 0345 by Susanna Lazo RN  Outcome: Progressing     Problem: Infection - Adult  Goal: Absence of infection at discharge  11/20/2024 1501 by Yeny Winslow RN  Outcome: Adequate for Discharge  11/20/2024 1113 by Yeny Winslow RN  Outcome: Progressing  11/20/2024 0345 by Susnana Lazo RN  Outcome: Progressing  Goal: Absence of infection during hospitalization  11/20/2024 1501 by Yeny Winslow RN  Outcome: Adequate for Discharge  11/20/2024 1113 by Yeny Winslow RN  Outcome: Progressing  11/20/2024 0345 by Susanna Lazo, RN  Outcome: Progressing     Problem: Metabolic/Fluid and Electrolytes -

## 2024-11-20 NOTE — DISCHARGE SUMMARY
Hospital Medicine Discharge Summary    Patient ID: Danny Rock      Patient's PCP: Yesi Rosa, APRN - CNP    Admit Date: 11/18/2024     Discharge Date:  11/20/2024    Admitting Physician: Rachael Moreno MD     Discharge Physician: RACHAEL MORENO MD     Hospital Course: This 74-year-old male with PMHx of hypertension, hyperlipidemia,, A-fib, CAD, CHF, diabetes mellitus, renal cancer; s/p right nephrectomy on 7/25/2022 and intraperitoneal nodule who presented with pneumothorax after undergoing transpleural intraperitoneal nodule biopsy by IR.       Right-sided pneumothorax; postprocedural; improved  Underwent CT-guided chest tube placement by IR on 11/18/24.   Pulmonology consulted; CT clamped on 11/19/24. Chest tube was removed on 11/20/24 after reviewing repeat x-ray which showed trace pneumothorax decrease in size from prior imaging on 11/19.  Patient was discharged home in stable condition with pulmonology.    ESRD; on HD TTH&S   Nephrology consulted; received HD x 1 during hospital stay    Anemia due to CKD; on POONAM with HD    Hx of renal cancer; s/p right nephrectomy    Hx of CAD; continue aspirin and statins     Hx of A-fib; on beta-blocker and Eliquis.  Monitor on telemetry     Hypertension; hold BP meds due to soft pressure.     Hyperlipidemia; continue statins     Diabetes mellitus; HgbA1c 7.6 on 11/18/2024.  Continue current regimen and monitor BG closely       Physical Exam Performed:     /70   Pulse 61   Temp 97.6 °F (36.4 °C)   Resp 16   Ht 1.905 m (6' 3\")   Wt 77.3 kg (170 lb 6.7 oz)   SpO2 94%   BMI 21.30 kg/m²     General appearance: No apparent distress, appears stated age and cooperative.  Cardiovascular: Regular rhythm, normal S1, S2. No murmur.   Respiratory: Clear to auscultation bilaterally, no wheeze or crackles.   GI: Abdomen soft, no tenderness, not distended, normal bowel sounds  Musculoskeletal:  No cyanosis in digits.  No BLE edema present  Neurology: CN 2-12

## 2024-11-20 NOTE — CONSULTS
[facility]  Consultation Note    Reason for Consult: ESRD management  Requesting Physician:  Dr. JUWAN Gifford    CHIEF COMPLAINT:      History obtained from records and patient.    HISTORY OF PRESENT ILLNESS:                Danny Rock  is 74 y.o. y.o. male with significant past medical history of ESRD, on HD TTHS at [facility] under care, atrial fibrillation, coronary artery disease, renal CA, status post right nephrectomy, previously on immunotherapy diabetes mellitus type 2, hyperlipidemia, hypertension, prostate cancer, CHF, EF 40 to 45%, CVA secondary to hypercoagulable state, who presents with pneumothorax status post outpatient transpleural intra peritoneal nodule biopsy.  Chest tube has been placed by IR.  I am asked to see the patient with regards to his HD need.  Last HD was Saturday.  Missed HD yesterday.  Still has residual kidney function.  Potassium today of 4.3 with serum bicarbonate of 23.  Systolic blood pressure ranging from 110s to 140s.  Saturating well on room air.  No other complaints.  Has TDC for HD access.      Past Medical History:     has a past medical history of Atrial fibrillation (HCC), CAD (coronary artery disease), Cancer of kidney (HCC), Diabetes mellitus (HCC), Hyperlipidemia, Hypertension, Prostate cancer (HCC), Right renal mass, and Systolic CHF (HCC).   Past Surgical History:     has a past surgical history that includes hernia repair; knee surgery; Cholecystectomy; Nasal septum surgery; eye surgery (Left, 04/19/2018); Kidney removal (Right, 07/25/2022); Cataract removal (Bilateral); IR TUNNELED CVC PLACE WO SQ PORT/PUMP > 5 YEARS (02/07/2024); CT NEEDLE BIOPSY LIVER PERCUTANEOUS (08/01/2024); other surgical history (09/30/2024); CT NEEDLE BIOPSY LIVER PERCUTANEOUS (11/18/2024); and CT GUIDED CHEST TUBE (11/18/2024).   Current Medications:    Current Facility-Administered Medications: apixaban (ELIQUIS) tablet 5 mg, 5 mg, Oral,

## 2024-11-20 NOTE — PLAN OF CARE
Problem: Chronic Conditions and Co-morbidities  Goal: Patient's chronic conditions and co-morbidity symptoms are monitored and maintained or improved  11/20/2024 1113 by Yeny Winslow RN  Outcome: Progressing  11/20/2024 0345 by Susanna Lazo RN  Outcome: Progressing     Problem: Safety - Adult  Goal: Free from fall injury  11/20/2024 1113 by Yeny Winslow RN  Outcome: Progressing  11/20/2024 0345 by Susanna Lazo RN  Outcome: Progressing     Problem: ABCDS Injury Assessment  Goal: Absence of physical injury  11/20/2024 1113 by Yeny Winslow RN  Outcome: Progressing  11/20/2024 0345 by Susanna Lazo RN  Outcome: Progressing     Problem: Pain  Goal: Verbalizes/displays adequate comfort level or baseline comfort level  11/20/2024 1113 by Yeny Winslow RN  Outcome: Progressing  11/20/2024 0345 by Susanna Lazo RN  Outcome: Progressing     Problem: Respiratory - Adult  Goal: Achieves optimal ventilation and oxygenation  11/20/2024 1113 by Yeny Winslow RN  Outcome: Progressing  11/20/2024 0345 by Susanna Lazo RN  Outcome: Progressing     Problem: Infection - Adult  Goal: Absence of infection at discharge  11/20/2024 1113 by Yeny Winslow RN  Outcome: Progressing  11/20/2024 0345 by Susanna Lazo RN  Outcome: Progressing  Goal: Absence of infection during hospitalization  11/20/2024 1113 by Yeny Winslow RN  Outcome: Progressing  11/20/2024 0345 by Susanna Lazo RN  Outcome: Progressing     Problem: Metabolic/Fluid and Electrolytes - Adult  Goal: Glucose maintained within prescribed range  11/20/2024 1113 by Yeny Winslow RN  Outcome: Progressing  11/20/2024 0345 by Susanna Lazo RN  Outcome: Progressing     Problem: Cardiovascular - Adult  Goal: Maintains optimal cardiac output and hemodynamic stability  11/20/2024 1113 by Yeny Winslow RN  Outcome: Progressing  11/20/2024 0345 by Susanna Lazo RN  Outcome: Progressing  Goal: Absence of cardiac dysrhythmias or at baseline  11/20/2024 1113 by Yeny Winslow RN  Outcome:

## 2024-11-20 NOTE — CARE COORDINATION
Case Management -  Discharge Note      Patient Name: Danny Rock                   YOB: 1950            Readmission Risk (Low < 19, Mod (19-27), High > 27): Readmission Risk Score: 18.3    Current PCP: Yesi Rosa APRN - CNP      (IMM) Important Message from Medicare:    Has pt received appropriate compliance notices before being discharged if required: yes  Compliance doc:  [] 2nd IMM; [] Code 44 [] [place]  Date: 11/19/2024 1st IMM given    PT AM-PAC:   /24  OT AM-PAC:   /24     [facility] agency notified of discharge:  [] Yes [] No  [x] NA    Family notified of discharge:  [] Yes  [] No  [x] NA    Facility notified of discharge:  [] Yes  [] No  [x] NA     Financial    Payor: MEDICARE / Plan: MEDICARE PART A AND B / Product Type: *No Product type* /     Pharmacy:  Potential assistance Purchasing Medications:    Meds-to-Beds request:        [facility] [place] [phone] -  [phone]  [address] [address]  Phone: [phone] Fax: [phone]      Notes:    Additional Case Management Notes: Patient discharged 11/20/2024 to home.  All discharge needs met per case management     Wilda Santoyo RN, BSN  [phone]

## 2024-11-20 NOTE — PLAN OF CARE
Problem: Chronic Conditions and Co-morbidities  Goal: Patient's chronic conditions and co-morbidity symptoms are monitored and maintained or improved  11/20/2024 0345 by Susanna Lazo RN  Outcome: Progressing     Problem: Safety - Adult  Goal: Free from fall injury  11/20/2024 0345 by Susanna Lazo RN  Outcome: Progressing     Problem: ABCDS Injury Assessment  Goal: Absence of physical injury  11/20/2024 0345 by Susanna Lazo RN  Outcome: Progressing     Problem: Pain  Goal: Verbalizes/displays adequate comfort level or baseline comfort level  11/20/2024 0345 by Susanna Lazo RN  Outcome: Progressing     Problem: Respiratory - Adult  Goal: Achieves optimal ventilation and oxygenation  11/20/2024 0345 by Susanna Lazo RN  Outcome: Progressing     Problem: Infection - Adult  Goal: Absence of infection at discharge  11/20/2024 0345 by Susanna Lazo RN  Outcome: Progressing    Problem: Infection - Adult  Goal: Absence of infection during hospitalization  11/20/2024 0345 by Susanna Lazo RN  Outcome: Progressing     Problem: Metabolic/Fluid and Electrolytes - Adult  Goal: Glucose maintained within prescribed range  11/20/2024 0345 by Susanna Lazo RN  Outcome: Progressing     Problem: Cardiovascular - Adult  Goal: Maintains optimal cardiac output and hemodynamic stability  Outcome: Progressing     Problem: Cardiovascular - Adult  Goal: Absence of cardiac dysrhythmias or at baseline  Outcome: Progressing     Problem: Skin/Tissue Integrity - Adult  Goal: Skin integrity remains intact  Outcome: Progressing     Problem: Skin/Tissue Integrity - Adult  Goal: Incisions, wounds, or drain sites healing without S/S of infection  Outcome: Progressing     Problem: Skin/Tissue Integrity - Adult  Goal: Oral mucous membranes remain intact  Outcome: Progressing

## 2024-11-20 NOTE — DISCHARGE INSTRUCTIONS
Follow up with your PCP within 7-10 days of discharge.  Follow up with pulmonology as instructed   Follow up with nephrology as instructed   Take all your medications as prescribed.

## 2024-11-20 NOTE — PROGRESS NOTES
ADVANCED CARE PLANNING    Name:Danny Rock       :  1950              MRN:  [id number]      Purpose of Encounter: Advanced care planning in light of problem listed above   Parties in attendance: :Danny BETANCOURT Rakesh Rock MD, Family members:  Decisional Capacity:Yes    Diagnosis: Active Problems:    * No active hospital problems. *  Resolved Problems:    * No resolved hospital problems. *      Goals of Care/Treatment Preferences  We reviewed advance care planning information, which includes the following:    Primary Decision Maker: Tammy Rock - Spouse - [phone]    We reviewed / discussed your code status as:   Code Status:  Patients Medical Story:Presented with worsening symptom of dx above. ed. We discussed patient long term goal and also wishes and aggressive care. Discussed in detail about code status and what it means with detailed explanation.   Goals of Care Determinations: Patient wishes to focus on full code with aggressive care, CPR, intubation long term vent and facility as well.   Plan: Will notify Yesi Rosa APRN - CNP of change in care plan. Will look at further interventions as needed.   Code Status: At this time patient wishes to be Prior  Time Spent with Patient: 19minutes      Electronically signed by Rakesh Coronado MD on 2024 at 3:16 PM  Thank you Yesi Rosa APRN - CNP for the opportunity to be involved in this patient's care.     
  Pulmonary and Critical Care Medicine    CC: PTX   Date: 2024  Admit Date:  2024  Reason for Consultation: PTX  Consult Requesting Physician: Radha Gifford MD     HPI     This is a 75 y/o M w/ a hx of RCC, intra peritoneal nodule who presented post procedure PTX.     Overnight:  Chest tube clamped yesterday  Today cxr trace PTX, improved  No chest pain  Sitting on chair     ROS: negative except stated above      OBJECTIVE DATA       PHYSICAL EXAM:   Temp  Av.9 °F (36.6 °C)  Min: 97.2 °F (36.2 °C)  Max: 98.4 °F (36.9 °C)  Pulse  Av.8  Min: 61  Max: 90  BP  Min: 107/67  Max: 134/68  SpO2  Av.8 %  Min: 94 %  Max: 100 %    General: NAD  Neuro: A0x3  Lung: non labored equal chest rise   Heart: regular rate    MEDICATIONS: Reviewed  Scheduled Meds:   apixaban  5 mg Oral BID    aspirin  81 mg Oral Daily    rosuvastatin  5 mg Oral Nightly    sodium chloride flush  5-40 mL IntraVENous 2 times per day    insulin lispro  0-8 Units SubCUTAneous 4x Daily AC & HS     Continuous Infusions:   dextrose      sodium chloride       PRN Meds:.dextrose bolus **OR** dextrose bolus, glucagon (rDNA), dextrose, sodium chloride flush, sodium chloride, ondansetron **OR** ondansetron, polyethylene glycol, acetaminophen **OR** acetaminophen, oxyCODONE-acetaminophen     LABS: Reviewed.   Recent Labs     24  0837 24  0433 24  0505    142 140   K 4.9 4.3 4.3   CL 99 104 105   CO2 27 26 23   BUN 50* 56* 67*   CREATININE 3.9* 3.9* 3.9*     Recent Labs     24  0837 24  0433 24  0505   WBC 8.5 7.4 8.1   HGB 11.3* 9.4* 9.2*   HCT 34.2* 27.9* 27.3*   MCV 97.3 96.5 97.1    166 158     Lab Results   Component Value Date/Time    PROTIME 13.7 2024 08:37 AM    PROTIME 14.1 2024 10:35 AM    PROTIME 15.8 2024 10:49 AM    INR 1.03 2024 08:37 AM    INR 1.07 2024 10:35 AM    INR 1.26 2024 10:49 AM     Lab Results   Component Value Date/Time    
4 Eyes Skin Assessment     NAME:  Danny Rock  YOB: 1950  MEDICAL RECORD NUMBER:  [id number]    The patient is being assessed for  Admission    I agree that at least one RN has performed a thorough Head to Toe Skin Assessment on the patient. ALL assessment sites listed below have been assessed.      Areas assessed by both nurses:    Head, Face, Ears, Shoulders, Back, Chest, Arms, Elbows, Hands, Sacrum. Buttock, Coccyx, Ischium, Legs. Feet and Heels, and Under Medical Devices         Does the Patient have a Wound? Yes wound(s) were present on assessment. LDA wound assessment was Initiated and completed by RN.  - Patient has two surgical incisions with dressings.  One is on R flank from biopsy, second incision is on R chest where chest tube is.       Price Prevention initiated by RN: Yes  Wound Care Orders initiated by RN: No    Pressure Injury (Stage 3,4, Unstageable, DTI, NWPT, and Complex wounds) if present, place Wound referral order by RN under : No    New Ostomies, if present place, Ostomy referral order under : No     Nurse 1 eSignature: Electronically signed by Dinorah Gandara RN on 11/18/24 at 4:45 PM EST    **SHARE this note so that the co-signing nurse can place an eSignature**    Nurse 2 eSignature: Electronically signed by Elvira Lopes RN on 11/18/24 at 6:40 PM EST    
Patient has no code status. Hosp notified. Order to defer to day team.  
Patients head to toe assessment completed. Vital signs WNL.  call light within reach. Scheduled medications given per MAR. Patient denies any pain at the moment. Patient resting in bed.   
Shift assessment completed. Routine vitals obtained. Patient is awake, alert and oriented. Respirations are easy and unlabored. Patient does not appear to be in distress, resting comfortably at this time. Call light within reach. Chest tube was removed by pulcaleb LEE with this RN at bedside  
Shift assessment completed. Routine vitals obtained. Scheduled medications given. Patient is awake, alert and oriented. Respirations are easy and unlabored. Patient does not appear to be in distress, resting comfortably at this time. Call light within reach.   
Shift assessment completed. Routine vitals stable, patient's heart rhythm is bradycardic with afib. Patient is awake, alert and oriented. Respirations are easy and unlabored. Patient does not appear to be in distress, resting comfortably at this time. Call light within reach.    
This patient has had a discharge order placed. They are returning home and being picked up in the lobby. Discharge paperwork has been printed, highlighted, and gone over with the patient by this RN. Patient understands teaching and has no further questions at this time. IV has been removed with no complications. Telemetry has been removed. Pt has all belongings present. Patients family member is at bedside.  
Treatment time: 2 Hours and 30 minutes    Net UF: 800 ml    Pre weight: 77. 3 kg  Post weight: 76.5 kg  EDW: 76.5 kg as per patient    Access used: Right CVC  Access function: Access site located on the Right chest wall. Had clean, dry and intact CVC dressing. No signs of infection noted. No redness, hematoma, nor swelling was observed. No discharges was seen. Both ports had good flow.     Medications or blood products given: heparin dwell, Arterial:1.8 ml, Venous: 1.8 ml    Regular outpatient schedule: Tuesday, Thursday, Saturday.    Summary of response to treatment: 13:15: Treatment set at 800 ml for 2 hours and 30 minutes as ordered by doctor North via Right CVC. Access site located on the Right chest wall. Had clean, dry and intact CVC dressing. No signs of infection noted. No redness, hematoma, nor swelling was observed. No discharges was seen. Both ports had good flow. Parameters set accordingly. Hooked to HD machine. Monitored from time to time. 15:50: Completed treatment. Returned Blood aseptically. HD tolerated well.    Copy of dialysis treatment record placed in chart, to be scanned into EMR.  
flush 0.9 % injection 5-40 mL, 2 times per day  sodium chloride flush 0.9 % injection 5-40 mL, PRN  0.9 % sodium chloride infusion, PRN  ondansetron (ZOFRAN-ODT) disintegrating tablet 4 mg, Q8H PRN   Or  ondansetron (ZOFRAN) injection 4 mg, Q6H PRN  polyethylene glycol (GLYCOLAX) packet 17 g, Daily PRN  acetaminophen (TYLENOL) tablet 650 mg, Q6H PRN   Or  acetaminophen (TYLENOL) suppository 650 mg, Q6H PRN  insulin lispro (HUMALOG,ADMELOG) injection vial 0-8 Units, 4x Daily AC & HS  oxyCODONE-acetaminophen (PERCOCET) 5-325 MG per tablet 1 tablet, Q4H PRN        Objective:  /63   Pulse 85   Temp 98.4 °F (36.9 °C) (Oral)   Resp 16   Ht 1.905 m (6' 3\")   Wt 77.3 kg (170 lb 6.4 oz)   SpO2 96%   BMI 21.30 kg/m²     Intake/Output Summary (Last 24 hours) at 11/19/2024 0856  Last data filed at 11/19/2024 0729  Gross per 24 hour   Intake 120 ml   Output 15 ml   Net 105 ml      Wt Readings from Last 3 Encounters:   11/19/24 77.3 kg (170 lb 6.4 oz)   11/18/24 77.1 kg (170 lb)   10/01/24 77 kg (169 lb 12.8 oz)       Physical Examination:   General appearance:  No apparent distress, appears stated age and cooperative.  Cardiovascular: Regular rate and rhythm, normal S1, S2. No murmur.   Respiratory:Clear to auscultation bilaterally, no wheeze or crackles.   GI: Abdomen soft, no tenderness, not distended, normal bowel sounds  Musculoskeletal: No cyanosis in digits.  No BLE edema present  Neurology: CN 2-12 grossly intact. No speech or motor deficits      Labs and Tests:  CBC:   Recent Labs     11/18/24  0837 11/19/24  0433   WBC 8.5 7.4   HGB 11.3* 9.4*    166     BMP:    Recent Labs     11/18/24  0837 11/19/24  0433    142   K 4.9 4.3   CL 99 104   CO2 27 26   BUN 50* 56*   CREATININE 3.9* 3.9*   GLUCOSE 178* 133*     Hepatic: No results for input(s): \"AST\", \"ALT\", \"BILITOT\", \"ALKPHOS\" in the last 72 hours.    Invalid input(s): \"ALB\"  XR CHEST PORTABLE   Final Result   1. Near complete resolution of

## 2024-11-21 ENCOUNTER — CARE COORDINATION (OUTPATIENT)
Dept: CASE MANAGEMENT | Age: 74
End: 2024-11-21

## 2024-11-21 NOTE — CARE COORDINATION
Care Transitions Note    Initial Call - Call within 2 business days of discharge: Yes    Patient Current Location:  Home: [address] [place] [address] [address] [place] [address]    Care Transition Nurse contacted the spouse/partner , (PHI form verified; on file) by telephone to perform post hospital discharge assessment, verified name and  as identifiers. Provided introduction to self, and explanation of the Care Transition Nurse role.     Patient: Danny Rock    Patient : 1950   MRN: [id number]    Reason for Admission:   Discharge Date: 24  RURS: Readmission Risk Score: 18.3      Last Discharge Facility       Date Complaint Diagnosis Description Type Department Provider    24   Admission (Discharged) [facility] Radha Gifford MD            Was this an external facility discharge? No    Additional needs identified to be addressed with provider   No needs identified             Method of communication with provider: none.    Patients top risk factors for readmission: medical condition-pneumothorax    Interventions to address risk factors:   Review of patient management of conditions/medications: as above    Care Summary Note: Pt's wife states pt is doing well, no issues or concerns. Currently at dialysis. Encouraged wife to make f/u appt with PCP, voiced understanding. Agreed to more CTC f/u calls      Care Transition Nurse reviewed discharge instructions with spouse/partner. The spouse/partner was given an opportunity to ask questions; all questions answered at this time.. The spouse/partner verbalized understanding.   Were discharge instructions available to patient? Yes.   Reviewed appropriate site of care based on symptoms and resources available to patient including: PCP  Specialist  When to call 911. The spouse/partner agrees to contact the primary care provider and/or specialist office for questions related to their healthcare.      Advance Care Planning:   Does patient have an Advance Directive:

## 2024-11-24 NOTE — PROGRESS NOTES
[facility]  Arvind Benedict MD, FACS, RPVI  [phone]    DIALYSIS CONSULTATION      PCP:  Yesi Rosa, APRN - CNP       Chief Complaint: ESRD    Nephrologist:  [place]    Prior AVaccess:  None    Tunneled Catheters:  Right IJ    Dialysis schedule:  TTS    Hand Dominance:  Rigth    History of Present Illness:   Danny Rock is a 74 y.o. male who presents today for discussion regarding new access creation.  Patient underwent vein mapping and azura October 9, 2024.  At that time patient was in the training for peritoneal dialysis..       Past Medical History:  Past Medical History:   Diagnosis Date    Atrial fibrillation (HCC)     CAD (coronary artery disease)     Cancer of kidney (HCC)     Currently on Immunotherapy: just finished treatment 11-22-23    Diabetes mellitus (HCC)     Hyperlipidemia     Hypertension     Prostate cancer (HCC)     radiation    Right renal mass     Systolic CHF (HCC)        Past Surgical History:  Past Surgical History:   Procedure Laterality Date    CATARACT REMOVAL Bilateral     ~ 2006 with lens implanted    CHOLECYSTECTOMY      CT GUIDED CHEST TUBE  11/18/2024    CT GUIDED CHEST TUBE 11/18/2024 [facility] CT SCAN    CT NEEDLE BIOPSY LIVER PERCUTANEOUS  08/01/2024    CT NEEDLE BIOPSY LIVER PERCUTANEOUS 8/1/2024 [facility] CT SCAN    CT NEEDLE BIOPSY LIVER PERCUTANEOUS  11/18/2024    CT NEEDLE BIOPSY LIVER PERCUTANEOUS 11/18/2024 [facility] CT SCAN    EYE SURGERY Left 04/19/2018    Cataract    HERNIA REPAIR      IR TUNNELED CATHETER PLACEMENT GREATER THAN 5 YEARS  02/07/2024    IR TUNNELED CATHETER PLACEMENT GREATER THAN 5 YEARS 2/7/2024 Kosta Baugh MD [facility] SPECIAL PROCEDURES    KIDNEY REMOVAL Right 07/25/2022    ROBOTIC LAPAROSCOPIC RADICAL RIGHT NEPHRECTOMY performed by Larry Olsen MD at [facility] OR    KNEE SURGERY      both knees: scope for cartilage    NASAL SEPTUM SURGERY      OTHER SURGICAL HISTORY  09/30/2024    Peritoneal dialysis catheter       Home Medications:   Prior to

## 2024-11-26 ENCOUNTER — CARE COORDINATION (OUTPATIENT)
Dept: CASE MANAGEMENT | Age: 74
End: 2024-11-26

## 2024-11-26 ENCOUNTER — OFFICE VISIT (OUTPATIENT)
Dept: VASCULAR SURGERY | Age: 74
End: 2024-11-26
Payer: MEDICARE

## 2024-11-26 VITALS
SYSTOLIC BLOOD PRESSURE: 126 MMHG | BODY MASS INDEX: 21.39 KG/M2 | WEIGHT: 172 LBS | DIASTOLIC BLOOD PRESSURE: 60 MMHG | HEIGHT: 75 IN

## 2024-11-26 DIAGNOSIS — N18.6 ESRD (END STAGE RENAL DISEASE) (HCC): Primary | ICD-10-CM

## 2024-11-26 PROCEDURE — 1036F TOBACCO NON-USER: CPT | Performed by: SURGERY

## 2024-11-26 PROCEDURE — 3017F COLORECTAL CA SCREEN DOC REV: CPT | Performed by: SURGERY

## 2024-11-26 PROCEDURE — G8427 DOCREV CUR MEDS BY ELIG CLIN: HCPCS | Performed by: SURGERY

## 2024-11-26 PROCEDURE — G8420 CALC BMI NORM PARAMETERS: HCPCS | Performed by: SURGERY

## 2024-11-26 PROCEDURE — 1123F ACP DISCUSS/DSCN MKR DOCD: CPT | Performed by: SURGERY

## 2024-11-26 PROCEDURE — 3078F DIAST BP <80 MM HG: CPT | Performed by: SURGERY

## 2024-11-26 PROCEDURE — G8484 FLU IMMUNIZE NO ADMIN: HCPCS | Performed by: SURGERY

## 2024-11-26 PROCEDURE — 1111F DSCHRG MED/CURRENT MED MERGE: CPT | Performed by: SURGERY

## 2024-11-26 PROCEDURE — 3074F SYST BP LT 130 MM HG: CPT | Performed by: SURGERY

## 2024-11-26 PROCEDURE — 99204 OFFICE O/P NEW MOD 45 MIN: CPT | Performed by: SURGERY

## 2024-11-26 PROCEDURE — 1159F MED LIST DOCD IN RCRD: CPT | Performed by: SURGERY

## 2024-11-26 NOTE — CARE COORDINATION
Care Transitions Note    Follow Up Call     Attempted to reach patient for transitions of care follow up.  Unable to reach patient.      Outreach Attempts:   HIPAA compliant voicemail left for patient.     Care Summary Note:     Follow Up Appointment:   Future Appointments         Provider Specialty Dept Phone    4/3/2025 10:30 AM Rene Boo MD Cardiology [phone]            Plan for follow-up call in 2-5 days based on severity of symptoms and risk factors. Plan for next call: self management-     Brendan Patel RN

## 2024-11-26 NOTE — CARE COORDINATION
Care Transitions Note    Follow Up Call     Patient Current Location:  Home: [facility] [address] [place] [address]    Care Transition Nurse contacted the patient by telephone. Verified name and  as identifiers.    Additional needs identified to be addressed with provider   No needs identified                 Method of communication with provider: none.    Care Summary Note: Pt states doing well, no issues or concerns. Agreed to more CTC f/u calls.      Advance Care Planning:   Does patient have an Advance Directive: reviewed during previous call, see note. .    Medication Review:  No changes since last call.     Remote Patient Monitoring:  Offered patient enrollment in the Remote Patient Monitoring (RPM) program for in-home monitoring: Patient is not eligible for RPM program because: non [facility] PCP .    Assessments:  Care Transitions Subsequent and Final Call    Subsequent and Final Calls  Do you have any ongoing symptoms?: No  Have your medications changed?: No  Do you have any questions related to your medications?: No  Do you currently have any active services?: No  Do you have any needs or concerns that I can assist you with?: No  Identified Barriers: None  Care Transitions Interventions  No Identified Needs  Other Interventions:              Follow Up Appointment:     Future Appointments         Provider Specialty Dept Phone    4/3/2025 10:30 AM Rene Boo MD Cardiology [phone]            Care Transition Nurse provided contact information.  Plan for follow-up call in 6-10 days based on severity of symptoms and risk factors.  Plan for next call: self management-       Brendan Patel RN

## 2024-12-02 ENCOUNTER — TELEPHONE (OUTPATIENT)
Dept: VASCULAR SURGERY | Age: 74
End: 2024-12-02

## 2024-12-02 NOTE — TELEPHONE ENCOUNTER
Confirmed surgery details at [facility] at 8:30am arrive 7:30am. NPO after midnight. Hold Eliquis 48 hours. Will need transportation.

## 2024-12-04 ENCOUNTER — TELEPHONE (OUTPATIENT)
Dept: VASCULAR SURGERY | Age: 74
End: 2024-12-04

## 2024-12-04 ENCOUNTER — CARE COORDINATION (OUTPATIENT)
Dept: CASE MANAGEMENT | Age: 74
End: 2024-12-04

## 2024-12-04 NOTE — TELEPHONE ENCOUNTER
Patient called concerned of getting his arterial venous fistula put in on Friday as started a new medication thru [facility]. I explained to him it means intestinal fistula, not for hemodialysis. Pamlr       Pt called with a question regarding a new medication he was prescribed. Per pt, [facility] is having him start taking Cabozantinib at the end of the month. In the information he received from [facility] regarding this information, it mentions side effects on a fistula. Pt would like to ensure it is safe to begin this medication as advised by [facility] even though he is having a fistula placed with Dr. Benedict on 12/13/24 at [facility]. Please call pt back to discuss at [phone].

## 2024-12-04 NOTE — TELEPHONE ENCOUNTER
Pt called with a question regarding a new medication he was prescribed. Per pt, [facility] is having him start taking Cabozantinib at the end of the month. In the information he received from [facility] regarding this information, it mentions side effects on a fistula. Pt would like to ensure it is safe to begin this medication as advised by [facility] even though he is having a fistula placed with Dr. Benedict on 12/13/24 at [facility]. Please call pt back to discuss at [phone].      Please advise.

## 2024-12-04 NOTE — CARE COORDINATION
Care Transitions Note    Follow Up Call     Attempted to reach patient for transitions of care follow up.  Unable to reach patient.      Outreach Attempts:   HIPAA compliant voicemail left for patient.     Care Summary Note:     Follow Up Appointment:   Future Appointments         Provider Specialty Dept Phone    4/3/2025 10:30 AM Rene Boo MD Cardiology [phone]            Plan for follow-up call in 2-5 days based on severity of symptoms and risk factors. Plan for next call: self management-     Brendan Patel RN

## 2024-12-06 ENCOUNTER — CARE COORDINATION (OUTPATIENT)
Dept: CASE MANAGEMENT | Age: 74
End: 2024-12-06

## 2024-12-06 NOTE — CARE COORDINATION
Care Transitions Note    Final Call     Attempted to reach patient for transitions of care follow up.  Unable to reach patient.      Outreach Attempts:   HIPAA compliant voicemail left for patient.     Patient closed (unable to reach patient) from the Care Transitions program on 12/6/24.  Patient/family has the ability to self manage at this time..      Handoff:   Patient was not referred to the ACM team due to unable to contact patient.      Care Summary Note:     Assessments:  Care Transitions Subsequent and Final Call    Subsequent and Final Calls  Care Transitions Interventions  Other Interventions:              Upcoming Appointments:    Future Appointments         Provider Specialty Dept Phone    4/3/2025 10:30 AM Rene Boo MD Cardiology [phone]            Brendan Patel RN

## 2024-12-20 ENCOUNTER — OFFICE VISIT (OUTPATIENT)
Dept: SURGERY | Age: 74
End: 2024-12-20
Payer: MEDICARE

## 2024-12-20 VITALS
DIASTOLIC BLOOD PRESSURE: 70 MMHG | HEIGHT: 75 IN | BODY MASS INDEX: 21.01 KG/M2 | SYSTOLIC BLOOD PRESSURE: 122 MMHG | WEIGHT: 169 LBS

## 2024-12-20 DIAGNOSIS — K40.90 LEFT INGUINAL HERNIA: Primary | ICD-10-CM

## 2024-12-20 PROCEDURE — G8484 FLU IMMUNIZE NO ADMIN: HCPCS | Performed by: SURGERY

## 2024-12-20 PROCEDURE — G8427 DOCREV CUR MEDS BY ELIG CLIN: HCPCS | Performed by: SURGERY

## 2024-12-20 PROCEDURE — 1036F TOBACCO NON-USER: CPT | Performed by: SURGERY

## 2024-12-20 PROCEDURE — 3074F SYST BP LT 130 MM HG: CPT | Performed by: SURGERY

## 2024-12-20 PROCEDURE — G8420 CALC BMI NORM PARAMETERS: HCPCS | Performed by: SURGERY

## 2024-12-20 PROCEDURE — 1125F AMNT PAIN NOTED PAIN PRSNT: CPT | Performed by: SURGERY

## 2024-12-20 PROCEDURE — 1123F ACP DISCUSS/DSCN MKR DOCD: CPT | Performed by: SURGERY

## 2024-12-20 PROCEDURE — 99203 OFFICE O/P NEW LOW 30 MIN: CPT | Performed by: SURGERY

## 2024-12-20 PROCEDURE — 3078F DIAST BP <80 MM HG: CPT | Performed by: SURGERY

## 2024-12-20 PROCEDURE — 1111F DSCHRG MED/CURRENT MED MERGE: CPT | Performed by: SURGERY

## 2024-12-20 PROCEDURE — 1159F MED LIST DOCD IN RCRD: CPT | Performed by: SURGERY

## 2024-12-20 PROCEDURE — 3017F COLORECTAL CA SCREEN DOC REV: CPT | Performed by: SURGERY

## 2024-12-20 RX ORDER — SODIUM CHLORIDE 9 MG/ML
INJECTION, SOLUTION INTRAVENOUS PRN
OUTPATIENT
Start: 2024-12-20

## 2024-12-20 RX ORDER — SODIUM CHLORIDE 0.9 % (FLUSH) 0.9 %
5-40 SYRINGE (ML) INJECTION PRN
OUTPATIENT
Start: 2024-12-20

## 2024-12-20 RX ORDER — SODIUM CHLORIDE 0.9 % (FLUSH) 0.9 %
5-40 SYRINGE (ML) INJECTION EVERY 12 HOURS SCHEDULED
OUTPATIENT
Start: 2024-12-20

## 2024-12-20 ASSESSMENT — ENCOUNTER SYMPTOMS
EYE ITCHING: 0
APNEA: 0
ABDOMINAL PAIN: 1
EYE DISCHARGE: 0
CHEST TIGHTNESS: 0
COLOR CHANGE: 0
BACK PAIN: 0
ABDOMINAL DISTENTION: 0

## 2024-12-20 NOTE — PATIENT INSTRUCTIONS
We discussed the risks of surgery including bleeding, infection, damage to surrounding structures, hernia recurrence, chronic pain as well as anesthesia related complications. Increased risk of recurrence is associated with smoking, diabetes, obesity as well as heavy lifting over 20lbs sooner than 6 weeks after the surgery. We also discussed laparoscopic vs open technique. The benefits of the procedure include repair of the hernia, prevention of future incarceration and return to normal daily activity. Alternatives discussed include close observation. Details of the procedure were discussed and all questions answered. The patient understands, agrees, and wishes to proceed with open procedure  Warning signs of an incarcerated hernia include inability to reduce the bulge, increased and constant pain, nausea, vomiting, fevers, chills and constipation. This is a surgical emergency and the patient should contact the office or present to an emergency department  Will schedule for open left inguinal hernia repair with mesh (19240) with general anesthesia as outpatient procedure  Will discuss with anesthesia periperative TAP block for better postoperative pain control  Need Eliquis to be held 2 days prior to surgery and can be restarted the day after

## 2024-12-20 NOTE — PROGRESS NOTES
[facility]  SUBJECTIVE:    Chief Complaint: left inguinal hernia    Danny Rock   1950   74 y.o. male presents with a large left inguinal hernia that is growing and causing discomfort. Denies obstructive symptoms or incarceration. Patient denies fevers, chills, nausea, vomiting, jaundice, dysuria, change in appetite, weight, or bowel movements. Being treated for metastatic renal cell carcinoma and on Eliquis for atrial fibrillation. Multiple prior abdominal surgeries including PD catheter placements but was unable to use them for unclear reasons    Review of Systems   Constitutional:  Negative for activity change and appetite change.   HENT:  Negative for congestion and dental problem.    Eyes:  Negative for discharge and itching.   Respiratory:  Negative for apnea and chest tightness.    Cardiovascular:  Negative for chest pain and leg swelling.   Gastrointestinal:  Positive for abdominal pain. Negative for abdominal distention.   Endocrine: Negative for cold intolerance and heat intolerance.   Genitourinary:  Negative for difficulty urinating and dysuria.   Musculoskeletal:  Negative for arthralgias and back pain.   Skin:  Negative for color change and pallor.   Allergic/Immunologic: Negative for environmental allergies and food allergies.   Neurological:  Negative for dizziness and facial asymmetry.   Hematological:  Negative for adenopathy. Does not bruise/bleed easily.   Psychiatric/Behavioral:  Negative for agitation and behavioral problems.        Past Medical History:   Diagnosis Date   • Atrial fibrillation (HCC)    • CAD (coronary artery disease)    • Cancer of kidney (HCC)     Currently on Immunotherapy: just finished treatment 11-22-23   • Diabetes mellitus (HCC)    • Hyperlipidemia    • Hypertension    • Prostate cancer (HCC)     radiation   • Right renal mass    • Systolic CHF (HCC)      Past Surgical History:   Procedure Laterality Date   •

## 2024-12-23 ENCOUNTER — PREP FOR PROCEDURE (OUTPATIENT)
Dept: SURGERY | Age: 74
End: 2024-12-23

## 2024-12-23 ENCOUNTER — TELEPHONE (OUTPATIENT)
Dept: CARDIOLOGY CLINIC | Age: 74
End: 2024-12-23

## 2024-12-23 DIAGNOSIS — K40.90 LEFT INGUINAL HERNIA: ICD-10-CM

## 2024-12-23 NOTE — TELEPHONE ENCOUNTER
CARDIAC CLEARANCE     What type of procedure are you having?  Inguinal hernia repair  Which physician is performing your procedure?  Dr. Chowdhury  When is your procedure scheduled for?  12/31/24  Where are you having this procedure?  [facility]  Are you taking Blood Thinners?   If so what? (Name/dose/frequesncy)  Truman   Does the surgeon want you to stop your blood thinner?  If so for how long?  48 hrs prior  Phone Number and Contact Name for Physicians office:  [phone]  Fax number to send information:  [phone]

## 2024-12-23 NOTE — TELEPHONE ENCOUNTER
Spoke with him. High risk due to multiple comorbidities ,ok to proceed. OK to hold eliquis. Stop eliquis with last dose this Tuesday

## 2024-12-27 RX ORDER — FOLIC ACID/VIT B COMPLEX AND C 0.8 MG
1 TABLET ORAL DAILY
COMMUNITY

## 2024-12-31 ENCOUNTER — ANESTHESIA EVENT (OUTPATIENT)
Dept: OPERATING ROOM | Age: 74
End: 2024-12-31
Payer: MEDICARE

## 2024-12-31 ENCOUNTER — HOSPITAL ENCOUNTER (OUTPATIENT)
Age: 74
Setting detail: OUTPATIENT SURGERY
Discharge: HOME | End: 2024-12-31
Attending: SURGERY | Admitting: SURGERY
Payer: MEDICARE

## 2024-12-31 ENCOUNTER — ANESTHESIA (OUTPATIENT)
Dept: OPERATING ROOM | Age: 74
End: 2024-12-31
Payer: MEDICARE

## 2024-12-31 VITALS
SYSTOLIC BLOOD PRESSURE: 117 MMHG | OXYGEN SATURATION: 95 % | TEMPERATURE: 98.5 F | HEIGHT: 75 IN | DIASTOLIC BLOOD PRESSURE: 66 MMHG | RESPIRATION RATE: 11 BRPM | BODY MASS INDEX: 21.01 KG/M2 | HEART RATE: 57 BPM | WEIGHT: 169 LBS

## 2024-12-31 DIAGNOSIS — K40.90 LEFT INGUINAL HERNIA: Primary | ICD-10-CM

## 2024-12-31 LAB
ANION GAP SERPL CALCULATED.3IONS-SCNC: 14 MMOL/L (ref 3–16)
BUN SERPL-MCNC: 34 MG/DL (ref 7–20)
CALCIUM SERPL-MCNC: 10.1 MG/DL (ref 8.3–10.6)
CHLORIDE SERPL-SCNC: 96 MMOL/L (ref 99–110)
CO2 SERPL-SCNC: 27 MMOL/L (ref 21–32)
CREAT SERPL-MCNC: 2.8 MG/DL (ref 0.8–1.3)
GFR SERPLBLD CREATININE-BSD FMLA CKD-EPI: 23 ML/MIN/{1.73_M2}
GLUCOSE BLD-MCNC: 172 MG/DL (ref 70–99)
GLUCOSE BLD-MCNC: 177 MG/DL (ref 70–99)
GLUCOSE SERPL-MCNC: 178 MG/DL (ref 70–99)
PERFORMED ON: ABNORMAL
PERFORMED ON: ABNORMAL
POTASSIUM SERPL-SCNC: 3.8 MMOL/L (ref 3.5–5.1)
SODIUM SERPL-SCNC: 137 MMOL/L (ref 136–145)

## 2024-12-31 PROCEDURE — 2500000003 HC RX 250 WO HCPCS: Performed by: SURGERY

## 2024-12-31 PROCEDURE — 3600000002 HC SURGERY LEVEL 2 BASE: Performed by: SURGERY

## 2024-12-31 PROCEDURE — 2709999900 HC NON-CHARGEABLE SUPPLY: Performed by: SURGERY

## 2024-12-31 PROCEDURE — 7100000000 HC PACU RECOVERY - FIRST 15 MIN: Performed by: SURGERY

## 2024-12-31 PROCEDURE — 7100000011 HC PHASE II RECOVERY - ADDTL 15 MIN: Performed by: SURGERY

## 2024-12-31 PROCEDURE — C1781 MESH (IMPLANTABLE): HCPCS | Performed by: SURGERY

## 2024-12-31 PROCEDURE — 6360000002 HC RX W HCPCS: Performed by: ANESTHESIOLOGY

## 2024-12-31 PROCEDURE — 3700000000 HC ANESTHESIA ATTENDED CARE: Performed by: SURGERY

## 2024-12-31 PROCEDURE — 6360000002 HC RX W HCPCS: Performed by: SURGERY

## 2024-12-31 PROCEDURE — 7100000010 HC PHASE II RECOVERY - FIRST 15 MIN: Performed by: SURGERY

## 2024-12-31 PROCEDURE — 2580000003 HC RX 258: Performed by: SURGERY

## 2024-12-31 PROCEDURE — 7100000001 HC PACU RECOVERY - ADDTL 15 MIN: Performed by: SURGERY

## 2024-12-31 PROCEDURE — 6360000002 HC RX W HCPCS: Performed by: NURSE ANESTHETIST, CERTIFIED REGISTERED

## 2024-12-31 PROCEDURE — 2500000003 HC RX 250 WO HCPCS: Performed by: NURSE ANESTHETIST, CERTIFIED REGISTERED

## 2024-12-31 PROCEDURE — 64486 TAP BLOCK UNIL BY INJECTION: CPT | Performed by: ANESTHESIOLOGY

## 2024-12-31 PROCEDURE — 36415 COLL VENOUS BLD VENIPUNCTURE: CPT

## 2024-12-31 PROCEDURE — 6370000000 HC RX 637 (ALT 250 FOR IP): Performed by: ANESTHESIOLOGY

## 2024-12-31 PROCEDURE — 49505 PRP I/HERN INIT REDUC >5 YR: CPT | Performed by: SURGERY

## 2024-12-31 PROCEDURE — 3700000001 HC ADD 15 MINUTES (ANESTHESIA): Performed by: SURGERY

## 2024-12-31 PROCEDURE — 80048 BASIC METABOLIC PNL TOTAL CA: CPT

## 2024-12-31 PROCEDURE — 3600000012 HC SURGERY LEVEL 2 ADDTL 15MIN: Performed by: SURGERY

## 2024-12-31 DEVICE — MESH HERN W3XL6IN POLYPR MFIL RECTANG: Type: IMPLANTABLE DEVICE | Site: INGUINAL | Status: FUNCTIONAL

## 2024-12-31 RX ORDER — SODIUM CHLORIDE 9 MG/ML
INJECTION, SOLUTION INTRAVENOUS PRN
Status: DISCONTINUED | OUTPATIENT
Start: 2024-12-31 | End: 2024-12-31 | Stop reason: HOSPADM

## 2024-12-31 RX ORDER — OXYCODONE HYDROCHLORIDE 5 MG/1
5 TABLET ORAL
Status: COMPLETED | OUTPATIENT
Start: 2024-12-31 | End: 2024-12-31

## 2024-12-31 RX ORDER — ONDANSETRON 2 MG/ML
INJECTION INTRAMUSCULAR; INTRAVENOUS
Status: DISCONTINUED | OUTPATIENT
Start: 2024-12-31 | End: 2024-12-31 | Stop reason: SDUPTHER

## 2024-12-31 RX ORDER — FENTANYL CITRATE 50 UG/ML
25 INJECTION, SOLUTION INTRAMUSCULAR; INTRAVENOUS EVERY 5 MIN PRN
Status: DISCONTINUED | OUTPATIENT
Start: 2024-12-31 | End: 2024-12-31 | Stop reason: HOSPADM

## 2024-12-31 RX ORDER — FENTANYL CITRATE 50 UG/ML
INJECTION, SOLUTION INTRAMUSCULAR; INTRAVENOUS
Status: DISCONTINUED | OUTPATIENT
Start: 2024-12-31 | End: 2024-12-31 | Stop reason: SDUPTHER

## 2024-12-31 RX ORDER — ROPIVACAINE HYDROCHLORIDE 5 MG/ML
INJECTION, SOLUTION EPIDURAL; INFILTRATION; PERINEURAL
Status: COMPLETED | OUTPATIENT
Start: 2024-12-31 | End: 2024-12-31

## 2024-12-31 RX ORDER — OXYCODONE HYDROCHLORIDE 5 MG/1
5 TABLET ORAL EVERY 4 HOURS PRN
Qty: 20 TABLET | Refills: 0 | Status: SHIPPED | OUTPATIENT
Start: 2024-12-31 | End: 2025-01-07

## 2024-12-31 RX ORDER — SODIUM CHLORIDE 0.9 % (FLUSH) 0.9 %
5-40 SYRINGE (ML) INJECTION EVERY 12 HOURS SCHEDULED
Status: DISCONTINUED | OUTPATIENT
Start: 2024-12-31 | End: 2024-12-31 | Stop reason: HOSPADM

## 2024-12-31 RX ORDER — DROPERIDOL 2.5 MG/ML
0.62 INJECTION, SOLUTION INTRAMUSCULAR; INTRAVENOUS
Status: DISCONTINUED | OUTPATIENT
Start: 2024-12-31 | End: 2024-12-31 | Stop reason: RX

## 2024-12-31 RX ORDER — SODIUM CHLORIDE 9 MG/ML
INJECTION, SOLUTION INTRAVENOUS CONTINUOUS
Status: DISCONTINUED | OUTPATIENT
Start: 2024-12-31 | End: 2024-12-31 | Stop reason: HOSPADM

## 2024-12-31 RX ORDER — GLUCAGON 1 MG/ML
1 KIT INJECTION PRN
Status: DISCONTINUED | OUTPATIENT
Start: 2024-12-31 | End: 2024-12-31 | Stop reason: HOSPADM

## 2024-12-31 RX ORDER — DEXTROSE MONOHYDRATE 100 MG/ML
INJECTION, SOLUTION INTRAVENOUS CONTINUOUS PRN
Status: DISCONTINUED | OUTPATIENT
Start: 2024-12-31 | End: 2024-12-31 | Stop reason: HOSPADM

## 2024-12-31 RX ORDER — MAGNESIUM HYDROXIDE 1200 MG/15ML
LIQUID ORAL CONTINUOUS PRN
Status: COMPLETED | OUTPATIENT
Start: 2024-12-31 | End: 2024-12-31

## 2024-12-31 RX ORDER — PHENYLEPHRINE HCL IN 0.9% NACL 1 MG/10 ML
SYRINGE (ML) INTRAVENOUS
Status: DISCONTINUED | OUTPATIENT
Start: 2024-12-31 | End: 2024-12-31 | Stop reason: SDUPTHER

## 2024-12-31 RX ORDER — GLYCOPYRROLATE 0.2 MG/ML
INJECTION INTRAMUSCULAR; INTRAVENOUS
Status: DISCONTINUED | OUTPATIENT
Start: 2024-12-31 | End: 2024-12-31 | Stop reason: SDUPTHER

## 2024-12-31 RX ORDER — NALOXONE HYDROCHLORIDE 0.4 MG/ML
INJECTION, SOLUTION INTRAMUSCULAR; INTRAVENOUS; SUBCUTANEOUS PRN
Status: DISCONTINUED | OUTPATIENT
Start: 2024-12-31 | End: 2024-12-31 | Stop reason: HOSPADM

## 2024-12-31 RX ORDER — PROPOFOL 10 MG/ML
INJECTION, EMULSION INTRAVENOUS
Status: DISCONTINUED | OUTPATIENT
Start: 2024-12-31 | End: 2024-12-31 | Stop reason: SDUPTHER

## 2024-12-31 RX ORDER — SODIUM CHLORIDE 0.9 % (FLUSH) 0.9 %
5-40 SYRINGE (ML) INJECTION PRN
Status: DISCONTINUED | OUTPATIENT
Start: 2024-12-31 | End: 2024-12-31 | Stop reason: HOSPADM

## 2024-12-31 RX ORDER — HYDROMORPHONE HYDROCHLORIDE 2 MG/ML
0.5 INJECTION, SOLUTION INTRAMUSCULAR; INTRAVENOUS; SUBCUTANEOUS EVERY 5 MIN PRN
Status: DISCONTINUED | OUTPATIENT
Start: 2024-12-31 | End: 2024-12-31 | Stop reason: HOSPADM

## 2024-12-31 RX ORDER — DIPHENHYDRAMINE HYDROCHLORIDE 50 MG/ML
12.5 INJECTION INTRAMUSCULAR; INTRAVENOUS
Status: DISCONTINUED | OUTPATIENT
Start: 2024-12-31 | End: 2024-12-31 | Stop reason: HOSPADM

## 2024-12-31 RX ORDER — DEXAMETHASONE SODIUM PHOSPHATE 4 MG/ML
INJECTION, SOLUTION INTRA-ARTICULAR; INTRALESIONAL; INTRAMUSCULAR; INTRAVENOUS; SOFT TISSUE
Status: DISCONTINUED | OUTPATIENT
Start: 2024-12-31 | End: 2024-12-31 | Stop reason: SDUPTHER

## 2024-12-31 RX ORDER — ONDANSETRON 2 MG/ML
4 INJECTION INTRAMUSCULAR; INTRAVENOUS
Status: DISCONTINUED | OUTPATIENT
Start: 2024-12-31 | End: 2024-12-31 | Stop reason: HOSPADM

## 2024-12-31 RX ORDER — LIDOCAINE HYDROCHLORIDE 20 MG/ML
INJECTION, SOLUTION EPIDURAL; INFILTRATION; INTRACAUDAL; PERINEURAL
Status: DISCONTINUED | OUTPATIENT
Start: 2024-12-31 | End: 2024-12-31 | Stop reason: SDUPTHER

## 2024-12-31 RX ORDER — ROCURONIUM BROMIDE 10 MG/ML
INJECTION, SOLUTION INTRAVENOUS
Status: DISCONTINUED | OUTPATIENT
Start: 2024-12-31 | End: 2024-12-31 | Stop reason: SDUPTHER

## 2024-12-31 RX ADMIN — SODIUM CHLORIDE: 9 INJECTION, SOLUTION INTRAVENOUS at 07:26

## 2024-12-31 RX ADMIN — SUGAMMADEX 200 MG: 100 INJECTION, SOLUTION INTRAVENOUS at 08:25

## 2024-12-31 RX ADMIN — PROPOFOL 160 MG: 10 INJECTION, EMULSION INTRAVENOUS at 07:31

## 2024-12-31 RX ADMIN — OXYCODONE 5 MG: 5 TABLET ORAL at 10:26

## 2024-12-31 RX ADMIN — CEFAZOLIN 2000 MG: 2 INJECTION, POWDER, FOR SOLUTION INTRAMUSCULAR; INTRAVENOUS at 07:40

## 2024-12-31 RX ADMIN — GLYCOPYRROLATE 0.2 MG: 0.2 INJECTION INTRAMUSCULAR; INTRAVENOUS at 07:44

## 2024-12-31 RX ADMIN — FENTANYL CITRATE 50 MCG: 50 INJECTION, SOLUTION INTRAMUSCULAR; INTRAVENOUS at 07:29

## 2024-12-31 RX ADMIN — Medication 100 MCG: at 07:44

## 2024-12-31 RX ADMIN — ROCURONIUM BROMIDE 50 MG: 10 INJECTION, SOLUTION INTRAVENOUS at 07:31

## 2024-12-31 RX ADMIN — PROPOFOL 80 MG: 10 INJECTION, EMULSION INTRAVENOUS at 08:40

## 2024-12-31 RX ADMIN — FENTANYL CITRATE 25 MCG: 50 INJECTION, SOLUTION INTRAMUSCULAR; INTRAVENOUS at 07:48

## 2024-12-31 RX ADMIN — FENTANYL CITRATE 25 MCG: 50 INJECTION, SOLUTION INTRAMUSCULAR; INTRAVENOUS at 08:02

## 2024-12-31 RX ADMIN — Medication 100 MCG: at 08:10

## 2024-12-31 RX ADMIN — LIDOCAINE HYDROCHLORIDE 100 MG: 20 INJECTION, SOLUTION EPIDURAL; INFILTRATION; INTRACAUDAL; PERINEURAL at 07:31

## 2024-12-31 RX ADMIN — ROPIVACAINE HYDROCHLORIDE 30 ML: 5 INJECTION, SOLUTION EPIDURAL; INFILTRATION; PERINEURAL at 08:40

## 2024-12-31 RX ADMIN — SODIUM CHLORIDE: 9 INJECTION, SOLUTION INTRAVENOUS at 06:48

## 2024-12-31 RX ADMIN — ONDANSETRON 4 MG: 2 INJECTION INTRAMUSCULAR; INTRAVENOUS at 08:27

## 2024-12-31 RX ADMIN — Medication 100 MCG: at 07:53

## 2024-12-31 RX ADMIN — Medication 100 MCG: at 07:39

## 2024-12-31 RX ADMIN — DEXAMETHASONE SODIUM PHOSPHATE 8 MG: 4 INJECTION, SOLUTION INTRAMUSCULAR; INTRAVENOUS at 07:47

## 2024-12-31 ASSESSMENT — PAIN SCALES - GENERAL: PAINLEVEL_OUTOF10: 5

## 2024-12-31 ASSESSMENT — PAIN DESCRIPTION - DESCRIPTORS
DESCRIPTORS: DISCOMFORT
DESCRIPTORS: BURNING;ACHING

## 2024-12-31 ASSESSMENT — PAIN DESCRIPTION - LOCATION: LOCATION: GROIN

## 2024-12-31 ASSESSMENT — PAIN - FUNCTIONAL ASSESSMENT: PAIN_FUNCTIONAL_ASSESSMENT: 0-10

## 2024-12-31 ASSESSMENT — PAIN DESCRIPTION - ORIENTATION: ORIENTATION: LEFT

## 2024-12-31 NOTE — BRIEF OP NOTE
[facility]  Brief Operative Note    Pt Name: Danny Rock  CSN: [id number]  YOB: 1950    Date of Procedure: 12/31/2024    Pre-operative Diagnosis:  Reducible left inguinal hernia    Post-operative Diagnosis: same     Procedure:  Open left inguinal hernia repair with mesh    Surgeon(s):  Camacho Chowdhury MD     Surgical Assistant: Ge Dean    Anesthesia:  General anesthesia and TAP block    Findings: Full note dictated, Dictation Job Number: [id number]  Infection Present At Time Of Surgery (PATOS) (choose all levels that have infection present):  No infection present    Estimated Blood Loss: less than 50  ml    Complications: None    Specimens: none  * No specimens in log *     Implants:  Implant Name Type Inv. Item Serial No.  Lot No. LRB No. Used Action   MESH MICHAEL [id number] POLYPR MFIL RECTANG - [id number]  MESH MICHAEL [id number] POLYPR MFIL RECTANG  BARD DAVOL-WD [id number] Left 1 Implanted       Drains: none   * No LDAs found *    Condition: stable     Disposition and Post-operative plan: PACU, home     Camacho Chowdhury MD, FACS  12/31/2024  8:26 AM

## 2024-12-31 NOTE — H&P
[facility]    I have reviewed the history and physical and examined the patient and find no relevant changes.    I have reviewed with the patient and/or family the risks, benefits, and alternatives to the procedure.    Camacho Chowdhury MD, FACS  12/31/2024  7:12 AM

## 2024-12-31 NOTE — PROGRESS NOTES
Pt arrived from OR to PACU, awakens to voice, denies pain. VSS, O2 sats 100% on 6 L NC. Incision to left groin dry and intact, ice pack and scrotal support in place. Will monitor.

## 2024-12-31 NOTE — DISCHARGE INSTRUCTIONS
[facility]        Discharge Instructions  Activity  No heavy lifting over 20 lbs for 6 weeks from date of surgery.  It is OK to be up walking around; walking up and down stairs is also OK.   Do what is comfortable: stop and rest if you feel tired  Driving  Do not drive for at least 48 hours after your surgery. Your reaction time and coordination will be slowed until your body totally reverses the effect of the anesthetic. Do not drive while taking narcotic pain medication.  It is OK to be up walking around. Walking up and down stairs is also OK.   Do what is comfortable. Stop and rest if you feel tired  Diet  Drink plenty of fluids.  Pain  Tylenol 1000mg by mouth every 4 hours for 1-2 weeks. May use narcotic pain medication as prescribed for breakthrough pain.   Narcotic pain medication will not be refilled on weekends or after hours. Please contact the office Monday-Friday 8-4p if a refill is requested.  After inguinal surgery, it is common to have genital bruising or swelling. Scrotal support may help pain and soreness. May use ice on incision for short periods of time as needed.  Nausea  Avoid taking narcotic pain medication on an empty stomach which may result in nausea. If pain medication is causing nausea try decreasing the dose.  Nausea can be common for 24 hours after surgery. If nausea uncontrolled or worsening, contact the office or go to the ED  Constipation  Pain medication can be constipating. May be helpful to take a stool softener with the goal of at least one soft bowel movement daily with minimal straining. May need to increase water and fiber intake, may supplement with benefiber. Increase in activity will also be helpful.  If stool softener is needed, recommend the following over the counter medications as needed, Colace 100mg by mouth twice daily, and Miralax 17 gm by mouth 1-3 times daily with glass of  water  Wound Care  You have clear surgical glue closing your incisions and this will peel away in 1-2 weeks. You may shower tomorrow. Avoid hot tubs, pools, open water until seen in office.  If incisional bleeding noted, hold hard pressure for 15-20 minutes. If not controlled, either contact the office or present to nearest ED  It is common to have bruising or mild redness around the wounds within the first few days after surgery. If it worsens, or starts draining, do not hesitate to contact the office  Prior Medications  May resume anticoagulation (Plavix, Xarelto, Eliquis, etc.) tomorrow and May resume all other normal daily medications today as usual  Cautions  Watch for signs of infection: Fever over 100.5, excessive warmth or redness around incisions, bloody or cloudy drainage from incisions  Watch for signs of complication: uncontrolled pain, nausea, bloating, sudden lightheadness  Serious problems may arise after surgery that need urgent or immediate care. Do not hesitate to contact the office with any questions  Followup  Call the office to schedule your post-operative appointment with your surgeon for 2 weeks.            TOPICAL SKIN ADHESIVE CARE    12/31/2024    This is a clear, purple, sterile liquid skin adhesive that holds wound/incision edges together.    The adhesive will usually remain in place for 5 to 10 days, then naturally wear or slough off your skin.    Do not scratch, rub, or pick at the adhesive film.    Do not apply liquid or ointment medications, soap, powders or lotions to the incision while the adhesive film is in place.    You may shower 24 hours after your surgery and briefly wet the adhesive film.  Do not sit in a tub of water or swim.  Do not soak or scrub your incision.  After showering, gently blot your incision dry.    No tape or any type of bandage/covering is required over the adhesive film.        ANESTHESIA DISCHARGE INSTRUCTIONS    Wear your seatbelt home.  You are under the

## 2024-12-31 NOTE — PROGRESS NOTES
Pt resting quietly in bed, awake, states pain in left groin is mild and tolerable for him. VSS, O2 sats 97% on room air. Incision to left groin dry and intact, ice pack and scrotal support remains in place. Pt seen by anesthesia, phase 1 criteria met.

## 2024-12-31 NOTE — ANESTHESIA PRE PROCEDURE
LIVER PERCUTANEOUS 2024 [facility] CT SCAN   • EYE SURGERY Left 2018    Cataract   • HERNIA REPAIR     • IR TUNNELED CVC PLACE WO SQ PORT/PUMP > 5 YEARS  2024    IR TUNNELED CATHETER PLACEMENT GREATER THAN 5 YEARS 2024 Kosta Baugh MD [facility] SPECIAL PROCEDURES   • KIDNEY REMOVAL Right 2022    ROBOTIC LAPAROSCOPIC RADICAL RIGHT NEPHRECTOMY performed by Larry Olsen MD at [facility] OR   • KNEE SURGERY      both knees: scope for cartilage   • NASAL SEPTUM SURGERY     • OTHER SURGICAL HISTORY  2024    Peritoneal dialysis catheter       Social History:    Social History     Tobacco Use   • Smoking status: Former     Current packs/day: 0.00     Types: Cigarettes     Start date:      Quit date: 1970     Years since quittin.0   • Smokeless tobacco: Never   Substance Use Topics   • Alcohol use: Yes     Comment: rarely                                Counseling given: Not Answered      Vital Signs (Current):   Vitals:    24 1030 24 0640   BP:  (!) 140/66   Pulse:  59   Resp:  15   Temp:  97.8 °F (36.6 °C)   TempSrc:  Oral   SpO2:  100%   Weight: 81.6 kg (180 lb) 76.7 kg (169 lb)   Height: 1.905 m (6' 3\")                                               BP Readings from Last 3 Encounters:   24 (!) 140/66   24 122/70   24 126/60       NPO Status:                                                                                 BMI:   Wt Readings from Last 3 Encounters:   24 76.7 kg (169 lb)   24 76.7 kg (169 lb)   24 78 kg (172 lb)     Body mass index is 21.12 kg/m².    CBC:   Lab Results   Component Value Date/Time    WBC 8.1 2024 05:05 AM    RBC 2.82 2024 05:05 AM    HGB 9.2 2024 05:05 AM    HCT 27.3 2024 05:05 AM    MCV 97.1 2024 05:05 AM    RDW 14.1 2024 05:05 AM     2024 05:05 AM       CMP:   Lab Results   Component Value Date/Time     2024 05:05 AM    K 4.3 2024 05:05 AM    CL

## 2024-12-31 NOTE — PROGRESS NOTES
Pt able to ambulate to restroom and urinate. L groin surgical site remains CDI. Discharge instructions reviewed and understanding verbalized per pt/family with copy given. All home medications/new prescriptions have been reviewed, questions answered and patient/family state understanding. Medication information sheet provided for new prescriptions received when applicable. All belongings returned. PIV removed. Pt transported for discharge via wheelchair by RN.

## 2024-12-31 NOTE — ANESTHESIA PROCEDURE NOTES
Peripheral Block    Patient location during procedure: OR  Reason for block: post-op pain management and at surgeon's request  Start time: 12/31/2024 8:40 AM  End time: 12/31/2024 8:43 AM  Staffing  Anesthesiologist: Omari Gibbs MD  Performed by: Omari Gibbs MD  Authorized by: Omari Gibbs MD    Preanesthetic Checklist  Completed: patient identified, IV checked, site marked, risks and benefits discussed, surgical/procedural consents, equipment checked, pre-op evaluation, timeout performed, anesthesia consent given, oxygen available, monitors applied/VS acknowledged, fire risk safety assessment completed and verbalized and blood product R/B/A discussed and consented  Peripheral Block   Patient position: supine  Prep: ChloraPrep  Provider prep: mask and sterile gloves  Patient monitoring: cardiac monitor, continuous pulse ox, continuous capnometry, frequent blood pressure checks, IV access, responsive to questions and oxygen  Block type: TAP  Laterality: left  Injection technique: single-shot  Guidance: ultrasound guided  Local infiltration: ropivacaine  Infiltration strength: 0.5 %  Local infiltration: ropivacaine  Dose: 30 mL    Needle   Needle type: long-bevel   Needle gauge: 20 G  Needle localization: anatomical landmarks and ultrasound guidance  Needle length: 10 cm  Assessment   Injection assessment: negative aspiration for heme and local visualized surrounding nerve on ultrasound  Slow fractionated injection: yes  Hemodynamics: stable  Outcomes: uncomplicated    Medications Administered  ropivacaine (NAROPIN) injection 0.5% - Perineural   30 mL - 12/31/2024 8:40:00 AM

## 2024-12-31 NOTE — ANESTHESIA POSTPROCEDURE EVALUATION
Department of Anesthesiology  Postprocedure Note    Patient: Danny Rock  MRN: [id number]  YOB: 1950  Date of evaluation: 12/31/2024    Procedure Summary       Date: 12/31/24 Room / Location: [address]    Anesthesia Start: 0726 Anesthesia Stop: 0859    Procedure: OPEN LEFT INGUINAL HERNIA REPAIR WITH MESH (Left: Groin) Diagnosis:       Left inguinal hernia      (Left inguinal hernia [K40.90])    Surgeons: Camacho Chowdhury MD Responsible Provider: Omari Gibbs MD    Anesthesia Type: general ASA Status: 4            Anesthesia Type: No value filed.    Ye Phase I: Ye Score: 10    Ye Phase II: Ye Score: 10    Anesthesia Post Evaluation    Patient location during evaluation: PACU  Patient participation: complete - patient participated  Level of consciousness: awake  Pain score: 2  Airway patency: patent  Nausea & Vomiting: no nausea and no vomiting  Cardiovascular status: hemodynamically stable  Respiratory status: acceptable  Hydration status: euvolemic  Pain management: adequate    No notable events documented.

## 2025-01-01 NOTE — OP NOTE
[address] [address]                            OPERATIVE REPORT      PATIENT NAME: MARCOS TORRES               : 1950  MED REC NO: [id number]                      ROOM: OR  ACCOUNT NO: [id number]                       ADMIT DATE: 2024  PROVIDER: Camacho Chowdhury MD      DATE OF PROCEDURE:  2024    SURGEON:  Camacho Chowdhury MD    PREOPERATIVE DIAGNOSIS:  Reducible left inguinal hernia.    POSTOPERATIVE DIAGNOSIS:  Reducible left inguinal hernia.    PROCEDURE:  Open left inguinal hernia repair with mesh.    ANESTHESIA:  General with TAP block.    INDICATIONS:  This is a 74-year-old male who presents to my office with a symptomatic reducible large left inguinal hernia.  The risks, benefits, and alternative treatments of an open left inguinal hernia repair with mesh were discussed.  Details of procedure were discussed.  All questions were answered.  The patient understood, agreed and wished to proceed.    DESCRIPTION OF PROCEDURE:  The patient was brought to the operative suite and laid in the supine position.  General anesthesia was induced and well tolerated.  The patient's abdomen was prepped and draped in the usual sterile fashion.  After a proper time-out was performed, verifying the operative site, the procedure, and the patient, an umbilical incision was made in the patient's left groin.  The incision was deepened through the subcutaneous tissue to the external oblique with the cautery.  The external oblique was then nicked laterally and extended medially to the external ring, taking care not to enter the underlying ilioinguinal nerve which was identified and protected from harm.  Flaps were created under the external oblique superiorly and inferiorly to allow facilitate closure at the conclusion of the case.  The cord structures were then developed to the level of the pubic tubercle with a Penrose drain and

## 2025-01-13 ENCOUNTER — OFFICE VISIT (OUTPATIENT)
Dept: SURGERY | Age: 75
End: 2025-01-13

## 2025-01-13 VITALS — WEIGHT: 167 LBS | SYSTOLIC BLOOD PRESSURE: 118 MMHG | DIASTOLIC BLOOD PRESSURE: 62 MMHG | BODY MASS INDEX: 20.87 KG/M2

## 2025-01-13 DIAGNOSIS — Z09 SURGERY FOLLOW-UP: Primary | ICD-10-CM

## 2025-01-13 PROCEDURE — 99024 POSTOP FOLLOW-UP VISIT: CPT | Performed by: SURGERY

## 2025-01-13 NOTE — PROGRESS NOTES
(CHOLECALCIFEROL) 125 MCG (5000 UT) TABS tablet Take 1 tablet by mouth daily      omeprazole (PRILOSEC) 20 MG capsule Take 1 capsule by mouth every other day       No current facility-administered medications for this visit.      Allergies   Allergen Reactions    Amoxicillin Other (See Comments)     Urethritis, rash    Bisoprolol-Hydrochlorothiazide Other (See Comments)     G.I. rash    Cisapride Other (See Comments)     diarrhea    Penicillins      Un aware of pcn allergy    Pioglitazone Other (See Comments)     G.I upset (long time ago)        Review of Systems:  Review of systems performed and negative with the exception of the above findings    OBJECTIVE:  /62   Wt 75.8 kg (167 lb)   BMI 20.87 kg/m²      Physical Exam:  General appearance: alert, appears stated age, cooperative, and no distress  Abdomen: soft, non-distended, appropriate incisional tenderness, incision clean dry and intact, left groin with hematoma but skin viable and no ulceration or drainage    Admission on 12/31/2024, Discharged on 12/31/2024   Component Date Value Ref Range Status    Sodium 12/31/2024 137  136 - 145 mmol/L Final    Potassium 12/31/2024 3.8  3.5 - 5.1 mmol/L Final    Chloride 12/31/2024 96 (L)  99 - 110 mmol/L Final    CO2 12/31/2024 27  21 - 32 mmol/L Final    Anion Gap 12/31/2024 14  3 - 16 Final    Glucose 12/31/2024 178 (H)  70 - 99 mg/dL Final    BUN 12/31/2024 34 (H)  7 - 20 mg/dL Final    Creatinine 12/31/2024 2.8 (H)  0.8 - 1.3 mg/dL Final    Est, Glom Filt Rate 12/31/2024 23 (A)  >60 Final    Comment: Pediatric calculator link  https://www.kidney.org/professionals/kdoqi/gfr_calculatorped  Effective Oct 3, 2022  These results are not intended for use in patients  <18 years of age.  eGFR results are calculated without  a race factor using the 2021 CKD-EPI equation.  Careful  clinical correlation is recommended, particularly when  comparing to results calculated using previous equations.  The CKD-EPI equation is

## 2025-01-13 NOTE — PATIENT INSTRUCTIONS
Counseled that hematoma/seroma will slowly re-absorb but may takes weeks to months to resolve  No heavy lifting over 20lbs for 6 weeks total postop  Diet and activity as tolerated otherwise  Follow up with general surgery office as needed

## 2025-01-14 NOTE — PROGRESS NOTES
[facility]     Referring Provider:  Yesi Rosa APRN - CNP     Nephrologist:  North    Office Follow Up after Dialysis Access    Subjective: Patient is status post left radiocephalic fistula creation at [facility] December 13, 2024 and presents today for his first postoperative visit.  He has no complaints      Objective:  There were no vitals taken for this visit.  Incision: Clean dry  Good thrill and bruit with excellent dilation of the forearm cephalic vein  2 branches are noted off the cephalic vein in the forearm    Assessment:  ESRD s/p left radiocephalic fistula      Plan:  Clinically patent left radiocephalic fistula with excellent early maturation.  There are 2 veins that are visible that may need ligation.  Will have patient follow-up at [facility] in 3 to 4 weeks for fistula check.

## 2025-01-15 ENCOUNTER — OFFICE VISIT (OUTPATIENT)
Dept: VASCULAR SURGERY | Age: 75
End: 2025-01-15

## 2025-01-15 VITALS
SYSTOLIC BLOOD PRESSURE: 116 MMHG | WEIGHT: 171 LBS | BODY MASS INDEX: 21.26 KG/M2 | HEIGHT: 75 IN | DIASTOLIC BLOOD PRESSURE: 70 MMHG

## 2025-01-15 DIAGNOSIS — N18.6 ESRD (END STAGE RENAL DISEASE) (HCC): Primary | ICD-10-CM

## 2025-01-15 PROCEDURE — 99024 POSTOP FOLLOW-UP VISIT: CPT | Performed by: SURGERY

## 2025-01-28 ENCOUNTER — APPOINTMENT (OUTPATIENT)
Dept: CT IMAGING | Age: 75
DRG: 981 | End: 2025-01-28
Payer: MEDICARE

## 2025-01-28 ENCOUNTER — HOSPITAL ENCOUNTER (INPATIENT)
Age: 75
LOS: 6 days | Discharge: HOME | DRG: 981 | End: 2025-02-04
Attending: STUDENT IN AN ORGANIZED HEALTH CARE EDUCATION/TRAINING PROGRAM | Admitting: INTERNAL MEDICINE
Payer: MEDICARE

## 2025-01-28 ENCOUNTER — APPOINTMENT (OUTPATIENT)
Dept: GENERAL RADIOLOGY | Age: 75
DRG: 981 | End: 2025-01-28
Payer: MEDICARE

## 2025-01-28 DIAGNOSIS — L02.413 ABSCESS OF RIGHT SHOULDER: Primary | ICD-10-CM

## 2025-01-28 DIAGNOSIS — L02.413 ABSCESS OF RIGHT ARM: ICD-10-CM

## 2025-01-28 PROCEDURE — 6370000000 HC RX 637 (ALT 250 FOR IP): Performed by: PHYSICIAN ASSISTANT

## 2025-01-28 PROCEDURE — 73200 CT UPPER EXTREMITY W/O DYE: CPT

## 2025-01-28 PROCEDURE — 99285 EMERGENCY DEPT VISIT HI MDM: CPT

## 2025-01-28 PROCEDURE — 73030 X-RAY EXAM OF SHOULDER: CPT

## 2025-01-28 RX ORDER — OXYCODONE AND ACETAMINOPHEN 5; 325 MG/1; MG/1
1 TABLET ORAL ONCE
Status: COMPLETED | OUTPATIENT
Start: 2025-01-28 | End: 2025-01-28

## 2025-01-28 RX ADMIN — OXYCODONE HYDROCHLORIDE AND ACETAMINOPHEN 1 TABLET: 5; 325 TABLET ORAL at 21:57

## 2025-01-28 ASSESSMENT — PAIN DESCRIPTION - DESCRIPTORS: DESCRIPTORS: DISCOMFORT

## 2025-01-28 ASSESSMENT — PAIN DESCRIPTION - LOCATION: LOCATION: SHOULDER

## 2025-01-28 ASSESSMENT — PAIN DESCRIPTION - ORIENTATION: ORIENTATION: RIGHT

## 2025-01-28 ASSESSMENT — PAIN - FUNCTIONAL ASSESSMENT: PAIN_FUNCTIONAL_ASSESSMENT: 0-10

## 2025-01-28 ASSESSMENT — PAIN SCALES - GENERAL: PAINLEVEL_OUTOF10: 7

## 2025-01-29 ENCOUNTER — APPOINTMENT (OUTPATIENT)
Dept: CT IMAGING | Age: 75
DRG: 981 | End: 2025-01-29
Payer: MEDICARE

## 2025-01-29 ENCOUNTER — PREP FOR PROCEDURE (OUTPATIENT)
Dept: ORTHOPEDIC SURGERY | Age: 75
End: 2025-01-29

## 2025-01-29 ENCOUNTER — APPOINTMENT (OUTPATIENT)
Dept: INTERVENTIONAL RADIOLOGY/VASCULAR | Age: 75
DRG: 981 | End: 2025-01-29
Payer: MEDICARE

## 2025-01-29 DIAGNOSIS — L02.413 ABSCESS OF RIGHT ARM: ICD-10-CM

## 2025-01-29 PROBLEM — R70.0 ELEVATED SED RATE: Status: ACTIVE | Noted: 2025-01-29

## 2025-01-29 PROBLEM — R79.82 CRP ELEVATED: Status: ACTIVE | Noted: 2025-01-29

## 2025-01-29 LAB
ANION GAP SERPL CALCULATED.3IONS-SCNC: 14 MMOL/L (ref 3–16)
APPEARANCE FLUID: NORMAL
BASOPHILS # BLD: 0.1 K/UL (ref 0–0.2)
BASOPHILS NFR BLD: 1.7 %
BDY FLUID QUALITY: NORMAL
BUN SERPL-MCNC: 31 MG/DL (ref 7–20)
CALCIUM SERPL-MCNC: 9.2 MG/DL (ref 8.3–10.6)
CELL COUNT FLUID TYPE: NORMAL
CHLORIDE SERPL-SCNC: 97 MMOL/L (ref 99–110)
CO2 SERPL-SCNC: 27 MMOL/L (ref 21–32)
COLOR FLUID: NORMAL
CREAT SERPL-MCNC: 2.7 MG/DL (ref 0.8–1.3)
CRP SERPL-MCNC: 338 MG/L (ref 0–5.1)
CRYSTALS FLD MICRO: NORMAL
DEPRECATED RDW RBC AUTO: 17 % (ref 12.4–15.4)
EOSINOPHIL # BLD: 0.1 K/UL (ref 0–0.6)
EOSINOPHIL NFR BLD: 0.9 %
ERYTHROCYTE [SEDIMENTATION RATE] IN BLOOD BY WESTERGREN METHOD: 112 MM/HR (ref 0–20)
GFR SERPLBLD CREATININE-BSD FMLA CKD-EPI: 24 ML/MIN/{1.73_M2}
GLUCOSE BLD-MCNC: 141 MG/DL (ref 70–99)
GLUCOSE BLD-MCNC: 175 MG/DL (ref 70–99)
GLUCOSE BLD-MCNC: 177 MG/DL (ref 70–99)
GLUCOSE BLD-MCNC: 177 MG/DL (ref 70–99)
GLUCOSE SERPL-MCNC: 206 MG/DL (ref 70–99)
HCT VFR BLD AUTO: 30.6 % (ref 40.5–52.5)
HGB BLD-MCNC: 10 G/DL (ref 13.5–17.5)
INR PPP: 1.56 (ref 0.85–1.15)
LYMPHOCYTES # BLD: 1.1 K/UL (ref 1–5.1)
LYMPHOCYTES NFR BLD: 15.6 %
LYMPHOCYTES NFR FLD: 1 %
MCH RBC QN AUTO: 32 PG (ref 26–34)
MCHC RBC AUTO-ENTMCNC: 32.8 G/DL (ref 31–36)
MCV RBC AUTO: 97.6 FL (ref 80–100)
MONOCYTES # BLD: 0.5 K/UL (ref 0–1.3)
MONOCYTES NFR BLD: 6.6 %
MONOCYTES NFR FLD: 6 %
NEUTROPHIL, FLUID: 93 %
NEUTROPHILS # BLD: 5.3 K/UL (ref 1.7–7.7)
NEUTROPHILS NFR BLD: 75.2 %
NUC CELL # FLD: 9009 /CUMM
PERFORMED ON: ABNORMAL
PLATELET # BLD AUTO: 361 K/UL (ref 135–450)
PMV BLD AUTO: 7 FL (ref 5–10.5)
POTASSIUM SERPL-SCNC: 3.9 MMOL/L (ref 3.5–5.1)
PROCALCITONIN SERPL IA-MCNC: 0.66 NG/ML (ref 0–0.15)
PROTHROMBIN TIME: 18.8 SEC (ref 11.9–14.9)
RBC # BLD AUTO: 3.13 M/UL (ref 4.2–5.9)
RBC FLUID: 3200 /CUMM
SODIUM SERPL-SCNC: 138 MMOL/L (ref 136–145)
SPECIMEN SOURCE FLD: NORMAL
TOTAL CELLS COUNTED FLD: 100
WBC # BLD AUTO: 7.1 K/UL (ref 4–11)

## 2025-01-29 PROCEDURE — 6360000002 HC RX W HCPCS: Performed by: STUDENT IN AN ORGANIZED HEALTH CARE EDUCATION/TRAINING PROGRAM

## 2025-01-29 PROCEDURE — 85610 PROTHROMBIN TIME: CPT

## 2025-01-29 PROCEDURE — 6370000000 HC RX 637 (ALT 250 FOR IP): Performed by: INTERNAL MEDICINE

## 2025-01-29 PROCEDURE — 85025 COMPLETE CBC W/AUTO DIFF WBC: CPT

## 2025-01-29 PROCEDURE — 20611 DRAIN/INJ JOINT/BURSA W/US: CPT

## 2025-01-29 PROCEDURE — 36415 COLL VENOUS BLD VENIPUNCTURE: CPT

## 2025-01-29 PROCEDURE — 2500000003 HC RX 250 WO HCPCS: Performed by: STUDENT IN AN ORGANIZED HEALTH CARE EDUCATION/TRAINING PROGRAM

## 2025-01-29 PROCEDURE — 80048 BASIC METABOLIC PNL TOTAL CA: CPT

## 2025-01-29 PROCEDURE — 2580000003 HC RX 258: Performed by: INTERNAL MEDICINE

## 2025-01-29 PROCEDURE — 6370000000 HC RX 637 (ALT 250 FOR IP): Performed by: STUDENT IN AN ORGANIZED HEALTH CARE EDUCATION/TRAINING PROGRAM

## 2025-01-29 PROCEDURE — APPNB30 APP NON BILLABLE TIME 0-30 MINS: Performed by: NURSE PRACTITIONER

## 2025-01-29 PROCEDURE — 84145 PROCALCITONIN (PCT): CPT

## 2025-01-29 PROCEDURE — APPSS60 APP SPLIT SHARED TIME 46-60 MINUTES: Performed by: NURSE PRACTITIONER

## 2025-01-29 PROCEDURE — 89051 BODY FLUID CELL COUNT: CPT

## 2025-01-29 PROCEDURE — 1200000000 HC SEMI PRIVATE

## 2025-01-29 PROCEDURE — 99223 1ST HOSP IP/OBS HIGH 75: CPT | Performed by: INTERNAL MEDICINE

## 2025-01-29 PROCEDURE — 88112 CYTOPATH CELL ENHANCE TECH: CPT

## 2025-01-29 PROCEDURE — 73201 CT UPPER EXTREMITY W/DYE: CPT

## 2025-01-29 PROCEDURE — 85652 RBC SED RATE AUTOMATED: CPT

## 2025-01-29 PROCEDURE — 2580000003 HC RX 258: Performed by: STUDENT IN AN ORGANIZED HEALTH CARE EDUCATION/TRAINING PROGRAM

## 2025-01-29 PROCEDURE — 6360000004 HC RX CONTRAST MEDICATION: Performed by: STUDENT IN AN ORGANIZED HEALTH CARE EDUCATION/TRAINING PROGRAM

## 2025-01-29 PROCEDURE — 87040 BLOOD CULTURE FOR BACTERIA: CPT

## 2025-01-29 PROCEDURE — 6360000002 HC RX W HCPCS: Performed by: INTERNAL MEDICINE

## 2025-01-29 PROCEDURE — 87205 SMEAR GRAM STAIN: CPT

## 2025-01-29 PROCEDURE — 87070 CULTURE OTHR SPECIMN AEROBIC: CPT

## 2025-01-29 PROCEDURE — 88305 TISSUE EXAM BY PATHOLOGIST: CPT

## 2025-01-29 PROCEDURE — G0545 PR INHERENT VISIT TO INPT: HCPCS | Performed by: INTERNAL MEDICINE

## 2025-01-29 PROCEDURE — 99221 1ST HOSP IP/OBS SF/LOW 40: CPT | Performed by: NURSE PRACTITIONER

## 2025-01-29 PROCEDURE — 86140 C-REACTIVE PROTEIN: CPT

## 2025-01-29 PROCEDURE — 6370000000 HC RX 637 (ALT 250 FOR IP): Performed by: NURSE PRACTITIONER

## 2025-01-29 PROCEDURE — 89060 EXAM SYNOVIAL FLUID CRYSTALS: CPT

## 2025-01-29 RX ORDER — CABOZANTINIB 40 MG/1
40 TABLET ORAL DAILY
COMMUNITY

## 2025-01-29 RX ORDER — SODIUM CHLORIDE 9 MG/ML
INJECTION, SOLUTION INTRAVENOUS PRN
Status: CANCELLED | OUTPATIENT
Start: 2025-01-29

## 2025-01-29 RX ORDER — SEVELAMER CARBONATE 800 MG/1
800 TABLET, FILM COATED ORAL
Status: DISCONTINUED | OUTPATIENT
Start: 2025-01-29 | End: 2025-02-04 | Stop reason: HOSPADM

## 2025-01-29 RX ORDER — IOPAMIDOL 755 MG/ML
75 INJECTION, SOLUTION INTRAVASCULAR
Status: COMPLETED | OUTPATIENT
Start: 2025-01-29 | End: 2025-01-29

## 2025-01-29 RX ORDER — INSULIN LISPRO 100 [IU]/ML
0-4 INJECTION, SOLUTION INTRAVENOUS; SUBCUTANEOUS EVERY 6 HOURS SCHEDULED
Status: DISCONTINUED | OUTPATIENT
Start: 2025-01-29 | End: 2025-02-02

## 2025-01-29 RX ORDER — ACETAMINOPHEN 325 MG/1
650 TABLET ORAL EVERY 6 HOURS PRN
Status: DISCONTINUED | OUTPATIENT
Start: 2025-01-29 | End: 2025-02-04 | Stop reason: HOSPADM

## 2025-01-29 RX ORDER — METOPROLOL TARTRATE 25 MG/1
25 TABLET, FILM COATED ORAL
Status: DISCONTINUED | OUTPATIENT
Start: 2025-01-29 | End: 2025-01-29

## 2025-01-29 RX ORDER — ACETAMINOPHEN 325 MG/1
650 TABLET ORAL 3 TIMES DAILY
Status: DISCONTINUED | OUTPATIENT
Start: 2025-01-29 | End: 2025-02-04 | Stop reason: HOSPADM

## 2025-01-29 RX ORDER — OXYCODONE HYDROCHLORIDE 5 MG/1
5 TABLET ORAL EVERY 4 HOURS PRN
Status: DISCONTINUED | OUTPATIENT
Start: 2025-01-29 | End: 2025-02-04 | Stop reason: HOSPADM

## 2025-01-29 RX ORDER — DEXTROSE MONOHYDRATE 100 MG/ML
INJECTION, SOLUTION INTRAVENOUS CONTINUOUS PRN
Status: DISCONTINUED | OUTPATIENT
Start: 2025-01-29 | End: 2025-02-04 | Stop reason: HOSPADM

## 2025-01-29 RX ORDER — ASPIRIN 81 MG/1
81 TABLET, CHEWABLE ORAL DAILY
Status: DISCONTINUED | OUTPATIENT
Start: 2025-01-29 | End: 2025-02-04 | Stop reason: HOSPADM

## 2025-01-29 RX ORDER — LIDOCAINE HYDROCHLORIDE 10 MG/ML
5 INJECTION, SOLUTION EPIDURAL; INFILTRATION; INTRACAUDAL; PERINEURAL ONCE
Status: COMPLETED | OUTPATIENT
Start: 2025-01-29 | End: 2025-01-29

## 2025-01-29 RX ORDER — ROSUVASTATIN CALCIUM 10 MG/1
10 TABLET, COATED ORAL NIGHTLY
Status: DISCONTINUED | OUTPATIENT
Start: 2025-01-29 | End: 2025-02-04 | Stop reason: HOSPADM

## 2025-01-29 RX ORDER — FUROSEMIDE 40 MG/1
40 TABLET ORAL PRN
Status: DISCONTINUED | OUTPATIENT
Start: 2025-01-29 | End: 2025-02-04 | Stop reason: HOSPADM

## 2025-01-29 RX ORDER — CLINDAMYCIN PHOSPHATE 900 MG/50ML
900 INJECTION, SOLUTION INTRAVENOUS ONCE
Status: COMPLETED | OUTPATIENT
Start: 2025-01-29 | End: 2025-01-29

## 2025-01-29 RX ORDER — METOPROLOL TARTRATE 50 MG
50 TABLET ORAL
Status: DISCONTINUED | OUTPATIENT
Start: 2025-01-31 | End: 2025-02-04 | Stop reason: HOSPADM

## 2025-01-29 RX ORDER — OXYCODONE AND ACETAMINOPHEN 5; 325 MG/1; MG/1
1 TABLET ORAL EVERY 8 HOURS PRN
Status: ON HOLD | COMMUNITY
End: 2025-01-31

## 2025-01-29 RX ORDER — ACETAMINOPHEN 650 MG/1
650 SUPPOSITORY RECTAL EVERY 6 HOURS PRN
Status: DISCONTINUED | OUTPATIENT
Start: 2025-01-29 | End: 2025-02-04 | Stop reason: HOSPADM

## 2025-01-29 RX ORDER — GLUCAGON 1 MG/ML
1 KIT INJECTION PRN
Status: DISCONTINUED | OUTPATIENT
Start: 2025-01-29 | End: 2025-02-04 | Stop reason: HOSPADM

## 2025-01-29 RX ORDER — METOPROLOL TARTRATE 50 MG
50 TABLET ORAL
Status: DISCONTINUED | OUTPATIENT
Start: 2025-01-29 | End: 2025-02-04 | Stop reason: HOSPADM

## 2025-01-29 RX ORDER — VANCOMYCIN HYDROCHLORIDE 1 G/200ML
1000 INJECTION, SOLUTION INTRAVENOUS ONCE
Status: COMPLETED | OUTPATIENT
Start: 2025-01-29 | End: 2025-01-29

## 2025-01-29 RX ORDER — HYDROMORPHONE HYDROCHLORIDE 1 MG/ML
0.25 INJECTION, SOLUTION INTRAMUSCULAR; INTRAVENOUS; SUBCUTANEOUS EVERY 4 HOURS PRN
Status: DISCONTINUED | OUTPATIENT
Start: 2025-01-29 | End: 2025-02-04 | Stop reason: HOSPADM

## 2025-01-29 RX ORDER — SODIUM CHLORIDE 0.9 % (FLUSH) 0.9 %
5-40 SYRINGE (ML) INJECTION PRN
Status: CANCELLED | OUTPATIENT
Start: 2025-01-29

## 2025-01-29 RX ORDER — METOPROLOL TARTRATE 50 MG
50 TABLET ORAL
Status: DISCONTINUED | OUTPATIENT
Start: 2025-01-29 | End: 2025-01-29

## 2025-01-29 RX ORDER — PANTOPRAZOLE SODIUM 40 MG/1
40 TABLET, DELAYED RELEASE ORAL
Status: DISCONTINUED | OUTPATIENT
Start: 2025-01-29 | End: 2025-02-04 | Stop reason: HOSPADM

## 2025-01-29 RX ORDER — SODIUM CHLORIDE 0.9 % (FLUSH) 0.9 %
5-40 SYRINGE (ML) INJECTION EVERY 12 HOURS SCHEDULED
Status: CANCELLED | OUTPATIENT
Start: 2025-01-29

## 2025-01-29 RX ORDER — METOPROLOL TARTRATE 50 MG
50 TABLET ORAL
Status: DISCONTINUED | OUTPATIENT
Start: 2025-01-31 | End: 2025-01-29

## 2025-01-29 RX ORDER — ONDANSETRON 2 MG/ML
4 INJECTION INTRAMUSCULAR; INTRAVENOUS EVERY 6 HOURS PRN
Status: DISCONTINUED | OUTPATIENT
Start: 2025-01-29 | End: 2025-02-04 | Stop reason: HOSPADM

## 2025-01-29 RX ADMIN — SEVELAMER CARBONATE 800 MG: 800 TABLET, FILM COATED ORAL at 11:36

## 2025-01-29 RX ADMIN — METOPROLOL TARTRATE 50 MG: 50 TABLET, FILM COATED ORAL at 11:36

## 2025-01-29 RX ADMIN — VANCOMYCIN HYDROCHLORIDE 1000 MG: 1 INJECTION, SOLUTION INTRAVENOUS at 05:20

## 2025-01-29 RX ADMIN — CEFEPIME 2000 MG: 2 INJECTION, POWDER, FOR SOLUTION INTRAVENOUS at 14:52

## 2025-01-29 RX ADMIN — ROSUVASTATIN CALCIUM 10 MG: 10 TABLET, FILM COATED ORAL at 20:14

## 2025-01-29 RX ADMIN — WATER 2000 MG: 1 INJECTION INTRAMUSCULAR; INTRAVENOUS; SUBCUTANEOUS at 05:12

## 2025-01-29 RX ADMIN — OXYCODONE 5 MG: 5 TABLET ORAL at 18:52

## 2025-01-29 RX ADMIN — CLINDAMYCIN PHOSPHATE 900 MG: 900 INJECTION, SOLUTION INTRAVENOUS at 04:03

## 2025-01-29 RX ADMIN — IOPAMIDOL 75 ML: 755 INJECTION, SOLUTION INTRAVENOUS at 01:27

## 2025-01-29 RX ADMIN — VANCOMYCIN HYDROCHLORIDE 500 MG: 500 INJECTION, POWDER, LYOPHILIZED, FOR SOLUTION INTRAVENOUS at 12:40

## 2025-01-29 RX ADMIN — PANTOPRAZOLE SODIUM 40 MG: 40 TABLET, DELAYED RELEASE ORAL at 11:36

## 2025-01-29 RX ADMIN — ACETAMINOPHEN 650 MG: 325 TABLET ORAL at 15:02

## 2025-01-29 RX ADMIN — METOPROLOL TARTRATE 50 MG: 50 TABLET, FILM COATED ORAL at 22:48

## 2025-01-29 RX ADMIN — ACETAMINOPHEN 650 MG: 325 TABLET ORAL at 22:56

## 2025-01-29 RX ADMIN — SEVELAMER CARBONATE 800 MG: 800 TABLET, FILM COATED ORAL at 18:50

## 2025-01-29 RX ADMIN — LIDOCAINE HYDROCHLORIDE 5 ML: 10 INJECTION, SOLUTION EPIDURAL; INFILTRATION; INTRACAUDAL; PERINEURAL at 11:36

## 2025-01-29 ASSESSMENT — PAIN DESCRIPTION - DESCRIPTORS
DESCRIPTORS: SHARP;SHOOTING
DESCRIPTORS: SHARP

## 2025-01-29 ASSESSMENT — PAIN SCALES - GENERAL
PAINLEVEL_OUTOF10: 7
PAINLEVEL_OUTOF10: 5
PAINLEVEL_OUTOF10: 5

## 2025-01-29 ASSESSMENT — PAIN DESCRIPTION - ORIENTATION
ORIENTATION: RIGHT
ORIENTATION: RIGHT

## 2025-01-29 ASSESSMENT — PAIN DESCRIPTION - LOCATION
LOCATION: SHOULDER
LOCATION: SHOULDER

## 2025-01-29 ASSESSMENT — PAIN DESCRIPTION - FREQUENCY: FREQUENCY: INTERMITTENT

## 2025-01-29 ASSESSMENT — PAIN - FUNCTIONAL ASSESSMENT
PAIN_FUNCTIONAL_ASSESSMENT: ACTIVITIES ARE NOT PREVENTED
PAIN_FUNCTIONAL_ASSESSMENT: ACTIVITIES ARE NOT PREVENTED

## 2025-01-29 NOTE — CONSULTS
[facility] Orthopedic Surgery  Consult Note    Patient: Danny Rock  Admit Date: 1/28/2025  Requesting Physician: Hannah Rodriguez MD  Room: ED-0014/14    Chief complaint: RIGHT shoulder pain    HPI: Danny Rock is a 74 y.o. male who presented to [facility] ER with complaints of progressive right shoulder pain over the past 6-12 months.   He denies trauma.  Reports he is on chemo for renal cell carcinoma.      Describes pain in the right shoulder of moderate to severe intensity and of aching nature since 6-12 months ago which is relieved by rest and oxy partially. Denies new numbness/tingling.       Independent imaging review of the right shoulder via plain films and CT scan demonstrated: severe glenohumeral arthritis and large intramuscular fluid collection anteriorly laterally and posteriorly with gas.     He has been taking oxy at home for pain recently, by his report about 4/day.    Relevant notes, labs and other tests reviewed.  Medical History:  Past Medical History:   Diagnosis Date    Atrial fibrillation (HCC)     CAD (coronary artery disease)     Cancer of kidney (HCC)     Immunotherapy: just finished treatment 11-22-23    Dependence on renal dialysis (HCC)     dialysis Tues, Thurs Sat    Diabetes mellitus (HCC)     Hyperlipidemia     Hypertension     Prostate cancer (HCC)     radiation    Right renal mass     dialysis    Systolic CHF (HCC)      Past Surgical History:   Procedure Laterality Date    CATARACT REMOVAL Bilateral     ~ 2006 with lens implanted    CHOLECYSTECTOMY      CT GUIDED CHEST TUBE  11/18/2024    CT GUIDED CHEST TUBE 11/18/2024 [facility] CT SCAN    CT NEEDLE BIOPSY LIVER PERCUTANEOUS  08/01/2024    CT NEEDLE BIOPSY LIVER PERCUTANEOUS 8/1/2024 [facility] CT SCAN    CT NEEDLE BIOPSY LIVER PERCUTANEOUS  11/18/2024    CT NEEDLE BIOPSY LIVER PERCUTANEOUS 11/18/2024 [facility] CT SCAN    EYE SURGERY Bilateral 04/19/2018    Cataract    HERNIA REPAIR      HERNIA REPAIR Left 12/31/2024    OPEN LEFT INGUINAL  prophylaxis: Hold anti-coags   - Dispo: await workup    I have reviewed imaging and plan with Dr. Nicolas Jacob.      Case discussed with RN    JANET Varela - CNP  1/29/2025  10:29 AM

## 2025-01-29 NOTE — PROGRESS NOTES
4 Eyes Skin Assessment     NAME:  Danny Rock  YOB: 1950  MEDICAL RECORD NUMBER:  [id number]    The patient is being assessed for  Admission    I agree that at least one RN has performed a thorough Head to Toe Skin Assessment on the patient. ALL assessment sites listed below have been assessed.      Areas assessed by both nurses:    Head, Face, Ears, Shoulders, Back, Chest, Arms, Elbows, Hands, Sacrum. Buttock, Coccyx, Ischium, Legs. Feet and Heels, and Under Medical Devices         Does the Patient have a Wound? No noted wound(s)  Patient has an I&D SITE on RIGHT SHOULDER, Covered with dressing.   No wounds noted on pressure points.   TDC site is clean and dry. Covered with dressing.     Price Prevention initiated by RN: No  Wound Care Orders initiated by RN: No    Pressure Injury (Stage 3,4, Unstageable, DTI, NWPT, and Complex wounds) if present, place Wound referral order by RN under : No    New Ostomies, if present place, Ostomy referral order under : No     Nurse 1 eSignature: Electronically signed by Elvira Lopes RN on 1/29/25 at 3:51 PM EST    **SHARE this note so that the co-signing nurse can place an eSignature**    Nurse 2 eSignature: Electronically signed by Moon Kendrick RN on 1/29/25 at 3:52 PM EST

## 2025-01-29 NOTE — CONSULTS
tone.   Psychiatric:         Mood and Affect: Mood normal.         Behavior: Behavior normal.           Lines and drains: All vascular access sites are healthy with no local erythema, discharge or tenderness.      Intake and output:     No intake/output data recorded.    Lab Data:   All available labs were reviewed by me today.     CBC:   Recent Labs     01/29/25 0048   WBC 7.1   RBC 3.13*   HGB 10.0*   HCT 30.6*      MCV 97.6   MCH 32.0   MCHC 32.8   RDW 17.0*        BMP:  Recent Labs     01/29/25 0048      K 3.9   CL 97*   CO2 27   BUN 31*   CREATININE 2.7*   CALCIUM 9.2   GLUCOSE 206*        Hepatic FunctionPanel:   Lab Results   Component Value Date/Time    ALKPHOS 96 02/02/2024 03:27 PM    ALT 16 02/02/2024 03:27 PM    AST 16 02/02/2024 03:27 PM    BILITOT 0.3 02/02/2024 03:27 PM    BILIDIR <0.2 03/16/2021 09:41 AM    IBILI see below 03/16/2021 09:41 AM       CPK:   Lab Results   Component Value Date    CKTOTAL 35 (L) 12/15/2023     ESR:   Lab Results   Component Value Date    SEDRATE 112 (H) 01/29/2025     CRP:   Lab Results   Component Value Date    .0 (H) 01/29/2025         Imaging:    All pertinent images and reports for the current visit were reviewed by me during this visit.  I reviewed the chest x-ray/CT scan/MRI images and independently interpreted the findings and results today.    CT SHOULDER RIGHT W CONTRAST   Final Result   Large fluid collection with mild peripheral enhancement involving the right   shoulder region suggesting abscesses.  There is involvement in the deltoid   muscle and margin of the biceps.  Communication with the bursal and joint   spaces may be present.  Projections along the anterior and posterior edge of   the glenohumeral joint and small projections into the superolateral deltoid   muscle.      Advanced arthritis of the glenohumeral and acromioclavicular joints with   sclerosis, subchondral cysts, marginal osteophytes, and loose bodies.   Synovial  Office Fax: [phone]    [facility] Infectious Disease   [address], [address] ([address])  [place] [address]  [facility] days:  Tuesday & Thursday AM    [facility] Infectious Disease  [address] (Medical office Building)  [place], [place], [address]  [facility] days: Wednesday AM

## 2025-01-29 NOTE — CONSULTS
REVIEWED BY ME:  as needed for my evaluation.   CT SHOULDER RIGHT W CONTRAST    Result Date: 1/29/2025  EXAMINATION: CT OF THE RIGHT SHOULDER WITH CONTRAST 1/29/2025 1:33 am TECHNIQUE: CT of the right shoulder was performed with the administration of intravenous contrast.  Multiplanar reformatted images are provided for review. Automated exposure control, iterative reconstruction, and/or weight based adjustment of the mA/kV was utilized to reduce the radiation dose to as low as reasonably achievable. COMPARISON: Earlier enhanced CT of the right shoulder from 01/28/2025 HISTORY ORDERING SYSTEM PROVIDED HISTORY: Fluid on shoulder wo scan, abscess? TECHNOLOGIST PROVIDED HISTORY: Additional Contrast?->1 Reason for Exam: Fluid on shoulder wo scan, abscess? FINDINGS: Bones: The advanced arthritic changes of the glenohumeral and acromioclavicular joints are again present as described previously. Sclerosis, subchondral cysts, marginal osteophytes, and loose bodies are present. Soft Tissue: Multilocular fluid collection with mild peripheral enhancement around the right shoulder and involving the musculature.  The more focal component is in the anterior aspect measuring up to 5.5 x 4.5 x 6 cm but is contiguous with a component extending superiorly and posteriorly over the anterior and posterior aspect of the shoulder and involving the muscles. Involvement of the proximal biceps, deltoid, and distal portions of the rotator cuff muscles.  There are projections into the lateral aspect of the superior deltoid muscle as well.  Synovial thickening and loose bodies at the glenohumeral joint.  May communicate with the bursal and joint spaces.  Small amount of gas in the main portion of the collection.  No apparent gas in the glenohumeral joint. 4 mm nodule in the right upper lobe on series 3, image 46 and some scarring in the periphery of the right upper lobe.  Azygos accessory fissure is also noted.     Large fluid collection with  with clinical symptoms, white count, and consider fluid sampling. RECOMMENDATIONS: MRI with contrast may provide better delineation of the margins of the fluid collection and exact locations of involvement..     XR SHOULDER RIGHT (MIN 2 VIEWS)    Result Date: 1/28/2025  EXAMINATION: THREE XRAY VIEWS OF THE RIGHT SHOULDER 1/28/2025 9:55 pm COMPARISON: None. HISTORY: ORDERING SYSTEM PROVIDED HISTORY: pain, swelling TECHNOLOGIST PROVIDED HISTORY: Reason for exam:->pain, swelling Reason for Exam: pain, swelling FINDINGS: No acute fracture of the clavicle or displacement of the acromioclavicular joint.  There arthritic changes at the expected acromioclavicular joint and some ossifications along the inferior aspect.  There is no acute fracture or dislocation of the humeral head.  There is sclerosis and cystic changes along the margin of the humeral head combined with marginal osteophytes.  Sclerosis and subchondral cysts are also noted at the margin of the glenoid. No radiopaque foreign bodies around the shoulder.  Soft tissue swelling around the shoulder.  Few small foci of lucency in the soft tissues lateral to the proximal humerus may be small foci of gas.  Dual lumen right central venous catheter is noted over the chest.     1.  No acute fracture or dislocation is identified at the right shoulder. 2.  Moderate arthritis is present at the glenohumeral and acromioclavicular joints.  Sclerosis and subchondral cysts along the articular margins and some areas of ossification inferior to the acromioclavicular joint.  May have features of underlying joint effusion and chronic rotator cuff pathology. 3.  Few small foci of lucency over the soft tissues lateral to the proximal humerus are likely small foci of gas.  Correlate if recent injections or signs of infection.           ======================================================================  Please note that this chart entry has been generated using voice recognition

## 2025-01-29 NOTE — PROGRESS NOTES
Clinical Pharmacy Note: Pharmacy to Dose Vancomycin    Danny Rock is a 74 y.o. male started on Vancomycin for Bone and joint infection; consult received from Dr. Rodriguez to manage therapy. Also receiving the following antibiotics: Ceftriaxone.    Goal AUC: 400-600 mg/L*hr    Assessment/Plan:  A 1000 mg loading dose was given on 01/29 at 0520  Based on renal function will intermittently dose  A vancomycin trough has been ordered for 01/60 at 0600  Changes in regimen will be determined based on culture results, renal function, and clinical response.  Pharmacy will continue to monitor and adjust regimen as necessary.    Allergies:  Amoxicillin, Bisoprolol-hydrochlorothiazide, Cisapride, Penicillins, and Pioglitazone     Recent Labs     01/29/25  0048   CREATININE 2.7*       Recent Labs     01/29/25  0048   WBC 7.1       Ht Readings from Last 1 Encounters:   01/28/25 1.905 m (6' 3\")        Wt Readings from Last 1 Encounters:   01/28/25 77.1 kg (170 lb)         Estimated Creatinine Clearance: 26 mL/min (A) (based on SCr of 2.7 mg/dL (H)).      Thank you for the consult,    Peña Garcia, PharmD        Addendum: As patient is ESRD on HD, will order an additional 500mg to complete a loading dose of 1500mg. Agree with plan above, level tomorrow AM.    Therese Neil, ReinierD, BCCCP

## 2025-01-29 NOTE — CONSULTS
radial: 2+  - L radial: 1+  - L forearm AVF + bruit/thrill   Left AC with ecchymosis and small hematoma     Labs  Lab Results   Component Value Date/Time    WBC 7.1 01/29/2025 12:48 AM    HGB 10.0 01/29/2025 12:48 AM    HCT 30.6 01/29/2025 12:48 AM    MCV 97.6 01/29/2025 12:48 AM     01/29/2025 12:48 AM     Lab Results   Component Value Date/Time     01/29/2025 12:48 AM    K 3.9 01/29/2025 12:48 AM    K 4.3 11/20/2024 05:05 AM    CL 97 01/29/2025 12:48 AM    CO2 27 01/29/2025 12:48 AM    PHOS 5.0 05/06/2024 01:28 PM    BUN 31 01/29/2025 12:48 AM    CREATININE 2.7 01/29/2025 12:48 AM      No results found for: \"GLU\"    Scheduled Meds:    cefTRIAXone (ROCEPHIN) IV  2,000 mg IntraVENous Q24H    [Held by provider] apixaban  5 mg Oral BID    aspirin  81 mg Oral Daily    metoprolol tartrate  50 mg Oral Once per day on Sunday Monday Wednesday Friday    pantoprazole  40 mg Oral QAM AC    rosuvastatin  10 mg Oral Nightly    sevelamer  800 mg Oral TID WC    insulin lispro  0-4 Units SubCUTAneous 4 times per day    vancomycin (VANCOCIN) intermittent dosing (placeholder)   Other RX Placeholder    vancomycin  500 mg IntraVENous Once     Continuous Infusions:    dextrose         Imaging:     CT right shoulder 1/29/25:  IMPRESSION:  Large fluid collection with mild peripheral enhancement involving the right  shoulder region suggesting abscesses.  There is involvement in the deltoid  muscle and margin of the biceps.  Communication with the bursal and joint  spaces may be present.  Projections along the anterior and posterior edge of  the glenohumeral joint and small projections into the superolateral deltoid  muscle.     Advanced arthritis of the glenohumeral and acromioclavicular joints with  sclerosis, subchondral cysts, marginal osteophytes, and loose bodies.  Synovial thickening of the glenohumeral joint there is small amount of gas in  the main component at the anterior inferior portion of the collection.     Mild  situs of the shoulder muscles and likely involving the distal portions  of the rotator cuff.    Assessment:   Right shoulder abscess and pain  - CT showed right intramuscular fluid collection   ESRD on HD - Tues/Thus/Sat  S/p left radiocephalic AVF (12/13/24 at [facility])   Left arm bleeding - likely from PIV placed which has been removed, AVF has not been accessed yet for dialysis   HTN  HLD  DM  PAF on Eliquis     Plan:  Dressing removed from left AC and forearm where previous IV/blood draw stick with no active bleeding.  NO IVs or blood draws in left arm.  Left radiocephalic AV fistula NOT ready for use.  Has apt at [facility] on 1/31 for maturation evaluation of AVF.  This may need to be rescheduled if remains inpatient.  Use tunneled dialysis catheter for dialysis.   Ortho consulted.  Nephrology consulted.   No further vascular plans at this time.    Patient is stable from vascular standpoint.  We will sign off for now, but please do not hesitate to contact us with any questions.  Thank you for the consultation.       Plan discussed with Dr. Benedict.    Thank you for the consultation.     Patient educated on plan of care and disease process.  All questions answered.        Electronically signed by JANET Kapoor CNP on 1/29/2025 at 10:14 AM

## 2025-01-29 NOTE — PLAN OF CARE
[facility] Orthopedic Surgery  Plan of Care Note        Orthopedic Consult received, full note to follow.    Danny Rock 74 y.o. presenting to ER for progressive right shoulder pain over last 6-12 months.  Hx renal cell carcinoma, on hemodialysis.    Xray and CT reviewed, and showed large right intramuscular fluid collection with gas and severe glenohumeral arthritis.  Aferbile, no leukocytosis, , PCT 0.66    Plan:  - Recommend fluid aspiration/drainage for evaluation via IR (ordered).   - If they are able to, consider a small drain placement in the collection to keep in place for 1-2 days to se output.   - Fluid to be sent for crystal, cell count, culture an cytology.   - NPO   - Hold AC  - Pain control  - Abx per primary team    JANET Varela - CNP 1/29/2025 7:57 AM

## 2025-01-29 NOTE — H&P
History and Physical      Name:  Danny Rock /Age/Sex: 1950  (74 y.o. male)   MRN & CSN:  [id number] & [id number] Encounter Date/Time: 2025 3:21 AM EST   Location:  [place] [address] PCP: Yesi Rosa APRN - CNP       Assessment and Plan:   Danny Rock is a 74 y.o. male with hypertension, hyperlipidemia, atrial fibrillation, coronary artery disease, chronic combined heart failure, ESRD on HD TTS, T2DM with insulin dependence, renal cancer status post nephrectomy presented from home with right shoulder pain    Possible right shoulder fluid collection  Possible inflammatory, rule out septic etiology  CT right shoulder with large fluid collection involving right shoulder region with extension into the deltoid muscle and margin of the biceps.  IV vancomycin and IV Rocephin, follow-up blood cultures  Keep NPO orthopedic surgery consulted for arthrocentesis +/- washout    ESRD on HD TTS  Nephrology consultation for inpatient management of HD    Atrial fibrillation  Continue home Lopressor  Hold home Eliquis in anticipation for surgical intervention    Chronic combined heart failure  Continue home Lasix as needed on non-HD days    T2DM with insulin dependence  Low-dose insulin sliding scale POC glucose every 6 hours hypoglycemia protocol    Inpatient MedSurg telemetry  Full code    Disposition:     Current Living situation: Home  Expected Disposition: Home  Estimated D/C: 2 days    Diet Diet NPO   DVT Prophylaxis [] Lovenox, []  Heparin, [] SCDs, [] Ambulation,  [x] Eliquis, [] Xarelto, [] Coumadin   Code Status Full Code   Surrogate Decision Maker/ PETER Munoz     Personally reviewed Lab Studies and Imaging   Discussed management of the case with ER provider who recommended admission due to right shoulder abscess  Drugs that require monitoring for toxicity include IV vancomycin and the method of monitoring was trough level    History from:     Patient    History of Present Illness:     Chief Complaint  internal gas concerning for abscess some other areas of myositis may be present in the musculature about the posterior edge of the shoulder but with limited evaluation on the current unenhanced study. Correlate with clinical symptoms, white count, and consider fluid sampling. RECOMMENDATIONS: MRI with contrast may provide better delineation of the margins of the fluid collection and exact locations of involvement..     XR SHOULDER RIGHT (MIN 2 VIEWS)    Result Date: 1/28/2025  EXAMINATION: THREE XRAY VIEWS OF THE RIGHT SHOULDER 1/28/2025 9:55 pm COMPARISON: None. HISTORY: ORDERING SYSTEM PROVIDED HISTORY: pain, swelling TECHNOLOGIST PROVIDED HISTORY: Reason for exam:->pain, swelling Reason for Exam: pain, swelling FINDINGS: No acute fracture of the clavicle or displacement of the acromioclavicular joint.  There arthritic changes at the expected acromioclavicular joint and some ossifications along the inferior aspect.  There is no acute fracture or dislocation of the humeral head.  There is sclerosis and cystic changes along the margin of the humeral head combined with marginal osteophytes.  Sclerosis and subchondral cysts are also noted at the margin of the glenoid. No radiopaque foreign bodies around the shoulder.  Soft tissue swelling around the shoulder.  Few small foci of lucency in the soft tissues lateral to the proximal humerus may be small foci of gas.  Dual lumen right central venous catheter is noted over the chest.     1.  No acute fracture or dislocation is identified at the right shoulder. 2.  Moderate arthritis is present at the glenohumeral and acromioclavicular joints.  Sclerosis and subchondral cysts along the articular margins and some areas of ossification inferior to the acromioclavicular joint.  May have features of underlying joint effusion and chronic rotator cuff pathology. 3.  Few small foci of lucency over the soft tissues lateral to the proximal humerus are likely small foci of gas.

## 2025-01-29 NOTE — PROGRESS NOTES
Patient seen in ED, room 14.  Admission completed with the following exceptions:  4 Eyes Assessment, Immunizations, Vaccines, Rights and Responsibilities, Orientation to room, Plan of Care, Education/Learning Assessment and Education Plan, white board, height and weight, pain assessment and head to toe assessment.  Patient is alert and oriented X 4.  Patient lives in a house with his wife three story and is being admitted for Abscess of right shoulder.      Home Medication reconciliation will be/has been completed by Pharmacy Staff.  All patient's and/or family's questions answered.     Patient gets dialysis on Tuesday Thursday and Saturdays and reports his last dialysis was Tuesday.    No B/P or IV sticks in left upper extremity due to dialysis graft. Patient reports that on Friday this week he was suppose to get his dialysis fistula enlarged and currently getting dialysis through his catheter right chest.    Patient uses a walker to ambulate.

## 2025-01-29 NOTE — PROGRESS NOTES
Pharmacy Home Medication Reconciliation Note    A medication reconciliation has been completed for Danny Rock 1950    Pharmacy: [facility] [place], p: [phone]  Information provided by: patient and his partner    The patient's home medication list is as follows:  No current facility-administered medications on file prior to encounter.     Current Outpatient Medications on File Prior to Encounter   Medication Sig Dispense Refill    cabozantinib s-malate (CABOMETYX) 40 MG TABS chemo tablet Take 1 tablet by mouth daily      oxyCODONE-acetaminophen (PERCOCET) 5-325 MG per tablet Take 1 tablet by mouth every 8 hours as needed for Pain. Max Daily Amount: 3 tablets      B Complex-C-Folic Acid (EVERETT-ISABELLA) TABS Take 1 tablet by mouth daily      sevelamer (RENVELA) 800 MG tablet Take 1 tablet by mouth 3 times daily (with meals)      rosuvastatin (CRESTOR) 10 MG tablet Take 10 mg every other day 45 tablet 3    apixaban (ELIQUIS) 5 MG TABS tablet Take 1 tablet by mouth 2 times daily 180 tablet 3    insulin lispro, 1 Unit Dial, (HUMALOG KWIKPEN) 100 UNIT/ML SOPN Sc before meals per sliding scale (Patient taking differently: 3 times daily (before meals) Takes as needed up to 6 units with meals) 3 Adjustable Dose Pre-filled Pen Syringe 0    Alpha-Lipoic Acid 100 MG CAPS Take 1 capsule by mouth Daily      melatonin 3 MG TABS tablet Take 1 tablet by mouth daily      zinc sulfate (ZINCATE) 220 (50 Zn)  mg capsule - elemental zinc Take 1 capsule by mouth daily      Cyanocobalamin (VITAMIN B-12 PO) Take 1 tablet by mouth daily      aspirin 81 MG chewable tablet Take 1 tablet by mouth daily 30 tablet 0    ascorbic acid (VITAMIN C) 500 MG tablet Take 1 tablet by mouth daily      vitamin D3 (CHOLECALCIFEROL) 125 MCG (5000 UT) TABS tablet Take 1 tablet by mouth daily      furosemide (LASIX) 40 MG tablet Take 1 tablet by mouth See Admin Instructions Take 2 times on non-dialysis days. (Patient not taking: Reported on

## 2025-01-29 NOTE — CONSULTS
MD Gordon Frankel MD Aldo Estella, DO              Office: [phone]                      Fax: [phone]               [email or web address]                     Nephrology consult received. Full consult report will follow.     Thank you for allowing us to participate in this patient's care. Please do not hesitate to contact us anytime. We will follow along with you.         Guillermo Moss DO

## 2025-01-29 NOTE — PROGRESS NOTES
Hospitalist Progress Note      PCP: Yesi Rosa APRN - CNP    Date of Admission: 1/28/2025    LOS: 0    Chief Complaint:   Chief Complaint   Patient presents with    Shoulder Injury     Right shoulder pain after fall 6 months.        Case Summary:   74-year-old gentleman with history of CAD, hypertension, history of CVA, paroxysmal atrial fibrillation, type 2 diabetes, history of renal intractable right shoulder pain for hydration.  Shoulder abscess    CT of the right shoulder noted for fluid lection with peripheral enhancement on suggestive of right shoulder abscess      Active Hospital Problems    Diagnosis Date Noted    Abscess of right shoulder [L02.413] 01/29/2025         Principal Problem:    Abscess of right shoulder: With right shoulder pain and decreased mobility.  CT of the right shoulder noted for probable abscess  - Continue empiric antibiotic coverage with ceftriaxone and vancomycin  - Await orthopedics consult  - ID consult  -Pain management    Active Problems:    Coronary artery disease due to lipid rich plaque: With no chest pains or shortness of breath.  Continue aspirin, metoprolol, rosuvastatin    HLD (hyperlipidemia): On statin    Essential hypertension: Stable on Coreg    Diabetes mellitus: On sliding scale insulin    PAF (paroxysmal atrial fibrillation) (HCC): On metoprolol and Eliquis.  Eliquis on hold in view of surgical intervention        Medications:  Reviewed  Infusion Medications    dextrose       Scheduled Medications    cefTRIAXone (ROCEPHIN) IV  2,000 mg IntraVENous Q24H    [Held by provider] apixaban  5 mg Oral BID    aspirin  81 mg Oral Daily    metoprolol tartrate  50 mg Oral Once per day on Sunday Monday Wednesday Friday    pantoprazole  40 mg Oral QAM AC    rosuvastatin  10 mg Oral Nightly    sevelamer  800 mg Oral TID WC    insulin lispro  0-4 Units SubCUTAneous 4 times per day    vancomycin (VANCOCIN) intermittent dosing (placeholder)   Other RX Placeholder

## 2025-01-29 NOTE — ED PROVIDER NOTES
medical decision making.        FINAL IMPRESSION     1. Abscess of right shoulder          DISPOSITION/PLAN     DISPOSITION Decision To Admit 01/29/2025 02:58:45 AM   DISPOSITION CONDITION Stable     Blood pressure 136/81, pulse 87, temperature 98.5 °F (36.9 °C), temperature source Oral, resp. rate 16, height 1.905 m (6' 3\"), weight 77.1 kg (170 lb), SpO2 95%.    PATIENT REFERRALS  No follow-up provider specified.  DISCHARGE MEDICATIONS  New Prescriptions    No medications on file     DISCONTINUED MEDICATIONS  Discontinued Medications    No medications on file         Note electronically signed by:   Sushil Buchanan DO  Attending emergency physician    This chart was generated using the Dragon dictation system.  I created this record, but it may contain dictation errors given the limitations of this technology.       Sushil Buchanan DO  01/29/25 0308    
were completed with a voice recognition program.  Efforts were made to edit the dictations but occasionally words are mis-transcribed.)    ALINA Orta (electronically signed)            Kerry Menjivar PA  02/05/25 0113

## 2025-01-29 NOTE — PROGRESS NOTES
Admission nurse at bedside patient stated he needed to use urinal: assisted patient on side of bed to use urinal: patient brief and linens soiled. Linens changed and brief removed.  Patient assisted back into the bed and assisted patient with calling his wife. NP Jovi Jacome at bedside speaking with patient. Will return to complete admission.

## 2025-01-29 NOTE — PLAN OF CARE
Patient admission received from ED. Patient is alert and oriented upon receiving. Family present at admission. Vitals checked and recorded. Patient alert to self, place, time and situation, able to follow commands and verbalize needs. Patient assisted with rey-care and gown change.   Patient resting in bed, call light within reach, bed alarm turned on, items of need within reach.,     Problem: Safety - Adult  Goal: Free from fall injury  Outcome: Progressing     Problem: Chronic Conditions and Co-morbidities  Goal: Patient's chronic conditions and co-morbidity symptoms are monitored and maintained or improved  Outcome: Progressing  Flowsheets (Taken 1/29/2025 2714)  Care Plan - Patient's Chronic Conditions and Co-Morbidity Symptoms are Monitored and Maintained or Improved:   Collaborate with multidisciplinary team to address chronic and comorbid conditions and prevent exacerbation or deterioration   Monitor and assess patient's chronic conditions and comorbid symptoms for stability, deterioration, or improvement   Update acute care plan with appropriate goals if chronic or comorbid symptoms are exacerbated and prevent overall improvement and discharge     Problem: ABCDS Injury Assessment  Goal: Absence of physical injury  Outcome: Progressing

## 2025-01-30 PROBLEM — N18.6 ESRD ON HEMODIALYSIS (HCC): Status: ACTIVE | Noted: 2025-01-30

## 2025-01-30 PROBLEM — Z99.2 ESRD ON HEMODIALYSIS (HCC): Status: ACTIVE | Noted: 2025-01-30

## 2025-01-30 LAB
ALBUMIN SERPL-MCNC: 1.2 G/DL (ref 3.4–5)
ANION GAP SERPL CALCULATED.3IONS-SCNC: 14 MMOL/L (ref 3–16)
BASOPHILS # BLD: 0 K/UL (ref 0–0.2)
BASOPHILS NFR BLD: 0.5 %
BUN SERPL-MCNC: 37 MG/DL (ref 7–20)
CALCIUM SERPL-MCNC: 8.3 MG/DL (ref 8.3–10.6)
CHLORIDE SERPL-SCNC: 100 MMOL/L (ref 99–110)
CO2 SERPL-SCNC: 24 MMOL/L (ref 21–32)
CREAT SERPL-MCNC: 2.8 MG/DL (ref 0.8–1.3)
DEPRECATED RDW RBC AUTO: 16.7 % (ref 12.4–15.4)
EOSINOPHIL # BLD: 0.2 K/UL (ref 0–0.6)
EOSINOPHIL NFR BLD: 3.9 %
GFR SERPLBLD CREATININE-BSD FMLA CKD-EPI: 23 ML/MIN/{1.73_M2}
GLUCOSE BLD-MCNC: 122 MG/DL (ref 70–99)
GLUCOSE BLD-MCNC: 131 MG/DL (ref 70–99)
GLUCOSE BLD-MCNC: 231 MG/DL (ref 70–99)
GLUCOSE BLD-MCNC: 290 MG/DL (ref 70–99)
GLUCOSE SERPL-MCNC: 124 MG/DL (ref 70–99)
HCT VFR BLD AUTO: 29.6 % (ref 40.5–52.5)
HGB BLD-MCNC: 9.7 G/DL (ref 13.5–17.5)
LYMPHOCYTES # BLD: 0.9 K/UL (ref 1–5.1)
LYMPHOCYTES NFR BLD: 14.8 %
MCH RBC QN AUTO: 32.3 PG (ref 26–34)
MCHC RBC AUTO-ENTMCNC: 32.8 G/DL (ref 31–36)
MCV RBC AUTO: 98.6 FL (ref 80–100)
MONOCYTES # BLD: 0.5 K/UL (ref 0–1.3)
MONOCYTES NFR BLD: 9.1 %
NEUTROPHILS # BLD: 4.3 K/UL (ref 1.7–7.7)
NEUTROPHILS NFR BLD: 71.7 %
PERFORMED ON: ABNORMAL
PHOSPHATE SERPL-MCNC: 3.6 MG/DL (ref 2.5–4.9)
PLATELET # BLD AUTO: 271 K/UL (ref 135–450)
PMV BLD AUTO: 6.9 FL (ref 5–10.5)
POTASSIUM SERPL-SCNC: 3.9 MMOL/L (ref 3.5–5.1)
RBC # BLD AUTO: 3 M/UL (ref 4.2–5.9)
SODIUM SERPL-SCNC: 138 MMOL/L (ref 136–145)
VANCOMYCIN SERPL-MCNC: 10.1 UG/ML
WBC # BLD AUTO: 6 K/UL (ref 4–11)

## 2025-01-30 PROCEDURE — 1200000000 HC SEMI PRIVATE

## 2025-01-30 PROCEDURE — 36415 COLL VENOUS BLD VENIPUNCTURE: CPT

## 2025-01-30 PROCEDURE — 85025 COMPLETE CBC W/AUTO DIFF WBC: CPT

## 2025-01-30 PROCEDURE — 99233 SBSQ HOSP IP/OBS HIGH 50: CPT | Performed by: INTERNAL MEDICINE

## 2025-01-30 PROCEDURE — 90935 HEMODIALYSIS ONE EVALUATION: CPT

## 2025-01-30 PROCEDURE — 6370000000 HC RX 637 (ALT 250 FOR IP): Performed by: INTERNAL MEDICINE

## 2025-01-30 PROCEDURE — 6370000000 HC RX 637 (ALT 250 FOR IP): Performed by: STUDENT IN AN ORGANIZED HEALTH CARE EDUCATION/TRAINING PROGRAM

## 2025-01-30 PROCEDURE — 2580000003 HC RX 258: Performed by: INTERNAL MEDICINE

## 2025-01-30 PROCEDURE — 80069 RENAL FUNCTION PANEL: CPT

## 2025-01-30 PROCEDURE — 5A1D70Z PERFORMANCE OF URINARY FILTRATION, INTERMITTENT, LESS THAN 6 HOURS PER DAY: ICD-10-PCS | Performed by: INTERNAL MEDICINE

## 2025-01-30 PROCEDURE — 6370000000 HC RX 637 (ALT 250 FOR IP): Performed by: NURSE PRACTITIONER

## 2025-01-30 PROCEDURE — 80202 ASSAY OF VANCOMYCIN: CPT

## 2025-01-30 PROCEDURE — 6360000002 HC RX W HCPCS: Performed by: INTERNAL MEDICINE

## 2025-01-30 PROCEDURE — 99232 SBSQ HOSP IP/OBS MODERATE 35: CPT | Performed by: NURSE PRACTITIONER

## 2025-01-30 PROCEDURE — G0545 PR INHERENT VISIT TO INPT: HCPCS | Performed by: INTERNAL MEDICINE

## 2025-01-30 RX ORDER — HEPARIN SODIUM 1000 [USP'U]/ML
3600 INJECTION, SOLUTION INTRAVENOUS; SUBCUTANEOUS PRN
Status: DISCONTINUED | OUTPATIENT
Start: 2025-01-30 | End: 2025-02-04 | Stop reason: HOSPADM

## 2025-01-30 RX ORDER — VANCOMYCIN HYDROCHLORIDE 750 MG/150ML
750 INJECTION, SOLUTION INTRAVENOUS ONCE
Status: COMPLETED | OUTPATIENT
Start: 2025-01-30 | End: 2025-01-30

## 2025-01-30 RX ADMIN — SEVELAMER CARBONATE 800 MG: 800 TABLET, FILM COATED ORAL at 17:40

## 2025-01-30 RX ADMIN — VANCOMYCIN HYDROCHLORIDE 750 MG: 750 INJECTION, SOLUTION INTRAVENOUS at 17:44

## 2025-01-30 RX ADMIN — OXYCODONE 5 MG: 5 TABLET ORAL at 09:22

## 2025-01-30 RX ADMIN — SEVELAMER CARBONATE 800 MG: 800 TABLET, FILM COATED ORAL at 14:28

## 2025-01-30 RX ADMIN — INSULIN LISPRO 2 UNITS: 100 INJECTION, SOLUTION INTRAVENOUS; SUBCUTANEOUS at 17:40

## 2025-01-30 RX ADMIN — OXYCODONE 5 MG: 5 TABLET ORAL at 14:28

## 2025-01-30 RX ADMIN — CEFEPIME 2000 MG: 2 INJECTION, POWDER, FOR SOLUTION INTRAVENOUS at 14:33

## 2025-01-30 RX ADMIN — ACETAMINOPHEN 650 MG: 325 TABLET ORAL at 09:21

## 2025-01-30 RX ADMIN — SEVELAMER CARBONATE 800 MG: 800 TABLET, FILM COATED ORAL at 09:22

## 2025-01-30 RX ADMIN — ACETAMINOPHEN 650 MG: 325 TABLET ORAL at 20:28

## 2025-01-30 RX ADMIN — METOPROLOL TARTRATE 50 MG: 50 TABLET, FILM COATED ORAL at 20:29

## 2025-01-30 RX ADMIN — ASPIRIN 81 MG: 81 TABLET, CHEWABLE ORAL at 09:22

## 2025-01-30 RX ADMIN — ROSUVASTATIN CALCIUM 10 MG: 10 TABLET, FILM COATED ORAL at 20:29

## 2025-01-30 RX ADMIN — PANTOPRAZOLE SODIUM 40 MG: 40 TABLET, DELAYED RELEASE ORAL at 05:33

## 2025-01-30 RX ADMIN — OXYCODONE 5 MG: 5 TABLET ORAL at 05:33

## 2025-01-30 RX ADMIN — ACETAMINOPHEN 650 MG: 325 TABLET ORAL at 14:27

## 2025-01-30 ASSESSMENT — PAIN DESCRIPTION - DESCRIPTORS
DESCRIPTORS: DISCOMFORT
DESCRIPTORS: ACHING
DESCRIPTORS: DISCOMFORT
DESCRIPTORS: ACHING
DESCRIPTORS: DISCOMFORT;ACHING
DESCRIPTORS: ACHING

## 2025-01-30 ASSESSMENT — PAIN SCALES - GENERAL
PAINLEVEL_OUTOF10: 8
PAINLEVEL_OUTOF10: 0
PAINLEVEL_OUTOF10: 4
PAINLEVEL_OUTOF10: 7
PAINLEVEL_OUTOF10: 7
PAINLEVEL_OUTOF10: 4
PAINLEVEL_OUTOF10: 4
PAINLEVEL_OUTOF10: 0

## 2025-01-30 ASSESSMENT — PAIN DESCRIPTION - FREQUENCY
FREQUENCY: INTERMITTENT

## 2025-01-30 ASSESSMENT — PAIN - FUNCTIONAL ASSESSMENT
PAIN_FUNCTIONAL_ASSESSMENT: ACTIVITIES ARE NOT PREVENTED

## 2025-01-30 ASSESSMENT — PAIN SCALES - WONG BAKER
WONGBAKER_NUMERICALRESPONSE: HURTS A LITTLE BIT
WONGBAKER_NUMERICALRESPONSE: HURTS A LITTLE BIT
WONGBAKER_NUMERICALRESPONSE: NO HURT
WONGBAKER_NUMERICALRESPONSE: HURTS LITTLE MORE
WONGBAKER_NUMERICALRESPONSE: NO HURT
WONGBAKER_NUMERICALRESPONSE: NO HURT
WONGBAKER_NUMERICALRESPONSE: HURTS A LITTLE BIT
WONGBAKER_NUMERICALRESPONSE: HURTS LITTLE MORE
WONGBAKER_NUMERICALRESPONSE: HURTS A LITTLE BIT

## 2025-01-30 ASSESSMENT — PAIN DESCRIPTION - LOCATION
LOCATION: SHOULDER

## 2025-01-30 ASSESSMENT — PAIN DESCRIPTION - ORIENTATION
ORIENTATION: RIGHT

## 2025-01-30 NOTE — PROGRESS NOTES
collection the musculature about the anterior edge of the shoulder   and upper arm measuring up to a 5 x 4.5 x 6 cm with some internal gas   concerning for abscess some other areas of myositis may be present in the   musculature about the posterior edge of the shoulder but with limited   evaluation on the current unenhanced study.      Correlate with clinical symptoms, white count, and consider fluid sampling.      RECOMMENDATIONS:   MRI with contrast may provide better delineation of the margins of the fluid   collection and exact locations of involvement..         XR SHOULDER RIGHT (MIN 2 VIEWS)   Final Result   1.  No acute fracture or dislocation is identified at the right shoulder.      2.  Moderate arthritis is present at the glenohumeral and acromioclavicular   joints.  Sclerosis and subchondral cysts along the articular margins and some   areas of ossification inferior to the acromioclavicular joint.  May have   features of underlying joint effusion and chronic rotator cuff pathology.      3.  Few small foci of lucency over the soft tissues lateral to the proximal   humerus are likely small foci of gas.  Correlate if recent injections or   signs of infection.             Medications: All current and past medications were reviewed.     vancomycin  750 mg IntraVENous Once    [Held by provider] apixaban  5 mg Oral BID    aspirin  81 mg Oral Daily    pantoprazole  40 mg Oral QAM AC    rosuvastatin  10 mg Oral Nightly    sevelamer  800 mg Oral TID WC    insulin lispro  0-4 Units SubCUTAneous 4 times per day    vancomycin (VANCOCIN) intermittent dosing (placeholder)   Other RX Placeholder    cefepime  2,000 mg IntraVENous Q24H    metoprolol tartrate  50 mg Oral Daily    And    [START ON 1/31/2025] metoprolol tartrate  50 mg Oral Once per day on Sunday Monday Wednesday Friday    acetaminophen  650 mg Oral TID        dextrose         heparin (porcine), acetaminophen **OR** acetaminophen, ondansetron, HYDROmorphone,  [phone]    [facility] Disease   [address], [address] ([address])  [place] [address]  [place] [facility] days:  Tuesday & Thursday AM    [facility] Disease  [address] , [address] (Medical office Building)  [place], [place], [address]  [facility] days: Wednesday AM

## 2025-01-30 NOTE — PLAN OF CARE
Problem: Safety - Adult  Goal: Free from fall injury  1/30/2025 1229 by Lena Stiles RN  Outcome: Progressing     Problem: Chronic Conditions and Co-morbidities  Goal: Patient's chronic conditions and co-morbidity symptoms are monitored and maintained or improved  1/30/2025 1229 by Lena Stiles RN  Outcome: Progressing     Problem: ABCDS Injury Assessment  Goal: Absence of physical injury  1/30/2025 1229 by Lena Stiles RN  Outcome: Progressing

## 2025-01-30 NOTE — PROGRESS NOTES
acute fractures are identified. 2.  Glenohumeral joint effusion with is heterogenous density of the fluid and scattered loose bodies.  Hemorrhagic or septic arthritis is a consideration. 3.  Fluid collection the musculature about the anterior edge of the shoulder and upper arm measuring up to a 5 x 4.5 x 6 cm with some internal gas concerning for abscess some other areas of myositis may be present in the musculature about the posterior edge of the shoulder but with limited evaluation on the current unenhanced study. Correlate with clinical symptoms, white count, and consider fluid sampling. RECOMMENDATIONS: MRI with contrast may provide better delineation of the margins of the fluid collection and exact locations of involvement..     XR SHOULDER RIGHT (MIN 2 VIEWS)    Result Date: 1/28/2025  EXAMINATION: THREE XRAY VIEWS OF THE RIGHT SHOULDER 1/28/2025 9:55 pm COMPARISON: None. HISTORY: ORDERING SYSTEM PROVIDED HISTORY: pain, swelling TECHNOLOGIST PROVIDED HISTORY: Reason for exam:->pain, swelling Reason for Exam: pain, swelling FINDINGS: No acute fracture of the clavicle or displacement of the acromioclavicular joint.  There arthritic changes at the expected acromioclavicular joint and some ossifications along the inferior aspect.  There is no acute fracture or dislocation of the humeral head.  There is sclerosis and cystic changes along the margin of the humeral head combined with marginal osteophytes.  Sclerosis and subchondral cysts are also noted at the margin of the glenoid. No radiopaque foreign bodies around the shoulder.  Soft tissue swelling around the shoulder.  Few small foci of lucency in the soft tissues lateral to the proximal humerus may be small foci of gas.  Dual lumen right central venous catheter is noted over the chest.     1.  No acute fracture or dislocation is identified at the right shoulder. 2.  Moderate arthritis is present at the glenohumeral and acromioclavicular joints.  Sclerosis and  subchondral cysts along the articular margins and some areas of ossification inferior to the acromioclavicular joint.  May have features of underlying joint effusion and chronic rotator cuff pathology. 3.  Few small foci of lucency over the soft tissues lateral to the proximal humerus are likely small foci of gas.  Correlate if recent injections or signs of infection.           ======================================================================  Please note that this chart entry has been generated using voice recognition software, mainly.  So please excuse brevity and/or typos.  While every effort and attempts have been made to ensure the accuracy of this automated transcription, some errors may have occurred; and certain words and phrases in transcription may not be entered as intended.  However, inadvertent computerized transcription errors may be present.  So please contact us if any clarification needed.

## 2025-01-30 NOTE — PROGRESS NOTES
Clinical Pharmacy Note: Pharmacy to Dose Vancomycin    Vancomycin Day: 2  Indication: Bone and joint infection  Current Dose: Pulse dosing  Dosing Method: Dosing by random levels    Random: 10.1    Recent Labs     01/29/25  0048 01/30/25  0637   BUN 31* 37*       Recent Labs     01/29/25  0048 01/30/25  0637   CREATININE 2.7* 2.8*       Recent Labs     01/29/25  0048 01/30/25  0637   WBC 7.1 6.0         Intake/Output Summary (Last 24 hours) at 1/30/2025 0808  Last data filed at 1/30/2025 0527  Gross per 24 hour   Intake --   Output 750 ml   Net -750 ml         Ht Readings from Last 1 Encounters:   01/28/25 1.905 m (6' 3\")        Wt Readings from Last 1 Encounters:   01/28/25 77.1 kg (170 lb)         Body mass index is 21.25 kg/m².    Estimated Creatinine Clearance: 25 mL/min (A) (based on SCr of 2.8 mg/dL (H)).      Assessment/Plan:  Vancomycin level is subtherapeutic.  Will redose vancomycin 750mg one time today.   Dose to be given after HD today  A vancomycin random level has been ordered on 2/1 at 0600 for follow-up.  Changes in regimen will be determined based on culture results, renal function, and clinical response.  Pharmacy will continue to monitor and adjust regimen as necessary.    Thank you for the consult,    Ricardo Sawant [facility]  [id number]

## 2025-01-30 NOTE — CARE COORDINATION
Case Management Assessment  Initial Evaluation    Date/Time of Evaluation: 1/30/2025 3:47 PM  Assessment Completed by: Renetta Germain    If patient is discharged prior to next notation, then this note serves as note for discharge by case management.    Patient Name: Danny Rock                   YOB: 1950  Diagnosis: Abscess of right shoulder [L02.413]                   Date / Time: 1/28/2025  9:08 PM    Patient Admission Status: Inpatient   Readmission Risk (Low < 19, Mod (19-27), High > 27): Readmission Risk Score: 23.1    Current PCP: Yesi Rosa APRN - CNP  PCP verified by CM? Yes    Chart Reviewed: Yes      History Provided by: Patient  Patient Orientation: Alert and Oriented    Patient Cognition: Alert    Hospitalization in the last 30 days (Readmission):  No    If yes, Readmission Assessment in CM Navigator will be completed.    Advance Directives:      Code Status: Full Code   Patient's Primary Decision Maker is: Legal Next of Kin    Primary Decision Maker: Amanda Rock - Spouse - [phone]    Discharge Planning:    Patient lives with: Spouse/Significant Other Type of Home: House  Primary Care Giver: Self  Patient Support Systems include: Spouse/Significant Other, Children   Current Financial resources: Medicare  Current community resources: None  Current services prior to admission: Home Care            Current DME:              Type of Home Care services:  PT, OT, Nursing Services    ADLS  Prior functional level: Bathing, Dressing, Toileting, Feeding, Mobility, Assistance with the following:, Cooking, Housework, Shopping (Limiting mobility since shoulder pain)  Current functional level: Assistance with the following:, Bathing, Dressing, Toileting, Mobility    PT AM-PAC:   /24  OT AM-PAC:   /24    Family can provide assistance at DC: Yes  Would you like Case Management to discuss the discharge plan with any other family members/significant others, and if so, who? No  Plans to

## 2025-01-30 NOTE — PLAN OF CARE
Problem: Safety - Adult  Goal: Free from fall injury  1/30/2025 0158 by Nathalie Hernández LPN  Outcome: Progressing     Problem: Chronic Conditions and Co-morbidities  Goal: Patient's chronic conditions and co-morbidity symptoms are monitored and maintained or improved  1/30/2025 0158 by Nathalie Hernández LPN  Outcome: Progressing     Problem: ABCDS Injury Assessment  Goal: Absence of physical injury  1/30/2025 0158 by Nathalie Hernández LPN  Outcome: Progressing

## 2025-01-30 NOTE — PROGRESS NOTES
Hospitalist Progress Note      PCP: Yesi Rosa, JANET - CNP    Date of Admission: 1/28/2025    LOS: 1    Chief Complaint:   Chief Complaint   Patient presents with    Shoulder Injury     Right shoulder pain after fall 6 months.        Case Summary:   74-year-old gentleman with history of CAD, hypertension, history of CVA, paroxysmal atrial fibrillation, type 2 diabetes, history of renal intractable right shoulder pain for hydration.  Shoulder abscess    CT of the right shoulder noted for fluid lection with peripheral enhancement on suggestive of right shoulder abscess      Active Hospital Problems    Diagnosis Date Noted    Coronary artery disease due to lipid rich plaque [I25.10, I25.83] 09/27/2011     Priority: High    Essential hypertension [I10] 09/27/2011     Priority: Medium    HLD (hyperlipidemia) [E78.5] 09/27/2011     Priority: Medium    ESRD on hemodialysis (HCC) [N18.6, Z99.2] 01/30/2025    Abscess of right shoulder [L02.413] 01/29/2025    CRP elevated [R79.82] 01/29/2025    Elevated sed rate [R70.0] 01/29/2025    Abscess of right arm [L02.413] 01/29/2025    PAF (paroxysmal atrial fibrillation) (HCC) [I48.0] 10/31/2023    Diabetes mellitus [E11.9] 02/14/2013         Principal Problem:    Abscess of right shoulder: With right shoulder pain and decreased mobility.  CT of the right shoulder noted for probable abscess  - Continue empiric antibiotic coverage  - ID and orthopedics following with recommendation  - Follow-up aspiration cultures  - Continue pain management  - PT OT evaluation    Active Problems:    Coronary artery disease due to lipid rich plaque: With no chest pains or shortness of breath.  Continue aspirin, metoprolol, rosuvastatin    HLD (hyperlipidemia): On statin    Essential hypertension: Stable on Coreg    Diabetes mellitus: On sliding scale insulin    PAF (paroxysmal atrial fibrillation) (HCC): On metoprolol and Eliquis.  Eliquis on hold in view of surgical  however, inadvertent computerized transcription errors may be present.

## 2025-01-30 NOTE — PROGRESS NOTES
[facility] Orthopedic Surgery  Progress Note      Chief complaint: RIGHT shoulder pain    Subjective: The patient is seen sitting up in bed undergoing HD.  He report some improvement in shoulder pain.  Denies new issues.     He did get dose ceftriaxone prior to aspiration yesterday.    Independent imaging review of the right shoulder via plain films and CT scan demonstrated: severe glenohumeral arthritis and large intramuscular fluid collection anteriorly laterally and posteriorly with gas.     He has been taking oxy at home for pain recently, by his report about 4/day.    Relevant notes, labs and other tests reviewed.    Review of Systems:  Constitutional: Negative for fever, chills, fatigue.   Skin:  Negative for pruritis, rash  Eyes: Negative for photophobia and visual disturbance.   ENT:  Negative for rhinorrhea, epistaxis, sore throat  Respiratory:  Negative for cough and shortness of breath.   Cardiovascular: Negative for chest pain.   Gastrointestinal: Negative for nausea, vomiting, diarrhea.  Genitourinary: Negative for dysuria and difficulty urinating.   Neurological: Negative for confusion, dysarthria, tremors, seizures.   Psychiatric:  Negative for depression or anxiety  Musculoskeletal:  Positive for right shoulder pain    Objective:  Vitals:    01/30/25 0921   BP:    Pulse:    Resp: 16   Temp:    SpO2:       Physical Examination:  GENERAL: No apparent distress, well-nourished  SKIN:  Warm and dry  EYES: Nonicteric.   ENT: Mucous membranes moist  HEAD: Normocephalic, atraumatic  RESPIRATORY: Resp easy and unlabored  CARDIOVASCULAR: Regular rate and rhythm  GI: Abdomen soft, nontender  NEURO: Awake and alert.  No speech defect  PSYCHIATRIC: Appropriate affect; not agitated  MUSCULOSKELETAL:  RIGHT shoulder  Inspection: On exam there are no ulcerations, rashes or lesions about the right shoulder.   There is pain to palpation of the right shoulder.  No warmth or erythema.  Obvious swelling and fluid collection

## 2025-01-30 NOTE — DIALYSIS
Treatment time: 3 hours  Net UF: 2900 ml     Pre weight: 77.1 kg  Post weight:74 kg  EDW: 74 kg  Crit Line Used: Yes Ending Profile: A  Refill Present: No    Access used: R TDC    Access function: Well with  ml/min     Medications or blood products given: n/a     Regular outpatient schedule: [facility]     Summary of response to treatment: Patient tolerated treatment well and without any complications. Patient remained stable throughout entire treatment and upon the exiting the dialysis suite via transport.     Report given to Lena Stiles RN and copy of dialysis treatment record placed in chart, to be scanned into EMR.

## 2025-01-31 ENCOUNTER — ANESTHESIA (OUTPATIENT)
Dept: OPERATING ROOM | Age: 75
End: 2025-01-31
Payer: MEDICARE

## 2025-01-31 ENCOUNTER — ANESTHESIA EVENT (OUTPATIENT)
Dept: OPERATING ROOM | Age: 75
End: 2025-01-31
Payer: MEDICARE

## 2025-01-31 LAB
GLUCOSE BLD-MCNC: 118 MG/DL (ref 70–99)
GLUCOSE BLD-MCNC: 158 MG/DL (ref 70–99)
GLUCOSE BLD-MCNC: 161 MG/DL (ref 70–99)
GLUCOSE BLD-MCNC: 195 MG/DL (ref 70–99)
GLUCOSE BLD-MCNC: 301 MG/DL (ref 70–99)
GLUCOSE BLD-MCNC: 305 MG/DL (ref 70–99)
PERFORMED ON: ABNORMAL

## 2025-01-31 PROCEDURE — 6370000000 HC RX 637 (ALT 250 FOR IP): Performed by: INTERNAL MEDICINE

## 2025-01-31 PROCEDURE — 6370000000 HC RX 637 (ALT 250 FOR IP): Performed by: NURSE PRACTITIONER

## 2025-01-31 PROCEDURE — 3600000004 HC SURGERY LEVEL 4 BASE: Performed by: ORTHOPAEDIC SURGERY

## 2025-01-31 PROCEDURE — 1200000000 HC SEMI PRIVATE

## 2025-01-31 PROCEDURE — 0PB50ZZ EXCISION OF RIGHT SCAPULA, OPEN APPROACH: ICD-10-PCS | Performed by: ORTHOPAEDIC SURGERY

## 2025-01-31 PROCEDURE — 87205 SMEAR GRAM STAIN: CPT

## 2025-01-31 PROCEDURE — 2580000003 HC RX 258: Performed by: REGISTERED NURSE

## 2025-01-31 PROCEDURE — 3600000002 HC SURGERY LEVEL 2 BASE: Performed by: ORTHOPAEDIC SURGERY

## 2025-01-31 PROCEDURE — 6360000002 HC RX W HCPCS: Performed by: STUDENT IN AN ORGANIZED HEALTH CARE EDUCATION/TRAINING PROGRAM

## 2025-01-31 PROCEDURE — 2580000003 HC RX 258: Performed by: INTERNAL MEDICINE

## 2025-01-31 PROCEDURE — 6360000002 HC RX W HCPCS: Performed by: INTERNAL MEDICINE

## 2025-01-31 PROCEDURE — 7100000001 HC PACU RECOVERY - ADDTL 15 MIN: Performed by: ORTHOPAEDIC SURGERY

## 2025-01-31 PROCEDURE — 87077 CULTURE AEROBIC IDENTIFY: CPT

## 2025-01-31 PROCEDURE — 2500000003 HC RX 250 WO HCPCS: Performed by: ANESTHESIOLOGY

## 2025-01-31 PROCEDURE — 2709999900 HC NON-CHARGEABLE SUPPLY: Performed by: ORTHOPAEDIC SURGERY

## 2025-01-31 PROCEDURE — C1729 CATH, DRAINAGE: HCPCS | Performed by: ORTHOPAEDIC SURGERY

## 2025-01-31 PROCEDURE — 6370000000 HC RX 637 (ALT 250 FOR IP): Performed by: STUDENT IN AN ORGANIZED HEALTH CARE EDUCATION/TRAINING PROGRAM

## 2025-01-31 PROCEDURE — 2500000003 HC RX 250 WO HCPCS: Performed by: REGISTERED NURSE

## 2025-01-31 PROCEDURE — 7100000000 HC PACU RECOVERY - FIRST 15 MIN: Performed by: ORTHOPAEDIC SURGERY

## 2025-01-31 PROCEDURE — 3700000001 HC ADD 15 MINUTES (ANESTHESIA): Performed by: ORTHOPAEDIC SURGERY

## 2025-01-31 PROCEDURE — 3600000012 HC SURGERY LEVEL 2 ADDTL 15MIN: Performed by: ORTHOPAEDIC SURGERY

## 2025-01-31 PROCEDURE — 0PBF0ZZ EXCISION OF RIGHT HUMERAL SHAFT, OPEN APPROACH: ICD-10-PCS | Performed by: ORTHOPAEDIC SURGERY

## 2025-01-31 PROCEDURE — 6360000002 HC RX W HCPCS: Performed by: ANESTHESIOLOGY

## 2025-01-31 PROCEDURE — 87070 CULTURE OTHR SPECIMN AEROBIC: CPT

## 2025-01-31 PROCEDURE — 6360000002 HC RX W HCPCS: Performed by: REGISTERED NURSE

## 2025-01-31 PROCEDURE — 87075 CULTR BACTERIA EXCEPT BLOOD: CPT

## 2025-01-31 PROCEDURE — 3600000014 HC SURGERY LEVEL 4 ADDTL 15MIN: Performed by: ORTHOPAEDIC SURGERY

## 2025-01-31 PROCEDURE — 2580000003 HC RX 258: Performed by: ORTHOPAEDIC SURGERY

## 2025-01-31 PROCEDURE — 23040 ARTHRT GH JT EXPL/DRG/RMV FB: CPT | Performed by: ORTHOPAEDIC SURGERY

## 2025-01-31 PROCEDURE — 3700000000 HC ANESTHESIA ATTENDED CARE: Performed by: ORTHOPAEDIC SURGERY

## 2025-01-31 RX ORDER — LIDOCAINE HYDROCHLORIDE 20 MG/ML
INJECTION, SOLUTION INFILTRATION; PERINEURAL
Status: DISCONTINUED | OUTPATIENT
Start: 2025-01-31 | End: 2025-01-31 | Stop reason: SDUPTHER

## 2025-01-31 RX ORDER — HYDROMORPHONE HYDROCHLORIDE 2 MG/ML
0.25 INJECTION, SOLUTION INTRAMUSCULAR; INTRAVENOUS; SUBCUTANEOUS EVERY 5 MIN PRN
Status: DISCONTINUED | OUTPATIENT
Start: 2025-01-31 | End: 2025-01-31

## 2025-01-31 RX ORDER — NALOXONE HYDROCHLORIDE 0.4 MG/ML
INJECTION, SOLUTION INTRAMUSCULAR; INTRAVENOUS; SUBCUTANEOUS PRN
Status: DISCONTINUED | OUTPATIENT
Start: 2025-01-31 | End: 2025-01-31 | Stop reason: HOSPADM

## 2025-01-31 RX ORDER — NALOXONE HYDROCHLORIDE 0.4 MG/ML
INJECTION, SOLUTION INTRAMUSCULAR; INTRAVENOUS; SUBCUTANEOUS PRN
Status: DISCONTINUED | OUTPATIENT
Start: 2025-01-31 | End: 2025-02-04 | Stop reason: HOSPADM

## 2025-01-31 RX ORDER — SODIUM CHLORIDE 0.9 % (FLUSH) 0.9 %
5-40 SYRINGE (ML) INJECTION PRN
Status: DISCONTINUED | OUTPATIENT
Start: 2025-01-31 | End: 2025-01-31 | Stop reason: HOSPADM

## 2025-01-31 RX ORDER — ONDANSETRON 2 MG/ML
4 INJECTION INTRAMUSCULAR; INTRAVENOUS
Status: DISCONTINUED | OUTPATIENT
Start: 2025-01-31 | End: 2025-01-31 | Stop reason: HOSPADM

## 2025-01-31 RX ORDER — HYDROMORPHONE HYDROCHLORIDE 2 MG/ML
0.5 INJECTION, SOLUTION INTRAMUSCULAR; INTRAVENOUS; SUBCUTANEOUS EVERY 5 MIN PRN
Status: DISCONTINUED | OUTPATIENT
Start: 2025-01-31 | End: 2025-01-31

## 2025-01-31 RX ORDER — DIPHENHYDRAMINE HYDROCHLORIDE 50 MG/ML
12.5 INJECTION INTRAMUSCULAR; INTRAVENOUS
Status: DISCONTINUED | OUTPATIENT
Start: 2025-01-31 | End: 2025-01-31 | Stop reason: HOSPADM

## 2025-01-31 RX ORDER — ONDANSETRON 2 MG/ML
INJECTION INTRAMUSCULAR; INTRAVENOUS
Status: DISCONTINUED | OUTPATIENT
Start: 2025-01-31 | End: 2025-01-31 | Stop reason: SDUPTHER

## 2025-01-31 RX ORDER — SODIUM CHLORIDE 0.9 % (FLUSH) 0.9 %
5-40 SYRINGE (ML) INJECTION PRN
Status: DISCONTINUED | OUTPATIENT
Start: 2025-01-31 | End: 2025-02-04 | Stop reason: HOSPADM

## 2025-01-31 RX ORDER — SODIUM CHLORIDE 9 MG/ML
INJECTION, SOLUTION INTRAVENOUS CONTINUOUS
Status: DISCONTINUED | OUTPATIENT
Start: 2025-01-31 | End: 2025-01-31

## 2025-01-31 RX ORDER — HYDROMORPHONE HYDROCHLORIDE 2 MG/ML
INJECTION, SOLUTION INTRAMUSCULAR; INTRAVENOUS; SUBCUTANEOUS
Status: DISCONTINUED | OUTPATIENT
Start: 2025-01-31 | End: 2025-01-31 | Stop reason: SDUPTHER

## 2025-01-31 RX ORDER — OXYCODONE AND ACETAMINOPHEN 5; 325 MG/1; MG/1
1 TABLET ORAL EVERY 4 HOURS PRN
Qty: 30 TABLET | Refills: 0 | Status: SHIPPED | OUTPATIENT
Start: 2025-01-31 | End: 2025-02-05

## 2025-01-31 RX ORDER — ESMOLOL HYDROCHLORIDE 10 MG/ML
INJECTION INTRAVENOUS
Status: DISCONTINUED | OUTPATIENT
Start: 2025-01-31 | End: 2025-01-31 | Stop reason: SDUPTHER

## 2025-01-31 RX ORDER — PHENYLEPHRINE HCL IN 0.9% NACL 1 MG/10 ML
SYRINGE (ML) INTRAVENOUS
Status: DISCONTINUED | OUTPATIENT
Start: 2025-01-31 | End: 2025-01-31

## 2025-01-31 RX ORDER — MEPERIDINE HYDROCHLORIDE 25 MG/ML
12.5 INJECTION INTRAMUSCULAR; INTRAVENOUS; SUBCUTANEOUS EVERY 5 MIN PRN
Status: DISCONTINUED | OUTPATIENT
Start: 2025-01-31 | End: 2025-02-04 | Stop reason: HOSPADM

## 2025-01-31 RX ORDER — HYDROMORPHONE HYDROCHLORIDE 2 MG/ML
0.25 INJECTION, SOLUTION INTRAMUSCULAR; INTRAVENOUS; SUBCUTANEOUS EVERY 5 MIN PRN
Status: DISCONTINUED | OUTPATIENT
Start: 2025-01-31 | End: 2025-01-31 | Stop reason: HOSPADM

## 2025-01-31 RX ORDER — SODIUM CHLORIDE 0.9 % (FLUSH) 0.9 %
5-40 SYRINGE (ML) INJECTION EVERY 12 HOURS SCHEDULED
Status: DISCONTINUED | OUTPATIENT
Start: 2025-01-31 | End: 2025-01-31 | Stop reason: HOSPADM

## 2025-01-31 RX ORDER — SODIUM CHLORIDE 0.9 % (FLUSH) 0.9 %
5-40 SYRINGE (ML) INJECTION EVERY 12 HOURS SCHEDULED
Status: DISCONTINUED | OUTPATIENT
Start: 2025-01-31 | End: 2025-02-04 | Stop reason: HOSPADM

## 2025-01-31 RX ORDER — PROPOFOL 10 MG/ML
INJECTION, EMULSION INTRAVENOUS
Status: DISCONTINUED | OUTPATIENT
Start: 2025-01-31 | End: 2025-01-31 | Stop reason: SDUPTHER

## 2025-01-31 RX ORDER — SODIUM CHLORIDE 9 MG/ML
INJECTION, SOLUTION INTRAVENOUS PRN
Status: DISCONTINUED | OUTPATIENT
Start: 2025-01-31 | End: 2025-02-04 | Stop reason: HOSPADM

## 2025-01-31 RX ORDER — DEXAMETHASONE SODIUM PHOSPHATE 4 MG/ML
INJECTION, SOLUTION INTRA-ARTICULAR; INTRALESIONAL; INTRAMUSCULAR; INTRAVENOUS; SOFT TISSUE
Status: DISCONTINUED | OUTPATIENT
Start: 2025-01-31 | End: 2025-01-31 | Stop reason: SDUPTHER

## 2025-01-31 RX ORDER — SODIUM CHLORIDE 9 MG/ML
INJECTION, SOLUTION INTRAVENOUS PRN
Status: DISCONTINUED | OUTPATIENT
Start: 2025-01-31 | End: 2025-01-31 | Stop reason: HOSPADM

## 2025-01-31 RX ORDER — DIPHENHYDRAMINE HYDROCHLORIDE 50 MG/ML
12.5 INJECTION INTRAMUSCULAR; INTRAVENOUS
Status: ACTIVE | OUTPATIENT
Start: 2025-01-31 | End: 2025-02-01

## 2025-01-31 RX ORDER — ONDANSETRON 2 MG/ML
4 INJECTION INTRAMUSCULAR; INTRAVENOUS
Status: ACTIVE | OUTPATIENT
Start: 2025-01-31 | End: 2025-02-01

## 2025-01-31 RX ORDER — ROCURONIUM BROMIDE 10 MG/ML
INJECTION, SOLUTION INTRAVENOUS
Status: DISCONTINUED | OUTPATIENT
Start: 2025-01-31 | End: 2025-01-31 | Stop reason: SDUPTHER

## 2025-01-31 RX ORDER — HYDROMORPHONE HYDROCHLORIDE 2 MG/ML
0.5 INJECTION, SOLUTION INTRAMUSCULAR; INTRAVENOUS; SUBCUTANEOUS EVERY 5 MIN PRN
Status: DISCONTINUED | OUTPATIENT
Start: 2025-01-31 | End: 2025-01-31 | Stop reason: HOSPADM

## 2025-01-31 RX ADMIN — HYDROMORPHONE HYDROCHLORIDE 0.25 MG: 1 INJECTION, SOLUTION INTRAMUSCULAR; INTRAVENOUS; SUBCUTANEOUS at 05:18

## 2025-01-31 RX ADMIN — HYDROMORPHONE HYDROCHLORIDE 0.2 MG: 2 INJECTION, SOLUTION INTRAMUSCULAR; INTRAVENOUS; SUBCUTANEOUS at 16:28

## 2025-01-31 RX ADMIN — HYDROMORPHONE HYDROCHLORIDE 0.5 MG: 2 INJECTION, SOLUTION INTRAMUSCULAR; INTRAVENOUS; SUBCUTANEOUS at 16:45

## 2025-01-31 RX ADMIN — PROPOFOL 20 MG: 10 INJECTION, EMULSION INTRAVENOUS at 15:22

## 2025-01-31 RX ADMIN — INSULIN LISPRO 3 UNITS: 100 INJECTION, SOLUTION INTRAVENOUS; SUBCUTANEOUS at 01:25

## 2025-01-31 RX ADMIN — HYDROMORPHONE HYDROCHLORIDE 0.2 MG: 2 INJECTION, SOLUTION INTRAMUSCULAR; INTRAVENOUS; SUBCUTANEOUS at 15:10

## 2025-01-31 RX ADMIN — PHENYLEPHRINE HYDROCHLORIDE 40 MCG/MIN: 10 INJECTION INTRAVENOUS at 15:56

## 2025-01-31 RX ADMIN — ROSUVASTATIN CALCIUM 10 MG: 10 TABLET, FILM COATED ORAL at 21:04

## 2025-01-31 RX ADMIN — PHENYLEPHRINE HYDROCHLORIDE 100 MCG: 10 INJECTION INTRAVENOUS at 15:59

## 2025-01-31 RX ADMIN — SODIUM CHLORIDE: 9 INJECTION, SOLUTION INTRAVENOUS at 15:00

## 2025-01-31 RX ADMIN — Medication 10 ML: at 21:04

## 2025-01-31 RX ADMIN — ROCURONIUM BROMIDE 50 MG: 10 INJECTION, SOLUTION INTRAVENOUS at 15:19

## 2025-01-31 RX ADMIN — OXYCODONE 5 MG: 5 TABLET ORAL at 18:05

## 2025-01-31 RX ADMIN — METOPROLOL TARTRATE 50 MG: 50 TABLET, FILM COATED ORAL at 21:04

## 2025-01-31 RX ADMIN — DEXAMETHASONE SODIUM PHOSPHATE 4 MG: 4 INJECTION, SOLUTION INTRAMUSCULAR; INTRAVENOUS at 15:27

## 2025-01-31 RX ADMIN — CEFEPIME 1000 MG: 1 INJECTION, POWDER, FOR SOLUTION INTRAMUSCULAR; INTRAVENOUS at 16:12

## 2025-01-31 RX ADMIN — PHENYLEPHRINE HYDROCHLORIDE 100 MCG: 10 INJECTION INTRAVENOUS at 15:57

## 2025-01-31 RX ADMIN — SEVELAMER CARBONATE 800 MG: 800 TABLET, FILM COATED ORAL at 18:05

## 2025-01-31 RX ADMIN — PHENYLEPHRINE HYDROCHLORIDE 100 MCG: 10 INJECTION INTRAVENOUS at 15:55

## 2025-01-31 RX ADMIN — HYDROMORPHONE HYDROCHLORIDE 0.2 MG: 2 INJECTION, SOLUTION INTRAMUSCULAR; INTRAVENOUS; SUBCUTANEOUS at 15:33

## 2025-01-31 RX ADMIN — HYDROMORPHONE HYDROCHLORIDE 0.4 MG: 2 INJECTION, SOLUTION INTRAMUSCULAR; INTRAVENOUS; SUBCUTANEOUS at 16:18

## 2025-01-31 RX ADMIN — PROPOFOL 50 MG: 10 INJECTION, EMULSION INTRAVENOUS at 15:19

## 2025-01-31 RX ADMIN — ACETAMINOPHEN 650 MG: 325 TABLET ORAL at 21:03

## 2025-01-31 RX ADMIN — ONDANSETRON 4 MG: 2 INJECTION, SOLUTION INTRAMUSCULAR; INTRAVENOUS at 16:02

## 2025-01-31 RX ADMIN — SUGAMMADEX 200 MG: 100 INJECTION, SOLUTION INTRAVENOUS at 16:14

## 2025-01-31 RX ADMIN — LIDOCAINE HYDROCHLORIDE 60 MG: 20 INJECTION, SOLUTION INFILTRATION; PERINEURAL at 15:19

## 2025-01-31 RX ADMIN — ESMOLOL HYDROCHLORIDE 10 MG: 100 INJECTION, SOLUTION INTRAVENOUS at 15:43

## 2025-01-31 ASSESSMENT — PAIN SCALES - GENERAL
PAINLEVEL_OUTOF10: 8
PAINLEVEL_OUTOF10: 0
PAINLEVEL_OUTOF10: 7
PAINLEVEL_OUTOF10: 7
PAINLEVEL_OUTOF10: 0

## 2025-01-31 ASSESSMENT — PAIN - FUNCTIONAL ASSESSMENT
PAIN_FUNCTIONAL_ASSESSMENT: ACTIVITIES ARE NOT PREVENTED
PAIN_FUNCTIONAL_ASSESSMENT: 0-10
PAIN_FUNCTIONAL_ASSESSMENT: ACTIVITIES ARE NOT PREVENTED
PAIN_FUNCTIONAL_ASSESSMENT: 0-10

## 2025-01-31 ASSESSMENT — PAIN DESCRIPTION - DESCRIPTORS
DESCRIPTORS: ACHING
DESCRIPTORS: ACHING;DULL
DESCRIPTORS: DISCOMFORT
DESCRIPTORS: ACHING;DULL

## 2025-01-31 ASSESSMENT — PAIN DESCRIPTION - LOCATION
LOCATION: SHOULDER

## 2025-01-31 ASSESSMENT — PAIN DESCRIPTION - ORIENTATION
ORIENTATION: RIGHT

## 2025-01-31 ASSESSMENT — PAIN SCALES - WONG BAKER
WONGBAKER_NUMERICALRESPONSE: HURTS EVEN MORE
WONGBAKER_NUMERICALRESPONSE: NO HURT
WONGBAKER_NUMERICALRESPONSE: HURTS LITTLE MORE

## 2025-01-31 ASSESSMENT — PAIN DESCRIPTION - PAIN TYPE
TYPE: SURGICAL PAIN
TYPE: SURGICAL PAIN

## 2025-01-31 ASSESSMENT — PAIN DESCRIPTION - FREQUENCY
FREQUENCY: INTERMITTENT

## 2025-01-31 NOTE — PROGRESS NOTES
The patient is in the OR today and could not be seen today.  Chart reviewed.  Continue current antibiotics.  Follow-up on the synovial fluid cultures      Jadiel Esteves MD, MPH, FACP, FIDSA  1/31/2025, 4:05 PM  Central Office Phone: [phone]  Central Office Fax: [phone]    [facility] Infectious Disease   [address], [address] ([address])  [place] [address]  [place] [facility] days:  Tuesday & Thursday AM    [facility] Infectious Disease  [address] , [address] (Medical office Building)  [place], [place], [address]  [facility] days: Wednesday AM

## 2025-01-31 NOTE — ANESTHESIA POSTPROCEDURE EVALUATION
Department of Anesthesiology  Postprocedure Note    Patient: Danny Rock  MRN: [id number]  YOB: 1950  Date of evaluation: 1/31/2025    Procedure Summary       Date: 01/31/25 Room / Location: [address]    Anesthesia Start: 1513 Anesthesia Stop:     Procedure: RIGHT SHOULDER OPEN INCISION AND DRAINAGE (Right: Shoulder) Diagnosis:       Abscess of right arm      (Abscess of right arm [L02.413])    Surgeons: Nicolas Jacob MD Responsible Provider: Jeancarlos Martinez MD    Anesthesia Type: general ASA Status: 3            Anesthesia Type: No value filed.    Ye Phase I: Ye Score: 10    Ye Phase II:      Anesthesia Post Evaluation    Patient location during evaluation: PACU  Patient participation: complete - patient participated  Level of consciousness: awake and alert  Pain score: 2  Airway patency: patent  Nausea & Vomiting: no vomiting  Cardiovascular status: blood pressure returned to baseline  Respiratory status: acceptable  Hydration status: euvolemic  Multimodal analgesia pain management approach  Pain management: adequate    No notable events documented.

## 2025-01-31 NOTE — PLAN OF CARE
Problem: Safety - Adult  Goal: Free from fall injury  1/31/2025 0155 by Nathalie Hernández LPN  Outcome: Progressing     Problem: Chronic Conditions and Co-morbidities  Goal: Patient's chronic conditions and co-morbidity symptoms are monitored and maintained or improved  1/31/2025 0155 by Nathalie Hernández LPN  Outcome: Progressing     Problem: ABCDS Injury Assessment  Goal: Absence of physical injury  1/31/2025 0155 by Nathalie Hernández LPN  Outcome: Progressing     Problem: Skin/Tissue Integrity  Goal: Skin integrity remains intact  Description: 1.  Monitor for areas of redness and/or skin breakdown  2.  Assess vascular access sites hourly  3.  Every 4-6 hours minimum:  Change oxygen saturation probe site  4.  Every 4-6 hours:  If on nasal continuous positive airway pressure, respiratory therapy assess nares and determine need for appliance change or resting period  Outcome: Progressing

## 2025-01-31 NOTE — PLAN OF CARE
Problem: Safety - Adult  Goal: Free from fall injury  1/31/2025 1137 by Jil Rangel RN  Outcome: Progressing  1/31/2025 0155 by Nathalie Hernández LPN  Outcome: Progressing     Problem: Chronic Conditions and Co-morbidities  Goal: Patient's chronic conditions and co-morbidity symptoms are monitored and maintained or improved  1/31/2025 1137 by Jil Rangel RN  Outcome: Progressing  1/31/2025 0155 by Nathalie Hernández LPN  Outcome: Progressing     Problem: ABCDS Injury Assessment  Goal: Absence of physical injury  1/31/2025 1137 by Jil Rangel RN  Outcome: Progressing  1/31/2025 0155 by Nathalie Hernández LPN  Outcome: Progressing     Problem: Skin/Tissue Integrity  Goal: Skin integrity remains intact  Description: 1.  Monitor for areas of redness and/or skin breakdown  2.  Assess vascular access sites hourly  3.  Every 4-6 hours minimum:  Change oxygen saturation probe site  4.  Every 4-6 hours:  If on nasal continuous positive airway pressure, respiratory therapy assess nares and determine need for appliance change or resting period  1/31/2025 1137 by Jil Rangel RN  Outcome: Progressing  1/31/2025 0155 by Nathalie Hernández LPN  Outcome: Progressing     Problem: Pain  Goal: Verbalizes/displays adequate comfort level or baseline comfort level  Outcome: Progressing

## 2025-01-31 NOTE — PROGRESS NOTES
Hospitalist Progress Note      PCP: Yesi Rosa APRN - CNP    Date of Admission: 1/28/2025    LOS: 2    Chief Complaint:   Chief Complaint   Patient presents with    Shoulder Injury     Right shoulder pain after fall 6 months.        Case Summary:   74-year-old gentleman with history of CAD, hypertension, history of CVA, paroxysmal atrial fibrillation, type 2 diabetes, history of renal intractable right shoulder pain for hydration.  Shoulder abscess    CT of the right shoulder noted for fluid lection with peripheral enhancement on suggestive of right shoulder abscess      Active Hospital Problems    Diagnosis Date Noted    Coronary artery disease due to lipid rich plaque [I25.10, I25.83] 09/27/2011     Priority: High    Essential hypertension [I10] 09/27/2011     Priority: Medium    HLD (hyperlipidemia) [E78.5] 09/27/2011     Priority: Medium    ESRD on hemodialysis (HCC) [N18.6, Z99.2] 01/30/2025    Abscess of right shoulder [L02.413] 01/29/2025    CRP elevated [R79.82] 01/29/2025    Elevated sed rate [R70.0] 01/29/2025    Abscess of right arm [L02.413] 01/29/2025    PAF (paroxysmal atrial fibrillation) (HCC) [I48.0] 10/31/2023    Diabetes mellitus [E11.9] 02/14/2013         Principal Problem:    Abscess of right shoulder: With right shoulder pain and decreased mobility.  CT of the right shoulder noted for probable abscess patient with elevated inflammatory markers on admission CRP of 338 and .  Status post right shoulder arthrocentesis 1/29/2024.  Cultures with no growth to date  - Follow-up arthrocentesis cultures  - Continue [place] antibiotics  - ID following with recommendations  - Orthopedics following with recommendation and plan for debridement today  - Continue pain management    Active Problems:    Coronary artery disease due to lipid rich plaque: With no chest pains or shortness of breath.  Continue aspirin, metoprolol, rosuvastatin    HLD (hyperlipidemia): On statin    Essential

## 2025-01-31 NOTE — PROGRESS NOTES
MD Gordon Frankel MD Aldo Estella, DO              Office: [phone]                      Fax: [phone]          NEPHROLOGY INPATIENT PROGRESS  NOTE:     PATIENT NAME: Danny Rock  : 1950  MRN: [id number]        IMPRESSION / RECOMMENDATION:      Admitted on:  2025  For:    Hospital Problems             Last Modified POA    * (Principal) Abscess of right shoulder 2025 Yes    Coronary artery disease due to lipid rich plaque 2025 Yes    Overview Signed 2011 11:20 AM by Laura Pelletier     Angiogram 2004 mild diffuse CAD with an EF 60-65%  CAPO GXT showed normal perfusion and normal EF of 67%         HLD (hyperlipidemia) 2025 Yes    Overview Signed 2011 11:19 AM by Laura Pelletier     7/21/10   HDL 36 LDL 65   2011  TG 46 HDL 41 LDL 61         Essential hypertension 2025 Yes    Diabetes mellitus 2025 Yes    PAF (paroxysmal atrial fibrillation) (HCC) 2025 Yes    CRP elevated 2025 Yes    Elevated sed rate 2025 Yes    Abscess of right arm 2025 Unknown    ESRD on hemodialysis (HCC) 2025 Yes   -Orthopedic  team consulted    1.  ESRD-outpatient HD [facility] S at [facility] under our care.      S/p HD on Thursday-  Tolerating it well  Solutes lower  Decrease HD time  D/w pt to get 24 hrs urine outpt for CrCL for renal recovery monitoring    Next HD Saturday -       HD access   Using TDC currently  Left radiocephalic AVF, place mid 2024  Av access bleeding, it was most likely from the IV insertion  Fup w vasc sx-  AVF likely not ready, has appointment at a 0 on  for maturation evaluation of AVF    2.  Electrolytes are acceptable.  3.  Anemia due to CKD POONAM with HD. At goal   4.  History of coronary artery disease  5.  History of renal cancer status post right nephrectomy  6.  History of paroxysmal atrial fibrillation  7.  History of hyperlipidemia-on statin  8.   Daily  melatonin 3 MG TABS tablet, Take 1 tablet by mouth daily  zinc sulfate (ZINCATE) 220 (50 Zn)  mg capsule - elemental zinc, Take 1 capsule by mouth daily  Cyanocobalamin (VITAMIN B-12 PO), Take 1 tablet by mouth daily  aspirin 81 MG chewable tablet, Take 1 tablet by mouth daily  ascorbic acid (VITAMIN C) 500 MG tablet, Take 1 tablet by mouth daily  vitamin D3 (CHOLECALCIFEROL) 125 MCG (5000 UT) TABS tablet, Take 1 tablet by mouth daily  [DISCONTINUED] oxyCODONE-acetaminophen (PERCOCET) 5-325 MG per tablet, Take 1 tablet by mouth every 8 hours as needed for Pain. Max Daily Amount: 3 tablets  furosemide (LASIX) 40 MG tablet, Take 1 tablet by mouth See Admin Instructions Take 2 times on non-dialysis days. (Patient not taking: Reported on 1/29/2025)  metoprolol tartrate (LOPRESSOR) 50 MG tablet, Take 1 tablet by mouth 2 times daily (Patient taking differently: Take 1 tablet by mouth 2 times daily Does not take in the morning on dialysis days T/Th/Sa)  omeprazole (PRILOSEC) 20 MG capsule, Take 1 capsule by mouth every other day     Inpatient Medications:  Scheduled Meds:   cefepime  1,000 mg IntraVENous Q24H    [Held by provider] apixaban  5 mg Oral BID    aspirin  81 mg Oral Daily    pantoprazole  40 mg Oral QAM AC    rosuvastatin  10 mg Oral Nightly    sevelamer  800 mg Oral TID WC    insulin lispro  0-4 Units SubCUTAneous 4 times per day    vancomycin (VANCOCIN) intermittent dosing (placeholder)   Other RX Placeholder    metoprolol tartrate  50 mg Oral Daily    And    metoprolol tartrate  50 mg Oral Once per day on Sunday Monday Wednesday Friday    acetaminophen  650 mg Oral TID     Continuous Infusions:   dextrose       PRN Meds:.heparin (porcine), acetaminophen **OR** acetaminophen, ondansetron, HYDROmorphone, furosemide, dextrose bolus **OR** dextrose bolus, glucagon (rDNA), dextrose, oxyCODONE             Allergies as needed for my evaluation. : Amoxicillin, Bisoprolol-hydrochlorothiazide, Cisapride,

## 2025-01-31 NOTE — ANESTHESIA PRE PROCEDURE
Department of Anesthesiology  Preprocedure Note       Name:  Danny Rock   Age:  74 y.o.  :  1950                                          MRN:  [id number]         Date:  2025      Surgeon: Surgeon(s):  Nicolas Jacob MD    Procedure: Procedure(s):  RIGHT SHOULDER ARTHROSCOPIC INCISION AND DRAINAGE WITH OPEN INCISION AND DRAINAGE, 97570, [id number]    Medications prior to admission:   Prior to Admission medications    Medication Sig Start Date End Date Taking? Authorizing Provider   cabozantinib s-malate (CABOMETYX) 40 MG TABS chemo tablet Take 1 tablet by mouth daily   Yes Svetlana Deleon MD   oxyCODONE-acetaminophen (PERCOCET) 5-325 MG per tablet Take 1 tablet by mouth every 8 hours as needed for Pain. Max Daily Amount: 3 tablets   Yes Svetlana Deleon MD   B Complex-C-Folic Acid (EVERETT-ISABELLA) TABS Take 1 tablet by mouth daily   Yes Svetlana Deleon MD   sevelamer (RENVELA) 800 MG tablet Take 1 tablet by mouth 3 times daily (with meals)   Yes Svetlana Deleon MD   rosuvastatin (CRESTOR) 10 MG tablet Take 10 mg every other day 10/1/24  Yes Rene Boo MD   apixaban (ELIQUIS) 5 MG TABS tablet Take 1 tablet by mouth 2 times daily 24  Yes Rene Boo MD   insulin lispro, 1 Unit Dial, (HUMALOG KWIKPEN) 100 UNIT/ML SOPN Sc before meals per sliding scale  Patient taking differently: 3 times daily (before meals) Takes as needed up to 6 units with meals 2/10/24  Yes Stacey Shelby MD   Alpha-Lipoic Acid 100 MG CAPS Take 1 capsule by mouth Daily   Yes Svetlana Deleon MD   melatonin 3 MG TABS tablet Take 1 tablet by mouth daily   Yes Svetlana Deleon MD   zinc sulfate (ZINCATE) 220 (50 Zn)  mg capsule - elemental zinc Take 1 capsule by mouth daily   Yes Svetlana Deleon MD   Cyanocobalamin (VITAMIN B-12 PO) Take 1 tablet by mouth daily   Yes Svetlana Deleon MD   aspirin 81 MG chewable tablet Take 1 tablet by mouth daily 23  Yes Mikael Coronado MD

## 2025-02-01 LAB
ALBUMIN SERPL-MCNC: 2.7 G/DL (ref 3.4–5)
ANION GAP SERPL CALCULATED.3IONS-SCNC: 14 MMOL/L (ref 3–16)
BASOPHILS # BLD: 0 K/UL (ref 0–0.2)
BASOPHILS NFR BLD: 0.3 %
BUN SERPL-MCNC: 45 MG/DL (ref 7–20)
CALCIUM SERPL-MCNC: 9.4 MG/DL (ref 8.3–10.6)
CHLORIDE SERPL-SCNC: 102 MMOL/L (ref 99–110)
CO2 SERPL-SCNC: 24 MMOL/L (ref 21–32)
CREAT SERPL-MCNC: 2.9 MG/DL (ref 0.8–1.3)
DEPRECATED RDW RBC AUTO: 17.1 % (ref 12.4–15.4)
EOSINOPHIL # BLD: 0 K/UL (ref 0–0.6)
EOSINOPHIL NFR BLD: 0 %
GFR SERPLBLD CREATININE-BSD FMLA CKD-EPI: 22 ML/MIN/{1.73_M2}
GLUCOSE BLD-MCNC: 215 MG/DL (ref 70–99)
GLUCOSE BLD-MCNC: 283 MG/DL (ref 70–99)
GLUCOSE BLD-MCNC: 290 MG/DL (ref 70–99)
GLUCOSE BLD-MCNC: 333 MG/DL (ref 70–99)
GLUCOSE SERPL-MCNC: 267 MG/DL (ref 70–99)
HCT VFR BLD AUTO: 32.4 % (ref 40.5–52.5)
HGB BLD-MCNC: 10.4 G/DL (ref 13.5–17.5)
LYMPHOCYTES # BLD: 0.9 K/UL (ref 1–5.1)
LYMPHOCYTES NFR BLD: 11.7 %
MCH RBC QN AUTO: 32.4 PG (ref 26–34)
MCHC RBC AUTO-ENTMCNC: 32 G/DL (ref 31–36)
MCV RBC AUTO: 101.2 FL (ref 80–100)
MONOCYTES # BLD: 0.4 K/UL (ref 0–1.3)
MONOCYTES NFR BLD: 5.1 %
NEUTROPHILS # BLD: 6.4 K/UL (ref 1.7–7.7)
NEUTROPHILS NFR BLD: 82.9 %
PERFORMED ON: ABNORMAL
PHOSPHATE SERPL-MCNC: 4.2 MG/DL (ref 2.5–4.9)
PLATELET # BLD AUTO: 408 K/UL (ref 135–450)
PMV BLD AUTO: 7 FL (ref 5–10.5)
POTASSIUM SERPL-SCNC: 4.8 MMOL/L (ref 3.5–5.1)
RBC # BLD AUTO: 3.2 M/UL (ref 4.2–5.9)
SODIUM SERPL-SCNC: 140 MMOL/L (ref 136–145)
VANCOMYCIN SERPL-MCNC: 10.7 UG/ML
WBC # BLD AUTO: 7.8 K/UL (ref 4–11)

## 2025-02-01 PROCEDURE — 36415 COLL VENOUS BLD VENIPUNCTURE: CPT

## 2025-02-01 PROCEDURE — 90935 HEMODIALYSIS ONE EVALUATION: CPT

## 2025-02-01 PROCEDURE — 6360000002 HC RX W HCPCS: Performed by: NURSE PRACTITIONER

## 2025-02-01 PROCEDURE — 1200000000 HC SEMI PRIVATE

## 2025-02-01 PROCEDURE — 85025 COMPLETE CBC W/AUTO DIFF WBC: CPT

## 2025-02-01 PROCEDURE — 80202 ASSAY OF VANCOMYCIN: CPT

## 2025-02-01 PROCEDURE — 6360000002 HC RX W HCPCS: Performed by: INTERNAL MEDICINE

## 2025-02-01 PROCEDURE — 2500000003 HC RX 250 WO HCPCS: Performed by: ANESTHESIOLOGY

## 2025-02-01 PROCEDURE — 6370000000 HC RX 637 (ALT 250 FOR IP): Performed by: NURSE PRACTITIONER

## 2025-02-01 PROCEDURE — 80069 RENAL FUNCTION PANEL: CPT

## 2025-02-01 PROCEDURE — G0545 PR INHERENT VISIT TO INPT: HCPCS | Performed by: INTERNAL MEDICINE

## 2025-02-01 PROCEDURE — 99233 SBSQ HOSP IP/OBS HIGH 50: CPT | Performed by: INTERNAL MEDICINE

## 2025-02-01 PROCEDURE — 6370000000 HC RX 637 (ALT 250 FOR IP): Performed by: STUDENT IN AN ORGANIZED HEALTH CARE EDUCATION/TRAINING PROGRAM

## 2025-02-01 PROCEDURE — 2580000003 HC RX 258: Performed by: NURSE PRACTITIONER

## 2025-02-01 RX ORDER — VANCOMYCIN HYDROCHLORIDE 1 G/200ML
1000 INJECTION, SOLUTION INTRAVENOUS ONCE
Status: COMPLETED | OUTPATIENT
Start: 2025-02-01 | End: 2025-02-01

## 2025-02-01 RX ORDER — CYCLOBENZAPRINE HCL 10 MG
10 TABLET ORAL 3 TIMES DAILY PRN
Status: DISCONTINUED | OUTPATIENT
Start: 2025-02-01 | End: 2025-02-04 | Stop reason: HOSPADM

## 2025-02-01 RX ADMIN — OXYCODONE 5 MG: 5 TABLET ORAL at 21:45

## 2025-02-01 RX ADMIN — SEVELAMER CARBONATE 800 MG: 800 TABLET, FILM COATED ORAL at 16:16

## 2025-02-01 RX ADMIN — ROSUVASTATIN CALCIUM 10 MG: 10 TABLET, FILM COATED ORAL at 21:45

## 2025-02-01 RX ADMIN — CEFEPIME 1000 MG: 1 INJECTION, POWDER, FOR SOLUTION INTRAMUSCULAR; INTRAVENOUS at 18:56

## 2025-02-01 RX ADMIN — ACETAMINOPHEN 650 MG: 325 TABLET ORAL at 16:16

## 2025-02-01 RX ADMIN — INSULIN LISPRO 3 UNITS: 100 INJECTION, SOLUTION INTRAVENOUS; SUBCUTANEOUS at 01:55

## 2025-02-01 RX ADMIN — METOPROLOL TARTRATE 50 MG: 50 TABLET, FILM COATED ORAL at 21:45

## 2025-02-01 RX ADMIN — PANTOPRAZOLE SODIUM 40 MG: 40 TABLET, DELAYED RELEASE ORAL at 05:34

## 2025-02-01 RX ADMIN — INSULIN LISPRO 2 UNITS: 100 INJECTION, SOLUTION INTRAVENOUS; SUBCUTANEOUS at 08:54

## 2025-02-01 RX ADMIN — ACETAMINOPHEN 650 MG: 325 TABLET ORAL at 08:55

## 2025-02-01 RX ADMIN — HEPARIN SODIUM 3600 UNITS: 1000 INJECTION INTRAVENOUS; SUBCUTANEOUS at 14:27

## 2025-02-01 RX ADMIN — SEVELAMER CARBONATE 800 MG: 800 TABLET, FILM COATED ORAL at 08:55

## 2025-02-01 RX ADMIN — INSULIN LISPRO 2 UNITS: 100 INJECTION, SOLUTION INTRAVENOUS; SUBCUTANEOUS at 17:28

## 2025-02-01 RX ADMIN — VANCOMYCIN HYDROCHLORIDE 1000 MG: 1 INJECTION, SOLUTION INTRAVENOUS at 16:15

## 2025-02-01 RX ADMIN — ASPIRIN 81 MG: 81 TABLET, CHEWABLE ORAL at 08:55

## 2025-02-01 RX ADMIN — Medication 10 ML: at 21:48

## 2025-02-01 RX ADMIN — Medication 10 ML: at 08:57

## 2025-02-01 RX ADMIN — OXYCODONE 5 MG: 5 TABLET ORAL at 08:56

## 2025-02-01 RX ADMIN — ACETAMINOPHEN 650 MG: 325 TABLET ORAL at 21:46

## 2025-02-01 NOTE — PROGRESS NOTES
cervicothoracic junction.     1.  Advanced arthritis of the right glenohumeral and acromioclavicular joint with joint space narrowing, articular sclerosis, subchondral cysts, marginal osteophytes, and loose bodies.  No acute fractures are identified. 2.  Glenohumeral joint effusion with is heterogenous density of the fluid and scattered loose bodies.  Hemorrhagic or septic arthritis is a consideration. 3.  Fluid collection the musculature about the anterior edge of the shoulder and upper arm measuring up to a 5 x 4.5 x 6 cm with some internal gas concerning for abscess some other areas of myositis may be present in the musculature about the posterior edge of the shoulder but with limited evaluation on the current unenhanced study. Correlate with clinical symptoms, white count, and consider fluid sampling. RECOMMENDATIONS: MRI with contrast may provide better delineation of the margins of the fluid collection and exact locations of involvement..     XR SHOULDER RIGHT (MIN 2 VIEWS)    Result Date: 1/28/2025  EXAMINATION: THREE XRAY VIEWS OF THE RIGHT SHOULDER 1/28/2025 9:55 pm COMPARISON: None. HISTORY: ORDERING SYSTEM PROVIDED HISTORY: pain, swelling TECHNOLOGIST PROVIDED HISTORY: Reason for exam:->pain, swelling Reason for Exam: pain, swelling FINDINGS: No acute fracture of the clavicle or displacement of the acromioclavicular joint.  There arthritic changes at the expected acromioclavicular joint and some ossifications along the inferior aspect.  There is no acute fracture or dislocation of the humeral head.  There is sclerosis and cystic changes along the margin of the humeral head combined with marginal osteophytes.  Sclerosis and subchondral cysts are also noted at the margin of the glenoid. No radiopaque foreign bodies around the shoulder.  Soft tissue swelling around the shoulder.  Few small foci of lucency in the soft tissues lateral to the proximal humerus may be small foci of gas.  Dual lumen right central  venous catheter is noted over the chest.     1.  No acute fracture or dislocation is identified at the right shoulder. 2.  Moderate arthritis is present at the glenohumeral and acromioclavicular joints.  Sclerosis and subchondral cysts along the articular margins and some areas of ossification inferior to the acromioclavicular joint.  May have features of underlying joint effusion and chronic rotator cuff pathology. 3.  Few small foci of lucency over the soft tissues lateral to the proximal humerus are likely small foci of gas.  Correlate if recent injections or signs of infection.           ======================================================================  Please note that this chart entry has been generated using voice recognition software, mainly.  So please excuse brevity and/or typos.  While every effort and attempts have been made to ensure the accuracy of this automated transcription, some errors may have occurred; and certain words and phrases in transcription may not be entered as intended.  However, inadvertent computerized transcription errors may be present.  So please contact us if any clarification needed.

## 2025-02-01 NOTE — PROGRESS NOTES
Treatment time: 3 hours     Net UF: 2 L     Pre weight: 73.6 kg  Post weight: 71.6 kg    Access used: Left chest TDC   Access function: tolerated 300 BFR     Medications or blood products given: Heparin dwells     Regular outpatient schedule: TTS     Summary of response to treatment: uncomplicated. Tolerated well. 2k bath used per SSK, 4.8k. HGB 10.4, no epogen given .     Copy of dialysis treatment record placed in chart, to be scanned into EMR.

## 2025-02-01 NOTE — PROGRESS NOTES
the culture results and clinical progress and will make further recommendations accordingly.  Continue close vitals monitoring.  Maintain good glycemic control.  Fall precautions.  Aspiration precautions.  Continue to watch for new fever or diarrhea.  DVT prophylaxis.  I reviewed the notes from other physicians and consultants involved in the patient's treatment teams in  detail today  Discussed all above with patient and RN.  Discussed in detail with the patient's wife at bedside    Current Isolation:    No active isolations     Drug Monitoring:    Continue monitoring for antibiotic related nephrotoxicity, hepatotoxicity and other toxicities as follows: CBC, CMP, vancomycin level  Continue to watch for following: new or worsening fever, new hypotension, hives, lip swelling and redness or purulence at vascular access sites.     I/v access Management:    Continue to monitor i.v access sites for erythema, induration, discharge or tenderness.   As always, continue efforts to minimize tubes/lines/drains as clinically appropriate to reduce chances of line associated infections.    Patient education and counseling:      The patient was educated in detail about the side-effects of various antibiotics and things to watch for like new rashes, lip swelling, severe reaction, worsening diarrhea, break through fever etc.  Discussed patient's condition and what to expect. All of the patient's questions were addressed in a satisfactory manner and patient verbalized understanding all instructions.    TIME SPENT TODAY:     - I did a detailed face-to-face evaluation of the patient including interval history and review of systems from patient/available family members/RN/patient care team and did a thorough bedside examination of the patient today.  I spent over  52 minutes today on this complex inpatient encounter (including interval history, physical exam, detailed review of data including labs, cultures, imaging studies and  (HCC) N17.9    LBBB (left bundle branch block) I44.7    H/O right nephrectomy Z90.5    Anemia D64.9    Acute ischemic stroke (HCC) I63.9    Overweight (BMI 25.0-29.9) E66.3    AIN (acute interstitial nephritis) N10    Sterile pyuria R82.81    Current chronic use of systemic steroids Z79.52    H/O systemic steroid therapy Z92.241    Diabetes education, encounter for Z71.89    Secondary hypercoagulable state (HCC) D68.69    Pneumothorax J93.9    Left inguinal hernia K40.90    Abscess of right shoulder L02.413    CRP elevated R79.82    Elevated sed rate R70.0    Abscess of right arm L02.413    ESRD on hemodialysis (HCC) N18.6, Z99.2       Please note that this chart was generated using Dragon dictation software. Although every effort was made to ensure the accuracy of this automated transcription, some errors in transcription may have occurred inadvertently. If you may need any clarification, please do not hesitate to contact me through EPIC or at the phone number provided below with my electronic signature.  Any pictures or media included in this note were obtained after taking informed verbal consent from the patient and with their approval to include those in the patient's medical record.        Jadiel Esteves MD, MPH, FACP, [facility]  2/1/2025, 6:56 PM  Central Office Phone: [phone]  Central Office Fax: [phone]    [facility] Infectious Disease   [address], [address] ([address])  [place] [address]  [facility] days:  Tuesday & Thursday AM    [facility] Infectious Disease  [address] , [address] (Medical office Building)  [place], [place], [address]  [facility] days: Wednesday AM

## 2025-02-01 NOTE — PROGRESS NOTES
Shift assessment complete. VSS. Patient resting in bed in semi fowlers position. Respirations even and unlabored on room air. Spouse at bedside. Call light within reach. Fall precautions in place. Bed in low, locked position. No additional needs noted at this time.

## 2025-02-01 NOTE — PROGRESS NOTES
Hospitalist Progress Note      PCP: Yesi Rosa APRN - CNP    Date of Admission: 1/28/2025    LOS: 3    Chief Complaint:   Chief Complaint   Patient presents with    Shoulder Injury     Right shoulder pain after fall 6 months.        Case Summary:   74-year-old gentleman with history of CAD, hypertension, history of CVA, paroxysmal atrial fibrillation, type 2 diabetes, history of renal intractable right shoulder pain for hydration.  Shoulder abscess    CT of the right shoulder noted for fluid lection with peripheral enhancement on suggestive of right shoulder abscess      Active Hospital Problems    Diagnosis Date Noted    Coronary artery disease due to lipid rich plaque [I25.10, I25.83] 09/27/2011     Priority: High    Essential hypertension [I10] 09/27/2011     Priority: Medium    HLD (hyperlipidemia) [E78.5] 09/27/2011     Priority: Medium    ESRD on hemodialysis (HCC) [N18.6, Z99.2] 01/30/2025    Abscess of right shoulder [L02.413] 01/29/2025    CRP elevated [R79.82] 01/29/2025    Elevated sed rate [R70.0] 01/29/2025    Abscess of right arm [L02.413] 01/29/2025    PAF (paroxysmal atrial fibrillation) (HCC) [I48.0] 10/31/2023    Diabetes mellitus [E11.9] 02/14/2013         Principal Problem:    Abscess of right shoulder: With right shoulder pain and decreased mobility.  CT of the right shoulder noted for probable abscess patient with elevated inflammatory markers on admission CRP of 338 and .  Status post right shoulder arthrocentesis 1/29/2024.  Cultures with no growth to date  Status post I&D 1/31/2024.  - Continue antibiotics  - Follow-up arthrocentesis and intraoperative cultures  - ID and orthopedics following with recommendation  - Continue PT OT and pain management    Active Problems:    Coronary artery disease due to lipid rich plaque: With no chest pains or shortness of breath.  Continue aspirin, metoprolol, rosuvastatin    HLD (hyperlipidemia): On statin    Essential hypertension:  soft tissues lateral to the proximal   humerus are likely small foci of gas.  Correlate if recent injections or   signs of infection.                 Juliette Mendes MD      Please excuse brevity and/or typos. This report was transcribed using voice recognition software. Every effort was made to ensure accuracy, however, inadvertent computerized transcription errors may be present.

## 2025-02-01 NOTE — PROGRESS NOTES
Clinical Pharmacy Note: Pharmacy to Dose Vancomycin    Vancomycin Day: 5  Indication: Bone and joint infection  Current Dose: Pulse dosing   Dosing Method: Dosing by random levels    Random: 10.7    Recent Labs     01/30/25  0637 02/01/25  0604   BUN 37* 45*       Recent Labs     01/30/25  0637 02/01/25  0604   CREATININE 2.8* 2.9*       Recent Labs     01/30/25  0637 02/01/25  0605   WBC 6.0 7.8         Intake/Output Summary (Last 24 hours) at 2/1/2025 0948  Last data filed at 2/1/2025 0536  Gross per 24 hour   Intake 830 ml   Output 175 ml   Net 655 ml         Ht Readings from Last 1 Encounters:   01/30/25 1.905 m (6' 3\")        Wt Readings from Last 1 Encounters:   01/31/25 73.6 kg (162 lb 3.2 oz)         Body mass index is 20.27 kg/m².    Estimated Creatinine Clearance: 23 mL/min (A) (based on SCr of 2.9 mg/dL (H)).      Assessment/Plan:  Vancomycin level is subtherapeutic.  Will redose vancomycin 1000 mg one time today.   Patient is ESRD on HD and further doses will be ordered based on dialysis days and levels.  A vancomycin random level has been ordered on 2/4 at 0600 for follow-up.  Changes in regimen will be determined based on culture results, renal function, and clinical response.  Pharmacy will continue to monitor and adjust regimen as necessary.    Thank you for the consult,    Heidi Guidry, PharmD  PGY-1 Pharmacy Resident

## 2025-02-01 NOTE — PROGRESS NOTES
Occupational Therapy/Physical Therapy  Orders received for occupational therapy/physical therapy evaluation.  Patient currently on hold due to off floor for dialysis treatment.   Discussed with nurse.   Will re-attempt as schedule and patient's medical status permits.     Thanks,  Nicolas Sung OTR/L [id number]  Alyson Noriega, PT, DPT [id number]

## 2025-02-01 NOTE — PLAN OF CARE
Problem: Safety - Adult  Goal: Free from fall injury  Outcome: Progressing     Problem: Chronic Conditions and Co-morbidities  Goal: Patient's chronic conditions and co-morbidity symptoms are monitored and maintained or improved  Outcome: Progressing  Flowsheets (Taken 1/31/2025 1756 by Jil Rangel RN)  Care Plan - Patient's Chronic Conditions and Co-Morbidity Symptoms are Monitored and Maintained or Improved: Monitor and assess patient's chronic conditions and comorbid symptoms for stability, deterioration, or improvement     Problem: ABCDS Injury Assessment  Goal: Absence of physical injury  Outcome: Progressing     Problem: Skin/Tissue Integrity  Goal: Skin integrity remains intact  Description: 1.  Monitor for areas of redness and/or skin breakdown  2.  Assess vascular access sites hourly  3.  Every 4-6 hours minimum:  Change oxygen saturation probe site  4.  Every 4-6 hours:  If on nasal continuous positive airway pressure, respiratory therapy assess nares and determine need for appliance change or resting period  Outcome: Progressing  Flowsheets  Taken 1/31/2025 2103 by Barby Ayers LPN  Skin Integrity Remains Intact: Assess vascular access sites hourly  Taken 1/31/2025 1756 by Jil Rangel, RN  Skin Integrity Remains Intact: Monitor for areas of redness and/or skin breakdown     Problem: Pain  Goal: Verbalizes/displays adequate comfort level or baseline comfort level  Outcome: Progressing

## 2025-02-02 LAB
BACTERIA BLD CULT ORG #2: NORMAL
BACTERIA BLD CULT: NORMAL
GLUCOSE BLD-MCNC: 156 MG/DL (ref 70–99)
GLUCOSE BLD-MCNC: 165 MG/DL (ref 70–99)
GLUCOSE BLD-MCNC: 176 MG/DL (ref 70–99)
GLUCOSE BLD-MCNC: 365 MG/DL (ref 70–99)
GLUCOSE BLD-MCNC: 387 MG/DL (ref 70–99)
HCT VFR BLD AUTO: 28.3 % (ref 40.5–52.5)
HGB BLD-MCNC: 9.1 G/DL (ref 13.5–17.5)
PERFORMED ON: ABNORMAL

## 2025-02-02 PROCEDURE — 6370000000 HC RX 637 (ALT 250 FOR IP)

## 2025-02-02 PROCEDURE — 2580000003 HC RX 258: Performed by: NURSE PRACTITIONER

## 2025-02-02 PROCEDURE — 97530 THERAPEUTIC ACTIVITIES: CPT

## 2025-02-02 PROCEDURE — 1200000000 HC SEMI PRIVATE

## 2025-02-02 PROCEDURE — 97535 SELF CARE MNGMENT TRAINING: CPT

## 2025-02-02 PROCEDURE — 97165 OT EVAL LOW COMPLEX 30 MIN: CPT

## 2025-02-02 PROCEDURE — 83036 HEMOGLOBIN GLYCOSYLATED A1C: CPT

## 2025-02-02 PROCEDURE — 6370000000 HC RX 637 (ALT 250 FOR IP): Performed by: INTERNAL MEDICINE

## 2025-02-02 PROCEDURE — 85018 HEMOGLOBIN: CPT

## 2025-02-02 PROCEDURE — 6370000000 HC RX 637 (ALT 250 FOR IP): Performed by: NURSE PRACTITIONER

## 2025-02-02 PROCEDURE — 97161 PT EVAL LOW COMPLEX 20 MIN: CPT

## 2025-02-02 PROCEDURE — 2500000003 HC RX 250 WO HCPCS: Performed by: ANESTHESIOLOGY

## 2025-02-02 PROCEDURE — 85014 HEMATOCRIT: CPT

## 2025-02-02 PROCEDURE — 6360000002 HC RX W HCPCS: Performed by: NURSE PRACTITIONER

## 2025-02-02 PROCEDURE — 36415 COLL VENOUS BLD VENIPUNCTURE: CPT

## 2025-02-02 PROCEDURE — 97116 GAIT TRAINING THERAPY: CPT

## 2025-02-02 RX ORDER — POLYETHYLENE GLYCOL 3350 17 G/17G
17 POWDER, FOR SOLUTION ORAL DAILY
Status: DISCONTINUED | OUTPATIENT
Start: 2025-02-02 | End: 2025-02-04 | Stop reason: HOSPADM

## 2025-02-02 RX ORDER — GLUCAGON 1 MG/ML
1 KIT INJECTION PRN
Status: DISCONTINUED | OUTPATIENT
Start: 2025-02-02 | End: 2025-02-02

## 2025-02-02 RX ORDER — BISACODYL 5 MG/1
10 TABLET, DELAYED RELEASE ORAL ONCE
Status: COMPLETED | OUTPATIENT
Start: 2025-02-02 | End: 2025-02-02

## 2025-02-02 RX ORDER — INSULIN LISPRO 100 [IU]/ML
0-8 INJECTION, SOLUTION INTRAVENOUS; SUBCUTANEOUS
Status: DISCONTINUED | OUTPATIENT
Start: 2025-02-02 | End: 2025-02-04 | Stop reason: HOSPADM

## 2025-02-02 RX ORDER — DEXTROSE MONOHYDRATE 100 MG/ML
INJECTION, SOLUTION INTRAVENOUS CONTINUOUS PRN
Status: DISCONTINUED | OUTPATIENT
Start: 2025-02-02 | End: 2025-02-02

## 2025-02-02 RX ORDER — DOCUSATE SODIUM 100 MG/1
100 CAPSULE, LIQUID FILLED ORAL ONCE
Status: COMPLETED | OUTPATIENT
Start: 2025-02-02 | End: 2025-02-02

## 2025-02-02 RX ADMIN — BISACODYL 10 MG: 5 TABLET, COATED ORAL at 23:29

## 2025-02-02 RX ADMIN — Medication 10 ML: at 21:05

## 2025-02-02 RX ADMIN — ACETAMINOPHEN 650 MG: 325 TABLET ORAL at 08:46

## 2025-02-02 RX ADMIN — ROSUVASTATIN CALCIUM 10 MG: 10 TABLET, FILM COATED ORAL at 20:51

## 2025-02-02 RX ADMIN — ACETAMINOPHEN 650 MG: 325 TABLET ORAL at 20:51

## 2025-02-02 RX ADMIN — Medication 10 ML: at 08:57

## 2025-02-02 RX ADMIN — PANTOPRAZOLE SODIUM 40 MG: 40 TABLET, DELAYED RELEASE ORAL at 06:10

## 2025-02-02 RX ADMIN — SEVELAMER CARBONATE 800 MG: 800 TABLET, FILM COATED ORAL at 17:36

## 2025-02-02 RX ADMIN — METOPROLOL TARTRATE 50 MG: 50 TABLET, FILM COATED ORAL at 20:51

## 2025-02-02 RX ADMIN — INSULIN LISPRO 4 UNITS: 100 INJECTION, SOLUTION INTRAVENOUS; SUBCUTANEOUS at 13:24

## 2025-02-02 RX ADMIN — ASPIRIN 81 MG: 81 TABLET, CHEWABLE ORAL at 08:45

## 2025-02-02 RX ADMIN — POLYETHYLENE GLYCOL 3350 17 G: 17 POWDER, FOR SOLUTION ORAL at 14:04

## 2025-02-02 RX ADMIN — CEFEPIME 1000 MG: 1 INJECTION, POWDER, FOR SOLUTION INTRAMUSCULAR; INTRAVENOUS at 14:08

## 2025-02-02 RX ADMIN — DOCUSATE SODIUM 100 MG: 100 CAPSULE, LIQUID FILLED ORAL at 06:09

## 2025-02-02 RX ADMIN — ACETAMINOPHEN 650 MG: 325 TABLET ORAL at 14:04

## 2025-02-02 RX ADMIN — SEVELAMER CARBONATE 800 MG: 800 TABLET, FILM COATED ORAL at 08:45

## 2025-02-02 RX ADMIN — SEVELAMER CARBONATE 800 MG: 800 TABLET, FILM COATED ORAL at 13:24

## 2025-02-02 RX ADMIN — METOPROLOL TARTRATE 50 MG: 50 TABLET, FILM COATED ORAL at 08:45

## 2025-02-02 ASSESSMENT — PAIN SCALES - GENERAL
PAINLEVEL_OUTOF10: 3
PAINLEVEL_OUTOF10: 3

## 2025-02-02 ASSESSMENT — PAIN DESCRIPTION - LOCATION: LOCATION: SHOULDER

## 2025-02-02 ASSESSMENT — PAIN DESCRIPTION - ORIENTATION: ORIENTATION: RIGHT

## 2025-02-02 NOTE — PROGRESS NOTES
[facility] - Inpatient Rehabilitation Department   Phone: [phone]    Physical Therapy    [x] Initial Evaluation            [] Daily Treatment Note         [] Discharge Summary      Patient: Danny Rock   : 1950   MRN: [id number]   Date of Service:  2025  Admitting Diagnosis: Abscess of right shoulder  Current Admission Summary: Pt is a 73 y/o male who presents to the hospital with R shoulder pain. Pt found to have R shoulder intramuscular fluid collection in the setting of renal cell carcinoma. Pt had aspiration of R shoulder during admission and is s/p R shoulder open incision and drainage on 25.  Past Medical History:  has a past medical history of Atrial fibrillation (HCC), CAD (coronary artery disease), Cancer of kidney (HCC), Dependence on renal dialysis (HCC), Diabetes mellitus (HCC), Hyperlipidemia, Hypertension, Prostate cancer (HCC), Right renal mass, and Systolic CHF (HCC).  Past Surgical History:  has a past surgical history that includes hernia repair; knee surgery; Cholecystectomy; Nasal septum surgery; eye surgery (Bilateral, 2018); Kidney removal (Right, 2022); Cataract removal (Bilateral); IR TUNNELED CVC PLACE WO SQ PORT/PUMP > 5 YEARS (2024); CT NEEDLE BIOPSY LIVER PERCUTANEOUS (2024); other surgical history (2024); CT NEEDLE BIOPSY LIVER PERCUTANEOUS (2024); CT GUIDED CHEST TUBE (2024); hernia repair (Left, 2024); other surgical history (Left); and Shoulder arthroscopy (Right, 2025).  Discharge Recommendations: Danny Rock scored a  on the AM-PAC short mobility form. Current research shows that an AM-PAC score of 18 or greater is typically associated with a discharge to the patient's home setting. Based on the patient's AM-PAC score and their current functional mobility deficits, it is recommended that the patient have 2-3 sessions per week of Physical Therapy at d/c to increase the patient's

## 2025-02-02 NOTE — PROGRESS NOTES
meals)  Alpha-Lipoic Acid 100 MG CAPS, Take 1 capsule by mouth Daily  melatonin 3 MG TABS tablet, Take 1 tablet by mouth daily  zinc sulfate (ZINCATE) 220 (50 Zn)  mg capsule - elemental zinc, Take 1 capsule by mouth daily  Cyanocobalamin (VITAMIN B-12 PO), Take 1 tablet by mouth daily  aspirin 81 MG chewable tablet, Take 1 tablet by mouth daily  ascorbic acid (VITAMIN C) 500 MG tablet, Take 1 tablet by mouth daily  vitamin D3 (CHOLECALCIFEROL) 125 MCG (5000 UT) TABS tablet, Take 1 tablet by mouth daily  [DISCONTINUED] oxyCODONE-acetaminophen (PERCOCET) 5-325 MG per tablet, Take 1 tablet by mouth every 8 hours as needed for Pain. Max Daily Amount: 3 tablets  furosemide (LASIX) 40 MG tablet, Take 1 tablet by mouth See Admin Instructions Take 2 times on non-dialysis days. (Patient not taking: Reported on 1/29/2025)  metoprolol tartrate (LOPRESSOR) 50 MG tablet, Take 1 tablet by mouth 2 times daily (Patient taking differently: Take 1 tablet by mouth 2 times daily Does not take in the morning on dialysis days T/Th/Sa)  omeprazole (PRILOSEC) 20 MG capsule, Take 1 capsule by mouth every other day     Inpatient Medications:  Scheduled Meds:   polyethylene glycol  17 g Oral Daily    [Held by provider] apixaban  5 mg Oral BID    cefepime  1,000 mg IntraVENous Q24H    sodium chloride flush  5-40 mL IntraVENous 2 times per day    aspirin  81 mg Oral Daily    pantoprazole  40 mg Oral QAM AC    rosuvastatin  10 mg Oral Nightly    sevelamer  800 mg Oral TID WC    insulin lispro  0-4 Units SubCUTAneous 4 times per day    vancomycin (VANCOCIN) intermittent dosing (placeholder)   Other RX Placeholder    metoprolol tartrate  50 mg Oral Daily    And    metoprolol tartrate  50 mg Oral Once per day on Sunday Monday Wednesday Friday    acetaminophen  650 mg Oral TID     Continuous Infusions:   sodium chloride      dextrose       PRN Meds:.cyclobenzaprine, naloxone 0.4 mg in 10 mL sodium chloride syringe, sodium chloride flush,  concerning for abscess some other areas of myositis may be present in the musculature about the posterior edge of the shoulder but with limited evaluation on the current unenhanced study. Correlate with clinical symptoms, white count, and consider fluid sampling. RECOMMENDATIONS: MRI with contrast may provide better delineation of the margins of the fluid collection and exact locations of involvement..     XR SHOULDER RIGHT (MIN 2 VIEWS)    Result Date: 1/28/2025  EXAMINATION: THREE XRAY VIEWS OF THE RIGHT SHOULDER 1/28/2025 9:55 pm COMPARISON: None. HISTORY: ORDERING SYSTEM PROVIDED HISTORY: pain, swelling TECHNOLOGIST PROVIDED HISTORY: Reason for exam:->pain, swelling Reason for Exam: pain, swelling FINDINGS: No acute fracture of the clavicle or displacement of the acromioclavicular joint.  There arthritic changes at the expected acromioclavicular joint and some ossifications along the inferior aspect.  There is no acute fracture or dislocation of the humeral head.  There is sclerosis and cystic changes along the margin of the humeral head combined with marginal osteophytes.  Sclerosis and subchondral cysts are also noted at the margin of the glenoid. No radiopaque foreign bodies around the shoulder.  Soft tissue swelling around the shoulder.  Few small foci of lucency in the soft tissues lateral to the proximal humerus may be small foci of gas.  Dual lumen right central venous catheter is noted over the chest.     1.  No acute fracture or dislocation is identified at the right shoulder. 2.  Moderate arthritis is present at the glenohumeral and acromioclavicular joints.  Sclerosis and subchondral cysts along the articular margins and some areas of ossification inferior to the acromioclavicular joint.  May have features of underlying joint effusion and chronic rotator cuff pathology. 3.  Few small foci of lucency over the soft tissues lateral to the proximal humerus are likely small foci of gas.  Correlate if

## 2025-02-02 NOTE — PROGRESS NOTES
[facility] - Inpatient Rehabilitation Department   Phone: [phone]    Occupational Therapy    [x] Initial Evaluation            [] Daily Treatment Note         [] Discharge Summary      Patient: Danny Rock   : 1950   MRN: [id number]   Date of Service:  2025    Admitting Diagnosis:  Abscess of right shoulder  Current Admission Summary: Danny Rock is a 74 y.o. male with hypertension, hyperlipidemia, atrial fibrillation, coronary artery disease, chronic combined heart failure, ESRD on HD TTS, T2DM with insulin dependence, renal cancer status post nephrectomy presented from home with right shoulder pain      RIGHT SHOULDER OPEN INCISION AND DRAINAGE (Right: Shoulder)     Past Medical History:  has a past medical history of Atrial fibrillation (HCC), CAD (coronary artery disease), Cancer of kidney (HCC), Dependence on renal dialysis (HCC), Diabetes mellitus (HCC), Hyperlipidemia, Hypertension, Prostate cancer (HCC), Right renal mass, and Systolic CHF (HCC).  Past Surgical History:  has a past surgical history that includes hernia repair; knee surgery; Cholecystectomy; Nasal septum surgery; eye surgery (Bilateral, 2018); Kidney removal (Right, 2022); Cataract removal (Bilateral); IR TUNNELED CVC PLACE WO SQ PORT/PUMP > 5 YEARS (2024); CT NEEDLE BIOPSY LIVER PERCUTANEOUS (2024); other surgical history (2024); CT NEEDLE BIOPSY LIVER PERCUTANEOUS (2024); CT GUIDED CHEST TUBE (2024); hernia repair (Left, 2024); other surgical history (Left); and Shoulder arthroscopy (Right, 2025).    Discharge Recommendations: Danny Rock scored a  on the AM-PAC ADL Inpatient form. Current research shows that an AM-PAC score of 18 or greater is typically associated with a discharge to the patient's home setting. Based on the patient's AM-PAC score, and their current ADL deficits, it is recommended that the patient have 2-3 sessions per week of  and would benefit from continued skilled OT to address return to PLOF.     Safety Interventions: patient left in chair, chair alarm in place, and call light within reach    Plan  Frequency: 5-7 x/week  Current Treatment Recommendations: strengthening, balance training, functional mobility training, transfer training, patient/caregiver education, ADL/self-care training, pain management, and home exercise program    Goals  Patient Goals: to return home, to be able to use R arm    Short Term Goals:  Time Frame: dc  Patient will complete upper body ADL at stand by assistance   Patient will complete lower body ADL at minimal assistance   Patient will complete toileting at minimal assistance   Patient will complete grooming at stand by assistance   Patient will complete functional transfers at stand by assistance   Patient will complete functional mobility at stand by assistance  with RW x 100 feet     Above goals reviewed on 2/2/2025.  All goals are ongoing at this time unless indicated above.       Therapy Session Time     Individual Group Co-treatment   Time In    0928   Time Out    1012   Minutes    44        Timed Code Treatment Minutes:   29  Total Treatment Minutes:  44       Electronically Signed By: Nicolas Sung OT

## 2025-02-02 NOTE — PLAN OF CARE
Problem: Safety - Adult  Goal: Free from fall injury  Outcome: Progressing     Problem: Chronic Conditions and Co-morbidities  Goal: Patient's chronic conditions and co-morbidity symptoms are monitored and maintained or improved  Outcome: Progressing     Problem: ABCDS Injury Assessment  Goal: Absence of physical injury  Outcome: Progressing     Problem: Skin/Tissue Integrity  Goal: Skin integrity remains intact  Description: 1.  Monitor for areas of redness and/or skin breakdown  2.  Assess vascular access sites hourly  3.  Every 4-6 hours minimum:  Change oxygen saturation probe site  4.  Every 4-6 hours:  If on nasal continuous positive airway pressure, respiratory therapy assess nares and determine need for appliance change or resting period  Outcome: Progressing     Problem: Pain  Goal: Verbalizes/displays adequate comfort level or baseline comfort level  Outcome: Progressing

## 2025-02-02 NOTE — PROGRESS NOTES
(hyperlipidemia): On statin    Essential hypertension: Stable on Coreg    Diabetes mellitus: On sliding scale insulin    PAF (paroxysmal atrial fibrillation) (HCC): On metoprolol and Eliquis.  Eliquis on hold in view of surgical intervention        Medications:  Reviewed  Infusion Medications    sodium chloride      dextrose       Scheduled Medications    [Held by provider] apixaban  5 mg Oral BID    cefepime  1,000 mg IntraVENous Q24H    sodium chloride flush  5-40 mL IntraVENous 2 times per day    aspirin  81 mg Oral Daily    pantoprazole  40 mg Oral QAM AC    rosuvastatin  10 mg Oral Nightly    sevelamer  800 mg Oral TID WC    insulin lispro  0-4 Units SubCUTAneous 4 times per day    vancomycin (VANCOCIN) intermittent dosing (placeholder)   Other RX Placeholder    metoprolol tartrate  50 mg Oral Daily    And    metoprolol tartrate  50 mg Oral Once per day on Sunday Monday Wednesday Friday    acetaminophen  650 mg Oral TID     PRN Meds: cyclobenzaprine, naloxone 0.4 mg in 10 mL sodium chloride syringe, sodium chloride flush, sodium chloride, meperidine, heparin (porcine), acetaminophen **OR** acetaminophen, ondansetron, HYDROmorphone, furosemide, dextrose bolus **OR** dextrose bolus, glucagon (rDNA), dextrose, oxyCODONE      DVT Prophylaxis: Eliquis  Diet: ADULT DIET; Regular  Code Status: Full Code    Dispo: Anticipate discharge in 2-3 days    ____________________________________________________________________________    Subjective:   Overnight Events:   Uneventful overnight  No new complaints this morning  Remains afebrile with no leukocytosis    Physical Exam:  /68   Pulse 88   Temp 97.6 °F (36.4 °C) (Oral)   Resp 16   Ht 1.905 m (6' 3\")   Wt 73.5 kg (162 lb)   SpO2 99%   BMI 20.25 kg/m²   General appearance: No apparent distress, appears stated age and cooperative.  HEENT: Normocephalic, atraumatic, MMM, No sclera icterus/conjuctival palor  Neck: Supple, no thyromegally. No jugular venous  distention.   Respiratory:  Normal respiratory effort. Clear to auscultation, no Rales/Wheezes/Rhonchi.  Cardiovascular: S1/S2 without murmurs, rubs or gallops. RRR  Abdomen: Soft, non-tender, non-distended, bowel sounds present.  Musculoskeletal: No clubbing, cyanosis or edema bilaterally.  Swollen right shoulder  Skin: Skin color, texture, turgor normal.  No rashes or lesions.  Neurologic:  Cranial nerves: II-XII intact, ROXANA, No focal sensory/motor deficits        Intake/Output Summary (Last 24 hours) at 2/2/2025 1121  Last data filed at 2/1/2025 1502  Gross per 24 hour   Intake 500 ml   Output 2500 ml   Net -2000 ml       Labs:   Recent Labs     02/01/25  0605   WBC 7.8   HGB 10.4*   HCT 32.4*         Recent Labs     02/01/25  0604      K 4.8      CO2 24   BUN 45*   CREATININE 2.9*   CALCIUM 9.4   PHOS 4.2     No results for input(s): \"CKTOTAL\", \"TROPONINI\" in the last 72 hours.    Urinalysis:    Lab Results   Component Value Date/Time    NITRU Negative 02/02/2024 05:32 PM    WBCUA 9 02/02/2024 05:32 PM    BACTERIA None Seen 02/02/2024 05:32 PM    RBCUA 0 02/02/2024 05:32 PM    BLOODU Negative 02/02/2024 05:32 PM    GLUCOSEU 100 02/02/2024 05:32 PM       Radiology:  IR DRAINAGE HEMATOMA/SEROMA/FLUID COLLECTION   Final Result   Successful US guided percutaneous aspiration of a subcutaneous collection in   the anterior right deltoid yielding approximately 60 mL of cloudy yellow   fluid.         CT SHOULDER RIGHT W CONTRAST   Final Result   Large fluid collection with mild peripheral enhancement involving the right   shoulder region suggesting abscesses.  There is involvement in the deltoid   muscle and margin of the biceps.  Communication with the bursal and joint   spaces may be present.  Projections along the anterior and posterior edge of   the glenohumeral joint and small projections into the superolateral deltoid   muscle.      Advanced arthritis of the glenohumeral and acromioclavicular

## 2025-02-03 PROBLEM — K64.9 HEMORRHOID: Status: ACTIVE | Noted: 2025-02-03

## 2025-02-03 PROBLEM — K59.00 CONSTIPATION: Status: ACTIVE | Noted: 2025-02-03

## 2025-02-03 LAB
ANION GAP SERPL CALCULATED.3IONS-SCNC: 15 MMOL/L (ref 3–16)
BACTERIA FLD AEROBE CULT: NORMAL
BUN SERPL-MCNC: 45 MG/DL (ref 7–20)
CALCIUM SERPL-MCNC: 8.7 MG/DL (ref 8.3–10.6)
CHLORIDE SERPL-SCNC: 96 MMOL/L (ref 99–110)
CO2 SERPL-SCNC: 20 MMOL/L (ref 21–32)
CREAT SERPL-MCNC: 2.8 MG/DL (ref 0.8–1.3)
EST. AVERAGE GLUCOSE BLD GHB EST-MCNC: 145.6 MG/DL
GFR SERPLBLD CREATININE-BSD FMLA CKD-EPI: 23 ML/MIN/{1.73_M2}
GLUCOSE BLD-MCNC: 173 MG/DL (ref 70–99)
GLUCOSE BLD-MCNC: 174 MG/DL (ref 70–99)
GLUCOSE BLD-MCNC: 182 MG/DL (ref 70–99)
GLUCOSE BLD-MCNC: 203 MG/DL (ref 70–99)
GLUCOSE SERPL-MCNC: 240 MG/DL (ref 70–99)
GRAM STN SPEC: NORMAL
HBA1C MFR BLD: 6.7 %
PERFORMED ON: ABNORMAL
POTASSIUM SERPL-SCNC: 4.3 MMOL/L (ref 3.5–5.1)
SODIUM SERPL-SCNC: 131 MMOL/L (ref 136–145)

## 2025-02-03 PROCEDURE — 99232 SBSQ HOSP IP/OBS MODERATE 35: CPT | Performed by: INTERNAL MEDICINE

## 2025-02-03 PROCEDURE — 6370000000 HC RX 637 (ALT 250 FOR IP): Performed by: NURSE PRACTITIONER

## 2025-02-03 PROCEDURE — 97530 THERAPEUTIC ACTIVITIES: CPT

## 2025-02-03 PROCEDURE — 6370000000 HC RX 637 (ALT 250 FOR IP): Performed by: INTERNAL MEDICINE

## 2025-02-03 PROCEDURE — 2500000003 HC RX 250 WO HCPCS: Performed by: ANESTHESIOLOGY

## 2025-02-03 PROCEDURE — 99024 POSTOP FOLLOW-UP VISIT: CPT | Performed by: NURSE PRACTITIONER

## 2025-02-03 PROCEDURE — 97116 GAIT TRAINING THERAPY: CPT

## 2025-02-03 PROCEDURE — 80048 BASIC METABOLIC PNL TOTAL CA: CPT

## 2025-02-03 PROCEDURE — APPNB45 APP NON BILLABLE 31-45 MINUTES: Performed by: NURSE PRACTITIONER

## 2025-02-03 PROCEDURE — 36415 COLL VENOUS BLD VENIPUNCTURE: CPT

## 2025-02-03 PROCEDURE — 1200000000 HC SEMI PRIVATE

## 2025-02-03 RX ORDER — DOXYCYCLINE HYCLATE 100 MG
100 TABLET ORAL EVERY 12 HOURS SCHEDULED
Status: DISCONTINUED | OUTPATIENT
Start: 2025-02-03 | End: 2025-02-04 | Stop reason: HOSPADM

## 2025-02-03 RX ORDER — SODIUM PHOSPHATE, DIBASIC AND SODIUM PHOSPHATE, MONOBASIC 7; 19 G/230ML; G/230ML
1 ENEMA RECTAL
Status: DISPENSED | OUTPATIENT
Start: 2025-02-03 | End: 2025-02-04

## 2025-02-03 RX ORDER — LACTULOSE 10 G/15ML
30 SOLUTION ORAL EVERY 6 HOURS SCHEDULED
Status: DISCONTINUED | OUTPATIENT
Start: 2025-02-03 | End: 2025-02-04 | Stop reason: HOSPADM

## 2025-02-03 RX ORDER — DOXYCYCLINE HYCLATE 100 MG
100 TABLET ORAL EVERY 12 HOURS SCHEDULED
Qty: 30 TABLET | Refills: 0 | Status: SHIPPED | OUTPATIENT
Start: 2025-02-03 | End: 2025-02-18

## 2025-02-03 RX ADMIN — Medication 10 ML: at 10:04

## 2025-02-03 RX ADMIN — ROSUVASTATIN CALCIUM 10 MG: 10 TABLET, FILM COATED ORAL at 20:27

## 2025-02-03 RX ADMIN — ACETAMINOPHEN 650 MG: 325 TABLET ORAL at 09:55

## 2025-02-03 RX ADMIN — ASPIRIN 81 MG: 81 TABLET, CHEWABLE ORAL at 09:56

## 2025-02-03 RX ADMIN — ACETAMINOPHEN 650 MG: 325 TABLET ORAL at 16:29

## 2025-02-03 RX ADMIN — DOXYCYCLINE HYCLATE 100 MG: 100 TABLET, COATED ORAL at 20:27

## 2025-02-03 RX ADMIN — INSULIN LISPRO 2 UNITS: 100 INJECTION, SOLUTION INTRAVENOUS; SUBCUTANEOUS at 09:56

## 2025-02-03 RX ADMIN — METOPROLOL TARTRATE 50 MG: 50 TABLET, FILM COATED ORAL at 20:27

## 2025-02-03 RX ADMIN — PANTOPRAZOLE SODIUM 40 MG: 40 TABLET, DELAYED RELEASE ORAL at 04:31

## 2025-02-03 RX ADMIN — SEVELAMER CARBONATE 800 MG: 800 TABLET, FILM COATED ORAL at 19:17

## 2025-02-03 RX ADMIN — METOPROLOL TARTRATE 50 MG: 50 TABLET, FILM COATED ORAL at 09:56

## 2025-02-03 RX ADMIN — SEVELAMER CARBONATE 800 MG: 800 TABLET, FILM COATED ORAL at 13:48

## 2025-02-03 RX ADMIN — LACTULOSE 30 G: 10 SOLUTION ORAL at 13:44

## 2025-02-03 RX ADMIN — SEVELAMER CARBONATE 800 MG: 800 TABLET, FILM COATED ORAL at 09:56

## 2025-02-03 RX ADMIN — INSULIN LISPRO 2 UNITS: 100 INJECTION, SOLUTION INTRAVENOUS; SUBCUTANEOUS at 19:16

## 2025-02-03 RX ADMIN — ACETAMINOPHEN 650 MG: 325 TABLET ORAL at 20:28

## 2025-02-03 RX ADMIN — Medication 10 ML: at 20:35

## 2025-02-03 RX ADMIN — POLYETHYLENE GLYCOL 3350 17 G: 17 POWDER, FOR SOLUTION ORAL at 09:54

## 2025-02-03 ASSESSMENT — PAIN DESCRIPTION - ORIENTATION: ORIENTATION: RIGHT

## 2025-02-03 ASSESSMENT — ENCOUNTER SYMPTOMS
ABDOMINAL PAIN: 0
CONSTIPATION: 0
EYE DISCHARGE: 0
SHORTNESS OF BREATH: 0
NAUSEA: 0
COUGH: 0
BACK PAIN: 0
EYE REDNESS: 0
DIARRHEA: 0
RHINORRHEA: 0
SINUS PRESSURE: 0
SINUS PAIN: 0
WHEEZING: 0
SORE THROAT: 0

## 2025-02-03 ASSESSMENT — PAIN DESCRIPTION - PAIN TYPE: TYPE: SURGICAL PAIN

## 2025-02-03 ASSESSMENT — PAIN DESCRIPTION - LOCATION: LOCATION: SHOULDER

## 2025-02-03 ASSESSMENT — PAIN SCALES - GENERAL: PAINLEVEL_OUTOF10: 3

## 2025-02-03 ASSESSMENT — PAIN DESCRIPTION - DESCRIPTORS: DESCRIPTORS: ACHING;DULL

## 2025-02-03 NOTE — PROGRESS NOTES
MD Gordon Frankel MD Aldo Estella, DO              Office: [phone]                      Fax: [phone]       NEPHROLOGY INPATIENT PROGRESS  NOTE:     PATIENT NAME: Danny Rock  : 1950  MRN: [id number]        IMPRESSION / RECOMMENDATION:      Admitted on:  2025  For:    Hospital Problems             Last Modified POA    * (Principal) Abscess of right shoulder 2025 Yes    Coronary artery disease due to lipid rich plaque 2025 Yes    Overview Signed 2011 11:20 AM by Laura Pelletier     Angiogram 2004 mild diffuse CAD with an EF 60-65%  CAPO GXT showed normal perfusion and normal EF of 67%         HLD (hyperlipidemia) 2025 Yes    Overview Signed 2011 11:19 AM by Laura Pelletier     7/21/10   HDL 36 LDL 65   2011  TG 46 HDL 41 LDL 61         Essential hypertension 2025 Yes    Diabetes mellitus 2025 Yes    PAF (paroxysmal atrial fibrillation) (HCC) 2025 Yes    CRP elevated 2025 Yes    Elevated sed rate 2025 Yes    Abscess of right arm 2025 Unknown    ESRD on hemodialysis (HCC) 2025 Yes    Hemorrhoid 2/3/2025 Yes    Constipation 2/3/2025 Yes   -Orthopedic  team consulted    1.  ESRD-outpatient HD [facility] S at [facility] under our care.      Solutes lower  Decreased HD time    -tolerated HD well Saturday.  Next HD Tuesday.      HD access   Using TDC currently  Left radiocephalic AVF, placed mid 2024  Av access bleeding, it was most likely from the IV insertion  Continue to follow    2.  Mild hyponatremia-follow with HD.  1.2 L per 24-hour fluid restriction.  3.  Anemia due to CKD- POONAM with HD.   4.  History of coronary artery disease  5.  History of renal cancer status post right nephrectomy  6.  History of paroxysmal atrial fibrillation  7.  History of hyperlipidemia-on statin  8.  Pneumothorax status post intraperitoneal nodule biopsy-chest tube hx  9.  Low serum  (ELIQUIS) 5 MG TABS tablet Take 1 tablet by mouth 2 times daily 180 tablet 3    insulin lispro, 1 Unit Dial, (HUMALOG KWIKPEN) 100 UNIT/ML SOPN Sc before meals per sliding scale (Patient taking differently: 3 times daily (before meals) Takes as needed up to 6 units with meals) 3 Adjustable Dose Pre-filled Pen Syringe 0    Alpha-Lipoic Acid 100 MG CAPS Take 1 capsule by mouth Daily      melatonin 3 MG TABS tablet Take 1 tablet by mouth daily      zinc sulfate (ZINCATE) 220 (50 Zn)  mg capsule - elemental zinc Take 1 capsule by mouth daily      Cyanocobalamin (VITAMIN B-12 PO) Take 1 tablet by mouth daily      aspirin 81 MG chewable tablet Take 1 tablet by mouth daily 30 tablet 0    ascorbic acid (VITAMIN C) 500 MG tablet Take 1 tablet by mouth daily      vitamin D3 (CHOLECALCIFEROL) 125 MCG (5000 UT) TABS tablet Take 1 tablet by mouth daily      furosemide (LASIX) 40 MG tablet Take 1 tablet by mouth See Admin Instructions Take 2 times on non-dialysis days. (Patient not taking: Reported on 1/29/2025) 60 tablet 3    metoprolol tartrate (LOPRESSOR) 50 MG tablet Take 1 tablet by mouth 2 times daily (Patient taking differently: Take 1 tablet by mouth 2 times daily Does not take in the morning on dialysis days T/Th/Sa) 180 tablet 3    [DISCONTINUED] Naproxen Sodium 220 MG CAPS Take 220 mg by mouth as needed for Pain      omeprazole (PRILOSEC) 20 MG capsule Take 1 capsule by mouth every other day         Medications Prior to Admission: cabozantinib s-malate (CABOMETYX) 40 MG TABS chemo tablet, Take 1 tablet by mouth daily  B Complex-C-Folic Acid (EVERETT-ISABELLA) TABS, Take 1 tablet by mouth daily  sevelamer (RENVELA) 800 MG tablet, Take 1 tablet by mouth 3 times daily (with meals)  rosuvastatin (CRESTOR) 10 MG tablet, Take 10 mg every other day  apixaban (ELIQUIS) 5 MG TABS tablet, Take 1 tablet by mouth 2 times daily  insulin lispro, 1 Unit Dial, (HUMALOG KWIKPEN) 100 UNIT/ML SOPN, Sc before meals per sliding scale

## 2025-02-03 NOTE — DISCHARGE INSTR - COC
Continuity of Care Form    Patient Name: Danny Rock   :  1950  MRN:  [id number]    Admit date:  2025  Discharge date:  2025    Code Status Order: Full Code   Advance Directives:   Advance Care Flowsheet Documentation        Date/Time Healthcare Directive Type of Healthcare Directive Copy in Chart Healthcare Agent Appointed Healthcare Agent's Name Healthcare Agent's Phone Number    [phone] Yes, patient has an advance directive for healthcare treatment  Living will  --  --  Wife - Tammy  --                     Admitting Physician:  Juliette Mendes MD  PCP: Yesi Rosa, JANET - CNP    Discharging Nurse: Brooklyn ECHEVERRIA RN  Discharging Hospital Unit/Room#: 5TN-5571/5571-01  Discharging Unit Phone Number: [phone]    Emergency Contact:   Extended Emergency Contact Information  Primary Emergency Contact: Amanda Rock  Address: [address] [place] [address]            [address]  Home Phone: [phone]  Mobile Phone: [phone]  Relation: Spouse    Past Surgical History:  Past Surgical History:   Procedure Laterality Date    CATARACT REMOVAL Bilateral     ~  with lens implanted    CHOLECYSTECTOMY      CT GUIDED CHEST TUBE  2024    CT GUIDED CHEST TUBE 2024 [facility] CT SCAN    CT NEEDLE BIOPSY LIVER PERCUTANEOUS  2024    CT NEEDLE BIOPSY LIVER PERCUTANEOUS 2024 [facility] CT SCAN    CT NEEDLE BIOPSY LIVER PERCUTANEOUS  2024    CT NEEDLE BIOPSY LIVER PERCUTANEOUS 2024 [facility] CT SCAN    EYE SURGERY Bilateral 2018    Cataract    HERNIA REPAIR      HERNIA REPAIR Left 2024    OPEN LEFT INGUINAL HERNIA REPAIR WITH MESH performed by Camacho Chowdhury MD at [facility] OR    IR TUNNELED CVC PLACE WO SQ PORT/PUMP > 5 YEARS  2024    IR TUNNELED CATHETER PLACEMENT GREATER THAN 5 YEARS 2024 Kosta Baugh MD [facility] SPECIAL PROCEDURES    KIDNEY REMOVAL Right 2022    ROBOTIC LAPAROSCOPIC RADICAL RIGHT NEPHRECTOMY performed by

## 2025-02-03 NOTE — PROGRESS NOTES
[facility] Orthopedic Surgery   Progress Note    CHIEF COMPLAINT/DIAGNOSIS: S/p right shoulder I&D (1/31/25)    SUBJECTIVE: The patient is seen sitting up in bed; describes improving shoulder pain.  Main complaint is some confusion with regards to day/time and constipation/rectal bleeding.     OBJECTIVE  Physical    VITALS:  /89   Pulse 83   Temp 97.4 °F (36.3 °C) (Oral)   Resp 18   Ht 1.905 m (6' 3\")   Wt 73.5 kg (162 lb)   SpO2 98%   BMI 20.25 kg/m²     GENERAL: Alert and oriented x3, in no acute distress.    MUSCULOSKELETAL: Able to flex and extend the elbow without issue.    INCISION:  Covered with post-op dressing; clean, dry and intact.  ROM: right shoulder ROM limited as expected  Sensory:  Intact to light touch in axillary, radial, ulnar distributions.   Vascular:   2+ radial pulses with brisk cap refill.    Data    ALL MEDICATIONS HAVE BEEN REVIEWED    CBC:   Recent Labs     02/01/25  0605 02/02/25  2343   WBC 7.8  --    HGB 10.4* 9.1*   HCT 32.4* 28.3*     --      BMP:   Recent Labs     02/01/25  0604      K 4.8      CO2 24   PHOS 4.2   BUN 45*   CREATININE 2.9*     INR: No results for input(s): \"INR\" in the last 72 hours.    Surgical culture: rare gram + cocci; rest are NGTD    ASSESSMENT:  S/p right shoulder I&D (1/31/25), POD#3  DM II  CAD  ESRD on HD  PAF on Eliquis  Chronic opiate therapy    PLAN:   Right shoulder drain removed without issue.  New dressing placed.  Keep shoulder incision covered x 10 days then mirna be left open to air.   - WB status:  WBAT/ROM as tolerated; reviewed post op precautions  - DVT prophylaxis: defer timing of resuming ELiquis to primary team in light of reported rectal bleeding (ok to resume from our end just in regard to the shoulder)  - Pain Control: Rx for dc in chart.    - ID:  Abx per primary team/ ID   - Disposition: per primary team; ok to d/c from our end once final abx recs complete    Follow-up with Dr. Franco in 10-14 days  Office  #[phone]  Future Appointments   Date Time Provider Department Center   4/3/2025 10:30 AM Rene Boo MD [facility]       JANET Varela - CNP  2/3/2025  8:54 AM     Breakcoverage note: Pt received to 1b. Axox4, calm affect Pt c/o abd pain with episode of nausea and vomiting started around 12noon ." Hx of gastric sleeve. htn. sl fluids meds given pt awaits ct scan. Hob elevated. Pt presently denies any discomfort.

## 2025-02-03 NOTE — PROGRESS NOTES
Pt has new onset of rectal bleeding when attempting to pass stool. Blood is bright red, pt denies pain. On call NP notified.

## 2025-02-03 NOTE — PROGRESS NOTES
Hospitalist Progress Note      PCP: Yesi Rosa APRN - CNP    Date of Admission: 1/28/2025    LOS: 5    Chief Complaint:   Chief Complaint   Patient presents with    Shoulder Injury     Right shoulder pain after fall 6 months.        Case Summary:   74-year-old gentleman with history of CAD, hypertension, history of CVA, paroxysmal atrial fibrillation, type 2 diabetes, history of renal intractable right shoulder pain for hydration.  Shoulder abscess    CT of the right shoulder noted for fluid lection with peripheral enhancement on suggestive of right shoulder abscess      Active Hospital Problems    Diagnosis Date Noted    Coronary artery disease due to lipid rich plaque [I25.10, I25.83] 09/27/2011     Priority: High    Essential hypertension [I10] 09/27/2011     Priority: Medium    HLD (hyperlipidemia) [E78.5] 09/27/2011     Priority: Medium    Hemorrhoid [K64.9] 02/03/2025    Constipation [K59.00] 02/03/2025    ESRD on hemodialysis (HCC) [N18.6, Z99.2] 01/30/2025    Abscess of right shoulder [L02.413] 01/29/2025    CRP elevated [R79.82] 01/29/2025    Elevated sed rate [R70.0] 01/29/2025    Abscess of right arm [L02.413] 01/29/2025    PAF (paroxysmal atrial fibrillation) ([facility]) [I48.0] 10/31/2023    Diabetes mellitus [E11.9] 02/14/2013         Principal Problem:    Abscess of right shoulder: With right shoulder pain and decreased mobility.  CT of the right shoulder noted for probable abscess patient with elevated inflammatory markers on admission CRP of 338 and .  Repeat inflammatory markers still elevated.  Status post right shoulder arthrocentesis 1/29/2024 and cultures NGTD  Status post I&D 1/31/2024.  -Continue antibiotics as per ID  - Follow-up in cultures intraoperative and arthrocentesis  - Orthopedics and ID following with recommendation      Constipation with hemorrhoid bleeding: Strained for bowel movement with hemorrhoidal bleeding.  Would like a bowel regimen.  Will place on    collection and exact locations of involvement..         XR SHOULDER RIGHT (MIN 2 VIEWS)   Final Result   1.  No acute fracture or dislocation is identified at the right shoulder.      2.  Moderate arthritis is present at the glenohumeral and acromioclavicular   joints.  Sclerosis and subchondral cysts along the articular margins and some   areas of ossification inferior to the acromioclavicular joint.  May have   features of underlying joint effusion and chronic rotator cuff pathology.      3.  Few small foci of lucency over the soft tissues lateral to the proximal   humerus are likely small foci of gas.  Correlate if recent injections or   signs of infection.                 Juliette Mendes MD      Please excuse brevity and/or typos. This report was transcribed using voice recognition software. Every effort was made to ensure accuracy, however, inadvertent computerized transcription errors may be present.

## 2025-02-03 NOTE — OP NOTE
Operative Note      Patient: Danny Rock  YOB: 1950  MRN: [id number]    Date of Procedure: 1/31/2025    Pre-Op Diagnosis Codes:      * Abscess of right arm [L02.413]    Post-Op Diagnosis: Same       Procedure(s):  RIGHT SHOULDER OPEN INCISION AND DRAINAGE  Excisional debridement    Surgeon(s):  Nicolas Jacob MD Chilelli, Brian, MD    Assistant:   First Assistant: Arvind Call RN; Sarah Jessica RN  Fellow: Arsh Ivey MD; Uli Barger DO    Anesthesia: General    Estimated Blood Loss (mL): Minimal    Complications: None    Specimens:   ID Type Source Tests Collected by Time Destination   1 : 1. RIGHT SHOULD ABSCESS FLUID Body Fluid Joint Fluid CULTURE, ANAEROBIC AND AEROBIC Nicolas Jacob MD 1/31/2025 1610    2 : 2. RIGHT SHOULD ABSCESS SWAB Body Fluid Shoulder CULTURE, ANAEROBIC AND AEROBIC Nicolas Jacob MD 1/31/2025 1613    3 : 3. RIGHT SHOULDER DELTOID ABSCESS CULTURE Tissue Tissue CULTURE, ANAEROBIC AND AEROBIC (Canceled) Nicolas Jacob MD 1/31/2025 1614    4 : 4. RIGHT SHOULDER ABSCESS SWAB CULTURE Body Fluid Fluid CULTURE, ANAEROBIC AND AEROBIC Nicolas Jacob MD 1/31/2025 1617    5 : 5. RIGHT SHOULDER ABSCESS CULTURE Body Fluid Fluid CULTURE, ANAEROBIC AND AEROBIC (Canceled) Nicolas Jacob MD 1/31/2025 1619        Implants:  * No implants in log *      Drains:   Closed/Suction Drain Right Shoulder Bulb (Active)   Site Description Clean, dry & intact 02/02/25 2051   Dressing Status Clean, dry & intact 02/02/25 2051   Drainage Appearance Bright red 02/02/25 2051   Drain Status Compressed 02/02/25 2051   Output (ml) 50 ml 02/01/25 0110       [REMOVED] External Urinary Catheter (Removed)   Site Assessment Clean,dry & intact 01/29/25 2005   Placement Replaced 01/29/25 2005   Securement Method Securing device (Describe) 01/29/25 2005   Catheter Care Catheter/Wick replaced 01/29/25 2005   Perineal Care Yes 01/29/25 2005   Suction 40 mmgHg continuous 01/29/25 2005   Urine

## 2025-02-03 NOTE — PROGRESS NOTES
transport as necessary   Hand Dominance: [] Left                 [x] Right  Current Employment: retired.  Occupation: [employer]   Hobbies: college baseball games, \"mess around in the backyard\"   Recent Falls: no falls in the last 6 months   Available Assistance at Discharge: 24 hr physical assistance available    Examination   Vision:   Vision Gross Assessment: Impaired and Vision Corrective Device: wears glasses at all times  Hearing:   hard of hearing  Observation:   General Observation:  Pt on RA on arrival. Negative pressure drain in R shoulder, chest wall dialysis catheter, and L UE fistula.  Posture:   Mild forward head, rounded shoulders, and increased thoracic kyphosis in sitting and standing. Severe genu varus in standing.  Sensation:   reports numbness and tingling in (B) UE - in hands which he attributes to dialysis   ROM:   (B) LE AROM WFL  Strength:   (B) LE strength grossly WFL  Therapist Clinical Decision Making (Complexity): low complexity  Clinical Presentation: stable      Subjective  General: Pt semi-fowlers in bed on arrival, pleasant and agreeable to participate in PT treatment. Pt wanting to go use there bathroom. Has had some rectal bleeding. R shoulder drain removed today.   Pain: 4/10.  Location: R shoulder  Pain Interventions: pain medication in place prior to arrival     Functional Mobility  Bed Mobility:  Supine to Sit: stand by assistance  Scooting: stand by assistance- toward EOB  Comments: Supine to sit: HOB elevated, bed rail used  Transfers:  Sit to stand transfer: stand by assistance  Stand to sit transfer: stand by assistance  Toilet transfer: stand by assistance  Comments: Verbal cues required for hand placement with use of RW.  Ambulation:  Surface:level surface  Assistive Device: rolling walker  Assistance: contact guard assistance  Distance: 15 ft, 150 ft  Gait Mechanics: Shortened step lengths and heights, decreased speed, no overt losses of balance, severe genu  collection in the setting of renal cell carcinoma. Pt had aspiration of R shoulder during admission and is s/p R shoulder open incision and drainage on 1/31/25. Pt is presenting below his baseline level of function and required up to CGA for performance of functional mobility tasks with use of a RW. Pt will benefit from continued skilled PT to facilitate return to PLOF and to promote independence.  Safety Interventions: patient left in chair, chair alarm in place, call light within reach, gait belt, and nurse notified    Plan  Frequency: 3-5 x/per week  Current Treatment Recommendations: strengthening, ROM, balance training, functional mobility training, transfer training, gait training, stair training, endurance training, neuromuscular re-education, patient/caregiver education, pain management, home exercise program, safety education, and equipment evaluation/education    Goals  Patient Goals: To return home   Short Term Goals:  Time Frame: By discharge  Patient will complete bed mobility at Independent   Patient will complete transfers at modified independent   Patient will ambulate 150 ft with use of rolling walker at modified independent  Patient will ascend/descend 8 stairs with (B) handrail at modified independent    Above goals reviewed on 2/3/2025.  All goals are ongoing at this time unless indicated above.      Therapy Session Time      Individual Group Co-treatment   Time In 1423       Time Out 1452       Minutes 29         Timed Code Treatment Minutes: 29 Minutes  Total Treatment Minutes: 29 Minutes        Electronically Signed By: SEAN CREWS, [id number]

## 2025-02-03 NOTE — PROGRESS NOTES
follow      Narrative:      ORDER#: [id number]                          ORDERED BY: MAHESH PEREZ  SOURCE: Abscess Shoulder Right             COLLECTED:  01/31/25 16:17  ANTIBIOTICS AT [facility]RA[facility].:                      RECEIVED :  01/31/25 18:52    Culture, Anaerobic and Aerobic [[id number]] Collected: 01/31/25 1614    Order Status: Canceled Specimen: Tissue     Culture, Tissue (with Gram Stain) [[id number]] Collected: 01/31/25 1614    Order Status: Completed Specimen: Abscess Updated: 02/03/25 0646     Gram Stain Result No Epithelial Cells seen  3+ WBC's (Polymorphonuclear)  1+ WBC's (Mononuclear)  No organisms seen       WOUND/ABSCESS --     No growth to date  No growth 36 to 48 hours  No Aerobic or Anaerobic growth  Holding for 3 weeks.       Anaerobic Culture --     Anaerobic culture further report to follow  No anaerobes isolated so far, Further report to follow      Narrative:      ORDER#: [id number]                          ORDERED BY: MAHESH PEREZ  SOURCE: Abscess Shoulder Right             COLLECTED:  01/31/25 16:14  ANTIBIOTICS AT SARAH.:                      RECEIVED :  01/31/25 19:49    Culture, Anaerobic and Aerobic [[id number]] Collected: 01/31/25 1613    Order Status: Completed Specimen: Body Fluid from Shoulder Updated: 02/03/25 0646     Gram Stain Result No Epithelial Cells seen  2+ WBC's (Polymorphonuclear)  1+ WBC's (Mononuclear)  rare gram positive cocci       WOUND/ABSCESS --     No growth to date  No growth 36 to 48 hours  No Aerobic or Anaerobic growth  Holding for 3 weeks.       Anaerobic Culture --     Anaerobic culture further report to follow  No anaerobes isolated so far, Further report to follow      Narrative:      ORDER#: [id number]                          ORDERED BY: MAHESH PEREZ  SOURCE: Abscess Shoulder Right             COLLECTED:  01/31/25 16:13  ANTIBIOTICS AT SARAH.:                      RECEIVED :  01/31/25 18:52    Culture, Anaerobic and Aerobic [[id number]] Collected: 01/31/25  1610    Order Status: Completed Specimen: Body Fluid from Joint Fluid Updated: 02/03/25 0646     Gram Stain Result No Epithelial Cells seen  3+ WBC's (Polymorphonuclear)  2+ WBC's (Mononuclear)  No organisms seen       WOUND/ABSCESS --     No growth to date  No growth 36 to 48 hours  No Aerobic or Anaerobic growth  Holding for 3 weeks.       Anaerobic Culture --     Anaerobic culture further report to follow  No anaerobes isolated so far, Further report to follow      Narrative:      ORDER#: [id number]                          ORDERED BY: MAHESH PEREZ  SOURCE: Abscess Shoulder Right Joint Fluid COLLECTED:  01/31/25 16:10  ANTIBIOTICS AT SARAH.:                      RECEIVED :  01/31/25 18:52    Culture, Body Fluid (with Gram Stain) [[id number]] Collected: 01/29/25 1130    Order Status: Completed Specimen: Body Fluid Updated: 02/03/25 1138     Body Fluid Culture, Sterile --     No growth to date  No growth 36 to 48 hours  No Aerobic or Anaerobic growth  Holding for 3 weeks.  No Aerobic or Anaerobic growth within week 1.       Gram Stain Result No Epithelial Cells seen  3+ WBC's (Polymorphonuclear)  1+ WBC's (Mononuclear)  No organisms seen      Narrative:      ORDER#: [id number]                          ORDERED BY: KENN RODRIGUEZ  [facility]     COLLECTED:  01/29/25 11:30  ANTIBIOTICS AT SARAH.:                      RECEIVED :  01/29/25 19:16    Culture, Body Fluid (with Gram Stain) [[id number]]     Order Status: Canceled Specimen: Body Fluid     Culture, Blood 2 [[id number]] Collected: 01/29/25 0351    Order Status: Completed Specimen: Blood Updated: 02/02/25 0415     Culture, Blood 2 No Growth after 4 days of incubation.    Narrative:      ORDER#: [id number]                          ORDERED BY: PDERO LUIS CONTEH  SOURCE: Blood rfa                          COLLECTED:  01/29/25 03:51  ANTIBIOTICS AT SARAH.:                      RECEIVED :  01/29/25 08:58  If child <=2 yrs old please draw

## 2025-02-03 NOTE — PROGRESS NOTES
Pt has not had a bowel movement yet, but when he attempts to the bright red watery blood is less and now when he wipes it appears to be more blood mixed with a very small amount of stool.

## 2025-02-04 VITALS
DIASTOLIC BLOOD PRESSURE: 70 MMHG | WEIGHT: 162.26 LBS | BODY MASS INDEX: 20.17 KG/M2 | OXYGEN SATURATION: 99 % | RESPIRATION RATE: 18 BRPM | HEIGHT: 75 IN | HEART RATE: 79 BPM | SYSTOLIC BLOOD PRESSURE: 117 MMHG | TEMPERATURE: 97.6 F

## 2025-02-04 LAB
ALBUMIN SERPL-MCNC: 2.8 G/DL (ref 3.4–5)
ANION GAP SERPL CALCULATED.3IONS-SCNC: 12 MMOL/L (ref 3–16)
BASOPHILS # BLD: 0.1 K/UL (ref 0–0.2)
BASOPHILS NFR BLD: 0.6 %
BUN SERPL-MCNC: 46 MG/DL (ref 7–20)
CALCIUM SERPL-MCNC: 9 MG/DL (ref 8.3–10.6)
CHLORIDE SERPL-SCNC: 101 MMOL/L (ref 99–110)
CO2 SERPL-SCNC: 22 MMOL/L (ref 21–32)
CREAT SERPL-MCNC: 3 MG/DL (ref 0.8–1.3)
DEPRECATED RDW RBC AUTO: 16.9 % (ref 12.4–15.4)
EOSINOPHIL # BLD: 0.2 K/UL (ref 0–0.6)
EOSINOPHIL NFR BLD: 2.9 %
GFR SERPLBLD CREATININE-BSD FMLA CKD-EPI: 21 ML/MIN/{1.73_M2}
GLUCOSE BLD-MCNC: 198 MG/DL (ref 70–99)
GLUCOSE SERPL-MCNC: 144 MG/DL (ref 70–99)
HCT VFR BLD AUTO: 28.4 % (ref 40.5–52.5)
HGB BLD-MCNC: 9.2 G/DL (ref 13.5–17.5)
LYMPHOCYTES # BLD: 1 K/UL (ref 1–5.1)
LYMPHOCYTES NFR BLD: 11.8 %
MCH RBC QN AUTO: 31.9 PG (ref 26–34)
MCHC RBC AUTO-ENTMCNC: 32.6 G/DL (ref 31–36)
MCV RBC AUTO: 97.9 FL (ref 80–100)
MONOCYTES # BLD: 0.4 K/UL (ref 0–1.3)
MONOCYTES NFR BLD: 4.7 %
NEUTROPHILS # BLD: 6.7 K/UL (ref 1.7–7.7)
NEUTROPHILS NFR BLD: 80 %
PERFORMED ON: ABNORMAL
PHOSPHATE SERPL-MCNC: 3.3 MG/DL (ref 2.5–4.9)
PLATELET # BLD AUTO: 339 K/UL (ref 135–450)
PMV BLD AUTO: 7.2 FL (ref 5–10.5)
POTASSIUM SERPL-SCNC: 4.6 MMOL/L (ref 3.5–5.1)
RBC # BLD AUTO: 2.9 M/UL (ref 4.2–5.9)
SODIUM SERPL-SCNC: 135 MMOL/L (ref 136–145)
WBC # BLD AUTO: 8.4 K/UL (ref 4–11)

## 2025-02-04 PROCEDURE — 6370000000 HC RX 637 (ALT 250 FOR IP): Performed by: NURSE PRACTITIONER

## 2025-02-04 PROCEDURE — 36415 COLL VENOUS BLD VENIPUNCTURE: CPT

## 2025-02-04 PROCEDURE — 99232 SBSQ HOSP IP/OBS MODERATE 35: CPT | Performed by: INTERNAL MEDICINE

## 2025-02-04 PROCEDURE — 6370000000 HC RX 637 (ALT 250 FOR IP): Performed by: INTERNAL MEDICINE

## 2025-02-04 PROCEDURE — 2500000003 HC RX 250 WO HCPCS: Performed by: ANESTHESIOLOGY

## 2025-02-04 PROCEDURE — 97116 GAIT TRAINING THERAPY: CPT

## 2025-02-04 PROCEDURE — 80069 RENAL FUNCTION PANEL: CPT

## 2025-02-04 PROCEDURE — 97530 THERAPEUTIC ACTIVITIES: CPT

## 2025-02-04 PROCEDURE — 85025 COMPLETE CBC W/AUTO DIFF WBC: CPT

## 2025-02-04 RX ADMIN — LACTULOSE 30 G: 10 SOLUTION ORAL at 12:11

## 2025-02-04 RX ADMIN — ASPIRIN 81 MG: 81 TABLET, CHEWABLE ORAL at 12:12

## 2025-02-04 RX ADMIN — PANTOPRAZOLE SODIUM 40 MG: 40 TABLET, DELAYED RELEASE ORAL at 04:43

## 2025-02-04 RX ADMIN — SEVELAMER CARBONATE 800 MG: 800 TABLET, FILM COATED ORAL at 15:10

## 2025-02-04 RX ADMIN — DOXYCYCLINE HYCLATE 100 MG: 100 TABLET, COATED ORAL at 12:12

## 2025-02-04 RX ADMIN — OXYCODONE 5 MG: 5 TABLET ORAL at 04:45

## 2025-02-04 RX ADMIN — ACETAMINOPHEN 650 MG: 325 TABLET ORAL at 15:10

## 2025-02-04 RX ADMIN — Medication 10 ML: at 12:15

## 2025-02-04 ASSESSMENT — PAIN DESCRIPTION - LOCATION: LOCATION: SHOULDER

## 2025-02-04 ASSESSMENT — ENCOUNTER SYMPTOMS
EYE DISCHARGE: 0
DIARRHEA: 0
RHINORRHEA: 0
CONSTIPATION: 0
WHEEZING: 0
BACK PAIN: 0
SINUS PRESSURE: 0
EYE REDNESS: 0
SHORTNESS OF BREATH: 0
ABDOMINAL PAIN: 0
NAUSEA: 0
COUGH: 0
SORE THROAT: 0
SINUS PAIN: 0

## 2025-02-04 ASSESSMENT — PAIN SCALES - GENERAL: PAINLEVEL_OUTOF10: 7

## 2025-02-04 ASSESSMENT — PAIN DESCRIPTION - ORIENTATION: ORIENTATION: RIGHT

## 2025-02-04 NOTE — PROGRESS NOTES
[facility] - Inpatient Rehabilitation Department   Phone: [phone]    Physical Therapy    [] Initial Evaluation            [x] Daily Treatment Note         [] Discharge Summary      Patient: Danny Rock   : 1950   MRN: [id number]   Date of Service:  2025  Admitting Diagnosis: Abscess of right shoulder  Current Admission Summary: Pt is a 75 y/o male who presents to the hospital with R shoulder pain. Pt found to have R shoulder intramuscular fluid collection in the setting of renal cell carcinoma. Pt had aspiration of R shoulder during admission and is s/p R shoulder open incision and drainage on 25.  Past Medical History:  has a past medical history of Atrial fibrillation (HCC), CAD (coronary artery disease), Cancer of kidney (HCC), Dependence on renal dialysis (HCC), Diabetes mellitus (HCC), Hyperlipidemia, Hypertension, Prostate cancer (HCC), Right renal mass, and Systolic CHF (HCC).  Past Surgical History:  has a past surgical history that includes hernia repair; knee surgery; Cholecystectomy; Nasal septum surgery; eye surgery (Bilateral, 2018); Kidney removal (Right, 2022); Cataract removal (Bilateral); IR TUNNELED CVC PLACE WO SQ PORT/PUMP > 5 YEARS (2024); CT NEEDLE BIOPSY LIVER PERCUTANEOUS (2024); other surgical history (2024); CT NEEDLE BIOPSY LIVER PERCUTANEOUS (2024); CT GUIDED CHEST TUBE (2024); hernia repair (Left, 2024); other surgical history (Left); and shoulder surgery (Right, 2025).  Discharge Recommendations: Danny Rock scored a 20/24 on the AM-PAC short mobility form. Current research shows that an AM-PAC score of 18 or greater is typically associated with a discharge to the patient's home setting. Based on the patient's AM-PAC score and their current functional mobility deficits, it is recommended that the patient have 2-3 sessions per week of Physical Therapy at d/c to increase the patient's  with R shoulder pain. Pt found to have R shoulder intramuscular fluid collection in the setting of renal cell carcinoma. Pt had aspiration of R shoulder during admission and is s/p R shoulder open incision and drainage on 1/31/25. Pt is presenting below his baseline level of function and required up to CGA for performance of functional mobility tasks with use of a RW. Pt. Was able to demonstrate going up and down 6 steps this date.  Wife will provide 24 hour S at d/c.  Pt will benefit from continued skilled PT to facilitate return to PLOF and to promote independence.  Safety Interventions: patient left in bed, bed alarm in place, call light within reach, gait belt, and nurse notified wife present    Plan  Frequency: 3-5 x/per week  Current Treatment Recommendations: strengthening, ROM, balance training, functional mobility training, transfer training, gait training, stair training, endurance training, neuromuscular re-education, patient/caregiver education, pain management, home exercise program, safety education, and equipment evaluation/education    Goals  Patient Goals: To return home   Short Term Goals:  Time Frame: By discharge  Patient will complete bed mobility at [facility]   Patient will complete transfers at [facility]   Patient will ambulate 150 ft with use of rolling walker at modified independent  Patient will ascend/descend 8 stairs with (B) handrail at modified independent    Above goals reviewed on 2/4/2025.  All goals are ongoing at this time unless indicated above.      Therapy Session Time      Individual Group Co-treatment   Time In 1423       Time Out 1453       Minutes 30         Timed Code Treatment Minutes: 30 Minutes  Total Treatment Minutes: 30 Minutes        Electronically Signed By: KERON FELIX, [id number]

## 2025-02-04 NOTE — CARE COORDINATION
02/04/25 1154   IMM Letter   IMM Letter given to Patient/Family/Significant other/Guardian/POA/by: IMM given to the patient by CM   IMM Letter date given: 02/04/25   IMM Letter time given: 1156

## 2025-02-04 NOTE — CARE COORDINATION
Discharge Planning:     (CM) called Alf [phone] with [facility]. CM provided Alf with the referral. Alf stated they can accept and will follow the patient.    Name: [facility]  Phone: [phone]  Fax: [phone]     Electronically signed by TALAT Shrestha on 2/4/2025 at 12:37 PM

## 2025-02-04 NOTE — DISCHARGE INSTRUCTIONS
Please call and make an appointment with your PCP within 1 week; If you do not have a PCP please make an appointment with the [facility] outpatient resident clinic by calling [phone]  Please call and make an appointment with Orthopedic Surgery  Please take all your medications as prescribed including any new ones on discharge

## 2025-02-04 NOTE — DISCHARGE SUMMARY
V2.0  Discharge Summary    Name:  Danny Rock /Age/Sex: 1950 (74 y.o. male)   Admit Date: 2025  Discharging Provider: Santiago Herring MD   MRN & CSN:  [id number] & [id number] Attending:  Santiago Herring MD       Brief HPI: Danny Rock is a 74 y.o. male with PMHx of CAD, hypertension, CVA, paroxysmal atrial fibrillation, type 2 diabetes, renal intractable right shoulder pain who was admitted with a right shoulder abscess. Pt was seen by ID and was treated with IV Abx which was switched to p.o on discharge. Pt was dishcarge in stable fashion.     Brief Problem Focused Hospital Course:     Abscess of right shoulder: With right shoulder pain and decreased mobility.  CT of the right shoulder noted for probable abscess patient with elevated inflammatory markers on admission CRP of 338 and .  Repeat inflammatory markers still elevated.  Status post right shoulder arthrocentesis 2024 and cultures NGTD  Status post I&D 2024.  -Continue antibiotics as per ID  - Follow-up in cultures intraoperative and arthrocentesis  - Orthopedics and ID following with recommendation       Constipation with hemorrhoid bleeding: Strained for bowel movement with hemorrhoidal bleeding.  Would like a bowel regimen.  Will place on phosphate enema with lactulose       Coronary artery disease due to lipid rich plaque: With no chest pains or shortness of breath.  Continue aspirin, metoprolol, rosuvastatin      HLD (hyperlipidemia): On statin      Essential hypertension: Stable on Coreg      Diabetes mellitus: On sliding scale insulin      PAF (paroxysmal atrial fibrillation) (HCC): On metoprolol and Eliquis.  Eliquis on hold in view of surgical intervention    The patient expressed appropriate understanding of, and agreement with the discharge recommendations, medications, and plan.     Consults this admission:  IP CONSULT TO HOSPITALIST  IP CONSULT TO ORTHOPEDIC SURGERY  IP CONSULT TO NEPHROLOGY  IP CONSULT TO  INFECTIOUS DISEASES  IP CONSULT TO VASCULAR SURGERY  IP CONSULT TO HOME CARE NEEDS    Discharge Instruction:   Primary care physician: Yesi Rosa APRN - CNP within 2 weeks  Disposition: Discharged to: [x]Home, []HHC, []SNF, []Acute Rehab, []Hospice []AMA  Condition on discharge: Stable    Physical Exam:   General appearance: No apparent distress, appears stated age and cooperative.  HEENT: Normocephalic, atraumatic, MMM, No sclera icterus/conjuctival palor  Neck: Supple, no thyromegally. No jugular venous distention.   Respiratory:  Normal respiratory effort. Clear to auscultation, no Rales/Wheezes/Rhonchi.  Cardiovascular: S1/S2 without murmurs, rubs or gallops. RRR  Abdomen: Soft, non-tender, non-distended, bowel sounds present.  Musculoskeletal: No clubbing, cyanosis or edema bilaterally.  Swollen right shoulder with limited movement and PIERRE drain in place.  Skin: Skin color, texture, turgor normal.  No rashes or lesions.  Neurologic:  Cranial nerves: II-XII intact, ROXANA, No focal sensory/motor deficits    Objective Findings at Discharge:   /70   Pulse 79   Temp 97.6 °F (36.4 °C) (Oral)   Resp 18   Ht 1.905 m (6' 3\")   Wt 73.6 kg (162 lb 4.1 oz)   SpO2 99%   BMI 20.28 kg/m²     Discharge Medications:        Medication List        START taking these medications      doxycycline hyclate 100 MG tablet  Commonly known as: VIBRA-TABS  Take 1 tablet by mouth every 12 hours for 15 days            CHANGE how you take these medications      apixaban 5 MG Tabs tablet  Commonly known as: ELIQUIS  Take 1 tablet by mouth 2 times daily  Start taking on: February 6, 2025  What changed: These instructions start on February 6, 2025. If you are unsure what to do until then, ask your doctor or other care provider.     insulin lispro (1 Unit Dial) 100 UNIT/ML Sopn  Commonly known as: HumaLOG KwikPen  Sc before meals per sliding scale  What changed:   when to take this  additional instructions

## 2025-02-04 NOTE — DIALYSIS
Treatment time: 3 hours  Net UF: 2000 ml     Pre weight: 73.6 kg  Post weight:71.6 kg  EDW: tbd kg      Access used: R TDC    Access function: good with  ml/min     Medications or blood products given: none     Regular outpatient schedule: TTS      Summary of response to treatment: Patient tolerated treatment well and without any complications. Patient remained stable throughout entire treatment and upon the exiting the dialysis suite via transport.     Report given to Brooklyn Samayoa RN and copy of dialysis treatment record placed in chart, to be scanned into EMR.

## 2025-02-04 NOTE — PLAN OF CARE
Problem: Safety - Adult  Goal: Free from fall injury  2/4/2025 0343 by Anastasia Dorantes RN  Outcome: Progressing  2/3/2025 2105 by Brooklyn Samayoa RN  Outcome: Progressing     Problem: Chronic Conditions and Co-morbidities  Goal: Patient's chronic conditions and co-morbidity symptoms are monitored and maintained or improved  2/4/2025 0343 by Anastasia Dorantes RN  Outcome: Progressing  2/3/2025 2105 by Brooklyn Samayoa RN  Outcome: Progressing     Problem: ABCDS Injury Assessment  Goal: Absence of physical injury  2/4/2025 0343 by Anastasia Dorantes RN  Outcome: Progressing  2/3/2025 2105 by Brooklyn Samayoa RN  Outcome: Progressing     Problem: Skin/Tissue Integrity  Goal: Skin integrity remains intact  Description: 1.  Monitor for areas of redness and/or skin breakdown  2.  Assess vascular access sites hourly  3.  Every 4-6 hours minimum:  Change oxygen saturation probe site  4.  Every 4-6 hours:  If on nasal continuous positive airway pressure, respiratory therapy assess nares and determine need for appliance change or resting period  2/4/2025 0343 by Anastasia Dorantes RN  Outcome: Progressing  2/3/2025 2105 by Brooklyn Samayoa RN  Outcome: Progressing     Problem: Pain  Goal: Verbalizes/displays adequate comfort level or baseline comfort level  2/4/2025 0343 by Anastasia Dorantes RN  Outcome: Progressing  2/3/2025 2105 by Brooklyn Samayoa RN  Outcome: Progressing

## 2025-02-04 NOTE — PROGRESS NOTES
to the acromioclavicular joint.  May have features of underlying joint effusion and chronic rotator cuff pathology. 3.  Few small foci of lucency over the soft tissues lateral to the proximal humerus are likely small foci of gas.  Correlate if recent injections or signs of infection.           ======================================================================  Please note that this chart entry has been generated using voice recognition software, mainly.  So please excuse brevity and/or typos.  While every effort and attempts have been made to ensure the accuracy of this automated transcription, some errors may have occurred; and certain words and phrases in transcription may not be entered as intended.  However, inadvertent computerized transcription errors may be present.  So please contact us if any clarification needed.

## 2025-02-04 NOTE — PROGRESS NOTES
Infectious Diseases   Progress Note      Admission Date: 1/28/2025  Hospital Day: Hospital Day: 8   Attending: No att. providers found  Date of service: 2/4/2025     Chief complaint/ Reason for consult:     Right shoulder abscess and severe right glenohumeral arthritis  Elevated CRP of 338  Elevated sed rate of 112  End-stage renal disease on hemodialysis  History of renal cell cancer, status post right nephrectomy, status post adjuvant pembrolizumab until 11/27/2023    Microbiology:      I have reviewed allavailable micro lab data and cultures    Results       Procedure Component Value Units Date/Time    Culture, Anaerobic and Aerobic [[id number]] Collected: 01/31/25 1619    Order Status: Canceled Specimen: Body Fluid     Culture, Tissue (with Gram Stain) [[id number]]  (Abnormal) Collected: 01/31/25 1619    Order Status: Completed Specimen: Abscess Updated: 02/04/25 1121     Gram Stain Result No Epithelial Cells seen  1+ WBC's (Polymorphonuclear)  No organisms seen       Anaerobic Culture --     Anaerobic culture further report to follow  No anaerobes isolated so far, Further report to follow       Organism Micrococcus luteus     WOUND/ABSCESS --     Rare growth  No further workup       Organism Diphtheroids     WOUND/ABSCESS --     Rare growth  No further workup      Narrative:      ORDER#: [id number]                          ORDERED BY: MAHESH PEREZ  SOURCE: Abscess Shoulder Right             COLLECTED:  01/31/25 16:19  ANTIBIOTICS AT SARAH.:                      RECEIVED :  01/31/25 18:52    Culture, Anaerobic and Aerobic [[id number]] Collected: 01/31/25 1617    Order Status: Completed Specimen: Body Fluid Updated: 02/03/25 0646     Gram Stain Result No Epithelial Cells seen  3+ WBC's (Polymorphonuclear)  1+ WBC's (Mononuclear)  No organisms seen       WOUND/ABSCESS --     No growth to date  No growth 36 to 48 hours  No Aerobic or Anaerobic growth  Holding for 3 weeks.       Anaerobic Culture --      R70.0    Abscess of right arm L02.413    ESRD on hemodialysis (HCC) N18.6, Z99.2    Hemorrhoid K64.9    Constipation K59.00       Please note that this chart was generated using Dragon dictation software. Although every effort was made to ensure the accuracy of this automated transcription, some errors in transcription may have occurred inadvertently. If you may need any clarification, please do not hesitate to contact me through EPIC or at the phone number provided below with my electronic signature.  Any pictures or media included in this note were obtained after taking informed verbal consent from the patient and with their approval to include those in the patient's medical record.        Jadiel Esteves MD, MPH, FACP, FIDSA  2/4/2025, 9:19 PM  Central Office Phone: [phone]  Central Office Fax: [phone]    [facility]   [address], [address] ([address])  [place] [address]  [facility] days:  Tuesday & Thursday AM    [facility]  [address] , [address] (Medical office Building)  [place], [address]  [facility] days: Wednesday AM

## 2025-02-04 NOTE — CARE COORDINATION
Case Management -  Discharge Note      Patient Name: Danny Rock                   YOB: 1950  Room: [facility]5571/5571-            Readmission Risk (Low < 19, Mod (19-27), High > 27): Readmission Risk Score: 25.6    Current PCP: Yesi Rosa APRN - CNP      (IMM) Important Message from Medicare:    Has pt received appropriate compliance notices before being discharged if required: yes  Compliance doc:  [] 2nd IMM; [] Code 44 [] [place]  Date Given: 2/4/2025  Given By: ANISHA    PT AM-PAC: 19 /24  OT AM-PAC: 19 /24    Patient/patient representative has been educated on the benefits of Home Health Care as well as the possible risks of declining recommended services. Patient/patient representative has acknowledged the information provided and decided on the following discharge plan. Patient/ patient representative has been provided freedom of choice regarding service provider, supported by basic dialogue that supports the patient's individualized plan of care/goals.    Home Care Information:   Home Care Agency:   [facility] -[address] [address]  Phone: [phone]  Fax: [phone]             Services: PT/OT/RN  Home Health Order Obtained: Yes    Home health agency notified of discharge.      [facility]  [address] [place] [address]  Phone: [phone]  Fax: [phone]        Financial    Payor: MEDICARE / Plan: MEDICARE PART A AND B / Product Type: *No Product type* /     Pharmacy:  Potential assistance Purchasing Medications: Yes  Meds-to-Beds request: Yes      [facility] [address] [phone] - F [phone]  [address] [id number]  Phone: [phone] Fax: [phone]      Notes:    Additional Case Management Notes: N/A

## 2025-02-04 NOTE — PROGRESS NOTES
CLINICAL PHARMACY NOTE: MEDS TO BEDS    Total # of Prescriptions Filled: 1   The following medications were delivered to the patient:  DOXYCYCLINE HYCLATE 100MG TABS    Additional Documentation: Brooklyn WEISS approved to deliver medication to patient room=signed  Eliquis due 3/9  [facility] Tech

## 2025-02-04 NOTE — PROGRESS NOTES
Patient discharging home today with wife who is here to transport. IV access removed without complication and dry dressing applied. Telemetry monitor discontinued and central monitoring unit notified. Discharge instructions reviewed with patient and spouse, both verbalized understanding.

## 2025-02-05 LAB
BACTERIA SPEC AEROBE CULT: ABNORMAL
BACTERIA SPEC AEROBE CULT: ABNORMAL
BACTERIA SPEC AEROBE CULT: NORMAL
BACTERIA SPEC ANAEROBE CULT: ABNORMAL
BACTERIA SPEC ANAEROBE CULT: NORMAL
GRAM STN SPEC: ABNORMAL
GRAM STN SPEC: NORMAL
ORGANISM: ABNORMAL
ORGANISM: ABNORMAL

## 2025-02-10 LAB
BACTERIA FLD AEROBE CULT: NORMAL
BACTERIA SPEC AEROBE CULT: NORMAL
BACTERIA SPEC ANAEROBE CULT: NORMAL
GRAM STN SPEC: NORMAL

## 2025-02-13 ENCOUNTER — OFFICE VISIT (OUTPATIENT)
Dept: ORTHOPEDIC SURGERY | Age: 75
End: 2025-02-13

## 2025-02-13 ENCOUNTER — TELEPHONE (OUTPATIENT)
Dept: ORTHOPEDIC SURGERY | Age: 75
End: 2025-02-13

## 2025-02-13 VITALS — WEIGHT: 162 LBS | BODY MASS INDEX: 20.14 KG/M2 | HEIGHT: 75 IN

## 2025-02-13 DIAGNOSIS — L02.413 ABSCESS OF RIGHT ARM: Primary | ICD-10-CM

## 2025-02-13 PROCEDURE — 99024 POSTOP FOLLOW-UP VISIT: CPT | Performed by: ORTHOPAEDIC SURGERY

## 2025-02-13 NOTE — TELEPHONE ENCOUNTER
SPOKE WITH HOME PT.  ALL QUESTIONS ANSWERED.  WILL CALL IF THERE ARE ANY UPDATES TO REPORT AFTER HIS OFFICE VISIT TODAY.

## 2025-02-13 NOTE — TELEPHONE ENCOUNTER
[facility] IS REQUESTING A CALL BACK IN REGARDS TO PHYSICAL THERAPY PROTOCOL. STAFF CAN BE REACHED AT    [phone]

## 2025-02-17 NOTE — PROGRESS NOTES
Physician Progress Note      PATIENT:               MARCOS TORRES  CSN #:                  [id number]  :                       1950  ADMIT DATE:       2025 9:08 PM  DISCH DATE:        2025 5:45 PM  RESPONDING  PROVIDER #:        Nicolas Jacob MD          QUERY TEXT:    Patient admitted with Infective myositis, right shoulder. Per Op note dated   25, \"excisional debridement of right shoulder bone. \". Can the bone be   specified as:      The medical record reflects the following:  Risk Factors: Infective myositis, right shoulder  Clinical Indicators: OR notes- \"\"excisional debridement of right shoulder   bone. \"  Treatment: Ortho consult, s/p excisional debridement of right shoulder bone.  Options provided:  -- excisional debridement right Humerus down to the bone  -- excisional debridement right scapula down to the bone  -- Other - I will add my own diagnosis  -- Disagree - Not applicable / Not valid  -- Disagree - Clinically unable to determine / Unknown  -- Refer to Clinical Documentation Reviewer    PROVIDER RESPONSE TEXT:    Deep shoulder infection, excisional debridement right Humerus down to the bone    Query created by: Angie Chance on 2025 7:42 AM      Electronically signed by:  Nicolas Jacob MD 2025 11:23 AM

## 2025-02-24 LAB
BACTERIA SPEC AEROBE CULT: NORMAL
BACTERIA SPEC ANAEROBE CULT: NORMAL
GRAM STN SPEC: NORMAL

## 2025-02-25 NOTE — PROGRESS NOTES
2/13/2025     Reason for visit:  Status post right shoulder I&D on 1/31/2025    History of Present Illness:  Patient returns for postoperative evaluation.  Overall he is doing well.  He is getting IV antibiotics per infectious disease.  He does report improvements of his shoulder symptoms and pain but still has some baseline pain    Objective:  Ht 1.905 m (6' 3\")   Wt 73.5 kg (162 lb)   BMI 20.25 kg/m²      Physical Exam:  The patient is well-appearing and in no apparent distress  Neg Spurling's test  Examination of the right shoulder  There is no swelling, ecchymosis, or gross deformity  There is no evidence of muscle atrophy  Range of motion demonstrates approximate 100 degrees of forward flexion and abduction with mild pain  Intact motor and sensory function throughout the median/radial/ulnar/PIN/AIN distributions  Palpable radial pulse, brisk cap refill, 2+ symmetric reflexes     Assessment:  Status post right shoulder I&D on 1/31/2025    Plan:  The patient is doing fairly well.  He has quite severe advanced glenohumeral degenerative joint disease and therefore will continue to have some pain and dysfunction as a result of that.  Continue IV antibiotics per ID.  He will return to see me in 4 weeks for repeat evaluation.                   Nicolas Jacob MD            Orthopaedic Surgery Sports Medicine and Arthroscopy            [facility]            Team Physician [facility] ([place])      Disclaimer:  This note was dictated with voice recognition software.  Though review and correction are routine, we apologize for any errors.

## 2025-03-13 ENCOUNTER — OFFICE VISIT (OUTPATIENT)
Dept: ORTHOPEDIC SURGERY | Age: 75
End: 2025-03-13

## 2025-03-13 VITALS — WEIGHT: 162 LBS | HEIGHT: 75 IN | BODY MASS INDEX: 20.14 KG/M2

## 2025-03-13 DIAGNOSIS — M25.512 LEFT SHOULDER PAIN, UNSPECIFIED CHRONICITY: Primary | ICD-10-CM

## 2025-03-13 DIAGNOSIS — L02.413 ABSCESS OF RIGHT ARM: ICD-10-CM

## 2025-03-13 RX ORDER — BUPIVACAINE HYDROCHLORIDE 5 MG/ML
4 INJECTION, SOLUTION PERINEURAL ONCE
Status: COMPLETED | OUTPATIENT
Start: 2025-03-13 | End: 2025-03-13

## 2025-03-13 RX ORDER — METHYLPREDNISOLONE ACETATE 40 MG/ML
40 INJECTION, SUSPENSION INTRA-ARTICULAR; INTRALESIONAL; INTRAMUSCULAR; SOFT TISSUE ONCE
Status: COMPLETED | OUTPATIENT
Start: 2025-03-13 | End: 2025-03-13

## 2025-03-13 RX ADMIN — METHYLPREDNISOLONE ACETATE 40 MG: 40 INJECTION, SUSPENSION INTRA-ARTICULAR; INTRALESIONAL; INTRAMUSCULAR; SOFT TISSUE at 14:56

## 2025-03-13 RX ADMIN — BUPIVACAINE HYDROCHLORIDE 20 MG: 5 INJECTION, SOLUTION PERINEURAL at 14:57

## 2025-03-14 DIAGNOSIS — L02.413 ABSCESS OF RIGHT ARM: ICD-10-CM

## 2025-03-14 LAB
CRP SERPL-MCNC: 58.4 MG/L (ref 0–5.1)
ERYTHROCYTE [SEDIMENTATION RATE] IN BLOOD BY WESTERGREN METHOD: 33 MM/HR (ref 0–20)

## 2025-03-17 ENCOUNTER — TELEPHONE (OUTPATIENT)
Dept: ORTHOPEDIC SURGERY | Age: 75
End: 2025-03-17

## 2025-03-17 NOTE — TELEPHONE ENCOUNTER
General Question     Subject: BLOOD TEST FOR SHOULDER INFECTION  Patient and /or Facility Request: PATIENT  Contact Number: [phone]    PT IS REQUESTING CALL BACK IN REGARDS TO BLOOD TEST FOR SHOULDER INFECTION. PT WANTS TO KNOW THE RESULTS, AND IF ANTIBIOTICS WILL BE ORDERED. PT CAN BE REACHED [phone]   
SPOKE WITH PATIENT. AWARE OF LAB RESULTS AND THAT INFECTION IS LIKELY ERADICATED. INFORMED THAT HE NEEDS TO RETURN TO SEE DR. PEREZ IN 2 WEEKS. PATIENT EXPRESSED UNDERSTANDING.   
today

## 2025-03-19 NOTE — PROGRESS NOTES
3/13/2025     Reason for visit:  Status post right shoulder I&D on 1/31/2025    History of Present Illness:  Patient returns for postoperative evaluation.  Overall he is doing well.  He does report improvements of his shoulder symptoms and pain but still has some baseline pain    Objective:  Ht 1.905 m (6' 3\")   Wt 73.5 kg (162 lb)   BMI 20.25 kg/m²      Physical Exam:  The patient is well-appearing and in no apparent distress  Neg Spurling's test  Examination of the right shoulder  There is no swelling, ecchymosis, or gross deformity  There is no evidence of muscle atrophy  Range of motion demonstrates approximate 100 degrees of forward flexion and abduction with mild pain  Intact motor and sensory function throughout the median/radial/ulnar/PIN/AIN distributions  Palpable radial pulse, brisk cap refill, 2+ symmetric reflexes     Assessment:  Status post right shoulder I&D on 1/31/2025    Plan:  The patient is doing fairly well.  He has quite severe advanced glenohumeral degenerative joint disease and therefore will continue to have some pain and dysfunction as a result of that.  At this point I would recommend ordering ESR and CRP to monitor for appropriate resolution of the right shoulder infection.  He is also having left shoulder pain therefore we discussed treatment about that. The patient elected proceed with cortisone injection.  Therefore injection was given to the left shoulder subacromial space via sterile technique.  The injection consisted of 40 mg of Depo-Medrol combined with 4 mL of 0.5% Marcaine.  The patient tolerated this well.  Patient will return for follow-up evaluation after blood draws have been performed.                   Nicolas Jacob MD            Orthopaedic Surgery Sports Medicine and Arthroscopy            [facility]            Team Physician [facility] ([place])      Disclaimer:  This note was dictated with voice

## 2025-03-20 NOTE — PROGRESS NOTES
[address] [facility]     Outpatient Follow Up Note      Chief Complaint   Patient presents with    Hypertension    Hyperlipidemia    1 Year Follow Up     HPI:  Mr. Barnett Race 75 y.o. male who's history includes CAD, hypertension, hyperlipidemia, PAF. He wore a Holter monitor May 2018 which showed no a. Fib; his Xarelto was stopped and baby aspirin started. Scheduled for surgery in July 2022 for a right Nephrectomy. The Urologist and Oncologist have concerns for cancer in the kidney. Pt established with Dr Thiago Muro 4/19/23 for ascending aortic and aortic root aneurysm who recommends ongoing surveillance over time. Today, he is here for yearly follow up. He is with wife, Antonio Ahuja. He did do CT without contrast. He sees Dr Rita Merchant and Dr Amanda Martinez. No sign of recurrence of renal cell cancer. He is treated with immunotherapy. Last week, his bp was low ~98, just like it is today. He has lost 30-35 lb. No s/s afib. Pt denies exertional chest pain, CONRAD/PND, palpitations, light-headedness, edema.       Past Medical History:   Diagnosis Date    Atrial fibrillation (HCC)     CAD (coronary artery disease)     Cancer of kidney ([address])     Currently on Immunotherapy    Diabetes mellitus ([address])     Hyperlipidemia     Hypertension     Prostate cancer ([address])     Right renal mass      Social History:    Social History     Tobacco Use   Smoking Status Former    Years: 2    Types: Cigarettes   Smokeless Tobacco Never     Current Medications:  Current Outpatient Medications   Medication Sig Dispense Refill    losartan (COZAAR) 100 MG tablet       vitamin B-12 (CYANOCOBALAMIN) 1000 MCG tablet Take 1 tablet by mouth daily      Pembrolizumab (KEYTRUDA IV) Infuse intravenously every 21 days      ascorbic acid (VITAMIN C) 500 MG tablet Take 1 tablet by mouth daily      vitamin D3 (CHOLECALCIFEROL) 125 MCG (5000 UT) TABS tablet Take 1 tablet by mouth daily      diclofenac sodium (VOLTAREN) 1 % GEL Apply 4 g topically 4 times daily [Negative] : Heme/Lymph

## 2025-03-22 ENCOUNTER — HOSPITAL ENCOUNTER (OUTPATIENT)
Age: 75
Setting detail: OBSERVATION
Discharge: ANOTHER HOSPITAL | End: 2025-03-22
Attending: EMERGENCY MEDICINE | Admitting: INTERNAL MEDICINE
Payer: MEDICARE

## 2025-03-22 ENCOUNTER — HOSPITAL ENCOUNTER (INPATIENT)
Age: 75
LOS: 8 days | Discharge: NURSING, REHAB OR LONG-TERM CARE | DRG: 539 | End: 2025-03-30
Admitting: INTERNAL MEDICINE
Payer: MEDICARE

## 2025-03-22 ENCOUNTER — APPOINTMENT (OUTPATIENT)
Dept: CT IMAGING | Age: 75
End: 2025-03-22
Payer: MEDICARE

## 2025-03-22 ENCOUNTER — APPOINTMENT (OUTPATIENT)
Dept: MRI IMAGING | Age: 75
End: 2025-03-22
Payer: MEDICARE

## 2025-03-22 VITALS
TEMPERATURE: 98.2 F | DIASTOLIC BLOOD PRESSURE: 76 MMHG | HEIGHT: 75 IN | BODY MASS INDEX: 20.01 KG/M2 | RESPIRATION RATE: 16 BRPM | SYSTOLIC BLOOD PRESSURE: 127 MMHG | WEIGHT: 160.94 LBS | OXYGEN SATURATION: 97 % | HEART RATE: 97 BPM

## 2025-03-22 DIAGNOSIS — R78.81 ENTEROCOCCAL BACTEREMIA: Primary | ICD-10-CM

## 2025-03-22 DIAGNOSIS — S39.012A STRAIN OF LUMBAR REGION, INITIAL ENCOUNTER: Primary | ICD-10-CM

## 2025-03-22 DIAGNOSIS — R93.5 ABNORMAL CT OF THE ABDOMEN: ICD-10-CM

## 2025-03-22 DIAGNOSIS — A49.8 ENTEROCOCCUS FAECALIS INFECTION: ICD-10-CM

## 2025-03-22 DIAGNOSIS — B95.2 ENTEROCOCCAL BACTEREMIA: Primary | ICD-10-CM

## 2025-03-22 DIAGNOSIS — R78.81 BACTEREMIA: ICD-10-CM

## 2025-03-22 DIAGNOSIS — M51.362 DEGENERATION OF INTERVERTEBRAL DISC OF LUMBAR REGION WITH DISCOGENIC BACK PAIN AND LOWER EXTREMITY PAIN: ICD-10-CM

## 2025-03-22 DIAGNOSIS — R26.2 UNABLE TO WALK: ICD-10-CM

## 2025-03-22 PROBLEM — M48.061 NEUROFORAMINAL STENOSIS OF LUMBAR SPINE: Status: ACTIVE | Noted: 2025-03-22

## 2025-03-22 PROBLEM — M54.50 BACK PAIN AT L4-L5 LEVEL: Status: ACTIVE | Noted: 2025-03-22

## 2025-03-22 LAB
ALBUMIN SERPL-MCNC: 3.5 G/DL (ref 3.4–5)
ALBUMIN/GLOB SERPL: 0.9 {RATIO} (ref 1.1–2.2)
ALP SERPL-CCNC: 112 U/L (ref 40–129)
ALT SERPL-CCNC: 26 U/L (ref 10–40)
ANION GAP SERPL CALCULATED.3IONS-SCNC: 13 MMOL/L (ref 3–16)
AST SERPL-CCNC: 28 U/L (ref 15–37)
BACTERIA URNS QL MICRO: NORMAL /HPF
BASOPHILS # BLD: 0.1 K/UL (ref 0–0.2)
BASOPHILS NFR BLD: 0.9 %
BILIRUB SERPL-MCNC: 0.6 MG/DL (ref 0–1)
BILIRUB UR QL STRIP.AUTO: NEGATIVE
BUN SERPL-MCNC: 40 MG/DL (ref 7–20)
CALCIUM SERPL-MCNC: 9.9 MG/DL (ref 8.3–10.6)
CHLORIDE SERPL-SCNC: 98 MMOL/L (ref 99–110)
CLARITY UR: CLEAR
CO2 SERPL-SCNC: 26 MMOL/L (ref 21–32)
COLOR UR: YELLOW
CREAT SERPL-MCNC: 2.4 MG/DL (ref 0.8–1.3)
DEPRECATED RDW RBC AUTO: 19.8 % (ref 12.4–15.4)
EOSINOPHIL # BLD: 0 K/UL (ref 0–0.6)
EOSINOPHIL NFR BLD: 0.1 %
EPI CELLS #/AREA URNS AUTO: 1 /HPF (ref 0–5)
GFR SERPLBLD CREATININE-BSD FMLA CKD-EPI: 27 ML/MIN/{1.73_M2}
GLUCOSE BLD-MCNC: 120 MG/DL (ref 70–99)
GLUCOSE BLD-MCNC: 203 MG/DL (ref 70–99)
GLUCOSE SERPL-MCNC: 134 MG/DL (ref 70–99)
GLUCOSE UR STRIP.AUTO-MCNC: NEGATIVE MG/DL
HCT VFR BLD AUTO: 37.2 % (ref 40.5–52.5)
HGB BLD-MCNC: 12.7 G/DL (ref 13.5–17.5)
HGB UR QL STRIP.AUTO: NEGATIVE
HYALINE CASTS #/AREA URNS AUTO: 1 /LPF (ref 0–8)
KETONES UR STRIP.AUTO-MCNC: NEGATIVE MG/DL
LEUKOCYTE ESTERASE UR QL STRIP.AUTO: NEGATIVE
LYMPHOCYTES # BLD: 1.1 K/UL (ref 1–5.1)
LYMPHOCYTES NFR BLD: 11.3 %
MCH RBC QN AUTO: 33.3 PG (ref 26–34)
MCHC RBC AUTO-ENTMCNC: 34 G/DL (ref 31–36)
MCV RBC AUTO: 97.8 FL (ref 80–100)
MONOCYTES # BLD: 0.5 K/UL (ref 0–1.3)
MONOCYTES NFR BLD: 4.9 %
NEUTROPHILS # BLD: 7.8 K/UL (ref 1.7–7.7)
NEUTROPHILS NFR BLD: 82.8 %
NITRITE UR QL STRIP.AUTO: NEGATIVE
PERFORMED ON: ABNORMAL
PERFORMED ON: ABNORMAL
PH UR STRIP.AUTO: 8 [PH] (ref 5–8)
PLATELET # BLD AUTO: 172 K/UL (ref 135–450)
PMV BLD AUTO: 7.2 FL (ref 5–10.5)
POTASSIUM SERPL-SCNC: 5 MMOL/L (ref 3.5–5.1)
PROT SERPL-MCNC: 7.3 G/DL (ref 6.4–8.2)
PROT UR STRIP.AUTO-MCNC: 100 MG/DL
RBC # BLD AUTO: 3.8 M/UL (ref 4.2–5.9)
RBC CLUMPS #/AREA URNS AUTO: 2 /HPF (ref 0–4)
SODIUM SERPL-SCNC: 137 MMOL/L (ref 136–145)
SP GR UR STRIP.AUTO: 1.01 (ref 1–1.03)
UA COMPLETE W REFLEX CULTURE PNL UR: ABNORMAL
UA DIPSTICK W REFLEX MICRO PNL UR: YES
URN SPEC COLLECT METH UR: ABNORMAL
UROBILINOGEN UR STRIP-ACNC: 1 E.U./DL
WBC # BLD AUTO: 9.4 K/UL (ref 4–11)
WBC #/AREA URNS AUTO: 0 /HPF (ref 0–5)

## 2025-03-22 PROCEDURE — 6360000004 HC RX CONTRAST MEDICATION: Performed by: EMERGENCY MEDICINE

## 2025-03-22 PROCEDURE — 87040 BLOOD CULTURE FOR BACTERIA: CPT

## 2025-03-22 PROCEDURE — 87150 DNA/RNA AMPLIFIED PROBE: CPT

## 2025-03-22 PROCEDURE — 96374 THER/PROPH/DIAG INJ IV PUSH: CPT

## 2025-03-22 PROCEDURE — 71260 CT THORAX DX C+: CPT

## 2025-03-22 PROCEDURE — 2500000003 HC RX 250 WO HCPCS: Performed by: INTERNAL MEDICINE

## 2025-03-22 PROCEDURE — 99222 1ST HOSP IP/OBS MODERATE 55: CPT | Performed by: SURGERY

## 2025-03-22 PROCEDURE — 94760 N-INVAS EAR/PLS OXIMETRY 1: CPT

## 2025-03-22 PROCEDURE — 6370000000 HC RX 637 (ALT 250 FOR IP): Performed by: INTERNAL MEDICINE

## 2025-03-22 PROCEDURE — 6370000000 HC RX 637 (ALT 250 FOR IP): Performed by: EMERGENCY MEDICINE

## 2025-03-22 PROCEDURE — 6360000002 HC RX W HCPCS: Performed by: INTERNAL MEDICINE

## 2025-03-22 PROCEDURE — 1200000000 HC SEMI PRIVATE

## 2025-03-22 PROCEDURE — 90935 HEMODIALYSIS ONE EVALUATION: CPT

## 2025-03-22 PROCEDURE — 6360000002 HC RX W HCPCS: Performed by: EMERGENCY MEDICINE

## 2025-03-22 PROCEDURE — 80053 COMPREHEN METABOLIC PANEL: CPT

## 2025-03-22 PROCEDURE — 85025 COMPLETE CBC W/AUTO DIFF WBC: CPT

## 2025-03-22 PROCEDURE — 72148 MRI LUMBAR SPINE W/O DYE: CPT

## 2025-03-22 PROCEDURE — 87186 SC STD MICRODIL/AGAR DIL: CPT

## 2025-03-22 PROCEDURE — G0378 HOSPITAL OBSERVATION PER HR: HCPCS

## 2025-03-22 PROCEDURE — 99285 EMERGENCY DEPT VISIT HI MDM: CPT

## 2025-03-22 PROCEDURE — 81001 URINALYSIS AUTO W/SCOPE: CPT

## 2025-03-22 PROCEDURE — 87154 CUL TYP ID BLD PTHGN 6+ TRGT: CPT

## 2025-03-22 RX ORDER — ACETAMINOPHEN 325 MG/1
650 TABLET ORAL EVERY 6 HOURS SCHEDULED
Status: DISCONTINUED | OUTPATIENT
Start: 2025-03-22 | End: 2025-03-22 | Stop reason: HOSPADM

## 2025-03-22 RX ORDER — MORPHINE SULFATE 2 MG/ML
2 INJECTION, SOLUTION INTRAMUSCULAR; INTRAVENOUS ONCE
Status: COMPLETED | OUTPATIENT
Start: 2025-03-22 | End: 2025-03-22

## 2025-03-22 RX ORDER — HEPARIN SODIUM 1000 [USP'U]/ML
3600 INJECTION, SOLUTION INTRAVENOUS; SUBCUTANEOUS PRN
Status: DISCONTINUED | OUTPATIENT
Start: 2025-03-22 | End: 2025-03-22 | Stop reason: HOSPADM

## 2025-03-22 RX ORDER — IOPAMIDOL 755 MG/ML
75 INJECTION, SOLUTION INTRAVASCULAR
Status: COMPLETED | OUTPATIENT
Start: 2025-03-22 | End: 2025-03-22

## 2025-03-22 RX ORDER — POLYETHYLENE GLYCOL 3350 17 G/17G
17 POWDER, FOR SOLUTION ORAL DAILY PRN
Status: DISCONTINUED | OUTPATIENT
Start: 2025-03-22 | End: 2025-03-22 | Stop reason: HOSPADM

## 2025-03-22 RX ORDER — OXYCODONE AND ACETAMINOPHEN 5; 325 MG/1; MG/1
1 TABLET ORAL EVERY 4 HOURS PRN
Refills: 0 | Status: DISCONTINUED | OUTPATIENT
Start: 2025-03-22 | End: 2025-03-30 | Stop reason: HOSPADM

## 2025-03-22 RX ORDER — LIDOCAINE 4 G/G
1 PATCH TOPICAL ONCE
Status: DISCONTINUED | OUTPATIENT
Start: 2025-03-22 | End: 2025-03-22 | Stop reason: HOSPADM

## 2025-03-22 RX ORDER — ONDANSETRON 4 MG/1
4 TABLET, ORALLY DISINTEGRATING ORAL EVERY 8 HOURS PRN
Status: DISCONTINUED | OUTPATIENT
Start: 2025-03-22 | End: 2025-03-22 | Stop reason: HOSPADM

## 2025-03-22 RX ORDER — SODIUM CHLORIDE 9 MG/ML
INJECTION, SOLUTION INTRAVENOUS PRN
Status: DISCONTINUED | OUTPATIENT
Start: 2025-03-22 | End: 2025-03-30 | Stop reason: HOSPADM

## 2025-03-22 RX ORDER — LIDOCAINE 4 G/G
1 PATCH TOPICAL DAILY
Status: DISCONTINUED | OUTPATIENT
Start: 2025-03-22 | End: 2025-03-22 | Stop reason: HOSPADM

## 2025-03-22 RX ORDER — ROSUVASTATIN CALCIUM 10 MG/1
10 TABLET, COATED ORAL NIGHTLY
Status: DISCONTINUED | OUTPATIENT
Start: 2025-03-22 | End: 2025-03-30 | Stop reason: HOSPADM

## 2025-03-22 RX ORDER — LANOLIN ALCOHOL/MO/W.PET/CERES
1000 CREAM (GRAM) TOPICAL DAILY
Status: DISCONTINUED | OUTPATIENT
Start: 2025-03-22 | End: 2025-03-30 | Stop reason: HOSPADM

## 2025-03-22 RX ORDER — TRAMADOL HYDROCHLORIDE 50 MG/1
50 TABLET ORAL EVERY 6 HOURS PRN
Status: DISCONTINUED | OUTPATIENT
Start: 2025-03-22 | End: 2025-03-22 | Stop reason: HOSPADM

## 2025-03-22 RX ORDER — INSULIN LISPRO 100 [IU]/ML
0-8 INJECTION, SOLUTION INTRAVENOUS; SUBCUTANEOUS
Status: DISCONTINUED | OUTPATIENT
Start: 2025-03-22 | End: 2025-03-30 | Stop reason: HOSPADM

## 2025-03-22 RX ORDER — METHOCARBAMOL 750 MG/1
750 TABLET, FILM COATED ORAL 4 TIMES DAILY
Status: DISCONTINUED | OUTPATIENT
Start: 2025-03-22 | End: 2025-03-30 | Stop reason: HOSPADM

## 2025-03-22 RX ORDER — ENOXAPARIN SODIUM 100 MG/ML
30 INJECTION SUBCUTANEOUS NIGHTLY
Status: DISCONTINUED | OUTPATIENT
Start: 2025-03-22 | End: 2025-03-25

## 2025-03-22 RX ORDER — GLUCAGON 1 MG/ML
1 KIT INJECTION PRN
Status: DISCONTINUED | OUTPATIENT
Start: 2025-03-22 | End: 2025-03-30 | Stop reason: HOSPADM

## 2025-03-22 RX ORDER — SEVELAMER CARBONATE 800 MG/1
800 TABLET, FILM COATED ORAL
Status: DISCONTINUED | OUTPATIENT
Start: 2025-03-23 | End: 2025-03-30 | Stop reason: HOSPADM

## 2025-03-22 RX ORDER — PREDNISONE 20 MG/1
60 TABLET ORAL ONCE
Status: COMPLETED | OUTPATIENT
Start: 2025-03-22 | End: 2025-03-22

## 2025-03-22 RX ORDER — SODIUM CHLORIDE 0.9 % (FLUSH) 0.9 %
10 SYRINGE (ML) INJECTION EVERY 12 HOURS SCHEDULED
Status: DISCONTINUED | OUTPATIENT
Start: 2025-03-22 | End: 2025-03-22 | Stop reason: HOSPADM

## 2025-03-22 RX ORDER — PANTOPRAZOLE SODIUM 40 MG/1
40 TABLET, DELAYED RELEASE ORAL
Status: DISCONTINUED | OUTPATIENT
Start: 2025-03-23 | End: 2025-03-30 | Stop reason: HOSPADM

## 2025-03-22 RX ORDER — GLUCAGON 1 MG/ML
1 KIT INJECTION PRN
Status: DISCONTINUED | OUTPATIENT
Start: 2025-03-22 | End: 2025-03-22 | Stop reason: HOSPADM

## 2025-03-22 RX ORDER — ONDANSETRON 2 MG/ML
4 INJECTION INTRAMUSCULAR; INTRAVENOUS EVERY 6 HOURS PRN
Status: DISCONTINUED | OUTPATIENT
Start: 2025-03-22 | End: 2025-03-22 | Stop reason: HOSPADM

## 2025-03-22 RX ORDER — METHOCARBAMOL 500 MG/1
500 TABLET, FILM COATED ORAL 3 TIMES DAILY
Status: DISCONTINUED | OUTPATIENT
Start: 2025-03-22 | End: 2025-03-22 | Stop reason: HOSPADM

## 2025-03-22 RX ORDER — ASPIRIN 81 MG/1
81 TABLET, CHEWABLE ORAL DAILY
Status: DISCONTINUED | OUTPATIENT
Start: 2025-03-22 | End: 2025-03-22 | Stop reason: HOSPADM

## 2025-03-22 RX ORDER — DEXTROSE MONOHYDRATE 100 MG/ML
INJECTION, SOLUTION INTRAVENOUS CONTINUOUS PRN
Status: DISCONTINUED | OUTPATIENT
Start: 2025-03-22 | End: 2025-03-22 | Stop reason: HOSPADM

## 2025-03-22 RX ORDER — SODIUM CHLORIDE 9 MG/ML
INJECTION, SOLUTION INTRAVENOUS PRN
Status: DISCONTINUED | OUTPATIENT
Start: 2025-03-22 | End: 2025-03-22 | Stop reason: HOSPADM

## 2025-03-22 RX ORDER — INSULIN LISPRO 100 [IU]/ML
0-4 INJECTION, SOLUTION INTRAVENOUS; SUBCUTANEOUS
Status: DISCONTINUED | OUTPATIENT
Start: 2025-03-22 | End: 2025-03-22 | Stop reason: HOSPADM

## 2025-03-22 RX ORDER — PANTOPRAZOLE SODIUM 40 MG/1
40 TABLET, DELAYED RELEASE ORAL
Status: DISCONTINUED | OUTPATIENT
Start: 2025-03-23 | End: 2025-03-22 | Stop reason: HOSPADM

## 2025-03-22 RX ORDER — CYCLOBENZAPRINE HCL 10 MG
10 TABLET ORAL ONCE
Status: COMPLETED | OUTPATIENT
Start: 2025-03-22 | End: 2025-03-22

## 2025-03-22 RX ORDER — SODIUM CHLORIDE 0.9 % (FLUSH) 0.9 %
5-40 SYRINGE (ML) INJECTION EVERY 12 HOURS SCHEDULED
Status: DISCONTINUED | OUTPATIENT
Start: 2025-03-22 | End: 2025-03-30 | Stop reason: HOSPADM

## 2025-03-22 RX ORDER — METOPROLOL TARTRATE 50 MG
50 TABLET ORAL 2 TIMES DAILY
Status: DISCONTINUED | OUTPATIENT
Start: 2025-03-22 | End: 2025-03-30 | Stop reason: HOSPADM

## 2025-03-22 RX ORDER — ONDANSETRON 4 MG/1
4 TABLET, ORALLY DISINTEGRATING ORAL EVERY 8 HOURS PRN
Status: DISCONTINUED | OUTPATIENT
Start: 2025-03-22 | End: 2025-03-30 | Stop reason: HOSPADM

## 2025-03-22 RX ORDER — SODIUM CHLORIDE 0.9 % (FLUSH) 0.9 %
5-40 SYRINGE (ML) INJECTION PRN
Status: DISCONTINUED | OUTPATIENT
Start: 2025-03-22 | End: 2025-03-30 | Stop reason: HOSPADM

## 2025-03-22 RX ORDER — METOPROLOL TARTRATE 50 MG
50 TABLET ORAL 2 TIMES DAILY
Status: DISCONTINUED | OUTPATIENT
Start: 2025-03-22 | End: 2025-03-22 | Stop reason: HOSPADM

## 2025-03-22 RX ORDER — OXYCODONE AND ACETAMINOPHEN 5; 325 MG/1; MG/1
2 TABLET ORAL EVERY 4 HOURS PRN
Refills: 0 | Status: DISCONTINUED | OUTPATIENT
Start: 2025-03-22 | End: 2025-03-30 | Stop reason: HOSPADM

## 2025-03-22 RX ORDER — SEVELAMER CARBONATE 800 MG/1
800 TABLET, FILM COATED ORAL
Status: DISCONTINUED | OUTPATIENT
Start: 2025-03-22 | End: 2025-03-22 | Stop reason: HOSPADM

## 2025-03-22 RX ORDER — ASCORBIC ACID 500 MG
500 TABLET ORAL DAILY
Status: DISCONTINUED | OUTPATIENT
Start: 2025-03-22 | End: 2025-03-22 | Stop reason: HOSPADM

## 2025-03-22 RX ORDER — ZINC SULFATE 50(220)MG
50 CAPSULE ORAL DAILY
Status: DISCONTINUED | OUTPATIENT
Start: 2025-03-22 | End: 2025-03-22 | Stop reason: HOSPADM

## 2025-03-22 RX ORDER — HYDROCODONE BITARTRATE AND ACETAMINOPHEN 5; 325 MG/1; MG/1
1 TABLET ORAL ONCE
Status: COMPLETED | OUTPATIENT
Start: 2025-03-22 | End: 2025-03-22

## 2025-03-22 RX ORDER — INSULIN GLARGINE 100 [IU]/ML
20 INJECTION, SOLUTION SUBCUTANEOUS NIGHTLY
Status: DISCONTINUED | OUTPATIENT
Start: 2025-03-23 | End: 2025-03-22 | Stop reason: ALTCHOICE

## 2025-03-22 RX ORDER — FOLIC ACID/VIT B COMPLEX AND C 0.8 MG
1 TABLET ORAL DAILY
Status: DISCONTINUED | OUTPATIENT
Start: 2025-03-22 | End: 2025-03-22 | Stop reason: RX

## 2025-03-22 RX ORDER — ROSUVASTATIN CALCIUM 10 MG/1
10 TABLET, COATED ORAL NIGHTLY
Status: DISCONTINUED | OUTPATIENT
Start: 2025-03-22 | End: 2025-03-22 | Stop reason: HOSPADM

## 2025-03-22 RX ORDER — SODIUM CHLORIDE 0.9 % (FLUSH) 0.9 %
10 SYRINGE (ML) INJECTION PRN
Status: DISCONTINUED | OUTPATIENT
Start: 2025-03-22 | End: 2025-03-22 | Stop reason: HOSPADM

## 2025-03-22 RX ORDER — DEXTROSE MONOHYDRATE 100 MG/ML
INJECTION, SOLUTION INTRAVENOUS CONTINUOUS PRN
Status: DISCONTINUED | OUTPATIENT
Start: 2025-03-22 | End: 2025-03-30 | Stop reason: HOSPADM

## 2025-03-22 RX ORDER — POLYETHYLENE GLYCOL 3350 17 G/17G
17 POWDER, FOR SOLUTION ORAL DAILY PRN
Status: DISCONTINUED | OUTPATIENT
Start: 2025-03-22 | End: 2025-03-30 | Stop reason: HOSPADM

## 2025-03-22 RX ORDER — ACETAMINOPHEN 650 MG/1
650 SUPPOSITORY RECTAL EVERY 6 HOURS PRN
Status: DISCONTINUED | OUTPATIENT
Start: 2025-03-22 | End: 2025-03-30 | Stop reason: HOSPADM

## 2025-03-22 RX ORDER — ONDANSETRON 2 MG/ML
4 INJECTION INTRAMUSCULAR; INTRAVENOUS EVERY 6 HOURS PRN
Status: DISCONTINUED | OUTPATIENT
Start: 2025-03-22 | End: 2025-03-30 | Stop reason: HOSPADM

## 2025-03-22 RX ORDER — ASPIRIN 81 MG/1
81 TABLET, CHEWABLE ORAL DAILY
Status: DISCONTINUED | OUTPATIENT
Start: 2025-03-22 | End: 2025-03-30 | Stop reason: HOSPADM

## 2025-03-22 RX ORDER — ACETAMINOPHEN 325 MG/1
650 TABLET ORAL EVERY 6 HOURS PRN
Status: DISCONTINUED | OUTPATIENT
Start: 2025-03-22 | End: 2025-03-30 | Stop reason: HOSPADM

## 2025-03-22 RX ADMIN — MORPHINE SULFATE 2 MG: 2 INJECTION, SOLUTION INTRAMUSCULAR; INTRAVENOUS at 07:56

## 2025-03-22 RX ADMIN — HYDROCODONE BITARTRATE AND ACETAMINOPHEN 1 TABLET: 5; 325 TABLET ORAL at 06:20

## 2025-03-22 RX ADMIN — INSULIN LISPRO 2 UNITS: 100 INJECTION, SOLUTION INTRAVENOUS; SUBCUTANEOUS at 20:55

## 2025-03-22 RX ADMIN — ROSUVASTATIN CALCIUM 10 MG: 10 TABLET, FILM COATED ORAL at 20:44

## 2025-03-22 RX ADMIN — ENOXAPARIN SODIUM 30 MG: 100 INJECTION SUBCUTANEOUS at 20:44

## 2025-03-22 RX ADMIN — PREDNISONE 60 MG: 20 TABLET ORAL at 09:03

## 2025-03-22 RX ADMIN — IOPAMIDOL 75 ML: 755 INJECTION, SOLUTION INTRAVENOUS at 06:58

## 2025-03-22 RX ADMIN — METOPROLOL TARTRATE 50 MG: 50 TABLET, FILM COATED ORAL at 20:44

## 2025-03-22 RX ADMIN — METHOCARBAMOL 750 MG: 750 TABLET ORAL at 20:44

## 2025-03-22 RX ADMIN — SODIUM CHLORIDE, PRESERVATIVE FREE 10 ML: 5 INJECTION INTRAVENOUS at 20:48

## 2025-03-22 RX ADMIN — CYCLOBENZAPRINE HYDROCHLORIDE 10 MG: 10 TABLET, FILM COATED ORAL at 09:03

## 2025-03-22 RX ADMIN — CYANOCOBALAMIN TAB 1000 MCG 1000 MCG: 1000 TAB at 20:44

## 2025-03-22 ASSESSMENT — PAIN SCALES - GENERAL
PAINLEVEL_OUTOF10: 3
PAINLEVEL_OUTOF10: 7
PAINLEVEL_OUTOF10: 7

## 2025-03-22 ASSESSMENT — ENCOUNTER SYMPTOMS
ABDOMINAL PAIN: 0
BACK PAIN: 1
GASTROINTESTINAL NEGATIVE: 1
RESPIRATORY NEGATIVE: 1
EYES NEGATIVE: 1
SHORTNESS OF BREATH: 0

## 2025-03-22 ASSESSMENT — PAIN - FUNCTIONAL ASSESSMENT: PAIN_FUNCTIONAL_ASSESSMENT: 0-10

## 2025-03-22 NOTE — DIALYSIS
Treatment time: 3 hours  Net UF: 1000 ml     Pre weight: 73.9 kg  Post weight:73 kg  EDW: 73.7 kg    Access used: R TDC    Access function: Well with  ml/min     Medications or blood products given: n/a     Regular outpatient schedule: [facility]     Summary of response to treatment: Patient tolerated treatment well without any complications. Patient remained stable throughout entire treatment and upon the exiting the dialysis suite via transport.     Report given to Ava Staley RN and copy of dialysis treatment record placed in chart, to be scanned into EMR.

## 2025-03-22 NOTE — CONSULTS
[facility]  Consultation Note    Reason for Consult: ESRD management  Requesting Physician: Dr. ALE Shelby    CHIEF COMPLAINT: Back pain    History obtained from records and patient.    HISTORY OF PRESENT ILLNESS:                Danny Rock  is 74yo., male with significant past medical history of ESRD, on HD TTHS at [facility] under care, atrial fibrillation, coronary artery disease, renal CA, status post right nephrectomy, previously on immunotherapy, diabetes mellitus type 2, hyperlipidemia, hypertension, prostate cancer, CHF, EF 40 to 45%, CVA secondary to hypercoagulable state, who presents with back pain.  I am asked to see the patient with regards to his HD need.  Still has residual kidney function.  Has TDC for HD access.  Potassium of 5 and serum bicarbonate of 26.  Systolic blood pressure ranging in the 120s to 130s.  Saturating well on room air.  +Back pain and chills.  Family at bedside.        Past Medical History:     has a past medical history of Atrial fibrillation (HCC), CAD (coronary artery disease), Cancer of kidney (HCC), Dependence on renal dialysis, Diabetes mellitus (HCC), Hyperlipidemia, Hypertension, Prostate cancer (HCC), Right renal mass, and Systolic CHF (HCC).   Past Surgical History:     has a past surgical history that includes hernia repair; knee surgery; Cholecystectomy; Nasal septum surgery; eye surgery (Bilateral, 04/19/2018); Kidney removal (Right, 07/25/2022); Cataract removal (Bilateral); IR TUNNELED CVC PLACE WO SQ PORT/PUMP > 5 YEARS (02/07/2024); CT NEEDLE BIOPSY LIVER PERCUTANEOUS (08/01/2024); other surgical history (09/30/2024); CT NEEDLE BIOPSY LIVER PERCUTANEOUS (11/18/2024); CT GUIDED CHEST TUBE (11/18/2024); hernia repair (Left, 12/31/2024); other surgical history (Left); and shoulder surgery (Right, 1/31/2025).   Current Medications:    Current Facility-Administered Medications: lidocaine 4 % external patch 1 patch, 1  cm craniocaudad, 10.6 cm transversely and 5.4 cm  AP. This fluid collection does not have any communication with the pelvic  cavity. This fluid collection is new as compared to previous CT scan on  11/04/2024. This is probably hematoma.  11. Multilevel very advanced degenerative disc disease and facet  osteoarthritis in the lumbar spine.  Thoracic skeletal hyperostosis.  No  acute fracture or dislocation in thoracic spine, lumbar spine, pelvic bones  and joints and bilateral hip joints.  12. Grade 1 spondylolisthesis at L5-S1 level with moderate to marked  degenerative disc disease and facet osteoarthritis.          IMPRESSION/RECOMMENDATIONS:    1.  ESRD-outpatient HD TTH S at [facility] under our care.  3 hours duration.  Fluid removal is towards dry weight.  Appears euvolemic.  2.  Electrolytes are acceptable.  3.  Anemia due to CKD-hemoglobin acceptable today  4.  History of coronary artery disease  5.  History of renal cancer status post right nephrectomy  6.  History of paroxysmal atrial fibrillation  7.  History of hyperlipidemia-on statin  8.  History of pneumothorax pneumothorax status post intraperitoneal nodule biopsy  9.  Back pain-as per Medicine.  Has TDC.  Check 1 set blood culture from TDC.   10.  Surgery consulted    Thank you for the consult.  We will follow this patient along the hospitalization.    Pratibha Kat MD

## 2025-03-22 NOTE — PROGRESS NOTES
Clinical Pharmacy Note    Pharmacy can not supply cabometyx 40 mg tablets.   I have notified the nurse and requested the medication be brought from home.     The medication is anticipated to be available today.     Please follow to ensure that medication therapy needs are met.    Thanks,  Vitaly Kim, PharmD   [id number]

## 2025-03-22 NOTE — DISCHARGE SUMMARY
Patient: Danny Rock     Gender: male  : 1950   Age: 75 y.o.  MRN: [id number]    Admitting Physician: Stacey Shelby MD  Discharge Physician: Stacey Shelby MD     Code Status: Full Code     Admit Date: 3/22/2025   Discharge Date:  3/22/2020    Disposition: Transfer to [facility] Diagnoses:  Severe L3-L4 and L5 foraminal stenosis with nerve impingement    Active Hospital Problems    Diagnosis Date Noted    Back pain at L4-L5 level [M54.50] 2025         Condition at Discharge:  Stable    Hospital Course:   75-year-old gentleman admitted to the hospital with back pain denied any saddle anesthesia no bowel bladder complaints unable to do much because of the pain had a CT abdomen and pelvis and a CT of the thoracic and lumbar spine which was initially negative subsequently MRI of the spine showed  IMPRESSION:  1. Grade 1 anterolisthesis of L5 on S1 with chronic bilateral L5 pars defects.  2. Severe canal stenosis at L3-4.  3. Severe bilateral L3-4 and left L4-5 foraminal stenosis.  4. Impingement of the bilateral exiting L5 nerve roots with high-grade left  foraminal narrowing at L5-S1.       Discussed with neurosurgery recommended transfer to [facility] for in person evaluation  He has taken his last dose of Eliquis last night and I have held his Eliquis this morning  He received dialysis here his dialysis schedule is  and Saturday    Discharge Medications:   Current Discharge Medication List        Current Discharge Medication List        Current Discharge Medication List        CONTINUE these medications which have NOT CHANGED    Details   apixaban (ELIQUIS) 5 MG TABS tablet Take 1 tablet by mouth 2 times daily  Qty: 180 tablet, Refills: 3      cabozantinib s-malate (CABOMETYX) 40 MG TABS chemo tablet Take 1 tablet by mouth daily      B Complex-C-Folic Acid (EVERETT-ISABELLA) TABS Take 1 tablet by mouth daily      sevelamer (RENVELA) 800 MG tablet Take 1 tablet by mouth 3 times  BILIDIR <0.2 03/16/2021 09:41 AM    TRIG 152 12/05/2023 05:17 AM     Lab Results   Component Value Date    INR 1.56 (H) 01/29/2025    INR 1.03 11/18/2024    INR 1.07 08/01/2024           The patient was seen and examined on same day day of admission and discharge and this discharge summary is in conjunction with the history and physical from day of discharge.Time Spent on admission, coordination of care and discharge is 90 minutes   in the examination, evaluation, counseling and review of medications and discharge plan.      Note that greater  than 30 minutes was spent in preparing discharge papers, discussing discharge with patient, medication review, etc.       Signed:    Stacey Shelby MD   3/22/2025      Thank you Yesi Rosa APRN - CNP for the opportunity to be involved in this patient's care. If you have any questions or concerns please feel free to contact me

## 2025-03-22 NOTE — PROGRESS NOTES
Pt back in room from HD and transport here to take pt to [facility]. Family at bedside and pt belongings packed and bagged.

## 2025-03-22 NOTE — PLAN OF CARE
[facility] Hospitalist Transfer accept note  Transfer center PS received  Case reviewed with ER physician  Reason for Transfer:  Danny Rock 75 y.o. male  -With medical history significant for ESRD on hemodialysis, coronary artery disease on Plavix, combined systolic and diastolic heart failure, atrial fibrillation, type 2 diabetes mellitus, RCC s/p nephrectomy presented with acute onset of lower back pain since morning with inability to move the lower extremities because of the pain, denies any numbness or tingling, no bowel disturbance but does report some difficulty with urination.  No saddle anesthesia.  MRI spine concerning for multilevel severe foraminal stenosis especially with impingement of the bilateral exiting L5 nerve root with high-grade left foraminal narrowing at L5-S1.  Patient was discussed with neurosurgery at the [facility] who recommended transfer to the [facility] for further evaluation.  All anticoagulation and antiplatelet therapy has been on hold for possible surgical intervention  - Recommendations: Admit inpatient in [facility] with telemetry  - Consulting services needed for transfer: Neurosurgery     Prelim diagnosis: Severe lumbar stenosis     Patient has been accepted for transfer to [facility].   Once patient arrive please page ON CALL HOSPITALIST so patient can be seen.   If unable to reach physician on PerfectServe please call hospitalist phone (#[phone])     PCP: Yesi Rosa, JANET - CNP     Thanks  Lakeisha Moreno MD  Hospitalist

## 2025-03-22 NOTE — PROGRESS NOTES
MRI noted  Discussed with neurosurgery on-call Dr. De La Cruz  He recommended transfer to [facility] for further in person evaluation  Discussed with transfer center and subsequently spoke to the hospitalist Dr. Pedraza person who has accepted the patient.  Informed patient and family

## 2025-03-22 NOTE — PROGRESS NOTES
[facility] - Inpatient Rehabilitation Department   Phone: [phone]    Occupational Therapy/Physical Therapy    Therapy evaluation initiated but not completed beyond collection of PLOF and current symptoms - patient c/o new onset numbness in hands and difficulty with urination. Patient with MRI pending at time of evaluation and decision was made to hold evaluation until after results of MRI. After MRI patient was recommended to transfer to [facility] for further evaluation with neurosurgery.         Patient: Danny Rock   : 1950   MRN: [id number]   Date of Service:  3/22/2025    Admitting Diagnosis:  Back pain at L4-L5 level  Current Admission Summary: Danny Rock is a 75 y.o. male with hypertension, hyperlipidemia, atrial fibrillation, coronary artery disease, chronic combined heart failure, ESRD on HD TTS, T2DM with insulin dependence, renal cancer status post nephrectomy presented from home complaining of back pain   Past Medical History:  has a past medical history of Atrial fibrillation (HCC), CAD (coronary artery disease), Cancer of kidney (HCC), Dependence on renal dialysis, Diabetes mellitus (HCC), Hyperlipidemia, Hypertension, Prostate cancer (HCC), Right renal mass, and Systolic CHF (HCC).  Past Surgical History:  has a past surgical history that includes hernia repair; knee surgery; Cholecystectomy; Nasal septum surgery; eye surgery (Bilateral, 2018); Kidney removal (Right, 2022); Cataract removal (Bilateral); IR TUNNELED CVC PLACE WO SQ PORT/PUMP > 5 YEARS (2024); CT NEEDLE BIOPSY LIVER PERCUTANEOUS (2024); other surgical history (2024); CT NEEDLE BIOPSY LIVER PERCUTANEOUS (2024); CT GUIDED CHEST TUBE (2024); hernia repair (Left, 2024); other surgical history (Left); and shoulder surgery (Right, 2025).      Pre-Admission Information   Lives With: spouse                  Type of Home: house  Home Layout: tri-level- hand rails on  status. Patient was not billed for any therapy today.     Electronically Signed By: JASON Neumann, PT, DPT [id number]

## 2025-03-22 NOTE — PROGRESS NOTES
03/22/25 1632   Encounter Summary   Encounter Overview/Reason Spiritual/Emotional Needs   Service Provided For Patient not available;Significant other;Family  (Osito taken for lengthy medical procedure. Prayed with spouse and met children.)   Referral/Consult From Nurse   Support System Spouse;Children;Family members   Last Encounter  03/22/25   Complexity of Encounter High   Begin Time 1600   End Time  1634   Total Time Calculated 34 min   Crisis   Type Emotional distress;Family Care   Spiritual/Emotional needs   Type Emotional Distress;Spiritual Distress;Spiritual Support   Grief, Loss, and Adjustments   Type Adjustment to illness;Life Adjustments   Assessment/Intervention/Outcome   Assessment Coping;Hopeful;Concerns with suffering;Anxious;Powerlessness;Stress overload;Tearful;Shock   Intervention Active listening;Discussed belief system/Sikhism practices/maame;Discussed illness injury and it’s impact;Explored/Affirmed feelings, thoughts, concerns;Explored Coping Skills/Resources;Prayer (assurance of)/[place];Sustaining Presence/Ministry of presence;Read/Provided Scripture   Outcome Comfort;Connection/Belonging;Coping;Encouraged;Engaged in conversation;Expressed Gratitude   Plan and Referrals   Plan/Referrals Referred to Outpatient   (Osito desires outpatient Spiritual Health support. Has received outpatient  support in past and it was helpful.)   Does the patient have a [facility] PCP? No      Manda Hall EdD, ANGLE AGUILAR ([facility])    Thank you for consulting Spiritual Health    If you would like a 's presence for emotional, spiritual, grief or comfort care,   please dial \"0\" and ask for the  on-call to be paged.    For help with Advanced Care Planning, Power of  for Healthcare or Living Will forms, you may also call us directly.    [facility]  [facility]

## 2025-03-22 NOTE — PROGRESS NOTES
Pt awake in room. Wife and 2 daughters at bedside. Pt very anxious. VSS, assessment complete and call light at bedside. Bed alarm engaged and pt denies any needs.

## 2025-03-22 NOTE — CONSULTS
[facility]     Consult                                                     Patient Name: Danny Rock  MRN: [id number]  YOB: 1950  Admission date: 3/22/2025  6:06 AM   Date of evaluation: 3/22/2025  Primary Care Physician: Yesi Rosa APRN - CNP  Reason for consult:  Inguinal hematoma  History of Present Illness:    Mr. Rock is a 75 y.o. male who presents with primarily with severe low back pain that has worsened recently. Difficult to walk, breathe. Tolerating diet and some constipation. Medical conditions include CHF, ESRD on HD, diabetes, CAD, atrial fibrillation, medical coagulopathy on Eliquis. Known to me after open left inguinal hernia repair with mesh on 12/31/24. Given the hernia size and restarting the anticoagulation, not surprisingly he developed a hematoma that was present on his post-operative visit 1/13/25. Other than its presence is not causing concern. This is the reason for general surgery consultation    Past Medical History:        Diagnosis Date    Atrial fibrillation (HCC)     CAD (coronary artery disease)     Cancer of kidney (HCC)     Immunotherapy: just finished treatment 11-22-23    Dependence on renal dialysis     dialysis Tues, Thurs Sat    Diabetes mellitus (HCC)     Hyperlipidemia     Hypertension     Prostate cancer (HCC)     radiation    Right renal mass     dialysis    Systolic CHF (HCC)        Past Surgical History:        Procedure Laterality Date    CATARACT REMOVAL Bilateral     ~ 2006 with lens implanted    CHOLECYSTECTOMY      CT GUIDED CHEST TUBE  11/18/2024    CT GUIDED CHEST TUBE 11/18/2024 [facility] CT SCAN    CT NEEDLE BIOPSY LIVER PERCUTANEOUS  08/01/2024    CT NEEDLE BIOPSY LIVER PERCUTANEOUS 8/1/2024 [facility] CT SCAN    CT NEEDLE BIOPSY LIVER PERCUTANEOUS  11/18/2024    CT NEEDLE BIOPSY LIVER PERCUTANEOUS 11/18/2024 [facility] CT SCAN    EYE SURGERY Bilateral 04/19/2018    Cataract    HERNIA REPAIR      HERNIA REPAIR  Negative      Vital Signs:  Patient Vitals for the past 24 hrs:   BP Temp Temp src Pulse Resp SpO2 Height Weight   25 1131 -- -- -- 94 16 97 % -- --   25 1114 117/74 97.5 °F (36.4 °C) Oral 89 16 97 % -- --   25 0800 124/81 98.4 °F (36.9 °C) Oral 88 14 97 % -- --   25 0610 133/73 (!) 96 °F (35.6 °C) Oral 84 16 100 % 1.905 m (6' 3\") 74.8 kg (165 lb)      TEMPERATURE HISTORY 24H: Temp (24hrs), Av.3 °F (36.3 °C), Min:96 °F (35.6 °C), Max:98.4 °F (36.9 °C)    BLOOD PRESSURE HISTORY: Systolic (36hrs), Av , Min:117 , Max:133    Diastolic (36hrs), Av, Min:73, Max:81    Admission Weight - Scale: 74.8 kg (165 lb)     No intake or output data in the 24 hours ending 25 1202  Drain/tube Output:         Physical Exam:  Constitutional:  well-developed, well-nourished,   Neurologic: awake, Orientation:  Oriented to person, place, time, follows commands, clear speech, cranial nerves grossly intact  Psychiatric: normal affect, no hallucinations  Eyes:  sclera clear, no visible discharge  Head, ears, nose, mouth, throat: normocephalic, without obvious abnormality, supple, symmetrical, trachea midline, no JVD, mucous membranes moist  Cardiac: regular rate and rhythm   Pulmonary: clear to auscultation bilaterally   GI: soft, non-distended, non-tender  Lymph: no palpable lymphadenopathy  Skin: no bruising or bleeding, normal skin color, texture, turgor, and no redness, warmth, or swelling    Labs:    CBC:    Recent Labs     25  06   WBC 9.4   HGB 12.7*   HCT 37.2*        BMP:    Recent Labs     25      K 5.0   CL 98*   CO2 26   BUN 40*   CREATININE 2.4*   GLUCOSE 134*     Hepatic:   Recent Labs     03/22/25  0628   AST 28   ALT 26   BILITOT 0.6   ALKPHOS 112     Amylase: No results for input(s): \"AMYLASE\" in the last 72 hours.  Lipase: No results for input(s): \"LIPASE\" in the last 72 hours.  Mag:    No results for input(s): \"MG\" in the last 72 hours.   Phos:   No

## 2025-03-22 NOTE — ED NOTES
Patient Name: Danny Rock  : 1950 75 y.o.  MRN: [id number]  ED Room #: ED-0007/07     Chief complaint:   Chief Complaint   Patient presents with    Back Pain     Pt via [facility] from home with cc of lower back pain. Pt denies any injury or recent fall. Pt states that pain began last night and was worse upon waking.      Hospital Problem/Diagnosis:   Hospital Problems           Last Modified POA    * (Principal) Back pain at L4-L5 level 3/22/2025 Yes         O2 Flow Rate:    (if applicable)  Cardiac Rhythm:   (if applicable)  Active LDA's:   Peripheral IV 25 Right Antecubital (Active)   Site Assessment Clean, dry & intact 25   Line Status Blood return noted;Brisk blood return;Normal saline locked 25   Phlebitis Assessment No symptoms 25   Infiltration Assessment 0 25   Dressing Status New dressing applied;Clean, dry & intact 25   Dressing Type Transparent 25   Dressing Intervention New 25            How does patient ambulate? Unknown, did not assess in the Emergency Department    2. How does patient take pills? Whole with Water    3. Is patient alert? Alert    4. Is patient oriented? To Person, To Place, To Time, To Situation, and Follows Commands    5.   Patient arrived from:  home  Facility Name: ___________________________________________    6. If patient is disoriented or from a Skill Nursing Facility has family been notified of admission? No    7. Patient belongings? Belongings: Clothing    Disposition of belongings? Kept with Patient     8. Any specific patient or family belongings/needs/dynamics?   a. none    9. Miscellaneous comments/pending orders?  a. All ed orders completed       If there are any additional questions please reach out to the Emergency Department.

## 2025-03-22 NOTE — ED PROVIDER NOTES
[facility] EMERGENCY DEPARTMENT  EMERGENCY DEPARTMENT ENCOUNTER        Pt Name: Danny Rock  MRN: [id number]  Birthdate 1950  Date of evaluation: 3/22/2025  Provider: Cortez Bowles MD  PCP: Yesi Rosa APRN - CNP  Note Started: 6:45 AM EDT 3/22/25    CHIEF COMPLAINT       Chief Complaint   Patient presents with    Back Pain     Pt via [facility] from home with cc of lower back pain. Pt denies any injury or recent fall. Pt states that pain began last night and was worse upon waking.        HISTORY OF PRESENT ILLNESS: 1 or more Elements     History from : Patient and EMS    Limitations to history : None    Danny Rock is a 75 y.o. male who presents for right sided low back pain that radiates down his right hip and leg.  Worse when bending.  Patient states he is unable to stand or walk because of this.  Patient has known history of right renal mass, cancer.  History of hypertension A-fib hyperlipidemia patient is ESRD on hemodialysis.  Patient has history of CAD.  No chest pain no radiation to the abdomen.  Denies any bowel or bladder incontinence no numbness in his legs.  Patient has no weakness but significant pain with lifting his right leg up off the bed.  No other recent injury or fall.  No other modifying factors.  Rest review of systems unremarkable see below for further detail    Nursing Notes were all reviewed and agreed with or any disagreements were addressed in the HPI.    REVIEW OF SYSTEMS :      Review of Systems   Constitutional: Negative.  Negative for chills and fever.   HENT: Negative.     Eyes: Negative.    Respiratory: Negative.  Negative for shortness of breath.    Cardiovascular: Negative.  Negative for chest pain.   Gastrointestinal: Negative.  Negative for abdominal pain.   Genitourinary: Negative.  Negative for difficulty urinating.   Musculoskeletal:  Positive for arthralgias and back pain. Negative for neck pain.   Skin: Negative.    Neurological:  of this note were completed with a voice recognition program.  Efforts were made to edit the dictations but occasionally words are mis-transcribed.)    Cortez Bowles MD (electronically signed)           Cortez Bowles MD  03/22/25 0934

## 2025-03-22 NOTE — H&P
Hospital Medicine History & Physical      PCP: Yesi Rosa, APRN - CNP    Date of Admission: 3/22/2025    Date of Service: Pt seen/examined on 3/22/2025  6:06 AM and    Placed in Observation.    Chief Complaint: Back pain    History Of Present Illness:    Danny Rock is a 75 y.o. male with hypertension, hyperlipidemia, atrial fibrillation, coronary artery disease, chronic combined heart failure, ESRD on HD TTS, T2DM with insulin dependence, renal cancer status post nephrectomy presented from home complaining of back pain    Patient reports lower back pain rating it 8/10 started yesterday sudden onset making it difficult for him to walk around do things  Unable to move his legs because of the pain  Denies any radiation of the pain worsens with movement no particular relieving factors  No tingling or numbness no bowel bladder complaints had a bowel movement yesterday  No saddle anesthesia  No recent trauma  No fevers or sweats  No diarrhea no dysuria      Past Medical History:          Diagnosis Date    Atrial fibrillation (HCC)     CAD (coronary artery disease)     Cancer of kidney (HCC)     Immunotherapy: just finished treatment 11-22-23    Dependence on renal dialysis     dialysis Tues, Thurs Sat    Diabetes mellitus (HCC)     Hyperlipidemia     Hypertension     Prostate cancer (HCC)     radiation    Right renal mass     dialysis    Systolic CHF (HCC)        Past Surgical History:          Procedure Laterality Date    CATARACT REMOVAL Bilateral     ~ 2006 with lens implanted    CHOLECYSTECTOMY      CT GUIDED CHEST TUBE  11/18/2024    CT GUIDED CHEST TUBE 11/18/2024 [facility] CT SCAN    CT NEEDLE BIOPSY LIVER PERCUTANEOUS  08/01/2024    CT NEEDLE BIOPSY LIVER PERCUTANEOUS 8/1/2024 [facility] CT SCAN    CT NEEDLE BIOPSY LIVER PERCUTANEOUS  11/18/2024    CT NEEDLE BIOPSY LIVER PERCUTANEOUS 11/18/2024 [facility] CT SCAN    EYE SURGERY Bilateral 04/19/2018    Cataract    HERNIA REPAIR      HERNIA REPAIR Left 12/31/2024

## 2025-03-22 NOTE — PROGRESS NOTES
4 Eyes Skin Assessment     NAME:  Danny Rock  YOB: 1950  MEDICAL RECORD NUMBER:  [id number]    The patient is being assessed for  Admission    I agree that at least one RN has performed a thorough Head to Toe Skin Assessment on the patient. ALL assessment sites listed below have been assessed.      Areas assessed by both nurses:    Head, Face, Ears, Shoulders, Back, Chest, Arms, Elbows, Hands, Sacrum. Buttock, Coccyx, Ischium, Legs. Feet and Heels, and Under Medical Devices         Does the Patient have a Wound? No noted wound(s)     - blanchable redness on heels  - blanchable redness on sacrum    Price Prevention initiated by RN: No  Wound Care Orders initiated by RN: No    Pressure Injury (Stage 3,4, Unstageable, DTI, NWPT, and Complex wounds) if present, place Wound referral order by RN under : No    New Ostomies, if present place, Ostomy referral order under : No     Nurse 1 eSignature: Electronically signed by Nell Acuna RN on 3/22/25 at 6:53 PM EDT    **SHARE this note so that the co-signing nurse can place an eSignature**    Nurse 2 eSignature: Electronically signed by Timothy Mendoza RN on 3/22/25 at 6:56 PM EDT

## 2025-03-23 ENCOUNTER — APPOINTMENT (OUTPATIENT)
Dept: MRI IMAGING | Age: 75
DRG: 539 | End: 2025-03-23
Payer: MEDICARE

## 2025-03-23 PROBLEM — M48.00 CENTRAL STENOSIS OF SPINAL CANAL: Status: ACTIVE | Noted: 2025-03-23

## 2025-03-23 PROBLEM — R78.81 ENTEROCOCCAL BACTEREMIA: Status: ACTIVE | Noted: 2025-03-23

## 2025-03-23 PROBLEM — B95.2 ENTEROCOCCAL BACTEREMIA: Status: ACTIVE | Noted: 2025-03-23

## 2025-03-23 PROBLEM — Z85.46 HISTORY OF PROSTATE CANCER: Status: ACTIVE | Noted: 2025-03-23

## 2025-03-23 PROBLEM — A49.8 ENTEROCOCCUS FAECALIS INFECTION: Status: ACTIVE | Noted: 2025-03-23

## 2025-03-23 LAB
ANION GAP SERPL CALCULATED.3IONS-SCNC: 13 MMOL/L (ref 3–16)
BACTERIA BLD CULT: ABNORMAL
BUN SERPL-MCNC: 40 MG/DL (ref 7–20)
CALCIUM SERPL-MCNC: 9.4 MG/DL (ref 8.3–10.6)
CHLORIDE SERPL-SCNC: 99 MMOL/L (ref 99–110)
CO2 SERPL-SCNC: 19 MMOL/L (ref 21–32)
CREAT SERPL-MCNC: 2.1 MG/DL (ref 0.8–1.3)
CRP SERPL-MCNC: 349 MG/L (ref 0–5.1)
GFR SERPLBLD CREATININE-BSD FMLA CKD-EPI: 32 ML/MIN/{1.73_M2}
GLUCOSE BLD-MCNC: 135 MG/DL (ref 70–99)
GLUCOSE BLD-MCNC: 142 MG/DL (ref 70–99)
GLUCOSE BLD-MCNC: 183 MG/DL (ref 70–99)
GLUCOSE BLD-MCNC: 219 MG/DL (ref 70–99)
GLUCOSE SERPL-MCNC: 219 MG/DL (ref 70–99)
Lab: NORMAL
ORGANISM: ABNORMAL
ORGANISM: ABNORMAL
PERFORMED ON: ABNORMAL
POTASSIUM SERPL-SCNC: 4.4 MMOL/L (ref 3.5–5.1)
REPORT: NORMAL
REPORT: NORMAL
SODIUM SERPL-SCNC: 131 MMOL/L (ref 136–145)

## 2025-03-23 PROCEDURE — A9579 GAD-BASE MR CONTRAST NOS,1ML: HCPCS | Performed by: STUDENT IN AN ORGANIZED HEALTH CARE EDUCATION/TRAINING PROGRAM

## 2025-03-23 PROCEDURE — 97530 THERAPEUTIC ACTIVITIES: CPT

## 2025-03-23 PROCEDURE — 2580000003 HC RX 258: Performed by: INTERNAL MEDICINE

## 2025-03-23 PROCEDURE — 97162 PT EVAL MOD COMPLEX 30 MIN: CPT

## 2025-03-23 PROCEDURE — 6370000000 HC RX 637 (ALT 250 FOR IP): Performed by: INTERNAL MEDICINE

## 2025-03-23 PROCEDURE — G0545 PR INHERENT VISIT TO INPT: HCPCS | Performed by: INTERNAL MEDICINE

## 2025-03-23 PROCEDURE — 6360000004 HC RX CONTRAST MEDICATION: Performed by: STUDENT IN AN ORGANIZED HEALTH CARE EDUCATION/TRAINING PROGRAM

## 2025-03-23 PROCEDURE — 97166 OT EVAL MOD COMPLEX 45 MIN: CPT

## 2025-03-23 PROCEDURE — 36415 COLL VENOUS BLD VENIPUNCTURE: CPT

## 2025-03-23 PROCEDURE — 6370000000 HC RX 637 (ALT 250 FOR IP): Performed by: STUDENT IN AN ORGANIZED HEALTH CARE EDUCATION/TRAINING PROGRAM

## 2025-03-23 PROCEDURE — 2580000003 HC RX 258

## 2025-03-23 PROCEDURE — 72156 MRI NECK SPINE W/O & W/DYE: CPT

## 2025-03-23 PROCEDURE — 72158 MRI LUMBAR SPINE W/O & W/DYE: CPT

## 2025-03-23 PROCEDURE — 6370000000 HC RX 637 (ALT 250 FOR IP)

## 2025-03-23 PROCEDURE — 6360000002 HC RX W HCPCS

## 2025-03-23 PROCEDURE — 1200000000 HC SEMI PRIVATE

## 2025-03-23 PROCEDURE — 72157 MRI CHEST SPINE W/O & W/DYE: CPT

## 2025-03-23 PROCEDURE — 86140 C-REACTIVE PROTEIN: CPT

## 2025-03-23 PROCEDURE — 2500000003 HC RX 250 WO HCPCS: Performed by: INTERNAL MEDICINE

## 2025-03-23 PROCEDURE — 87040 BLOOD CULTURE FOR BACTERIA: CPT

## 2025-03-23 PROCEDURE — 87150 DNA/RNA AMPLIFIED PROBE: CPT

## 2025-03-23 PROCEDURE — 6360000002 HC RX W HCPCS: Performed by: INTERNAL MEDICINE

## 2025-03-23 PROCEDURE — 80048 BASIC METABOLIC PNL TOTAL CA: CPT

## 2025-03-23 PROCEDURE — 87186 SC STD MICRODIL/AGAR DIL: CPT

## 2025-03-23 PROCEDURE — 83036 HEMOGLOBIN GLYCOSYLATED A1C: CPT

## 2025-03-23 PROCEDURE — 99223 1ST HOSP IP/OBS HIGH 75: CPT | Performed by: INTERNAL MEDICINE

## 2025-03-23 RX ORDER — INSULIN GLARGINE 100 [IU]/ML
7 INJECTION, SOLUTION SUBCUTANEOUS NIGHTLY
Status: DISCONTINUED | OUTPATIENT
Start: 2025-03-23 | End: 2025-03-25

## 2025-03-23 RX ORDER — CITRIC ACID/SODIUM CITRATE 334-500MG
30 SOLUTION, ORAL ORAL EVERY 12 HOURS
Status: COMPLETED | OUTPATIENT
Start: 2025-03-23 | End: 2025-03-23

## 2025-03-23 RX ORDER — LORAZEPAM 0.5 MG/1
0.5 TABLET ORAL
Status: DISCONTINUED | OUTPATIENT
Start: 2025-03-23 | End: 2025-03-30 | Stop reason: HOSPADM

## 2025-03-23 RX ORDER — HYDROMORPHONE HYDROCHLORIDE 1 MG/ML
0.25 INJECTION, SOLUTION INTRAMUSCULAR; INTRAVENOUS; SUBCUTANEOUS
Status: DISCONTINUED | OUTPATIENT
Start: 2025-03-23 | End: 2025-03-30 | Stop reason: HOSPADM

## 2025-03-23 RX ADMIN — VANCOMYCIN HYDROCHLORIDE 1750 MG: 10 INJECTION, POWDER, LYOPHILIZED, FOR SOLUTION INTRAVENOUS at 09:58

## 2025-03-23 RX ADMIN — POLYETHYLENE GLYCOL 3350 17 G: 17 POWDER, FOR SOLUTION ORAL at 09:48

## 2025-03-23 RX ADMIN — CYANOCOBALAMIN TAB 1000 MCG 1000 MCG: 1000 TAB at 08:02

## 2025-03-23 RX ADMIN — METHOCARBAMOL 750 MG: 750 TABLET ORAL at 11:20

## 2025-03-23 RX ADMIN — SODIUM CITRATE AND CITRIC ACID MONOHYDRATE 30 ML: 500; 334 SOLUTION ORAL at 09:49

## 2025-03-23 RX ADMIN — METOPROLOL TARTRATE 50 MG: 50 TABLET, FILM COATED ORAL at 21:38

## 2025-03-23 RX ADMIN — OXYCODONE HYDROCHLORIDE AND ACETAMINOPHEN 2 TABLET: 5; 325 TABLET ORAL at 11:20

## 2025-03-23 RX ADMIN — METHOCARBAMOL 750 MG: 750 TABLET ORAL at 18:55

## 2025-03-23 RX ADMIN — SODIUM CHLORIDE, PRESERVATIVE FREE 10 ML: 5 INJECTION INTRAVENOUS at 21:42

## 2025-03-23 RX ADMIN — SODIUM CHLORIDE: 0.9 INJECTION, SOLUTION INTRAVENOUS at 09:57

## 2025-03-23 RX ADMIN — OXYCODONE HYDROCHLORIDE AND ACETAMINOPHEN 2 TABLET: 5; 325 TABLET ORAL at 02:14

## 2025-03-23 RX ADMIN — METHOCARBAMOL 750 MG: 750 TABLET ORAL at 08:02

## 2025-03-23 RX ADMIN — ENOXAPARIN SODIUM 30 MG: 100 INJECTION SUBCUTANEOUS at 21:38

## 2025-03-23 RX ADMIN — SODIUM CITRATE AND CITRIC ACID MONOHYDRATE 30 ML: 500; 334 SOLUTION ORAL at 21:39

## 2025-03-23 RX ADMIN — OXYCODONE HYDROCHLORIDE AND ACETAMINOPHEN 2 TABLET: 5; 325 TABLET ORAL at 16:01

## 2025-03-23 RX ADMIN — SEVELAMER CARBONATE 800 MG: 800 TABLET, FILM COATED ORAL at 18:55

## 2025-03-23 RX ADMIN — ROSUVASTATIN CALCIUM 10 MG: 10 TABLET, FILM COATED ORAL at 21:38

## 2025-03-23 RX ADMIN — INSULIN GLARGINE 7 UNITS: 100 INJECTION, SOLUTION SUBCUTANEOUS at 21:39

## 2025-03-23 RX ADMIN — METHOCARBAMOL 750 MG: 750 TABLET ORAL at 21:38

## 2025-03-23 RX ADMIN — METOPROLOL TARTRATE 50 MG: 50 TABLET, FILM COATED ORAL at 08:02

## 2025-03-23 RX ADMIN — GADOTERIDOL 16 ML: 279.3 INJECTION, SOLUTION INTRAVENOUS at 18:39

## 2025-03-23 RX ADMIN — INSULIN LISPRO 2 UNITS: 100 INJECTION, SOLUTION INTRAVENOUS; SUBCUTANEOUS at 08:02

## 2025-03-23 RX ADMIN — LORAZEPAM 0.5 MG: 0.5 TABLET ORAL at 16:01

## 2025-03-23 RX ADMIN — INSULIN LISPRO 2 UNITS: 100 INJECTION, SOLUTION INTRAVENOUS; SUBCUTANEOUS at 18:59

## 2025-03-23 ASSESSMENT — PAIN SCALES - GENERAL
PAINLEVEL_OUTOF10: 2
PAINLEVEL_OUTOF10: 7
PAINLEVEL_OUTOF10: 6
PAINLEVEL_OUTOF10: 3
PAINLEVEL_OUTOF10: 7
PAINLEVEL_OUTOF10: 0

## 2025-03-23 ASSESSMENT — PAIN - FUNCTIONAL ASSESSMENT
PAIN_FUNCTIONAL_ASSESSMENT: ACTIVITIES ARE NOT PREVENTED
PAIN_FUNCTIONAL_ASSESSMENT: ACTIVITIES ARE NOT PREVENTED
PAIN_FUNCTIONAL_ASSESSMENT: PREVENTS OR INTERFERES SOME ACTIVE ACTIVITIES AND ADLS

## 2025-03-23 ASSESSMENT — PAIN DESCRIPTION - ONSET
ONSET: ON-GOING

## 2025-03-23 ASSESSMENT — PAIN DESCRIPTION - FREQUENCY
FREQUENCY: CONTINUOUS

## 2025-03-23 ASSESSMENT — PAIN DESCRIPTION - DESCRIPTORS
DESCRIPTORS: ACHING;SHARP
DESCRIPTORS: SHARP;ACHING;DISCOMFORT
DESCRIPTORS: SHARP;ACHING;DISCOMFORT

## 2025-03-23 ASSESSMENT — PAIN DESCRIPTION - PAIN TYPE
TYPE: ACUTE PAIN

## 2025-03-23 ASSESSMENT — PAIN DESCRIPTION - ORIENTATION
ORIENTATION: LOWER

## 2025-03-23 ASSESSMENT — PAIN DESCRIPTION - LOCATION
LOCATION: BACK

## 2025-03-23 NOTE — CONSULTS
Ph: [phone], Fax: [phone]                                     [facility]                                                   [address] [place], [place] [id number]           Reason for admission:                 Brief Summary:     Danny Rock is being seen by nephrology for ESRD.     Interval History and Plan:   Patient seen at bedside  Comfortable  /87  On room air  K 4.4    Bicarbonate 19          HD on TTS schedule unless any urgency in the meantime. No urgency today  Bicitra 30 ml x 2 doses  Fluid restriction and follow Na trend.   Monitor Hb  Follow BP   Dose medications according to GFR          Thank you for allowing us to participate in this patient's care  In case of any question please call us at our 24 hour answering service [phone] or from 7 AM to 5 PM via [employer], [place], Epic chat or cell phone.   Dr Hannah Rodriguez MD      Assessment:     ESRD  On HD TTS  Access: TDC  Volume status: Euvolemic        Hypertension      Anemia        HPI      Patient is a 75 y.o. male  with PMH significant forcoronary artery disease, chronic systolic and diastolic CHF, ESRD on dialysis, renal cell carcinoma s/p nephrectomy, essential hypertension, hyperlipidemia, paroxysmal A-fib on Eliquis transferred form [facility] for evaluation of back back/lumbar spine stenosis. Nephrology consulted to assist in HD. Reviewed chart and patient had dialysis yesterday.       Patient seen at bedside and appear comfortable on room air and denied SOB, pain around TDC or fever.         ROS:   Negative except as mentioned in HPI      Vitals:     Vitals:    03/23/25 0400   BP:    Pulse: 86   Resp: 16   Temp: 98.4 °F (36.9 °C)   SpO2: 96%         Physical Examination:     General appearance: NAD. Alert, oriented  Respiratory: No respiratory distress on room air.   Cardiovascular: Edema no  Abdomen: Soft.   Other relevant findings:       Medications:       [Held by  provider] apixaban  5 mg Oral BID    [Held by provider] aspirin  81 mg Oral Daily    vitamin B-12  1,000 mcg Oral Daily    metoprolol tartrate  50 mg Oral BID    pantoprazole  40 mg Oral QAM AC    rosuvastatin  10 mg Oral Nightly    sevelamer  800 mg Oral TID WC    sodium chloride flush  5-40 mL IntraVENous 2 times per day    enoxaparin  30 mg SubCUTAneous Nightly    insulin lispro  0-8 Units SubCUTAneous 4x Daily AC & HS    methocarbamol  750 mg Oral 4x Daily         Labs:     Lab Results   Component Value Date    CREATININE 2.4 (H) 03/22/2025    BUN 40 (H) 03/22/2025     03/22/2025    K 5.0 03/22/2025    CL 98 (L) 03/22/2025    CO2 26 03/22/2025    CALCIUM 9.9 03/22/2025    PHOS 3.3 02/04/2025     Lab Results   Component Value Date    WBC 9.4 03/22/2025    HGB 12.7 (L) 03/22/2025    HCT 37.2 (L) 03/22/2025    MCV 97.8 03/22/2025     03/22/2025         Past Medical History:     Past Medical History:   Diagnosis Date    Atrial fibrillation (HCC)     CAD (coronary artery disease)     Cancer of kidney (HCC)     Immunotherapy: just finished treatment 11-22-23    Dependence on renal dialysis     dialysis [facility] Sat    Diabetes mellitus (HCC)     Hyperlipidemia     Hypertension     Prostate cancer (HCC)     radiation    Right renal mass     dialysis    Systolic CHF (HCC)        Past Surgical History:     Past Surgical History:   Procedure Laterality Date    CATARACT REMOVAL Bilateral     ~ 2006 with lens implanted    CHOLECYSTECTOMY      CT GUIDED CHEST TUBE  11/18/2024    CT GUIDED CHEST TUBE 11/18/2024 [facility] CT SCAN    CT NEEDLE BIOPSY LIVER PERCUTANEOUS  08/01/2024    CT NEEDLE BIOPSY LIVER PERCUTANEOUS 8/1/2024 [facility] CT SCAN    CT NEEDLE BIOPSY LIVER PERCUTANEOUS  11/18/2024    CT NEEDLE BIOPSY LIVER PERCUTANEOUS 11/18/2024 [facility] CT SCAN    EYE SURGERY Bilateral 04/19/2018    Cataract    HERNIA REPAIR      HERNIA REPAIR Left 12/31/2024    OPEN LEFT INGUINAL HERNIA REPAIR WITH MESH performed by Luciana

## 2025-03-23 NOTE — PLAN OF CARE
Problem: Chronic Conditions and Co-morbidities  Goal: Patient's chronic conditions and co-morbidity symptoms are monitored and maintained or improved  3/23/2025 0109 by Barby Yeboah RN  Outcome: Progressing     Problem: Discharge Planning  Goal: Discharge to home or other facility with appropriate resources  3/23/2025 0109 by Barby Yeboah RN  Outcome: Progressing     Problem: Skin/Tissue Integrity  Goal: Skin integrity remains intact  Description: 1.  Monitor for areas of redness and/or skin breakdown  2.  Assess vascular access sites hourly  3.  Every 4-6 hours minimum:  Change oxygen saturation probe site  4.  Every 4-6 hours:  If on nasal continuous positive airway pressure, respiratory therapy assess nares and determine need for appliance change or resting period  3/23/2025 0109 by Barby Yeboah RN  Outcome: Progressing   Skin assessed this shift. Maria E care completed as necessary. Patient alternating positions to reduce pressure and prevent skin injury q2 and as needed.     Problem: Safety - Adult  Goal: Free from fall injury  3/23/2025 0109 by Barby Yeboah RN  Outcome: Progressing   All fall precautions in place. Bed locked and in lowest position with alarm on. Overbed table and personal belonings within reach. Call light within reach and patient instructed to use call light for assistance. Non-skid socks on.    Problem: Pain  Goal: Verbalizes/displays adequate comfort level or baseline comfort level  Outcome: Progressing   Pt endorsing pain to back. Being treated with PRN pain medication, rest, and frequent repositioning with pillow support for comfort and pressure relief. Pt reports some relief from pain with above interventions.

## 2025-03-23 NOTE — PROGRESS NOTES
Department of Pharmacy    Notification received from laboratory of positive blood culture results.    Organism(s) detected: Enterococcus faecalis , staphylococcus epidermidis  Applicable Antimicrobial Resistance Genes: Antonia/B genes NOT detected     Recommendation initiating antibiotics with: vancomycin    Recommendations reviewed with Dr. Sandoval    Please call with any questions,  An Siu, PharmD  PGY1 Pharmacy Resident  Wireless: [phone]  3/23/2025 7:57 AM

## 2025-03-23 NOTE — CONSULTS
various antibiotics and things to watch for like new rashes, lip swelling, severe reaction, worsening diarrhea, break through fever etc.  Discussed patient's condition and what to expect. All of the patient's questions were addressed in a satisfactory manner and patient verbalized understanding all instructions.      Level of complexity of visit and medical decision making: High     Risk of Complications/Morbidity: High     Illness(es)/ Infection present that pose threat to life/bodily function.   There is potential for severe exacerbation of infection/side effects of treatment.  Therapy requires intensive monitoring for antimicrobial agent toxicity.   I did an In-depth patient chart review that entails going back  in time and assessing the complete breadth of all health care interactions, with higher-level synthesis for the patient's complex diagnoses.  Counseled patients, family members, and caregivers regarding infection prevention antimicrobial stewardship and resistance for the patient.  Engaging in complex medical decision-making associated with antimicrobial prescribing including considerations such as antimicrobial resistance patterns, , hospital antibiogram, recent antibiotic exposures, interactions/complications from comorbidities including concurrent infections, public health considerations to minimize development of antimicrobial resistance  Developing, following, and supervising specialized, individualized infection control protocols for an individual patient based on their diagnosis and risks in order to reduce risk of disease transmission.  Coordinating with staff regarding infection prevention and control measures to enable healthcare facility staff to safely care for patient.  Managing infection prevention and treatment protocols associated with transitions of care for this complex patient.    Thank you for involving me in the care of your patient. I will continue to follow. If you have  capsule by mouth Daily  melatonin 3 MG TABS tablet, Take 1 tablet by mouth daily  zinc sulfate (ZINCATE) 220 (50 Zn)  mg capsule - elemental zinc, Take 1 capsule by mouth daily  Cyanocobalamin (VITAMIN B-12 PO), Take 1 tablet by mouth daily  aspirin 81 MG chewable tablet, Take 1 tablet by mouth daily  ascorbic acid (VITAMIN C) 500 MG tablet, Take 1 tablet by mouth daily  vitamin D3 (CHOLECALCIFEROL) 125 MCG (5000 UT) TABS tablet, Take 1 tablet by mouth daily  omeprazole (PRILOSEC) 20 MG capsule, Take 1 capsule by mouth every other day  insulin lispro, 1 Unit Dial, (HUMALOG KWIKPEN) 100 UNIT/ML SOPN, Sc before meals per sliding scale (Patient taking differently: 3 times daily (before meals) Takes as needed up to 6 units with meals)     citric acid-sodium citrate  30 mL Oral Q12H    insulin glargine  7 Units SubCUTAneous Nightly    vancomycin (VANCOCIN) intermittent dosing (placeholder)   Other RX Placeholder    [Held by provider] apixaban  5 mg Oral BID    [Held by provider] aspirin  81 mg Oral Daily    vitamin B-12  1,000 mcg Oral Daily    metoprolol tartrate  50 mg Oral BID    pantoprazole  40 mg Oral QAM AC    rosuvastatin  10 mg Oral Nightly    sevelamer  800 mg Oral TID WC    sodium chloride flush  5-40 mL IntraVENous 2 times per day    enoxaparin  30 mg SubCUTAneous Nightly    insulin lispro  0-8 Units SubCUTAneous 4x Daily AC & HS    methocarbamol  750 mg Oral 4x Daily          REVIEW OF SYSTEMS:       Review of Systems   Constitutional:  Negative for chills, diaphoresis and fever.   HENT:  Negative for ear discharge, ear pain, postnasal drip, rhinorrhea, sinus pressure, sinus pain and sore throat.    Eyes:  Negative for discharge and redness.   Respiratory:  Negative for cough, shortness of breath and wheezing.    Cardiovascular:  Negative for chest pain and leg swelling.   Gastrointestinal:  Negative for abdominal pain, constipation, diarrhea and nausea.   Endocrine: Negative for cold intolerance, heat

## 2025-03-23 NOTE — PROGRESS NOTES
Physical Therapy  Facility/Department: [facility] 5T ORTHO/NEURO  Physical Therapy Initial Assessment/Treatment    Name: Danny Rock  : 1950  MRN: [id number]  Date of Service: 3/23/2025    Discharge Recommendations:  Subacute/Skilled Nursing Facility   PT Equipment Recommendations  Equipment Needed: No      Patient Diagnosis(es): The encounter diagnosis was Enterococcal bacteremia.  Past Medical History:  has a past medical history of Atrial fibrillation (HCC), CAD (coronary artery disease), Cancer of kidney (HCC), Dependence on renal dialysis, Diabetes mellitus (HCC), Hyperlipidemia, Hypertension, Prostate cancer (HCC), Right renal mass, and Systolic CHF (HCC).  Past Surgical History:  has a past surgical history that includes hernia repair; knee surgery; Cholecystectomy; Nasal septum surgery; eye surgery (Bilateral, 2018); Kidney removal (Right, 2022); Cataract removal (Bilateral); IR TUNNELED CVC PLACE WO SQ PORT/PUMP > 5 YEARS (2024); CT NEEDLE BIOPSY LIVER PERCUTANEOUS (2024); other surgical history (2024); CT NEEDLE BIOPSY LIVER PERCUTANEOUS (2024); CT GUIDED CHEST TUBE (2024); hernia repair (Left, 2024); other surgical history (Left); and shoulder surgery (Right, 2025).    Assessment  Body Structures, Functions, Activity Limitations Requiring Skilled Therapeutic Intervention: Decreased functional mobility   Assessment: 75 y.o. y.o. male with history of CAD, CHF, ESRD on HD, RCC s/p nephrectomy, HTN, HLD, afib on eliquis who presented to [facility] with extreme low back pain. Pt currently requiring Ax1 for bed mobility and Ax2 for sit>stand to take a few steps with RW from EOB to recliner. Pt is limited by decreased strength, balance and endurance. Pt is below his baseline and would benefit from further skilled PT to maximize safety and independence with functional mobility. Will continue to follow.  Treatment Diagnosis: Decreased functional

## 2025-03-23 NOTE — CONSULTS
Department of Neurosurgery  Attending Consult Note        Reason for Consult:  back pain    CHIEF COMPLAINT:  back pain    HISTORY OF PRESENT ILLNESS:                The patient is a 75 y.o. y.o. male with history of CAD, CHF, ESRD on HD, RCC s/p nephrectomy, HTN, HLD, afib on eliquis who presented to [facility] with extreme low back pain. States that his strength today is improved and it is mostly pain limited. Has had some weakness in legs chronically. Denies any saddle anesthesia or bowel/bladder incontinence. Transferred to [facility] for neurosurgical evaluation.      Past Medical History:        Diagnosis Date    Atrial fibrillation (HCC)     CAD (coronary artery disease)     Cancer of kidney (HCC)     Immunotherapy: just finished treatment 11-22-23    Dependence on renal dialysis     dialysis Tues, Thurs Sat    Diabetes mellitus (HCC)     Hyperlipidemia     Hypertension     Prostate cancer (HCC)     radiation    Right renal mass     dialysis    Systolic CHF (HCC)      Past Surgical History:        Procedure Laterality Date    CATARACT REMOVAL Bilateral     ~ 2006 with lens implanted    CHOLECYSTECTOMY      CT GUIDED CHEST TUBE  11/18/2024    CT GUIDED CHEST TUBE 11/18/2024 [facility] CT SCAN    CT NEEDLE BIOPSY LIVER PERCUTANEOUS  08/01/2024    CT NEEDLE BIOPSY LIVER PERCUTANEOUS 8/1/2024 [facility] CT SCAN    CT NEEDLE BIOPSY LIVER PERCUTANEOUS  11/18/2024    CT NEEDLE BIOPSY LIVER PERCUTANEOUS 11/18/2024 [facility] CT SCAN    EYE SURGERY Bilateral 04/19/2018    Cataract    HERNIA REPAIR      HERNIA REPAIR Left 12/31/2024    OPEN LEFT INGUINAL HERNIA REPAIR WITH MESH performed by Camacho Chowdhury MD at [facility] OR    IR TUNNELED CVC PLACE WO SQ PORT/PUMP > 5 YEARS  02/07/2024    IR TUNNELED CATHETER PLACEMENT GREATER THAN 5 YEARS 2/7/2024 Kosta Baugh MD [facility] SPECIAL PROCEDURES    KIDNEY REMOVAL Right 07/25/2022    ROBOTIC LAPAROSCOPIC RADICAL RIGHT NEPHRECTOMY performed by Larry Olsen MD at [facility] OR    KNEE  SURGERY      both knees: scope for cartilage    NASAL SEPTUM SURGERY      OTHER SURGICAL HISTORY  09/30/2024    Peritoneal dialysis catheter: removed    OTHER SURGICAL HISTORY Left     left arm fistula for dialysis: also has right chest catheter for dialysis    SHOULDER SURGERY Right 1/31/2025    RIGHT SHOULDER OPEN INCISION AND DRAINAGE performed by Nicolas Jacob MD at [facility] OR     Current Medications:   Current Facility-Administered Medications: citric acid-sodium citrate (BICITRA) solution 30 mL, 30 mL, Oral, Q12H  insulin glargine (LANTUS) injection vial 7 Units, 7 Units, SubCUTAneous, Nightly  vancomycin (VANCOCIN) intermittent dosing (placeholder), , Other, RX Placeholder  vancomycin (VANCOCIN) 1,750 mg in sodium chloride 0.9 % 500 mL IVPB, 1,750 mg, IntraVENous, Once  [Held by provider] apixaban (ELIQUIS) tablet 5 mg, 5 mg, Oral, BID  [Held by provider] aspirin chewable tablet 81 mg, 81 mg, Oral, Daily  vitamin B-12 (CYANOCOBALAMIN) tablet 1,000 mcg, 1,000 mcg, Oral, Daily  melatonin tablet 3 mg, 3 mg, Oral, Nightly PRN  metoprolol tartrate (LOPRESSOR) tablet 50 mg, 50 mg, Oral, BID  pantoprazole (PROTONIX) tablet 40 mg, 40 mg, Oral, QAM AC  rosuvastatin (CRESTOR) tablet 10 mg, 10 mg, Oral, Nightly  sevelamer (RENVELA) tablet 800 mg, 800 mg, Oral, TID WC  sodium chloride flush 0.9 % injection 5-40 mL, 5-40 mL, IntraVENous, 2 times per day  sodium chloride flush 0.9 % injection 5-40 mL, 5-40 mL, IntraVENous, PRN  0.9 % sodium chloride infusion, , IntraVENous, PRN  enoxaparin Sodium (LOVENOX) injection 30 mg, 30 mg, SubCUTAneous, Nightly  ondansetron (ZOFRAN-ODT) disintegrating tablet 4 mg, 4 mg, Oral, Q8H PRN **OR** ondansetron (ZOFRAN) injection 4 mg, 4 mg, IntraVENous, Q6H PRN  polyethylene glycol (GLYCOLAX) packet 17 g, 17 g, Oral, Daily PRN  acetaminophen (TYLENOL) tablet 650 mg, 650 mg, Oral, Q6H PRN **OR** acetaminophen (TYLENOL) suppository 650 mg, 650 mg, Rectal, Q6H PRN  glucose chewable tablet

## 2025-03-23 NOTE — PROGRESS NOTES
Patient is A&Ox 3, oriented/ disoriented at times. VSS this shift with exception of elevated BP. Patient has endorsed pain to lower back managed well per MAR and non-pharm measures. Patient is tolerating PO diet. Tolerating ambulation x 2 assist with stedy/ GB & walker sand/ pivot. Voiding via BRP. Patient updated on plan of care. Fall and safety precautions in place, call light within reach.

## 2025-03-23 NOTE — PLAN OF CARE
Problem: Safety - Adult  Goal: Free from fall injury  3/23/2025 0736 by Nlel Acuna RN  Outcome: Progressing  Note: Pt free of fall injury this shift. All proper safety precautions are in place. Call light is within reach. Bed alarm is on.     Problem: Pain  Goal: Verbalizes/displays adequate comfort level or baseline comfort level  3/23/2025 0736 by Nell Acuna RN  Outcome: Progressing  Note: Pt encouraged to monitor pain and request assistance. Appropriate pain scale is being used.     Problem: Chronic Conditions and Co-morbidities  Goal: Patient's chronic conditions and co-morbidity symptoms are monitored and maintained or improved  3/23/2025 0736 by Nell Acuna, RN  Outcome: Progressing

## 2025-03-23 NOTE — CONSULTS
[facility] -  Clinical Pharmacy Note    Vancomycin - Management by Pharmacy    Consult Date(s): 3/23  Consulting Provider(s): Dr Moreno    Assessment / Plan  Bacteremia - Vancomycin  Concurrent Antimicrobials: none  Day of Vanc Therapy / Ordered Duration: 1/unspecified  Current Dosing Method: Intermittent Dosing by Levels  Therapeutic Goal: Trough ~ 15 mg/L  Current Dose / Plan:   Patient with ESRD on HD (TTS)   Last session 3/22  Patient does still make urine ; 3 unmeasured occurrences last night  Will dose intermittently  Will load with 1750 mg IV x1 (~23 mg/kg)  Will plan to obtain level 3/24 AM to assess clearance   Will continue to monitor clinical condition and make adjustments to regimen as appropriate.    Thank you for consulting pharmacy,    Please call with any questions,  An Siu, PharmD  PGY1 Pharmacy Resident  Wireless: [phone]  3/23/2025 8:55 AM        Interval update:  therapy initiation     Subjective/Objective:   Danny Rock is a 75 y.o. male with a PMHx significant for CAD, CHF, ESRD on HD (TTS), renal cell carcinoma s/p nephrectomy, HTN, HLD, AF (Eliquis) who presented to [facility] with complaints of lower back pain and inability to move lower extremities 2/2 pain. MRI concerning for multilevel severe foraminal stenosis with high-grade narrowing at L5-S1 and was transferred to [facility]. Blood cx were positive x1 for staph epidermidis and e faecalis (van A/B genes not detected) (bottle 2 cancelled) and vancomycin was initiated.     Pharmacy is consulted to dose vancomycin.    Ht Readings from Last 1 Encounters:   03/22/25 1.905 m (6' 3\")     Wt Readings from Last 1 Encounters:   03/22/25 74.8 kg (165 lb)     Current & Prior Antimicrobial Regimen(s):  Vancomycin - PTD  Intermittent  Date Vanc Level Vanc Dose   3/23 - 1750 mg ordered               Cultures & Sensitivities:    Date Site Micro Susceptibility / Result   3/22 Blood x1 Enterococcus faecalis Staphylococcus epidermidis Van A/B

## 2025-03-23 NOTE — PROGRESS NOTES
Hospital Medicine Progress Note      Date of Admission: 3/22/2025  Hospital Day: 2    Chief Admission Complaint: Low back pain     Subjective: Patient seen and examined at bedside  Continues to complain of low back pain, especially worse with movements  Patient intermittently not oriented to time      Presenting Admission History:       75 y.o. male with PMHx significant for coronary artery disease, chronic systolic and diastolic CHF, ESRD on dialysis, renal cell carcinoma s/p nephrectomy, essential hypertension, hyperlipidemia, paroxysmal A-fib on Eliquis who presented to [facility] initially with a complaint of back pain which started a few days and was associated with inability to move lower extremities secondary to his pain.  Patient denies any numbness or tingling, no bowel disturbance but does report some difficulty with urination. No saddle anesthesia. MRI spine concerning for multilevel severe foraminal stenosis especially with impingement of the bilateral exiting L5 nerve root with high-grade left foraminal narrowing at L5-S1. Patient was discussed with neurosurgery at the [facility] who recommended transfer to the [facility] for further evaluation.     Assessment/Plan:    Multilevel lumbar spine foraminal stenosis  Impingement of the bilateral L5 nerve root  Neurosurgery on consult, recommend MRI cervical, thoracic and lumbar spine in the setting of bacteremia with concerns of infection  Pain management with Robaxin, Percocet    Bacteremia with Enterococcus faecalis and Staph epidermidis  1 bottle of blood culture done at outside hospital prior to transfer on 3/22 positive for Enterococcus faecalis and Staph epidermidis.  Will await final results and sensitivity  Had a history of right shoulder abscess in 1/2025 s/p I&D on 1/31/2025 with 1 surgical specimen positive for micrococcus which was considered contaminant patient was discharged with doxycycline pending final culture

## 2025-03-23 NOTE — PROGRESS NOTES
Occupational Therapy  Facility/Department: [facility] ORTHO/NEURO  Occupational Therapy Initial Assessment / Treatment    Name: Danny Rock  : 1950  MRN: [id number]  Date of Service: 3/23/2025    Discharge Recommendations:  Subacute/Skilled Nursing Facility  OT Equipment Recommendations  Equipment Needed: No  Other: defer       Patient Diagnosis(es): The encounter diagnosis was Enterococcal bacteremia.  Past Medical History:  has a past medical history of Atrial fibrillation (HCC), CAD (coronary artery disease), Cancer of kidney (HCC), Dependence on renal dialysis, Diabetes mellitus (HCC), Hyperlipidemia, Hypertension, Prostate cancer (HCC), Right renal mass, and Systolic CHF (HCC).  Past Surgical History:  has a past surgical history that includes hernia repair; knee surgery; Cholecystectomy; Nasal septum surgery; eye surgery (Bilateral, 2018); Kidney removal (Right, 2022); Cataract removal (Bilateral); IR TUNNELED CVC PLACE WO SQ PORT/PUMP > 5 YEARS (2024); CT NEEDLE BIOPSY LIVER PERCUTANEOUS (2024); other surgical history (2024); CT NEEDLE BIOPSY LIVER PERCUTANEOUS (2024); CT GUIDED CHEST TUBE (2024); hernia repair (Left, 2024); other surgical history (Left); and shoulder surgery (Right, 2025).    Treatment Diagnosis: impaired ADL and fxl mobilty      Assessment  Performance deficits / Impairments: Decreased functional mobility ;Decreased ADL status;Decreased cognition  Assessment: Pt is 75y M admitted d/t back pain. Pt self-reporting baseline is IND w/ ADL and fxl mobility, noted that pt is poor historian so PLOF to be confirmed w/ family / caregiver. Pt presently req A x2 for fxl mobiiyt, and MaxA for LB ADL - is functioning below reported baseline and may benefit from skilled OT while inpt, and after acute d/c prior to retn home to maximize safety / IND / fxl act jenna needed for ADL and fxl mobiity tasks. Will follow as inpt per POC.  Treatment  Diagnosis: impaired ADL and fxl mobilty  Prognosis: Fair  Decision Making: Medium Complexity  REQUIRES OT FOLLOW-UP: Yes  Activity Tolerance  Activity Tolerance: Patient limited by pain  Activity Tolerance Comments: pt commenting re back pain w/ movement, limiting participation     Plan  Occupational Therapy Plan  Times Per Week: 2-5  Times Per Day: Once a day  Current Treatment Recommendations: Balance training, Functional mobility training, Patient/Caregiver education & training, Safety education & training, Self-Care / ADL    Restrictions  Position Activity Restriction  Other Position/Activity Restrictions: up as tolerated; full code.    Subjective  General  Chart Reviewed: Yes, Orders, Progress Notes  Additional Pertinent Hx: 75 y.o. male with PMHx significant for coronary artery disease, chronic systolic and diastolic CHF, ESRD on dialysis, renal cell carcinoma s/p nephrectomy, essential hypertension, hyperlipidemia, paroxysmal A-fib on Eliquis who presented to [facility] initially with a complaint of back pain which started a few days and was associated with inability to move lower extremities secondary to his pain.  Patient denies any numbness or tingling, no bowel disturbance but does report some difficulty with urination. No saddle anesthesia. MRI spine concerning for multilevel severe foraminal stenosis especially with impingement of the bilateral exiting L5 nerve root with high-grade left foraminal narrowing at L5-S1. Patient was discussed with neurosurgery at the [facility] who recommended transfer to the [facility] for further evaluation.  Family / Caregiver Present: No  Referring Practitioner: Zach Sandoval MD  Diagnosis: neuroforaminal stenosis of lumbar spine  Subjective  Subjective: Pt semi-supine on OT/PT approach and agreed to eval.  General Comment  Comments: RN Toño seeing pt prior to approach     Social/Functional History  Social/Functional History  Lives With: Spouse  Type of

## 2025-03-24 ENCOUNTER — APPOINTMENT (OUTPATIENT)
Age: 75
DRG: 539 | End: 2025-03-24
Payer: MEDICARE

## 2025-03-24 PROBLEM — E44.0 MODERATE MALNUTRITION: Chronic | Status: ACTIVE | Noted: 2025-03-24

## 2025-03-24 LAB
ANION GAP SERPL CALCULATED.3IONS-SCNC: 10 MMOL/L (ref 3–16)
BUN SERPL-MCNC: 56 MG/DL (ref 7–20)
CALCIUM SERPL-MCNC: 9.2 MG/DL (ref 8.3–10.6)
CHLORIDE SERPL-SCNC: 96 MMOL/L (ref 99–110)
CO2 SERPL-SCNC: 27 MMOL/L (ref 21–32)
CREAT SERPL-MCNC: 2.5 MG/DL (ref 0.8–1.3)
ECHO AV MEAN GRADIENT: 5 MMHG
ECHO AV MEAN VELOCITY: 1.1 M/S
ECHO AV PEAK GRADIENT: 10 MMHG
ECHO AV PEAK VELOCITY: 1.6 M/S
ECHO AV VELOCITY RATIO: 0.44
ECHO AV VTI: 22.5 CM
ECHO BSA: 1.99 M2
ECHO LV EF PHYSICIAN: 55 %
ECHO LVOT AV VTI INDEX: 0.51
ECHO LVOT MEAN GRADIENT: 1 MMHG
ECHO LVOT PEAK GRADIENT: 2 MMHG
ECHO LVOT PEAK VELOCITY: 0.7 M/S
ECHO LVOT VTI: 11.5 CM
ECHO RA AREA 4C: 17.2 CM2
ECHO RA END SYSTOLIC VOLUME APICAL 4 CHAMBER INDEX BSA: 23 ML/M2
ECHO RA VOLUME: 46 ML
ECHO RV BASAL DIMENSION: 3.5 CM
ECHO RV FREE WALL PEAK S': 12.7 CM/S
ECHO RV LONGITUDINAL DIMENSION: 7.3 CM
ECHO RV MID DIMENSION: 3.4 CM
ECHO RV TAPSE: 1.8 CM (ref 1.7–?)
EST. AVERAGE GLUCOSE BLD GHB EST-MCNC: 142.7 MG/DL
GFR SERPLBLD CREATININE-BSD FMLA CKD-EPI: 26 ML/MIN/{1.73_M2}
GLUCOSE BLD-MCNC: 134 MG/DL (ref 70–99)
GLUCOSE BLD-MCNC: 159 MG/DL (ref 70–99)
GLUCOSE BLD-MCNC: 167 MG/DL (ref 70–99)
GLUCOSE BLD-MCNC: 184 MG/DL (ref 70–99)
GLUCOSE BLD-MCNC: 211 MG/DL (ref 70–99)
GLUCOSE SERPL-MCNC: 132 MG/DL (ref 70–99)
HBA1C MFR BLD: 6.6 %
PERFORMED ON: ABNORMAL
PHOSPHATE SERPL-MCNC: 3.5 MG/DL (ref 2.5–4.9)
POTASSIUM SERPL-SCNC: 4.7 MMOL/L (ref 3.5–5.1)
REPORT: NORMAL
SODIUM SERPL-SCNC: 133 MMOL/L (ref 136–145)
VANCOMYCIN SERPL-MCNC: 12.5 UG/ML

## 2025-03-24 PROCEDURE — 2500000003 HC RX 250 WO HCPCS: Performed by: INTERNAL MEDICINE

## 2025-03-24 PROCEDURE — 93306 TTE W/DOPPLER COMPLETE: CPT | Performed by: INTERNAL MEDICINE

## 2025-03-24 PROCEDURE — 6370000000 HC RX 637 (ALT 250 FOR IP)

## 2025-03-24 PROCEDURE — 87040 BLOOD CULTURE FOR BACTERIA: CPT

## 2025-03-24 PROCEDURE — C8929 TTE W OR WO FOL WCON,DOPPLER: HCPCS

## 2025-03-24 PROCEDURE — 6370000000 HC RX 637 (ALT 250 FOR IP): Performed by: INTERNAL MEDICINE

## 2025-03-24 PROCEDURE — 6360000002 HC RX W HCPCS

## 2025-03-24 PROCEDURE — 80202 ASSAY OF VANCOMYCIN: CPT

## 2025-03-24 PROCEDURE — 80048 BASIC METABOLIC PNL TOTAL CA: CPT

## 2025-03-24 PROCEDURE — 99232 SBSQ HOSP IP/OBS MODERATE 35: CPT | Performed by: NURSE PRACTITIONER

## 2025-03-24 PROCEDURE — 6360000004 HC RX CONTRAST MEDICATION: Performed by: INTERNAL MEDICINE

## 2025-03-24 PROCEDURE — 2580000003 HC RX 258

## 2025-03-24 PROCEDURE — 36415 COLL VENOUS BLD VENIPUNCTURE: CPT

## 2025-03-24 PROCEDURE — 1200000000 HC SEMI PRIVATE

## 2025-03-24 PROCEDURE — 99233 SBSQ HOSP IP/OBS HIGH 50: CPT | Performed by: INTERNAL MEDICINE

## 2025-03-24 PROCEDURE — 84100 ASSAY OF PHOSPHORUS: CPT

## 2025-03-24 PROCEDURE — 6360000002 HC RX W HCPCS: Performed by: INTERNAL MEDICINE

## 2025-03-24 RX ADMIN — INSULIN LISPRO 2 UNITS: 100 INJECTION, SOLUTION INTRAVENOUS; SUBCUTANEOUS at 11:23

## 2025-03-24 RX ADMIN — VANCOMYCIN HYDROCHLORIDE 1000 MG: 1 INJECTION, POWDER, LYOPHILIZED, FOR SOLUTION INTRAVENOUS at 11:16

## 2025-03-24 RX ADMIN — ROSUVASTATIN CALCIUM 10 MG: 10 TABLET, FILM COATED ORAL at 20:17

## 2025-03-24 RX ADMIN — OXYCODONE HYDROCHLORIDE AND ACETAMINOPHEN 1 TABLET: 5; 325 TABLET ORAL at 04:40

## 2025-03-24 RX ADMIN — METHOCARBAMOL 750 MG: 750 TABLET ORAL at 09:35

## 2025-03-24 RX ADMIN — CYANOCOBALAMIN TAB 1000 MCG 1000 MCG: 1000 TAB at 09:35

## 2025-03-24 RX ADMIN — METOPROLOL TARTRATE 50 MG: 50 TABLET, FILM COATED ORAL at 09:35

## 2025-03-24 RX ADMIN — SODIUM CHLORIDE, PRESERVATIVE FREE 10 ML: 5 INJECTION INTRAVENOUS at 20:17

## 2025-03-24 RX ADMIN — ENOXAPARIN SODIUM 30 MG: 100 INJECTION SUBCUTANEOUS at 20:17

## 2025-03-24 RX ADMIN — OXYCODONE HYDROCHLORIDE AND ACETAMINOPHEN 1 TABLET: 5; 325 TABLET ORAL at 17:28

## 2025-03-24 RX ADMIN — METHOCARBAMOL 750 MG: 750 TABLET ORAL at 20:17

## 2025-03-24 RX ADMIN — SEVELAMER CARBONATE 800 MG: 800 TABLET, FILM COATED ORAL at 09:35

## 2025-03-24 RX ADMIN — PANTOPRAZOLE SODIUM 40 MG: 40 TABLET, DELAYED RELEASE ORAL at 06:22

## 2025-03-24 RX ADMIN — SULFUR HEXAFLUORIDE 2 ML: 60.7; .19; .19 INJECTION, POWDER, LYOPHILIZED, FOR SUSPENSION INTRAVENOUS; INTRAVESICAL at 14:42

## 2025-03-24 RX ADMIN — METOPROLOL TARTRATE 50 MG: 50 TABLET, FILM COATED ORAL at 20:17

## 2025-03-24 RX ADMIN — METHOCARBAMOL 750 MG: 750 TABLET ORAL at 12:25

## 2025-03-24 RX ADMIN — METHOCARBAMOL 750 MG: 750 TABLET ORAL at 17:28

## 2025-03-24 RX ADMIN — SEVELAMER CARBONATE 800 MG: 800 TABLET, FILM COATED ORAL at 17:28

## 2025-03-24 RX ADMIN — SEVELAMER CARBONATE 800 MG: 800 TABLET, FILM COATED ORAL at 11:23

## 2025-03-24 RX ADMIN — SODIUM CHLORIDE, PRESERVATIVE FREE 10 ML: 5 INJECTION INTRAVENOUS at 09:39

## 2025-03-24 RX ADMIN — INSULIN LISPRO 2 UNITS: 100 INJECTION, SOLUTION INTRAVENOUS; SUBCUTANEOUS at 17:32

## 2025-03-24 RX ADMIN — INSULIN GLARGINE 7 UNITS: 100 INJECTION, SOLUTION SUBCUTANEOUS at 20:16

## 2025-03-24 RX ADMIN — OXYCODONE HYDROCHLORIDE AND ACETAMINOPHEN 1 TABLET: 5; 325 TABLET ORAL at 10:56

## 2025-03-24 ASSESSMENT — PAIN DESCRIPTION - ONSET: ONSET: ON-GOING

## 2025-03-24 ASSESSMENT — PAIN DESCRIPTION - PAIN TYPE
TYPE: ACUTE PAIN
TYPE: ACUTE PAIN

## 2025-03-24 ASSESSMENT — PAIN SCALES - GENERAL
PAINLEVEL_OUTOF10: 3
PAINLEVEL_OUTOF10: 4
PAINLEVEL_OUTOF10: 2
PAINLEVEL_OUTOF10: 5
PAINLEVEL_OUTOF10: 1
PAINLEVEL_OUTOF10: 4
PAINLEVEL_OUTOF10: 3
PAINLEVEL_OUTOF10: 2

## 2025-03-24 ASSESSMENT — PAIN DESCRIPTION - LOCATION
LOCATION: BACK

## 2025-03-24 ASSESSMENT — PAIN - FUNCTIONAL ASSESSMENT
PAIN_FUNCTIONAL_ASSESSMENT: PREVENTS OR INTERFERES SOME ACTIVE ACTIVITIES AND ADLS

## 2025-03-24 ASSESSMENT — PAIN DESCRIPTION - ORIENTATION
ORIENTATION: LOWER
ORIENTATION: LOWER
ORIENTATION: LOWER;MID
ORIENTATION: LOWER;MID

## 2025-03-24 ASSESSMENT — PAIN DESCRIPTION - DESCRIPTORS
DESCRIPTORS: ACHING;DISCOMFORT
DESCRIPTORS: ACHING;DISCOMFORT
DESCRIPTORS: TENDER;THROBBING
DESCRIPTORS: ACHING;THROBBING

## 2025-03-24 ASSESSMENT — PAIN DESCRIPTION - FREQUENCY: FREQUENCY: CONTINUOUS

## 2025-03-24 NOTE — PROGRESS NOTES
Hospital Medicine Progress Note      Date of Admission: 3/22/2025  Hospital Day: 3    Chief Admission Complaint: Low back pain     Subjective: Patient seen and examined at bedside  Patient reports improvement in his back pain  Intermittently confused and was not able to retain our conversation  Afebrile, hemodynamically stable  Blood culture 3/23 2 out of 2 positive for Enterococcus      Presenting Admission History:       75 y.o. male with PMHx significant for coronary artery disease, chronic systolic and diastolic CHF, ESRD on dialysis, renal cell carcinoma s/p nephrectomy, essential hypertension, hyperlipidemia, paroxysmal A-fib on Eliquis who presented to [facility] initially with a complaint of back pain which started a few days and was associated with inability to move lower extremities secondary to his pain.  Patient denies any numbness or tingling, no bowel disturbance but does report some difficulty with urination. No saddle anesthesia. MRI spine concerning for multilevel severe foraminal stenosis especially with impingement of the bilateral exiting L5 nerve root with high-grade left foraminal narrowing at L5-S1. Patient was discussed with neurosurgery at the [facility] who recommended transfer to the [facility] for further evaluation.     Assessment/Plan:        Bacteremia with Enterococcus faecalis   Multilevel lumbar discitis and osteomyelitis  MRI of the lumbar, thoracic and cervical spine concerning for multilevel abnormal disc signal with disc enhancement and endplate enhancement compatible with discitis and osteomyelitis at L2-L3, L3-L4 and L4-L5.  Adjacent paravertebral edema and enhancement.  Equivocal small abscess in the right psoas muscle.  1 bottle of blood culture done at outside hospital prior to transfer on 3/22 positive for Enterococcus faecalis and Staph epidermidis.    Repeat blood culture 3/23 2 out of 2 positive for Enterococcus faecalis  Will continue to follow repeat  blood culture on 3/24  Had a history of right shoulder abscess in 1/2025 s/p I&D on 1/31/2025 with 1 surgical specimen positive for micrococcus which was considered contaminant patient was discharged with doxycycline pending final culture results.  Continue vancomycin per infectious disease  TTE unable to visualize valves clearly and recommend DELMAR if high clinical suspicion for endocarditis.  EF 55 to 60%.  Patient also has dialysis catheter in place on the right chest which might need removal if persistently bacteremic      Multilevel lumbar spine foraminal stenosis  Impingement of the bilateral L5 nerve root  Neurosurgery on consult, no surgical intervention planned at this time.  Signed off  Pain management with Robaxin, Percocet    Coronary artery disease  Combined systolic and diastolic heart failure, compensated  Essential hypertension  Hyperlipidemia  Paroxysmal A-fib on Eliquis  Resume home medication Lopressor, Crestor  Hold aspirin and Eliquis for possible procedure      Type 2 diabetes mellitus  Only on sliding scale insulin  Blood sugars elevated, will add Lantus 7 units nightly and continue medium dose sliding scale in addition to glucose check and hypoglycemia protocol    ESRD on hemodialysis  CKD-MBD  Nephrology consulted for HD  Resume Renvela    History of renal cell carcinoma s/p nephrectomy  GERD, on PPI    High risk of developing delirium  Continue delirium precautions  Avoid benzos and overmedication of pain    Physical Exam Performed:      General appearance:  No apparent distress  Respiratory:  Normal respiratory effort.   Cardiovascular:  Regular rate and rhythm.  Abdomen:  Soft, non-tender, non-distended.  Musculoskeletal: Bilateral lower extremity range of motion limited by pain  Neurologic:  Non-focal  Psychiatric:  Alert and oriented    /67   Pulse 76   Temp 98.9 °F (37.2 °C) (Temporal)   Resp 17   Ht 1.905 m (6' 3\")   Wt 74.8 kg (165 lb)   SpO2 99%   BMI 20.62 kg/m²

## 2025-03-24 NOTE — CONSULTS
[facility] -  Clinical Pharmacy Note    Vancomycin - Management by Pharmacy    Consult Date(s): 3/23  Consulting Provider(s): Dr Moreno    Assessment / Plan  Bacteremia - Vancomycin  Concurrent Antimicrobials: none  Day of Vanc Therapy / Ordered Duration: 2/unspecified  Current Dosing Method: Intermittent Dosing by Levels  Therapeutic Goal: Trough ~ 15 mg/L  Current Dose / Plan:   Patient with ESRD on HD (TTS)   Last session 3/22  Patient does still make urine; 7 unmeasured occurrences past 24h  Will dose intermittently- awaiting HD orders per nephrology  Loaded with 1750 mg IV x1 (~23 mg/kg) last evening  Level today = 12.5 mg/L (drawn ~20h after load)  Will re-dose today with 1000 mg to keep expected level therapeutic through HD tomorrow  Level ordered tomorrow am prior to expected HD session  Will continue to monitor clinical condition and make adjustments to regimen as appropriate.    Thank you for consulting pharmacy,  Please call with any questions    Pretty Arcos  [phone] (Main Pharmacy)        Interval update:  Blood cx +  E faecalis (Antonia/b not detected). Pt remains afebrile and HDS.     Subjective/Objective:   Danny Rock is a 75 y.o. male with a PMHx significant for CAD, CHF, ESRD on HD (TTS), renal cell carcinoma s/p nephrectomy, HTN, HLD, AF (Eliquis) who presented to [facility] with complaints of lower back pain and inability to move lower extremities 2/2 pain. MRI concerning for multilevel severe foraminal stenosis with high-grade narrowing at L5-S1 and was transferred to [facility]. Blood cx were positive x1 for staph epidermidis and e faecalis (van A/B genes not detected) (bottle 2 cancelled) and vancomycin was initiated.     Pharmacy is consulted to dose vancomycin.    Ht Readings from Last 1 Encounters:   03/22/25 1.905 m (6' 3\")     Wt Readings from Last 1 Encounters:   03/22/25 74.8 kg (165 lb)     Current & Prior Antimicrobial Regimen(s):  Vancomycin - PTD  Intermittent  Date

## 2025-03-24 NOTE — PROGRESS NOTES
ID Follow-up NOTE    CC:   E faecalis bacteremia, Lumbar osteo-diskitis   Antibiotics: Vancomycin    Admit Date: 3/22/2025  Hospital Day: 3    Subjective:     Poor historian   Patient has LBP yet unable to give any details       Objective:     Patient Vitals for the past 8 hrs:   BP Temp Temp src Pulse Resp SpO2   03/24/25 1056 -- -- -- -- 16 --   03/24/25 0815 123/77 98.5 °F (36.9 °C) Oral 75 18 96 %   03/24/25 0510 -- -- -- -- 16 --   03/24/25 0440 127/73 98.3 °F (36.8 °C) Oral 72 17 96 %     I/O last 3 completed shifts:  In: 900 [P.O.:900]  Out: -   No intake/output data recorded.    EXAM:  GENERAL: No apparent distress.    HEENT: Membranes moist, no oral lesion  NECK:  Supple, no lymphadenopathy  LUNGS: Clear b/l, no rales, no dullness  CARDIAC: RRR, no murmur appreciated  ABD:  + BS, soft / NT  EXT:  No rash, no edema, no lesions  NEURO: No focal neurologic findings YET limited cooperation / exam  PSYCH: Orientation, sensorium, mood normal  LINES:  Peripheral iv       Data Review:  Lab Results   Component Value Date    WBC 9.4 03/22/2025    HGB 12.7 (L) 03/22/2025    HCT 37.2 (L) 03/22/2025    MCV 97.8 03/22/2025     03/22/2025     Lab Results   Component Value Date    CREATININE 2.5 (H) 03/24/2025    BUN 56 (H) 03/24/2025     (L) 03/24/2025    K 4.7 03/24/2025    CL 96 (L) 03/24/2025    CO2 27 03/24/2025       Hepatic Function Panel:   Lab Results   Component Value Date/Time    ALKPHOS 112 03/22/2025 06:28 AM    ALT 26 03/22/2025 06:28 AM    AST 28 03/22/2025 06:28 AM    BILITOT 0.6 03/22/2025 06:28 AM    BILIDIR <0.2 03/16/2021 09:41 AM    IBILI see below 03/16/2021 09:41 AM       ESR 33,     MICRO:  3/22 BC x 1 E faecalis, S epidermidis  3/23 BC X 2 GPC, E faecalis by PCR     IMAGING:  3/22 C/A/P - thoracic / lumbar spine reconstruction CT   IMPRESSION:  1. Contrast enhanced CT scan of the chest demonstrates no acute cardiopulmonary process.  2. Mild pulmonary emphysema.  3. Marked  ca, LBP  Allergy - rase w Amox, has had cephalosporins (Ancef, Ceftriaxone)    E faecalis bacteremia 3/21,22   - f/u BC neg   - r/o endocarditis     Multilevel lumbar osteo-diskitis seen on MRI  - assoc with epidural phlegmon posterior L3/4 on MRI reading  - assoc with E faecalis bacteremia    Lower abd wall fluid collection on CT     Plan:     Cont Vancomycin  Pain control  Will review CT / MRI w Radiologist   Echo ordered - may need DELMAR  F/u BC sent - no growth to date       Medical Decision Making:  The following items were considered in medical decision making:  Discussion of patient care with other providers  Reviewed clinical lab tests  Reviewed radiology tests  Reviewed other diagnostic tests/interventions  Independent review of radiologic images - will review imaging w Radiologist   Microbiology cultures and other micro tests reviewed      Discussed with pt    Arvind Lee MD

## 2025-03-24 NOTE — PLAN OF CARE
Problem: Chronic Conditions and Co-morbidities  Goal: Patient's chronic conditions and co-morbidity symptoms are monitored and maintained or improved  3/24/2025 1424 by Savanna Hutton RN  Outcome: Progressing     Problem: Discharge Planning  Goal: Discharge to home or other facility with appropriate resources  3/24/2025 1424 by Savanna Hutton RN  Outcome: Progressing     Problem: Skin/Tissue Integrity  Goal: Skin integrity remains intact  Description: 1.  Monitor for areas of redness and/or skin breakdown  2.  Assess vascular access sites hourly  3.  Every 4-6 hours minimum:  Change oxygen saturation probe site  4.  Every 4-6 hours:  If on nasal continuous positive airway pressure, respiratory therapy assess nares and determine need for appliance change or resting period  3/24/2025 1424 by Savanna Hutton RN  Outcome: Progressing     Problem: Safety - Adult  Goal: Free from fall injury  3/24/2025 1424 by Savanna Hutton RN  Outcome: Progressing     Problem: Pain  Goal: Verbalizes/displays adequate comfort level or baseline comfort level  3/24/2025 1424 by Savanna Hutton RN  Outcome: Progressing     Problem: ABCDS Injury Assessment  Goal: Absence of physical injury  3/24/2025 1424 by Savanna Hutton RN  Outcome: Progressing     Problem: Nutrition Deficit:  Goal: Optimize nutritional status  Outcome: Progressing

## 2025-03-24 NOTE — PROGRESS NOTES
Comprehensive Nutrition Assessment    RECOMMENDATIONS:  PO Diet: Continue Regular, will add CCC-4  Nutrition Supplement: Will modify Ensure+ HP to Glucerna TID  Nutrition Education: No recommendation at this time     NUTRITION ASSESSMENT:   Nutritional summary & status: Positive screen for poor intake and wt loss. Presented to [facility] ED w/ worsening lower back pain; possibly r/t DDD w/ acute component per H&P. Transferred to [facility] for neurosurgical evaluation and MRI concerning for multilevel severe foraminal stenosis per [facility] H&P. Per wt hx in EMR, pt has had 10% wt loss over 7 months which is just below clinical significance. Mild body fat and muscle mass loss noted upon visual observation; meets criteria for moderate malnutrition. Pt very poor historian so unable to provide much info. Was able to report not eating well pta however also states his appetite has been pretty good. 2 meals in documented PO intake per EMR: both at >50% consumed. RD encouraged ONS intake; will modify to Glucerna for better blood glucose control and will add CCC-4 to diet order r/t hyperglycemia and T2DM. Will continue to monitor PO intake as well as any other changes to nutritional status.   Admission // PMH: Lumbar foraminal stenosis // HTN, HLD, a-fib, CAD, chronic combined CHF, ESRD on HD, T2DM, prostate cancer, renal cancer s/p nephrectomy    MALNUTRITION ASSESSMENT  Context of Malnutrition: Chronic Illness   Malnutrition Status: Moderate malnutrition  Findings of the 6 clinical characteristics of malnutrition (Minimum of 2 out of 6 clinical characteristics is required to make the diagnosis of moderate or severe Protein Calorie Malnutrition based on AND/ASPEN Guidelines):  Energy Intake:  Mild decrease in energy intake  Weight Loss:  Mild weight loss (10% over 7 months)     Body Fat Loss:  Mild body fat loss Buccal region   Muscle Mass Loss:  Mild muscle mass loss Clavicles (pectoralis & deltoids)    NUTRITION DIAGNOSIS

## 2025-03-24 NOTE — CONSULTS
Ph: [phone], Fax: [phone]                                     [facility]                                                   [address] [place], [place] [id number]           Reason for admission:                 Brief Summary:     Danny Rock is being seen by nephrology for ESRD.     Interval History and Plan:   Patient seen at bedside  Comfortable  /77  On room air  K 4.7    Bicarbonate 27          HD tomorrow on TTS schedule unless any urgency in the meantime. No urgency today  Fluid restriction and follow Na trend.   Monitor Hb  Follow BP   Dose medications according to GFR          Thank you for allowing us to participate in this patient's care  In case of any question please call us at our 24 hour answering service [phone] or from 7 AM to 5 PM via [employer], [employer], Epic chat or cell phone.   Dr Hannah Rodriguez MD      Assessment:     ESRD  On HD TTS  Access: TDC  Volume status: Euvolemic        Hypertension      Anemia        HPI      Patient is a 75 y.o. male  with PMH significant forcoronary artery disease, chronic systolic and diastolic CHF, ESRD on dialysis, renal cell carcinoma s/p nephrectomy, essential hypertension, hyperlipidemia, paroxysmal A-fib on Eliquis transferred form [facility] for evaluation of back back/lumbar spine stenosis. Nephrology consulted to assist in HD. Reviewed chart and patient had dialysis yesterday.       Patient seen at bedside and appear comfortable on room air and denied SOB, pain around TDC or fever.         ROS:   Negative except as mentioned in HPI      Vitals:     Vitals:    03/24/25 0815   BP: 123/77   Pulse: 75   Resp: 18   Temp: 98.5 °F (36.9 °C)   SpO2: 96%         Physical Examination:     General appearance: NAD. Alert, oriented  Respiratory: No respiratory distress on room air.   Cardiovascular: Edema no  Abdomen: Soft.   Other relevant findings:       Medications:       insulin glargine  7  Units SubCUTAneous Nightly    vancomycin (VANCOCIN) intermittent dosing (placeholder)   Other RX Placeholder    [Held by provider] apixaban  5 mg Oral BID    [Held by provider] aspirin  81 mg Oral Daily    vitamin B-12  1,000 mcg Oral Daily    metoprolol tartrate  50 mg Oral BID    pantoprazole  40 mg Oral QAM AC    rosuvastatin  10 mg Oral Nightly    sevelamer  800 mg Oral TID WC    sodium chloride flush  5-40 mL IntraVENous 2 times per day    enoxaparin  30 mg SubCUTAneous Nightly    insulin lispro  0-8 Units SubCUTAneous 4x Daily AC & HS    methocarbamol  750 mg Oral 4x Daily         Labs:     Lab Results   Component Value Date    CREATININE 2.5 (H) 03/24/2025    BUN 56 (H) 03/24/2025     (L) 03/24/2025    K 4.7 03/24/2025    CL 96 (L) 03/24/2025    CO2 27 03/24/2025    CALCIUM 9.2 03/24/2025    PHOS 3.3 02/04/2025     Lab Results   Component Value Date    WBC 9.4 03/22/2025    HGB 12.7 (L) 03/22/2025    HCT 37.2 (L) 03/22/2025    MCV 97.8 03/22/2025     03/22/2025         Past Medical History:     Past Medical History:   Diagnosis Date    Atrial fibrillation (HCC)     CAD (coronary artery disease)     Cancer of kidney (HCC)     Immunotherapy: just finished treatment 11-22-23    Dependence on renal dialysis     dialysis Tues, Thurs Sat    Diabetes mellitus (HCC)     Hyperlipidemia     Hypertension     Prostate cancer (HCC)     radiation    Right renal mass     dialysis    Systolic CHF (HCC)        Past Surgical History:     Past Surgical History:   Procedure Laterality Date    CATARACT REMOVAL Bilateral     ~ 2006 with lens implanted    CHOLECYSTECTOMY      CT GUIDED CHEST TUBE  11/18/2024    CT GUIDED CHEST TUBE 11/18/2024 [facility] CT SCAN    CT NEEDLE BIOPSY LIVER PERCUTANEOUS  08/01/2024    CT NEEDLE BIOPSY LIVER PERCUTANEOUS 8/1/2024 [facility] CT SCAN    CT NEEDLE BIOPSY LIVER PERCUTANEOUS  11/18/2024    CT NEEDLE BIOPSY LIVER PERCUTANEOUS 11/18/2024 [facility] CT SCAN    EYE SURGERY Bilateral 04/19/2018

## 2025-03-24 NOTE — PROGRESS NOTES
Department of Pharmacy    Notification received from laboratory of positive blood culture results.    Organism(s) detected: E faecalis  Applicable Antimicrobial Resistance Genes: none detected    Recommend continuing vancomycin until sensitivities result.    Please call with any questions.  Ene Maria, PharmD, BCPS  Main pharmacy: [id number]  3/24/2025 7:57 AM

## 2025-03-24 NOTE — CARE COORDINATION
03/24/25 1147   Readmission Assessment   Number of Days since last admission? 1-7 days  (transfer from [facility])   Previous Disposition Other (comment)  (transfer from [facility])   Who is being Interviewed Patient  (transfer from [facility])   What was the patient's/caregiver's perception as to why they think they needed to return back to the hospital? Other (Comment)  (transfer from [facility])   Did you visit your Primary Care Physician after you left the hospital, before you returned this time? No  (transfer from [facility])   Why weren't you able to visit your PCP? Other (Comment)  (transfer from [facility])   Did you see a specialist, such as Cardiac, Pulmonary, Orthopedic Physician, etc. after you left the hospital? No  (transfer from [facility])   Who advised the patient to return to the hospital? Other (Comment)  (transfer from [facility])   Does the patient report anything that got in the way of taking their medications? No  (transfer from [facility])   In our efforts to provide the best possible care to you and others like you, can you think of anything that we could have done to help you after you left the hospital the first time, so that you might not have needed to return so soon? Other (Comment)  (transfer from [facility])       Case Management Assessment  Initial Evaluation    Date/Time of Evaluation: 3/24/2025 11:47 AM  Assessment Completed by: ALEXI ÁLVAREZ    If patient is discharged prior to next notation, then this note serves as note for discharge by case management.    Patient Name: Danny Rock                   YOB: 1950  Diagnosis: Neuroforaminal stenosis of lumbar spine [M48.061]                   Date / Time: 3/22/2025  6:51 PM    Patient Admission Status: Inpatient   Readmission Risk (Low < 19, Mod (19-27), High > 27): Readmission Risk Score: 24.3    Current PCP: Yesi Rosa APRN - CNP  PCP verified by CM?  supports the patient's individualized plan of care/goals and shares the quality data associated with the providers was provided to: Patient   Patient Representative Name:       The Patient and/or Patient Representative Agree with the Discharge Plan? Yes    ALEXI ÁLVAREZ  Case Management Department  Ph: [phone] Fax:

## 2025-03-24 NOTE — PLAN OF CARE
Problem: Chronic Conditions and Co-morbidities  Goal: Patient's chronic conditions and co-morbidity symptoms are monitored and maintained or improved  Outcome: Progressing     Problem: Discharge Planning  Goal: Discharge to home or other facility with appropriate resources  Outcome: Progressing     Problem: Skin/Tissue Integrity  Goal: Skin integrity remains intact  Description: 1.  Monitor for areas of redness and/or skin breakdown  2.  Assess vascular access sites hourly  3.  Every 4-6 hours minimum:  Change oxygen saturation probe site  4.  Every 4-6 hours:  If on nasal continuous positive airway pressure, respiratory therapy assess nares and determine need for appliance change or resting period  Outcome: Progressing  Flowsheets (Taken 3/23/2025 2235 by Barby Yeboah, RN)  Skin Integrity Remains Intact: Monitor for areas of redness and/or skin breakdown     Problem: Safety - Adult  Goal: Free from fall injury  Outcome: Progressing  Flowsheets (Taken 3/23/2025 2235 by Barby Yeboah, RN)  Free From Fall Injury: Instruct family/caregiver on patient safety     Problem: Pain  Goal: Verbalizes/displays adequate comfort level or baseline comfort level  Outcome: Progressing     Problem: ABCDS Injury Assessment  Goal: Absence of physical injury  Outcome: Progressing

## 2025-03-24 NOTE — PROGRESS NOTES
ligamentous hypertrophy. Severe central canal narrowing at L3-L4. 4. Spondylolysis at L5 with grade 1 spondylolisthesis of L5 on S1. 5. Multifactorial multilevel foraminal narrowing with severe foraminal narrowing at L3-L4, L4-L5, and at L5-S1. Electronically signed by Trent Arellano MD    MRI THORACIC SPINE W WO CONTRAST  Result Date: 3/23/2025  1. No canal stenosis or significant foraminal narrowing. No suspicious enhancement or evidence of infection. 2. Diffuse idiopathic skeletal hyperostosis. 3. Mild kyphosis and mild degenerative disc disease. Electronically signed by Trent Arellano MD    MRI CERVICAL SPINE W WO CONTRAST  Result Date: 3/23/2025  1. Multilevel degenerative disc disease as described. Mild central canal narrowing at C3-C4. 2. Multifactorial multilevel foraminal narrowing as described in the lumbar level description. 3. Incidental muscular lipoma along the right side of the neck. 4. No acute marrow edema or suspicious enhancement to suggest infection. Electronically signed by Trent Arellano MD      Assessment   This is a 75 y.o. y.o. male with back pain in the setting of elevated inflammatory markers and bacteremia. MRI lumbar shows discitis and osteomyelitis at L2-L3, L3-L4, and at L4-L5.   No concern for cauda equina at this time. MRI C/T/L w wo contrast was completed and no signs of infection in thoracic or cervical spine. no surgical intervention needed..    Plan:  Neurologic exam frequency:q4  Mobility:PT/OT eval  ID following for abx  DVT Prophylaxis: SCDs and lovenox  Robaxin and oxy appear to be helping with pain  No neurosurgical intervention needed at this time.  Dispo Planning:we will sign off at this time. Call with any questions.    Patient was discussed with Dr. Mcwilliams who agrees with above assessment and plan.     Electronically signed by: JANET Walker - CNP, 3/24/2025 8:32 AM   Neurosurgery Nurse Practitioner  [phone]   I spent 35 minutes in the care of this  patient.  Over 50% of that time was in face-to-face counseling regarding disease process, diagnostic testing, preventative measures, and answering patient and family questions.

## 2025-03-24 NOTE — PROGRESS NOTES
VSS, afebrile. Alert and oriented with intermittent confusion. PRN pain medication given with relief noted. No acute events overnight. All fall & safety precautions in place. Call light within reach. Continue current plan of care.

## 2025-03-25 LAB
ANION GAP SERPL CALCULATED.3IONS-SCNC: 10 MMOL/L (ref 3–16)
BASOPHILS # BLD: 0.1 K/UL (ref 0–0.2)
BASOPHILS NFR BLD: 0.7 %
BUN SERPL-MCNC: 66 MG/DL (ref 7–20)
CALCIUM SERPL-MCNC: 9 MG/DL (ref 8.3–10.6)
CHLORIDE SERPL-SCNC: 96 MMOL/L (ref 99–110)
CO2 SERPL-SCNC: 25 MMOL/L (ref 21–32)
CREAT SERPL-MCNC: 2.6 MG/DL (ref 0.8–1.3)
DEPRECATED RDW RBC AUTO: 20 % (ref 12.4–15.4)
EOSINOPHIL # BLD: 0.1 K/UL (ref 0–0.6)
EOSINOPHIL NFR BLD: 0.9 %
GFR SERPLBLD CREATININE-BSD FMLA CKD-EPI: 25 ML/MIN/{1.73_M2}
GLUCOSE BLD-MCNC: 101 MG/DL (ref 70–99)
GLUCOSE BLD-MCNC: 192 MG/DL (ref 70–99)
GLUCOSE BLD-MCNC: 197 MG/DL (ref 70–99)
GLUCOSE BLD-MCNC: 74 MG/DL (ref 70–99)
GLUCOSE SERPL-MCNC: 118 MG/DL (ref 70–99)
HCT VFR BLD AUTO: 31.2 % (ref 40.5–52.5)
HGB BLD-MCNC: 10.4 G/DL (ref 13.5–17.5)
LYMPHOCYTES # BLD: 0.6 K/UL (ref 1–5.1)
LYMPHOCYTES NFR BLD: 7.1 %
MCH RBC QN AUTO: 33.5 PG (ref 26–34)
MCHC RBC AUTO-ENTMCNC: 33.2 G/DL (ref 31–36)
MCV RBC AUTO: 100.8 FL (ref 80–100)
MONOCYTES # BLD: 0.2 K/UL (ref 0–1.3)
MONOCYTES NFR BLD: 2 %
NEUTROPHILS # BLD: 7.7 K/UL (ref 1.7–7.7)
NEUTROPHILS NFR BLD: 89.3 %
PERFORMED ON: ABNORMAL
PERFORMED ON: NORMAL
PLATELET # BLD AUTO: 145 K/UL (ref 135–450)
PMV BLD AUTO: 7.4 FL (ref 5–10.5)
POTASSIUM SERPL-SCNC: 4.4 MMOL/L (ref 3.5–5.1)
RBC # BLD AUTO: 3.09 M/UL (ref 4.2–5.9)
SODIUM SERPL-SCNC: 131 MMOL/L (ref 136–145)
VANCOMYCIN SERPL-MCNC: 14.6 UG/ML
WBC # BLD AUTO: 8.6 K/UL (ref 4–11)

## 2025-03-25 PROCEDURE — 97110 THERAPEUTIC EXERCISES: CPT

## 2025-03-25 PROCEDURE — 85025 COMPLETE CBC W/AUTO DIFF WBC: CPT

## 2025-03-25 PROCEDURE — 97530 THERAPEUTIC ACTIVITIES: CPT

## 2025-03-25 PROCEDURE — 2580000003 HC RX 258

## 2025-03-25 PROCEDURE — 97535 SELF CARE MNGMENT TRAINING: CPT

## 2025-03-25 PROCEDURE — 80202 ASSAY OF VANCOMYCIN: CPT

## 2025-03-25 PROCEDURE — 6370000000 HC RX 637 (ALT 250 FOR IP): Performed by: INTERNAL MEDICINE

## 2025-03-25 PROCEDURE — 99233 SBSQ HOSP IP/OBS HIGH 50: CPT | Performed by: INTERNAL MEDICINE

## 2025-03-25 PROCEDURE — 90935 HEMODIALYSIS ONE EVALUATION: CPT

## 2025-03-25 PROCEDURE — 97116 GAIT TRAINING THERAPY: CPT

## 2025-03-25 PROCEDURE — 2500000003 HC RX 250 WO HCPCS: Performed by: INTERNAL MEDICINE

## 2025-03-25 PROCEDURE — 5A1D70Z PERFORMANCE OF URINARY FILTRATION, INTERMITTENT, LESS THAN 6 HOURS PER DAY: ICD-10-PCS | Performed by: STUDENT IN AN ORGANIZED HEALTH CARE EDUCATION/TRAINING PROGRAM

## 2025-03-25 PROCEDURE — 80048 BASIC METABOLIC PNL TOTAL CA: CPT

## 2025-03-25 PROCEDURE — 1200000000 HC SEMI PRIVATE

## 2025-03-25 PROCEDURE — 36415 COLL VENOUS BLD VENIPUNCTURE: CPT

## 2025-03-25 PROCEDURE — 6370000000 HC RX 637 (ALT 250 FOR IP)

## 2025-03-25 PROCEDURE — 6360000002 HC RX W HCPCS

## 2025-03-25 RX ORDER — VANCOMYCIN HYDROCHLORIDE 750 MG/15ML
INJECTION, POWDER, LYOPHILIZED, FOR SOLUTION INTRAVENOUS
Status: DISPENSED
Start: 2025-03-25 | End: 2025-03-25

## 2025-03-25 RX ORDER — HEPARIN SODIUM 1000 [USP'U]/ML
3600 INJECTION, SOLUTION INTRAVENOUS; SUBCUTANEOUS PRN
Status: DISCONTINUED | OUTPATIENT
Start: 2025-03-25 | End: 2025-03-30 | Stop reason: HOSPADM

## 2025-03-25 RX ORDER — HEPARIN SODIUM 5000 [USP'U]/ML
5000 INJECTION, SOLUTION INTRAVENOUS; SUBCUTANEOUS EVERY 8 HOURS SCHEDULED
Status: DISCONTINUED | OUTPATIENT
Start: 2025-03-25 | End: 2025-03-25

## 2025-03-25 RX ADMIN — ASPIRIN 81 MG: 81 TABLET, CHEWABLE ORAL at 13:01

## 2025-03-25 RX ADMIN — SODIUM CHLORIDE, PRESERVATIVE FREE 10 ML: 5 INJECTION INTRAVENOUS at 20:29

## 2025-03-25 RX ADMIN — SODIUM CHLORIDE, PRESERVATIVE FREE 10 ML: 5 INJECTION INTRAVENOUS at 08:23

## 2025-03-25 RX ADMIN — SEVELAMER CARBONATE 800 MG: 800 TABLET, FILM COATED ORAL at 13:01

## 2025-03-25 RX ADMIN — Medication 3 MG: at 20:29

## 2025-03-25 RX ADMIN — INSULIN LISPRO 1 UNITS: 100 INJECTION, SOLUTION INTRAVENOUS; SUBCUTANEOUS at 17:12

## 2025-03-25 RX ADMIN — METHOCARBAMOL 750 MG: 750 TABLET ORAL at 20:29

## 2025-03-25 RX ADMIN — SEVELAMER CARBONATE 800 MG: 800 TABLET, FILM COATED ORAL at 17:12

## 2025-03-25 RX ADMIN — METOPROLOL TARTRATE 50 MG: 50 TABLET, FILM COATED ORAL at 20:29

## 2025-03-25 RX ADMIN — PANTOPRAZOLE SODIUM 40 MG: 40 TABLET, DELAYED RELEASE ORAL at 05:57

## 2025-03-25 RX ADMIN — METHOCARBAMOL 750 MG: 750 TABLET ORAL at 13:01

## 2025-03-25 RX ADMIN — SEVELAMER CARBONATE 800 MG: 800 TABLET, FILM COATED ORAL at 08:19

## 2025-03-25 RX ADMIN — METHOCARBAMOL 750 MG: 750 TABLET ORAL at 08:19

## 2025-03-25 RX ADMIN — METHOCARBAMOL 750 MG: 750 TABLET ORAL at 17:12

## 2025-03-25 RX ADMIN — INSULIN LISPRO 2 UNITS: 100 INJECTION, SOLUTION INTRAVENOUS; SUBCUTANEOUS at 20:32

## 2025-03-25 RX ADMIN — SODIUM CHLORIDE 1500 MG: 0.9 INJECTION, SOLUTION INTRAVENOUS at 11:19

## 2025-03-25 RX ADMIN — APIXABAN 5 MG: 5 TABLET, FILM COATED ORAL at 13:01

## 2025-03-25 RX ADMIN — CYANOCOBALAMIN TAB 1000 MCG 1000 MCG: 1000 TAB at 08:19

## 2025-03-25 RX ADMIN — ROSUVASTATIN CALCIUM 10 MG: 10 TABLET, FILM COATED ORAL at 20:29

## 2025-03-25 NOTE — PROGRESS NOTES
Pharmacist Review and Automatic Dose Adjustment of Prophylactic Enoxaparin    The reviewing pharmacist has made an adjustment to the ordered enoxaparin dose or converted to UFH per the approved [facility] protocol and table as identified below.      Plan: Based upon the patient's weight and renal function, the ordered enoxaparin dose of 30 mg nightly has been  converted to heparin 5000 units q8h.  (Pt on hemodialysis)    Please call with any questions    Pretty Arcos Pharm.D. [facility]  [phone] (Main Pharmacy)    Danny Rock is a 75 y.o. male.     Recent Labs     03/23/25  0718 03/24/25  0635 03/25/25  0512   CREATININE 2.1* 2.5* 2.6*       Estimated Creatinine Clearance: 26 mL/min (A) (based on SCr of 2.6 mg/dL (H)).    No results for input(s): \"HGB\", \"HCT\", \"PLT\" in the last 72 hours.  No results for input(s): \"INR\" in the last 72 hours.    Height:   Ht Readings from Last 1 Encounters:   03/22/25 1.905 m (6' 3\")     Weight:  Wt Readings from Last 1 Encounters:   03/22/25 74.8 kg (165 lb)       Plan: Based upon the patient's weight and renal function, the ordered enoxaparin dose of 30 mg nightly has been  converted to heparin 5000 units q8h.    Please call with any questions    Pretty Arcos Pharm.D. [facility]  [phone] (Main Pharmacy)

## 2025-03-25 NOTE — PROGRESS NOTES
ID Follow-up NOTE    CC:   E faecalis bacteremia, Lumbar osteo-diskitis   Antibiotics: Vancomycin    Admit Date: 3/22/2025  Hospital Day: 4    Subjective:     Poor historian   Patient has LBP, 'aches', no radiation      Objective:     Patient Vitals for the past 8 hrs:   BP Temp Temp src Pulse Resp SpO2   03/25/25 0800 118/75 98.6 °F (37 °C) Oral 72 16 90 %   03/25/25 0510 121/69 98.1 °F (36.7 °C) Oral 79 16 98 %     I/O last 3 completed shifts:  In: 480 [P.O.:480]  Out: 925 [Urine:925]  I/O this shift:  In: -   Out: 200 [Urine:200]    EXAM:  GENERAL: No apparent distress.  RA  HEENT: Membranes moist, no oral lesion  NECK:  Supple, no lymphadenopathy  LUNGS: Clear b/l, no rales, no dullness  CARDIAC: RRR, no murmur appreciated  ABD:  + BS, soft / NT  EXT:  No rash, no edema, no lesions  NEURO: No focal neurologic findings YET limited exam  PSYCH: Orientation, sensorium, mood normal  LINES:  R chest tunnel HD line, no redness / drainage at exit site   Peripheral iv       Data Review:  Lab Results   Component Value Date    WBC 8.6 03/25/2025    HGB 10.4 (L) 03/25/2025    HCT 31.2 (L) 03/25/2025    .8 (H) 03/25/2025     03/25/2025     Lab Results   Component Value Date    CREATININE 2.6 (H) 03/25/2025    BUN 66 (H) 03/25/2025     (L) 03/25/2025    K 4.4 03/25/2025    CL 96 (L) 03/25/2025    CO2 25 03/25/2025       Hepatic Function Panel:   Lab Results   Component Value Date/Time    ALKPHOS 112 03/22/2025 06:28 AM    ALT 26 03/22/2025 06:28 AM    AST 28 03/22/2025 06:28 AM    BILITOT 0.6 03/22/2025 06:28 AM    BILIDIR <0.2 03/16/2021 09:41 AM    IBILI see below 03/16/2021 09:41 AM       ESR 33,     MICRO:  3/22 BC x 1 E faecalis, S epidermidis  Enterococcus faecalis   Antibiotic Interpretation VALERIE    ampicillin Sensitive <=2 mcg/mL   gentamicin-syn Resistant SYN-R mcg/mL   streptomycin-syn Sensitive SYN-S mcg/mL   vancomycin Sensitive 1 mcg/mL     3/23 BC x 2 E faecalis   3/24 BC x 2  (rDNA), dextrose, oxyCODONE-acetaminophen **OR** oxyCODONE-acetaminophen      Assessment:     HTN, DM, pAF, CRF on HD, hx prostate ca, LBP  Allergy - rase w Amox, has had cephalosporins (Ancef, Ceftriaxone)    E faecalis bacteremia 3/21,22,23   - f/u BC 3/24 [facility]   - r/o endocarditis     Multilevel lumbar osteo-diskitis seen on MRI  - assoc with epidural phlegmon posterior L3/4 on MRI reading  - assoc with E faecalis bacteremia  - seen by Neurosurg who recommended no neurosurg intervention, signed off    Lower abd wall fluid collection on CT     Plan:     Cont Vancomycin - will get 1.5 gm at HD today  Add Gentamicin - 1 mg/kg after HD  Remove HD line - will order  Will f/u on BC - 3/24 positive, asked for BC to be sent at HD (prior to Vanco)  Will review CT / MRI w Radiologist   Will need DELMAR      Medical Decision Making:  The following items were considered in medical decision making:  Discussion of patient care with other providers  Reviewed clinical lab tests  Reviewed radiology tests  Reviewed other diagnostic tests/interventions  Independent review of radiologic images - will review imaging w Radiologist   Microbiology cultures and other micro tests reviewed      Discussed with pt, HD RN    Arvind Lee MD

## 2025-03-25 NOTE — PROGRESS NOTES
Eliquis and Aspirin held by provider after meds were scanned in, pt did not receive but it won't let me edit administration. These meds were held.

## 2025-03-25 NOTE — PROGRESS NOTES
VSS, afebrile. Alert and oriented to self and knows he has back pain, but unsure of where he is at occasionally.  Confusion wax/wanes. Purewick in place. No acute events overnight. All fall & safety precautions in place. Call light within reach. Continue current plan of care.

## 2025-03-25 NOTE — CARE COORDINATION
DISCHARGE PLANNIN  Chart reviewed.  Patient is from home with his spouse and daughter. He was active with [facility] but was discharged on 3/7. He receives HD via [facility] in [facility] on TTS (chair time is 8am for 4 hours).     I met with patients spouse yesterday and we discussed discharge plan. Referrals were sent to the following in top preference order:  1) [facility] - I called Sonu this morning ([phone]) who stated he would review patient today  2) [facility] (while doing HD at his home clinic per family request) -- I called Andreia ([phone]) who stated she was coming to the hospital to talk with patient and review update: able to accept    UPDATE: [id number]  I spoke with Sonu at [facility] who stated they are able to accept patient at discharge. They will just need a plan from ID and medical clearance.  I called patients spouse, Amanda ([phone]) and updated her on the above.    CM team will continue to follow.  Ana Grider, RN Case Manager  [phone]

## 2025-03-25 NOTE — PLAN OF CARE
Problem: Skin/Tissue Integrity  Goal: Skin integrity remains intact  Description: 1.  Monitor for areas of redness and/or skin breakdown  2.  Assess vascular access sites hourly  3.  Every 4-6 hours minimum:  Change oxygen saturation probe site  4.  Every 4-6 hours:  If on nasal continuous positive airway pressure, respiratory therapy assess nares and determine need for appliance change or resting period  3/25/2025 0733 by Barby Samano, RN  Outcome: Progressing  Pt has some redness to bottom and bilateral heels, will prevent pressure with turns and pillow support.      Problem: Safety - Adult  Goal: Free from fall injury  3/25/2025 0733 by Barby Samano, RN  Outcome: Progressing  All fall precautions in place. Bed locked and in lowest position with alarm on. Overbed table and personal belonings within reach. Call light within reach and patient instructed to use call light for assistance. Non-skid socks on.      Problem: Pain  Goal: Verbalizes/displays adequate comfort level or baseline comfort level  3/25/2025 0733 by Barby Samano, RN  Outcome: Progressing   Pt denies pain at this time, pain is being managed with PRN meds per MAR.

## 2025-03-25 NOTE — PROGRESS NOTES
Physical Therapy    Daily Treatment Note    Discharge Recommendations:  Subacute/Skilled Nursing Facility  Equipment Needs:  Defer to next level of care    Assessment:  Pt progressing gait distance today with chair follow for safety.  Ongoing limitations with pain and overall cognition.  Pt is at high risk for falls and would benefit from ongoing skilled PT to increase strength and maximize mobility before returning home.      Chart Reviewed: Yes   Other Position/Activity Restrictions: up as tolerated; full code.   Additional Pertinent Hx: 75 y.o. y.o. male with history of CAD, CHF, ESRD on HD, RCC s/p nephrectomy, HTN, HLD, afib on eliquis who presented to [facility] with extreme low back pain.      Diagnosis: Lumbar foraminal stenosis   Treatment Diagnosis: Decreased functional mobility      Subjective:  Pt found supine in bed.  Pt confused and disoriented, needing encouragement for activity.  \"I don't want to get the girls in trouble.\"      Pain:  Back pain, not rated, RN made aware.      Objective:    Bed mobility  Supine to sit:  Max A (HOB raised, verbal cues to reach for rail, log roll)    Transfers  Sit to stand:  Max A (from elevated EOB to walker, from chair heights to walker)  Stand to sit:  Max A (decreased control to sit)  Bed <> chair:  Mod A (stand step pivot with rolling walker)    Ambulation  Assistance Level:  Mod A   Assistive device:  Rolling walker  Distance:  5 steps + 8ft   Quality of gait:  B knee flexion, shuffled steps, cues for upright posture, unsteady  Other:  Chair follow    Exercises  Ankle Pumps x10  LAQ x10  Marches x5     Neuro/balance  Sitting balance:  Min A initially at EOB due to posterior lean, progressing to SBA with better positioning  Static standing:  Min A x1 at walker  Dynamic standing:  Mod A x1 with rolling walker for transfers/gait      Patient Education  Role of PT and d/c recommendations.  Pt needs ongoing education due to cognition.    Safety Devices  Pt left  with needs in reach, seated in chair with LEs elevated for comfort, alarm in place and RN aware.           AM-PAC score  AM-PAC Inpatient Mobility Raw Score : 11  AM-PAC Inpatient T-Scale Score : 33.86  Mobility Inpatient CMS 0-100% Score: 72.57  Mobility Inpatient CMS G-Code Modifier : CL    Goals:  Time Frame for Short Term Goals: d/c  Short Term Goal 1: sup<>sit supervision   Short Term Goal 2: sit<>stand supervision   Short Term Goal 3: amb 150' with RW and supervision          Plan:  2-5 times per week  Current Treatment Recommendations: Strengthening, Balance training, Functional mobility training, Gait training, Transfer training, Stair training, Endurance training, Safety education & training, Therapeutic activities      Therapy Time    Individual  Concurrent  Group  Co-treatment    Time In           1325   Time Out           1405   Minutes           40   Timed Code Treatment Minutes:  40  Total Treatment Minutes:  40    [x]co-treatment indicated; patient seen for co-treatment due to: patient safety, patient endurance, cognitive status, and need for the assistance of 2 skilled therapists.  PT addressing: [] Sitting balance/LE WB, [x] Standing balance/LE WB, [x]Transfer training, [x] BLE strength/endurance with functional tasks,  [x] Other:  gait training          If patient is discharged prior to next treatment, this note will serve as the discharge summary.    Lesia Batres, PT

## 2025-03-25 NOTE — PROGRESS NOTES
Pt is A/Ox2-3, VSS on room air, and x1 gb/walker. Pt is voiding via external catheter, tolerating food and fluids, and pain is being managed with scheduled and PRN pain meds per MAR. All safety precautions in place, call light within reach, plan of care continues.

## 2025-03-25 NOTE — PROGRESS NOTES
Physical Therapy and Occupational Therapy  No Treatment    Attempted to see for PT/OT.  Currently off the floor at dialysis.  Will follow up per therapy plans of care.    Lesia Batres, PT #[id number]   Cheyenne Schumacher, MOT-OTR/L [id number]

## 2025-03-25 NOTE — PROGRESS NOTES
Hospital Medicine Progress Note      Date of Admission: 3/22/2025  Hospital Day: 4    Chief Admission Complaint: Low back pain     Subjective: Patient seen and examined at the dialysis unit  Continues to be intermittently confused  Pain in control  Blood culture from yesterday positive  BS in lower range      Presenting Admission History:       75 y.o. male with PMHx significant for coronary artery disease, chronic systolic and diastolic CHF, ESRD on dialysis, renal cell carcinoma s/p nephrectomy, essential hypertension, hyperlipidemia, paroxysmal A-fib on Eliquis who presented to [facility] initially with a complaint of back pain which started a few days and was associated with inability to move lower extremities secondary to his pain.  Patient denies any numbness or tingling, no bowel disturbance but does report some difficulty with urination. No saddle anesthesia. MRI spine concerning for multilevel severe foraminal stenosis especially with impingement of the bilateral exiting L5 nerve root with high-grade left foraminal narrowing at L5-S1. Patient was discussed with neurosurgery at the [facility] who recommended transfer to the [facility] for further evaluation.     Assessment/Plan:        Bacteremia with Enterococcus faecalis   Multilevel lumbar discitis and osteomyelitis  MRI of the lumbar, thoracic and cervical spine concerning for multilevel abnormal disc signal with disc enhancement and endplate enhancement compatible with discitis and osteomyelitis at L2-L3, L3-L4 and L4-L5.  Adjacent paravertebral edema and enhancement.  Equivocal small abscess in the right psoas muscle.  1 bottle of blood culture done at outside hospital prior to transfer on 3/22 positive for Enterococcus faecalis and Staph epidermidis.    Repeat blood culture 3/23, 3/24 2 out of 2 positive for Enterococcus faecalis  Will obtain repeat blood culture on 3/2 26  Had a history of right shoulder abscess in 1/2025 s/p I&D on

## 2025-03-25 NOTE — PLAN OF CARE
Problem: Chronic Conditions and Co-morbidities  Goal: Patient's chronic conditions and co-morbidity symptoms are monitored and maintained or improved  3/25/2025 0042 by Barby Yeboah RN  Outcome: Progressing     Problem: Discharge Planning  Goal: Discharge to home or other facility with appropriate resources  3/25/2025 0042 by Barby Yeboah RN  Outcome: Progressing     Problem: Skin/Tissue Integrity  Goal: Skin integrity remains intact  Description: 1.  Monitor for areas of redness and/or skin breakdown  2.  Assess vascular access sites hourly  3.  Every 4-6 hours minimum:  Change oxygen saturation probe site  4.  Every 4-6 hours:  If on nasal continuous positive airway pressure, respiratory therapy assess nares and determine need for appliance change or resting period  3/25/2025 0042 by Barby Yeboah RN  Outcome: Progressing  Flowsheets (Taken 3/25/2025 0040)  Skin Integrity Remains Intact: Monitor for areas of redness and/or skin breakdown  Note: Skin assessed this shift. Maria E care completed as necessary. Patient alternating positions to reduce pressure and prevent skin injury q2 and as needed.     3/24/2025 1424 by Savanna Hutton RN  Outcome: Progressing     Problem: Safety - Adult  Goal: Free from fall injury  3/25/2025 0042 by Barby Yeboah RN  Outcome: Progressing  Flowsheets (Taken 3/25/2025 0040)  Free From Fall Injury: Instruct family/caregiver on patient safety  Note: All fall precautions in place. Bed locked and in lowest position with alarm on. Overbed table and personal belonings within reach. Call light within reach and patient instructed to use call light for assistance. Non-skid socks on.       Problem: Pain  Goal: Verbalizes/displays adequate comfort level or baseline comfort level  3/25/2025 0042 by Barby Yeboah RN  Outcome: Progressing  Note: No c/o pain at this time, will continue to monitor and give interventions as indicated       Problem: ABCDS Injury Assessment  Goal:  Absence of physical injury  3/25/2025 0042 by Barby Yeboah, RN  Outcome: Progressing  Flowsheets (Taken 3/25/2025 0040)  Absence of Physical Injury: Implement safety measures based on patient assessment     Problem: Nutrition Deficit:  Goal: Optimize nutritional status  3/25/2025 0042 by Barby Yeboah, RN  Outcome: Progressing

## 2025-03-25 NOTE — PROGRESS NOTES
Treatment time: 3.5 hours  Net UF: 1000 ml     Pre weight: 68.2 kg  Post weight:67.2 kg  EDW: 73.7 kg    Access used: R TDC    Access function: well with  ml/min     Medications or blood products given: Vancomycin and heparin dwells     Regular outpatient schedule: [facility]      Summary of response to treatment: Patient tolerated treatment well and without any complications. Patient remained stable throughout entire treatment and upon the exiting the dialysis suite via transport.     Report given to Barby Samano RN and copy of dialysis treatment record placed in chart, to be scanned into EMR.

## 2025-03-25 NOTE — PROGRESS NOTES
Occupational Therapy  Facility/Department: [facility] 5T ORTHO/NEURO  Daily Treatment Note  NAME: Danny Rock  : 1950  MRN: [id number]    Date of Service: 3/25/2025    Discharge Recommendations:  Subacute/Skilled Nursing Facility  OT Equipment Recommendations  Equipment Needed: No  Other: defer      Patient Diagnosis(es): The primary encounter diagnosis was Enterococcal bacteremia. A diagnosis of Bacteremia was also pertinent to this visit.     Assessment   Assessment: Pt with improved participation this session, not requiring as much cueing as previous. Completed fxl mobiliyt at RW level with Mod/MaxA, LB ADL w/ DEP, and oral care seated in recliner w/ setup. Pt cont to function below baseline, and limited by back pain. Pt may benefit from cont'd skilled OT while inpt, and after acute d/c, prior to retn home, to maximize safety / IND / fxl act jenna needed for ADL and fxl mobility and to decrease burden of care. Will cont to follow per POC  Activity Tolerance: Patient tolerated treatment well;Patient limited by pain  Discharge Recommendations: Subacute/Skilled Nursing Facility  Equipment Needed: No  Other: defer     Plan  Occupational Therapy Plan  Times Per Week: 2-5  Times Per Day: Once a day  Current Treatment Recommendations: Balance training;Functional mobility training;Patient/Caregiver education & training;Safety education & training;Self-Care / ADL    Subjective  Subjective  Subjective: Pt semi-supine on OT / PT appraoch and agreed to tx.  Pain: No pain initially reported- pain response w/ transitional movement supine>sit, but able to continue.    Objective  ADL  Grooming: Setup  Grooming Skilled Clinical Factors: setup cleanup oral care, seated in recliner.  LE Dressing: Dependent/Total  LE Dressing Skilled Clinical Factors: don hosp pants  Toileting: Dependent/Total  Toileting Skilled Clinical Factors: jennifer  Functional Mobility Skilled Clinical Factors: supine>sit MaxA sit>stand MaxA   sitting

## 2025-03-25 NOTE — CONSULTS
[facility] -  Clinical Pharmacy Note    Vancomycin - Management by Pharmacy    Consult Date(s): 3/23  Consulting Provider(s): Dr Moreno    Assessment / Plan  E faecalis bacteremia, lumbar osteo-diskitis - Vancomycin  Concurrent Antimicrobials: none  Day of Vanc Therapy / Ordered Duration: Day 3/ indefinite  Current Dosing Method: Intermittent Dosing by Levels  Therapeutic Goal: Trough ~ 15 mg/L  Current Dose / Plan:   Patient with ESRD on HD (TTS)   Last HD session 3/22  Patient does still make urine;  documented UOP past 12h =1 ml/kg/hr  Will dose intermittently   Level today = 14.6 mg/L (after 1000 mg IV given yesterday on level of 12.5 mg/L)  HD scheduled today. Re-dose today with 1500 mg IV x1 post-HD   Repeat level tomorrow, as pt clearing vancomycin somewhat without HD  Will continue to monitor clinical condition and make adjustments to regimen as appropriate.    Thank you for consulting pharmacy,  Please call with any questions    [place].D. BCPS  [id number] (Main Pharmacy)        Interval update:  Blood cx +  E faecalis (Antonia/b not detected). Pt remains afebrile and HDS. Per ID, continue vancomycin. Echo unable to visualize valves clearly, considering DELMAR.     Subjective/Objective:   Danny Rock is a 75 y.o. male with a PMHx significant for CAD, CHF, ESRD on HD (TTS), renal cell carcinoma s/p nephrectomy, HTN, HLD, AF (Eliquis) who presented to [facility] with complaints of lower back pain and inability to move lower extremities 2/2 pain. MRI concerning for multilevel severe foraminal stenosis with high-grade narrowing at L5-S1 and was transferred to [facility]. Blood cx were positive x1 for staph epidermidis and e faecalis (van A/B genes not detected) (bottle 2 cancelled) and vancomycin was initiated.     Pharmacy is consulted to dose vancomycin.    Ht Readings from Last 1 Encounters:   03/22/25 1.905 m (6' 3\")     Wt Readings from Last 1 Encounters:   03/22/25 74.8 kg (165 lb)     Current &  Prior Antimicrobial Regimen(s):  Vancomycin - PTD  Intermittent  Date Vanc Level Vanc Dose HD   3/22 ---- ----- Yes 3h   3/23 - 1750 mg   no   3/24 12.5 mg/L 1000 mg   no   3/25 14.6 mg/L 1500 mg post HD scheduled     Cultures & Sensitivities:    Date Site Micro Susceptibility / Result   3/22 Blood x 1   Enterococcus faecalis   S: ampicillin, vancomycin (VALERIE=1)   3/23 Blood x 2 #1- GPC  #2 - E faecalis  --   Van A/B not detected   3/24 Blood x2 GPC resembling strep      Recent Labs     03/23/25  0718 03/24/25  0635 03/25/25  0512   CREATININE 2.1* 2.5* 2.6*   BUN 40* 56* 66*       Estimated Creatinine Clearance: 26 mL/min (A) (based on SCr of 2.6 mg/dL (H)).    Additional Lab Values / Findings of Note:    No results for input(s): \"PROCAL\" in the last 72 hours.

## 2025-03-25 NOTE — CONSULTS
Ph: [phone], Fax: [phone]                                     [facility]                                                   [address] [place], [place] [id number]           Reason for admission:                 Brief Summary:     Danny Rock is being seen by nephrology for ESRD.     Interval History and Plan:   Patient seen during HD.   Comfortable  /75  On room air  K 4.4  Na 131  Bicarbonate 25  Hb was 12.7          HD today on TTS schedule with UF towards his EDW  Fluid restriction and follow Na trend.   Monitor Hb  Follow BP   Dose medications according to GFR    D/W dialysis nurse          Thank you for allowing us to participate in this patient's care  In case of any question please call us at our 24 hour answering service [phone] or from 7 AM to 5 PM via [employer], [employer], Epic chat or cell phone.   Dr Hannah Rodriguez MD      Assessment:     ESRD  On HD TTS  Access: TDC  Volume status: Euvolemic        Hypertension      Anemia        HPI      Patient is a 75 y.o. male  with PMH significant forcoronary artery disease, chronic systolic and diastolic CHF, ESRD on dialysis, renal cell carcinoma s/p nephrectomy, essential hypertension, hyperlipidemia, paroxysmal A-fib on Eliquis transferred form [facility] for evaluation of back back/lumbar spine stenosis. Nephrology consulted to assist in HD. Reviewed chart and patient had dialysis yesterday.       Patient seen at bedside and appear comfortable on room air and denied SOB, pain around TDC or fever.         ROS:   Negative except as mentioned in HPI      Vitals:     Vitals:    03/25/25 0800   BP: 118/75   Pulse: 72   Resp: 16   Temp: 98.6 °F (37 °C)   SpO2: 90%         Physical Examination:     General appearance: NAD. Alert, oriented  Respiratory: No respiratory distress on room air. Clear  Cardiovascular: Edema no  Abdomen: Soft.   Other relevant findings:       Medications:       vancomycin

## 2025-03-26 ENCOUNTER — ANESTHESIA EVENT (OUTPATIENT)
Dept: INTERVENTIONAL RADIOLOGY/VASCULAR | Age: 75
End: 2025-03-26
Payer: MEDICARE

## 2025-03-26 ENCOUNTER — ANESTHESIA (OUTPATIENT)
Dept: INTERVENTIONAL RADIOLOGY/VASCULAR | Age: 75
End: 2025-03-26
Payer: MEDICARE

## 2025-03-26 ENCOUNTER — HOSPITAL ENCOUNTER (INPATIENT)
Dept: INTERVENTIONAL RADIOLOGY/VASCULAR | Age: 75
Discharge: HOME | DRG: 539 | End: 2025-03-28
Attending: INTERNAL MEDICINE
Payer: MEDICARE

## 2025-03-26 ENCOUNTER — HOSPITAL ENCOUNTER (INPATIENT)
Dept: INTERVENTIONAL RADIOLOGY/VASCULAR | Age: 75
Discharge: HOME | DRG: 539 | End: 2025-03-28
Payer: MEDICARE

## 2025-03-26 VITALS
HEART RATE: 62 BPM | RESPIRATION RATE: 17 BRPM | OXYGEN SATURATION: 100 % | SYSTOLIC BLOOD PRESSURE: 148 MMHG | DIASTOLIC BLOOD PRESSURE: 86 MMHG

## 2025-03-26 LAB
ANION GAP SERPL CALCULATED.3IONS-SCNC: 9 MMOL/L (ref 3–16)
BACTERIA BLD CULT ORG #2: ABNORMAL
BACTERIA BLD CULT: ABNORMAL
BASOPHILS # BLD: 0 K/UL (ref 0–0.2)
BASOPHILS NFR BLD: 0.3 %
BUN SERPL-MCNC: 40 MG/DL (ref 7–20)
CALCIUM SERPL-MCNC: 9.5 MG/DL (ref 8.3–10.6)
CHLORIDE SERPL-SCNC: 97 MMOL/L (ref 99–110)
CO2 SERPL-SCNC: 26 MMOL/L (ref 21–32)
CREAT SERPL-MCNC: 2 MG/DL (ref 0.8–1.3)
DEPRECATED RDW RBC AUTO: 20.1 % (ref 12.4–15.4)
ECHO AO ASC DIAM: 4.3 CM
ECHO AO ASCENDING AORTA INDEX: 2.23 CM/M2
ECHO AO ROOT DIAM: 4.4 CM
ECHO AO ROOT INDEX: 2.28 CM/M2
ECHO BSA: 1.99 M2
ECHO BSA: 1.99 M2
EOSINOPHIL # BLD: 0 K/UL (ref 0–0.6)
EOSINOPHIL NFR BLD: 0.3 %
GFR SERPLBLD CREATININE-BSD FMLA CKD-EPI: 34 ML/MIN/{1.73_M2}
GLUCOSE BLD-MCNC: 140 MG/DL (ref 70–99)
GLUCOSE BLD-MCNC: 164 MG/DL (ref 70–99)
GLUCOSE BLD-MCNC: 216 MG/DL (ref 70–99)
GLUCOSE BLD-MCNC: 95 MG/DL (ref 70–99)
GLUCOSE SERPL-MCNC: 209 MG/DL (ref 70–99)
HCT VFR BLD AUTO: 32.1 % (ref 40.5–52.5)
HGB BLD-MCNC: 10.7 G/DL (ref 13.5–17.5)
INR PPP: 1.31 (ref 0.85–1.15)
LYMPHOCYTES # BLD: 0.6 K/UL (ref 1–5.1)
LYMPHOCYTES NFR BLD: 6.3 %
MCH RBC QN AUTO: 33.7 PG (ref 26–34)
MCHC RBC AUTO-ENTMCNC: 33.3 G/DL (ref 31–36)
MCV RBC AUTO: 101.2 FL (ref 80–100)
MONOCYTES # BLD: 0.5 K/UL (ref 0–1.3)
MONOCYTES NFR BLD: 5 %
NEUTROPHILS # BLD: 8.1 K/UL (ref 1.7–7.7)
NEUTROPHILS NFR BLD: 88.1 %
ORGANISM: ABNORMAL
PERFORMED ON: ABNORMAL
PERFORMED ON: NORMAL
PLATELET # BLD AUTO: 144 K/UL (ref 135–450)
PMV BLD AUTO: 7.7 FL (ref 5–10.5)
POTASSIUM SERPL-SCNC: 4.4 MMOL/L (ref 3.5–5.1)
PROTHROMBIN TIME: 16.4 SEC (ref 11.9–14.9)
RBC # BLD AUTO: 3.17 M/UL (ref 4.2–5.9)
SODIUM SERPL-SCNC: 132 MMOL/L (ref 136–145)
VANCOMYCIN SERPL-MCNC: 13.8 UG/ML
WBC # BLD AUTO: 9.2 K/UL (ref 4–11)

## 2025-03-26 PROCEDURE — 2580000003 HC RX 258: Performed by: INTERNAL MEDICINE

## 2025-03-26 PROCEDURE — 87150 DNA/RNA AMPLIFIED PROBE: CPT

## 2025-03-26 PROCEDURE — 93320 DOPPLER ECHO COMPLETE: CPT

## 2025-03-26 PROCEDURE — 80202 ASSAY OF VANCOMYCIN: CPT

## 2025-03-26 PROCEDURE — 77001 FLUOROGUIDE FOR VEIN DEVICE: CPT

## 2025-03-26 PROCEDURE — 93312 ECHO TRANSESOPHAGEAL: CPT

## 2025-03-26 PROCEDURE — 7100000010 HC PHASE II RECOVERY - FIRST 15 MIN: Performed by: INTERNAL MEDICINE

## 2025-03-26 PROCEDURE — 6370000000 HC RX 637 (ALT 250 FOR IP): Performed by: INTERNAL MEDICINE

## 2025-03-26 PROCEDURE — 93325 DOPPLER ECHO COLOR FLOW MAPG: CPT | Performed by: INTERNAL MEDICINE

## 2025-03-26 PROCEDURE — 2500000003 HC RX 250 WO HCPCS: Performed by: INTERNAL MEDICINE

## 2025-03-26 PROCEDURE — 87040 BLOOD CULTURE FOR BACTERIA: CPT

## 2025-03-26 PROCEDURE — 93312 ECHO TRANSESOPHAGEAL: CPT | Performed by: INTERNAL MEDICINE

## 2025-03-26 PROCEDURE — 93320 DOPPLER ECHO COMPLETE: CPT | Performed by: INTERNAL MEDICINE

## 2025-03-26 PROCEDURE — 6360000002 HC RX W HCPCS: Performed by: STUDENT IN AN ORGANIZED HEALTH CARE EDUCATION/TRAINING PROGRAM

## 2025-03-26 PROCEDURE — 1200000000 HC SEMI PRIVATE

## 2025-03-26 PROCEDURE — 7100000011 HC PHASE II RECOVERY - ADDTL 15 MIN: Performed by: INTERNAL MEDICINE

## 2025-03-26 PROCEDURE — 6360000002 HC RX W HCPCS: Performed by: INTERNAL MEDICINE

## 2025-03-26 PROCEDURE — 6370000000 HC RX 637 (ALT 250 FOR IP)

## 2025-03-26 PROCEDURE — 87070 CULTURE OTHR SPECIMN AEROBIC: CPT

## 2025-03-26 PROCEDURE — 3700000001 HC ADD 15 MINUTES (ANESTHESIA): Performed by: INTERNAL MEDICINE

## 2025-03-26 PROCEDURE — 99232 SBSQ HOSP IP/OBS MODERATE 35: CPT | Performed by: INTERNAL MEDICINE

## 2025-03-26 PROCEDURE — 80048 BASIC METABOLIC PNL TOTAL CA: CPT

## 2025-03-26 PROCEDURE — 85025 COMPLETE CBC W/AUTO DIFF WBC: CPT

## 2025-03-26 PROCEDURE — 36415 COLL VENOUS BLD VENIPUNCTURE: CPT

## 2025-03-26 PROCEDURE — 36589 REMOVAL TUNNELED CV CATH: CPT

## 2025-03-26 PROCEDURE — 6360000002 HC RX W HCPCS

## 2025-03-26 PROCEDURE — 3700000000 HC ANESTHESIA ATTENDED CARE: Performed by: INTERNAL MEDICINE

## 2025-03-26 PROCEDURE — 85610 PROTHROMBIN TIME: CPT

## 2025-03-26 RX ORDER — PROPOFOL 10 MG/ML
INJECTION, EMULSION INTRAVENOUS
Status: DISCONTINUED | OUTPATIENT
Start: 2025-03-26 | End: 2025-03-26 | Stop reason: SDUPTHER

## 2025-03-26 RX ORDER — SODIUM CHLORIDE 9 MG/ML
INJECTION, SOLUTION INTRAVENOUS PRN
Status: CANCELLED | OUTPATIENT
Start: 2025-03-26

## 2025-03-26 RX ORDER — LIDOCAINE HYDROCHLORIDE 20 MG/ML
INJECTION, SOLUTION INTRAVENOUS
Status: DISCONTINUED | OUTPATIENT
Start: 2025-03-26 | End: 2025-03-26 | Stop reason: SDUPTHER

## 2025-03-26 RX ORDER — SODIUM CHLORIDE 0.9 % (FLUSH) 0.9 %
5-40 SYRINGE (ML) INJECTION PRN
Status: CANCELLED | OUTPATIENT
Start: 2025-03-26

## 2025-03-26 RX ORDER — SODIUM CHLORIDE 0.9 % (FLUSH) 0.9 %
5-40 SYRINGE (ML) INJECTION EVERY 12 HOURS SCHEDULED
Status: CANCELLED | OUTPATIENT
Start: 2025-03-26

## 2025-03-26 RX ORDER — LIDOCAINE HYDROCHLORIDE 10 MG/ML
INJECTION, SOLUTION EPIDURAL; INFILTRATION; INTRACAUDAL; PERINEURAL PRN
Status: COMPLETED | OUTPATIENT
Start: 2025-03-26 | End: 2025-03-26

## 2025-03-26 RX ADMIN — METHOCARBAMOL 750 MG: 750 TABLET ORAL at 22:05

## 2025-03-26 RX ADMIN — LIDOCAINE HYDROCHLORIDE 60 MG: 20 INJECTION, SOLUTION INTRAVENOUS at 13:41

## 2025-03-26 RX ADMIN — METOPROLOL TARTRATE 50 MG: 50 TABLET, FILM COATED ORAL at 09:11

## 2025-03-26 RX ADMIN — METHOCARBAMOL 750 MG: 750 TABLET ORAL at 17:38

## 2025-03-26 RX ADMIN — SEVELAMER CARBONATE 800 MG: 800 TABLET, FILM COATED ORAL at 11:49

## 2025-03-26 RX ADMIN — VANCOMYCIN HYDROCHLORIDE 1000 MG: 1 INJECTION, POWDER, LYOPHILIZED, FOR SOLUTION INTRAVENOUS at 11:49

## 2025-03-26 RX ADMIN — METHOCARBAMOL 750 MG: 750 TABLET ORAL at 09:12

## 2025-03-26 RX ADMIN — LIDOCAINE HYDROCHLORIDE 5 ML: 10 INJECTION, SOLUTION EPIDURAL; INFILTRATION; INTRACAUDAL; PERINEURAL at 08:08

## 2025-03-26 RX ADMIN — PROPOFOL 50 MG: 10 INJECTION, EMULSION INTRAVENOUS at 13:41

## 2025-03-26 RX ADMIN — SEVELAMER CARBONATE 800 MG: 800 TABLET, FILM COATED ORAL at 09:12

## 2025-03-26 RX ADMIN — PROPOFOL 50 MG: 10 INJECTION, EMULSION INTRAVENOUS at 13:44

## 2025-03-26 RX ADMIN — ROSUVASTATIN CALCIUM 10 MG: 10 TABLET, FILM COATED ORAL at 22:05

## 2025-03-26 RX ADMIN — INSULIN LISPRO 2 UNITS: 100 INJECTION, SOLUTION INTRAVENOUS; SUBCUTANEOUS at 09:12

## 2025-03-26 RX ADMIN — SEVELAMER CARBONATE 800 MG: 800 TABLET, FILM COATED ORAL at 17:38

## 2025-03-26 RX ADMIN — METHOCARBAMOL 750 MG: 750 TABLET ORAL at 11:50

## 2025-03-26 RX ADMIN — SODIUM CHLORIDE, PRESERVATIVE FREE 10 ML: 5 INJECTION INTRAVENOUS at 09:18

## 2025-03-26 RX ADMIN — BENZOCAINE, BUTAMBEN, AND TETRACAINE HYDROCHLORIDE 1 SPRAY: .028; .004; .004 AEROSOL, SPRAY TOPICAL at 13:41

## 2025-03-26 RX ADMIN — PROPOFOL 50 MG: 10 INJECTION, EMULSION INTRAVENOUS at 13:50

## 2025-03-26 RX ADMIN — PROPOFOL 50 MG: 10 INJECTION, EMULSION INTRAVENOUS at 13:47

## 2025-03-26 RX ADMIN — SODIUM CHLORIDE, PRESERVATIVE FREE 10 ML: 5 INJECTION INTRAVENOUS at 22:05

## 2025-03-26 RX ADMIN — CYANOCOBALAMIN TAB 1000 MCG 1000 MCG: 1000 TAB at 09:12

## 2025-03-26 RX ADMIN — ACETAMINOPHEN 650 MG: 325 TABLET ORAL at 09:12

## 2025-03-26 ASSESSMENT — PAIN - FUNCTIONAL ASSESSMENT: PAIN_FUNCTIONAL_ASSESSMENT: ACTIVITIES ARE NOT PREVENTED

## 2025-03-26 ASSESSMENT — PAIN SCALES - GENERAL
PAINLEVEL_OUTOF10: 3
PAINLEVEL_OUTOF10: 0

## 2025-03-26 ASSESSMENT — PAIN DESCRIPTION - ORIENTATION: ORIENTATION: POSTERIOR

## 2025-03-26 ASSESSMENT — PAIN DESCRIPTION - DESCRIPTORS: DESCRIPTORS: ACHING;THROBBING

## 2025-03-26 ASSESSMENT — PAIN DESCRIPTION - LOCATION: LOCATION: COCCYX;BUTTOCKS

## 2025-03-26 NOTE — PROGRESS NOTES
[facility] -  Clinical Pharmacy Note    Vancomycin - Management by Pharmacy    Consult Date(s): 3/23  Consulting Provider(s): Dr Moreno    Assessment / Plan  E faecalis bacteremia, lumbar osteo-diskitis - Vancomycin  Concurrent Antimicrobials: gentamicin 1 mg/kg w/ HD 3x/week for synergy   Day of Vanc Therapy / Ordered Duration: Day 4/ indefinite  Current Dosing Method: Intermittent Dosing by Levels  Therapeutic Goal: Trough ~ 15 mg/L  Current Dose / Plan:   Patient with ESRD on HD (TTS)   Last HD session 3/25 for 3.5 hours  Patient does still make urine;  documented UOP past 24h = 0.5 ml/kg/hr  HD line removed this AM  Level today = 13.8 mg/L. Patient received 1500 mg IV once yesterday following HD.   No plans for HD at this time. Will continue to follow.   Will re-dose today with 1000 mg IV once   Repeat level tomorrow, as pt clearing vancomycin somewhat without HD  Will continue to monitor clinical condition and make adjustments to regimen as appropriate.    Thank you for consulting pharmacy,    Silvia Villarreal, PharmD, BCPS  Clinical Pharmacist  [id number]        Interval update:  Blood cx +  E faecalis (Antonia/b not detected). Repeat blood cx sent today. Pt remains afebrile. Per ID, continue vancomycin + gent for synergy. No NSGY intervention planned. HD line removed this AM.     Subjective/Objective:   Danny Rock is a 75 y.o. male with a PMHx significant for CAD, CHF, ESRD on HD (TTS), renal cell carcinoma s/p nephrectomy, HTN, HLD, AF (Eliquis) who presented to [facility] with complaints of lower back pain and inability to move lower extremities 2/2 pain. MRI concerning for multilevel severe foraminal stenosis with high-grade narrowing at L5-S1 and was transferred to [facility]. Blood cx were positive x1 for staph epidermidis and e faecalis (van A/B genes not detected) (bottle 2 cancelled) and vancomycin was initiated.     Pharmacy is consulted to dose vancomycin.    Ht Readings from Last 1 Encounters:   03/22/25

## 2025-03-26 NOTE — H&P
3/26/2025    PROCEDURE PERFORMED:     1. A Complete Transesophageal echocardiogram with color and spectral doppler and 3-D imaging was performed.     INDICATIONS: Bacteremia, rule out SBE    PROCEDURE: The patient was brought to the lab in fasting state. The patient received adequate sedation with the assistance of anesthesia for the case. After ensuring adequate sedation, the esophagus was intubated and a complete transesophageal echocardiogram was completed. There were no complications related to the study.       CONCLUSION:   Excellent visualization of all cardiac valves with no valvular vegetations noted.       See formal report for full details.         PLAN:   1. Continue current medications with no changes made post-procedure today. Antimicrobial therapy as per ID service.       Trent Youssef MD, [facility]   [phone] [facility]   [phone] [facility]

## 2025-03-26 NOTE — PLAN OF CARE
Problem: Chronic Conditions and Co-morbidities  Goal: Patient's chronic conditions and co-morbidity symptoms are monitored and maintained or improved  Outcome: Progressing     Problem: Discharge Planning  Goal: Discharge to home or other facility with appropriate resources  Outcome: Progressing     Problem: Skin/Tissue Integrity  Goal: Skin integrity remains intact  Description: 1.  Monitor for areas of redness and/or skin breakdown  2.  Assess vascular access sites hourly  3.  Every 4-6 hours minimum:  Change oxygen saturation probe site  4.  Every 4-6 hours:  If on nasal continuous positive airway pressure, respiratory therapy assess nares and determine need for appliance change or resting period  Outcome: Progressing  Flowsheets (Taken 3/26/2025 0131)  Skin Integrity Remains Intact: Monitor for areas of redness and/or skin breakdown  Note: Skin assessed this shift. Maria E care completed as necessary. Patient alternating positions to reduce pressure and prevent skin injury q2 and as needed.        Problem: Safety - Adult  Goal: Free from fall injury  Outcome: Progressing  Flowsheets (Taken 3/26/2025 0131)  Free From Fall Injury: Instruct family/caregiver on patient safety  Note: All fall precautions in place. Bed locked and in lowest position with alarm on. Overbed table and personal belonings within reach. Call light within reach and patient instructed to use call light for assistance. Non-skid socks on.       Problem: Pain  Goal: Verbalizes/displays adequate comfort level or baseline comfort level  Outcome: Progressing     Problem: ABCDS Injury Assessment  Goal: Absence of physical injury  Outcome: Progressing  Flowsheets (Taken 3/26/2025 0131)  Absence of Physical Injury: Implement safety measures based on patient assessment     Problem: Nutrition Deficit:  Goal: Optimize nutritional status  Outcome: Progressing

## 2025-03-26 NOTE — PROGRESS NOTES
ID Follow-up NOTE    CC:   E faecalis bacteremia, Lumbar osteo-diskitis   Antibiotics: Vancomycin, Gentamicin    Admit Date: 3/22/2025  Hospital Day: 5    Subjective:     HD line removed this am    Feels 'better', ongoing LBP      Objective:     Patient Vitals for the past 8 hrs:   BP Temp Temp src Pulse Resp SpO2   03/26/25 0505 118/71 98 °F (36.7 °C) Oral 81 17 95 %     I/O last 3 completed shifts:  In: 360 [P.O.:360]  Out: 1725 [Urine:1725]  I/O this shift:  In: -   Out: 500 [Urine:500]    EXAM:  GENERAL: No apparent distress.  RA  HEENT: Membranes moist, no oral lesion  NECK:  Supple, no lymphadenopathy  LUNGS: Clear b/l, no rales, no dullness  CARDIAC: RRR, no murmur appreciated  ABD:  + BS, soft / NT  EXT:  No rash, no edema, no lesions  NEURO: No focal neurologic findings   PSYCH: Orientation, sensorium, mood normal  LINES:  R chest tunnel HD line - removed  Peripheral iv       Data Review:  Lab Results   Component Value Date    WBC 8.6 03/25/2025    HGB 10.4 (L) 03/25/2025    HCT 31.2 (L) 03/25/2025    .8 (H) 03/25/2025     03/25/2025     Lab Results   Component Value Date    CREATININE 2.6 (H) 03/25/2025    BUN 66 (H) 03/25/2025     (L) 03/25/2025    K 4.4 03/25/2025    CL 96 (L) 03/25/2025    CO2 25 03/25/2025       Hepatic Function Panel:   Lab Results   Component Value Date/Time    ALKPHOS 112 03/22/2025 06:28 AM    ALT 26 03/22/2025 06:28 AM    AST 28 03/22/2025 06:28 AM    BILITOT 0.6 03/22/2025 06:28 AM    BILIDIR <0.2 03/16/2021 09:41 AM    IBILI see below 03/16/2021 09:41 AM       ESR 33,     MICRO:  3/22 BC x 1 E faecalis, S epidermidis  Enterococcus faecalis   Antibiotic Interpretation VALERIE    ampicillin Sensitive <=2 mcg/mL   gentamicin-syn Resistant SYN-R mcg/mL   streptomycin-syn Sensitive SYN-S mcg/mL   vancomycin Sensitive 1 mcg/mL     3/23 BC x 2 E faecalis   3/24 BC x 2 E faecalis    IMAGING:  3/22 C/A/P - thoracic / lumbar spine reconstruction CT   IMPRESSION:  1.  enhancement or evidence of infection.  2. Diffuse idiopathic skeletal hyperostosis.  3. Mild kyphosis and mild degenerative disc disease.    3/23 Lumbar MRI   1. Multilevel abnormal disc signal with disc enhancement and endplate enhancement compatible with discitis and osteomyelitis at L2-L3, L3-L4, and at L4-L5. Adjacent paravertebral edema and enhancement. Equivocal small abscess in the right psoas muscle.   Overall findings compatible with discitis and osteomyelitis at multiple levels.  2. Associated epidural enhancement pattern with epidural phlegmon posterior to L3 and L4 without discrete abscess collection identified.  3. Advanced degenerative disc disease as described with posterior facet ligamentous hypertrophy. Severe central canal narrowing at L3-L4.  4. Spondylolysis at L5 with grade 1 spondylolisthesis of L5 on S1.  5. Multifactorial multilevel foraminal narrowing with severe foraminal narrowing at L3-L4, L4-L5, and at L5-S1.       Scheduled Meds:   [START ON 3/27/2025] gentamicin  68 mg IntraVENous Once per day on Tuesday Thursday Saturday    cabozantinib s-malate  40 mg Oral Daily    vancomycin (VANCOCIN) intermittent dosing (placeholder)   Other RX Placeholder    [Held by provider] apixaban  5 mg Oral BID    [Held by provider] aspirin  81 mg Oral Daily    vitamin B-12  1,000 mcg Oral Daily    metoprolol tartrate  50 mg Oral BID    pantoprazole  40 mg Oral QAM AC    rosuvastatin  10 mg Oral Nightly    sevelamer  800 mg Oral TID WC    sodium chloride flush  5-40 mL IntraVENous 2 times per day    insulin lispro  0-8 Units SubCUTAneous 4x Daily AC & HS    methocarbamol  750 mg Oral 4x Daily       Continuous Infusions:   sodium chloride 25 mL/hr at 03/23/25 0957    dextrose         PRN Meds:  heparin (porcine), HYDROmorphone, LORazepam, melatonin, sodium chloride flush, sodium chloride, ondansetron **OR** ondansetron, polyethylene glycol, acetaminophen **OR** acetaminophen, glucose, dextrose bolus **OR**

## 2025-03-26 NOTE — H&P
Brief Pre-Op Note/Sedation Assessment      Danny Rock  1950  [id number]  12:54 PM    Planned Procedure: DELMAR  Post Procedure Plan: Return to same level of care  Consent: I have discussed with the patient and/or the patient representative the indication, alternatives, and the possible risks and/or complications of the planned procedure and the anesthesia methods. The patient and/or patient representative appear to understand and agree to proceed.        Chief Complaint:     75 year old with hypertension, diabetes mellitus, pAF, ESRD on HD, E faecalis bacteremia, lumbar osteo-diskitis/phlegmon, followed by ID service here, presenting for DELMAR to exclude SBE today.  Patient had line removed 3/26 which was suspected to be source of infection.  TTE was unable to evaluate valves clearly.  DELMAR therefore requested for further evaluation.    Pre-Procedure Medical History:  Vital Signs:  There were no vitals taken for this visit.    Allergies:    Allergies   Allergen Reactions    Amoxicillin Other (See Comments)     Urethritis, rash    Bisoprolol-Hydrochlorothiazide Other (See Comments)     G.I. rash    Cisapride Other (See Comments)     diarrhea    Penicillins      Unsure of reaction  Tolerated ceftriaxone 1/29/25  Tolerated Ancef 12/31/24    Pioglitazone Other (See Comments)     G.I upset (long time ago)     Medications:    No current facility-administered medications for this encounter.     No current outpatient medications on file.     Facility-Administered Medications Ordered in Other Encounters   Medication Dose Route Frequency Provider Last Rate Last Admin    heparin (porcine) injection 3,600 Units  3,600 Units IntraCATHeter PRN Hannah Rodriguez MD        [START ON 3/27/2025] gentamicin (GARAMYCIN) 68 mg in sodium chloride 0.9 % 100 mL IVPB  68 mg IntraVENous Once per day on Tuesday Thursday Saturday Arvind Lee MD        vancomycin (VANCOCIN) intermittent dosing (placeholder)   Other RX  of sedation:   yes      Electronically signed by Trent Youssef MD on 3/26/2025 at 12:54 PM

## 2025-03-26 NOTE — ANESTHESIA PRE PROCEDURE
Department of Anesthesiology  Preprocedure Note       Name:  Danny Rock   Age:  75 y.o.  :  1950                                          MRN:  [id number]         Date:  3/26/2025      Surgeon: * No surgeons listed *    Procedure: * No procedures listed *    Medications prior to admission:   Prior to Admission medications    Medication Sig Start Date End Date Taking? Authorizing Provider   apixaban (ELIQUIS) 5 MG TABS tablet Take 1 tablet by mouth 2 times daily 25   Santiago Herring MD   cabozantinib s-malate (CABOMETYX) 40 MG TABS chemo tablet Take 1 tablet by mouth daily    Svetlana Deleon MD   B Complex-C-Folic Acid (EVERETT-ISABELLA) TABS Take 1 tablet by mouth daily    Svetlana Deleon MD   sevelamer (RENVELA) 800 MG tablet Take 1 tablet by mouth 3 times daily (with meals)    Svetlana Deleon MD   rosuvastatin (CRESTOR) 10 MG tablet Take 10 mg every other day 10/1/24   Rene Boo MD   metoprolol tartrate (LOPRESSOR) 50 MG tablet Take 1 tablet by mouth 2 times daily 24   Rene Boo MD   insulin lispro, 1 Unit Dial, (HUMALOG KWIKPEN) 100 UNIT/ML SOPN Sc before meals per sliding scale  Patient taking differently: 3 times daily (before meals) Takes as needed up to 6 units with meals 2/10/24   Stacey Shelby MD   Alpha-Lipoic Acid 100 MG CAPS Take 1 capsule by mouth Daily    Svetlana Deleon MD   melatonin 3 MG TABS tablet Take 1 tablet by mouth daily    Svetlana Deleon MD   zinc sulfate (ZINCATE) 220 (50 Zn)  mg capsule - elemental zinc Take 1 capsule by mouth daily    Svetlana Deleon MD   Cyanocobalamin (VITAMIN B-12 PO) Take 1 tablet by mouth daily    Svetlana Deleon MD   aspirin 81 MG chewable tablet Take 1 tablet by mouth daily 23   Mikael Coronado MD   ascorbic acid (VITAMIN C) 500 MG tablet Take 1 tablet by mouth daily    Svetlana Deleon MD   vitamin D3 (CHOLECALCIFEROL) 125 MCG (5000 UT) TABS tablet Take 1 tablet by mouth daily

## 2025-03-26 NOTE — CONSULTS
Ph: [phone], Fax: [phone]                                     [employer]                                                   [address] [place], [place] [id number]           Reason for admission:                 Brief Summary:     Danny Rock is being seen by nephrology for ESRD.     Interval History and Plan:   Patient seen at bedside with family.   Had HD yesterday   Comfortable  /73  On room air  HB 10.7  Renal panel not checked.           HD tomorrow per TTS schedule  TDC was removed for bacteremia and will replace once cleared by ID. If needed place temporary HD catheter.   Fluid restriction and follow Na trend.   Monitor Hb  Follow BP   Dose medications according to GFR        Thank you for allowing us to participate in this patient's care  In case of any question please call us at our 24 hour answering service [phone] or from 7 AM to 5 PM via Perfect Serve, Voalte, Epic chat or cell phone.   Dr Hannah Rodriguez MD      Assessment:     ESRD  On HD TTS  Access: TDC  Volume status: Euvolemic        Hypertension      Anemia        HPI      Patient is a 75 y.o. male  with PMH significant forcoronary artery disease, chronic systolic and diastolic CHF, ESRD on dialysis, renal cell carcinoma s/p nephrectomy, essential hypertension, hyperlipidemia, paroxysmal A-fib on Eliquis transferred form [facility] for evaluation of back back/lumbar spine stenosis. Nephrology consulted to assist in HD. Reviewed chart and patient had dialysis yesterday.       Patient seen at bedside and appear comfortable on room air and denied SOB, pain around TDC or fever.         ROS:   Negative except as mentioned in HPI      Vitals:     Vitals:    03/26/25 0900   BP: 114/73   Pulse: 66   Resp: 16   Temp: 98.2 °F (36.8 °C)   SpO2: 100%         Physical Examination:     General appearance: NAD. Alert, oriented  Respiratory: No respiratory distress on room air.   Cardiovascular:

## 2025-03-26 NOTE — PROGRESS NOTES
Occupational Therapy / Physical Therapy    Attempted to see pt for OT / PT treatment. Pt off floor for DELMAR at this time. Will re-attempt later date as appropriate. RN aware.    Cheyenne Schumacher, MOT-OTR/L [id number]  Lesia Batres, PT #[id number]

## 2025-03-26 NOTE — CARE COORDINATION
DISCHARGE PLANNING:  Chart reviewed.  Patient is from home with his spouse and daughter. He was active with [facility] but was discharged on 3/7. He receives HD via [facility] in [facility] on TTS (chair time is 8am for 4 hours).     Current discharge plan is [facility] (accepted yesterday). No pre-cert needed.  We are waiting on final ID recommendations; DELMAR pending.    CM team will continue to follow.  Ana Grider, RN Case Manager  [phone]

## 2025-03-26 NOTE — PROGRESS NOTES
Pt is A/Ox4, with intermittent confusion, VSS on room air, and 1 walker/gb. Pt is voiding via external catheter, tolerating food and fluids, and denies pain at this time. All safety precautions in place, call light within reach, plan of care continues.

## 2025-03-26 NOTE — PLAN OF CARE
Problem: Skin/Tissue Integrity  Goal: Skin integrity remains intact  Description: 1.  Monitor for areas of redness and/or skin breakdown  2.  Assess vascular access sites hourly  3.  Every 4-6 hours minimum:  Change oxygen saturation probe site  4.  Every 4-6 hours:  If on nasal continuous positive airway pressure, respiratory therapy assess nares and determine need for appliance change or resting period  3/26/2025 0905 by Barby Samano, RN  Outcome: Progressing  Pt has redness on bottom, pt being turned q2 to prevent skin breakdown.      Problem: Safety - Adult  Goal: Free from fall injury  3/26/2025 0905 by Barby Samano, RN  Outcome: Progressing  All fall precautions in place. Bed locked and in lowest position with alarm on. Overbed table and personal belonings within reach. Call light within reach and patient instructed to use call light for assistance. Non-skid socks on.      Problem: Pain  Goal: Verbalizes/displays adequate comfort level or baseline comfort level  3/26/2025 0905 by Barby Samano, RN  Outcome: Progressing   Pt's pain is being managed with scheduled and PRN pain meds per MAR

## 2025-03-26 NOTE — ANESTHESIA POSTPROCEDURE EVALUATION
Department of Anesthesiology  Postprocedure Note    Patient: Danny Rock  MRN: [id number]  YOB: 1950  Date of evaluation: 3/26/2025    Procedure Summary       Date: 03/26/25 Room / Location: [facility] Special Procedures    Anesthesia Start: 1338 Anesthesia Stop: 1402    Procedure: DELMAR (PRN CONTRAST/BUBBLE/3D) Diagnosis: Bacteremia    Scheduled Providers: Trent Youssef MD Responsible Provider: Tom Corey MD    Anesthesia Type: MAC ASA Status: 4            Anesthesia Type: MAC    Ye Phase I: Ye Score: 10    Ye Phase II:      Anesthesia Post Evaluation    Patient location during evaluation: bedside  Patient participation: complete - patient participated  Level of consciousness: awake and alert  Pain score: 0  Airway patency: patent  Nausea & Vomiting: no nausea and no vomiting  Cardiovascular status: hemodynamically stable  Respiratory status: acceptable  Hydration status: euvolemic  Pain management: adequate    No notable events documented.

## 2025-03-27 LAB
ANION GAP SERPL CALCULATED.3IONS-SCNC: 11 MMOL/L (ref 3–16)
BASOPHILS # BLD: 0.1 K/UL (ref 0–0.2)
BASOPHILS NFR BLD: 0.9 %
BUN SERPL-MCNC: 44 MG/DL (ref 7–20)
CALCIUM SERPL-MCNC: 8.8 MG/DL (ref 8.3–10.6)
CHLORIDE SERPL-SCNC: 99 MMOL/L (ref 99–110)
CO2 SERPL-SCNC: 24 MMOL/L (ref 21–32)
CREAT SERPL-MCNC: 2.2 MG/DL (ref 0.8–1.3)
DEPRECATED RDW RBC AUTO: 20 % (ref 12.4–15.4)
EOSINOPHIL # BLD: 0.1 K/UL (ref 0–0.6)
EOSINOPHIL NFR BLD: 1.2 %
GFR SERPLBLD CREATININE-BSD FMLA CKD-EPI: 30 ML/MIN/{1.73_M2}
GLUCOSE BLD-MCNC: 160 MG/DL (ref 70–99)
GLUCOSE BLD-MCNC: 175 MG/DL (ref 70–99)
GLUCOSE BLD-MCNC: 257 MG/DL (ref 70–99)
GLUCOSE BLD-MCNC: 265 MG/DL (ref 70–99)
GLUCOSE SERPL-MCNC: 195 MG/DL (ref 70–99)
HCT VFR BLD AUTO: 31.1 % (ref 40.5–52.5)
HGB BLD-MCNC: 10.3 G/DL (ref 13.5–17.5)
LYMPHOCYTES # BLD: 0.6 K/UL (ref 1–5.1)
LYMPHOCYTES NFR BLD: 10 %
MCH RBC QN AUTO: 33.4 PG (ref 26–34)
MCHC RBC AUTO-ENTMCNC: 33 G/DL (ref 31–36)
MCV RBC AUTO: 101.3 FL (ref 80–100)
MONOCYTES # BLD: 0.3 K/UL (ref 0–1.3)
MONOCYTES NFR BLD: 5.3 %
NEUTROPHILS # BLD: 4.8 K/UL (ref 1.7–7.7)
NEUTROPHILS NFR BLD: 82.6 %
PERFORMED ON: ABNORMAL
PLATELET # BLD AUTO: 134 K/UL (ref 135–450)
PMV BLD AUTO: 7.5 FL (ref 5–10.5)
POTASSIUM SERPL-SCNC: 3.9 MMOL/L (ref 3.5–5.1)
RBC # BLD AUTO: 3.07 M/UL (ref 4.2–5.9)
REPORT: NORMAL
SODIUM SERPL-SCNC: 134 MMOL/L (ref 136–145)
VANCOMYCIN SERPL-MCNC: 16.7 UG/ML
WBC # BLD AUTO: 5.8 K/UL (ref 4–11)

## 2025-03-27 PROCEDURE — 1200000000 HC SEMI PRIVATE

## 2025-03-27 PROCEDURE — 99233 SBSQ HOSP IP/OBS HIGH 50: CPT | Performed by: INTERNAL MEDICINE

## 2025-03-27 PROCEDURE — 2500000003 HC RX 250 WO HCPCS: Performed by: INTERNAL MEDICINE

## 2025-03-27 PROCEDURE — 36415 COLL VENOUS BLD VENIPUNCTURE: CPT

## 2025-03-27 PROCEDURE — 6370000000 HC RX 637 (ALT 250 FOR IP): Performed by: INTERNAL MEDICINE

## 2025-03-27 PROCEDURE — 85025 COMPLETE CBC W/AUTO DIFF WBC: CPT

## 2025-03-27 PROCEDURE — 80202 ASSAY OF VANCOMYCIN: CPT

## 2025-03-27 PROCEDURE — 6370000000 HC RX 637 (ALT 250 FOR IP)

## 2025-03-27 PROCEDURE — 80048 BASIC METABOLIC PNL TOTAL CA: CPT

## 2025-03-27 RX ORDER — INSULIN LISPRO 100 [IU]/ML
2 INJECTION, SOLUTION INTRAVENOUS; SUBCUTANEOUS
Status: DISCONTINUED | OUTPATIENT
Start: 2025-03-27 | End: 2025-03-30 | Stop reason: HOSPADM

## 2025-03-27 RX ADMIN — INSULIN LISPRO 2 UNITS: 100 INJECTION, SOLUTION INTRAVENOUS; SUBCUTANEOUS at 16:34

## 2025-03-27 RX ADMIN — INSULIN LISPRO 4 UNITS: 100 INJECTION, SOLUTION INTRAVENOUS; SUBCUTANEOUS at 12:55

## 2025-03-27 RX ADMIN — ROSUVASTATIN CALCIUM 10 MG: 10 TABLET, FILM COATED ORAL at 22:00

## 2025-03-27 RX ADMIN — SODIUM CHLORIDE, PRESERVATIVE FREE 10 ML: 5 INJECTION INTRAVENOUS at 08:42

## 2025-03-27 RX ADMIN — PANTOPRAZOLE SODIUM 40 MG: 40 TABLET, DELAYED RELEASE ORAL at 08:41

## 2025-03-27 RX ADMIN — METHOCARBAMOL 750 MG: 750 TABLET ORAL at 08:41

## 2025-03-27 RX ADMIN — METHOCARBAMOL 750 MG: 750 TABLET ORAL at 22:00

## 2025-03-27 RX ADMIN — CYANOCOBALAMIN TAB 1000 MCG 1000 MCG: 1000 TAB at 08:42

## 2025-03-27 RX ADMIN — SODIUM CHLORIDE, PRESERVATIVE FREE 10 ML: 5 INJECTION INTRAVENOUS at 22:00

## 2025-03-27 RX ADMIN — INSULIN LISPRO 2 UNITS: 100 INJECTION, SOLUTION INTRAVENOUS; SUBCUTANEOUS at 23:03

## 2025-03-27 RX ADMIN — SEVELAMER CARBONATE 800 MG: 800 TABLET, FILM COATED ORAL at 08:42

## 2025-03-27 RX ADMIN — METHOCARBAMOL 750 MG: 750 TABLET ORAL at 16:34

## 2025-03-27 RX ADMIN — OXYCODONE HYDROCHLORIDE AND ACETAMINOPHEN 2 TABLET: 5; 325 TABLET ORAL at 21:56

## 2025-03-27 RX ADMIN — SEVELAMER CARBONATE 800 MG: 800 TABLET, FILM COATED ORAL at 12:58

## 2025-03-27 RX ADMIN — INSULIN LISPRO 2 UNITS: 100 INJECTION, SOLUTION INTRAVENOUS; SUBCUTANEOUS at 13:03

## 2025-03-27 RX ADMIN — SEVELAMER CARBONATE 800 MG: 800 TABLET, FILM COATED ORAL at 16:34

## 2025-03-27 RX ADMIN — INSULIN LISPRO 4 UNITS: 100 INJECTION, SOLUTION INTRAVENOUS; SUBCUTANEOUS at 08:42

## 2025-03-27 RX ADMIN — OXYCODONE HYDROCHLORIDE AND ACETAMINOPHEN 2 TABLET: 5; 325 TABLET ORAL at 10:47

## 2025-03-27 RX ADMIN — METHOCARBAMOL 750 MG: 750 TABLET ORAL at 12:56

## 2025-03-27 ASSESSMENT — PAIN - FUNCTIONAL ASSESSMENT
PAIN_FUNCTIONAL_ASSESSMENT: ACTIVITIES ARE NOT PREVENTED
PAIN_FUNCTIONAL_ASSESSMENT: PREVENTS OR INTERFERES SOME ACTIVE ACTIVITIES AND ADLS

## 2025-03-27 ASSESSMENT — PAIN DESCRIPTION - PAIN TYPE: TYPE: ACUTE PAIN

## 2025-03-27 ASSESSMENT — PAIN DESCRIPTION - FREQUENCY: FREQUENCY: CONTINUOUS

## 2025-03-27 ASSESSMENT — PAIN DESCRIPTION - DESCRIPTORS
DESCRIPTORS: ACHING
DESCRIPTORS: ACHING

## 2025-03-27 ASSESSMENT — PAIN SCALES - GENERAL
PAINLEVEL_OUTOF10: 2
PAINLEVEL_OUTOF10: 7
PAINLEVEL_OUTOF10: 8

## 2025-03-27 ASSESSMENT — PAIN DESCRIPTION - ONSET: ONSET: ON-GOING

## 2025-03-27 ASSESSMENT — PAIN DESCRIPTION - ORIENTATION
ORIENTATION: LOWER
ORIENTATION: MID

## 2025-03-27 ASSESSMENT — PAIN DESCRIPTION - LOCATION
LOCATION: BACK
LOCATION: COCCYX

## 2025-03-27 NOTE — PROGRESS NOTES
Hospital Medicine Progress Note      Date of Admission: 3/22/2025  Hospital Day: 5    Chief Admission Complaint: Low back pain     Subjective: Patient seen and examined at the dialysis unit  Wife at bedside  Had HD cath removed today and planned for DELMAR  Denies any complaints and pain in good control      Presenting Admission History:       75 y.o. male with PMHx significant for coronary artery disease, chronic systolic and diastolic CHF, ESRD on dialysis, renal cell carcinoma s/p nephrectomy, essential hypertension, hyperlipidemia, paroxysmal A-fib on Eliquis who presented to [facility] initially with a complaint of back pain which started a few days and was associated with inability to move lower extremities secondary to his pain.  Patient denies any numbness or tingling, no bowel disturbance but does report some difficulty with urination. No saddle anesthesia. MRI spine concerning for multilevel severe foraminal stenosis especially with impingement of the bilateral exiting L5 nerve root with high-grade left foraminal narrowing at L5-S1. Patient was discussed with neurosurgery at the [facility] who recommended transfer to the [facility] for further evaluation.     Assessment/Plan:        Bacteremia with Enterococcus faecalis   Multilevel lumbar discitis and osteomyelitis  MRI of the lumbar, thoracic and cervical spine concerning for multilevel abnormal disc signal with disc enhancement and endplate enhancement compatible with discitis and osteomyelitis at L2-L3, L3-L4 and L4-L5.  Adjacent paravertebral edema and enhancement.  Equivocal small abscess in the right psoas muscle.  1 bottle of blood culture done at outside hospital prior to transfer on 3/22 positive for Enterococcus faecalis and Staph epidermidis.    Repeat blood culture 3/23, 3/24 2 out of 2 positive for Enterococcus faecalis  Follow repeat blood culture on 3/26  Had a history of right shoulder abscess in 1/2025 s/p I&D on 1/31/2025  ondansetron **OR** ondansetron, polyethylene glycol, acetaminophen **OR** acetaminophen, glucose, dextrose bolus **OR** dextrose bolus, glucagon (rDNA), dextrose, oxyCODONE-acetaminophen **OR** oxyCODONE-acetaminophen     Labs:  Personally reviewed and interpreted for clinical significance.     Recent Labs     03/25/25  0836 03/26/25  0910   WBC 8.6 9.2   HGB 10.4* 10.7*   HCT 31.2* 32.1*    144     Recent Labs     03/24/25  0605 03/24/25  0635 03/25/25  0512 03/26/25  0910   NA  --  133* 131* 132*   K  --  4.7 4.4 4.4   CL  --  96* 96* 97*   CO2  --  27 25 26   BUN  --  56* 66* 40*   CREATININE  --  2.5* 2.6* 2.0*   CALCIUM  --  9.2 9.0 9.5   PHOS 3.5  --   --   --      No results for input(s): \"PROBNP\", \"TROPHS\" in the last 72 hours.  No results for input(s): \"LABA1C\" in the last 72 hours.    No results for input(s): \"AST\", \"ALT\", \"BILIDIR\", \"BILITOT\", \"ALKPHOS\" in the last 72 hours.    Recent Labs     03/26/25  0910   INR 1.31*       Urine Cultures:   Lab Results   Component Value Date/Time    LABURIN No growth at 18 to 36 hours 11/28/2023 02:23 PM     Blood Cultures:   Lab Results   Component Value Date/Time    BC  03/24/2025 06:28 AM     POSITIVE for  Refer to culture [id number] for sensitivity results       Lab Results   Component Value Date/Time    BLOODCULT2  03/24/2025 06:34 AM     POSITIVE for  Refer to culture [id number] for sensitivity results       Organism:   Lab Results   Component Value Date/Time    ORG Enterococcus faecalis 03/24/2025 06:34 AM         Lakeisha Moreno MD

## 2025-03-27 NOTE — CONSULTS
PERCUTANEOUS  08/01/2024    CT NEEDLE BIOPSY LIVER PERCUTANEOUS 8/1/2024 [facility] CT SCAN    CT NEEDLE BIOPSY LIVER PERCUTANEOUS  11/18/2024    CT NEEDLE BIOPSY LIVER PERCUTANEOUS 11/18/2024 [facility] CT SCAN    EYE SURGERY Bilateral 04/19/2018    Cataract    HERNIA REPAIR      HERNIA REPAIR Left 12/31/2024    OPEN LEFT INGUINAL HERNIA REPAIR WITH MESH performed by Camacho Chowdhury MD at [facility] OR    IR TUNNELED CVC PLACE WO SQ PORT/PUMP > 5 YEARS  02/07/2024    IR TUNNELED CATHETER PLACEMENT GREATER THAN 5 YEARS 2/7/2024 Kosta Baugh MD [facility] SPECIAL PROCEDURES    KIDNEY REMOVAL Right 07/25/2022    ROBOTIC LAPAROSCOPIC RADICAL RIGHT NEPHRECTOMY performed by Larry Olsen MD at [facility] OR    KNEE SURGERY      both knees: scope for cartilage    NASAL SEPTUM SURGERY      OTHER SURGICAL HISTORY  09/30/2024    Peritoneal dialysis catheter: removed    OTHER SURGICAL HISTORY Left     left arm fistula for dialysis: also has right chest catheter for dialysis    SHOULDER SURGERY Right 1/31/2025    RIGHT SHOULDER OPEN INCISION AND DRAINAGE performed by Nicolas Jacob MD at [facility] ASC OR

## 2025-03-27 NOTE — PROGRESS NOTES
[facility] -  Clinical Pharmacy Note    Vancomycin - Management by Pharmacy    Consult Date(s): 3/23  Consulting Provider(s): Dr Moreno    Assessment / Plan  E faecalis bacteremia, lumbar osteo-discitis - Vancomycin  Concurrent Antimicrobials: gentamicin 1 mg/kg w/ HD 3x/week for synergy   Day of Vanc Therapy / Ordered Duration: Day 5/ indefinite  Current Dosing Method: Intermittent Dosing by Levels  Therapeutic Goal: Trough ~ 15 mg/L  Current Dose / Plan:   Patient with ESRD on HD (TTS)   Last HD session 3/25 for 3.5 hours  Patient does still make urine;  documented UOP past 24h = 1.3 ml/kg/hr  HD line removed 3/26. Plans for HD today per normal schedule but will need temporary line placement.  Level today = 16.7 mg/L. Patient received 1000 mg IV once yesterday.  No plans for HD today per notes from nephrology. Will reassess tomorrow and consider temporary catheter placement if needed. Will follow plan.  Will hold off on repeat vanc dosing today given that patient currently therapeutic and no plans for HD today.  Repeat vancomycin level ordered for tomorrow, 3/28 at 0600.   Will continue to monitor clinical condition and make adjustments to regimen as appropriate.    Thank you for consulting pharmacy,    Silvia Villarreal, PharmD, BCPS  Clinical Pharmacist  [phone]        Interval update:  Blood cx +  E faecalis (Antonia/b not detected). Repeat blood cx sent today. Pt remains afebrile. Per ID, continue vancomycin + gent for synergy. No NSGY intervention planned. HD line removed yesterday morning. S/p DELMAR 3/26 with no valvular vegetation.    Subjective/Objective:   Danny Rock is a 75 y.o. male with a PMHx significant for CAD, CHF, ESRD on HD (TTS), renal cell carcinoma s/p nephrectomy, HTN, HLD, AF (Eliquis) who presented to [facility] with complaints of lower back pain and inability to move lower extremities 2/2 pain. MRI concerning for multilevel severe foraminal stenosis with high-grade narrowing at L5-S1 and  was transferred to [facility]. Blood cx were positive x1 for staph epidermidis and e faecalis (van A/B genes not detected) (bottle 2 cancelled) and vancomycin was initiated.     Pharmacy is consulted to dose vancomycin.    Ht Readings from Last 1 Encounters:   03/22/25 1.905 m (6' 3\")     Wt Readings from Last 1 Encounters:   03/25/25 67.2 kg (148 lb 2.4 oz)     Current & Prior Antimicrobial Regimen(s):  Vancomycin - PTD  Intermittent  Date Vanc Level Vanc Dose HD   3/22 ---- ----- Yes 3h   3/23 - 1750 mg   no   3/24 12.5 mg/L 1000 mg   no   3/25 14.6 mg/L 1500 mg post HD Yes 3.5h   3/26 13.8 mg/L 1000 mg no   3/27 16.7 mg/L --- No     Cultures & Sensitivities:    Date Site Micro Susceptibility / Result   3/22 Blood x 1   Enterococcus faecalis   S: ampicillin, vancomycin (VALERIE=1)   3/23 Blood x 2 #1- GPC  #2 - E faecalis  --   Van A/B not detected   3/24 Blood x2 GPC resembling strep  E. faecalis    3/26 Blood x2 Sent       Recent Labs     03/25/25  0512 03/25/25  0836 03/26/25  0910 03/27/25  0418   CREATININE 2.6*  --  2.0* 2.2*   BUN 66*  --  40* 44*   WBC  --  8.6 9.2 5.8       Estimated Creatinine Clearance: 27 mL/min (A) (based on SCr of 2.2 mg/dL (H)).    Additional Lab Values / Findings of Note:    No results for input(s): \"PROCAL\" in the last 72 hours.

## 2025-03-27 NOTE — PROGRESS NOTES
Patient is A&Ox4 with intermittent confusion. VSS on RA. Patient has denied pain throughout shift. Patient is tolerating PO diet. Tolerating ambulation x1 assist with GB/walker. Voiding via purwick.Pt denies any needs at this time. Patient updated on plan of care. Fall and safety precautions in place, call light within reach. Continuing care.

## 2025-03-27 NOTE — PLAN OF CARE
Problem: Chronic Conditions and Co-morbidities  Goal: Patient's chronic conditions and co-morbidity symptoms are monitored and maintained or improved  Outcome: Progressing     Problem: Discharge Planning  Goal: Discharge to home or other facility with appropriate resources  Outcome: Progressing  Patient will have all needs met prior to discharge      Problem: Skin/Tissue Integrity  Goal: Skin integrity remains intact  Description: 1.  Monitor for areas of redness and/or skin breakdown  2.  Assess vascular access sites hourly  3.  Every 4-6 hours minimum:  Change oxygen saturation probe site  4.  Every 4-6 hours:  If on nasal continuous positive airway pressure, respiratory therapy assess nares and determine need for appliance change or resting period  Outcome: Progressing     Problem: Safety - Adult  Goal: Free from fall injury  Outcome: Progressing  Patient will remain fall free during stay by implementing and following all safety precautions.      Problem: ABCDS Injury Assessment  Goal: Absence of physical injury  Outcome: Progressing

## 2025-03-27 NOTE — PLAN OF CARE
Problem: Chronic Conditions and Co-morbidities  Goal: Patient's chronic conditions and co-morbidity symptoms are monitored and maintained or improved  Recent Flowsheet Documentation  Taken 3/27/2025 1600 by Sarah Bueno RN  Care Plan - Patient's Chronic Conditions and Co-Morbidity Symptoms are Monitored and Maintained or Improved: Monitor and assess patient's chronic conditions and comorbid symptoms for stability, deterioration, or improvement  Taken 3/27/2025 1230 by Sarah Bueno RN  Care Plan - Patient's Chronic Conditions and Co-Morbidity Symptoms are Monitored and Maintained or Improved: Monitor and assess patient's chronic conditions and comorbid symptoms for stability, deterioration, or improvement  Taken 3/27/2025 0833 by Sarah Bueno RN  Care Plan - Patient's Chronic Conditions and Co-Morbidity Symptoms are Monitored and Maintained or Improved: Monitor and assess patient's chronic conditions and comorbid symptoms for stability, deterioration, or improvement       Problem: Discharge Planning  Goal: Discharge to home or other facility with appropriate resources  Recent Flowsheet Documentation  Taken 3/27/2025 1600 by Sarah Bueno RN  Discharge to home or other facility with appropriate resources: Identify barriers to discharge with patient and caregiver  Pt involved in discharge planning. Barriers to discharge discussed with patient. Discharge learning needs identified. Discuss with patient any additional needed resources and transportation plans. Case management following plan of care.    Problem: Safety - Adult  Goal: Free from fall injury  Outcome: Progressing   All fall precautions in place. Bed locked and in lowest position with alarm on. Overbed table and personal belonings within reach. Call light within reach and patient instructed to use call light for assistance. Non-skid socks on.

## 2025-03-27 NOTE — PROGRESS NOTES
Patient is alert and oriented to self. Requires frequent reorientation. VSS on room air. Medications given per MAR, no side effects noted. Patient ambulating x1 with gait belt and walker. No complaints of pain, continuing to monitor and manage per MAR. Voiding well via external urinary catheter. Q2 turns.      Patient is currently resting in bed with bed alarm on for safety. Call light within reach and all fall precautions in place. Plan of care continues.

## 2025-03-27 NOTE — PLAN OF CARE
Problem: Chronic Conditions and Co-morbidities  Goal: Patient's chronic conditions and co-morbidity symptoms are monitored and maintained or improved  Outcome: Progressing  Flowsheets  Taken 3/27/2025 1600 by Sarah Bueno RN  Care Plan - Patient's Chronic Conditions and Co-Morbidity Symptoms are Monitored and Maintained or Improved: Monitor and assess patient's chronic conditions and comorbid symptoms for stability, deterioration, or improvement  Taken 3/27/2025 1230 by Sarah Bueno RN  Care Plan - Patient's Chronic Conditions and Co-Morbidity Symptoms are Monitored and Maintained or Improved: Monitor and assess patient's chronic conditions and comorbid symptoms for stability, deterioration, or improvement  Taken 3/27/2025 0833 by Sarah Bueno RN  Care Plan - Patient's Chronic Conditions and Co-Morbidity Symptoms are Monitored and Maintained or Improved: Monitor and assess patient's chronic conditions and comorbid symptoms for stability, deterioration, or improvement     Problem: Discharge Planning  Goal: Discharge to home or other facility with appropriate resources  Outcome: Progressing  Flowsheets  Taken 3/27/2025 1600 by Sarah Bueno RN  Discharge to home or other facility with appropriate resources: Identify barriers to discharge with patient and caregiver  Taken 3/27/2025 1230 by Sarah Bueno RN  Discharge to home or other facility with appropriate resources: Identify barriers to discharge with patient and caregiver  Taken 3/27/2025 0833 by Sarah Bueno RN  Discharge to home or other facility with appropriate resources: Identify barriers to discharge with patient and caregiver  Note: Collaboration with inter professional healthcare team to meet pt dsicharge needs     Problem: Skin/Tissue Integrity  Goal: Skin integrity remains intact  Description: 1.  Monitor for areas of redness and/or skin breakdown  2.  Assess vascular access sites hourly  3.  Every 4-6 hours minimum:  Change

## 2025-03-27 NOTE — PROGRESS NOTES
Physician Progress Note      PATIENT:               MARCOS TORRES  CSN #:                  [id number]  :                       1950  ADMIT DATE:       3/22/2025 6:51 PM  DISCH DATE:  RESPONDING  PROVIDER #:        Lakeisha Moreno MD          QUERY TEXT:    Pt admitted with spinal stenosis  and has malnutrition documented in RD note.   Please further specify type of malnutrition with documentation in the medical   record.    The medical record reflects the following:  Risk Factors: Spinal stenosis, ESRD,  DM2  Clinical Indicators: RD note 3/24:  Malnutrition Status: Moderate   malnutrition-Positive screen for poor intake and wt loss.  Treatment: PO Diet: Continue Regular, will add CCC-4 Nutrition Supplement:   Will modify Ensure+ HP to Glucerna TID  Nutrition Education: No recommendation at this time    ASPEN Criteria:    https://aspenjournals.onlinelibrary.benoit.com/doi/full/10.1177/605930980558766  5  Options provided:  -- Moderate Malnutrition  -- Other - I will add my own diagnosis  -- Disagree - Not applicable / Not valid  -- Disagree - Clinically unable to determine / Unknown  -- Refer to Clinical Documentation Reviewer    PROVIDER RESPONSE TEXT:    This patient has moderate malnutrition.    Query created by: Haja Seymour on 3/26/2025 2:56 PM      Electronically signed by:  Lakeisha Moreno MD 3/27/2025 3:29 PM

## 2025-03-27 NOTE — PROGRESS NOTES
Hospital Medicine Progress Note      Date of Admission: 3/22/2025  Hospital Day: 6    Chief Admission Complaint: Low back pain     Subjective: Patient seen and examined at the dialysis unit  Wife at bedside  Reports pain in his back      Presenting Admission History:       75 y.o. male with PMHx significant for coronary artery disease, chronic systolic and diastolic CHF, ESRD on dialysis, renal cell carcinoma s/p nephrectomy, essential hypertension, hyperlipidemia, paroxysmal A-fib on Eliquis who presented to [facility] initially with a complaint of back pain which started a few days and was associated with inability to move lower extremities secondary to his pain.  Patient denies any numbness or tingling, no bowel disturbance but does report some difficulty with urination. No saddle anesthesia. MRI spine concerning for multilevel severe foraminal stenosis especially with impingement of the bilateral exiting L5 nerve root with high-grade left foraminal narrowing at L5-S1. Patient was discussed with neurosurgery at the [facility] who recommended transfer to the [facility] for further evaluation.   Had a history of right shoulder abscess in 1/2025 s/p I&D on 1/31/2025 with 1 surgical specimen positive for micrococcus which was considered contaminant patient was discharged with doxycycline pending final culture results.    Assessment/Plan:        Bacteremia with Enterococcus faecalis   Multilevel lumbar discitis and osteomyelitis  MRI of the lumbar, thoracic and cervical spine concerning for multilevel abnormal disc signal with disc enhancement and endplate enhancement compatible with discitis and osteomyelitis at L2-L3, L3-L4 and L4-L5.  Adjacent paravertebral edema and enhancement.  Equivocal small abscess in the right psoas muscle.  1 bottle of blood culture done at outside hospital prior to transfer on 3/22 positive for Enterococcus faecalis and Staph epidermidis.    Repeat blood culture 3/23, 3/24  -   [] Decision to De-escalate Care this DOS due to change in treatment goals:  [] Decision to Escalate Care To:   [] Major Surgery/Procedure (any 1):   Elective with patient risk factors including Procedure Type and Risk Factors:   Emergent Procedure Type:    ----------------------------------------------------------------------  C. Data (any 2)  [x] Data Review (3+ points)  [x] Consultant Note reviewed with note date, specialty, and summary (1 point each) neurosurgery, infectious disease  [x] All current labs were reviewed and interpreted for clinical significance   [] Studies Reviewed (1 point each):   [] Collateral history obtained including from who and why needed (Max 1 point):  [] Independent (Lakeisha Moreno MD) Interpretation of tests (any 1)  [] Rhythm Strip (Telemetry) personally reviewed and interpreted as documented above    [] Imaging personally reviewed and interpreted, includes:    [x] Discussion (any 1)  [x] Discussed the discharge plan in detail with case management  including timing/barriers to discharge, need for support services and/or placement decision   [] Discussed management of the case with:        Medications:  Personally reviewed in detail in conjunction w/ labs as documented for evidence of drug toxicity.     Infusion Medications    sodium chloride Stopped (03/26/25 1400)    dextrose       Scheduled Medications    insulin lispro  2 Units SubCUTAneous TID WC    vancomycin (VANCOCIN) intermittent dosing (placeholder)   Other RX Placeholder    [Held by provider] apixaban  5 mg Oral BID    [Held by provider] aspirin  81 mg Oral Daily    vitamin B-12  1,000 mcg Oral Daily    [Held by provider] metoprolol tartrate  50 mg Oral BID    pantoprazole  40 mg Oral QAM AC    rosuvastatin  10 mg Oral Nightly    sevelamer  800 mg Oral TID WC    sodium chloride flush  5-40 mL IntraVENous 2 times per day    insulin lispro  0-8 Units SubCUTAneous 4x Daily AC & HS    methocarbamol  750 mg Oral 4x

## 2025-03-27 NOTE — PROGRESS NOTES
visualization of all cardiac valves with no valvular vegetations noted.      Scheduled Meds:   insulin lispro  2 Units SubCUTAneous TID WC    gentamicin  68 mg IntraVENous Once per day on Tuesday Thursday Saturday    vancomycin (VANCOCIN) intermittent dosing (placeholder)   Other RX Placeholder    [Held by provider] apixaban  5 mg Oral BID    [Held by provider] aspirin  81 mg Oral Daily    vitamin B-12  1,000 mcg Oral Daily    [Held by provider] metoprolol tartrate  50 mg Oral BID    pantoprazole  40 mg Oral QAM AC    rosuvastatin  10 mg Oral Nightly    sevelamer  800 mg Oral TID WC    sodium chloride flush  5-40 mL IntraVENous 2 times per day    insulin lispro  0-8 Units SubCUTAneous 4x Daily AC & HS    methocarbamol  750 mg Oral 4x Daily       Continuous Infusions:   sodium chloride Stopped (03/26/25 1400)    dextrose         PRN Meds:  heparin (porcine), HYDROmorphone, LORazepam, melatonin, sodium chloride flush, sodium chloride, ondansetron **OR** ondansetron, polyethylene glycol, acetaminophen **OR** acetaminophen, glucose, dextrose bolus **OR** dextrose bolus, glucagon (rDNA), dextrose, oxyCODONE-acetaminophen **OR** oxyCODONE-acetaminophen      Assessment:     HTN, DM, pAF, CRF on HD, hx prostate ca, LBP  Allergy - rash w Amox, has had cephalosporins (Ancef, Ceftriaxone)    E faecalis bacteremia 3/21,22,23,24   - f/u BC 3/24 [facility]   - line removed 3/26, line tip cult sent   - DELMAR 3/26 no vegetation     Multilevel lumbar osteo-diskitis seen on MRI  - assoc with epidural phlegmon posterior L3/4 on MRI reading  - assoc with E faecalis bacteremia  - seen by Neurosurg who recommended no neurosurg intervention, signed off    Lower abd wall fluid collection on CT     Plan:     Cont Vancomycin -given 1.5 gm at HD 10/25  D/c Gentamicin    Will f/u on BC sent 3/26 - no growth to date   Will f/u on line tip sent 3/26 - in process    If BC 3/26 neg on 3/28, may proceed with no HD line   Treat for 6-8 weeks - will give  Vancomycin at HD     Medical Decision Making:  The following items were considered in medical decision making:  Discussion of patient care with other providers  Reviewed clinical lab tests  Reviewed radiology tests  Reviewed other diagnostic tests/interventions  Independent review of radiologic images - reviewed imaging w Radiologist on 3/25  Microbiology cultures and other micro tests reviewed      Discussed with pt  Discussed with Renal - Dr BENNY Rodriguez by phone     Arvind Lee MD

## 2025-03-28 ENCOUNTER — HOSPITAL ENCOUNTER (INPATIENT)
Dept: INTERVENTIONAL RADIOLOGY/VASCULAR | Age: 75
Discharge: HOME | DRG: 539 | End: 2025-03-28
Attending: STUDENT IN AN ORGANIZED HEALTH CARE EDUCATION/TRAINING PROGRAM | Admitting: STUDENT IN AN ORGANIZED HEALTH CARE EDUCATION/TRAINING PROGRAM
Payer: MEDICARE

## 2025-03-28 LAB
ANION GAP SERPL CALCULATED.3IONS-SCNC: 11 MMOL/L (ref 3–16)
BASOPHILS # BLD: 0 K/UL (ref 0–0.2)
BASOPHILS NFR BLD: 0.2 %
BUN SERPL-MCNC: 52 MG/DL (ref 7–20)
CALCIUM SERPL-MCNC: 8.9 MG/DL (ref 8.3–10.6)
CHLORIDE SERPL-SCNC: 101 MMOL/L (ref 99–110)
CO2 SERPL-SCNC: 22 MMOL/L (ref 21–32)
CREAT SERPL-MCNC: 2.3 MG/DL (ref 0.8–1.3)
DEPRECATED RDW RBC AUTO: 19.8 % (ref 12.4–15.4)
ECHO BSA: 1.99 M2
EOSINOPHIL # BLD: 0.1 K/UL (ref 0–0.6)
EOSINOPHIL NFR BLD: 2.2 %
GFR SERPLBLD CREATININE-BSD FMLA CKD-EPI: 29 ML/MIN/{1.73_M2}
GLUCOSE BLD-MCNC: 131 MG/DL (ref 70–99)
GLUCOSE BLD-MCNC: 142 MG/DL (ref 70–99)
GLUCOSE BLD-MCNC: 155 MG/DL (ref 70–99)
GLUCOSE BLD-MCNC: 189 MG/DL (ref 70–99)
GLUCOSE BLD-MCNC: 227 MG/DL (ref 70–99)
GLUCOSE SERPL-MCNC: 162 MG/DL (ref 70–99)
HCT VFR BLD AUTO: 30.5 % (ref 40.5–52.5)
HGB BLD-MCNC: 10.4 G/DL (ref 13.5–17.5)
LYMPHOCYTES # BLD: 0.7 K/UL (ref 1–5.1)
LYMPHOCYTES NFR BLD: 12 %
MCH RBC QN AUTO: 33.3 PG (ref 26–34)
MCHC RBC AUTO-ENTMCNC: 34 G/DL (ref 31–36)
MCV RBC AUTO: 97.9 FL (ref 80–100)
MONOCYTES # BLD: 0.3 K/UL (ref 0–1.3)
MONOCYTES NFR BLD: 5.4 %
NEUTROPHILS # BLD: 4.9 K/UL (ref 1.7–7.7)
NEUTROPHILS NFR BLD: 80.2 %
PERFORMED ON: ABNORMAL
PLATELET # BLD AUTO: 160 K/UL (ref 135–450)
PMV BLD AUTO: 7.3 FL (ref 5–10.5)
POTASSIUM SERPL-SCNC: 4.2 MMOL/L (ref 3.5–5.1)
RBC # BLD AUTO: 3.12 M/UL (ref 4.2–5.9)
SODIUM SERPL-SCNC: 134 MMOL/L (ref 136–145)
VANCOMYCIN SERPL-MCNC: 12.7 UG/ML
WBC # BLD AUTO: 6.1 K/UL (ref 4–11)

## 2025-03-28 PROCEDURE — 6360000002 HC RX W HCPCS: Performed by: INTERNAL MEDICINE

## 2025-03-28 PROCEDURE — 2500000003 HC RX 250 WO HCPCS: Performed by: INTERNAL MEDICINE

## 2025-03-28 PROCEDURE — 6370000000 HC RX 637 (ALT 250 FOR IP): Performed by: INTERNAL MEDICINE

## 2025-03-28 PROCEDURE — 76937 US GUIDE VASCULAR ACCESS: CPT

## 2025-03-28 PROCEDURE — 80048 BASIC METABOLIC PNL TOTAL CA: CPT

## 2025-03-28 PROCEDURE — 6370000000 HC RX 637 (ALT 250 FOR IP)

## 2025-03-28 PROCEDURE — 99152 MOD SED SAME PHYS/QHP 5/>YRS: CPT

## 2025-03-28 PROCEDURE — 85025 COMPLETE CBC W/AUTO DIFF WBC: CPT

## 2025-03-28 PROCEDURE — C1769 GUIDE WIRE: HCPCS

## 2025-03-28 PROCEDURE — 80202 ASSAY OF VANCOMYCIN: CPT

## 2025-03-28 PROCEDURE — C1894 INTRO/SHEATH, NON-LASER: HCPCS

## 2025-03-28 PROCEDURE — 97116 GAIT TRAINING THERAPY: CPT

## 2025-03-28 PROCEDURE — 77001 FLUOROGUIDE FOR VEIN DEVICE: CPT

## 2025-03-28 PROCEDURE — 0JH63XZ INSERTION OF TUNNELED VASCULAR ACCESS DEVICE INTO CHEST SUBCUTANEOUS TISSUE AND FASCIA, PERCUTANEOUS APPROACH: ICD-10-PCS | Performed by: STUDENT IN AN ORGANIZED HEALTH CARE EDUCATION/TRAINING PROGRAM

## 2025-03-28 PROCEDURE — 2500000003 HC RX 250 WO HCPCS: Performed by: STUDENT IN AN ORGANIZED HEALTH CARE EDUCATION/TRAINING PROGRAM

## 2025-03-28 PROCEDURE — 0JPW3XZ REMOVAL OF TUNNELED VASCULAR ACCESS DEVICE FROM LOWER EXTREMITY SUBCUTANEOUS TISSUE AND FASCIA, PERCUTANEOUS APPROACH: ICD-10-PCS | Performed by: STUDENT IN AN ORGANIZED HEALTH CARE EDUCATION/TRAINING PROGRAM

## 2025-03-28 PROCEDURE — 2580000003 HC RX 258: Performed by: INTERNAL MEDICINE

## 2025-03-28 PROCEDURE — 1200000000 HC SEMI PRIVATE

## 2025-03-28 PROCEDURE — 6360000002 HC RX W HCPCS: Performed by: STUDENT IN AN ORGANIZED HEALTH CARE EDUCATION/TRAINING PROGRAM

## 2025-03-28 PROCEDURE — 99232 SBSQ HOSP IP/OBS MODERATE 35: CPT | Performed by: INTERNAL MEDICINE

## 2025-03-28 PROCEDURE — C1750 CATH, HEMODIALYSIS,LONG-TERM: HCPCS

## 2025-03-28 PROCEDURE — 36415 COLL VENOUS BLD VENIPUNCTURE: CPT

## 2025-03-28 PROCEDURE — 97530 THERAPEUTIC ACTIVITIES: CPT

## 2025-03-28 RX ORDER — LIDOCAINE HYDROCHLORIDE AND EPINEPHRINE 10; 10 MG/ML; UG/ML
INJECTION, SOLUTION INFILTRATION; PERINEURAL PRN
Status: COMPLETED | OUTPATIENT
Start: 2025-03-28 | End: 2025-03-28

## 2025-03-28 RX ORDER — MIDAZOLAM HYDROCHLORIDE 1 MG/ML
INJECTION, SOLUTION INTRAMUSCULAR; INTRAVENOUS PRN
Status: COMPLETED | OUTPATIENT
Start: 2025-03-28 | End: 2025-03-28

## 2025-03-28 RX ORDER — LIDOCAINE HYDROCHLORIDE 10 MG/ML
INJECTION, SOLUTION INFILTRATION; PERINEURAL PRN
Status: COMPLETED | OUTPATIENT
Start: 2025-03-28 | End: 2025-03-28

## 2025-03-28 RX ORDER — HEPARIN SODIUM 1000 [USP'U]/ML
INJECTION, SOLUTION INTRAVENOUS; SUBCUTANEOUS PRN
Status: COMPLETED | OUTPATIENT
Start: 2025-03-28 | End: 2025-03-28

## 2025-03-28 RX ADMIN — METHOCARBAMOL 750 MG: 750 TABLET ORAL at 13:11

## 2025-03-28 RX ADMIN — CYANOCOBALAMIN TAB 1000 MCG 1000 MCG: 1000 TAB at 09:05

## 2025-03-28 RX ADMIN — INSULIN LISPRO 2 UNITS: 100 INJECTION, SOLUTION INTRAVENOUS; SUBCUTANEOUS at 17:35

## 2025-03-28 RX ADMIN — SEVELAMER CARBONATE 800 MG: 800 TABLET, FILM COATED ORAL at 17:36

## 2025-03-28 RX ADMIN — METHOCARBAMOL 750 MG: 750 TABLET ORAL at 17:35

## 2025-03-28 RX ADMIN — FENTANYL CITRATE 50 MCG: 50 INJECTION, SOLUTION INTRAMUSCULAR; INTRAVENOUS at 15:49

## 2025-03-28 RX ADMIN — CEFAZOLIN SODIUM 1000 MG: 1 POWDER, FOR SOLUTION INTRAMUSCULAR; INTRAVENOUS at 15:51

## 2025-03-28 RX ADMIN — METHOCARBAMOL 750 MG: 750 TABLET ORAL at 09:05

## 2025-03-28 RX ADMIN — SODIUM CHLORIDE, PRESERVATIVE FREE 10 ML: 5 INJECTION INTRAVENOUS at 09:05

## 2025-03-28 RX ADMIN — LIDOCAINE HYDROCHLORIDE,EPINEPHRINE BITARTRATE 5 ML: 10; .01 INJECTION, SOLUTION INFILTRATION; PERINEURAL at 15:56

## 2025-03-28 RX ADMIN — HEPARIN SODIUM 2000 UNITS: 1000 INJECTION INTRAVENOUS; SUBCUTANEOUS at 16:07

## 2025-03-28 RX ADMIN — OXYCODONE HYDROCHLORIDE AND ACETAMINOPHEN 1 TABLET: 5; 325 TABLET ORAL at 22:25

## 2025-03-28 RX ADMIN — SODIUM CHLORIDE, PRESERVATIVE FREE 10 ML: 5 INJECTION INTRAVENOUS at 22:28

## 2025-03-28 RX ADMIN — METHOCARBAMOL 750 MG: 750 TABLET ORAL at 22:25

## 2025-03-28 RX ADMIN — VANCOMYCIN HYDROCHLORIDE 1000 MG: 1 INJECTION, POWDER, LYOPHILIZED, FOR SOLUTION INTRAVENOUS at 13:13

## 2025-03-28 RX ADMIN — LIDOCAINE HYDROCHLORIDE 5 ML: 10 INJECTION, SOLUTION INFILTRATION; PERINEURAL at 15:50

## 2025-03-28 RX ADMIN — ROSUVASTATIN CALCIUM 10 MG: 10 TABLET, FILM COATED ORAL at 22:26

## 2025-03-28 RX ADMIN — MIDAZOLAM HYDROCHLORIDE 1 MG: 1 INJECTION, SOLUTION INTRAMUSCULAR; INTRAVENOUS at 15:49

## 2025-03-28 ASSESSMENT — PAIN DESCRIPTION - FREQUENCY
FREQUENCY: CONTINUOUS
FREQUENCY: INTERMITTENT

## 2025-03-28 ASSESSMENT — PAIN DESCRIPTION - ORIENTATION
ORIENTATION: RIGHT;LEFT
ORIENTATION: MID

## 2025-03-28 ASSESSMENT — PAIN DESCRIPTION - DESCRIPTORS
DESCRIPTORS: ACHING
DESCRIPTORS: ACHING

## 2025-03-28 ASSESSMENT — PAIN DESCRIPTION - PAIN TYPE
TYPE: ACUTE PAIN
TYPE: ACUTE PAIN

## 2025-03-28 ASSESSMENT — PAIN SCALES - GENERAL
PAINLEVEL_OUTOF10: 2
PAINLEVEL_OUTOF10: 4

## 2025-03-28 ASSESSMENT — PAIN DESCRIPTION - LOCATION
LOCATION: ARM;SHOULDER
LOCATION: COCCYX

## 2025-03-28 ASSESSMENT — PAIN DESCRIPTION - ONSET
ONSET: ON-GOING
ONSET: ON-GOING

## 2025-03-28 NOTE — CARE COORDINATION
DISCHARGE PLANNING:  Chart reviewed.  Patient is from home with his spouse and daughter. He was active with [facility] but was discharged on 3/7. He receives HD via [facility] in [facility] on TTS (chair time is 8am for 4 hours).     Current discharge plan is [facility]. No precert needed.   They are able to accept patient on Sunday at anytime if medically stable. Weekend contact is Sonu ([phone]).    CM team will continue to follow.  Ana Grider RN Case Manager  [phone]

## 2025-03-28 NOTE — PROGRESS NOTES
Pt is resting comfortable, no complaints at this time wife at bedside, pt vas cath in place, clean dry and intact. Will resume dialysis tomorrow plan of care ongoing, call light within reach

## 2025-03-28 NOTE — PROGRESS NOTES
Ph: [phone], Fax: [phone]                                     [employer]                                                   [address] [place], [place] [id number]           Reason for admission:                 Brief Summary:     Danny Rock is being seen by nephrology for ESRD.     Interval History and Plan:   Seen at bedside  /81  Made 2500 mL urine yesterday  Had HD 3/25  TDC removed 3/27/2025 due to E.faecalis bacteremia.  Blood cx 3/26/2025 1 of 2 bottles positive for staph epi.  Last positive cx with E faecalis was 3/24      Discussed with Dr. Lee. Blood cx from 3/24 likely a contaminant. Will proceed with TDC placement today. Will likely happen later in the day.  Will plan for HD tomorrow.  He is NPO      Thank you for allowing us to participate in this patient's care  In case of any question please call us at our 24 hour answering service [phone] or from 7 AM to 5 PM via Perfect Serve, Voalte, Epic chat or cell phone.   Dr Thomas Clarke MD      Assessment:     ESRD  On HD TTS  Access: TDC  Volume status: Euvolemic        Hypertension      Anemia        HPI      Patient is a 75 y.o. male  with PMH significant forcoronary artery disease, chronic systolic and diastolic CHF, ESRD on dialysis, renal cell carcinoma s/p nephrectomy, essential hypertension, hyperlipidemia, paroxysmal A-fib on Eliquis transferred form [facility] for evaluation of back back/lumbar spine stenosis. Nephrology consulted to assist in HD. Reviewed chart and patient had dialysis yesterday.       Patient seen at bedside and appear comfortable on room air and denied SOB, pain around TDC or fever.         ROS:   Negative except as mentioned in HPI      Vitals:     Vitals:    03/28/25 0756   BP: 121/81   Pulse: 61   Resp: 16   Temp: 97.9 °F (36.6 °C)   SpO2: 98%         Physical Examination:     General appearance: NAD. Alert, oriented  Respiratory: No respiratory distress on  11/18/2024    CT NEEDLE BIOPSY LIVER PERCUTANEOUS 11/18/2024 [facility] CT SCAN    EYE SURGERY Bilateral 04/19/2018    Cataract    HERNIA REPAIR      HERNIA REPAIR Left 12/31/2024    OPEN LEFT INGUINAL HERNIA REPAIR WITH MESH performed by Camacho Chowdhury MD at [facility] OR    IR TUNNELED CVC PLACE WO SQ PORT/PUMP > 5 YEARS  02/07/2024    IR TUNNELED CATHETER PLACEMENT GREATER THAN 5 YEARS 2/7/2024 Kosta Baugh MD [facility] SPECIAL PROCEDURES    KIDNEY REMOVAL Right 07/25/2022    ROBOTIC LAPAROSCOPIC RADICAL RIGHT NEPHRECTOMY performed by Larry Olsen MD at [facility] OR    KNEE SURGERY      both knees: scope for cartilage    NASAL SEPTUM SURGERY      OTHER SURGICAL HISTORY  09/30/2024    Peritoneal dialysis catheter: removed    OTHER SURGICAL HISTORY Left     left arm fistula for dialysis: also has right chest catheter for dialysis    SHOULDER SURGERY Right 1/31/2025    RIGHT SHOULDER OPEN INCISION AND DRAINAGE performed by Nicolas Jacob MD at [facility] ASC OR

## 2025-03-28 NOTE — PLAN OF CARE
Problem: Chronic Conditions and Co-morbidities  Goal: Patient's chronic conditions and co-morbidity symptoms are monitored and maintained or improved  Outcome: Progressing     Problem: Discharge Planning  Goal: Discharge to home or other facility with appropriate resources  3/28/2025 1953 by Srini Barboza RN  Outcome: Progressing  3/28/2025 1817 by Isabella Mercer RN  Outcome: Progressing     Problem: Skin/Tissue Integrity  Goal: Skin integrity remains intact  Description: 1.  Monitor for areas of redness and/or skin breakdown  2.  Assess vascular access sites hourly  3.  Every 4-6 hours minimum:  Change oxygen saturation probe site  4.  Every 4-6 hours:  If on nasal continuous positive airway pressure, respiratory therapy assess nares and determine need for appliance change or resting period  3/28/2025 1953 by Srini Barboza RN  Outcome: Progressing  3/28/2025 1817 by Isabella Mercer RN  Outcome: Progressing     Problem: Safety - Adult  Goal: Free from fall injury  3/28/2025 1953 by Srini Barboza RN  Outcome: Progressing  Note: [place] safety precautions in place, bed locked and in lowest position, bed/chair alarm on, bedside table/call light within reach, gripper socks applied, pt oriented to fall prevention measures.    3/28/2025 1817 by Isabella Mercer RN  Outcome: Progressing     Problem: Pain  Goal: Verbalizes/displays adequate comfort level or baseline comfort level  Outcome: Progressing  Note: Pt pain managed via mAR     Problem: ABCDS Injury Assessment  Goal: Absence of physical injury  Outcome: Progressing     Problem: Nutrition Deficit:  Goal: Optimize nutritional status  Outcome: Progressing

## 2025-03-28 NOTE — PROGRESS NOTES
[facility] - Clinical Pharmacy Note     Notification received from laboratory of positive blood culture results.    Organism(s) detected: Staph epi in 1/2 blood cultures  Applicable Antimicrobial Resistance Genes: none    Recommended change(s) to current antimicrobial regimen: N/A - currently on vancomycin      Silvia Villarreal, PharmD, BCPS  Clinical Pharmacist  [id number]

## 2025-03-28 NOTE — PROGRESS NOTES
ID Follow-up NOTE    CC:   E faecalis bacteremia, Lumbar osteo-diskitis   Antibiotics: Vancomycin    Admit Date: 3/22/2025  Hospital Day: 7    Subjective:     3/27 HD line removed   3/27 DELMAR no vegetation    Feels 'good', no LBP this am      Objective:     Patient Vitals for the past 8 hrs:   BP Temp Temp src Pulse Resp SpO2   03/28/25 0756 121/81 97.9 °F (36.6 °C) Oral 61 16 98 %   03/28/25 0515 (!) 154/78 97.7 °F (36.5 °C) Axillary 72 16 98 %     I/O last 3 completed shifts:  In: 985 [P.O.:980; I.V.:5]  Out: 3400 [Urine:3400]  No intake/output data recorded.    EXAM:  GENERAL: No apparent distress.  RA  HEENT: Membranes moist, no oral lesion  NECK:  Supple, no lymphadenopathy  LUNGS: Clear b/l, no rales, no dullness  CARDIAC: RRR, no murmur appreciated  ABD:  + BS, soft / NT  EXT:  No rash, no edema, no lesions  NEURO: No focal neurologic findings   PSYCH: Orientation, sensorium, mood normal  LINES:  R chest tunnel HD line - removed 3/26  Peripheral iv       Data Review:  Lab Results   Component Value Date    WBC 6.1 03/28/2025    HGB 10.4 (L) 03/28/2025    HCT 30.5 (L) 03/28/2025    MCV 97.9 03/28/2025     03/28/2025     Lab Results   Component Value Date    CREATININE 2.3 (H) 03/28/2025    BUN 52 (H) 03/28/2025     (L) 03/28/2025    K 4.2 03/28/2025     03/28/2025    CO2 22 03/28/2025       Hepatic Function Panel:   Lab Results   Component Value Date/Time    ALKPHOS 112 03/22/2025 06:28 AM    ALT 26 03/22/2025 06:28 AM    AST 28 03/22/2025 06:28 AM    BILITOT 0.6 03/22/2025 06:28 AM    BILIDIR <0.2 03/16/2021 09:41 AM    IBILI see below 03/16/2021 09:41 AM       ESR 33,     MICRO:  3/22 BC x 1 E faecalis, S epidermidis  Enterococcus faecalis   Antibiotic Interpretation VALERIE    ampicillin Sensitive <=2 mcg/mL   gentamicin-syn Resistant SYN-R mcg/mL   streptomycin-syn Sensitive SYN-S mcg/mL   vancomycin Sensitive 1 mcg/mL     3/23 BC x 2 E faecalis   3/24 BC x 2 E faecalis  3/26 BC x 1  normal. Trace regurgitation.    Excellent visualization of all cardiac valves with no valvular vegetations noted.      Scheduled Meds:   insulin lispro  2 Units SubCUTAneous TID WC    vancomycin (VANCOCIN) intermittent dosing (placeholder)   Other RX Placeholder    [Held by provider] apixaban  5 mg Oral BID    [Held by provider] aspirin  81 mg Oral Daily    vitamin B-12  1,000 mcg Oral Daily    [Held by provider] metoprolol tartrate  50 mg Oral BID    pantoprazole  40 mg Oral QAM AC    rosuvastatin  10 mg Oral Nightly    sevelamer  800 mg Oral TID WC    sodium chloride flush  5-40 mL IntraVENous 2 times per day    insulin lispro  0-8 Units SubCUTAneous 4x Daily AC & HS    methocarbamol  750 mg Oral 4x Daily       Continuous Infusions:   sodium chloride Stopped (03/26/25 1400)    dextrose         PRN Meds:  heparin (porcine), HYDROmorphone, LORazepam, melatonin, sodium chloride flush, sodium chloride, ondansetron **OR** ondansetron, polyethylene glycol, acetaminophen **OR** acetaminophen, glucose, dextrose bolus **OR** dextrose bolus, glucagon (rDNA), dextrose, oxyCODONE-acetaminophen **OR** oxyCODONE-acetaminophen      Assessment:     HTN, DM, pAF, CRF on HD, hx prostate ca, LBP  Allergy - rash w Amox, has had cephalosporins (Ancef, Ceftriaxone)    E faecalis bacteremia 3/21,22,23,24   - f/u BC 3/24 [facility]   - line removed 3/26, line tip cult sent   - DELMAR 3/26 no vegetation     Multilevel lumbar osteo-diskitis seen on MRI  - assoc with epidural phlegmon posterior L3/4 on MRI reading  - assoc with E faecalis bacteremia  - seen by Neurosurg who recommended no neurosurg intervention, signed off    Lower abd wall fluid collection on CT     Plan:     Cont Vancomycin   -given 1.5 gm at HD 10/25, 1.0 gm on 3/26   - pharmacy following and dosing   - will dose 3 days a week at end of HD after discharge    Will f/u on BC sent 3/26 - no growth to date   Will f/u on line tip sent 3/26 - no growth to date    May proceed with new HD

## 2025-03-28 NOTE — PROGRESS NOTES
Physical Therapy  Facility/Department: [facility] 5T ORTHO/NEURO  Physical Therapy Treatment    Name: Danny Rock  : 1950  MRN: [id number]  Date of Service: 3/28/2025    Discharge Recommendations:  Subacute/Skilled Nursing Facility (pending improved cognition and pain may benefit from ARU)   PT Equipment Recommendations  Other: plan to continue to assess and likely defer recommendations to the next level of care      Patient Diagnosis(es): The primary encounter diagnosis was Enterococcal bacteremia. A diagnosis of Bacteremia was also pertinent to this visit.  Past Medical History:  has a past medical history of Atrial fibrillation (HCC), CAD (coronary artery disease), Cancer of kidney (HCC), Dependence on renal dialysis, Diabetes mellitus (HCC), Hyperlipidemia, Hypertension, Prostate cancer (HCC), Right renal mass, and Systolic CHF (HCC).  Past Surgical History:  has a past surgical history that includes hernia repair; knee surgery; Cholecystectomy; Nasal septum surgery; eye surgery (Bilateral, 2018); Kidney removal (Right, 2022); Cataract removal (Bilateral); IR TUNNELED CVC PLACE WO SQ PORT/PUMP > 5 YEARS (2024); CT NEEDLE BIOPSY LIVER PERCUTANEOUS (2024); other surgical history (2024); CT NEEDLE BIOPSY LIVER PERCUTANEOUS (2024); CT GUIDED CHEST TUBE (2024); hernia repair (Left, 2024); other surgical history (Left); and shoulder surgery (Right, 2025).    Assessment  Body Structures, Functions, Activity Limitations Requiring Skilled Therapeutic Intervention: Decreased functional mobility ;Decreased ADL status;Decreased ROM;Decreased strength;Decreased safe awareness;Decreased cognition;Decreased endurance;Decreased balance;Increased pain  Assessment: 75 y.o. y.o. male with history of CAD, CHF, ESRD on HD, RCC s/p nephrectomy, HTN, HLD, afib on eliquis who presented to [facility] with extreme low back pain. Pt currently requiring Ax1 for bed mobility and  13  AM-PAC Inpatient T-Scale Score : 36.74  Mobility Inpatient CMS 0-100% Score: 64.91  Mobility Inpatient CMS G-Code Modifier : CL      Goals  Short Term Goals  Time Frame for Short Term Goals: d/c - all goals ongoing 3/28  Short Term Goal 1: sup<>sit supervision  Short Term Goal 2: sit<>stand supervision  Short Term Goal 3: amb 150' with RW and supervision  Patient Goals   Patient Goals : \"I don't know - my wife will be here in 10 minutes\"       Education  Patient Education  Education Given To: Patient  Education Provided: Role of Therapy;Plan of Care  Education Provided Comments: use of call light, PT recommendations  Education Method: Demonstration;Verbal  Barriers to Learning: Cognition  Education Outcome: Continued education needed      Therapy Time   Individual Concurrent Group Co-treatment   Time In 1255         Time Out 1318         Minutes 23              Timed Code Treatment Minutes: 23 minutes    Total Treatment Minutes: 23 Minutes    If patient discharges prior to next treatment, this note will serve as discharge summary.    Clair Gonzalez PT, DPT #[id number]

## 2025-03-28 NOTE — PLAN OF CARE
Problem: Discharge Planning  Goal: Discharge to home or other facility with appropriate resources  Outcome: Progressing   Pt will not d/c on this shift, plan of care ongoing  Problem: Skin/Tissue Integrity  Goal: Skin integrity remains intact  Description: 1.  Monitor for areas of redness and/or skin breakdown  2.  Assess vascular access sites hourly  3.  Every 4-6 hours minimum:  Change oxygen saturation probe site  4.  Every 4-6 hours:  If on nasal continuous positive airway pressure, respiratory therapy assess nares and determine need for appliance change or resting period  Outcome: Progressing  Pt noted no new skin issues during this shift     Problem: Safety - Adult  Goal: Free from fall injury  Outcome: Progressing   Pt free of falls during this shift, in bed alarm on locked and low call light within reach

## 2025-03-28 NOTE — DISCHARGE INSTR - COC
Continuity of Care Form    Patient Name: Danny Rock   :  1950  MRN:  [id number]    Admit date:  3/22/2025  Discharge date:  2025    Code Status Order: Full Code   Advance Directives:     Admitting Physician:  Zach Sandoval MD  PCP: Yesi Rosa, JANET - CNP    Discharging Nurse: [facility] Unit/Room#: 5511/5511-01  Discharging Unit Phone Number: [phone]    Emergency Contact:   Extended Emergency Contact Information  Primary Emergency Contact: Amanda Rock  Address: [address] [place]           [place] [address] [facility]  Home Phone: [phone]  Mobile Phone: [phone]  Relation: Spouse    Past Surgical History:  Past Surgical History:   Procedure Laterality Date    CATARACT REMOVAL Bilateral     ~  with lens implanted    CHOLECYSTECTOMY      CT GUIDED CHEST TUBE  2024    CT GUIDED CHEST TUBE 2024 [facility] CT SCAN    CT NEEDLE BIOPSY LIVER PERCUTANEOUS  2024    CT NEEDLE BIOPSY LIVER PERCUTANEOUS 2024 [facility] CT SCAN    CT NEEDLE BIOPSY LIVER PERCUTANEOUS  2024    CT NEEDLE BIOPSY LIVER PERCUTANEOUS 2024 [facility] CT SCAN    EYE SURGERY Bilateral 2018    Cataract    HERNIA REPAIR      HERNIA REPAIR Left 2024    OPEN LEFT INGUINAL HERNIA REPAIR WITH MESH performed by Camacho Chowdhury MD at [facility] OR    IR TUNNELED CVC PLACE WO SQ PORT/PUMP > 5 YEARS  2024    IR TUNNELED CATHETER PLACEMENT GREATER THAN 5 YEARS 2024 Kosta Baugh MD [facility] SPECIAL PROCEDURES    KIDNEY REMOVAL Right 2022    ROBOTIC LAPAROSCOPIC RADICAL RIGHT NEPHRECTOMY performed by Larry Olsen MD at [facility] OR    KNEE SURGERY      both knees: scope for cartilage    NASAL SEPTUM SURGERY      OTHER SURGICAL HISTORY  2024    Peritoneal dialysis catheter: removed    OTHER SURGICAL HISTORY Left     left arm fistula for dialysis: also has right chest catheter for dialysis    SHOULDER SURGERY Right 2025    RIGHT SHOULDER OPEN  information only, NOT a DME order):  {EQUIPMENT:[id number]}  Other Treatments: ***    Patient's personal belongings (please select all that are sent with patient):  {CHP DME Belongings:[id number]}    RN SIGNATURE:  {Esignature:[id number]}    CASE MANAGEMENT/SOCIAL WORK SECTION    Inpatient Status Date: 3/22/25    Readmission Risk Assessment Score:  [facility] RISK OF UNPLANNED READMISSION 2.0             25 Total Score        Discharging to Facility/ Agency   Name: [facility]  [address] [place] [address]  Phone: [phone]  Fax: [phone]     / signature: Electronically signed by ALEXI ÁLVAREZ on 3/28/25 at 4:12 PM EDT    PHYSICIAN SECTION    Prognosis: Good    Condition at Discharge: Stable    Rehab Potential (if transferring to Rehab): Good    Recommended Labs or Other Treatments After Discharge:     -Pharmacy to dose vancomycin    - will give Vancomycin at HD, 1 gm at HD 3 days a week   - through 5/19 (8 weeks from last positive BC 3/24    Physician Certification: I certify the above information and transfer of Danny Rock  is necessary for the continuing treatment of the diagnosis listed and that he requires Acute Rehab for less 30 days.     Update Admission H&P: No change in H&P    PHYSICIAN SIGNATURE:  Electronically signed by Marva Todd MD on 3/30/25 at 12:00 PM EDT

## 2025-03-28 NOTE — PROGRESS NOTES
[facility] -  Clinical Pharmacy Note    Vancomycin - Management by Pharmacy    Consult Date(s): 3/23  Consulting Provider(s): Dr Moreno    Assessment / Plan  E faecalis bacteremia, lumbar osteo-discitis - Vancomycin  Concurrent Antimicrobials: gentamicin 1 mg/kg w/ HD 3x/week for synergy   Day of Vanc Therapy / Ordered Duration: Day 6/ indefinite  Current Dosing Method: Intermittent Dosing by Levels  Therapeutic Goal: Trough ~ 15 mg/L  Current Dose / Plan:   Patient with ESRD on HD (TTS)   Last HD session 3/25 for 3.5 hours  Patient does still make urine;  documented UOP past 24h = 1.6 ml/kg/hr  HD line removed 3/26. Plans for HD today per normal schedule but will need temporary line placement.  Vancomycin random level today = 12.7 mg/L.   Per notes from nephrology, will plan for TDC placement today and HD tomorrow.  Will give vancomycin 1000 mg IV once today as level is <15 mg/L.   Vancomycin random level ordered for tomorrow, 3/29 for follow up  Will continue to monitor clinical condition and make adjustments to regimen as appropriate.    Thank you for consulting pharmacy,    Silvia Villarreal, PharmD, BCPS  Clinical Pharmacist  [id number]        Interval update:  Staph epi in 1/2 blood cultures drawn on 3/26. Patient remains on vancomycin + gentamicin for synergy. ID continues to follow. Patient remains afebrile, on room air.    Subjective/Objective:   Danny Rock is a 75 y.o. male with a PMHx significant for CAD, CHF, ESRD on HD (TTS), renal cell carcinoma s/p nephrectomy, HTN, HLD, AF (Eliquis) who presented to [facility] with complaints of lower back pain and inability to move lower extremities 2/2 pain. MRI concerning for multilevel severe foraminal stenosis with high-grade narrowing at L5-S1 and was transferred to [facility]. Blood cx were positive x1 for staph epidermidis and e faecalis (van A/B genes not detected) (bottle 2 cancelled) and vancomycin was initiated.     Pharmacy is consulted to dose  vancomycin.    Ht Readings from Last 1 Encounters:   03/22/25 1.905 m (6' 3\")     Wt Readings from Last 1 Encounters:   03/25/25 67.2 kg (148 lb 2.4 oz)     Current & Prior Antimicrobial Regimen(s):  Vancomycin - PTD  Intermittent  Date Vanc Level Vanc Dose HD   3/22 ---- ----- Yes 3h   3/23 - 1750 mg   no   3/24 12.5 mg/L 1000 mg   no   3/25 14.6 mg/L 1500 mg post HD Yes 3.5h   3/26 13.8 mg/L 1000 mg no   3/27 16.7 mg/L --- No   3/28 12.7 mg/L 1000 mg  No     Cultures & Sensitivities:    Date Site Micro Susceptibility / Result   3/22 Blood x 1   Enterococcus faecalis   S: ampicillin, vancomycin (VALERIE=1)   3/23 Blood x 2 #1- GPC  #2 - E faecalis  --   Van A/B not detected   3/24 Blood x2 GPC resembling strep  E. faecalis    3/26 Blood x2 Staph epi in 1/2 blood cx      Recent Labs     03/26/25  0910 03/27/25  0418 03/28/25  0800   CREATININE 2.0* 2.2* 2.3*   BUN 40* 44* 52*   WBC 9.2 5.8 6.1       Estimated Creatinine Clearance: 26 mL/min (A) (based on SCr of 2.3 mg/dL (H)).    Additional Lab Values / Findings of Note:    No results for input(s): \"PROCAL\" in the last 72 hours.

## 2025-03-28 NOTE — PROGRESS NOTES
Pt awake in bed, alert and oriented to self and time, disoriented to place and situation, pt needs frequent reorientation. VSS on RA, pt exhibiting no s/s acute distress at this time. Pt tolerating NPO since 0000 diet. Pt voiding via external catheter. Pt pain managed via MAR. Standard safety precautions in place, bed locked and in lowest position, bed/chair alarm on, gripper socks applied, bedside table/call light within reach, pt oriented to fall prevention measures. Video monitoring utilized for safety. Pt states he Has no other needs at this time. Plan of care to continue.    Electronically signed by SHANNON CHANCE RN on 3/28/2025 at 12:17 AM

## 2025-03-29 LAB
ANION GAP SERPL CALCULATED.3IONS-SCNC: 10 MMOL/L (ref 3–16)
BASOPHILS # BLD: 0 K/UL (ref 0–0.2)
BASOPHILS NFR BLD: 0.5 %
BUN SERPL-MCNC: 51 MG/DL (ref 7–20)
CALCIUM SERPL-MCNC: 8.8 MG/DL (ref 8.3–10.6)
CHLORIDE SERPL-SCNC: 103 MMOL/L (ref 99–110)
CO2 SERPL-SCNC: 23 MMOL/L (ref 21–32)
CREAT SERPL-MCNC: 2.2 MG/DL (ref 0.8–1.3)
DEPRECATED RDW RBC AUTO: 19.9 % (ref 12.4–15.4)
EOSINOPHIL # BLD: 0.2 K/UL (ref 0–0.6)
EOSINOPHIL NFR BLD: 2.6 %
GFR SERPLBLD CREATININE-BSD FMLA CKD-EPI: 30 ML/MIN/{1.73_M2}
GLUCOSE BLD-MCNC: 144 MG/DL (ref 70–99)
GLUCOSE BLD-MCNC: 149 MG/DL (ref 70–99)
GLUCOSE BLD-MCNC: 155 MG/DL (ref 70–99)
GLUCOSE BLD-MCNC: 94 MG/DL (ref 70–99)
GLUCOSE SERPL-MCNC: 130 MG/DL (ref 70–99)
HCT VFR BLD AUTO: 29.8 % (ref 40.5–52.5)
HGB BLD-MCNC: 10 G/DL (ref 13.5–17.5)
LYMPHOCYTES # BLD: 0.8 K/UL (ref 1–5.1)
LYMPHOCYTES NFR BLD: 13.3 %
MCH RBC QN AUTO: 33.8 PG (ref 26–34)
MCHC RBC AUTO-ENTMCNC: 33.5 G/DL (ref 31–36)
MCV RBC AUTO: 100.7 FL (ref 80–100)
MONOCYTES # BLD: 0.4 K/UL (ref 0–1.3)
MONOCYTES NFR BLD: 5.6 %
NEUTROPHILS # BLD: 5 K/UL (ref 1.7–7.7)
NEUTROPHILS NFR BLD: 78 %
PERFORMED ON: ABNORMAL
PERFORMED ON: NORMAL
PLATELET # BLD AUTO: 174 K/UL (ref 135–450)
PMV BLD AUTO: 7.6 FL (ref 5–10.5)
POTASSIUM SERPL-SCNC: 4.3 MMOL/L (ref 3.5–5.1)
RBC # BLD AUTO: 2.96 M/UL (ref 4.2–5.9)
SODIUM SERPL-SCNC: 136 MMOL/L (ref 136–145)
VANCOMYCIN SERPL-MCNC: 16.7 UG/ML
WBC # BLD AUTO: 6.4 K/UL (ref 4–11)

## 2025-03-29 PROCEDURE — 2500000003 HC RX 250 WO HCPCS: Performed by: INTERNAL MEDICINE

## 2025-03-29 PROCEDURE — 2580000003 HC RX 258

## 2025-03-29 PROCEDURE — 90935 HEMODIALYSIS ONE EVALUATION: CPT

## 2025-03-29 PROCEDURE — 80048 BASIC METABOLIC PNL TOTAL CA: CPT

## 2025-03-29 PROCEDURE — 6360000002 HC RX W HCPCS

## 2025-03-29 PROCEDURE — 6370000000 HC RX 637 (ALT 250 FOR IP): Performed by: INTERNAL MEDICINE

## 2025-03-29 PROCEDURE — 80202 ASSAY OF VANCOMYCIN: CPT

## 2025-03-29 PROCEDURE — 85025 COMPLETE CBC W/AUTO DIFF WBC: CPT

## 2025-03-29 PROCEDURE — 6370000000 HC RX 637 (ALT 250 FOR IP)

## 2025-03-29 PROCEDURE — 36415 COLL VENOUS BLD VENIPUNCTURE: CPT

## 2025-03-29 PROCEDURE — 99232 SBSQ HOSP IP/OBS MODERATE 35: CPT | Performed by: INTERNAL MEDICINE

## 2025-03-29 PROCEDURE — 1200000000 HC SEMI PRIVATE

## 2025-03-29 RX ADMIN — METHOCARBAMOL 750 MG: 750 TABLET ORAL at 12:41

## 2025-03-29 RX ADMIN — PANTOPRAZOLE SODIUM 40 MG: 40 TABLET, DELAYED RELEASE ORAL at 06:14

## 2025-03-29 RX ADMIN — INSULIN LISPRO 2 UNITS: 100 INJECTION, SOLUTION INTRAVENOUS; SUBCUTANEOUS at 08:00

## 2025-03-29 RX ADMIN — INSULIN LISPRO 2 UNITS: 100 INJECTION, SOLUTION INTRAVENOUS; SUBCUTANEOUS at 12:41

## 2025-03-29 RX ADMIN — CYANOCOBALAMIN TAB 1000 MCG 1000 MCG: 1000 TAB at 08:01

## 2025-03-29 RX ADMIN — SEVELAMER CARBONATE 800 MG: 800 TABLET, FILM COATED ORAL at 12:41

## 2025-03-29 RX ADMIN — SODIUM CHLORIDE 1500 MG: 0.9 INJECTION, SOLUTION INTRAVENOUS at 18:07

## 2025-03-29 RX ADMIN — METHOCARBAMOL 750 MG: 750 TABLET ORAL at 17:56

## 2025-03-29 RX ADMIN — SEVELAMER CARBONATE 800 MG: 800 TABLET, FILM COATED ORAL at 17:56

## 2025-03-29 RX ADMIN — POLYETHYLENE GLYCOL 3350 17 G: 17 POWDER, FOR SOLUTION ORAL at 12:46

## 2025-03-29 RX ADMIN — METHOCARBAMOL 750 MG: 750 TABLET ORAL at 08:01

## 2025-03-29 RX ADMIN — SODIUM CHLORIDE, PRESERVATIVE FREE 10 ML: 5 INJECTION INTRAVENOUS at 08:01

## 2025-03-29 RX ADMIN — SEVELAMER CARBONATE 800 MG: 800 TABLET, FILM COATED ORAL at 08:01

## 2025-03-29 RX ADMIN — APIXABAN 5 MG: 5 TABLET, FILM COATED ORAL at 20:32

## 2025-03-29 RX ADMIN — ROSUVASTATIN CALCIUM 10 MG: 10 TABLET, FILM COATED ORAL at 20:32

## 2025-03-29 RX ADMIN — ACETAMINOPHEN 650 MG: 325 TABLET ORAL at 12:45

## 2025-03-29 RX ADMIN — SODIUM CHLORIDE, PRESERVATIVE FREE 10 ML: 5 INJECTION INTRAVENOUS at 20:33

## 2025-03-29 RX ADMIN — METHOCARBAMOL 750 MG: 750 TABLET ORAL at 20:32

## 2025-03-29 ASSESSMENT — PAIN DESCRIPTION - ORIENTATION: ORIENTATION: LOWER

## 2025-03-29 ASSESSMENT — PAIN DESCRIPTION - DESCRIPTORS: DESCRIPTORS: ACHING

## 2025-03-29 ASSESSMENT — PAIN SCALES - GENERAL
PAINLEVEL_OUTOF10: 2
PAINLEVEL_OUTOF10: 3
PAINLEVEL_OUTOF10: 3

## 2025-03-29 ASSESSMENT — PAIN DESCRIPTION - LOCATION: LOCATION: BACK

## 2025-03-29 ASSESSMENT — PAIN - FUNCTIONAL ASSESSMENT: PAIN_FUNCTIONAL_ASSESSMENT: ACTIVITIES ARE NOT PREVENTED

## 2025-03-29 NOTE — PROGRESS NOTES
Treatment time: 3.5 hrs    Net UF: 1000 ml    Pre weight: 66 kg  Post weight: 65 kg    Access used: Rt CW TDC  Access function: Well tolerated, 350 BFR    Medications or blood products given: Heparin dwells    Regular outpatient schedule: TTS    Summary of response to treatment: Pt tolerated well. Pt remained stable throughout entire treatment and upon exiting the hemodialysis suite. Report given to Savanna Hutton RN

## 2025-03-29 NOTE — PLAN OF CARE
Problem: Chronic Conditions and Co-morbidities  Goal: Patient's chronic conditions and co-morbidity symptoms are monitored and maintained or improved  Outcome: Progressing     Problem: Discharge Planning  Goal: Discharge to home or other facility with appropriate resources  Outcome: Progressing     Problem: Skin/Tissue Integrity  Goal: Skin integrity remains intact  Description: 1.  Monitor for areas of redness and/or skin breakdown  2.  Assess vascular access sites hourly  3.  Every 4-6 hours minimum:  Change oxygen saturation probe site  4.  Every 4-6 hours:  If on nasal continuous positive airway pressure, respiratory therapy assess nares and determine need for appliance change or resting period  Outcome: Progressing     Problem: Safety - Adult  Goal: Free from fall injury  Outcome: Progressing     Problem: Pain  Goal: Verbalizes/displays adequate comfort level or baseline comfort level  Outcome: Progressing     Problem: ABCDS Injury Assessment  Goal: Absence of physical injury  Outcome: Progressing     Problem: Nutrition Deficit:  Goal: Optimize nutritional status  Outcome: Progressing

## 2025-03-29 NOTE — PROGRESS NOTES
Pt awake in bed, alert and oriented x self, time, and situation, disoriented to time. VSS on RA, pt exhibiting no s/s acute distress at this time. Delirium precautions in place. Pt tolerating PO diet well. Pt CHG bath completed with full linen change this shift. Pt voiding via malewick. Pt pain managed via MAR. Pt tolerating Q2 turns. Standard safety precautions in place, bed locked and in lowest position, bed/chair alarm on, gripper socks applied, bedside table/call light within reach, pt oriented to fall prevention measures. Pt states he Has no other needs at this time. Plan of care to continue.    Electronically signed by SHANNON CHANCE RN on 3/29/2025 at 2:06 AM

## 2025-03-29 NOTE — PROGRESS NOTES
Hospital Medicine Progress Note      Date of Admission: 3/22/2025  Hospital Day: 8    Chief Admission Complaint: Low back pain     Subjective: Patient seen and examined in the morning  No acute events ovn          Presenting Admission History:       75 y.o. male with PMHx significant for coronary artery disease, chronic systolic and diastolic CHF, ESRD on dialysis, renal cell carcinoma s/p nephrectomy, essential hypertension, hyperlipidemia, paroxysmal A-fib on Eliquis who presented to [facility] initially with a complaint of back pain which started a few days and was associated with inability to move lower extremities secondary to his pain.  Patient denies any numbness or tingling, no bowel disturbance but does report some difficulty with urination. No saddle anesthesia. MRI spine concerning for multilevel severe foraminal stenosis especially with impingement of the bilateral exiting L5 nerve root with high-grade left foraminal narrowing at L5-S1. Patient was discussed with neurosurgery at the [facility] who recommended transfer to the [facility] for further evaluation.   Had a history of right shoulder abscess in 1/2025 s/p I&D on 1/31/2025 with 1 surgical specimen positive for micrococcus which was considered contaminant patient was discharged with doxycycline pending final culture results.    Assessment/Plan:        Bacteremia with Enterococcus faecalis   Multilevel lumbar discitis and osteomyelitis  MRI of the lumbar, thoracic and cervical spine concerning for multilevel abnormal disc signal with disc enhancement and endplate enhancement compatible with discitis and osteomyelitis at L2-L3, L3-L4 and L4-L5.  Adjacent paravertebral edema and enhancement.  Equivocal small abscess in the right psoas muscle.  1 bottle of blood culture done at outside hospital prior to transfer on 3/22 positive for Enterococcus faecalis and Staph epidermidis.    Repeat blood culture 3/23, 3/24 2 out of 2 positive for  treatment goals:  [] Decision to Escalate Care To:   [] Major Surgery/Procedure (any 1):   Elective with patient risk factors including Procedure Type and Risk Factors:   Emergent Procedure Type:    ----------------------------------------------------------------------  C. Data (any 2)  [x] Data Review (3+ points)  [x] Consultant Note reviewed with note date, specialty, and summary (1 point each) neurosurgery, infectious disease  [x] All current labs were reviewed and interpreted for clinical significance   [] Studies Reviewed (1 point each):   [] Collateral history obtained including from who and why needed (Max 1 point):  [] Independent (Marva Todd MD) Interpretation of tests (any 1)  [] Rhythm Strip (Telemetry) personally reviewed and interpreted as documented above    [] Imaging personally reviewed and interpreted, includes:    [x] Discussion (any 1)  [x] Discussed the discharge plan in detail with case management  including timing/barriers to discharge, need for support services and/or placement decision   [] Discussed management of the case with:        Medications:  Personally reviewed in detail in conjunction w/ labs as documented for evidence of drug toxicity.     Infusion Medications    sodium chloride Stopped (03/26/25 1400)    dextrose       Scheduled Medications    vancomycin  1,500 mg IntraVENous Once    insulin lispro  2 Units SubCUTAneous TID WC    vancomycin (VANCOCIN) intermittent dosing (placeholder)   Other RX Placeholder    apixaban  5 mg Oral BID    aspirin  81 mg Oral Daily    vitamin B-12  1,000 mcg Oral Daily    [Held by provider] metoprolol tartrate  50 mg Oral BID    pantoprazole  40 mg Oral QAM AC    rosuvastatin  10 mg Oral Nightly    sevelamer  800 mg Oral TID WC    sodium chloride flush  5-40 mL IntraVENous 2 times per day    insulin lispro  0-8 Units SubCUTAneous 4x Daily AC & HS    methocarbamol  750 mg Oral 4x Daily     PRN Meds: heparin (porcine), HYDROmorphone, LORazepam,

## 2025-03-29 NOTE — PROGRESS NOTES
[facility] -  Clinical Pharmacy Note    Vancomycin - Management by Pharmacy    Consult Date(s): 3/23  Consulting Provider(s): Dr Moreno    Assessment / Plan  E faecalis bacteremia, lumbar osteo-discitis - Vancomycin  Concurrent Antimicrobials: None   Day of Vanc Therapy / Ordered Duration: Day 7   Per ID, planning to treat 6-8 weeks, dosing with HD  Current Dosing Method: Intermittent Dosing by Levels  Therapeutic Goal: Trough ~ 15 mg/L  Current Dose / Plan:   Patient with ESRD on HD (TTS).  New vascath placed yesterday in Cath lab.  Plan for HD today to resume home schedule.    Patient still makes urine; documented as 0.8 mL/kg/hr in past 24hrs   Vancomycin random level today = 16.7 mg/L.   Will order vancomycin 1500 mg IV x1 to be given after HD today.  Plan to obtain a level 3/30 AM to ensure pt still therapeutic after HD session today.   Will continue to monitor clinical condition and make adjustments to regimen as appropriate.    Thank you for consulting pharmacy,  Luisa HillsD., BCPS   3/29/2025 8:47 AM  Wireless: [phone]          Interval update:  New HD vascath placed last evening.  Planning for HD today to resume home schedule.  ID continues to follow. Patient remains afebrile, on room air.    Subjective/Objective:   Danny Rock is a 75 y.o. male with a PMHx significant for CAD, CHF, ESRD on HD (TTS), renal cell carcinoma s/p nephrectomy, HTN, HLD, AF (Eliquis) who presented to [facility] with complaints of lower back pain and inability to move lower extremities 2/2 pain. MRI concerning for multilevel severe foraminal stenosis with high-grade narrowing at L5-S1 and was transferred to [facility]. Blood cx were positive x1 for staph epidermidis and e faecalis (van A/B genes not detected) (bottle 2 cancelled) and vancomycin was initiated.     Pharmacy is consulted to dose vancomycin.    Ht Readings from Last 1 Encounters:   03/22/25 1.905 m (6' 3\")     Wt Readings from Last 1 Encounters:

## 2025-03-29 NOTE — PROGRESS NOTES
Ph: [phone], Fax: [phone]                                     [email or web address]                                                   [address] [place] [address]           Reason for admission:                 Brief Summary:     Danny Rock is being seen by nephrology for ESRD.     Interval History and Plan:     Tunneled catheter was removed and replaced yesterday  Blood culture was positive for thought to be contaminant  Electrolytes are overall stable  Blood pressure good  No significant pedal edema noted  In Good spirits  Anxious to go home  Labs showing stable electrolytes  Doing dialysis today and will continue dialysis Tuesday Thursday Saturday  Fluid removal to target weight       Zacarias Redman MD      Assessment:     ESRD  On HD TTS  Access: TDC  Volume status: Euvolemic        Hypertension      Anemia        HPI      Patient is a 75 y.o. male  with PMH significant forcoronary artery disease, chronic systolic and diastolic CHF, ESRD on dialysis, renal cell carcinoma s/p nephrectomy, essential hypertension, hyperlipidemia, paroxysmal A-fib on Eliquis transferred form [facility] for evaluation of back back/lumbar spine stenosis. Nephrology consulted to assist in HD. Reviewed chart and patient had dialysis yesterday.       Patient seen at bedside and appear comfortable on room air and denied SOB, pain around TDC or fever.         ROS:   Negative except as mentioned in HPI      Vitals:     Vitals:    03/29/25 0500   BP: 121/68   Pulse: 77   Resp: 16   Temp: 97.8 °F (36.6 °C)   SpO2: 97%         Physical Examination:     General appearance: NAD. Alert, oriented  Respiratory: No respiratory distress on room air. Clear.   Cardiovascular: Edema no  Abdomen: Soft.   Other relevant findings:       Medications:       insulin lispro  2 Units SubCUTAneous TID     vancomycin (VANCOCIN) intermittent dosing (placeholder)   Other RX Placeholder    [Held by provider]  TUNNELED CVC PLACE WO SQ PORT/PUMP > 5 YEARS  02/07/2024    IR TUNNELED CATHETER PLACEMENT GREATER THAN 5 YEARS 2/7/2024 Kosta Baguh MD [facility] SPECIAL PROCEDURES    IR TUNNELED CVC PLACE WO SQ PORT/PUMP > 5 YEARS  3/28/2025    IR TUNNELED CVC PLACE WO SQ PORT/PUMP > 5 YEARS 3/28/2025 [facility] CARDIAC CATH/IR/NEURO LAB    KIDNEY REMOVAL Right 07/25/2022    ROBOTIC LAPAROSCOPIC RADICAL RIGHT NEPHRECTOMY performed by Larry Olsen MD at [facility] OR    KNEE SURGERY      both knees: scope for cartilage    NASAL SEPTUM SURGERY      OTHER SURGICAL HISTORY  09/30/2024    Peritoneal dialysis catheter: removed    OTHER SURGICAL HISTORY Left     left arm fistula for dialysis: also has right chest catheter for dialysis    SHOULDER SURGERY Right 1/31/2025    RIGHT SHOULDER OPEN INCISION AND DRAINAGE performed by Nicolas Jacob MD at [facility] OR

## 2025-03-29 NOTE — PROGRESS NOTES
Hospital Medicine Progress Note      Date of Admission: 3/22/2025  Hospital Day: 7    Chief Admission Complaint: Low back pain     Subjective: Patient seen and examined at the dialysis unit  No acute events ovn  Plan for HD cath placement today        Presenting Admission History:       75 y.o. male with PMHx significant for coronary artery disease, chronic systolic and diastolic CHF, ESRD on dialysis, renal cell carcinoma s/p nephrectomy, essential hypertension, hyperlipidemia, paroxysmal A-fib on Eliquis who presented to [facility] initially with a complaint of back pain which started a few days and was associated with inability to move lower extremities secondary to his pain.  Patient denies any numbness or tingling, no bowel disturbance but does report some difficulty with urination. No saddle anesthesia. MRI spine concerning for multilevel severe foraminal stenosis especially with impingement of the bilateral exiting L5 nerve root with high-grade left foraminal narrowing at L5-S1. Patient was discussed with neurosurgery at the [facility] who recommended transfer to the [facility] for further evaluation.   Had a history of right shoulder abscess in 1/2025 s/p I&D on 1/31/2025 with 1 surgical specimen positive for micrococcus which was considered contaminant patient was discharged with doxycycline pending final culture results.    Assessment/Plan:        Bacteremia with Enterococcus faecalis   Multilevel lumbar discitis and osteomyelitis  MRI of the lumbar, thoracic and cervical spine concerning for multilevel abnormal disc signal with disc enhancement and endplate enhancement compatible with discitis and osteomyelitis at L2-L3, L3-L4 and L4-L5.  Adjacent paravertebral edema and enhancement.  Equivocal small abscess in the right psoas muscle.  1 bottle of blood culture done at outside hospital prior to transfer on 3/22 positive for Enterococcus faecalis and Staph epidermidis.    Repeat blood      PRN Meds: heparin (porcine), HYDROmorphone, LORazepam, melatonin, sodium chloride flush, sodium chloride, ondansetron **OR** ondansetron, polyethylene glycol, acetaminophen **OR** acetaminophen, glucose, dextrose bolus **OR** dextrose bolus, glucagon (rDNA), dextrose, oxyCODONE-acetaminophen **OR** oxyCODONE-acetaminophen     Labs:  Personally reviewed and interpreted for clinical significance.     Recent Labs     03/26/25  0910 03/27/25  0418 03/28/25  0800   WBC 9.2 5.8 6.1   HGB 10.7* 10.3* 10.4*   HCT 32.1* 31.1* 30.5*    134* 160     Recent Labs     03/26/25  0910 03/27/25  0418 03/28/25  0800   * 134* 134*   K 4.4 3.9 4.2   CL 97* 99 101   CO2 26 24 22   BUN 40* 44* 52*   CREATININE 2.0* 2.2* 2.3*   CALCIUM 9.5 8.8 8.9     No results for input(s): \"PROBNP\", \"TROPHS\" in the last 72 hours.  No results for input(s): \"LABA1C\" in the last 72 hours.    No results for input(s): \"AST\", \"ALT\", \"BILIDIR\", \"BILITOT\", \"ALKPHOS\" in the last 72 hours.    Recent Labs     03/26/25  0910   INR 1.31*       Urine Cultures:   Lab Results   Component Value Date/Time    LABURIN No growth at 18 to 36 hours 11/28/2023 02:23 PM     Blood Cultures:   Lab Results   Component Value Date/Time    BC  03/26/2025 07:32 AM     Gram stain Aerobic bottle:  Gram positive cocci in clusters  resembling Staphylococcus  Information to follow  Gram stain Anaerobic bottle:  Gram positive cocci in clusters  resembling Staphylococcus  Information to follow      BC See additional report for complete BCID panel. 03/26/2025 07:32 AM     Lab Results   Component Value Date/Time    BLOODCULT2  03/26/2025 09:10 AM     No Growth to date.  Any change in status will be called.     Organism:   Lab Results   Component Value Date/Time    ORG Staphylococcus epidermidis DNA Detected 03/26/2025 07:32 AM         Lakeisha Moreno MD

## 2025-03-29 NOTE — PROGRESS NOTES
ID Follow-up NOTE    CC:   E faecalis bacteremia, Lumbar osteo-diskitis   Antibiotics: Vancomycin    Admit Date: 3/22/2025  Hospital Day: 8    Subjective:     3/29 Today - new HD tunnel line placed  3/27 HD line removed   3/27 DELMAR no vegetation    Feels 'good', no LBP this am      Objective:     Patient Vitals for the past 8 hrs:   BP Temp Temp src Pulse Resp SpO2   03/29/25 1233 116/73 97.6 °F (36.4 °C) Temporal 69 18 98 %   03/29/25 0712 122/75 97.3 °F (36.3 °C) Temporal 79 17 100 %     I/O last 3 completed shifts:  In: 500 [P.O.:500]  Out: 3355 [Urine:3350; Blood:5]  I/O this shift:  In: -   Out: 950 [Urine:950]    EXAM:  GENERAL: No apparent distress.  RA  HEENT: Membranes moist, no oral lesion  NECK:  Supple, no lymphadenopathy  LUNGS: Clear b/l, no rales, no dullness  CARDIAC: RRR, no murmur appreciated  ABD:  + BS, soft / NT  EXT:  No rash, no edema, no lesions  NEURO: No focal neurologic findings   PSYCH: Orientation, sensorium, mood normal - NOT confused  LINES:  L  chest tunnel HD line - placed 3/29  Peripheral iv       Data Review:  Lab Results   Component Value Date    WBC 6.4 03/29/2025    HGB 10.0 (L) 03/29/2025    HCT 29.8 (L) 03/29/2025    .7 (H) 03/29/2025     03/29/2025     Lab Results   Component Value Date    CREATININE 2.2 (H) 03/29/2025    BUN 51 (H) 03/29/2025     03/29/2025    K 4.3 03/29/2025     03/29/2025    CO2 23 03/29/2025       Hepatic Function Panel:   Lab Results   Component Value Date/Time    ALKPHOS 112 03/22/2025 06:28 AM    ALT 26 03/22/2025 06:28 AM    AST 28 03/22/2025 06:28 AM    BILITOT 0.6 03/22/2025 06:28 AM    BILIDIR <0.2 03/16/2021 09:41 AM    IBILI see below 03/16/2021 09:41 AM       ESR 33,     MICRO:  3/22 BC x 1 E faecalis, S epidermidis  Enterococcus faecalis   Antibiotic Interpretation VALERIE    ampicillin Sensitive <=2 mcg/mL   gentamicin-syn Resistant SYN-R mcg/mL   streptomycin-syn Sensitive SYN-S mcg/mL   vancomycin Sensitive 1

## 2025-03-30 ENCOUNTER — HOSPITAL ENCOUNTER (INPATIENT)
Age: 75
LOS: 5 days | Discharge: ANOTHER HOSPITAL | DRG: 947 | End: 2025-04-04
Attending: STUDENT IN AN ORGANIZED HEALTH CARE EDUCATION/TRAINING PROGRAM | Admitting: HOSPITALIST
Payer: MEDICARE

## 2025-03-30 VITALS
DIASTOLIC BLOOD PRESSURE: 70 MMHG | BODY MASS INDEX: 17.82 KG/M2 | WEIGHT: 143.3 LBS | HEART RATE: 80 BPM | OXYGEN SATURATION: 99 % | HEIGHT: 75 IN | RESPIRATION RATE: 16 BRPM | SYSTOLIC BLOOD PRESSURE: 130 MMHG | TEMPERATURE: 97.7 F

## 2025-03-30 PROBLEM — R53.81 DEBILITY: Status: ACTIVE | Noted: 2025-03-30

## 2025-03-30 LAB
ANION GAP SERPL CALCULATED.3IONS-SCNC: 11 MMOL/L (ref 3–16)
BACTERIA BLD CULT ORG #2: NORMAL
BACTERIA BLD CULT: ABNORMAL
BACTERIA BLD CULT: ABNORMAL
BACTERIA CATH TIP CULT: NORMAL
BASOPHILS # BLD: 0 K/UL (ref 0–0.2)
BASOPHILS NFR BLD: 0.3 %
BUN SERPL-MCNC: 28 MG/DL (ref 7–20)
CALCIUM SERPL-MCNC: 8.4 MG/DL (ref 8.3–10.6)
CHLORIDE SERPL-SCNC: 98 MMOL/L (ref 99–110)
CO2 SERPL-SCNC: 24 MMOL/L (ref 21–32)
CREAT SERPL-MCNC: 1.5 MG/DL (ref 0.8–1.3)
DEPRECATED RDW RBC AUTO: 19.7 % (ref 12.4–15.4)
EOSINOPHIL # BLD: 0.2 K/UL (ref 0–0.6)
EOSINOPHIL NFR BLD: 2.1 %
GFR SERPLBLD CREATININE-BSD FMLA CKD-EPI: 48 ML/MIN/{1.73_M2}
GLUCOSE BLD-MCNC: 160 MG/DL (ref 70–99)
GLUCOSE BLD-MCNC: 161 MG/DL (ref 70–99)
GLUCOSE BLD-MCNC: 162 MG/DL (ref 70–99)
GLUCOSE BLD-MCNC: 199 MG/DL (ref 70–99)
GLUCOSE SERPL-MCNC: 155 MG/DL (ref 70–99)
HCT VFR BLD AUTO: 28.5 % (ref 40.5–52.5)
HGB BLD-MCNC: 9.8 G/DL (ref 13.5–17.5)
LYMPHOCYTES # BLD: 0.9 K/UL (ref 1–5.1)
LYMPHOCYTES NFR BLD: 11.8 %
MCH RBC QN AUTO: 33.4 PG (ref 26–34)
MCHC RBC AUTO-ENTMCNC: 34.3 G/DL (ref 31–36)
MCV RBC AUTO: 97.4 FL (ref 80–100)
MONOCYTES # BLD: 0.3 K/UL (ref 0–1.3)
MONOCYTES NFR BLD: 4.6 %
NEUTROPHILS # BLD: 6 K/UL (ref 1.7–7.7)
NEUTROPHILS NFR BLD: 81.2 %
ORGANISM: ABNORMAL
ORGANISM: ABNORMAL
PERFORMED ON: ABNORMAL
PLATELET # BLD AUTO: 204 K/UL (ref 135–450)
PMV BLD AUTO: 7.6 FL (ref 5–10.5)
POTASSIUM SERPL-SCNC: 4.1 MMOL/L (ref 3.5–5.1)
RBC # BLD AUTO: 2.92 M/UL (ref 4.2–5.9)
SODIUM SERPL-SCNC: 133 MMOL/L (ref 136–145)
VANCOMYCIN SERPL-MCNC: 24.4 UG/ML
WBC # BLD AUTO: 7.4 K/UL (ref 4–11)

## 2025-03-30 PROCEDURE — 1280000000 HC REHAB R&B

## 2025-03-30 PROCEDURE — 6370000000 HC RX 637 (ALT 250 FOR IP)

## 2025-03-30 PROCEDURE — 36415 COLL VENOUS BLD VENIPUNCTURE: CPT

## 2025-03-30 PROCEDURE — 85025 COMPLETE CBC W/AUTO DIFF WBC: CPT

## 2025-03-30 PROCEDURE — 2500000003 HC RX 250 WO HCPCS: Performed by: INTERNAL MEDICINE

## 2025-03-30 PROCEDURE — 6370000000 HC RX 637 (ALT 250 FOR IP): Performed by: INTERNAL MEDICINE

## 2025-03-30 PROCEDURE — 6370000000 HC RX 637 (ALT 250 FOR IP): Performed by: STUDENT IN AN ORGANIZED HEALTH CARE EDUCATION/TRAINING PROGRAM

## 2025-03-30 PROCEDURE — 80048 BASIC METABOLIC PNL TOTAL CA: CPT

## 2025-03-30 PROCEDURE — 80202 ASSAY OF VANCOMYCIN: CPT

## 2025-03-30 RX ORDER — PANTOPRAZOLE SODIUM 40 MG/1
40 TABLET, DELAYED RELEASE ORAL
Status: DISCONTINUED | OUTPATIENT
Start: 2025-03-31 | End: 2025-04-04

## 2025-03-30 RX ORDER — BISACODYL 5 MG/1
5 TABLET, DELAYED RELEASE ORAL DAILY PRN
Status: DISCONTINUED | OUTPATIENT
Start: 2025-03-30 | End: 2025-04-04 | Stop reason: HOSPADM

## 2025-03-30 RX ORDER — OXYCODONE AND ACETAMINOPHEN 5; 325 MG/1; MG/1
2 TABLET ORAL EVERY 4 HOURS PRN
Refills: 0 | Status: DISCONTINUED | OUTPATIENT
Start: 2025-03-30 | End: 2025-04-04

## 2025-03-30 RX ORDER — METHOCARBAMOL 750 MG/1
750 TABLET, FILM COATED ORAL 4 TIMES DAILY
Status: CANCELLED | OUTPATIENT
Start: 2025-03-30

## 2025-03-30 RX ORDER — METHOCARBAMOL 750 MG/1
750 TABLET, FILM COATED ORAL 4 TIMES DAILY
Status: DISCONTINUED | OUTPATIENT
Start: 2025-03-30 | End: 2025-04-04

## 2025-03-30 RX ORDER — INSULIN LISPRO 100 [IU]/ML
2 INJECTION, SOLUTION INTRAVENOUS; SUBCUTANEOUS
Status: CANCELLED | OUTPATIENT
Start: 2025-03-30

## 2025-03-30 RX ORDER — ASPIRIN 81 MG/1
81 TABLET, CHEWABLE ORAL DAILY
Status: DISCONTINUED | OUTPATIENT
Start: 2025-03-31 | End: 2025-04-04 | Stop reason: HOSPADM

## 2025-03-30 RX ORDER — HEPARIN SODIUM 1000 [USP'U]/ML
3600 INJECTION, SOLUTION INTRAVENOUS; SUBCUTANEOUS PRN
Status: DISCONTINUED | OUTPATIENT
Start: 2025-03-30 | End: 2025-04-04

## 2025-03-30 RX ORDER — OXYCODONE AND ACETAMINOPHEN 5; 325 MG/1; MG/1
1 TABLET ORAL EVERY 4 HOURS PRN
Refills: 0 | Status: CANCELLED | OUTPATIENT
Start: 2025-03-30

## 2025-03-30 RX ORDER — INSULIN LISPRO 100 [IU]/ML
2 INJECTION, SOLUTION INTRAVENOUS; SUBCUTANEOUS
Status: DISCONTINUED | OUTPATIENT
Start: 2025-03-30 | End: 2025-04-02

## 2025-03-30 RX ORDER — INSULIN LISPRO 100 [IU]/ML
0-8 INJECTION, SOLUTION INTRAVENOUS; SUBCUTANEOUS
Status: CANCELLED | OUTPATIENT
Start: 2025-03-30

## 2025-03-30 RX ORDER — OXYCODONE AND ACETAMINOPHEN 5; 325 MG/1; MG/1
1 TABLET ORAL EVERY 4 HOURS PRN
Refills: 0 | Status: DISCONTINUED | OUTPATIENT
Start: 2025-03-30 | End: 2025-04-04

## 2025-03-30 RX ORDER — ASPIRIN 81 MG/1
81 TABLET, CHEWABLE ORAL DAILY
Status: CANCELLED | OUTPATIENT
Start: 2025-03-31

## 2025-03-30 RX ORDER — ROSUVASTATIN CALCIUM 10 MG/1
10 TABLET, COATED ORAL NIGHTLY
Status: CANCELLED | OUTPATIENT
Start: 2025-03-30

## 2025-03-30 RX ORDER — SEVELAMER CARBONATE 800 MG/1
800 TABLET, FILM COATED ORAL
Status: DISCONTINUED | OUTPATIENT
Start: 2025-03-30 | End: 2025-04-04

## 2025-03-30 RX ORDER — INSULIN LISPRO 100 [IU]/ML
0-8 INJECTION, SOLUTION INTRAVENOUS; SUBCUTANEOUS
Status: DISCONTINUED | OUTPATIENT
Start: 2025-03-30 | End: 2025-04-04

## 2025-03-30 RX ORDER — POLYETHYLENE GLYCOL 3350 17 G/17G
17 POWDER, FOR SOLUTION ORAL DAILY
Status: DISCONTINUED | OUTPATIENT
Start: 2025-03-30 | End: 2025-04-04 | Stop reason: HOSPADM

## 2025-03-30 RX ORDER — SEVELAMER CARBONATE 800 MG/1
800 TABLET, FILM COATED ORAL
Status: CANCELLED | OUTPATIENT
Start: 2025-03-30

## 2025-03-30 RX ORDER — OXYCODONE AND ACETAMINOPHEN 5; 325 MG/1; MG/1
1 TABLET ORAL EVERY 4 HOURS PRN
Status: SHIPPED | OUTPATIENT
Start: 2025-03-30 | End: 2025-04-02

## 2025-03-30 RX ORDER — HEPARIN SODIUM 1000 [USP'U]/ML
3600 INJECTION, SOLUTION INTRAVENOUS; SUBCUTANEOUS PRN
Status: CANCELLED | OUTPATIENT
Start: 2025-03-30

## 2025-03-30 RX ORDER — ACETAMINOPHEN 325 MG/1
650 TABLET ORAL EVERY 4 HOURS PRN
Status: DISCONTINUED | OUTPATIENT
Start: 2025-03-30 | End: 2025-04-04 | Stop reason: HOSPADM

## 2025-03-30 RX ORDER — BISACODYL 5 MG/1
5 TABLET, DELAYED RELEASE ORAL DAILY PRN
Status: CANCELLED | OUTPATIENT
Start: 2025-03-30

## 2025-03-30 RX ORDER — OXYCODONE AND ACETAMINOPHEN 5; 325 MG/1; MG/1
2 TABLET ORAL EVERY 4 HOURS PRN
Refills: 0 | Status: CANCELLED | OUTPATIENT
Start: 2025-03-30

## 2025-03-30 RX ORDER — ROSUVASTATIN CALCIUM 10 MG/1
10 TABLET, COATED ORAL NIGHTLY
Status: DISCONTINUED | OUTPATIENT
Start: 2025-03-30 | End: 2025-04-04

## 2025-03-30 RX ORDER — PANTOPRAZOLE SODIUM 40 MG/1
40 TABLET, DELAYED RELEASE ORAL
Status: CANCELLED | OUTPATIENT
Start: 2025-03-31

## 2025-03-30 RX ORDER — ACETAMINOPHEN 325 MG/1
650 TABLET ORAL EVERY 4 HOURS PRN
Status: CANCELLED | OUTPATIENT
Start: 2025-03-30

## 2025-03-30 RX ORDER — METHOCARBAMOL 750 MG/1
750 TABLET, FILM COATED ORAL 4 TIMES DAILY
Status: ON HOLD | DISCHARGE
Start: 2025-03-30 | End: 2025-04-09

## 2025-03-30 RX ORDER — POLYETHYLENE GLYCOL 3350 17 G/17G
17 POWDER, FOR SOLUTION ORAL DAILY
Status: CANCELLED | OUTPATIENT
Start: 2025-03-30

## 2025-03-30 RX ADMIN — Medication 3 MG: at 02:56

## 2025-03-30 RX ADMIN — ROSUVASTATIN CALCIUM 10 MG: 10 TABLET, FILM COATED ORAL at 20:40

## 2025-03-30 RX ADMIN — APIXABAN 5 MG: 5 TABLET, FILM COATED ORAL at 20:40

## 2025-03-30 RX ADMIN — SEVELAMER CARBONATE 800 MG: 800 TABLET, FILM COATED ORAL at 20:40

## 2025-03-30 RX ADMIN — CYANOCOBALAMIN TAB 1000 MCG 1000 MCG: 1000 TAB at 08:46

## 2025-03-30 RX ADMIN — INSULIN LISPRO 2 UNITS: 100 INJECTION, SOLUTION INTRAVENOUS; SUBCUTANEOUS at 08:44

## 2025-03-30 RX ADMIN — ASPIRIN 81 MG: 81 TABLET, CHEWABLE ORAL at 08:46

## 2025-03-30 RX ADMIN — METHOCARBAMOL 750 MG: 750 TABLET ORAL at 14:14

## 2025-03-30 RX ADMIN — APIXABAN 5 MG: 5 TABLET, FILM COATED ORAL at 08:46

## 2025-03-30 RX ADMIN — SODIUM CHLORIDE, PRESERVATIVE FREE 10 ML: 5 INJECTION INTRAVENOUS at 08:47

## 2025-03-30 RX ADMIN — INSULIN LISPRO 2 UNITS: 100 INJECTION, SOLUTION INTRAVENOUS; SUBCUTANEOUS at 20:40

## 2025-03-30 RX ADMIN — METHOCARBAMOL 750 MG: 750 TABLET ORAL at 08:48

## 2025-03-30 RX ADMIN — METHOCARBAMOL 750 MG: 750 TABLET ORAL at 20:40

## 2025-03-30 RX ADMIN — OXYCODONE HYDROCHLORIDE AND ACETAMINOPHEN 2 TABLET: 5; 325 TABLET ORAL at 20:40

## 2025-03-30 RX ADMIN — SEVELAMER CARBONATE 800 MG: 800 TABLET, FILM COATED ORAL at 08:46

## 2025-03-30 RX ADMIN — PANTOPRAZOLE SODIUM 40 MG: 40 TABLET, DELAYED RELEASE ORAL at 05:08

## 2025-03-30 ASSESSMENT — PAIN SCALES - GENERAL
PAINLEVEL_OUTOF10: 0
PAINLEVEL_OUTOF10: 6
PAINLEVEL_OUTOF10: 0
PAINLEVEL_OUTOF10: 7
PAINLEVEL_OUTOF10: 5

## 2025-03-30 ASSESSMENT — PAIN DESCRIPTION - LOCATION: LOCATION: HIP;LEG

## 2025-03-30 ASSESSMENT — PAIN DESCRIPTION - ORIENTATION: ORIENTATION: RIGHT

## 2025-03-30 ASSESSMENT — PAIN DESCRIPTION - DESCRIPTORS: DESCRIPTORS: STABBING;THROBBING

## 2025-03-30 NOTE — CARE COORDINATION
Case Management Assessment            Discharge Note                    Date / Time of Note: 3/30/2025 12:23 PM                  Discharge Note Completed by: Maritza Ruiz RN    Patient Name: Danny Rock   YOB: 1950  Diagnosis: Neuroforaminal stenosis of lumbar spine [M48.061]   Date / Time: 3/22/2025  6:51 PM    Current PCP: Yesi Rosa APRN - CNP  Clinic patient: No    Hospitalization in the last 30 days: Yes  Readmission Assessment  Number of Days since last admission?: 1-7 days (transfer from [facility])  Previous Disposition: Other (comment) (transfer from [facility])  Who is being Interviewed: Patient (transfer from [facility])  What was the patient's/caregiver's perception as to why they think they needed to return back to the hospital?: Other (Comment) (transfer from [facility])  Did you visit your Primary Care Physician after you left the hospital, before you returned this time?: No (transfer from [facility])  Why weren't you able to visit your PCP?: Other (Comment) (transfer from [facility])  Did you see a specialist, such as Cardiac, Pulmonary, Orthopedic Physician, etc. after you left the hospital?: No (transfer from [facility])  Who advised the patient to return to the hospital?: Other (Comment) (transfer from [facility])  Does the patient report anything that got in the way of taking their medications?: No (transfer from [facility])  In our efforts to provide the best possible care to you and others like you, can you think of anything that we could have done to help you after you left the hospital the first time, so that you might not have needed to return so soon?: Other (Comment) (transfer from [facility])    Advance Directives:  Code Status: Full Code  [place] DNR form completed and on chart: No    Financial:  Payor: MEDICARE / Plan: MEDICARE PART A AND B / Product Type: *No Product type* /      Pharmacy:    [facility]

## 2025-03-30 NOTE — PLAN OF CARE
retention >300ml in bladder  [] Complete bladder protocol with lowe removal  [] Maintain O2 SATs at ___%  [x] Have pain managed while on ARU       [] Be pain free by discharge   [] Have no skin breakdown while on ARU  [x] Have improved skin integrity via wound measurements  [x] Have no signs/symptoms of infection at the wound site  [x] Be free from injury during hospitalization   [x] Complete education with patient/family with understanding demonstrated for:  [x] Adjustment   [] Other:     Nursing Interventions will include:  [] bowel/bladder training   [x] education for medical assistive devices   [x] medication education   [] O2 saturation management   [x] energy conservation   [x] stress management techniques   [x] fall prevention   [x] alarms protocol   [x] seating and positioning   [x] skin/wound care   [x] pressure relief instruction   [x] dressing changes     [x] infection protection   [x] DVT prophylaxis  [x] assistance with in room safety with transfers to bed, toilet, wheelchair, shower   [x] bathroom activities and hygiene  [] Other:    Patient/Caregiver Education for:  [x] Disease/sustained injury/management     [x] Medication Use  [] Surgical intervention  [x] Safety  [x] Body mechanics and or joint protection  [x] Health maintenance     [] Other:     PHYSICAL THERAPY:  Goals:                                Short Term Goals:  Time Frame: 2 weeks  Patient will complete bed mobility at modified independent   Patient will complete transfers at supervision   Patient will ambulate 150 ft with use of LRAD at supervision  Patient will ascend/descend 12 stairs with (B) handrail at contact guard assistance  Patient will complete car transfer at contact guard assistance     Above goals reviewed on 3/31/2025.  All goals are ongoing at this time unless indicated above  These goals were reviewed with this patient at the time of assessment and Danny Rock is in agreement.     Plan of Care: Pt to be seen 5 out of  contents:    Title   Name    Date    Time    Physician: Garret Ponce MD 04/01/25 4:46 PM      Case Mgmt: Sharon Saleh MSW on 3/31/25 at 11:23 AM EDT    OT: Brendan Crooks, OTR/L #[id number], 3/31/2025, 1143    PT: Angie Maier PT, DPT [id number]    RN: Carey Rogers, 3/30/25, 1938    :   Shaquille Gonzalez PT, DPT [id number]  4/1/25  11:00 AM

## 2025-03-30 NOTE — PLAN OF CARE
Problem: Skin/Tissue Integrity  Goal: Skin integrity remains intact  Description: 1.  Monitor for areas of redness and/or skin breakdown  2.  Assess vascular access sites hourly  3.  Every 4-6 hours minimum:  Change oxygen saturation probe site  4.  Every 4-6 hours:  If on nasal continuous positive airway pressure, respiratory therapy assess nares and determine need for appliance change or resting period  Note: Stage 2 on coccyx , pt states he has been treating it for about 6 weeks , remains CDI ,

## 2025-03-30 NOTE — DISCHARGE SUMMARY
Discharge Summary    Name:  Danny Rock /Age/Sex: 1950  (75 y.o. male)   MRN & CSN:  [id number] & [id number] Admission Date/Time: 3/22/2025  6:51 PM   Attending:  Marva Todd MD Discharging Physician: Marva Todd MD       Discharge Diagnosis:  Enterococcus faecalis bacteremia  Lumbar discitis  Osteomyelitis      Discharge Exam  Physical Exam      Hospital Course:   Danny Rock is a 75 y.o.  male  who presents with Lumbar foraminal stenosis    Bacteremia with Enterococcus faecalis   Multilevel lumbar discitis and osteomyelitis  MRI of the lumbar, thoracic and cervical spine concerning for multilevel abnormal disc signal with disc enhancement and endplate enhancement compatible with discitis and osteomyelitis at L2-L3, L3-L4 and L4-L5.  Adjacent paravertebral edema and enhancement.  Equivocal small abscess in the right psoas muscle.  1 bottle of blood culture done at outside hospital prior to transfer on 3/22 positive for Enterococcus faecalis and Staph epidermidis.    Repeat blood culture 3/23, 3/24 2 out of 2 positive for Enterococcus faecalis  Repeat blood culture on 3/26,1 out 2 postive for staph epi likely contaminant  Catheter tip culture no growth till date  Continue vancomycin, added gentamicin 3/25 per infectious disease  TEEwith no valvular vegetation 3/26. EF 55 to 60%.  HD cath removed 3/26  Plan to continue IV vancomycin 3 times a week after dialysis, through 2025        Multilevel lumbar spine foraminal stenosis  Impingement of the bilateral L5 nerve root  Neurosurgery on consult, no surgical intervention planned at this time.  Signed off  Pain management with Robaxin, Percocet     Coronary artery disease  Combined systolic and diastolic heart failure, compensated  Essential hypertension  Hyperlipidemia  Paroxysmal A-fib on Eliquis  Sinus bradycardia  Resume home medication Crestor  Resume lopressor  Continue aspirin and Eliquis        Type 2 diabetes mellitus  Only on  Caps     apixaban 5 MG Tabs tablet  Commonly known as: ELIQUIS  Take 1 tablet by mouth 2 times daily     ascorbic acid 500 MG tablet  Commonly known as: VITAMIN C     aspirin 81 MG chewable tablet  Take 1 tablet by mouth daily     Cabometyx 40 MG Tabs chemo tablet  Generic drug: cabozantinib s-malate     melatonin 3 MG Tabs tablet     metoprolol tartrate 50 MG tablet  Commonly known as: LOPRESSOR  Take 1 tablet by mouth 2 times daily     omeprazole 20 MG delayed release capsule  Commonly known as: PRILOSEC     ashli-triston Tabs     rosuvastatin 10 MG tablet  Commonly known as: CRESTOR  Take 10 mg every other day     sevelamer 800 MG tablet  Commonly known as: RENVELA     VITAMIN B-12 PO     vitamin D3 125 MCG (5000 UT) Tabs tablet  Commonly known as: CHOLECALCIFEROL     zinc sulfate 220 (50 Zn)  mg capsule - elemental zinc  Commonly known as: ZINCATE            STOP taking these medications      Naproxen Sodium 220 MG Caps               Where to Get Your Medications        You can get these medications from any pharmacy    Bring a paper prescription for each of these medications  oxyCODONE-acetaminophen 5-325 MG per tablet       Information about where to get these medications is not yet available    Ask your nurse or doctor about these medications  methocarbamol 750 MG tablet  vancomycin infusion         Objective Findings at Discharge:       BMP/CBC  Recent Labs     03/28/25  0800 03/29/25  0619 03/30/25  0556   * 136 133*   K 4.2 4.3 4.1    103 98*   CO2 22 23 24   BUN 52* 51* 28*   CREATININE 2.3* 2.2* 1.5*   WBC 6.1 6.4 7.4   HCT 30.5* 29.8* 28.5*    174 204       IMAGING:      Additional Information: Patient seen and examined day of discharge. For more information regarding patient's care please contact [facility] medical records [phone]    Discharge Time of 35 minutes    Electronically signed by Marva Todd MD on 3/30/2025 at 2:23 PM

## 2025-03-30 NOTE — PROGRESS NOTES
Patient A&O x3, disoriented to place, with intermittent confusion. Vital signs stable, BP high. Medications managed per MAR. Voids via external cath. Patient is turned Q2. Safety precautions in order, bedside table and call light within reach.

## 2025-03-30 NOTE — PLAN OF CARE
Problem: Safety - Adult  Goal: Free from fall injury  3/30/2025 1430 by Timothy Mendoza, RN  Note: Alert oriented times 4 , up with walker one person stand by assist , tolerated diet, no choking , moves all extremities , general weakness to lower legs , pt states his knees can buckle , plan dc to rehab today around 4 pm

## 2025-03-30 NOTE — PLAN OF CARE
Problem: Safety - Adult  Goal: Free from fall injury  3/30/2025 0031 by Garret Davis, RN  Outcome: Progressing  Note: All fall precautions in place. Bed locked and in lowest position with alarm on. Overbed table and personal belonings within reach. Call light within reach and patient instructed to use call light for assistance. Non-skid socks on.       Problem: ABCDS Injury Assessment  Goal: Absence of physical injury  3/30/2025 0031 by Garret Davis, RN  Outcome: Progressing  Note: Patient has remained free of physical injury this shift. Fall and safety precautions in place. Bed exit alarm activated, bed in lowest position with wheels locked, call light and belongings within reach.

## 2025-03-30 NOTE — PROGRESS NOTES
ARU Admission Assessment    Ethnicity  \"Are you of , /a, or [place] origin?\"  Check all that apply:  [x] A.  No, not of , /a, or [place] Origin  [] B.  Yes, [place] Islander, [place] American, Chicano/a  [] C.  Yes, Scottish  [] D.  Yes, Gonzalez  [] E.  Yes, another , , or [place] origin  [] X.  Patient unable to respond  [] Y.  Patient declines to respond    Race  \"What is your race?\"  Check all that apply:  [x] A.  White  [] B.  Black or   [] C.   or   [] D.   Angolan  [] E.  Chinese  [] F.  [place]  [] G. Brazilian  [] H.  Arabic  [] I.  Albanian  [] J.  Other   [] K.    [] L.  Macanese or [place]ade  [] M.  Danish  [] N.  Other   [] X.  Patient unable to respond  [] Y.  Patient declines to respond  [] Z.  None of the above    Language  A.  \"What is your preferred language?\"   English    B.  \"Do you need or want an  to communicate with a doctor or health care staff?\"  Check only one:  [x] 0.  No  [] 1.  Yes  [] 9.  Unable to determine    Transportation  \"Has lack of transportation kept you from medical appointments, meetings, work, or from getting things needed for daily living?\"Check all that apply:  [] A.  Yes, it has kept me from medical appointments or from getting my medications  [] B.  Yes, it has kept me from non-medical meetings, appointments, work, or from getting things that I need  [x] C.  No  [] X.  Patient unable to respond  [] Y.  Patient declines to respond    Hearing  Ability to hear (with hearing aid or hearing appliances if normally used)  [x]  0.  Adequate - no difficulty in normal conversation, social interaction, listening to TV  []  1.  Minimal difficulty - difficulty in some environments (e.g. when person speaks softly or setting is noisy)  []  2.  Moderate difficulty - speaker has to increase volume and speak distinctly   []  3.  Highly impaired - absence of  score 0-27, or enter 99 if unable to complete (if symptom frequency (column 2) is blank for 3 or more items).   0     Social Isolation  \"How often do you feel lonely or isolated from those around you?\"  [] 0.  Never  [x] 1.  Rarely  [] 2.  Sometimes  [] 3.  Often  [] 4.  Always  [] 7.  Patient declines to respond  [] 8.  Patient unable to respond    Pain Effect on Sleep  \"Over the past 5 days, how much of the time has pain made it hard for you to sleep at night?\"  []  0.  Does not apply - I have not had any pain or hurting in the past 5 days  [x]  1.  Rarely or not at all  []  2.  Occasionally  []  3.  Frequently  []  4.  Almost constantly  []  8.  Unable to answer    **If the patient answers \"0.  Does not apply\" to this question, skip the next two \"Pain Effect...\" questions**    Pain Interference with Therapy Activities  \"Over the past 5 days, how often have you limited your participation in rehabilitation therapy sessions due to pain?\"  []  0.  Does not apply - I have not received rehabilitation therapy in the past 5 days  [x]  1.  Rarely or not at all  []  2.  Occasionally  []  3.  Frequently  []  4.  Almost constantly  []  8.  Unable to answer    Pain Interference with Day-to-Day Activities:  \"Over the past 5 days, how often have you limited your day-to-day activities (excluding rehabilitation therapy session)?\"  [x]  1.  Rarely or not at all  []  2.  Occasionally  []  3.  Frequently  []  4.  Almost constantly  []  8.  Unable to answer    Nutritional Approaches  Check all of the following nutritional approaches that apply on admission:  []  A.  Parenteral/IV feeding (including IV fluids if needed for hydration, but not as part of dialysis/chemo)  []  B.  Feeding tube (e.g., nasogastric or abdominal (PEG))  []  C.  Mechanically altered diet - requires change in texture of food or liquids (e.g., pureed food, thickened liquids)  [x]  D.  Therapeutic diet (e.g., low salt, diabetic, low cholesterol)  []  Z.  None of

## 2025-03-30 NOTE — PROGRESS NOTES
4 Eyes Skin Assessment     NAME:  Danny Rock  YOB: 1950  MEDICAL RECORD NUMBER:  [id number]    The patient is being assessed for  Admission    I agree that at least one RN has performed a thorough Head to Toe Skin Assessment on the patient. ALL assessment sites listed below have been assessed.      Areas assessed by both nurses:    Head, Face, Ears, Shoulders, Back, Chest, Arms, Elbows, Hands, Sacrum. Buttock, Coccyx, Ischium, and Legs. Feet and Heels        Does the Patient have a Wound? Yes wound(s) were present on assessment. LDA wound assessment was Initiated and completed by RN  Stage 2 pressure areas noted on EFRAIN buttocks       Price Prevention initiated by RN: Yes  Wound Care Orders initiated by RN: Yes    Pressure Injury (Stage 3,4, Unstageable, DTI, NWPT, and Complex wounds) if present, place Wound referral order by RN under : No    New Ostomies, if present place, Ostomy referral order under : No     Nurse 1 eSignature: Electronically signed by Carey Rogers RN on 3/30/25 at 7:00 PM EDT    **SHARE this note so that the co-signing nurse can place an eSignature**    Nurse 2 eSignature: Electronically signed by Aan Dunne RN on 4/2/25 at 3:55 PM EDT

## 2025-03-30 NOTE — PROGRESS NOTES
[facility] -  Clinical Pharmacy Note    Vancomycin - Management by Pharmacy    Consult Date(s): 3/23  Consulting Provider(s): Dr Moreno    Assessment / Plan  E faecalis bacteremia, lumbar osteo-discitis - Vancomycin  Concurrent Antimicrobials: None   Day of Vanc Therapy / Ordered Duration: Day 7   Per ID, planning to treat 6-8 weeks, dosing with HD  Current Dosing Method: Intermittent Dosing by Levels  Therapeutic Goal: Trough ~ 15 mg/L  Current Dose / Plan:   Patient with ESRD on HD (TTS); currently receiving HD via home schedule.    Patient still makes urine; documented as 1 mL/kg/hr in past 24hrs   Level today = 24.4 mg/L, after receiving dose after HD yesterday.     No vancomycin dose today.    Plan to obtain a level 3/31 AM to ensure pt still therapeutic.  Goal to begin dosing with HD as ID intends to discharge this way.    Will continue to monitor clinical condition and make adjustments to regimen as appropriate.    Thank you for consulting pharmacy,  Luisa HillsD., BCPS   3/30/2025 11:33 AM  Wireless: [phone]          Interval update:  Patient tolerated HD yesterday; now on home schedule of T-Th-Sat.        Subjective/Objective:   Danny Rock is a 75 y.o. male with a PMHx significant for CAD, CHF, ESRD on HD (TTS), renal cell carcinoma s/p nephrectomy, HTN, HLD, AF (Eliquis) who presented to [facility] with complaints of lower back pain and inability to move lower extremities 2/2 pain. MRI concerning for multilevel severe foraminal stenosis with high-grade narrowing at L5-S1 and was transferred to [facility]. Blood cx were positive x1 for staph epidermidis and e faecalis (van A/B genes not detected) (bottle 2 cancelled) and vancomycin was initiated.     Pharmacy is consulted to dose vancomycin.    Ht Readings from Last 1 Encounters:   03/22/25 1.905 m (6' 3\")     Wt Readings from Last 1 Encounters:   03/29/25 65 kg (143 lb 4.8 oz)     Current & Prior Antimicrobial Regimen(s):  Vancomycin -  PTD  Intermittent  Date Vanc Level Vanc Dose HD   3/22 ---- ----- Yes 3h   3/23 - 1750 mg   no   3/24 12.5 mg/L 1000 mg   no   3/25 14.6 mg/L 1500 mg post HD Yes 3.5h   3/26 13.8 mg/L 1000 mg no   3/27 16.7 mg/L --- No   3/28 12.7 mg/L 1000 mg  No   3/29 16.7 mg/L 1500mg Yes 3.5h   3/30 24.4 mg/L --    3/31        Cultures & Sensitivities:    Date Site Micro Susceptibility / Result   3/22 Blood x 1 Enterococcus faecalis   S: ampicillin, vancomycin (VALERIE=1)   3/23 Blood x 2 E faecalis As above   3/24 Blood x2 E faecalis As above   3/26 Blood x2 Staph epi in 1/2 blood cx Considered contaminant     Recent Labs     03/28/25  0800 03/29/25  0619 03/30/25  0556   CREATININE 2.3* 2.2* 1.5*   BUN 52* 51* 28*   WBC 6.1 6.4 7.4       Estimated Creatinine Clearance: 39 mL/min (A) (based on SCr of 1.5 mg/dL (H)).    Additional Lab Values / Findings of Note:    No results for input(s): \"PROCAL\" in the last 72 hours.

## 2025-03-30 NOTE — PROGRESS NOTES
Patient was admitted to room 4911 at 1700.  Patient was oriented to the Call Light, Phone, TV, Thermostat, Bed Controls, Bathroom and Emergency Cord.  Patient verbalized and demonstrated understanding of all.  Patient was also given an overview of Unit Routines for Acute Rehab, including the patient's rights and responsibilities as well as the CMS IRF BETTY Privacy Act Statement providing notice of data collection.  Patient states that their normal bowel regime is usually every other day. Meal times were explained, including how to order food.  The white board, (which is posted on the wall by the door is used for communication) has the Therapy Scheduled that is posted each day along with the name of your doctor, nurse, and therapist for your convenience.  We recommend any family that will be care givers or any care givers the patient has, take part in therapy.  We have no set visiting hours, we suggest non-caregiver friends and family visitors come after therapy (at 4 PM or later) to allow patient to rest in between sessions.      In conjunction with the patient and patient’s family, this nurse worked to establish a tailored Fall TIPS plan to ensure patient safety and compliance:    Falls TIPS Completion    Patient identified as increased risk for harm if fall:  [x] Yes     Fall Risks  History of Falls:    [] Yes   Medication Side Effects:   [] Yes   Walking Aid:    [] Yes   IV Pole or Equipment:   [] Yes   Unsteady Walk:     [x] Yes   May Forget or Choose Not to Call: [x] Yes     Fall Interventions   Communicate Recent Fall and/or Risk of Harm: [x] Yes   Walking Aids:  Crutches: [] Yes   Cane: [] Yes   Walker: [] Yes   IV Assistance When Walking: [] Yes   Toileting Schedule: Every 2 Hours  Bedpan:   [] Yes   Assist to Commode: [] Yes   Assist to Bathroom: [x] Yes   Bed Alarm On: [x] Yes   Assistance Out of Bed:  Bedrest: [] Yes   1 Person: [] Yes   2 People: [x] Yes

## 2025-03-31 PROBLEM — M46.26 ACUTE OSTEOMYELITIS OF LUMBAR SPINE (HCC): Status: ACTIVE | Noted: 2025-03-31

## 2025-03-31 PROBLEM — M46.46 LUMBAR DISCITIS: Status: ACTIVE | Noted: 2025-03-31

## 2025-03-31 LAB
ANION GAP SERPL CALCULATED.3IONS-SCNC: 14 MMOL/L (ref 3–16)
BASOPHILS # BLD: 0.1 K/UL (ref 0–0.2)
BASOPHILS NFR BLD: 0.5 %
BUN SERPL-MCNC: 37 MG/DL (ref 7–20)
CALCIUM SERPL-MCNC: 9.4 MG/DL (ref 8.3–10.6)
CHLORIDE SERPL-SCNC: 99 MMOL/L (ref 99–110)
CO2 SERPL-SCNC: 23 MMOL/L (ref 21–32)
CREAT SERPL-MCNC: 1.9 MG/DL (ref 0.8–1.3)
CRP SERPL-MCNC: 143 MG/L (ref 0–5.1)
DEPRECATED RDW RBC AUTO: 19.5 % (ref 12.4–15.4)
EOSINOPHIL # BLD: 0.2 K/UL (ref 0–0.6)
EOSINOPHIL NFR BLD: 1.6 %
ERYTHROCYTE [SEDIMENTATION RATE] IN BLOOD BY WESTERGREN METHOD: 104 MM/HR (ref 0–20)
GFR SERPLBLD CREATININE-BSD FMLA CKD-EPI: 36 ML/MIN/{1.73_M2}
GLUCOSE BLD-MCNC: 162 MG/DL (ref 70–99)
GLUCOSE BLD-MCNC: 174 MG/DL (ref 70–99)
GLUCOSE BLD-MCNC: 202 MG/DL (ref 70–99)
GLUCOSE BLD-MCNC: 206 MG/DL (ref 70–99)
GLUCOSE SERPL-MCNC: 169 MG/DL (ref 70–99)
HCT VFR BLD AUTO: 33.1 % (ref 40.5–52.5)
HGB BLD-MCNC: 11 G/DL (ref 13.5–17.5)
LYMPHOCYTES # BLD: 1.2 K/UL (ref 1–5.1)
LYMPHOCYTES NFR BLD: 9.4 %
MCH RBC QN AUTO: 33.3 PG (ref 26–34)
MCHC RBC AUTO-ENTMCNC: 33.2 G/DL (ref 31–36)
MCV RBC AUTO: 100.1 FL (ref 80–100)
MONOCYTES # BLD: 0.4 K/UL (ref 0–1.3)
MONOCYTES NFR BLD: 2.9 %
NEUTROPHILS # BLD: 10.6 K/UL (ref 1.7–7.7)
NEUTROPHILS NFR BLD: 85.6 %
PERFORMED ON: ABNORMAL
PLATELET # BLD AUTO: 333 K/UL (ref 135–450)
PMV BLD AUTO: 7.3 FL (ref 5–10.5)
POTASSIUM SERPL-SCNC: 4.1 MMOL/L (ref 3.5–5.1)
RBC # BLD AUTO: 3.31 M/UL (ref 4.2–5.9)
SODIUM SERPL-SCNC: 136 MMOL/L (ref 136–145)
WBC # BLD AUTO: 12.3 K/UL (ref 4–11)

## 2025-03-31 PROCEDURE — 85025 COMPLETE CBC W/AUTO DIFF WBC: CPT

## 2025-03-31 PROCEDURE — 97530 THERAPEUTIC ACTIVITIES: CPT

## 2025-03-31 PROCEDURE — 6370000000 HC RX 637 (ALT 250 FOR IP): Performed by: STUDENT IN AN ORGANIZED HEALTH CARE EDUCATION/TRAINING PROGRAM

## 2025-03-31 PROCEDURE — 80048 BASIC METABOLIC PNL TOTAL CA: CPT

## 2025-03-31 PROCEDURE — 86140 C-REACTIVE PROTEIN: CPT

## 2025-03-31 PROCEDURE — 87040 BLOOD CULTURE FOR BACTERIA: CPT

## 2025-03-31 PROCEDURE — 97116 GAIT TRAINING THERAPY: CPT

## 2025-03-31 PROCEDURE — 97165 OT EVAL LOW COMPLEX 30 MIN: CPT

## 2025-03-31 PROCEDURE — 1280000000 HC REHAB R&B

## 2025-03-31 PROCEDURE — 97535 SELF CARE MNGMENT TRAINING: CPT

## 2025-03-31 PROCEDURE — G0545 PR INHERENT VISIT TO INPT: HCPCS | Performed by: INTERNAL MEDICINE

## 2025-03-31 PROCEDURE — 6370000000 HC RX 637 (ALT 250 FOR IP): Performed by: INTERNAL MEDICINE

## 2025-03-31 PROCEDURE — 36415 COLL VENOUS BLD VENIPUNCTURE: CPT

## 2025-03-31 PROCEDURE — 85652 RBC SED RATE AUTOMATED: CPT

## 2025-03-31 PROCEDURE — 99223 1ST HOSP IP/OBS HIGH 75: CPT | Performed by: INTERNAL MEDICINE

## 2025-03-31 PROCEDURE — 97161 PT EVAL LOW COMPLEX 20 MIN: CPT

## 2025-03-31 RX ORDER — LINEZOLID 600 MG/1
600 TABLET, FILM COATED ORAL EVERY 12 HOURS SCHEDULED
Status: DISCONTINUED | OUTPATIENT
Start: 2025-03-31 | End: 2025-04-04

## 2025-03-31 RX ORDER — NEPHROCAP 1 MG
1 CAP ORAL DAILY
Status: DISCONTINUED | OUTPATIENT
Start: 2025-03-31 | End: 2025-04-04

## 2025-03-31 RX ORDER — LANOLIN ALCOHOL/MO/W.PET/CERES
1000 CREAM (GRAM) TOPICAL DAILY
Status: DISCONTINUED | OUTPATIENT
Start: 2025-03-31 | End: 2025-04-04

## 2025-03-31 RX ORDER — METOPROLOL TARTRATE 25 MG/1
25 TABLET, FILM COATED ORAL 2 TIMES DAILY
Status: DISCONTINUED | OUTPATIENT
Start: 2025-03-31 | End: 2025-04-04

## 2025-03-31 RX ADMIN — APIXABAN 5 MG: 5 TABLET, FILM COATED ORAL at 21:08

## 2025-03-31 RX ADMIN — APIXABAN 5 MG: 5 TABLET, FILM COATED ORAL at 07:59

## 2025-03-31 RX ADMIN — METHOCARBAMOL 750 MG: 750 TABLET ORAL at 17:15

## 2025-03-31 RX ADMIN — PANTOPRAZOLE SODIUM 40 MG: 40 TABLET, DELAYED RELEASE ORAL at 05:34

## 2025-03-31 RX ADMIN — ACETAMINOPHEN 650 MG: 325 TABLET ORAL at 12:35

## 2025-03-31 RX ADMIN — ROSUVASTATIN CALCIUM 10 MG: 10 TABLET, FILM COATED ORAL at 21:08

## 2025-03-31 RX ADMIN — MELATONIN TAB 3 MG 3 MG: 3 TAB at 21:08

## 2025-03-31 RX ADMIN — POLYETHYLENE GLYCOL 3350 17 G: 17 POWDER, FOR SOLUTION ORAL at 07:59

## 2025-03-31 RX ADMIN — CHOLECALCIFEROL TAB 125 MCG (5000 UNIT) 5000 UNITS: 125 TAB at 10:53

## 2025-03-31 RX ADMIN — SEVELAMER CARBONATE 800 MG: 800 TABLET, FILM COATED ORAL at 12:09

## 2025-03-31 RX ADMIN — METOPROLOL TARTRATE 25 MG: 25 TABLET, FILM COATED ORAL at 21:08

## 2025-03-31 RX ADMIN — METHOCARBAMOL 750 MG: 750 TABLET ORAL at 12:34

## 2025-03-31 RX ADMIN — METHOCARBAMOL 750 MG: 750 TABLET ORAL at 08:00

## 2025-03-31 RX ADMIN — INSULIN LISPRO 2 UNITS: 100 INJECTION, SOLUTION INTRAVENOUS; SUBCUTANEOUS at 08:00

## 2025-03-31 RX ADMIN — CYANOCOBALAMIN TAB 1000 MCG 1000 MCG: 1000 TAB at 10:53

## 2025-03-31 RX ADMIN — INSULIN LISPRO 2 UNITS: 100 INJECTION, SOLUTION INTRAVENOUS; SUBCUTANEOUS at 08:04

## 2025-03-31 RX ADMIN — INSULIN LISPRO 2 UNITS: 100 INJECTION, SOLUTION INTRAVENOUS; SUBCUTANEOUS at 17:15

## 2025-03-31 RX ADMIN — OXYCODONE HYDROCHLORIDE AND ACETAMINOPHEN 2 TABLET: 5; 325 TABLET ORAL at 04:05

## 2025-03-31 RX ADMIN — SEVELAMER CARBONATE 800 MG: 800 TABLET, FILM COATED ORAL at 08:00

## 2025-03-31 RX ADMIN — SEVELAMER CARBONATE 800 MG: 800 TABLET, FILM COATED ORAL at 17:15

## 2025-03-31 RX ADMIN — OXYCODONE HYDROCHLORIDE AND ACETAMINOPHEN 1 TABLET: 5; 325 TABLET ORAL at 21:07

## 2025-03-31 RX ADMIN — ASPIRIN 81 MG: 81 TABLET, CHEWABLE ORAL at 07:59

## 2025-03-31 RX ADMIN — INSULIN LISPRO 2 UNITS: 100 INJECTION, SOLUTION INTRAVENOUS; SUBCUTANEOUS at 12:10

## 2025-03-31 RX ADMIN — INSULIN LISPRO 2 UNITS: 100 INJECTION, SOLUTION INTRAVENOUS; SUBCUTANEOUS at 12:09

## 2025-03-31 RX ADMIN — METHOCARBAMOL 750 MG: 750 TABLET ORAL at 21:08

## 2025-03-31 RX ADMIN — Medication 1 MG: at 10:52

## 2025-03-31 RX ADMIN — METOPROLOL TARTRATE 25 MG: 25 TABLET, FILM COATED ORAL at 10:54

## 2025-03-31 RX ADMIN — LINEZOLID 600 MG: 600 TABLET, FILM COATED ORAL at 21:08

## 2025-03-31 ASSESSMENT — PAIN - FUNCTIONAL ASSESSMENT
PAIN_FUNCTIONAL_ASSESSMENT: PREVENTS OR INTERFERES SOME ACTIVE ACTIVITIES AND ADLS
PAIN_FUNCTIONAL_ASSESSMENT: ACTIVITIES ARE NOT PREVENTED

## 2025-03-31 ASSESSMENT — PAIN SCALES - WONG BAKER
WONGBAKER_NUMERICALRESPONSE: NO HURT
WONGBAKER_NUMERICALRESPONSE: NO HURT

## 2025-03-31 ASSESSMENT — ENCOUNTER SYMPTOMS
DIARRHEA: 0
WHEEZING: 0
NAUSEA: 0
BACK PAIN: 1
SINUS PRESSURE: 0
SHORTNESS OF BREATH: 0
SORE THROAT: 0
RHINORRHEA: 0
EYE REDNESS: 0
COUGH: 0
ABDOMINAL PAIN: 0
SINUS PAIN: 0
EYE DISCHARGE: 0
CONSTIPATION: 0

## 2025-03-31 ASSESSMENT — PAIN SCALES - GENERAL
PAINLEVEL_OUTOF10: 0
PAINLEVEL_OUTOF10: 2
PAINLEVEL_OUTOF10: 3
PAINLEVEL_OUTOF10: 7
PAINLEVEL_OUTOF10: 7
PAINLEVEL_OUTOF10: 1

## 2025-03-31 ASSESSMENT — 9 HOLE PEG TEST
TESTTIME_SECONDS: 48
TESTTIME_SECONDS: 37
TEST_RESULT: FUNCTIONAL

## 2025-03-31 ASSESSMENT — PAIN DESCRIPTION - DESCRIPTORS
DESCRIPTORS: DISCOMFORT;ACHING
DESCRIPTORS: DISCOMFORT
DESCRIPTORS: ACHING
DESCRIPTORS: STABBING

## 2025-03-31 ASSESSMENT — PAIN DESCRIPTION - ORIENTATION
ORIENTATION: RIGHT;MID
ORIENTATION: RIGHT

## 2025-03-31 ASSESSMENT — PAIN DESCRIPTION - LOCATION
LOCATION: HIP
LOCATION: BUTTOCKS;SHOULDER
LOCATION: HIP
LOCATION: BACK

## 2025-03-31 NOTE — PROGRESS NOTES
Admission Period/Goal QM Codes for Danny Rock.    QM Admit Code Goal Code   Eating 5 6   Oral Hygiene 4 6   Toileting Hygiene 3 6   Shower/Bathing 3 6   UB Dressing 3 6   LB Dressing 2 6   Putting on/off Footwear 2 6   Rolling Left and Right 3 6   Sit To Lying 2 6   Lying to Sitting on Bedside 3 4   Sit to Stand 2 4   Chair/Bed to Chair Transfer 3 4   Toilet Transfers 3 6   Car Transfers 2 4   Walk 10 Feet 3 6   Walk 50 Feet with Two Turns 88 6   Walk 150 Feet 88 6   Walk 10 Feet on Uneven Surfaces 3 6   1 Step (Curb) 3 4   4 Steps 88 4   12 Steps 88 4   Picking up Object from Floor 88 4   Wheel 50 Feet with 2 Turns 9 -   Type - -   Wheel 150 Feet 9 -   Type - -       The above codes were determined by the treatment team to be the patient's accurate admission assessment codes based on assessment performed soon after the patient's admission and prior to the benefit of services provided by staff, or if appropriate, the patient's usual performance during the admission assessment period.    OT:  Brendan Crooks OTR/L #[id number], 4/3/2025, 0716     PT:  Therese Lynn, PT, [id number], 4/3/2025, 1138     RN:  Jacob Lezama BSN, CRRN, 4/2/25, 1550     ST:  Heidi Chery MA CCC-SLP 4/4/2025 1046     :  Shaquille Gonzalez PT, DPT [id number]  4/4/25  10:56 AM

## 2025-03-31 NOTE — CONSULTS
MD Gordon Frankel MD Aldo Estella, DO                 Office: [phone]                      Fax: [phone]             [employer]                   NEPHROLOGY CONSULT INITIAL NOTE        IMPRESSION/RECOMMENDATIONS:        #ESKD on HD TTS  -last iHD Saturday prior to admit  -TDC placed 3/28/25.  -Continue iHD TTS schedule  -next iHD tomorrow.    #DM  -on insulin  -controlled    #HTN  -controlled  -resume home antihypertensives    #leukocytosis  -likely from recent infection    #Anemia of CKD  -hgb at goal  -stable monitor    Thank you for the consult.  We will follow this patient along the hospitalization.  Guillermo Moss DO     High complexity, at risk of impending organ failure needing higher level of care/monitoring.   Time spent 38 minutes that included face-to-face meeting/discussion with patient, and treatment team (including primary/referring team and other consultants; included coordination of care with the treatment team; and review of patient's electronic medical records and ordering appropriates tests.             Reason for Consult:  ESKD management  Requesting Physician/Provider:  Tony Valverde MD     CHIEF COMPLAINT:  lumbar osteomyelitis/disciits    History obtained from records and patient.    HISTORY OF PRESENT ILLNESS:                Danny Rock  is 75 y.o. y.o. male with significant past medical history of ESKD on HD TTS who presented with lumbar osteomyelitis/disciits. Transferred from outside facility. Recent TDC placed 3/28. Last iHD prior to admit here was on Saturday. Nephrology consulted for ESKD management.    Past Medical History:     has a past medical history of Atrial fibrillation (HCC), CAD (coronary artery disease), Cancer of kidney (HCC), Dependence on renal dialysis, Diabetes mellitus (HCC), Hyperlipidemia, Hypertension, Prostate cancer (HCC), Right renal mass, and Systolic CHF (HCC).   Past Surgical History:     has a past surgical history that  time.      DATA:    CBC:   Lab Results   Component Value Date/Time    WBC 12.3 03/31/2025 06:19 AM    RBC 3.31 03/31/2025 06:19 AM    HGB 11.0 03/31/2025 06:19 AM    HCT 33.1 03/31/2025 06:19 AM    .1 03/31/2025 06:19 AM    MCH 33.3 03/31/2025 06:19 AM    MCHC 33.2 03/31/2025 06:19 AM    RDW 19.5 03/31/2025 06:19 AM     03/31/2025 06:19 AM    MPV 7.3 03/31/2025 06:19 AM     BMP:   Lab Results   Component Value Date/Time     03/31/2025 06:19 AM    K 4.1 03/31/2025 06:19 AM    CL 99 03/31/2025 06:19 AM    CO2 23 03/31/2025 06:19 AM    BUN 37 03/31/2025 06:19 AM    CREATININE 1.9 03/31/2025 06:19 AM    CALCIUM 9.4 03/31/2025 06:19 AM    GFRAA 40 07/26/2022 05:19 AM    GFRAA >60 03/21/2013 10:55 AM    LABGLOM 36 03/31/2025 06:19 AM    LABGLOM 26 04/10/2024 09:47 AM    GLUCOSE 169 03/31/2025 06:19 AM   Magnesium:    Lab Results   Component Value Date/Time    MG 1.70 02/09/2024 05:32 AM   Phosphorus:    Lab Results   Component Value Date/Time    PHOS 3.5 03/24/2025 06:05 AM

## 2025-03-31 NOTE — CONSULTS
Pharmacy Home Medication Reconciliation Note    A medication reconciliation has been completed for Danny Rock 1950    Information provided by: fill history    The patient's home medication list is as follows:  Current Facility-Administered Medications on File Prior to Encounter   Medication Dose Route Frequency Provider Last Rate Last Admin    [DISCONTINUED] insulin lispro (HUMALOG,ADMELOG) injection vial 2 Units  2 Units SubCUTAneous TID  Lakeisha Moreno MD   2 Units at 03/30/25 0844    [DISCONTINUED] heparin (porcine) injection 3,600 Units  3,600 Units IntraCATHeter PRN Hannah Rodriguez MD        [DISCONTINUED] vancomycin (VANCOCIN) intermittent dosing (placeholder)   Other RX Placeholder Lakeisha Moreno MD        [DISCONTINUED] HYDROmorphone HCl PF (DILAUDID) injection 0.25 mg  0.25 mg IntraVENous Once PRN Lakeisha Moreno MD        [DISCONTINUED] LORazepam (ATIVAN) tablet 0.5 mg  0.5 mg Oral Once PRN Lakeisha Moreno MD   0.5 mg at 03/23/25 1601    [DISCONTINUED] apixaban (ELIQUIS) tablet 5 mg  5 mg Oral BID Marva Todd MD   5 mg at 03/30/25 0846    [DISCONTINUED] aspirin chewable tablet 81 mg  81 mg Oral Daily Marva Todd MD   81 mg at 03/30/25 0846    [DISCONTINUED] vitamin B-12 (CYANOCOBALAMIN) tablet 1,000 mcg  1,000 mcg Oral Daily Zach Sandoval MD   1,000 mcg at 03/30/25 0846    [DISCONTINUED] melatonin tablet 3 mg  3 mg Oral Nightly PRN Zach Sandoval MD   3 mg at 03/30/25 0256    [DISCONTINUED] metoprolol tartrate (LOPRESSOR) tablet 50 mg  50 mg Oral BID Zach Sandoval MD   50 mg at 03/26/25 0911    [DISCONTINUED] pantoprazole (PROTONIX) tablet 40 mg  40 mg Oral QAM AC Zach Sandoval MD   40 mg at 03/30/25 0508    [DISCONTINUED] rosuvastatin (CRESTOR) tablet 10 mg  10 mg Oral Nightly Zach Sandoval MD   10 mg at 03/29/25 2032    [DISCONTINUED] sevelamer (RENVELA) tablet 800 mg  800 mg Oral TID  Zach Sandoval MD    MG capsule Take 1 capsule by mouth every other day         Patient is no longer taking naproxen.    Of note, unable to verify pt's OTC medications    Timing of last doses updated.    Thank you,  Lesia Palma, PharmD, BCPS

## 2025-03-31 NOTE — PROGRESS NOTES
Clinical Pharmacy Note    Pharmacy can not supply Cabozantinib (Cabometyx).   I have notified the nurse and requested the medication be brought from home.     The medication is anticipated to be available today. RN will bring down to pharmacy to be labeled.     Please follow to ensure that medication therapy needs are met.    Thanks,    Yogesh Newman, PharmD  PGY-1 Pharmacy Resident   [facility]  [phone]

## 2025-03-31 NOTE — PROGRESS NOTES
Spiritual Health History and Assessment/Progress Note  [facility]    Spiritual/Emotional Needs,  ,  ,      Name: Danny Rock MRN: [id number]    Age: 75 y.o.     Sex: male   Language: English   Restoration: Caodaism   Debility     Date: 3/31/2025            Total Time Calculated: 30 min              Spiritual Assessment began in [facility]        Referral/Consult From: Nurse (Ref: per pt request, placed by Carey Rogers RN)   Encounter Overview/Reason: Spiritual/Emotional Needs  Service Provided For: Patient    Dinorah, Belief, Meaning:   Patient identifies as spiritual and unable to assess at this time  Family/Friends No family/friends present      Importance and Influence:  Patient unable to assess at this time  Family/Friends No family/friends present    Community:  Patient feels well-supported. Support system includes: Spouse/Partner, Children, and Extended family  Family/Friends No family/friends present    Assessment and Plan of Care:     Patient Interventions include: Facilitated expression of thoughts and feelings, Explored spiritual coping/struggle/distress, Engaged in theological reflection, Affirmed coping skills/support systems, and Facilitated life review and/ or legacy  Family/Friends Interventions include: No family/friends present    Patient Plan of Care: No spiritual needs identified for follow-up and Spiritual Care available upon further referral  Family/Friends Plan of Care: No family/friends present    Electronically signed by Chaplain Sade on 3/31/2025 at 12:28 PM

## 2025-03-31 NOTE — PLAN OF CARE
Problem: Chronic Conditions and Co-morbidities  Goal: Patient's chronic conditions and co-morbidity symptoms are monitored and maintained or improved  3/31/2025 1623 by Kerry Rosenthal  Outcome: Progressing  3/31/2025 1617 by Kerry Rosenthal  Outcome: Progressing  Flowsheets (Taken 3/31/2025 1220)  Care Plan - Patient's Chronic Conditions and Co-Morbidity Symptoms are Monitored and Maintained or Improved: Monitor and assess patient's chronic conditions and comorbid symptoms for stability, deterioration, or improvement     Problem: Discharge Planning  Goal: Discharge to home or other facility with appropriate resources  3/31/2025 1623 by Kerry Rosenthal  Outcome: Progressing  3/31/2025 1617 by Kerry Rosenthal  Outcome: Progressing  Flowsheets (Taken 3/31/2025 1220)  Discharge to home or other facility with appropriate resources:   Identify barriers to discharge with patient and caregiver   Identify discharge learning needs (meds, wound care, etc)     Problem: Skin/Tissue Integrity  Goal: Skin integrity remains intact  Description: 1.  Monitor for areas of redness and/or skin breakdown  2.  Assess vascular access sites hourly  3.  Every 4-6 hours minimum:  Change oxygen saturation probe site  4.  Every 4-6 hours:  If on nasal continuous positive airway pressure, respiratory therapy assess nares and determine need for appliance change or resting period  3/31/2025 1623 by Kerry Rosenthal  Outcome: Progressing  3/31/2025 1617 by Kerry Rosenthal  Outcome: Progressing  Flowsheets (Taken 3/31/2025 1220)  Skin Integrity Remains Intact: Monitor for areas of redness and/or skin breakdown     Problem: Safety - Adult  Goal: Free from fall injury  3/31/2025 1623 by Kerry Rosenthal  Outcome: Progressing  3/31/2025 1617 by Kerry Rosenthal  Outcome: Progressing     Problem: ABCDS Injury Assessment  Goal: Absence of physical injury  3/31/2025 1623 by Kerry Rosenthal  Outcome: Progressing  3/31/2025 1617 by Kerry Rosenthal  Outcome:

## 2025-03-31 NOTE — PROGRESS NOTES
[facility] - Inpatient Rehabilitation Department   Phone: [phone]    Occupational Therapy    [x] Initial Evaluation            [] Daily Treatment Note         [] Discharge Summary      Patient: Danny Rock   : 1950   MRN: [id number]   Date of Service:  3/31/2025    Admitting Diagnosis:  Debility  Current Admission Summary: Danny Rock is a 75 y.o. male with PMHx notable for HTN, HLD, CAD, HFrEF, atrial fibrillation, renal cancer s/p right nephrectomy, ESRD on HD who initially presented to [facility] on 3/22 with low back pain and had MRI showing severe multilevel foraminal stenosis worst at L5-S1 prompting transfer to [facility] for neurosurgical evaluation.  Repeat MRI C/T/L-spine on 3/23 showed multilevel disc/endplate enhancement compatible with discitis and osteomyelitis at L2-L3, L3-L4 and L4-L5 with adjacent paravertebral edema, and small right psoas muscle abscess.  Blood cultures resulted positive for Enterococcus faecalis.  He was started on IV vancomycin and gentamicin on 3/25, with plan at discharge to continue IV vancomycin 3 times a week after dialysis through 2025. NSGY evaluated while inpatient, recommended no surgical intervention at this time.  Nephrology consulted for HD needs, had old HD access removed and TDC placed on 3/28   Past Medical History:  has a past medical history of Atrial fibrillation (HCC), CAD (coronary artery disease), Cancer of kidney (HCC), Dependence on renal dialysis, Diabetes mellitus (HCC), Hyperlipidemia, Hypertension, Prostate cancer (HCC), Right renal mass, and Systolic CHF (HCC).  Past Surgical History:  has a past surgical history that includes hernia repair; knee surgery; Cholecystectomy; Nasal septum surgery; eye surgery (Bilateral, 2018); Kidney removal (Right, 2022); Cataract removal (Bilateral); IR TUNNELED CVC PLACE WO SQ PORT/PUMP > 5 YEARS (2024); CT NEEDLE BIOPSY LIVER PERCUTANEOUS (2024); other surgical  Vision Corrective Device: wears glasses at all times- reports he does not need them   Hearing:   WFL  Perception:   WFL  Observation:   General Observation:  chest port on L side, small dressing on R side of chest, scattered bruising of UEs; pt reports recent R shoulder surgery, scar notable, pt endorses pain w/ certain position    Posture:   Fair, kyphotic. Head forward and rounded shoulders   Sensation:   pt reports (B) hands N/T; reports R shoulder N/T; B feet, intermittent   Tone:   Normotonic  Coordination Testing:   Coordination and Movement Description: (R) UE, (L) UE, fine motor impairments, decreased speed, decreased accuracy  Finger/Thumb Opposition: Impaired Bilaterally  ROM:   (B) shoulder flexion significantly impaired- ~45*; elbow, wrist and digits are WFL (B).   Strength:   Formal shoulder assessment held d/t ROM limitations and pt reports pain in (R) shoulder w/ pressure; pt does not tolerate (R) elbow flex assessment d/t radiating shoulder pain, (L) elbow is 3+/5. (B)  WFL.     Therapist Clinical Decision Making (Complexity): low complexity  Clinical Presentation: evolving      Subjective  General: Pt in recliner at entry, pleasant and agreeable to OT eval. Periods of confusion/word finding difficulties   Pain: Patient unable to rate secondary to cognition/communication deficits- pt reports not much, 2-3%; pt does endorse periods or R LE muscle spasms   Pain Interventions: pain medication in place prior to arrival and repositioned         Activities of Daily Living  Basic Activities of Daily Living  Feeding: Independent  Feeding Comments: pt IND completed meal prior to entry   Upper Extremity Bathing: minimal assistance  Lower Extremity Bathing: minimal assistance   Bathing Comments: seated on TTB- CGA for dynamic balance during low reaching for B LE feet, assist to thoroughly dry B feet after rpt completes- assist for rear hygiene w/ lateral shift; assist to wash/dry B underarms. Overall good

## 2025-03-31 NOTE — CONSULTS
Infectious Diseases   Consult Note        Admission Date: 3/30/2025  Hospital Day: Hospital Day: 2   Attending: Garret Ponce MD  Date of service: 3/31/25     Reason for admission: Debility [R53.81]    Chief complaint/ Reason for consult: Enterococcus faecalis bacteremia, lumbar discitis and osteomyelitis    Microbiology:        I have reviewed allavailable micro lab data and cultures    Results       Procedure Component Value Units Date/Time    Culture, Blood 2 [[id number]] Collected: 03/26/25 0910    Order Status: Completed Specimen: Blood Updated: 03/30/25 1015     Culture, Blood 2 No Growth after 4 days of incubation.    Narrative:      ORDER#: [id number]                          ORDERED BY: RAIN LEBRON  SOURCE: Blood                              COLLECTED:  03/26/25 09:10  ANTIBIOTICS AT SARAH.:                      RECEIVED :  03/26/25 15:10  If child <=2 yrs old please draw pediatric bottle.~Blood Culture #2    Culture, Tip [[id number]] Collected: 03/26/25 0811    Order Status: Completed Specimen: Catheter Tip Updated: 03/30/25 0628     Culture Catheter Tip No growth at 96 hours    Narrative:      ORDER#: [id number]                          ORDERED BY: RAIN LEBRON  SOURCE: Catheter Tip                       COLLECTED:  03/26/25 08:11  ANTIBIOTICS AT SARAH.:                      RECEIVED :  03/26/25 08:32    Culture, Blood 1 [[id number]]  (Abnormal) Collected: 03/26/25 0732    Order Status: Completed Specimen: Blood Updated: 03/30/25 0803     Organism Staphylococcus epidermidis DNA Detected     Blood Culture, Routine See additional report for complete BCID panel.     Organism Staphylococcus epidermidis     Blood Culture, Routine --     POSITIVE for  This organism was isolated in one set.  Susceptibility testing is not routinely done as this  organism frequently represents skin contamination.  Additional testing can be ordered by calling the  Microbiology Department.      Narrative:       Results   Component Value Date    .0 (H) 03/23/2025         Imaging:    All pertinent images and reports for the current visit were reviewed by me during this visit.  I reviewed the chest x-ray/CT scan/MRI images and independently interpreted the findings and results today.    No orders to display       Outside records:    Labs, Microbiology, Radiology and pertinent results from Care everywhere, if available, were reviewed as a part ofthe consultation.      Problem list:       Patient Active Problem List   Diagnosis Code    HLD (hyperlipidemia) E78.5    Essential hypertension I10    Coronary artery disease due to lipid rich plaque I25.10, I25.83    Cardiac dysrhythmia I49.9    Diabetes mellitus E11.9    Paroxysmal atrial fibrillation (HCC) I48.0    Renal mass N28.89    Cerebrovascular accident (CVA) ([facility]) I63.9    Recent cerebrovascular accident (CVA) Z86.73    HTN (hypertension), benign I10    PAF (paroxysmal atrial fibrillation) ([facility]) I48.0    CASTRO (acute kidney injury) N17.9    LBBB (left bundle branch block) I44.7    H/O right nephrectomy Z90.5    Anemia D64.9    Acute ischemic stroke (HCC) I63.9    Overweight (BMI 25.0-29.9) E66.3    AIN (acute interstitial nephritis) N10    Sterile pyuria R82.81    Current chronic use of systemic steroids Z79.52    H/O systemic steroid therapy Z92.241    Encounter for medication counseling Z71.89    Secondary hypercoagulable state D68.69    Pneumothorax J93.9    Left inguinal hernia K40.90    Abscess of right shoulder L02.413    CRP elevated R79.82    Elevated sed rate R70.0    Abscess of right arm L02.413    ESRD on hemodialysis (HCC) N18.6, Z99.2    Hemorrhoid K64.9    Constipation K59.00    Low back pain M54.50    Lumbar foraminal stenosis M48.061    History of prostate cancer Z85.46    Central stenosis of spinal canal M48.00    Enterococcus faecalis infection A49.8    Enterococcal bacteremia R78.81, B95.2    Moderate malnutrition E44.0    Debility R53.81

## 2025-03-31 NOTE — PATIENT CARE CONFERENCE
[facility] Inpatient Rehabilitation Department  Weekly Team Conference Note    Patient Name: Danny Rock      MRN: [id number]  : 1950  (75 y.o.) Gender: male     The team conference for this patient was held on 25 at 11am and was led by:  Garret Ponce MD     CASE MANAGEMENT:  Assessment: Patient is a 75 year old male. Patient discharge date is TBD. Patient was admitted for lumbar osteomyelitis/discitis. Patient is from home with his spouse and dog. He is A&Ox4, has active insurance and is active with a PCP in the community with a recent visit. Disposition is TBD. SW will continue to follow therapy and Dr. Ponce recommendations and discuss closer to discharge.    PHYSICAL THERAPY    Current Status:  Bed Mobility:  Supine to Sit: moderate assistance  Sit to Supine: minimal assistance  Rolling Left: contact guard assistance  Rolling Right: minimal assistance  Comments: Assist with RLE with sit>supine, assist with trunk with supine>sit with increased time to complete d/t pain in RLE   Transfers:  Sit to stand transfer: minimal assistance, moderate assistance, maximum assistance  Stand to sit transfer: minimal assistance  Bed to chair transfer: minimal assistance  Toilet transfer: moderate assistance  Comments: Transfers completed with RW, frequent cues for hand placement, foot placement and scooting to edge of surface before standing. Pt fluctuating between min-max A due to pain levels in RLE and varying surface heights   Ambulation:  Surface:level surface  Assistive Device: rolling walker  Assistance: minimal assistance  Distance: 20 ft  Gait Mechanics: Decreased oriana, antalgic gait on RLE, decreased B foot clearance, decreased B TKE in stance phase   Comments: Increased BUE reliance on RW due to pain in RLE with WB. Therapist domenico w/c behind pt for safety   Ambulation Trial 2  Surface:carpet  Assistive Device: rolling walker  Assistance: minimal assistance  Distance: 10  Supplement    Receives a CCC diet; pt was consuming at least 50% of meals per previous facility, & ate % of meals on 3/31.  Likes Chocolate flavored supplements; will change Nepro to Glucerna to provide flavor preference. Wt is trending down, with fluctuations expected from HD.  lb, which is 11 lb, 6.5% less than 3 months ago.  Please see nutrition note for details.    NURSING:    Risk for Readmission: 27%  Pierre Fall Risk Score: 70  Wounds/Incisions/Ulcers:    Wounds: Gluteal Cleft   Incisions: Right Axilla    HD Cath; Left neck  Medication Review: Reviewed with the patient prior to medication administration.  Pain: managed with and without medication management.  Consultations/Labs/X-rays:    Consultations: Pharmacy: 3/31/25; Nephrology 3/31/25; ID 3/31/25   Labs:  Vancomycin Level Q Day  CBC, Renal Function Panel Every other day      Patient/Family Education provided by team:    Discharge Plan   Estimated Length of Stay:  11 Days  Destination: Home  Pass:No  Services at Discharge: HHOT, PRN assist from family pending progress   Equipment at Discharge: TBD pending pt progress    Patient Goals:  \"Get stronger and get pain to go away\", get back to moving well, get back home.     Interdisciplinary Individualized Plan of Care Review of Previous Week:    Medical and functional progress towards goals:  Medically the patient is doing well but white count elevated, ID on board, ordered oral zyvox, blood cultures pending, vascath cultures pending, Nephro following, pain not a major concern.  Pt recently evaluated on 3/31/25, at this time he presents significantly below baseline as he requires min A for bathing, min A for UB dressing, and max A for LB dressing. Pt requiring mod A for toileting. Pt requires min A-max A for transfers depending on surface height. He requires min A for ambulation with use of RW of less than household distances.   Factors facilitating achievement of goals:  pleasant, cooperative,

## 2025-03-31 NOTE — PROGRESS NOTES
Physical Therapy    [facility] - Inpatient Rehabilitation Department   Phone: [phone]    Physical Therapy    [x] Initial Evaluation            [x] Daily Treatment Note         [] Discharge Summary      Patient: Danny Rock   : 1950   MRN: [id number]   Date of Service:  3/31/2025  Admitting Diagnosis: Debility  Current Admission Summary: Per chart review:    Danny Rock is a 75 y.o. male with PMHx notable for HTN, HLD, CAD, HFrEF, atrial fibrillation, renal cancer s/p right nephrectomy, ESRD on HD who initially presented to [facility] on 3/22 with low back pain and had MRI showing severe multilevel foraminal stenosis worst at L5-S1 prompting transfer to [facility] for neurosurgical evaluation.  Repeat MRI C/T/L-spine on 3/23 showed multilevel disc/endplate enhancement compatible with discitis and osteomyelitis at L2-L3, L3-L4 and L4-L5 with adjacent paravertebral edema, and small right psoas muscle abscess.  Blood cultures resulted positive for Enterococcus faecalis.  He was started on IV vancomycin and gentamicin on 3/25, with plan at discharge to continue IV vancomycin 3 times a week after dialysis through 2025. NSGY evaluated while inpatient, recommended no surgical intervention at this time.  Nephrology consulted for HD needs, had old HD access removed and TDC placed on 3/28   Past Medical History:  has a past medical history of Atrial fibrillation (HCC), CAD (coronary artery disease), Cancer of kidney (HCC), Dependence on renal dialysis, Diabetes mellitus (HCC), Hyperlipidemia, Hypertension, Prostate cancer (HCC), Right renal mass, and Systolic CHF (HCC).  Past Surgical History:  has a past surgical history that includes hernia repair; knee surgery; Cholecystectomy; Nasal septum surgery; eye surgery (Bilateral, 2018); Kidney removal (Right, 2022); Cataract removal (Bilateral); IR TUNNELED CVC PLACE WO SQ PORT/PUMP > 5 YEARS (2024); CT NEEDLE BIOPSY LIVER  deficits  Initiation: requires cues for some  Sequencing: requires cues for some  Orientation:    alert and oriented x 4  Command Following:   accurately follows one step commands    Education  Barriers To Learning: cognition  Patient Education: patient educated on goals, PT role and benefits, plan of care, general safety, functional mobility training, proper use of assistive device/equipment, family education, transfer training, discharge recommendations  Learning Assessment:  patient verbalizes understanding, would benefit from continued reinforcement    Assessment  Activity Tolerance: Limited by pain in R thigh/hip   Impairments Requiring Therapeutic Intervention: decreased functional mobility, decreased ROM, decreased strength, decreased safety awareness, decreased cognition, decreased endurance, decreased sensation, decreased balance, increased pain, decreased posture  Prognosis: good  Clinical Assessment: Pt is a 75 year old male who presents to [facility] due to low back pain. MRI C/T/L-spine on 3/23 showed multilevel disc/endplate enhancement compatible with discitis and osteomyelitis at L2-L3, L3-L4 and L4-L5 with adjacent paravertebral edema, and small right psoas muscle abscess. Blood cultures resulted positive for Enterococcus faecalis.  Prior to admission, pt was mod I with ambulation and transfers with intermittent use of RW and was independent with stair negotiation. Pt currently fluctuating between min-max A with sit>stand transfer and amb short gait distances with RW and min A. Pt currently limited by global weakness, pain in RLE, cognitive deficits, and general deconditioning. Pt is currently below functional mobility baseline and would benefit from further skilled PT to maximize independence with functional mobility, progress toward PLOF and decrease caregiver burden.   Safety Interventions: patient left in bed, bed alarm in place, call light within reach, gait belt, patient

## 2025-03-31 NOTE — PROGRESS NOTES
Patient has slept intermittently through the night. Complaints of pain in right hip and leg. Warm blankets, repositioning, pillow support, and pain medication all attempted to ease pain and were minimally effective. Patient up in recliner chair at this time, stating that it eased pain slightly but still rates pain at a 7/10 and is stabbing/throbbing. Able to transfer from bed to chair with minimal assistance and walker. In chair, alarm active. Call light in reach.

## 2025-03-31 NOTE — PLAN OF CARE
Problem: Chronic Conditions and Co-morbidities  Goal: Patient's chronic conditions and co-morbidity symptoms are monitored and maintained or improved  Outcome: Progressing  Flowsheets (Taken 3/31/2025 1220)  Care Plan - Patient's Chronic Conditions and Co-Morbidity Symptoms are Monitored and Maintained or Improved: Monitor and assess patient's chronic conditions and comorbid symptoms for stability, deterioration, or improvement     Problem: Discharge Planning  Goal: Discharge to home or other facility with appropriate resources  Outcome: Progressing  Flowsheets (Taken 3/31/2025 1220)  Discharge to home or other facility with appropriate resources:   Identify barriers to discharge with patient and caregiver   Identify discharge learning needs (meds, wound care, etc)     Problem: Skin/Tissue Integrity  Goal: Skin integrity remains intact  Description: 1.  Monitor for areas of redness and/or skin breakdown  2.  Assess vascular access sites hourly  3.  Every 4-6 hours minimum:  Change oxygen saturation probe site  4.  Every 4-6 hours:  If on nasal continuous positive airway pressure, respiratory therapy assess nares and determine need for appliance change or resting period  Outcome: Progressing  Flowsheets (Taken 3/31/2025 1220)  Skin Integrity Remains Intact: Monitor for areas of redness and/or skin breakdown     Problem: Safety - Adult  Goal: Free from fall injury  Outcome: Progressing     Problem: ABCDS Injury Assessment  Goal: Absence of physical injury  Outcome: Progressing     Problem: Pain  Goal: Verbalizes/displays adequate comfort level or baseline comfort level  Outcome: Progressing  Flowsheets (Taken 3/31/2025 8645 by Carey Rogers RN)  Verbalizes/displays adequate comfort level or baseline comfort level:   Encourage patient to monitor pain and request assistance   Assess pain using appropriate pain scale   Administer analgesics based on type and severity of pain and evaluate response   Implement  non-pharmacological measures as appropriate and evaluate response   Consider cultural and social influences on pain and pain management     Problem: Nutrition Deficit:  Goal: Optimize nutritional status  Outcome: Progressing  Flowsheets (Taken 3/31/2025 1132 by Magdalene Ray, RD, LD)  Nutrient intake appropriate for improving, restoring, or maintaining nutritional needs:   Monitor oral intake, labs, and treatment plans   Recommend appropriate diets, oral nutritional supplements, and vitamin/mineral supplements

## 2025-03-31 NOTE — PROGRESS NOTES
Nutrition Note    RECOMMENDATIONS  PO Diet: CCC   ONS: Glucerna BID    ASSESSMENT   Nutrition intervention triggered for new ARU admission for debility as pt with E faecalis bacteremia, Lumbar osteo-diskitis/ osteomyelitis.  Receives a CCC diet; pt was consuming at least 50% of meals per previous facility.  Only intake recorded in ARU is 1-25% x1 snack. Likes Chocolate flavored supplements; will change Nepro to Glucerna to provide flavor preference.  Wt is trending down, with fluctuations expected from HD.   lb, which is 11 lb, 6.5%  less [place] 3 months ago.  Will complete NFPE upon f/u, as pt currently unavaible.  Will follow for improved po & supp intake.       Malnutrition Status: Moderate malnutrition  Acute Illness    NUTRITION DIAGNOSIS   Inadequate oral intake related to inadequate protein-energy intake as evidenced by intake 0-25%  Increased nutrient needs related to increase demand for energy/nutrients as evidenced by wounds    Goals: PO intake 50% or greater     NUTRITION RELATED FINDINGS  Objective: LBM 3/30; no edema noted; POCG 202; on HD d/t ESRD  Wounds: Open Wounds    CURRENT NUTRITION THERAPIES  ADULT DIET; Regular; 4 carb choices (60 gm/meal)  ADULT ORAL NUTRITION SUPPLEMENT; Breakfast, Dinner; Diabetic Oral Supplement       PO Intake: 1-25% (in ARU)   PO Supplement Intake:Unable to assess    ANTHROPOMETRICS  Current Height: 190.5 cm (6' 3\")  Current Weight - Scale: 71.9 kg (158 lb 8 oz)      COMPARATIVE STANDARDS  Total Energy Requirements (kcals/day): 2146     Protein (g):  86- 144g       Fluid (mL/day):  1 ml/kcal    EDUCATION  Education/Counseling not indicated     The patient will be monitored per nutrition standards of care. Consult dietitian if additional nutrition interventions are needed prior to RD reassessment.     Magdalene Ray, RICO, LD    Contact: [phone]

## 2025-03-31 NOTE — CONSULTS
Clinical Pharmacy Note: Pharmacy to Dose Vancomycin    Danny Rock is a 75 y.o. male started on Vancomycin for E. Faecalis bacteremia; consult received from Dr. Ponce to manage therapy. Also receiving the following antibiotics: n/a    Goal AUC: 400-600 mg/L*hr  Goal Trough Level: 15-20 mcg/mL    Assessment/Plan:  Per ID notes, patient to receive vancomycin post-HD (Tuesday, Thursday and Saturday) through 5/19  A vancomycin random level has been ordered on HD days at 0600  Last vancomycin random level from 3/30 was 24.4, next HD session is tomorrow  No dose today, will check level tomorrow and assess   Pharmacy will continue to monitor and adjust regimen as necessary.    Allergies:  Amoxicillin, Bisoprolol-hydrochlorothiazide, Cisapride, Penicillins, and Pioglitazone     Recent Labs     03/30/25  0556 03/31/25  0619   CREATININE 1.5* 1.9*       Recent Labs     03/30/25  0556 03/31/25  0619   WBC 7.4 12.3*       Ht Readings from Last 1 Encounters:   03/30/25 1.905 m (6' 3\")        Wt Readings from Last 1 Encounters:   03/31/25 71.9 kg (158 lb 8 oz)         Estimated Creatinine Clearance: 34 mL/min (A) (based on SCr of 1.9 mg/dL (H)).      Thank you for the consult,    Yogesh Newman, PharmD  PGY-1 Pharmacy Resident   [facility]  [phone]

## 2025-03-31 NOTE — PLAN OF CARE
Problem: Chronic Conditions and Co-morbidities  Goal: Patient's chronic conditions and co-morbidity symptoms are monitored and maintained or improved  Outcome: Progressing  Flowsheets  Taken 3/30/2025 2040 by Radha Palomino RN  Care Plan - Patient's Chronic Conditions and Co-Morbidity Symptoms are Monitored and Maintained or Improved:   Monitor and assess patient's chronic conditions and comorbid symptoms for stability, deterioration, or improvement   Collaborate with multidisciplinary team to address chronic and comorbid conditions and prevent exacerbation or deterioration   Update acute care plan with appropriate goals if chronic or comorbid symptoms are exacerbated and prevent overall improvement and discharge  Taken 3/30/2025 1734 by Carey Rogers RN  Care Plan - Patient's Chronic Conditions and Co-Morbidity Symptoms are Monitored and Maintained or Improved: Monitor and assess patient's chronic conditions and comorbid symptoms for stability, deterioration, or improvement     Problem: Discharge Planning  Goal: Discharge to home or other facility with appropriate resources  Outcome: Progressing  Flowsheets  Taken 3/30/2025 2040 by Radha Palomino RN  Discharge to home or other facility with appropriate resources:   Identify barriers to discharge with patient and caregiver   Refer to discharge planning if patient needs post-hospital services based on physician order or complex needs related to functional status, cognitive ability or social support system  Taken 3/30/2025 1734 by Carey Rogers RN  Discharge to home or other facility with appropriate resources:   Identify barriers to discharge with patient and caregiver   Identify discharge learning needs (meds, wound care, etc)  Taken 3/30/2025 1727 by Carey Rogers RN  Discharge to home or other facility with appropriate resources:   Identify barriers to discharge with patient and caregiver   Identify discharge learning needs (meds, wound  Administer analgesics based on type and severity of pain and evaluate response   Implement non-pharmacological measures as appropriate and evaluate response   Consider cultural and social influences on pain and pain management

## 2025-03-31 NOTE — H&P
Patient: Danny Rock  [id number]  Date: 3/31/2025    Chief Complaint: Lumbar osteomyelitis/discitis    History of Present Illness/Hospital Course:  Danny Rock is a 75 y.o. male with PMHx notable for HTN, HLD, CAD, HFrEF, atrial fibrillation, renal cancer s/p right nephrectomy, ESRD on HD who initially presented to [facility] on 3/22 with low back pain and had MRI showing severe multilevel foraminal stenosis worst at L5-S1 prompting transfer to [facility] for neurosurgical evaluation.  Repeat MRI C/T/L-spine on 3/23 showed multilevel disc/endplate enhancement compatible with discitis and osteomyelitis at L2-L3, L3-L4 and L4-L5 with adjacent paravertebral edema, and small right psoas muscle abscess.  Blood cultures resulted positive for Enterococcus faecalis.  He was started on IV vancomycin and gentamicin on 3/25, with plan at discharge to continue IV vancomycin 3 times a week after dialysis through 5/19/2025. NSGY evaluated while inpatient, recommended no surgical intervention at this time.  Nephrology consulted for HD needs, had old HD access removed and TDC placed on 3/28.    Currently patient reports that he is doing well.  He slept better last night than he has in the past few nights.  He denies any significant lumbar pain.  He is having some cramping, stabbing pain in his right thigh intermittently.  He denies any radicular pain from his back into either of his lower extremities.  He denies any fevers or chills.  He is having nocturia, increased urinary frequency but no dysuria or flank pain. Last BM (including prior to admit): 03/30/25.     Prior Level of Function:  Independent for mobility, self-care, IADLs.  Lives with spouse in a multilevel home with 7 steps to enter.    Current Level of Function:  Set up for feeding and grooming, supervision for upper body dressing, dependent for lower body dressing, max assist for bathing, dependent for toileting.  Mod to max assist for bed mobility, min to mod  services, wound care, and prosthetics and orthotics. Given the patients complex condition  and risk of further medical complications, rehabilitation services cannot be  safely provided at a lower level of care such as a skilled nursing facility.  I have compared the patients medical and functional status at the time of the  preadmission screening and the same on this date, and there are no significant changes except as documented below in the assessment and plan.    By signing this document, I acknowledge that I have personally performed a  full physical examination on this patient within 24 hours of admission to  this inpatient rehabilitation facility and have determined the patient to be  able to tolerate the above course of treatment at an intensive level for a  reasonable period of time. I will be completing a detailed individualized  Plan of Care for this patient by day four of the patients stay based upon the  Preadmission Screen, this Post-Admission Evaluation, and the therapy  evaluations.       Rehabilitation Diagnosis:   IGC: Orthopedic, 8.9, Other Orthopedic    Assessment and Plan:  #.  Multilevel lumbar discitis and osteomyelitis  #.  E. faecalis bacteremia  - MRI of the lumbar, thoracic and cervical spine concerning for multilevel abnormal disc signal with disc enhancement and endplate enhancement compatible with discitis and osteomyelitis at L2-L3, L3-L4 and L4-L5. Adjacent paravertebral edema and enhancement. Equivocal small abscess in the right psoas muscle.   - Blood cultures: 2 out of 2 (+) Enterococcus faecalis, 1 out of 2 positive for Staph epidermidis (likely skin contaminant)  -Dialysis Vas-Cath removed on 3/26, tunneled dialysis catheter placed on 3/28  Plan:  - Continue IV vancomycin 3x/week after dialysis (Tues/Thurs/Sat). Pharmacy consulted for dosing. ID consult for additional recommendations in light of new leukocytosis noted on morning labs.  - PT/OT    #.  Multilevel lumbar foraminal

## 2025-04-01 LAB
ALBUMIN SERPL-MCNC: 2.8 G/DL (ref 3.4–5)
ANION GAP SERPL CALCULATED.3IONS-SCNC: 12 MMOL/L (ref 3–16)
BASOPHILS # BLD: 0 K/UL (ref 0–0.2)
BASOPHILS NFR BLD: 0.3 %
BUN SERPL-MCNC: 51 MG/DL (ref 7–20)
CALCIUM SERPL-MCNC: 9.1 MG/DL (ref 8.3–10.6)
CHLORIDE SERPL-SCNC: 96 MMOL/L (ref 99–110)
CO2 SERPL-SCNC: 21 MMOL/L (ref 21–32)
CREAT SERPL-MCNC: 2.3 MG/DL (ref 0.8–1.3)
DEPRECATED RDW RBC AUTO: 19.1 % (ref 12.4–15.4)
EOSINOPHIL # BLD: 0.1 K/UL (ref 0–0.6)
EOSINOPHIL NFR BLD: 1.4 %
GFR SERPLBLD CREATININE-BSD FMLA CKD-EPI: 29 ML/MIN/{1.73_M2}
GLUCOSE BLD-MCNC: 105 MG/DL (ref 70–99)
GLUCOSE BLD-MCNC: 123 MG/DL (ref 70–99)
GLUCOSE BLD-MCNC: 193 MG/DL (ref 70–99)
GLUCOSE BLD-MCNC: 209 MG/DL (ref 70–99)
GLUCOSE SERPL-MCNC: 183 MG/DL (ref 70–99)
HCT VFR BLD AUTO: 28 % (ref 40.5–52.5)
HGB BLD-MCNC: 9.4 G/DL (ref 13.5–17.5)
LYMPHOCYTES # BLD: 1 K/UL (ref 1–5.1)
LYMPHOCYTES NFR BLD: 9.7 %
MCH RBC QN AUTO: 33.4 PG (ref 26–34)
MCHC RBC AUTO-ENTMCNC: 33.6 G/DL (ref 31–36)
MCV RBC AUTO: 99.4 FL (ref 80–100)
MONOCYTES # BLD: 0.4 K/UL (ref 0–1.3)
MONOCYTES NFR BLD: 3.5 %
NEUTROPHILS # BLD: 8.8 K/UL (ref 1.7–7.7)
NEUTROPHILS NFR BLD: 85.1 %
PERFORMED ON: ABNORMAL
PHOSPHATE SERPL-MCNC: 2.4 MG/DL (ref 2.5–4.9)
PLATELET # BLD AUTO: 294 K/UL (ref 135–450)
PMV BLD AUTO: 7.7 FL (ref 5–10.5)
POTASSIUM SERPL-SCNC: 4.7 MMOL/L (ref 3.5–5.1)
RBC # BLD AUTO: 2.82 M/UL (ref 4.2–5.9)
SODIUM SERPL-SCNC: 129 MMOL/L (ref 136–145)
VANCOMYCIN SERPL-MCNC: 14.4 UG/ML
WBC # BLD AUTO: 10.4 K/UL (ref 4–11)

## 2025-04-01 PROCEDURE — 97530 THERAPEUTIC ACTIVITIES: CPT

## 2025-04-01 PROCEDURE — 97116 GAIT TRAINING THERAPY: CPT

## 2025-04-01 PROCEDURE — 80069 RENAL FUNCTION PANEL: CPT

## 2025-04-01 PROCEDURE — 90935 HEMODIALYSIS ONE EVALUATION: CPT

## 2025-04-01 PROCEDURE — 51798 US URINE CAPACITY MEASURE: CPT

## 2025-04-01 PROCEDURE — 6370000000 HC RX 637 (ALT 250 FOR IP): Performed by: STUDENT IN AN ORGANIZED HEALTH CARE EDUCATION/TRAINING PROGRAM

## 2025-04-01 PROCEDURE — 5A1D70Z PERFORMANCE OF URINARY FILTRATION, INTERMITTENT, LESS THAN 6 HOURS PER DAY: ICD-10-PCS | Performed by: STUDENT IN AN ORGANIZED HEALTH CARE EDUCATION/TRAINING PROGRAM

## 2025-04-01 PROCEDURE — 1280000000 HC REHAB R&B

## 2025-04-01 PROCEDURE — 85025 COMPLETE CBC W/AUTO DIFF WBC: CPT

## 2025-04-01 PROCEDURE — 6370000000 HC RX 637 (ALT 250 FOR IP): Performed by: INTERNAL MEDICINE

## 2025-04-01 PROCEDURE — 97535 SELF CARE MNGMENT TRAINING: CPT

## 2025-04-01 PROCEDURE — 80202 ASSAY OF VANCOMYCIN: CPT

## 2025-04-01 PROCEDURE — 97140 MANUAL THERAPY 1/> REGIONS: CPT

## 2025-04-01 PROCEDURE — 99233 SBSQ HOSP IP/OBS HIGH 50: CPT | Performed by: INTERNAL MEDICINE

## 2025-04-01 PROCEDURE — 36415 COLL VENOUS BLD VENIPUNCTURE: CPT

## 2025-04-01 RX ORDER — LIDOCAINE 4 G/G
1 PATCH TOPICAL DAILY PRN
Status: DISCONTINUED | OUTPATIENT
Start: 2025-04-01 | End: 2025-04-04

## 2025-04-01 RX ORDER — GABAPENTIN 100 MG/1
100 CAPSULE ORAL
Status: DISCONTINUED | OUTPATIENT
Start: 2025-04-01 | End: 2025-04-04

## 2025-04-01 RX ORDER — HEPARIN SODIUM 1000 [USP'U]/ML
4000 INJECTION, SOLUTION INTRAVENOUS; SUBCUTANEOUS PRN
Status: DISCONTINUED | OUTPATIENT
Start: 2025-04-01 | End: 2025-04-04

## 2025-04-01 RX ORDER — ONDANSETRON 4 MG/1
8 TABLET, ORALLY DISINTEGRATING ORAL EVERY 8 HOURS PRN
Status: DISCONTINUED | OUTPATIENT
Start: 2025-04-01 | End: 2025-04-04

## 2025-04-01 RX ADMIN — ROSUVASTATIN CALCIUM 10 MG: 10 TABLET, FILM COATED ORAL at 21:54

## 2025-04-01 RX ADMIN — POLYETHYLENE GLYCOL 3350 17 G: 17 POWDER, FOR SOLUTION ORAL at 08:34

## 2025-04-01 RX ADMIN — INSULIN LISPRO 2 UNITS: 100 INJECTION, SOLUTION INTRAVENOUS; SUBCUTANEOUS at 08:36

## 2025-04-01 RX ADMIN — INSULIN LISPRO 2 UNITS: 100 INJECTION, SOLUTION INTRAVENOUS; SUBCUTANEOUS at 18:52

## 2025-04-01 RX ADMIN — APIXABAN 5 MG: 5 TABLET, FILM COATED ORAL at 21:53

## 2025-04-01 RX ADMIN — METHOCARBAMOL 750 MG: 750 TABLET ORAL at 08:36

## 2025-04-01 RX ADMIN — OXYCODONE HYDROCHLORIDE AND ACETAMINOPHEN 2 TABLET: 5; 325 TABLET ORAL at 15:48

## 2025-04-01 RX ADMIN — LINEZOLID 600 MG: 600 TABLET, FILM COATED ORAL at 21:53

## 2025-04-01 RX ADMIN — METHOCARBAMOL 750 MG: 750 TABLET ORAL at 12:22

## 2025-04-01 RX ADMIN — ASPIRIN 81 MG: 81 TABLET, CHEWABLE ORAL at 08:36

## 2025-04-01 RX ADMIN — METHOCARBAMOL 750 MG: 750 TABLET ORAL at 18:48

## 2025-04-01 RX ADMIN — METOPROLOL TARTRATE 25 MG: 25 TABLET, FILM COATED ORAL at 08:35

## 2025-04-01 RX ADMIN — LINEZOLID 600 MG: 600 TABLET, FILM COATED ORAL at 08:35

## 2025-04-01 RX ADMIN — SEVELAMER CARBONATE 800 MG: 800 TABLET, FILM COATED ORAL at 12:22

## 2025-04-01 RX ADMIN — CHOLECALCIFEROL TAB 125 MCG (5000 UNIT) 5000 UNITS: 125 TAB at 08:35

## 2025-04-01 RX ADMIN — SEVELAMER CARBONATE 800 MG: 800 TABLET, FILM COATED ORAL at 08:34

## 2025-04-01 RX ADMIN — METHOCARBAMOL 750 MG: 750 TABLET ORAL at 21:53

## 2025-04-01 RX ADMIN — MELATONIN TAB 3 MG 3 MG: 3 TAB at 21:53

## 2025-04-01 RX ADMIN — SEVELAMER CARBONATE 800 MG: 800 TABLET, FILM COATED ORAL at 18:48

## 2025-04-01 RX ADMIN — GABAPENTIN 100 MG: 100 CAPSULE ORAL at 21:54

## 2025-04-01 RX ADMIN — Medication 1 MG: at 08:35

## 2025-04-01 RX ADMIN — METOPROLOL TARTRATE 25 MG: 25 TABLET, FILM COATED ORAL at 21:54

## 2025-04-01 RX ADMIN — APIXABAN 5 MG: 5 TABLET, FILM COATED ORAL at 08:36

## 2025-04-01 RX ADMIN — PANTOPRAZOLE SODIUM 40 MG: 40 TABLET, DELAYED RELEASE ORAL at 05:00

## 2025-04-01 RX ADMIN — OXYCODONE HYDROCHLORIDE AND ACETAMINOPHEN 2 TABLET: 5; 325 TABLET ORAL at 03:25

## 2025-04-01 RX ADMIN — CYANOCOBALAMIN TAB 1000 MCG 1000 MCG: 1000 TAB at 08:36

## 2025-04-01 ASSESSMENT — ENCOUNTER SYMPTOMS
NAUSEA: 0
BACK PAIN: 0
RHINORRHEA: 0
ABDOMINAL PAIN: 0
SORE THROAT: 0
SINUS PAIN: 0
SINUS PRESSURE: 0
DIARRHEA: 0
CONSTIPATION: 0
EYE DISCHARGE: 0
WHEEZING: 0
COUGH: 0
SHORTNESS OF BREATH: 0
EYE REDNESS: 0

## 2025-04-01 ASSESSMENT — PAIN DESCRIPTION - LOCATION
LOCATION: SACRUM
LOCATION: HIP
LOCATION: LEG
LOCATION: HIP

## 2025-04-01 ASSESSMENT — PAIN DESCRIPTION - DESCRIPTORS
DESCRIPTORS: ACHING;DISCOMFORT
DESCRIPTORS: ACHING
DESCRIPTORS: ACHING
DESCRIPTORS: THROBBING

## 2025-04-01 ASSESSMENT — PAIN SCALES - GENERAL
PAINLEVEL_OUTOF10: 6
PAINLEVEL_OUTOF10: 4
PAINLEVEL_OUTOF10: 3
PAINLEVEL_OUTOF10: 6
PAINLEVEL_OUTOF10: 7

## 2025-04-01 ASSESSMENT — PAIN DESCRIPTION - ORIENTATION
ORIENTATION: MID
ORIENTATION: RIGHT

## 2025-04-01 ASSESSMENT — PAIN DESCRIPTION - FREQUENCY: FREQUENCY: INTERMITTENT

## 2025-04-01 ASSESSMENT — PAIN DESCRIPTION - ONSET: ONSET: ON-GOING

## 2025-04-01 ASSESSMENT — PAIN DESCRIPTION - PAIN TYPE: TYPE: ACUTE PAIN

## 2025-04-01 NOTE — PROGRESS NOTES
Up to the bathroom walker x1 w/gait belt. Cleaned up at sink, back to bed. Tolerated Well. Barrier cream and mepilex applied to buttocks.

## 2025-04-01 NOTE — PROGRESS NOTES
Treatment time: 3 hrs    Net UF: 1000 ml    Pre weight: Unable to weight pt r/t scale not working on bed  EDW: TBD    Access used: Lt CW TDC  Access function: Well tolerated, 350 BFR    Medications or blood products given: Heparin dwells    Regular outpatient schedule: TTS    Summary of response to treatment: Pt tolerated well. Pt remained stable throughout entire treatment and upon exiting the hemodialysis suite. Report given to Carey Rogers RN

## 2025-04-01 NOTE — PROGRESS NOTES
Patient up in chair, call light within reach, bedside table within reach, chair alarm is activated. No further needs at this time.

## 2025-04-01 NOTE — PROGRESS NOTES
Infectious Diseases   Progress Note      Admission Date: 3/30/2025  Hospital Day: Hospital Day: 3   Attending: Garret Ponce MD  Date of service: 4/1/2025     Chief complaint/ Reason for consult:     High-grade Enterococcus faecalis bacteremia  Bilateral L3-L4 4 and left L4-L5 discitis and osteomyelitis with foraminal stenosis and spinal canal stenosis at L3 and L4 level on MRI  L3-L4 level epidural phlegmon and right psoas muscle abscess on MRI done on March 23, 2025  History of rash with amoxicillin, tolerates ceftriaxone okay  End-stage renal disease on hemodialysis  Elevated sed rate  Elevated CRP    Microbiology:      I have reviewed allavailable micro lab data and cultures    Results       Procedure Component Value Units Date/Time    Culture, Blood 2 [[id number]] Collected: 03/31/25 1313    Order Status: Completed Specimen: Blood Updated: 04/01/25 1415     Culture, Blood 2 No Growth to date.  Any change in status will be called.    Narrative:      ORDER#: [id number]                          ORDERED BY: MAURISIO MIN  SOURCE: Blood Hand, Right                  COLLECTED:  03/31/25 13:13  ANTIBIOTICS AT SARAH.:                      RECEIVED :  03/31/25 20:42  If child <=2 yrs old please draw pediatric bottle.~Blood Culture #2    Culture, Blood 1 [[id number]] Collected: 03/31/25 1310    Order Status: Completed Specimen: Blood Updated: 04/01/25 1415     Blood Culture, Routine No Growth to date.  Any change in status will be called.    Narrative:      ORDER#: [id number]                          ORDERED BY: MAURISIO MIN  SOURCE: Blood Antecubital-Rig              COLLECTED:  03/31/25 13:10  ANTIBIOTICS AT SARAH.:                      RECEIVED :  03/31/25 20:42  If child <=2 yrs old please draw pediatric bottle.~Blood Culture 1    Culture, Blood 3 [[id number]]     Order Status: Sent Specimen: Blood     Culture, Blood 2 [[id number]] Collected: 03/26/25 0910    Order Status: Completed Specimen: Blood Updated:  03/30/25 1015     Culture, Blood 2 No Growth after 4 days of incubation.    Narrative:      ORDER#: [id number]                          ORDERED BY: RAIN LEBRON  SOURCE: Blood                              COLLECTED:  03/26/25 09:10  ANTIBIOTICS AT SARAH.:                      RECEIVED :  03/26/25 15:10  If child <=2 yrs old please draw pediatric bottle.~Blood Culture #2    Culture, Tip [[id number]] Collected: 03/26/25 0811    Order Status: Completed Specimen: Catheter Tip Updated: 03/30/25 0628     Culture Catheter Tip No growth at 96 hours    Narrative:      ORDER#: [id number]                          ORDERED BY: RAIN LEBRON  SOURCE: Catheter Tip                       COLLECTED:  03/26/25 08:11  ANTIBIOTICS AT SARAH.:                      RECEIVED :  03/26/25 08:32    Culture, Blood 1 [[id number]]  (Abnormal) Collected: 03/26/25 0732    Order Status: Completed Specimen: Blood Updated: 03/30/25 0803     Organism Staphylococcus epidermidis DNA Detected     Blood Culture, Routine See additional report for complete BCID panel.     Organism Staphylococcus epidermidis     Blood Culture, Routine --     POSITIVE for  This organism was isolated in one set.  Susceptibility testing is not routinely done as this  organism frequently represents skin contamination.  Additional testing can be ordered by calling the  Microbiology Department.      Narrative:      ORDER#: [id number]                          ORDERED BY: RAIN LEBRON  SOURCE: Blood                              COLLECTED:  03/26/25 07:32  ANTIBIOTICS AT SARAH.:                      RECEIVED :  03/26/25 15:10  CALL  [facility] tel. [phone],  Microbiology results called to and read back by ABHISHEK Brock,  03/27/2025 22:38, by ANGELICA  If child <=2 yrs old please draw pediatric bottle.~Blood Culture 1    Culture, Blood, PCR ID Panel [[id number]] Collected: 03/26/25 0732    Order Status: Completed Updated: 03/27/25 2240     Report SEE IMAGE

## 2025-04-01 NOTE — PROGRESS NOTES
Physical Therapy    [facility] - Inpatient Rehabilitation Department   Phone: [phone]    Physical Therapy    [] Initial Evaluation            [x] Daily Treatment Note         [] Discharge Summary      Patient: Danny Rock   : 1950   MRN: [id number]   Date of Service:  2025  Admitting Diagnosis: Debility  Current Admission Summary: Per chart review:    Danny Rock is a 75 y.o. male with PMHx notable for HTN, HLD, CAD, HFrEF, atrial fibrillation, renal cancer s/p right nephrectomy, ESRD on HD who initially presented to [facility] on 3/22 with low back pain and had MRI showing severe multilevel foraminal stenosis worst at L5-S1 prompting transfer to [facility] for neurosurgical evaluation.  Repeat MRI C/T/L-spine on 3/23 showed multilevel disc/endplate enhancement compatible with discitis and osteomyelitis at L2-L3, L3-L4 and L4-L5 with adjacent paravertebral edema, and small right psoas muscle abscess.  Blood cultures resulted positive for Enterococcus faecalis.  He was started on IV vancomycin and gentamicin on 3/25, with plan at discharge to continue IV vancomycin 3 times a week after dialysis through 2025. NSGY evaluated while inpatient, recommended no surgical intervention at this time.  Nephrology consulted for HD needs, had old HD access removed and TDC placed on 3/28   Past Medical History:  has a past medical history of Atrial fibrillation (HCC), CAD (coronary artery disease), Cancer of kidney (HCC), Dependence on renal dialysis, Diabetes mellitus (HCC), Hyperlipidemia, Hypertension, Prostate cancer (HCC), Right renal mass, and Systolic CHF (HCC).  Past Surgical History:  has a past surgical history that includes hernia repair; knee surgery; Cholecystectomy; Nasal septum surgery; eye surgery (Bilateral, 2018); Kidney removal (Right, 2022); Cataract removal (Bilateral); IR TUNNELED CVC PLACE WO SQ PORT/PUMP > 5 YEARS (2024); CT NEEDLE BIOPSY LIVER    Safety Interventions: patient left in chair, chair alarm in place, call light within reach, gait belt, patient at risk for falls, nurse notified, and family/caregiver present    Plan  Frequency: 5 x/week, 90 min/day  Current Treatment Recommendations: strengthening, ROM, balance training, functional mobility training, transfer training, gait training, stair training, endurance training, neuromuscular re-education, modalities, patient/caregiver education, pain management, home exercise program, safety education, and equipment evaluation/education    Goals  Patient Goals: \"Get stronger and get pain to go away\"    Short Term Goals:  Time Frame: 2 weeks  Patient will complete bed mobility at [facility] independent   Patient will complete transfers at supervision   Patient will ambulate 150 ft with use of LRAD at supervision  Patient will ascend/descend 12 stairs with (B) handrail at contact guard assistance  Patient will complete car transfer at contact guard assistance    Above goals reviewed on 4/1/2025.  All goals are ongoing at this time unless indicated above.      Therapy Session Time      Individual Group Co-treatment   Time In 0830       Time Out 0930       Minutes 60         Second Session Therapy Time:   Individual Concurrent Group Co-treatment   Time In 1300         Time Out 1333         Minutes 33           Timed Code Treatment Minutes:  60 + 33 Minutes    Total Treatment Minutes:  93 Minutes         Electronically Signed By: Catherine Madrigal, PT  Catherine Madrigal PT, DPT [id number]      Second Session: Cheyenne Skinner, PT, DPT, CNS #[id number]

## 2025-04-01 NOTE — PLAN OF CARE
Problem: Discharge Planning  Goal: Discharge to home or other facility with appropriate resources  4/1/2025 0042 by Codie Contreras, RN  Outcome: Progressing  Flowsheets (Taken 3/31/2025 2100)  Discharge to home or other facility with appropriate resources: Identify barriers to discharge with patient and caregiver     Problem: Skin/Tissue Integrity  Goal: Skin integrity remains intact  4/1/2025 0042 by Codie Contreras, RN  Outcome: Progressing     Problem: ABCDS Injury Assessment  Goal: Absence of physical injury  4/1/2025 0042 by Codie Contreras, RN  Outcome: Progressing     Problem: Pain  Goal: Verbalizes/displays adequate comfort level or baseline comfort level  4/1/2025 0042 by Codie Contreras, RN  Outcome: Progressing  Flowsheets (Taken 3/31/2025 1945)  Verbalizes/displays adequate comfort level or baseline comfort level: Encourage patient to monitor pain and request assistance

## 2025-04-01 NOTE — CARE COORDINATION
Case Management Assessment  Initial Evaluation    Date/Time of Evaluation: 4/1/2025 9:24 AM  Assessment Completed by: TALAT Atkins    If patient is discharged prior to next notation, then this note serves as note for discharge by case management.    Patient Name: Danny Rock                   YOB: 1950  Diagnosis: Debility [R53.81]                   Date / Time: 3/30/2025  4:53 PM    Patient Admission Status: REHAB IP   Readmission Risk (Low < 19, Mod (19-27), High > 27): Readmission Risk Score: 28    Current PCP: Yesi Rosa APRN - CNP  PCP verified by CM? (P) Yes    Chart Reviewed: Yes      History Provided by: (P) Significant Other, Patient  Patient Orientation: (P) Alert and Oriented    Patient Cognition: (P) Alert    Hospitalization in the last 30 days (Readmission):  Yes    If yes, Readmission Assessment in CM Navigator will be completed.    Advance Directives:      Code Status: Full Code   Patient's Primary Decision Maker is: (P) Legal Next of Kin    Primary Decision Maker: Amanda Rock - Spouse - [phone]    Discharge Planning:    Patient lives with: (P) Spouse/Significant Other Type of Home: (P) House  Primary Care Giver: (P) Self  Patient Support Systems include: (P) Spouse/Significant Other   Current Financial resources: (P) None  Current community resources: (P) None  Current services prior to admission: (P) Other (Comment), Durable Medical Equipment            Current DME: (P) Cane, Walker            Type of Home Care services:  (P) OT, PT, Skilled Therapy    ADLS  Prior functional level: (P) Independent in ADLs/IADLs  Current functional level: (P) Assistance with the following:, Mobility    PT AM-PAC:   /24  OT AM-PAC:   /24    Family can provide assistance at DC: (P) Yes  Would you like Case Management to discuss the discharge plan with any other family members/significant others, and if so, who? (P) Yes (Wife Amanda)  Plans to Return to Present Housing: (P)  Representative Agree with the Discharge Plan?      TALAT Atkins  Case Management Department  Ph: [phone]  Electronically signed by TALAT Atknis on 4/1/25 at 9:26 AM EDT

## 2025-04-01 NOTE — PROGRESS NOTES
[facility] - Inpatient Rehabilitation Department   Phone: [phone]    Occupational Therapy    [] Initial Evaluation            [x] Daily Treatment Note         [] Discharge Summary      Patient: Danny Rock   : 1950   MRN: [id number]   Date of Service:  2025    Admitting Diagnosis:  Debility  Current Admission Summary: Danny Rock is a 75 y.o. male with PMHx notable for HTN, HLD, CAD, HFrEF, atrial fibrillation, renal cancer s/p right nephrectomy, ESRD on HD who initially presented to [facility] on 3/22 with low back pain and had MRI showing severe multilevel foraminal stenosis worst at L5-S1 prompting transfer to [facility] for neurosurgical evaluation.  Repeat MRI C/T/L-spine on 3/23 showed multilevel disc/endplate enhancement compatible with discitis and osteomyelitis at L2-L3, L3-L4 and L4-L5 with adjacent paravertebral edema, and small right psoas muscle abscess.  Blood cultures resulted positive for Enterococcus faecalis.  He was started on IV vancomycin and gentamicin on 3/25, with plan at discharge to continue IV vancomycin 3 times a week after dialysis through 2025. NSGY evaluated while inpatient, recommended no surgical intervention at this time.  Nephrology consulted for HD needs, had old HD access removed and TDC placed on 3/28   Past Medical History:  has a past medical history of Atrial fibrillation (HCC), CAD (coronary artery disease), Cancer of kidney (HCC), Dependence on renal dialysis, Diabetes mellitus (HCC), Hyperlipidemia, Hypertension, Prostate cancer (HCC), Right renal mass, and Systolic CHF (HCC).  Past Surgical History:  has a past surgical history that includes hernia repair; knee surgery; Cholecystectomy; Nasal septum surgery; eye surgery (Bilateral, 2018); Kidney removal (Right, 2022); Cataract removal (Bilateral); IR TUNNELED CVC PLACE WO SQ PORT/PUMP > 5 YEARS (2024); CT NEEDLE BIOPSY LIVER PERCUTANEOUS (2024); other surgical  Wife at bedside.     C/o significant fatigue during session. Pt edu on use of LB adaptive equipment for LB dressing. Pt doffed shoes w/ no AE and SBA. Pt donned socks using sock aide w/ mod A (requires increased time and max verbal cues). Pt donned shoes w/ use of long handled shoe horn and mod A for R shoe and min A for L shoe.     Cognitive Assessment  Patient participated in the completion of the [facility] cognitive exam during session. The UMS ([employer] Mental Status Exam) is an eleven question standardized assessment that measures attention, orientation, immediate recall, delayed recall with interference, numeric calculation and registration, visual spatial skills and executive functioning. Items include clock drawing, animal naming, digit span, figure recognition and size differentiation.     Exam is scored out of 30 points with 27-30 indicating normal cognition, 21-26 indicating mild neurocognitive deficits and 1-20 indicating severe neurocognitive deficits/dementia.     Patient scored 11/30. Patient demonstrated deficits in delayed recall with interference, visual spatial skills and executive functioning. Pt agreeable to SLP evaluation and intervention w/ some hesitation. Pt's wife reporting pt w/ SLP during previous admission in ARU.     Pt completed STS from recliner>RW w/ CGA. Functional mobility to/from bathroom w/ CGA. Pt completed toilet t/f w/ CGA and toileting w/ max A (assistance for pants up/down, and for thoroughness of wiping buttocks, noted blood smear on toilet paper however when re-wiped no blood noted). Pt unable to tolerate hand hygiene in stance, wipe provided once seated on EOB. Pt doffed shoes w/ max A. Sit>supine in bed w/ mod A.     Pt left supine in bed, bed alarm activated, call light w/in reach. Wife at bedside.           Functional Outcomes     3/31/25  Left Hand Strength -  (lbs)  Handle Setting 3: 43     Left Hand Strength - Pinch (lbs)  Palmar 3 point: 11

## 2025-04-01 NOTE — PROGRESS NOTES
Up to the bathroom, patient has called out several times and did not appear to be voiding very much but having the urge to go.  Bladder scanned after last void and bladder scan reading of 186ml. Patient has dialysis T-Th-Sat.

## 2025-04-01 NOTE — PROGRESS NOTES
Danny Rock  4/1/2025  [id number]    Chief Complaint: Debility    Subjective    No acute events overnight.     Patient reports he is doing well. He is having right hip pain over the greater trochanter. Also having aching pain in his right thigh. Denies any other new concerns. Last Bowel Movement:  04/01/25          Objective    Patient Vitals for the past 24 hrs:   BP Temp Temp src Pulse Resp SpO2 Height   04/01/25 0830 (!) 147/79 97.8 °F (36.6 °C) Oral 74 17 95 % --   03/31/25 2107 134/65 -- -- 71 17 -- --   03/31/25 1945 134/65 98.2 °F (36.8 °C) Oral 71 17 95 % --   03/31/25 1132 -- -- -- -- -- -- 1.905 m (6' 3\")   03/31/25 1054 128/81 -- -- 92 -- -- --     Patient Vitals for the past 96 hrs (Last 3 readings):   Weight   03/31/25 0551 71.9 kg (158 lb 8 oz)   03/30/25 1654 71.4 kg (157 lb 6.5 oz)      Gen: No distress, pleasant.   HEENT: Normocephalic, atraumatic.   CV: Regular rate and rhythm. Extremities warm, well perfused.   Resp: No respiratory distress.   Abd: Soft, nontender.  Ext: No edema.  Neuro: Alert, oriented, appropriately interactive.     Laboratory data:   Na/K/Cl/CO2:  129/4.7/96/21 (04/01 0640)   BUN/Cr/glu/ALT/AST/amyl/lip:  51/2.3/--/--/--/--/-- (04/01 0640)    WBC/Hgb/Hct/Plts:  10.4/9.4/28.0/294 (04/01 0640)     Therapy progress:       PT    Supine to Sit: Partial/moderate assistance  Sit to Supine: Partial/moderate assistance   Sit to Stand: Substantial/maximal assistance  Chair/Bed to Chair Transfer: Partial/moderate assistance  Car Transfer: Substantial/maximal assistance  Ambulation 10 ft: Partial/moderate assistance  Ambulation 50 ft:    Ambulation 150 ft:    Stairs - 1 Step: Partial/moderate assistance  Stairs - 4 Step:    Stairs - 12 Step:      OT    Eating:    Oral Hygiene: Supervision or touching assistance  Bathing: Partial/moderate assistance  Upper Body Dressing: Partial/moderate assistance  Lower Body Dressing: Substantial/maximal assistance  Toilet Transfer: Supervision or  to patient/family (if available). More than 50% of the time was spent with medical decision making which was of high complexity for this patient. This includes preparing to see the patient by reviewing documentation and recent events with nursing team, obtaining/reviewing/updating the medical history, performing a medically appropriate exam, ordering medication and changing orders as indicated. The medical decision making was high for this encounter because of the number and complexity of comorbidities addressed, the complexity of the data that was reviewed/analyzed, and the complexity of the decisions made on further management. This data included vital signs, relevant labs, diagnostic tests, and documentation of consultants. It also included discussion and interpretation for nursing and therapy team about how these issues affect patient's functional progress. For this patient, the risk of complications is high due to number of active problems and contributing comorbidities and how they impact the patient's rehabilitation progress and discharge planning.     Garret Ponce MD 4/1/2025, 9:52 AM    * This document was created using dictation software.  While all precautions were taken to ensure accuracy, errors may have occurred.  Please disregard any typographical errors.

## 2025-04-01 NOTE — CARE COORDINATION
Team conference/Weekly Summary         Team conference held today.  Patient discharge date is 4/2/25. Patient is a 75 year old male.  Patient was admitted for lumbar osteomyelitis/discitis. Patient is from home with his spouse and dog. He is A&Ox4, has active insurance and is active with a PCP in the community with a recent visit. Disposition is most likely home. SW will continue to follow therapy and Dr. Ponce recommendations and discuss closer to discharge.     Electronically signed by TALAT Atkins on 4/1/25 at 3:08 PM EDT

## 2025-04-01 NOTE — PLAN OF CARE
Problem: Chronic Conditions and Co-morbidities  Goal: Patient's chronic conditions and co-morbidity symptoms are monitored and maintained or improved  4/1/2025 1200 by Carey Rogers RN  Outcome: Progressing  Flowsheets (Taken 4/1/2025 0840)  Care Plan - Patient's Chronic Conditions and Co-Morbidity Symptoms are Monitored and Maintained or Improved: Monitor and assess patient's chronic conditions and comorbid symptoms for stability, deterioration, or improvement  4/1/2025 0042 by Codie Contreras RN  Outcome: Progressing  Flowsheets (Taken 3/31/2025 2100)  Care Plan - Patient's Chronic Conditions and Co-Morbidity Symptoms are Monitored and Maintained or Improved: Monitor and assess patient's chronic conditions and comorbid symptoms for stability, deterioration, or improvement     Problem: Discharge Planning  Goal: Discharge to home or other facility with appropriate resources  4/1/2025 1200 by Carey Rogers RN  Outcome: Progressing  Flowsheets (Taken 4/1/2025 0840)  Discharge to home or other facility with appropriate resources:   Identify barriers to discharge with patient and caregiver   Identify discharge learning needs (meds, wound care, etc)  4/1/2025 0042 by Codie Contreras, RN  Outcome: Progressing  Flowsheets (Taken 3/31/2025 2100)  Discharge to home or other facility with appropriate resources: Identify barriers to discharge with patient and caregiver     Problem: Skin/Tissue Integrity  Goal: Skin integrity remains intact  Description: 1.  Monitor for areas of redness and/or skin breakdown  2.  Assess vascular access sites hourly  3.  Every 4-6 hours minimum:  Change oxygen saturation probe site  4.  Every 4-6 hours:  If on nasal continuous positive airway pressure, respiratory therapy assess nares and determine need for appliance change or resting period  4/1/2025 1200 by Carey Rogers, RN  Outcome: Progressing  Flowsheets (Taken 4/1/2025 0840)  Skin Integrity Remains Intact: Monitor for  areas of redness and/or skin breakdown  4/1/2025 0042 by Codie Contreras, RN  Outcome: Progressing     Problem: Safety - Adult  Goal: Free from fall injury  4/1/2025 1200 by Carey Rogers RN  Outcome: Progressing  4/1/2025 0042 by Codie Contreras RN  Outcome: Progressing     Problem: ABCDS Injury Assessment  Goal: Absence of physical injury  4/1/2025 1200 by Carey Rogers RN  Outcome: Progressing  4/1/2025 0042 by Codie Contreras RN  Outcome: Progressing     Problem: Pain  Goal: Verbalizes/displays adequate comfort level or baseline comfort level  4/1/2025 1200 by Carey Rogers RN  Outcome: Progressing  Flowsheets (Taken 4/1/2025 0830)  Verbalizes/displays adequate comfort level or baseline comfort level:   Encourage patient to monitor pain and request assistance   Assess pain using appropriate pain scale   Administer analgesics based on type and severity of pain and evaluate response   Implement non-pharmacological measures as appropriate and evaluate response   Consider cultural and social influences on pain and pain management  4/1/2025 0042 by Codie Contreras, RN  Outcome: Progressing  Flowsheets (Taken 3/31/2025 1945)  Verbalizes/displays adequate comfort level or baseline comfort level: Encourage patient to monitor pain and request assistance     Problem: Nutrition Deficit:  Goal: Optimize nutritional status  Outcome: Progressing

## 2025-04-02 ENCOUNTER — APPOINTMENT (OUTPATIENT)
Dept: CT IMAGING | Age: 75
DRG: 947 | End: 2025-04-02
Attending: STUDENT IN AN ORGANIZED HEALTH CARE EDUCATION/TRAINING PROGRAM
Payer: MEDICARE

## 2025-04-02 LAB
GLUCOSE BLD-MCNC: 160 MG/DL (ref 70–99)
GLUCOSE BLD-MCNC: 170 MG/DL (ref 70–99)
GLUCOSE BLD-MCNC: 237 MG/DL (ref 70–99)
GLUCOSE BLD-MCNC: 83 MG/DL (ref 70–99)
PERFORMED ON: ABNORMAL
PERFORMED ON: NORMAL

## 2025-04-02 PROCEDURE — 2580000003 HC RX 258: Performed by: INTERNAL MEDICINE

## 2025-04-02 PROCEDURE — 6370000000 HC RX 637 (ALT 250 FOR IP): Performed by: STUDENT IN AN ORGANIZED HEALTH CARE EDUCATION/TRAINING PROGRAM

## 2025-04-02 PROCEDURE — 97535 SELF CARE MNGMENT TRAINING: CPT

## 2025-04-02 PROCEDURE — 97116 GAIT TRAINING THERAPY: CPT

## 2025-04-02 PROCEDURE — 72193 CT PELVIS W/DYE: CPT

## 2025-04-02 PROCEDURE — 1280000000 HC REHAB R&B

## 2025-04-02 PROCEDURE — 92523 SPEECH SOUND LANG COMPREHEN: CPT

## 2025-04-02 PROCEDURE — 97110 THERAPEUTIC EXERCISES: CPT

## 2025-04-02 PROCEDURE — 6360000002 HC RX W HCPCS: Performed by: INTERNAL MEDICINE

## 2025-04-02 PROCEDURE — 6360000004 HC RX CONTRAST MEDICATION: Performed by: INTERNAL MEDICINE

## 2025-04-02 PROCEDURE — 2500000003 HC RX 250 WO HCPCS: Performed by: STUDENT IN AN ORGANIZED HEALTH CARE EDUCATION/TRAINING PROGRAM

## 2025-04-02 PROCEDURE — 6370000000 HC RX 637 (ALT 250 FOR IP): Performed by: INTERNAL MEDICINE

## 2025-04-02 PROCEDURE — 97530 THERAPEUTIC ACTIVITIES: CPT

## 2025-04-02 PROCEDURE — 99233 SBSQ HOSP IP/OBS HIGH 50: CPT | Performed by: INTERNAL MEDICINE

## 2025-04-02 RX ORDER — INSULIN LISPRO 100 [IU]/ML
3 INJECTION, SOLUTION INTRAVENOUS; SUBCUTANEOUS
Status: DISCONTINUED | OUTPATIENT
Start: 2025-04-02 | End: 2025-04-04

## 2025-04-02 RX ORDER — SENNA AND DOCUSATE SODIUM 50; 8.6 MG/1; MG/1
2 TABLET, FILM COATED ORAL 2 TIMES DAILY PRN
Status: DISCONTINUED | OUTPATIENT
Start: 2025-04-02 | End: 2025-04-04

## 2025-04-02 RX ORDER — IOPAMIDOL 755 MG/ML
75 INJECTION, SOLUTION INTRAVASCULAR
Status: COMPLETED | OUTPATIENT
Start: 2025-04-02 | End: 2025-04-02

## 2025-04-02 RX ORDER — TRAZODONE HYDROCHLORIDE 50 MG/1
50 TABLET ORAL NIGHTLY PRN
Status: DISCONTINUED | OUTPATIENT
Start: 2025-04-02 | End: 2025-04-04

## 2025-04-02 RX ORDER — SODIUM CHLORIDE 0.9 % (FLUSH) 0.9 %
5-40 SYRINGE (ML) INJECTION EVERY 12 HOURS
Status: DISCONTINUED | OUTPATIENT
Start: 2025-04-02 | End: 2025-04-04

## 2025-04-02 RX ADMIN — METOPROLOL TARTRATE 25 MG: 25 TABLET, FILM COATED ORAL at 09:49

## 2025-04-02 RX ADMIN — INSULIN LISPRO 2 UNITS: 100 INJECTION, SOLUTION INTRAVENOUS; SUBCUTANEOUS at 12:11

## 2025-04-02 RX ADMIN — MELATONIN TAB 3 MG 6 MG: 3 TAB at 18:01

## 2025-04-02 RX ADMIN — PANTOPRAZOLE SODIUM 40 MG: 40 TABLET, DELAYED RELEASE ORAL at 06:41

## 2025-04-02 RX ADMIN — POLYETHYLENE GLYCOL 3350 17 G: 17 POWDER, FOR SOLUTION ORAL at 09:50

## 2025-04-02 RX ADMIN — APIXABAN 5 MG: 5 TABLET, FILM COATED ORAL at 22:10

## 2025-04-02 RX ADMIN — METHOCARBAMOL 750 MG: 750 TABLET ORAL at 12:11

## 2025-04-02 RX ADMIN — OXYCODONE HYDROCHLORIDE AND ACETAMINOPHEN 1 TABLET: 5; 325 TABLET ORAL at 16:37

## 2025-04-02 RX ADMIN — ASPIRIN 81 MG: 81 TABLET, CHEWABLE ORAL at 09:49

## 2025-04-02 RX ADMIN — Medication 5 ML: at 12:12

## 2025-04-02 RX ADMIN — METOPROLOL TARTRATE 25 MG: 25 TABLET, FILM COATED ORAL at 22:10

## 2025-04-02 RX ADMIN — ACETAMINOPHEN 650 MG: 325 TABLET ORAL at 09:49

## 2025-04-02 RX ADMIN — APIXABAN 5 MG: 5 TABLET, FILM COATED ORAL at 09:49

## 2025-04-02 RX ADMIN — SEVELAMER CARBONATE 800 MG: 800 TABLET, FILM COATED ORAL at 12:11

## 2025-04-02 RX ADMIN — METHOCARBAMOL 750 MG: 750 TABLET ORAL at 09:49

## 2025-04-02 RX ADMIN — INSULIN LISPRO 3 UNITS: 100 INJECTION, SOLUTION INTRAVENOUS; SUBCUTANEOUS at 18:00

## 2025-04-02 RX ADMIN — METHOCARBAMOL 750 MG: 750 TABLET ORAL at 18:01

## 2025-04-02 RX ADMIN — CYANOCOBALAMIN TAB 1000 MCG 1000 MCG: 1000 TAB at 09:49

## 2025-04-02 RX ADMIN — Medication 10 ML: at 22:11

## 2025-04-02 RX ADMIN — CHOLECALCIFEROL TAB 125 MCG (5000 UNIT) 5000 UNITS: 125 TAB at 09:49

## 2025-04-02 RX ADMIN — IOPAMIDOL 75 ML: 755 INJECTION, SOLUTION INTRAVENOUS at 15:04

## 2025-04-02 RX ADMIN — METHOCARBAMOL 750 MG: 750 TABLET ORAL at 22:10

## 2025-04-02 RX ADMIN — LINEZOLID 600 MG: 600 TABLET, FILM COATED ORAL at 09:49

## 2025-04-02 RX ADMIN — VANCOMYCIN HYDROCHLORIDE 1000 MG: 1 INJECTION, POWDER, LYOPHILIZED, FOR SOLUTION INTRAVENOUS at 00:27

## 2025-04-02 RX ADMIN — ACETAMINOPHEN 650 MG: 325 TABLET ORAL at 15:29

## 2025-04-02 RX ADMIN — Medication 1 MG: at 09:49

## 2025-04-02 RX ADMIN — OXYCODONE HYDROCHLORIDE AND ACETAMINOPHEN 1 TABLET: 5; 325 TABLET ORAL at 02:24

## 2025-04-02 RX ADMIN — SEVELAMER CARBONATE 800 MG: 800 TABLET, FILM COATED ORAL at 09:48

## 2025-04-02 RX ADMIN — SEVELAMER CARBONATE 800 MG: 800 TABLET, FILM COATED ORAL at 18:01

## 2025-04-02 RX ADMIN — LINEZOLID 600 MG: 600 TABLET, FILM COATED ORAL at 22:10

## 2025-04-02 RX ADMIN — ROSUVASTATIN CALCIUM 10 MG: 10 TABLET, FILM COATED ORAL at 22:10

## 2025-04-02 ASSESSMENT — ENCOUNTER SYMPTOMS
SHORTNESS OF BREATH: 0
COUGH: 0
COLOR CHANGE: 1
BACK PAIN: 0
EYE DISCHARGE: 0
CONSTIPATION: 0
WHEEZING: 0
EYE REDNESS: 0
SORE THROAT: 0
SINUS PAIN: 0
ABDOMINAL PAIN: 0
DIARRHEA: 0
SINUS PRESSURE: 0
RHINORRHEA: 0
NAUSEA: 0

## 2025-04-02 ASSESSMENT — PAIN DESCRIPTION - DESCRIPTORS
DESCRIPTORS: ACHING

## 2025-04-02 ASSESSMENT — PAIN SCALES - GENERAL
PAINLEVEL_OUTOF10: 4
PAINLEVEL_OUTOF10: 6
PAINLEVEL_OUTOF10: 5
PAINLEVEL_OUTOF10: 0
PAINLEVEL_OUTOF10: 8
PAINLEVEL_OUTOF10: 6
PAINLEVEL_OUTOF10: 2

## 2025-04-02 ASSESSMENT — PAIN DESCRIPTION - LOCATION
LOCATION: HIP
LOCATION: BACK
LOCATION: HIP

## 2025-04-02 ASSESSMENT — PAIN DESCRIPTION - ORIENTATION
ORIENTATION: RIGHT
ORIENTATION: MID
ORIENTATION: RIGHT

## 2025-04-02 NOTE — PROGRESS NOTES
[facility] - Inpatient Rehabilitation Department   Phone: [phone]    Occupational Therapy    [] Initial Evaluation            [x] Daily Treatment Note         [] Discharge Summary      Patient: Danny Rock   : 1950   MRN: [id number]   Date of Service:  2025    Admitting Diagnosis:  Debility  Current Admission Summary: Danny Rock is a 75 y.o. male with PMHx notable for HTN, HLD, CAD, HFrEF, atrial fibrillation, renal cancer s/p right nephrectomy, ESRD on HD who initially presented to [facility] on 3/22 with low back pain and had MRI showing severe multilevel foraminal stenosis worst at L5-S1 prompting transfer to [facility] for neurosurgical evaluation.  Repeat MRI C/T/L-spine on 3/23 showed multilevel disc/endplate enhancement compatible with discitis and osteomyelitis at L2-L3, L3-L4 and L4-L5 with adjacent paravertebral edema, and small right psoas muscle abscess.  Blood cultures resulted positive for Enterococcus faecalis.  He was started on IV vancomycin and gentamicin on 3/25, with plan at discharge to continue IV vancomycin 3 times a week after dialysis through 2025. NSGY evaluated while inpatient, recommended no surgical intervention at this time.  Nephrology consulted for HD needs, had old HD access removed and TDC placed on 3/28   Past Medical History:  has a past medical history of Atrial fibrillation (HCC), CAD (coronary artery disease), Cancer of kidney (HCC), Dependence on renal dialysis, Diabetes mellitus (HCC), Hyperlipidemia, Hypertension, Prostate cancer (HCC), Right renal mass, and Systolic CHF (HCC).  Past Surgical History:  has a past surgical history that includes hernia repair; knee surgery; Cholecystectomy; Nasal septum surgery; eye surgery (Bilateral, 2018); Kidney removal (Right, 2022); Cataract removal (Bilateral); IR TUNNELED CVC PLACE WO SQ PORT/PUMP > 5 YEARS (2024); CT NEEDLE BIOPSY LIVER PERCUTANEOUS (2024); other surgical  history (09/30/2024); CT NEEDLE BIOPSY LIVER PERCUTANEOUS (11/18/2024); CT GUIDED CHEST TUBE (11/18/2024); hernia repair (Left, 12/31/2024); other surgical history (Left); shoulder surgery (Right, 1/31/2025); and IR TUNNELED CVC PLACE WO SQ PORT/PUMP > 5 YEARS (3/28/2025).    Discharge Recommendations: HH OT; PRN assist from family pending progress     DME Required For Discharge: DME to be determined pending patient progress    Precautions/Restrictions: high fall risk, up as tolerated  Weight Bearing Restrictions: no restrictions       Required Braces/Orthotics: no braces required    Positional Restrictions:no positional restrictions    Pre-Admission Information   Lives With: spouse, + Dog \"Wriggly\"     Type of Home: house  Home Layout: tri-level  Home Access:  1 KAYLYNN front door; 1 KAYLYNN into back entry; 7-8 steps between levels   Bathroom Layout: tub/shower unit; walk in on lower level  Bathroom Equipment: grab bars in shower, grab bars around toilet, shower chair, hand held shower head  Toilet Height: standard height  Home Equipment: rolling walker, single point cane  Transfer Assistance: Independent without use of device  Ambulation Assistance:modified independent with use of use of AD as needed-reports using RW for dialysis   ADL Assistance: independent with all ADL's  IADL Assistance:  shared roles with spouse; reports doing \"some\", pt's wife completes medication management d/t hx of having errors   Active :        [] Yes  [] No - not currently, did return to driving recently prior to current dx/admission   Hand Dominance: [] Left  [x] Right  Current Employment: retired, steel mill   Hobbies:  \"piddle around\"; outdoor activities   Recent Falls: denied hx of falls- per chart review pt has had 2     Available Assistance at Discharge: 24 hr supervision (non-physical) available; stating spouse can assist with ADLs but not lifting/mobility         Examination   Observation:   General Observation:  chest port on L

## 2025-04-02 NOTE — PROGRESS NOTES
[facility] - Inpatient Rehabilitation Department   Phone: [phone]    Speech Therapy   Initial Evaluation     Patient: Danny Rock   : 1950   MRN: [id number]   Date of Service:  2025  Admitting Diagnosis: Debility  Current Admission Summary: Danny Rock is a 75 y.o. male with PMHx notable for HTN, HLD, CAD, HFrEF, atrial fibrillation, renal cancer s/p right nephrectomy, ESRD on HD who initially presented to [facility] on 3/22 with low back pain and had MRI showing severe multilevel foraminal stenosis worst at L5-S1 prompting transfer to [facility] for neurosurgical evaluation.  Repeat MRI C/T/L-spine on 3/23 showed multilevel disc/endplate enhancement compatible with discitis and osteomyelitis at L2-L3, L3-L4 and L4-L5 with adjacent paravertebral edema, and small right psoas muscle abscess.  Blood cultures resulted positive for Enterococcus faecalis.  He was started on IV vancomycin and gentamicin on 3/25, with plan at discharge to continue IV vancomycin 3 times a week after dialysis through 2025. NSGY evaluated while inpatient, recommended no surgical intervention at this time.  Nephrology consulted for HD needs, had old HD access removed and TDC placed on 3/28.   Past Medical History:  has a past medical history of Atrial fibrillation (HCC), CAD (coronary artery disease), Cancer of kidney (HCC), Dependence on renal dialysis, Diabetes mellitus (HCC), Hyperlipidemia, Hypertension, Prostate cancer (HCC), Right renal mass, and Systolic CHF (HCC).  Past Surgical History:  has a past surgical history that includes hernia repair; knee surgery; Cholecystectomy; Nasal septum surgery; eye surgery (Bilateral, 2018); Kidney removal (Right, 2022); Cataract removal (Bilateral); IR TUNNELED CVC PLACE WO SQ PORT/PUMP > 5 YEARS (2024); CT NEEDLE BIOPSY LIVER PERCUTANEOUS (2024); other surgical history (2024); CT NEEDLE BIOPSY LIVER PERCUTANEOUS (2024); CT

## 2025-04-02 NOTE — PROGRESS NOTES
MD Gordon Frankel MD Aldo Estella, DO                 Office: [phone]                      Fax: [phone]             [place]                   NEPHROLOGY CONSULT PROGRESS NOTE    Subjective / interval history / medical decision making.  -says occasionally feeling cramps with iHD. Still having uop up to 900ml he thinks.    IMPRESSION/RECOMMENDATIONS:        #ESKD on HD TTS  -last iHD Saturday prior to admit  -TDC placed 3/28/25.  -Continue iHD TTS schedule  -next iHD today.    #DM  -on insulin  -controlled    #HTN  -controlled  -resume home antihypertensives    #leukocytosis  -likely from recent infection    #Anemia of CKD  -hgb at goal  -stable monitor    Thank you for the consult.  We will follow this patient along the hospitalization.  Guillermo Moss DO     High complexity, at risk of impending organ failure needing higher level of care/monitoring.   Time spent 38 minutes that included face-to-face meeting/discussion with patient, and treatment team (including primary/referring team and other consultants; included coordination of care with the treatment team; and review of patient's electronic medical records and ordering appropriates tests.             Reason for Consult:  ESKD management  Requesting Physician/Provider:  Tony Valverde MD     CHIEF COMPLAINT:  lumbar osteomyelitis/disciits    History obtained from records and patient.    HISTORY OF PRESENT ILLNESS:                Danny Rock  is 75 y.o. y.o. male with significant past medical history of ESKD on HD TTS who presented with lumbar osteomyelitis/disciits. Transferred from outside facility. Recent TDC placed 3/28. Last iHD prior to admit here was on Saturday. Nephrology consulted for ESKD management.    Past Medical History:     has a past medical history of Atrial fibrillation (HCC), CAD (coronary artery disease), Cancer of kidney (HCC), Dependence on renal dialysis, Diabetes mellitus (HCC), Hyperlipidemia,  Hypertension, Prostate cancer (HCC), Right renal mass, and Systolic CHF (HCC).   Past Surgical History:     has a past surgical history that includes hernia repair; knee surgery; Cholecystectomy; Nasal septum surgery; eye surgery (Bilateral, 04/19/2018); Kidney removal (Right, 07/25/2022); Cataract removal (Bilateral); IR TUNNELED CVC PLACE WO SQ PORT/PUMP > 5 YEARS (02/07/2024); CT NEEDLE BIOPSY LIVER PERCUTANEOUS (08/01/2024); other surgical history (09/30/2024); CT NEEDLE BIOPSY LIVER PERCUTANEOUS (11/18/2024); CT GUIDED CHEST TUBE (11/18/2024); hernia repair (Left, 12/31/2024); other surgical history (Left); shoulder surgery (Right, 1/31/2025); and IR TUNNELED CVC PLACE WO SQ PORT/PUMP > 5 YEARS (3/28/2025).   Current Medications:    Current Facility-Administered Medications: ondansetron (ZOFRAN-ODT) disintegrating tablet 8 mg, 8 mg, Oral, Q8H PRN  vancomycin (VANCOCIN) 1,000 mg in sodium chloride 0.9 % 250 mL IVPB ([id number]), 1,000 mg, IntraVENous, Once per day on Tuesday Thursday Saturday  gabapentin (NEURONTIN) capsule 100 mg, 100 mg, Oral, Once per day on Tuesday Thursday Saturday  lidocaine 4 % external patch 1 patch, 1 patch, TransDERmal, Daily PRN  heparin (porcine) injection 4,000 Units, 4,000 Units, IntraCATHeter, PRN  Virt-Caps 1 mg, 1 capsule, Oral, Daily  metoprolol tartrate (LOPRESSOR) tablet 25 mg, 25 mg, Oral, BID  vitamin B-12 (CYANOCOBALAMIN) tablet 1,000 mcg, 1,000 mcg, Oral, Daily  vitamin D3 (CHOLECALCIFEROL) tablet 5,000 Units, 5,000 Units, Oral, Daily  vancomycin (VANCOCIN) intermittent dosing (placeholder), , Other, RX Placeholder  cabozantinib s-malate (CABOMETYX) chemo tablet 40 mg (PATIENT SUPPLIED) (Patient Supplied), 40 mg, Oral, Daily  linezolid (ZYVOX) tablet 600 mg, 600 mg, Oral, 2 times per day  apixaban (ELIQUIS) tablet 5 mg, 5 mg, Oral, BID  aspirin chewable tablet 81 mg, 81 mg, Oral, Daily  heparin (porcine) injection 3,600 Units, 3,600 Units, IntraCATHeter, PRN  insulin lispro

## 2025-04-02 NOTE — PROGRESS NOTES
Physical Therapy    [facility] - Inpatient Rehabilitation Department   Phone: [phone]    Physical Therapy    [] Initial Evaluation            [x] Daily Treatment Note         [] Discharge Summary      Patient: Danny Rock   : 1950   MRN: [id number]   Date of Service:  2025  Admitting Diagnosis: Debility  Current Admission Summary: Per chart review:    Danny Rock is a 75 y.o. male with PMHx notable for HTN, HLD, CAD, HFrEF, atrial fibrillation, renal cancer s/p right nephrectomy, ESRD on HD who initially presented to [facility] on 3/22 with low back pain and had MRI showing severe multilevel foraminal stenosis worst at L5-S1 prompting transfer to [facility] for neurosurgical evaluation.  Repeat MRI C/T/L-spine on 3/23 showed multilevel disc/endplate enhancement compatible with discitis and osteomyelitis at L2-L3, L3-L4 and L4-L5 with adjacent paravertebral edema, and small right psoas muscle abscess.  Blood cultures resulted positive for Enterococcus faecalis.  He was started on IV vancomycin and gentamicin on 3/25, with plan at discharge to continue IV vancomycin 3 times a week after dialysis through 2025. NSGY evaluated while inpatient, recommended no surgical intervention at this time.  Nephrology consulted for HD needs, had old HD access removed and TDC placed on 3/28   Past Medical History:  has a past medical history of Atrial fibrillation (HCC), CAD (coronary artery disease), Cancer of kidney (HCC), Dependence on renal dialysis, Diabetes mellitus (HCC), Hyperlipidemia, Hypertension, Prostate cancer (HCC), Right renal mass, and Systolic CHF (HCC).  Past Surgical History:  has a past surgical history that includes hernia repair; knee surgery; Cholecystectomy; Nasal septum surgery; eye surgery (Bilateral, 2018); Kidney removal (Right, 2022); Cataract removal (Bilateral); IR TUNNELED CVC PLACE WO SQ PORT/PUMP > 5 YEARS (2024); CT NEEDLE BIOPSY LIVER  prior to current dx/admission   Hand Dominance: [] Left                 [x] Right  Current Employment: retired, steel mill   Hobbies:  \"piddle around\"; outdoor activities   Recent Falls: denied hx of falls- per chart review pt has had 2      Available Assistance at Discharge: 24 hr supervision (non-physical) available; stating spouse can assist with ADLs but not lifting/mobility     Examination 3/31/25   Vision:   Vision Gross Assessment: Impaired and Vision Corrective Device: wears glasses at all times  Hearing:   WFL  Observation:   General Observation:  chest port on L side, RA, bruising noted in B UE (RN aware)       Subjective  General: Pt in recliner upon arrival. Agreeable to therapy. RN administering pain meds at beginning of session.  Pain: 6/10.  Location: R hip into glute  Pain Interventions: RN delivered pain medication within therapy session per patient request and therapy activities modified       Functional Mobility  Bed Mobility:  Bed mobility not completed on this date.  Comments:   Transfers:  Sit to stand transfer: minimal assistance, moderate assistance  Stand to sit transfer: minimal assistance  Comments: Transfers completed with RW, ModA progressing to David. Consistent modA from low surfaces without arm rests.     Ambulation:  Surface:level surface  Assistive Device: rolling walker  Other Appliance: wheelchair follow  Assistance: contact guard assistance  Distance: 118'  Gait Mechanics: decreased oriana, antalgic gait on RLE, decreased B foot clearance, decreased B TKE in stance phase, B genu varum, forward flexed trunk, elevated shoulders   Comments: with w/c follow  Stair Mobility:  Number of Steps: 4  Step Height: 4 inch  Hand Rails: (B) handrail  Device: no device  Assistance: contact guard assistance  Comments: seated rest break at top. Non-recip pattern, ascend with L, descend with R  Wheelchair Mobility:  No w/c mobility completed on this date.  Comments:  Balance:  Static Sitting

## 2025-04-02 NOTE — PROGRESS NOTES
Consult Note  Date:2025       Room:[place] 2063164906-01  Patient Name:Danny Rock     YOB: 1950     Age:75 y.o.        Subjective    Subjective:  Pain:  He complains of pain that is mild.       Review of Systems   Skin:  Positive for color change (toe nails).     Objective         Vitals Last 24 Hours:  TEMPERATURE:  Temp  Av.9 °F (36.6 °C)  Min: 97.8 °F (36.6 °C)  Max: 98 °F (36.7 °C)  RESPIRATIONS RANGE: Resp  Av.4  Min: 16  Max: 18  PULSE OXIMETRY RANGE: SpO2  Av %  Min: 98 %  Max: 98 %  PULSE RANGE: Pulse  Av.7  Min: 76  Max: 85  BLOOD PRESSURE RANGE: Systolic (24hrs), Av , Min:127 , Max:135   ; Diastolic (24hrs), Av, Min:70, Max:76    I/O (24Hr):    Intake/Output Summary (Last 24 hours) at 2025  Last data filed at 2025  Gross per 24 hour   Intake 360 ml   Output --   Net 360 ml     Objective:  Vital signs: (most recent): Blood pressure 133/76, pulse 78, temperature 97.8 °F (36.6 °C), resp. rate 16, height 1.905 m (6' 3\"), weight 72.5 kg (159 lb 13.3 oz), SpO2 98%.    Skin:  There is ulceration (right hallux nail incurvated, no drainage).      Labs/Imaging/Diagnostics    Labs:  CBC:  Recent Labs     25  0619 25  0640   WBC 12.3* 10.4   RBC 3.31* 2.82*   HGB 11.0* 9.4*   HCT 33.1* 28.0*   .1* 99.4   RDW 19.5* 19.1*    294     CHEMISTRIES:  Recent Labs     25  0619 25  0640    129*   K 4.1 4.7   CL 99 96*   CO2 23 21   BUN 37* 51*   CREATININE 1.9* 2.3*   GLUCOSE 169* 183*   PHOS  --  2.4*     PT/INR:No results for input(s): \"PROTIME\", \"INR\" in the last 72 hours.  APTT:No results for input(s): \"APTT\" in the last 72 hours.  LIVER PROFILE:No results for input(s): \"AST\", \"ALT\", \"BILIDIR\", \"BILITOT\", \"ALKPHOS\" in the last 72 hours.    Imaging Last 24 Hours:  No results found.  Assessment//Plan           Hospital Problems           Last Modified POA    * (Principal) Debility 3/30/2025 Yes    Moderate malnutrition  (Chronic) 4/1/2025 Yes    Acute osteomyelitis of lumbar spine (HCC) 3/31/2025 Yes    Lumbar discitis 3/31/2025 Yes     Assessment:   (Onychomycosis toe nails both feet.  Callus    Incurvated right hallux nail).     Plan:    (Nail debridement.    Will follow.).       Electronically signed by Iain Isbell DPM on 4/2/25 at 5:21 PM EDT

## 2025-04-02 NOTE — PROGRESS NOTES
Specimen: Blood Updated: 03/30/25 1015     Culture, Blood 2 No Growth after 4 days of incubation.    Narrative:      ORDER#: [id number]                          ORDERED BY: RAIN LEBRON  SOURCE: Blood                              COLLECTED:  03/26/25 09:10  ANTIBIOTICS AT SARAH.:                      RECEIVED :  03/26/25 15:10  If child <=2 yrs old please draw pediatric bottle.~Blood Culture #2    Culture, Tip [[id number]] Collected: 03/26/25 0811    Order Status: Completed Specimen: Catheter Tip Updated: 03/30/25 0628     Culture Catheter Tip No growth at 96 hours    Narrative:      ORDER#: [id number]                          ORDERED BY: RAIN LEBRON  SOURCE: Catheter Tip                       COLLECTED:  03/26/25 08:11  ANTIBIOTICS AT SARAH.:                      RECEIVED :  03/26/25 08:32    Culture, Blood 1 [[id number]]  (Abnormal) Collected: 03/26/25 0732    Order Status: Completed Specimen: Blood Updated: 03/30/25 0803     Organism Staphylococcus epidermidis DNA Detected     Blood Culture, Routine See additional report for complete BCID panel.     Organism Staphylococcus epidermidis     Blood Culture, Routine --     POSITIVE for  This organism was isolated in one set.  Susceptibility testing is not routinely done as this  organism frequently represents skin contamination.  Additional testing can be ordered by calling the  Microbiology Department.      Narrative:      ORDER#: [id number]                          ORDERED BY: RAIN LEBRON  SOURCE: Blood                              COLLECTED:  03/26/25 07:32  ANTIBIOTICS AT SARAH.:                      RECEIVED :  03/26/25 15:10  CALL  [facility]  J tel. [phone],  Microbiology results called to and read back by ABHISHEK Brock,  03/27/2025 22:38, by ANGELICA  If child <=2 yrs old please draw pediatric bottle.~Blood Culture 1    Culture, Blood, PCR ID Panel [[id number]] Collected: 03/26/25 0732    Order Status: Completed Updated: 03/27/25 2240      18:03  CALL  [facility]  [id number] tel. [phone],  Microbiology results called to and read back by [facility],  03/24/2025 07:59, by HORACIO  Microbiology results called to and read back by ABHISHEK MENDOZA, 03/24/2025  07:59, by HORACIO  If child <=2 yrs old please draw pediatric bottle.~Blood Culture 1    Culture, Blood 1 [[id number]]  (Abnormal)  (Susceptibility) Collected: 03/22/25 1330    Order Status: Completed Specimen: Blood Updated: 03/26/25 1254     Organism Enterococcus faecalis DNA Detected     Blood Culture, Routine --     Antonia/B gene not detected.  See additional report for complete BCID panel.  Negative results for this marker does not indicate susceptibility,  as multiple mechanisms of resistance to Vancomycin exist.  Please see full susceptibility report.       Organism Staphylococcus epidermidis DNA Detected     Blood Culture, Routine See additional report for complete BCID panel.     Organism Enterococcus faecalis     Blood Culture, Routine --     POSITIVE for  Isolated one of one set       Organism Micrococcus luteus     Blood Culture, Routine --     POSITIVE for  This organism was isolated in one set.  Susceptibility testing is not routinely done as this  organism frequently represents skin contamination.  Additional testing can be ordered by calling the  Microbiology Department.      Narrative:      ORDER#: [id number]                          ORDERED BY: LAVONNE STORY  SOURCE: Blood                              COLLECTED:  03/22/25 13:30  ANTIBIOTICS AT SARAH.:                      RECEIVED :  03/23/25 02:02  CALL  [facility]  [id number] tel. [phone],  Microbiology results called to and read back by LEA Donald, 03/23/2025  08:49, by AVIVA  Microbiology results called to and read back by ABHISHEK Acuna, 03/23/2025  08:47, by AVIVA mai [facility], 03/23/2025 08:46, by AVIVA  If child <=2 yrs old please draw pediatric bottle.~Blood Culture 1    Susceptibility        Enterococcus faecalis      BACTERIAL

## 2025-04-02 NOTE — PLAN OF CARE
Problem: Chronic Conditions and Co-morbidities  Goal: Patient's chronic conditions and co-morbidity symptoms are monitored and maintained or improved  4/2/2025 1539 by Jacob Lezama RN  Outcome: Progressing     Problem: Discharge Planning  Goal: Discharge to home or other facility with appropriate resources  4/2/2025 1539 by Jacob Lezama RN  Outcome: Progressing     Problem: Skin/Tissue Integrity  Goal: Skin integrity remains intact  Description: 1.  Monitor for areas of redness and/or skin breakdown  2.  Assess vascular access sites hourly  3.  Every 4-6 hours minimum:  Change oxygen saturation probe site  4.  Every 4-6 hours:  If on nasal continuous positive airway pressure, respiratory therapy assess nares and determine need for appliance change or resting period  4/2/2025 1539 by Jacob Lezama RN  Outcome: Progressing  Flowsheets (Taken 4/2/2025 0444 by Argelia Corey, RN)  Skin Integrity Remains Intact: Monitor for areas of redness and/or skin breakdown     Problem: Safety - Adult  Goal: Free from fall injury  4/2/2025 1539 by Jacob Lezama RN  Outcome: Progressing     Problem: ABCDS Injury Assessment  Goal: Absence of physical injury  4/2/2025 1539 by Jacob Lezama RN  Outcome: Progressing     Problem: Pain  Goal: Verbalizes/displays adequate comfort level or baseline comfort level  Outcome: Progressing     Problem: Nutrition Deficit:  Goal: Optimize nutritional status  Outcome: Progressing

## 2025-04-02 NOTE — PROGRESS NOTES
The pt c/o severe pain on his right hip and had difficulty getting out of bed.  The pt agreed to take 5mg of oxycodone

## 2025-04-02 NOTE — PROGRESS NOTES
MD Gordon Frankel MD Aldo Estella, DO                 Office: [phone]                      Fax: [phone]             "[employer]                   NEPHROLOGY CONSULT PROGRESS NOTE    Subjective / interval history / medical decision making.  -s/p iHD UF 1L. No new complaints.    IMPRESSION/RECOMMENDATIONS:        #ESKD on HD TTS  -last iHD Saturday prior to admit  -TDC placed 3/28/25.  -Continue iHD TTS schedule  -next iHD Thursday.    #DM  -on insulin  -controlled    #HTN  -controlled  -resume home antihypertensives    #leukocytosis  -likely from recent infection    #Anemia of CKD  -hgb at goal  -stable monitor    Thank you for the consult.  We will follow this patient along the hospitalization.  Guillermo Moss DO     High complexity, at risk of impending organ failure needing higher level of care/monitoring.   Time spent 38 minutes that included face-to-face meeting/discussion with patient, and treatment team (including primary/referring team and other consultants; included coordination of care with the treatment team; and review of patient's electronic medical records and ordering appropriates tests.             Reason for Consult:  ESKD management  Requesting Physician/Provider:  Tony Valverde MD     CHIEF COMPLAINT:  lumbar osteomyelitis/disciits    History obtained from records and patient.    HISTORY OF PRESENT ILLNESS:                Danny Rock  is 75 y.o. y.o. male with significant past medical history of ESKD on HD TTS who presented with lumbar osteomyelitis/disciits. Transferred from outside facility. Recent TDC placed 3/28. Last iHD prior to admit here was on Saturday. Nephrology consulted for ESKD management.    Past Medical History:     has a past medical history of Atrial fibrillation (HCC), CAD (coronary artery disease), Cancer of kidney (HCC), Dependence on renal dialysis, Diabetes mellitus (HCC), Hyperlipidemia, Hypertension, Prostate cancer (HCC), Right renal  837 ml       CONSTITUTIONAL:  awake, alert, cooperative, no apparent distress, and appears stated age  EYES:  Lids and lashes normal, pupils equal, round   NECK:  Supple, symmetrical, trachea midline  HEMATOLOGIC/LYMPHATICS:  no pallor, no cervical LAD  LUNGS:  No increased work of breathing, diminished bibasilar breaths  CARDIOVASCULAR:  regular rate and rhythm, normal S1 and S2  ABDOMEN:  No scars, normal bowel sounds, soft, non-distended, non-tender  EXTREMITIES:  Warm, dry, edema trace  NEUROLOGIC:  Awake, alert, oriented to name, place and time.      DATA:    CBC:   Lab Results   Component Value Date/Time    WBC 10.4 04/01/2025 06:40 AM    RBC 2.82 04/01/2025 06:40 AM    HGB 9.4 04/01/2025 06:40 AM    HCT 28.0 04/01/2025 06:40 AM    MCV 99.4 04/01/2025 06:40 AM    MCH 33.4 04/01/2025 06:40 AM    MCHC 33.6 04/01/2025 06:40 AM    RDW 19.1 04/01/2025 06:40 AM     04/01/2025 06:40 AM    MPV 7.7 04/01/2025 06:40 AM     BMP:   Lab Results   Component Value Date/Time     04/01/2025 06:40 AM    K 4.7 04/01/2025 06:40 AM    K 4.1 03/31/2025 06:19 AM    CL 96 04/01/2025 06:40 AM    CO2 21 04/01/2025 06:40 AM    BUN 51 04/01/2025 06:40 AM    CREATININE 2.3 04/01/2025 06:40 AM    CALCIUM 9.1 04/01/2025 06:40 AM    GFRAA 40 07/26/2022 05:19 AM    GFRAA >60 03/21/2013 10:55 AM    LABGLOM 29 04/01/2025 06:40 AM    LABGLOM 26 04/10/2024 09:47 AM    GLUCOSE 183 04/01/2025 06:40 AM   Magnesium:    Lab Results   Component Value Date/Time    MG 1.70 02/09/2024 05:32 AM   Phosphorus:    Lab Results   Component Value Date/Time    PHOS 2.4 04/01/2025 06:40 AM

## 2025-04-02 NOTE — PROGRESS NOTES
Danny Rock  4/2/2025  [id number]    Chief Complaint: Debility    Subjective    Slept poorly, confused overnight per nursing.    Patient reports that he is doing well. He isn't sleeping well due to frequent urination overnight. Denies any dysuria, or flank pain. Right hip pain is stable, hasn't tried lidocaine patch yet. Last Bowel Movement:  04/01/25          Objective    Patient Vitals for the past 24 hrs:   BP Temp Temp src Pulse Resp SpO2 Weight   04/02/25 0645 -- -- -- -- -- -- 72.5 kg (159 lb 13.3 oz)   04/02/25 0254 -- -- -- -- 16 -- --   04/02/25 0224 -- -- -- -- 16 -- --   04/01/25 2015 127/70 98 °F (36.7 °C) Oral 76 16 98 % --   04/01/25 1811 135/71 97.9 °F (36.6 °C) -- 85 18 -- --   04/01/25 1548 -- -- -- -- 16 -- --   04/01/25 1510 113/70 97.9 °F (36.6 °C) -- 69 18 -- --     Patient Vitals for the past 96 hrs (Last 3 readings):   Weight   04/02/25 0645 72.5 kg (159 lb 13.3 oz)   03/31/25 0551 71.9 kg (158 lb 8 oz)   03/30/25 1654 71.4 kg (157 lb 6.5 oz)      Gen: No distress, pleasant.   HEENT: Normocephalic, atraumatic.   CV: Regular rate and rhythm. Extremities warm, well perfused.   Resp: No respiratory distress.   Abd: Soft, nontender.  Ext: No edema.  Neuro: Alert, oriented, appropriately interactive.     Laboratory data:   Na/K/Cl/CO2:  129/4.7/96/21 (04/01 0640)   BUN/Cr/glu/ALT/AST/amyl/lip:  51/2.3/--/--/--/--/-- (04/01 0640)    WBC/Hgb/Hct/Plts:  10.4/9.4/28.0/294 (04/01 0640)     Therapy progress:       PT    Supine to Sit: Partial/moderate assistance  Sit to Supine: Partial/moderate assistance   Sit to Stand: Partial/moderate assistance  Chair/Bed to Chair Transfer: Supervision or touching assistance  Car Transfer: Substantial/maximal assistance  Ambulation 10 ft: Supervision or touching assistance  Ambulation 50 ft: Supervision or touching assistance  Ambulation 150 ft:    Stairs - 1 Step: Partial/moderate assistance  Stairs - 4 Step:    Stairs - 12 Step:      OT    Eating:    Oral

## 2025-04-03 PROBLEM — K68.12 ILIOPSOAS ABSCESS ON RIGHT (HCC): Status: ACTIVE | Noted: 2025-04-03

## 2025-04-03 LAB
ALBUMIN SERPL-MCNC: 2.8 G/DL (ref 3.4–5)
ANION GAP SERPL CALCULATED.3IONS-SCNC: 9 MMOL/L (ref 3–16)
BASOPHILS # BLD: 0.1 K/UL (ref 0–0.2)
BASOPHILS NFR BLD: 0.7 %
BUN SERPL-MCNC: 44 MG/DL (ref 7–20)
CALCIUM SERPL-MCNC: 9.3 MG/DL (ref 8.3–10.6)
CHLORIDE SERPL-SCNC: 98 MMOL/L (ref 99–110)
CO2 SERPL-SCNC: 24 MMOL/L (ref 21–32)
CREAT SERPL-MCNC: 2.5 MG/DL (ref 0.8–1.3)
DEPRECATED RDW RBC AUTO: 19.2 % (ref 12.4–15.4)
EOSINOPHIL # BLD: 0.2 K/UL (ref 0–0.6)
EOSINOPHIL NFR BLD: 1.9 %
FOLATE SERPL-MCNC: 16.7 NG/ML (ref 4.78–24.2)
GFR SERPLBLD CREATININE-BSD FMLA CKD-EPI: 26 ML/MIN/{1.73_M2}
GLUCOSE BLD-MCNC: 164 MG/DL (ref 70–99)
GLUCOSE BLD-MCNC: 238 MG/DL (ref 70–99)
GLUCOSE BLD-MCNC: 296 MG/DL (ref 70–99)
GLUCOSE BLD-MCNC: 80 MG/DL (ref 70–99)
GLUCOSE SERPL-MCNC: 142 MG/DL (ref 70–99)
HCT VFR BLD AUTO: 24.7 % (ref 40.5–52.5)
HGB BLD-MCNC: 8.3 G/DL (ref 13.5–17.5)
LYMPHOCYTES # BLD: 1.1 K/UL (ref 1–5.1)
LYMPHOCYTES NFR BLD: 11.7 %
MCH RBC QN AUTO: 33.8 PG (ref 26–34)
MCHC RBC AUTO-ENTMCNC: 33.7 G/DL (ref 31–36)
MCV RBC AUTO: 100.3 FL (ref 80–100)
MONOCYTES # BLD: 0.3 K/UL (ref 0–1.3)
MONOCYTES NFR BLD: 3.8 %
NEUTROPHILS # BLD: 7.4 K/UL (ref 1.7–7.7)
NEUTROPHILS NFR BLD: 81.9 %
PERFORMED ON: ABNORMAL
PERFORMED ON: NORMAL
PHOSPHATE SERPL-MCNC: 2.9 MG/DL (ref 2.5–4.9)
PLATELET # BLD AUTO: 314 K/UL (ref 135–450)
PMV BLD AUTO: 7 FL (ref 5–10.5)
POTASSIUM SERPL-SCNC: 4.7 MMOL/L (ref 3.5–5.1)
RBC # BLD AUTO: 2.46 M/UL (ref 4.2–5.9)
SODIUM SERPL-SCNC: 131 MMOL/L (ref 136–145)
T4 FREE SERPL-MCNC: 1.2 NG/DL (ref 0.9–1.8)
TSH SERPL DL<=0.005 MIU/L-ACNC: 6.19 UIU/ML (ref 0.27–4.2)
VIT B12 SERPL-MCNC: 716 PG/ML (ref 211–911)
WBC # BLD AUTO: 9 K/UL (ref 4–11)

## 2025-04-03 PROCEDURE — 1280000000 HC REHAB R&B

## 2025-04-03 PROCEDURE — 84439 ASSAY OF FREE THYROXINE: CPT

## 2025-04-03 PROCEDURE — 80069 RENAL FUNCTION PANEL: CPT

## 2025-04-03 PROCEDURE — 97530 THERAPEUTIC ACTIVITIES: CPT

## 2025-04-03 PROCEDURE — 84443 ASSAY THYROID STIM HORMONE: CPT

## 2025-04-03 PROCEDURE — 97110 THERAPEUTIC EXERCISES: CPT

## 2025-04-03 PROCEDURE — 6370000000 HC RX 637 (ALT 250 FOR IP): Performed by: STUDENT IN AN ORGANIZED HEALTH CARE EDUCATION/TRAINING PROGRAM

## 2025-04-03 PROCEDURE — 97130 THER IVNTJ EA ADDL 15 MIN: CPT

## 2025-04-03 PROCEDURE — 6370000000 HC RX 637 (ALT 250 FOR IP): Performed by: INTERNAL MEDICINE

## 2025-04-03 PROCEDURE — 97535 SELF CARE MNGMENT TRAINING: CPT

## 2025-04-03 PROCEDURE — 97116 GAIT TRAINING THERAPY: CPT

## 2025-04-03 PROCEDURE — 82746 ASSAY OF FOLIC ACID SERUM: CPT

## 2025-04-03 PROCEDURE — 2580000003 HC RX 258: Performed by: INTERNAL MEDICINE

## 2025-04-03 PROCEDURE — 99221 1ST HOSP IP/OBS SF/LOW 40: CPT | Performed by: NURSE PRACTITIONER

## 2025-04-03 PROCEDURE — 82607 VITAMIN B-12: CPT

## 2025-04-03 PROCEDURE — 97129 THER IVNTJ 1ST 15 MIN: CPT

## 2025-04-03 PROCEDURE — 6360000002 HC RX W HCPCS: Performed by: INTERNAL MEDICINE

## 2025-04-03 PROCEDURE — 85025 COMPLETE CBC W/AUTO DIFF WBC: CPT

## 2025-04-03 PROCEDURE — 99233 SBSQ HOSP IP/OBS HIGH 50: CPT | Performed by: INTERNAL MEDICINE

## 2025-04-03 PROCEDURE — 2500000003 HC RX 250 WO HCPCS: Performed by: STUDENT IN AN ORGANIZED HEALTH CARE EDUCATION/TRAINING PROGRAM

## 2025-04-03 RX ADMIN — SEVELAMER CARBONATE 800 MG: 800 TABLET, FILM COATED ORAL at 12:04

## 2025-04-03 RX ADMIN — SEVELAMER CARBONATE 800 MG: 800 TABLET, FILM COATED ORAL at 08:40

## 2025-04-03 RX ADMIN — Medication 10 ML: at 23:37

## 2025-04-03 RX ADMIN — METOPROLOL TARTRATE 25 MG: 25 TABLET, FILM COATED ORAL at 20:55

## 2025-04-03 RX ADMIN — Medication 1 MG: at 08:40

## 2025-04-03 RX ADMIN — ACETAMINOPHEN 650 MG: 325 TABLET ORAL at 21:09

## 2025-04-03 RX ADMIN — INSULIN LISPRO 4 UNITS: 100 INJECTION, SOLUTION INTRAVENOUS; SUBCUTANEOUS at 21:09

## 2025-04-03 RX ADMIN — GABAPENTIN 100 MG: 100 CAPSULE ORAL at 20:55

## 2025-04-03 RX ADMIN — SEVELAMER CARBONATE 800 MG: 800 TABLET, FILM COATED ORAL at 17:53

## 2025-04-03 RX ADMIN — OXYCODONE HYDROCHLORIDE AND ACETAMINOPHEN 1 TABLET: 5; 325 TABLET ORAL at 17:53

## 2025-04-03 RX ADMIN — VANCOMYCIN HYDROCHLORIDE 1000 MG: 1 INJECTION, POWDER, LYOPHILIZED, FOR SOLUTION INTRAVENOUS at 17:54

## 2025-04-03 RX ADMIN — LINEZOLID 600 MG: 600 TABLET, FILM COATED ORAL at 08:40

## 2025-04-03 RX ADMIN — INSULIN LISPRO 3 UNITS: 100 INJECTION, SOLUTION INTRAVENOUS; SUBCUTANEOUS at 08:54

## 2025-04-03 RX ADMIN — ROSUVASTATIN CALCIUM 10 MG: 10 TABLET, FILM COATED ORAL at 20:55

## 2025-04-03 RX ADMIN — INSULIN LISPRO 3 UNITS: 100 INJECTION, SOLUTION INTRAVENOUS; SUBCUTANEOUS at 12:20

## 2025-04-03 RX ADMIN — METHOCARBAMOL 750 MG: 750 TABLET ORAL at 12:04

## 2025-04-03 RX ADMIN — MELATONIN TAB 3 MG 6 MG: 3 TAB at 17:53

## 2025-04-03 RX ADMIN — OXYCODONE HYDROCHLORIDE AND ACETAMINOPHEN 1 TABLET: 5; 325 TABLET ORAL at 07:30

## 2025-04-03 RX ADMIN — CHOLECALCIFEROL TAB 125 MCG (5000 UNIT) 5000 UNITS: 125 TAB at 08:40

## 2025-04-03 RX ADMIN — PANTOPRAZOLE SODIUM 40 MG: 40 TABLET, DELAYED RELEASE ORAL at 12:04

## 2025-04-03 RX ADMIN — INSULIN LISPRO 2 UNITS: 100 INJECTION, SOLUTION INTRAVENOUS; SUBCUTANEOUS at 12:20

## 2025-04-03 RX ADMIN — POLYETHYLENE GLYCOL 3350 17 G: 17 POWDER, FOR SOLUTION ORAL at 08:43

## 2025-04-03 RX ADMIN — METHOCARBAMOL 750 MG: 750 TABLET ORAL at 20:55

## 2025-04-03 RX ADMIN — METHOCARBAMOL 750 MG: 750 TABLET ORAL at 08:40

## 2025-04-03 RX ADMIN — OXYCODONE HYDROCHLORIDE AND ACETAMINOPHEN 1 TABLET: 5; 325 TABLET ORAL at 12:04

## 2025-04-03 RX ADMIN — METOPROLOL TARTRATE 25 MG: 25 TABLET, FILM COATED ORAL at 08:40

## 2025-04-03 RX ADMIN — METHOCARBAMOL 750 MG: 750 TABLET ORAL at 17:53

## 2025-04-03 RX ADMIN — LINEZOLID 600 MG: 600 TABLET, FILM COATED ORAL at 20:55

## 2025-04-03 RX ADMIN — CYANOCOBALAMIN TAB 1000 MCG 1000 MCG: 1000 TAB at 08:40

## 2025-04-03 RX ADMIN — Medication 10 ML: at 17:53

## 2025-04-03 ASSESSMENT — ENCOUNTER SYMPTOMS
CONSTIPATION: 0
DIARRHEA: 0
ABDOMINAL PAIN: 0
SINUS PRESSURE: 0
EYE REDNESS: 0
COLOR CHANGE: 1
EYE DISCHARGE: 0
BACK PAIN: 0
COUGH: 0
SINUS PAIN: 0
SHORTNESS OF BREATH: 0
WHEEZING: 0
NAUSEA: 0
RHINORRHEA: 0
SORE THROAT: 0

## 2025-04-03 ASSESSMENT — PAIN DESCRIPTION - LOCATION
LOCATION: HIP
LOCATION: SHOULDER
LOCATION: HIP
LOCATION: SHOULDER
LOCATION: HIP

## 2025-04-03 ASSESSMENT — PAIN SCALES - WONG BAKER: WONGBAKER_NUMERICALRESPONSE: NO HURT

## 2025-04-03 ASSESSMENT — PAIN DESCRIPTION - ORIENTATION
ORIENTATION: RIGHT

## 2025-04-03 ASSESSMENT — PAIN DESCRIPTION - DESCRIPTORS
DESCRIPTORS: ACHING

## 2025-04-03 ASSESSMENT — PAIN SCALES - GENERAL
PAINLEVEL_OUTOF10: 3
PAINLEVEL_OUTOF10: 5
PAINLEVEL_OUTOF10: 8
PAINLEVEL_OUTOF10: 5
PAINLEVEL_OUTOF10: 4
PAINLEVEL_OUTOF10: 5
PAINLEVEL_OUTOF10: 5
PAINLEVEL_OUTOF10: 4

## 2025-04-03 ASSESSMENT — PAIN - FUNCTIONAL ASSESSMENT: PAIN_FUNCTIONAL_ASSESSMENT: ACTIVITIES ARE NOT PREVENTED

## 2025-04-03 NOTE — PLAN OF CARE
Problem: Chronic Conditions and Co-morbidities  Goal: Patient's chronic conditions and co-morbidity symptoms are monitored and maintained or improved  4/3/2025 0033 by Timoteo Lira RN  Outcome: Progressing  Flowsheets (Taken 4/3/2025 0033)  Care Plan - Patient's Chronic Conditions and Co-Morbidity Symptoms are Monitored and Maintained or Improved:   Monitor and assess patient's chronic conditions and comorbid symptoms for stability, deterioration, or improvement   Collaborate with multidisciplinary team to address chronic and comorbid conditions and prevent exacerbation or deterioration     Problem: Discharge Planning  Goal: Discharge to home or other facility with appropriate resources  4/3/2025 0033 by Timoteo Lira, RN  Outcome: Progressing  Flowsheets (Taken 4/3/2025 0033)  Discharge to home or other facility with appropriate resources:   Identify barriers to discharge with patient and caregiver   Identify discharge learning needs (meds, wound care, etc)   Arrange for needed discharge resources and transportation as appropriate     Problem: Skin/Tissue Integrity  Goal: Skin integrity remains intact  Description: 1.  Monitor for areas of redness and/or skin breakdown  2.  Assess vascular access sites hourly  3.  Every 4-6 hours minimum:  Change oxygen saturation probe site  4.  Every 4-6 hours:  If on nasal continuous positive airway pressure, respiratory therapy assess nares and determine need for appliance change or resting period  4/3/2025 0033 by Timoteo Lira, RN  Outcome: Progressing  Flowsheets (Taken 4/3/2025 0033)  Skin Integrity Remains Intact:   Monitor for areas of redness and/or skin breakdown   Turn and reposition as indicated     Problem: Safety - Adult  Goal: Free from fall injury  4/3/2025 0033 by Timoteo Lira RN  Outcome: Progressing  Flowsheets (Taken 4/3/2025 0033)  Free From Fall Injury: Instruct family/caregiver on patient safety     Problem: ABCDS Injury  12-Dec-2020 20:09 Assessment  Goal: Absence of physical injury  4/3/2025 0033 by Timoteo Lira RN  Outcome: Progressing  Flowsheets (Taken 4/3/2025 0033)  Absence of Physical Injury: Implement safety measures based on patient assessment     Problem: Pain  Goal: Verbalizes/displays adequate comfort level or baseline comfort level  4/3/2025 0033 by Timoteo Lira RN  Outcome: Progressing  Flowsheets (Taken 4/3/2025 0033)  Verbalizes/displays adequate comfort level or baseline comfort level:   Assess pain using appropriate pain scale   Encourage patient to monitor pain and request assistance   Administer analgesics based on type and severity of pain and evaluate response   Consider cultural and social influences on pain and pain management   Implement non-pharmacological measures as appropriate and evaluate response   Notify Licensed Independent Practitioner if interventions unsuccessful or patient reports new pain     Problem: Nutrition Deficit:  Goal: Optimize nutritional status  4/3/2025 0033 by Timoteo Lira RN  Outcome: Progressing  Flowsheets (Taken 4/3/2025 0033)  Nutrient intake appropriate for improving, restoring, or maintaining nutritional needs:   Monitor oral intake, labs, and treatment plans   Assess nutritional status and recommend course of action   Recommend appropriate diets, oral nutritional supplements, and vitamin/mineral supplements

## 2025-04-03 NOTE — PROGRESS NOTES
Physical Therapy    [facility] - Inpatient Rehabilitation Department   Phone: [phone]    Physical Therapy    [] Initial Evaluation            [x] Daily Treatment Note         [] Discharge Summary      Patient: Danny Rock   : 1950   MRN: [id number]   Date of Service:  4/3/2025  Admitting Diagnosis: Debility  Current Admission Summary: Per chart review:    Danny Rock is a 75 y.o. male with PMHx notable for HTN, HLD, CAD, HFrEF, atrial fibrillation, renal cancer s/p right nephrectomy, ESRD on HD who initially presented to [facility] on 3/22 with low back pain and had MRI showing severe multilevel foraminal stenosis worst at L5-S1 prompting transfer to [facility] for neurosurgical evaluation.  Repeat MRI C/T/L-spine on 3/23 showed multilevel disc/endplate enhancement compatible with discitis and osteomyelitis at L2-L3, L3-L4 and L4-L5 with adjacent paravertebral edema, and small right psoas muscle abscess.  Blood cultures resulted positive for Enterococcus faecalis.  He was started on IV vancomycin and gentamicin on 3/25, with plan at discharge to continue IV vancomycin 3 times a week after dialysis through 2025. NSGY evaluated while inpatient, recommended no surgical intervention at this time.  Nephrology consulted for HD needs, had old HD access removed and TDC placed on 3/28   Past Medical History:  has a past medical history of Atrial fibrillation (HCC), CAD (coronary artery disease), Cancer of kidney (HCC), Dependence on renal dialysis, Diabetes mellitus (HCC), Hyperlipidemia, Hypertension, Prostate cancer (HCC), Right renal mass, and Systolic CHF (HCC).  Past Surgical History:  has a past surgical history that includes hernia repair; knee surgery; Cholecystectomy; Nasal septum surgery; eye surgery (Bilateral, 2018); Kidney removal (Right, 2022); Cataract removal (Bilateral); IR TUNNELED CVC PLACE WO SQ PORT/PUMP > 5 YEARS (2024); CT NEEDLE BIOPSY LIVER

## 2025-04-03 NOTE — PROGRESS NOTES
Danny Rock  4/3/2025  [id number]    Chief Complaint: Debility    Subjective    No acute events overnight.    Patient reports that he is doing alright today. Slept poorly last night, again with frequent urination. Still having right hip/groin pain. Denies any new concerns. Discussed CT findings. Last Bowel Movement:  04/03/25          Objective    Patient Vitals for the past 24 hrs:   BP Temp Temp src Pulse Resp SpO2   04/02/25 2015 (!) 103/55 98.1 °F (36.7 °C) Oral 62 16 96 %   04/02/25 1707 -- -- -- -- 16 --   04/02/25 0949 133/76 97.8 °F (36.6 °C) -- 78 16 98 %     Patient Vitals for the past 96 hrs (Last 3 readings):   Weight   04/02/25 0645 72.5 kg (159 lb 13.3 oz)   03/31/25 0551 71.9 kg (158 lb 8 oz)   03/30/25 1654 71.4 kg (157 lb 6.5 oz)      Gen: No distress, pleasant.   HEENT: Normocephalic, atraumatic.   CV: Regular rate and rhythm. Extremities warm, well perfused.   Resp: No respiratory distress.   Abd: Soft, nontender.  Ext: No edema.  Neuro: Alert, oriented, appropriately interactive.     Laboratory data:   Na/K/Cl/CO2:  131/4.7/98/24 (04/03 0618)   BUN/Cr/glu/ALT/AST/amyl/lip:  44/2.5/--/--/--/--/-- (04/03 0618)    WBC/Hgb/Hct/Plts:  9.0/8.3/24.7/314 (04/03 0618)     Therapy progress:       PT    Supine to Sit: Partial/moderate assistance  Sit to Supine: Partial/moderate assistance   Sit to Stand: Partial/moderate assistance  Chair/Bed to Chair Transfer: Supervision or touching assistance  Car Transfer: Substantial/maximal assistance  Ambulation 10 ft: Supervision or touching assistance  Ambulation 50 ft: Supervision or touching assistance  Ambulation 150 ft:    Stairs - 1 Step: Partial/moderate assistance  Stairs - 4 Step:    Stairs - 12 Step:      OT    Eating:    Oral Hygiene: Supervision or touching assistance  Bathing: Partial/moderate assistance  Upper Body Dressing: Partial/moderate assistance  Lower Body Dressing: Substantial/maximal assistance  Toilet Transfer: Supervision or touching  setting of acute infection, multiple centrally acting pain medications.  - Reviewed MRI Brain 12/23: Mild to moderate global parenchymal volume loss with mild chronic microvascular ischemic changes, ventricular dilatation due to cortical volume loss, cannot exclude NPH.  Also showed to punctate foci of acute infarct in the right temporal lobe, and chronic infarcts involving the left frontal lobe.  - Reduce pain medications as able  - Sleep-wake cycle regulation with melatonin scheduled 2 to 3 hours before bed, and trazodone as needed for insomnia  - Continue home B12 supplementation.  Serum level WNL.   - TSH elevated, reflex T4 pending.   - SLP cog eval to better characterize deficits, compensatory strategies (if applicable)    #.  Multilevel lumbar foraminal stenosis, bilateral L5 nerve root impingement  - NSGY recommends no acute surgical intervention  - Multimodal pain management: Tylenol PRN, Robaxin scheduled 4x/day, Percocet 1-2 tabs q4h PRN for moderate-severe. Add gabapentin 100 mg 3x/week after dialysis (renally dosed)    #. Right greater trochanter pain   - Heat PRN.  - Lidocaine patch PRN.     #. CAD  #. Combined systolic and diastolic heart failure, compensated  #. HTN  #. HLD  #. Paroxysmal A-fib  #. Sinus bradycardia  - Restart home metoprolol at reduced dose 25 mg twice daily (takes 50 mg BID at home), increase as tolerated by heart rate and blood pressure.  - Continue statin  - Continue ASA, and Eliquis      #. ESRD  - Nephrology consulted for HD needs  - Renally dose medications       #. T2DM  - Continue prandial Humalog 2 units  TID w/ meals + SSI. Basal insulin stopped at outside hospital    #. Onychomycosis  - Nails debrided on 4/2 per Podiatry     Chronic Conditions:  - GERD: Continue PPI  - Hx of renal cell carcinoma s/p nephrectomy: Continue home Cabometyx (patient-provided)  - Hx of right shoulder abscess s/p I&D 1/31/24, right glenohumeral joint arthritis: Follows w/ Ortho, next appointment

## 2025-04-03 NOTE — PLAN OF CARE
Problem: Chronic Conditions and Co-morbidities  Goal: Patient's chronic conditions and co-morbidity symptoms are monitored and maintained or improved  4/3/2025 1259 by Jacob Lezama RN  Outcome: Progressing     Problem: Discharge Planning  Goal: Discharge to home or other facility with appropriate resources  4/3/2025 1259 by Jacob Lezama RN  Outcome: Progressing     Problem: Skin/Tissue Integrity  Goal: Skin integrity remains intact  Description: 1.  Monitor for areas of redness and/or skin breakdown  2.  Assess vascular access sites hourly  3.  Every 4-6 hours minimum:  Change oxygen saturation probe site  4.  Every 4-6 hours:  If on nasal continuous positive airway pressure, respiratory therapy assess nares and determine need for appliance change or resting period  4/3/2025 1259 by Jacob Lezama RN  Outcome: Progressing     Problem: Safety - Adult  Goal: Free from fall injury  4/3/2025 1259 by Jacob Lezama RN  Outcome: Progressing     Problem: ABCDS Injury Assessment  Goal: Absence of physical injury  4/3/2025 1259 by Jacob Lezama RN  Outcome: Progressing     Problem: Pain  Goal: Verbalizes/displays adequate comfort level or baseline comfort level  4/3/2025 1259 by Jacob Lezama RN  Outcome: Progressing     Problem: Nutrition Deficit:  Goal: Optimize nutritional status  4/3/2025 1259 by Jacob Lezama RN  Outcome: Progressing  Flowsheets (Taken 4/3/2025 1150 by Magdalene Ray, RD, LD)  Nutrient intake appropriate for improving, restoring, or maintaining nutritional needs:   Monitor oral intake, labs, and treatment plans   Recommend appropriate diets, oral nutritional supplements, and vitamin/mineral supplements

## 2025-04-03 NOTE — CONSULTS
Clinical Pharmacy Note: Pharmacy to Dose Vancomycin    Vancomycin Day: 12  Indication: bacteremia  Current Dose: 1g intermittent on HD days  Dosing Method: Dosing by random levels    No lvl until Tuesday 4/8    Recent Labs     04/01/25  0640 04/03/25  0618   BUN 51* 44*       Recent Labs     04/01/25  0640 04/03/25  0618   CREATININE 2.3* 2.5*       Recent Labs     04/01/25  0640 04/03/25  0618   WBC 10.4 9.0       No intake or output data in the 24 hours ending 04/03/25 1000      Ht Readings from Last 1 Encounters:   03/31/25 1.905 m (6' 3\")        Wt Readings from Last 1 Encounters:   04/02/25 72.5 kg (159 lb 13.3 oz)         Body mass index is 19.98 kg/m².    Estimated Creatinine Clearance: 26 mL/min (A) (based on SCr of 2.5 mg/dL (H)).      Assessment/Plan:  Will redose vancomycin 1g one time today.   A vancomycin random level has been ordered on 4/8 at 0600 for follow-up.  Changes in regimen will be determined based on culture results, renal function, and clinical response.  Pharmacy will continue to monitor and adjust regimen as necessary.    Thank you for the consult,    Emeterio Rojas PharmD, BCPS  Clinical Pharmacy Specialist  [id number]

## 2025-04-03 NOTE — PROGRESS NOTES
Progress Note  Date:4/3/2025       Room:[address]  Patient Name:Danny Rock     YOB: 1950     Age:75 y.o.        Subjective    Subjective:  Pain:  He reports no pain.       Review of Systems   Skin:  Positive for color change (right hallux nail).     Objective         Vitals Last 24 Hours:  TEMPERATURE:  Temp  Av.9 °F (36.6 °C)  Min: 97.8 °F (36.6 °C)  Max: 98.1 °F (36.7 °C)  RESPIRATIONS RANGE: Resp  Av  Min: 16  Max: 16  PULSE OXIMETRY RANGE: SpO2  Av %  Min: 96 %  Max: 98 %  PULSE RANGE: Pulse  Av.3  Min: 62  Max: 78  BLOOD PRESSURE RANGE: Systolic (24hrs), Av , Min:103 , Max:133   ; Diastolic (24hrs), Av, Min:55, Max:76    I/O (24Hr):  No intake or output data in the 24 hours ending 25 0848  Objective:  Vital signs: (most recent): Blood pressure 121/71, pulse 68, temperature 97.9 °F (36.6 °C), resp. rate 16, height 1.905 m (6' 3\"), weight 72.5 kg (159 lb 13.3 oz), SpO2 97%.    Skin:  There is ecchymosis (right hallux nail).      Labs/Imaging/Diagnostics    Labs:  CBC:  Recent Labs     25  0640 25  0618   WBC 10.4 9.0   RBC 2.82* 2.46*   HGB 9.4* 8.3*   HCT 28.0* 24.7*   MCV 99.4 100.3*   RDW 19.1* 19.2*    314     CHEMISTRIES:  Recent Labs     25  0640 25  0618   * 131*   K 4.7 4.7   CL 96* 98*   CO2 21 24   BUN 51* 44*   CREATININE 2.3* 2.5*   GLUCOSE 183* 142*   PHOS 2.4* 2.9     PT/INR:No results for input(s): \"PROTIME\", \"INR\" in the last 72 hours.  APTT:No results for input(s): \"APTT\" in the last 72 hours.  LIVER PROFILE:No results for input(s): \"AST\", \"ALT\", \"BILIDIR\", \"BILITOT\", \"ALKPHOS\" in the last 72 hours.    Imaging Last 24 Hours:  CT PELVIS W CONTRAST Additional Contrast? None  Result Date: 2025  EXAMINATION: CT OF THE PELVIS WITH CONTRAST 2025 2:01 pm TECHNIQUE: CT of the pelvis was performed with the administration of intravenous contrast. Multiplanar reformatted images are provided for review.

## 2025-04-03 NOTE — PROGRESS NOTES
Treatment time: 3 hours  Net UF: 1500 ml     Pre weight: 72.5 kg  Post weight:71 kg    Access used: CVC    Access function: good with  ml/min     Medications or blood products given: na     Regular outpatient schedule: TTS     Summary of response to treatment: Patient tolerated treatment well and without any complications. Report given to Jacob.  Copy of dialysis treatment record placed in chart, to be scanned into EMR.

## 2025-04-03 NOTE — PROGRESS NOTES
[facility] - Inpatient Rehabilitation Department   Phone: [phone]    Occupational Therapy    [] Initial Evaluation            [x] Daily Treatment Note         [] Discharge Summary      Patient: Danny Rock   : 1950   MRN: [id number]   Date of Service:  4/3/2025    Admitting Diagnosis:  Debility  Current Admission Summary: Danny Rock is a 75 y.o. male with PMHx notable for HTN, HLD, CAD, HFrEF, atrial fibrillation, renal cancer s/p right nephrectomy, ESRD on HD who initially presented to [facility] on 3/22 with low back pain and had MRI showing severe multilevel foraminal stenosis worst at L5-S1 prompting transfer to [facility] for neurosurgical evaluation.  Repeat MRI C/T/L-spine on 3/23 showed multilevel disc/endplate enhancement compatible with discitis and osteomyelitis at L2-L3, L3-L4 and L4-L5 with adjacent paravertebral edema, and small right psoas muscle abscess.  Blood cultures resulted positive for Enterococcus faecalis.  He was started on IV vancomycin and gentamicin on 3/25, with plan at discharge to continue IV vancomycin 3 times a week after dialysis through 2025. NSGY evaluated while inpatient, recommended no surgical intervention at this time.  Nephrology consulted for HD needs, had old HD access removed and TDC placed on 3/28   Past Medical History:  has a past medical history of Atrial fibrillation (HCC), CAD (coronary artery disease), Cancer of kidney (HCC), Dependence on renal dialysis, Diabetes mellitus (HCC), Hyperlipidemia, Hypertension, Prostate cancer (HCC), Right renal mass, and Systolic CHF (HCC).  Past Surgical History:  has a past surgical history that includes hernia repair; knee surgery; Cholecystectomy; Nasal septum surgery; eye surgery (Bilateral, 2018); Kidney removal (Right, 2022); Cataract removal (Bilateral); IR TUNNELED CVC PLACE WO SQ PORT/PUMP > 5 YEARS (2024); CT NEEDLE BIOPSY LIVER PERCUTANEOUS (2024); other surgical  modified independent   Patient will complete lower body ADL at modified independent   Patient will complete toileting at modified independent   Patient will complete functional transfers at modified independent   Patient will complete functional mobility at modified independent   Patient to gather and transport IADL items at modified independent     Above goals reviewed on 4/3/2025.  All goals are ongoing at this time unless indicated above.       Therapy Session Time  First Session   Individual Group Co-treatment   Time In 0845      Time Out 0930      Minutes 45        Second Session    Individual Group Co-treatment   Time In 1245      Time Out 1330      Minutes 45        Timed Code Treatment Minutes: 45 + 45  Total Treatment Minutes:   90       Electronically Signed By:   1st Session: BENNY Trimble/ANA    Therapist observed and directed the patient's plan of care.  Co-signed and supervised by: JENNIE Zhao/L #[id number]   2nd Session: JENNIE Ho/L, CNS ([id number]) 4/3/2025 1:40 PM

## 2025-04-03 NOTE — PROGRESS NOTES
MD Gordon Frankel MD Aldo Estella, DO                 Office: [phone]                      Fax: [phone]             [facility]                   NEPHROLOGY CONSULT PROGRESS NOTE    Subjective / interval history / medical decision making.  - No new complaints.    IMPRESSION/RECOMMENDATIONS:        #ESKD on HD TTS  -last iHD Saturday prior to admit  -TDC placed 3/28/25.  -Continue iHD TTS schedule  -next routine iHD today. Following iHD on Saturday if still inpatient.    #DM  -on insulin  -controlled    #HTN  -controlled  -resume home antihypertensives    #leukocytosis  -likely from recent infection    #Anemia of CKD  -hgb at goal  -stable monitor    Thank you for the consult.  We will follow this patient along the hospitalization.  Guillermo Moss DO     High complexity, at risk of impending organ failure needing higher level of care/monitoring.   Time spent 38 minutes that included face-to-face meeting/discussion with patient, and treatment team (including primary/referring team and other consultants; included coordination of care with the treatment team; and review of patient's electronic medical records and ordering appropriates tests.             Reason for Consult:  ESKD management  Requesting Physician/Provider:  Tony Valverde MD     CHIEF COMPLAINT:  lumbar osteomyelitis/disciits    History obtained from records and patient.    HISTORY OF PRESENT ILLNESS:                Danny Rock  is 75 y.o. y.o. male with significant past medical history of ESKD on HD TTS who presented with lumbar osteomyelitis/disciits. Transferred from outside facility. Recent TDC placed 3/28. Last iHD prior to admit here was on Saturday. Nephrology consulted for ESKD management.    Past Medical History:     has a past medical history of Atrial fibrillation (HCC), CAD (coronary artery disease), Cancer of kidney (HCC), Dependence on renal dialysis, Diabetes mellitus (HCC), Hyperlipidemia,  Temp 98.1 °F (36.7 °C) (Oral)   Resp 16   Ht 1.905 m (6' 3\")   Wt 72.5 kg (159 lb 13.3 oz)   SpO2 96%   BMI 19.98 kg/m²   WEIGHT: well tolerated  No intake or output data in the 24 hours ending 04/03/25 0729      CONSTITUTIONAL:  awake, alert, cooperative, no apparent distress, and appears stated age  EYES:  Lids and lashes normal, pupils equal, round   NECK:  Supple, symmetrical, trachea midline  HEMATOLOGIC/LYMPHATICS:  no pallor, no cervical LAD  LUNGS:  No increased work of breathing, diminished bibasilar breaths  CARDIOVASCULAR:  regular rate and rhythm, normal S1 and S2  ABDOMEN:  No scars, normal bowel sounds, soft, non-distended, non-tender  EXTREMITIES:  Warm, dry, edema trace  NEUROLOGIC:  Awake, alert, oriented to name, place and time.      DATA:    CBC:   Lab Results   Component Value Date/Time    WBC 9.0 04/03/2025 06:18 AM    RBC 2.46 04/03/2025 06:18 AM    HGB 8.3 04/03/2025 06:18 AM    HCT 24.7 04/03/2025 06:18 AM    .3 04/03/2025 06:18 AM    MCH 33.8 04/03/2025 06:18 AM    MCHC 33.7 04/03/2025 06:18 AM    RDW 19.2 04/03/2025 06:18 AM     04/03/2025 06:18 AM    MPV 7.0 04/03/2025 06:18 AM     BMP:   Lab Results   Component Value Date/Time     04/03/2025 06:18 AM    K 4.7 04/03/2025 06:18 AM    K 4.1 03/31/2025 06:19 AM    CL 98 04/03/2025 06:18 AM    CO2 24 04/03/2025 06:18 AM    BUN 44 04/03/2025 06:18 AM    CREATININE 2.5 04/03/2025 06:18 AM    CALCIUM 9.3 04/03/2025 06:18 AM    GFRAA 40 07/26/2022 05:19 AM    GFRAA >60 03/21/2013 10:55 AM    LABGLOM 26 04/03/2025 06:18 AM    LABGLOM 26 04/10/2024 09:47 AM    GLUCOSE 142 04/03/2025 06:18 AM   Magnesium:    Lab Results   Component Value Date/Time    MG 1.70 02/09/2024 05:32 AM   Phosphorus:    Lab Results   Component Value Date/Time    PHOS 2.9 04/03/2025 06:18 AM

## 2025-04-03 NOTE — PROGRESS NOTES
Nutrition Note    RECOMMENDATIONS  PO Diet: CCC  ONS: Glucerna BID     ASSESSMENT   Nutrition intervention triggered for f/u.  Pt reports is eating well on a CCC diet, with greater than 50% of meals & Glucerna supplements consumed.  Has increased nutrient needs for open area on buttocks documented.  Agrees to con't Glucerna BID to promote healing.  NFPE completed & determined that pt meets criteria for moderate malnutrition (see malnutrition assessment).  Will con't to monitor for further needs as identified, as nutrition status is currently stable.       Malnutrition Status: Moderate malnutrition  Acute Illness  Findings of the 6 clinical characteristics of malnutrition:  Energy Intake:  No decrease in energy intake  Weight Loss:  Mild weight loss (6.5% in 3 months)     Body Fat Loss:  Moderate body fat loss Buccal region, Orbital   Muscle Mass Loss:  Moderate muscle mass loss Temples (temporalis)  Fluid Accumulation:  No fluid accumulation     Strength:  Not Performed      NUTRITION DIAGNOSIS   Moderate malnutrition, in context of acute illness or injury related to acute injury/trauma as evidenced by criteria as identified in malnutrition assessment  Increased nutrient needs related to increase demand for energy/nutrients as evidenced by wounds    Goals: PO intake 50% or greater     NUTRITION RELATED FINDINGS  Objective: LBM 4/3; no edema noted; Na 131, gluc 142  Wounds: Open Wounds    CURRENT NUTRITION THERAPIES  ADULT DIET; Regular; 4 carb choices (60 gm/meal)  ADULT ORAL NUTRITION SUPPLEMENT; Breakfast, Dinner; Diabetic Oral Supplement       PO Intake: 51-75%, %   PO Supplement Intake:%    ANTHROPOMETRICS  Current Height: 190.5 cm (6' 3\")  Current Weight - Scale: 72.5 kg (159 lb 13.3 oz)      COMPARATIVE STANDARDS  Total Energy Requirements (kcals/day): 2146     Protein (g):  86- 144g       Fluid (mL/day):  1 ml/kcal    EDUCATION  Education/Counseling not indicated     The patient will be

## 2025-04-03 NOTE — CONSULTS
[facility] Orthopedic Surgery  Consult Note    Patient: Danny Rock  Admit Date: 3/30/2025  Requesting Physician: Tony Valverde MD  Room: [facility]-[address] [place], [place] [address]    Chief complaint: Iliopsoas hematoma    HPI: Danny Rock is a 75 y.o. male who presented to [facility] from [facility] on 3/31.   He was found to have Enterococcus faecalis bacteremia and multilevel lumbar discitis and osteomyelitis with small adjacent abscesses in right psoas.  Seen by neurosurgery and no surgical intervention recommended. His Hgb dropped from 11->8.3 since being admitted to ARU on 3/31.   ASA/Eliquis now held, last dose 9pm 4/2.    Describes pain in right later hip and into thigh of moderate intensity and of aching nature since 2 days ago which is relieved by exercise. Denies new numbness/tingling.       Independent imaging review of the pelvis via CT demonstrated: right psoas fluid collection - abscess vs hematoma.     Patient is in ARU but typically lives in private home with spouse.     Relevant notes, labs and other tests reviewed.  Medical History:  Past Medical History:   Diagnosis Date    Atrial fibrillation (HCC)     CAD (coronary artery disease)     Cancer of kidney (HCC)     Immunotherapy: just finished treatment 11-22-23    Dependence on renal dialysis     dialysis Tues, Thurs Sat    Diabetes mellitus (HCC)     Hyperlipidemia     Hypertension     Prostate cancer (HCC)     radiation    Right renal mass     dialysis    Systolic CHF (HCC)      Past Surgical History:   Procedure Laterality Date    CATARACT REMOVAL Bilateral     ~ 2006 with lens implanted    CHOLECYSTECTOMY      CT GUIDED CHEST TUBE  11/18/2024    CT GUIDED CHEST TUBE 11/18/2024 [facility] CT SCAN    CT NEEDLE BIOPSY LIVER PERCUTANEOUS  08/01/2024    CT NEEDLE BIOPSY LIVER PERCUTANEOUS 8/1/2024 [facility] CT SCAN    CT NEEDLE BIOPSY LIVER PERCUTANEOUS  11/18/2024    CT NEEDLE BIOPSY LIVER PERCUTANEOUS 11/18/2024 [facility] CT SCAN    EYE SURGERY Bilateral 04/19/2018    Cataract     linezolid  600 mg Oral 2 times per day    [Held by provider] apixaban  5 mg Oral BID    [Held by provider] aspirin  81 mg Oral Daily    insulin lispro  0-8 Units SubCUTAneous 4x Daily AC & HS    methocarbamol  750 mg Oral 4x Daily    pantoprazole  40 mg Oral QAM AC    rosuvastatin  10 mg Oral Nightly    sevelamer  800 mg Oral TID WC    polyethylene glycol  17 g Oral Daily     Continuous:  PRN:traZODone, sennosides-docusate sodium, ondansetron, lidocaine, heparin (porcine), heparin (porcine), oxyCODONE-acetaminophen **OR** oxyCODONE-acetaminophen, acetaminophen, bisacodyl    Allergies:   Allergies   Allergen Reactions    Amoxicillin Other (See Comments)     Urethritis, rash    Bisoprolol-Hydrochlorothiazide Other (See Comments)     G.I. rash    Cisapride Other (See Comments)     diarrhea    Penicillins      Unsure of reaction  Tolerated ceftriaxone 1/29/25  Tolerated Ancef 12/31/24    Pioglitazone Other (See Comments)     G.I upset (long time ago)       Review of Systems:  Constitutional: Negative for fever, chills, fatigue.   Skin:  Negative for pruritis, rash  Eyes: Negative for photophobia and visual disturbance.   ENT:  Negative for rhinorrhea, epistaxis, sore throat  Respiratory:  Negative for cough and shortness of breath.   Cardiovascular: Negative for chest pain.   Gastrointestinal: Negative for nausea, vomiting, diarrhea.  Genitourinary: Negative for dysuria and difficulty urinating.   Neurological: Negative for confusion, dysarthria, tremors, seizures.   Psychiatric:  Negative for depression or anxiety  Musculoskeletal:  Positive for right thigh pain    Objective:  Vitals:    04/03/25 0830   BP: 121/71   Pulse: 68   Resp: 16   Temp: 97.9 °F (36.6 °C)   SpO2: 97%      Physical Examination:  GENERAL: No apparent distress, well-nourished  SKIN:  Warm and dry  EYES: Nonicteric.   ENT: Mucous membranes moist  HEAD: Normocephalic, atraumatic  RESPIRATORY: Resp easy and unlabored  CARDIOVASCULAR: Regular rate and

## 2025-04-03 NOTE — PROGRESS NOTES
Resp: 16 16  16   Temp: 98.1 °F (36.7 °C) 97.9 °F (36.6 °C)     TempSrc: Oral      SpO2: 96% 97%     Weight:       Height:   1.905 m (6' 3\")        Physical Exam  Vitals and nursing note reviewed.   Constitutional:       Appearance: He is well-developed. He is not diaphoretic.      Comments: The patient was seen earlier today.   HENT:      Head: Normocephalic and atraumatic.      Right Ear: External ear normal. There is no impacted cerumen.      Left Ear: External ear normal. There is no impacted cerumen.      Nose: Nose normal.      Mouth/Throat:      Mouth: Mucous membranes are moist.      Pharynx: Oropharynx is clear. No oropharyngeal exudate.   Eyes:      General: No scleral icterus.        Right eye: No discharge.         Left eye: No discharge.      Conjunctiva/sclera: Conjunctivae normal.      Pupils: Pupils are equal, round, and reactive to light.   Neck:      Thyroid: No thyromegaly.   Cardiovascular:      Rate and Rhythm: Normal rate and regular rhythm.      Heart sounds: Normal heart sounds. No murmur heard.     No friction rub.   Pulmonary:      Effort: No respiratory distress.      Breath sounds: No stridor. No wheezing or rales.   Abdominal:      General: Bowel sounds are normal.      Palpations: Abdomen is soft.      Tenderness: There is no abdominal tenderness. There is no guarding or rebound.   Musculoskeletal:         General: Tenderness (right groin) present. No swelling or deformity. Normal range of motion.      Cervical back: Normal range of motion and neck supple.      Right lower leg: No edema.      Left lower leg: No edema.   Lymphadenopathy:      Cervical: No cervical adenopathy.   Skin:     General: Skin is warm and dry.      Coloration: Skin is not jaundiced.      Findings: No bruising, erythema or rash.   Neurological:      General: No focal deficit present.      Mental Status: He is alert and oriented to person, place, and time. Mental status is at baseline.      Motor: No abnormal

## 2025-04-03 NOTE — PROGRESS NOTES
[facility] - Inpatient Rehabilitation Department   Phone: [phone]    Speech Therapy   Daily Treatment Note     Patient: Danny Rock   : 1950   MRN: [id number]   Date of Service:  4/3/2025  Admitting Diagnosis: Debility  Current Admission Summary: Danny Rock is a 75 y.o. male with PMHx notable for HTN, HLD, CAD, HFrEF, atrial fibrillation, renal cancer s/p right nephrectomy, ESRD on HD who initially presented to [facility] on 3/22 with low back pain and had MRI showing severe multilevel foraminal stenosis worst at L5-S1 prompting transfer to [facility] for neurosurgical evaluation.  Repeat MRI C/T/L-spine on 3/23 showed multilevel disc/endplate enhancement compatible with discitis and osteomyelitis at L2-L3, L3-L4 and L4-L5 with adjacent paravertebral edema, and small right psoas muscle abscess.  Blood cultures resulted positive for Enterococcus faecalis.  He was started on IV vancomycin and gentamicin on 3/25, with plan at discharge to continue IV vancomycin 3 times a week after dialysis through 2025. NSGY evaluated while inpatient, recommended no surgical intervention at this time.  Nephrology consulted for HD needs, had old HD access removed and TDC placed on 3/28.   Past Medical History:  has a past medical history of Atrial fibrillation (HCC), CAD (coronary artery disease), Cancer of kidney (HCC), Dependence on renal dialysis, Diabetes mellitus (HCC), Hyperlipidemia, Hypertension, Prostate cancer (HCC), Right renal mass, and Systolic CHF (HCC).  Past Surgical History:  has a past surgical history that includes hernia repair; knee surgery; Cholecystectomy; Nasal septum surgery; eye surgery (Bilateral, 2018); Kidney removal (Right, 2022); Cataract removal (Bilateral); IR TUNNELED CVC PLACE WO SQ PORT/PUMP > 5 YEARS (2024); CT NEEDLE BIOPSY LIVER PERCUTANEOUS (2024); other surgical history (2024); CT NEEDLE BIOPSY LIVER PERCUTANEOUS (2024); CT

## 2025-04-04 ENCOUNTER — HOSPITAL ENCOUNTER (INPATIENT)
Age: 75
LOS: 4 days | Discharge: NURSING, REHAB OR LONG-TERM CARE | DRG: 813 | End: 2025-04-08
Attending: HOSPITALIST | Admitting: HOSPITALIST
Payer: MEDICARE

## 2025-04-04 VITALS
DIASTOLIC BLOOD PRESSURE: 70 MMHG | SYSTOLIC BLOOD PRESSURE: 115 MMHG | TEMPERATURE: 97.7 F | BODY MASS INDEX: 20.17 KG/M2 | HEIGHT: 75 IN | HEART RATE: 69 BPM | RESPIRATION RATE: 16 BRPM | WEIGHT: 162.26 LBS | OXYGEN SATURATION: 100 %

## 2025-04-04 DIAGNOSIS — M46.26 OSTEOMYELITIS OF LUMBAR SPINE (HCC): Primary | ICD-10-CM

## 2025-04-04 DIAGNOSIS — S30.1XXA PSOAS HEMATOMA, LEFT, SECONDARY TO ANTICOAGULANT THERAPY, INITIAL ENCOUNTER: ICD-10-CM

## 2025-04-04 PROBLEM — S30.1XXS: Status: ACTIVE | Noted: 2025-04-04

## 2025-04-04 LAB
BACTERIA BLD CULT ORG #2: NORMAL
BACTERIA BLD CULT: NORMAL
GLUCOSE BLD-MCNC: 155 MG/DL (ref 70–99)
GLUCOSE BLD-MCNC: 192 MG/DL (ref 70–99)
GLUCOSE BLD-MCNC: 251 MG/DL (ref 70–99)
GLUCOSE BLD-MCNC: 96 MG/DL (ref 70–99)
GLUCOSE BLD-MCNC: 98 MG/DL (ref 70–99)
HCT VFR BLD AUTO: 22.2 % (ref 40.5–52.5)
HGB BLD-MCNC: 7.4 G/DL (ref 13.5–17.5)
PERFORMED ON: ABNORMAL
PERFORMED ON: NORMAL
PERFORMED ON: NORMAL

## 2025-04-04 PROCEDURE — 97535 SELF CARE MNGMENT TRAINING: CPT

## 2025-04-04 PROCEDURE — 6370000000 HC RX 637 (ALT 250 FOR IP): Performed by: STUDENT IN AN ORGANIZED HEALTH CARE EDUCATION/TRAINING PROGRAM

## 2025-04-04 PROCEDURE — 36415 COLL VENOUS BLD VENIPUNCTURE: CPT

## 2025-04-04 PROCEDURE — 85014 HEMATOCRIT: CPT

## 2025-04-04 PROCEDURE — 6370000000 HC RX 637 (ALT 250 FOR IP): Performed by: INTERNAL MEDICINE

## 2025-04-04 PROCEDURE — 97130 THER IVNTJ EA ADDL 15 MIN: CPT

## 2025-04-04 PROCEDURE — 97129 THER IVNTJ 1ST 15 MIN: CPT

## 2025-04-04 PROCEDURE — 97530 THERAPEUTIC ACTIVITIES: CPT

## 2025-04-04 PROCEDURE — 85018 HEMOGLOBIN: CPT

## 2025-04-04 PROCEDURE — 2500000003 HC RX 250 WO HCPCS: Performed by: STUDENT IN AN ORGANIZED HEALTH CARE EDUCATION/TRAINING PROGRAM

## 2025-04-04 PROCEDURE — 2060000000 HC ICU INTERMEDIATE R&B

## 2025-04-04 PROCEDURE — 6370000000 HC RX 637 (ALT 250 FOR IP): Performed by: HOSPITALIST

## 2025-04-04 PROCEDURE — 99232 SBSQ HOSP IP/OBS MODERATE 35: CPT | Performed by: INTERNAL MEDICINE

## 2025-04-04 PROCEDURE — 97116 GAIT TRAINING THERAPY: CPT

## 2025-04-04 RX ORDER — PANTOPRAZOLE SODIUM 40 MG/1
40 TABLET, DELAYED RELEASE ORAL
Status: CANCELLED | OUTPATIENT
Start: 2025-04-05

## 2025-04-04 RX ORDER — METOPROLOL TARTRATE 25 MG/1
25 TABLET, FILM COATED ORAL 2 TIMES DAILY
Status: DISCONTINUED | OUTPATIENT
Start: 2025-04-04 | End: 2025-04-04

## 2025-04-04 RX ORDER — ROSUVASTATIN CALCIUM 10 MG/1
10 TABLET, COATED ORAL
Status: DISCONTINUED | OUTPATIENT
Start: 2025-04-04 | End: 2025-04-08 | Stop reason: HOSPADM

## 2025-04-04 RX ORDER — VANCOMYCIN HYDROCHLORIDE 1 G/200ML
1000 INJECTION, SOLUTION INTRAVENOUS
Status: DISCONTINUED | OUTPATIENT
Start: 2025-04-04 | End: 2025-04-04

## 2025-04-04 RX ORDER — ROSUVASTATIN CALCIUM 10 MG/1
10 TABLET, COATED ORAL NIGHTLY
Status: DISCONTINUED | OUTPATIENT
Start: 2025-04-04 | End: 2025-04-04

## 2025-04-04 RX ORDER — SEVELAMER CARBONATE 800 MG/1
800 TABLET, FILM COATED ORAL
Status: CANCELLED | OUTPATIENT
Start: 2025-04-04

## 2025-04-04 RX ORDER — LANOLIN ALCOHOL/MO/W.PET/CERES
1000 CREAM (GRAM) TOPICAL DAILY
Status: DISCONTINUED | OUTPATIENT
Start: 2025-04-05 | End: 2025-04-04

## 2025-04-04 RX ORDER — OXYCODONE AND ACETAMINOPHEN 5; 325 MG/1; MG/1
2 TABLET ORAL EVERY 4 HOURS PRN
Refills: 0 | Status: CANCELLED | OUTPATIENT
Start: 2025-04-04

## 2025-04-04 RX ORDER — GABAPENTIN 100 MG/1
100 CAPSULE ORAL
Status: DISCONTINUED | OUTPATIENT
Start: 2025-04-05 | End: 2025-04-08 | Stop reason: HOSPADM

## 2025-04-04 RX ORDER — POLYETHYLENE GLYCOL 3350 17 G/17G
17 POWDER, FOR SOLUTION ORAL DAILY
Status: DISCONTINUED | OUTPATIENT
Start: 2025-04-05 | End: 2025-04-08 | Stop reason: HOSPADM

## 2025-04-04 RX ORDER — LIDOCAINE 4 G/G
1 PATCH TOPICAL DAILY PRN
Status: DISCONTINUED | OUTPATIENT
Start: 2025-04-04 | End: 2025-04-08 | Stop reason: HOSPADM

## 2025-04-04 RX ORDER — METHOCARBAMOL 500 MG/1
750 TABLET, FILM COATED ORAL 4 TIMES DAILY
Status: DISCONTINUED | OUTPATIENT
Start: 2025-04-05 | End: 2025-04-04

## 2025-04-04 RX ORDER — ACETAMINOPHEN 325 MG/1
650 TABLET ORAL EVERY 4 HOURS PRN
Status: DISCONTINUED | OUTPATIENT
Start: 2025-04-04 | End: 2025-04-08 | Stop reason: HOSPADM

## 2025-04-04 RX ORDER — LINEZOLID 600 MG/1
600 TABLET, FILM COATED ORAL EVERY 12 HOURS SCHEDULED
Status: DISCONTINUED | OUTPATIENT
Start: 2025-04-04 | End: 2025-04-08 | Stop reason: HOSPADM

## 2025-04-04 RX ORDER — INSULIN GLARGINE 100 [IU]/ML
6 INJECTION, SOLUTION SUBCUTANEOUS DAILY
Status: DISCONTINUED | OUTPATIENT
Start: 2025-04-04 | End: 2025-04-04

## 2025-04-04 RX ORDER — HEPARIN SODIUM 1000 [USP'U]/ML
4000 INJECTION, SOLUTION INTRAVENOUS; SUBCUTANEOUS PRN
Status: CANCELLED | OUTPATIENT
Start: 2025-04-04

## 2025-04-04 RX ORDER — NEPHROCAP 1 MG
1 CAP ORAL DAILY
Status: DISCONTINUED | OUTPATIENT
Start: 2025-04-05 | End: 2025-04-08 | Stop reason: HOSPADM

## 2025-04-04 RX ORDER — ZINC SULFATE 50(220)MG
50 CAPSULE ORAL DAILY
Status: DISCONTINUED | OUTPATIENT
Start: 2025-04-04 | End: 2025-04-08 | Stop reason: HOSPADM

## 2025-04-04 RX ORDER — LINEZOLID 600 MG/1
600 TABLET, FILM COATED ORAL EVERY 12 HOURS SCHEDULED
Status: CANCELLED | OUTPATIENT
Start: 2025-04-04

## 2025-04-04 RX ORDER — HEPARIN SODIUM 1000 [USP'U]/ML
3600 INJECTION, SOLUTION INTRAVENOUS; SUBCUTANEOUS PRN
Status: CANCELLED | OUTPATIENT
Start: 2025-04-04

## 2025-04-04 RX ORDER — SODIUM CHLORIDE 0.9 % (FLUSH) 0.9 %
5-40 SYRINGE (ML) INJECTION EVERY 12 HOURS
Status: CANCELLED | OUTPATIENT
Start: 2025-04-04

## 2025-04-04 RX ORDER — ONDANSETRON 4 MG/1
8 TABLET, ORALLY DISINTEGRATING ORAL EVERY 8 HOURS PRN
Status: DISCONTINUED | OUTPATIENT
Start: 2025-04-04 | End: 2025-04-08 | Stop reason: HOSPADM

## 2025-04-04 RX ORDER — PANTOPRAZOLE SODIUM 40 MG/1
40 TABLET, DELAYED RELEASE ORAL
Status: DISCONTINUED | OUTPATIENT
Start: 2025-04-05 | End: 2025-04-04

## 2025-04-04 RX ORDER — GABAPENTIN 100 MG/1
100 CAPSULE ORAL
Status: CANCELLED | OUTPATIENT
Start: 2025-04-05

## 2025-04-04 RX ORDER — SODIUM CHLORIDE 0.9 % (FLUSH) 0.9 %
5-40 SYRINGE (ML) INJECTION EVERY 12 HOURS
Status: DISCONTINUED | OUTPATIENT
Start: 2025-04-04 | End: 2025-04-08 | Stop reason: HOSPADM

## 2025-04-04 RX ORDER — LANOLIN ALCOHOL/MO/W.PET/CERES
1000 CREAM (GRAM) TOPICAL DAILY
Status: DISCONTINUED | OUTPATIENT
Start: 2025-04-04 | End: 2025-04-08 | Stop reason: HOSPADM

## 2025-04-04 RX ORDER — SENNA AND DOCUSATE SODIUM 50; 8.6 MG/1; MG/1
2 TABLET, FILM COATED ORAL 2 TIMES DAILY PRN
Status: CANCELLED | OUTPATIENT
Start: 2025-04-04

## 2025-04-04 RX ORDER — ACETAMINOPHEN 325 MG/1
650 TABLET ORAL EVERY 4 HOURS PRN
Status: CANCELLED | OUTPATIENT
Start: 2025-04-04

## 2025-04-04 RX ORDER — INSULIN GLARGINE 100 [IU]/ML
6 INJECTION, SOLUTION SUBCUTANEOUS DAILY
Status: CANCELLED | OUTPATIENT
Start: 2025-04-05

## 2025-04-04 RX ORDER — SENNA AND DOCUSATE SODIUM 50; 8.6 MG/1; MG/1
2 TABLET, FILM COATED ORAL 2 TIMES DAILY PRN
Status: DISCONTINUED | OUTPATIENT
Start: 2025-04-04 | End: 2025-04-08 | Stop reason: HOSPADM

## 2025-04-04 RX ORDER — TRAZODONE HYDROCHLORIDE 50 MG/1
50 TABLET ORAL NIGHTLY PRN
Status: DISCONTINUED | OUTPATIENT
Start: 2025-04-04 | End: 2025-04-08 | Stop reason: HOSPADM

## 2025-04-04 RX ORDER — METHOCARBAMOL 750 MG/1
750 TABLET, FILM COATED ORAL 4 TIMES DAILY
Status: CANCELLED | OUTPATIENT
Start: 2025-04-04

## 2025-04-04 RX ORDER — INSULIN LISPRO 100 [IU]/ML
0-8 INJECTION, SOLUTION INTRAVENOUS; SUBCUTANEOUS
Status: CANCELLED | OUTPATIENT
Start: 2025-04-04

## 2025-04-04 RX ORDER — BISACODYL 5 MG/1
5 TABLET, DELAYED RELEASE ORAL DAILY PRN
Status: DISCONTINUED | OUTPATIENT
Start: 2025-04-04 | End: 2025-04-08 | Stop reason: HOSPADM

## 2025-04-04 RX ORDER — LIDOCAINE 4 G/G
1 PATCH TOPICAL DAILY PRN
Status: CANCELLED | OUTPATIENT
Start: 2025-04-04

## 2025-04-04 RX ORDER — NEPHROCAP 1 MG
1 CAP ORAL DAILY
Status: CANCELLED | OUTPATIENT
Start: 2025-04-05

## 2025-04-04 RX ORDER — METOPROLOL TARTRATE 50 MG
50 TABLET ORAL
Status: DISCONTINUED | OUTPATIENT
Start: 2025-04-04 | End: 2025-04-08 | Stop reason: HOSPADM

## 2025-04-04 RX ORDER — ROSUVASTATIN CALCIUM 10 MG/1
10 TABLET, COATED ORAL NIGHTLY
Status: CANCELLED | OUTPATIENT
Start: 2025-04-04

## 2025-04-04 RX ORDER — TRAZODONE HYDROCHLORIDE 50 MG/1
50 TABLET ORAL NIGHTLY PRN
Status: CANCELLED | OUTPATIENT
Start: 2025-04-04

## 2025-04-04 RX ORDER — INSULIN LISPRO 100 [IU]/ML
0-8 INJECTION, SOLUTION INTRAVENOUS; SUBCUTANEOUS
Status: DISCONTINUED | OUTPATIENT
Start: 2025-04-04 | End: 2025-04-08 | Stop reason: HOSPADM

## 2025-04-04 RX ORDER — METHOCARBAMOL 500 MG/1
750 TABLET, FILM COATED ORAL 4 TIMES DAILY
Status: DISCONTINUED | OUTPATIENT
Start: 2025-04-04 | End: 2025-04-08 | Stop reason: HOSPADM

## 2025-04-04 RX ORDER — SEVELAMER CARBONATE 800 MG/1
800 TABLET, FILM COATED ORAL
Status: DISCONTINUED | OUTPATIENT
Start: 2025-04-05 | End: 2025-04-04

## 2025-04-04 RX ORDER — OXYCODONE AND ACETAMINOPHEN 5; 325 MG/1; MG/1
2 TABLET ORAL EVERY 4 HOURS PRN
Refills: 0 | Status: DISCONTINUED | OUTPATIENT
Start: 2025-04-04 | End: 2025-04-08 | Stop reason: HOSPADM

## 2025-04-04 RX ORDER — HEPARIN SODIUM 1000 [USP'U]/ML
3600 INJECTION, SOLUTION INTRAVENOUS; SUBCUTANEOUS PRN
Status: DISCONTINUED | OUTPATIENT
Start: 2025-04-04 | End: 2025-04-04

## 2025-04-04 RX ORDER — BISACODYL 5 MG/1
5 TABLET, DELAYED RELEASE ORAL DAILY PRN
Status: CANCELLED | OUTPATIENT
Start: 2025-04-04

## 2025-04-04 RX ORDER — METOPROLOL TARTRATE 25 MG/1
25 TABLET, FILM COATED ORAL 2 TIMES DAILY
Status: CANCELLED | OUTPATIENT
Start: 2025-04-04

## 2025-04-04 RX ORDER — SEVELAMER CARBONATE 800 MG/1
800 TABLET, FILM COATED ORAL
Status: DISCONTINUED | OUTPATIENT
Start: 2025-04-04 | End: 2025-04-08 | Stop reason: HOSPADM

## 2025-04-04 RX ORDER — PANTOPRAZOLE SODIUM 40 MG/1
40 TABLET, DELAYED RELEASE ORAL
Status: DISCONTINUED | OUTPATIENT
Start: 2025-04-05 | End: 2025-04-08 | Stop reason: HOSPADM

## 2025-04-04 RX ORDER — ONDANSETRON 4 MG/1
8 TABLET, ORALLY DISINTEGRATING ORAL EVERY 8 HOURS PRN
Status: CANCELLED | OUTPATIENT
Start: 2025-04-04

## 2025-04-04 RX ORDER — HEPARIN SODIUM 1000 [USP'U]/ML
4000 INJECTION, SOLUTION INTRAVENOUS; SUBCUTANEOUS PRN
Status: DISCONTINUED | OUTPATIENT
Start: 2025-04-04 | End: 2025-04-08 | Stop reason: HOSPADM

## 2025-04-04 RX ORDER — OXYCODONE AND ACETAMINOPHEN 5; 325 MG/1; MG/1
1 TABLET ORAL EVERY 4 HOURS PRN
Refills: 0 | Status: CANCELLED | OUTPATIENT
Start: 2025-04-04

## 2025-04-04 RX ORDER — POLYETHYLENE GLYCOL 3350 17 G/17G
17 POWDER, FOR SOLUTION ORAL DAILY
Status: CANCELLED | OUTPATIENT
Start: 2025-04-05

## 2025-04-04 RX ORDER — LANOLIN ALCOHOL/MO/W.PET/CERES
1000 CREAM (GRAM) TOPICAL DAILY
Status: CANCELLED | OUTPATIENT
Start: 2025-04-05

## 2025-04-04 RX ORDER — ASCORBIC ACID 500 MG
500 TABLET ORAL DAILY
Status: DISCONTINUED | OUTPATIENT
Start: 2025-04-04 | End: 2025-04-08 | Stop reason: HOSPADM

## 2025-04-04 RX ORDER — OXYCODONE AND ACETAMINOPHEN 5; 325 MG/1; MG/1
1 TABLET ORAL EVERY 4 HOURS PRN
Refills: 0 | Status: DISCONTINUED | OUTPATIENT
Start: 2025-04-04 | End: 2025-04-08 | Stop reason: HOSPADM

## 2025-04-04 RX ORDER — INSULIN GLARGINE 100 [IU]/ML
6 INJECTION, SOLUTION SUBCUTANEOUS DAILY
Status: DISCONTINUED | OUTPATIENT
Start: 2025-04-05 | End: 2025-04-04

## 2025-04-04 RX ADMIN — INSULIN LISPRO 3 UNITS: 100 INJECTION, SOLUTION INTRAVENOUS; SUBCUTANEOUS at 08:48

## 2025-04-04 RX ADMIN — ROSUVASTATIN 10 MG: 10 TABLET, FILM COATED ORAL at 22:21

## 2025-04-04 RX ADMIN — OXYCODONE HYDROCHLORIDE AND ACETAMINOPHEN 500 MG: 500 TABLET ORAL at 22:30

## 2025-04-04 RX ADMIN — PANTOPRAZOLE SODIUM 40 MG: 40 TABLET, DELAYED RELEASE ORAL at 05:05

## 2025-04-04 RX ADMIN — METHOCARBAMOL 750 MG: 750 TABLET ORAL at 10:25

## 2025-04-04 RX ADMIN — SEVELAMER CARBONATE 800 MG: 800 TABLET, FILM COATED ORAL at 12:27

## 2025-04-04 RX ADMIN — METOPROLOL TARTRATE 50 MG: 50 TABLET, FILM COATED ORAL at 22:22

## 2025-04-04 RX ADMIN — Medication 1 MG: at 10:35

## 2025-04-04 RX ADMIN — LINEZOLID 600 MG: 600 TABLET, FILM COATED ORAL at 10:26

## 2025-04-04 RX ADMIN — OXYCODONE HYDROCHLORIDE AND ACETAMINOPHEN 2 TABLET: 5; 325 TABLET ORAL at 22:21

## 2025-04-04 RX ADMIN — SEVELAMER CARBONATE 800 MG: 800 TABLET, FILM COATED ORAL at 08:48

## 2025-04-04 RX ADMIN — METOPROLOL TARTRATE 25 MG: 25 TABLET, FILM COATED ORAL at 10:26

## 2025-04-04 RX ADMIN — LINEZOLID 600 MG: 600 TABLET, FILM COATED ORAL at 22:22

## 2025-04-04 RX ADMIN — MELATONIN TAB 3 MG 3 MG: 3 TAB at 22:30

## 2025-04-04 RX ADMIN — CYANOCOBALAMIN TAB 1000 MCG 1000 MCG: 1000 TAB at 22:30

## 2025-04-04 RX ADMIN — INSULIN GLARGINE 6 UNITS: 100 INJECTION, SOLUTION SUBCUTANEOUS at 11:03

## 2025-04-04 RX ADMIN — METHOCARBAMOL 750 MG: 750 TABLET ORAL at 14:58

## 2025-04-04 RX ADMIN — CHOLECALCIFEROL TAB 125 MCG (5000 UNIT) 5000 UNITS: 125 TAB at 10:26

## 2025-04-04 RX ADMIN — INSULIN LISPRO 2 UNITS: 100 INJECTION, SOLUTION INTRAVENOUS; SUBCUTANEOUS at 22:23

## 2025-04-04 RX ADMIN — OXYCODONE HYDROCHLORIDE AND ACETAMINOPHEN 2 TABLET: 5; 325 TABLET ORAL at 03:11

## 2025-04-04 RX ADMIN — SODIUM CHLORIDE, PRESERVATIVE FREE 10 ML: 5 INJECTION INTRAVENOUS at 22:31

## 2025-04-04 RX ADMIN — SEVELAMER CARBONATE 800 MG: 800 TABLET, FILM COATED ORAL at 22:45

## 2025-04-04 RX ADMIN — Medication 10 ML: at 10:29

## 2025-04-04 RX ADMIN — CYANOCOBALAMIN TAB 1000 MCG 1000 MCG: 1000 TAB at 10:26

## 2025-04-04 RX ADMIN — METHOCARBAMOL 750 MG: 500 TABLET ORAL at 22:21

## 2025-04-04 RX ADMIN — INSULIN LISPRO 4 UNITS: 100 INJECTION, SOLUTION INTRAVENOUS; SUBCUTANEOUS at 12:25

## 2025-04-04 RX ADMIN — TRAZODONE HYDROCHLORIDE 50 MG: 50 TABLET ORAL at 22:30

## 2025-04-04 ASSESSMENT — PAIN SCALES - GENERAL
PAINLEVEL_OUTOF10: 7
PAINLEVEL_OUTOF10: 7
PAINLEVEL_OUTOF10: 4
PAINLEVEL_OUTOF10: 4
PAINLEVEL_OUTOF10: 6

## 2025-04-04 ASSESSMENT — PAIN DESCRIPTION - ORIENTATION
ORIENTATION: LOWER
ORIENTATION: LOWER
ORIENTATION: RIGHT
ORIENTATION: LOWER
ORIENTATION_2: LEFT;RIGHT

## 2025-04-04 ASSESSMENT — PAIN DESCRIPTION - PAIN TYPE
TYPE: ACUTE PAIN
TYPE: ACUTE PAIN

## 2025-04-04 ASSESSMENT — PAIN DESCRIPTION - ONSET
ONSET: ON-GOING
ONSET: ON-GOING

## 2025-04-04 ASSESSMENT — PAIN DESCRIPTION - LOCATION
LOCATION: BACK
LOCATION_2: SHOULDER
LOCATION: HIP;BACK
LOCATION: BACK
LOCATION: HIP;LEG

## 2025-04-04 ASSESSMENT — ENCOUNTER SYMPTOMS
ABDOMINAL PAIN: 0
BACK PAIN: 0
NAUSEA: 0
RHINORRHEA: 0
DIARRHEA: 0
COLOR CHANGE: 1
SORE THROAT: 0
WHEEZING: 0
COUGH: 0
SHORTNESS OF BREATH: 0
CONSTIPATION: 0
EYE DISCHARGE: 0
SINUS PRESSURE: 0
EYE REDNESS: 0
SINUS PAIN: 0

## 2025-04-04 ASSESSMENT — PAIN DESCRIPTION - DESCRIPTORS
DESCRIPTORS: ACHING

## 2025-04-04 ASSESSMENT — PAIN - FUNCTIONAL ASSESSMENT: PAIN_FUNCTIONAL_ASSESSMENT: ACTIVITIES ARE NOT PREVENTED

## 2025-04-04 ASSESSMENT — PAIN DESCRIPTION - FREQUENCY
FREQUENCY: CONTINUOUS
FREQUENCY: CONTINUOUS

## 2025-04-04 ASSESSMENT — PAIN DESCRIPTION - INTENSITY: RATING_2: 5

## 2025-04-04 NOTE — PROGRESS NOTES
MD Gordon Frankel MD Aldo Estella, DO                 Office: [phone]                      Fax: [phone]             [facility]       NEPHROLOGY ARU PROGRESS NOTE    Subjective / interval history / medical decision making.  - No new complaints.    IMPRESSION/RECOMMENDATIONS:        #ESKD on HD TTS  -last iHD Saturday prior to admit  -TDC placed 3/28/25.  -Continue iHD TTS schedule  -next routine iHD tomorrow    #DM  -on insulin  -controlled    #HTN  -controlled  -resume home antihypertensives    #leukocytosis  -likely from recent infection    #Anemia of CKD  -hgb lower.  Follow-up.  POONAM with HD.    Stable renal status    Thank you for the consult.  We will follow this patient along the hospitalization.  Pratibha Kat MD     Reason for Consult:  ESKD management  Requesting Physician/Provider:  Tony Valverde MD     CHIEF COMPLAINT:  lumbar osteomyelitis/disciits    History obtained from records and patient.    HISTORY OF PRESENT ILLNESS:                Danny Rock  is 75 y.o. y.o. male with significant past medical history of ESKD on HD TTS who presented with lumbar osteomyelitis/disciits. Transferred from outside facility. Recent TDC placed 3/28. Last iHD prior to admit here was on Saturday. Nephrology consulted for ESKD management.    Past Medical History:     has a past medical history of Atrial fibrillation (HCC), CAD (coronary artery disease), Cancer of kidney (HCC), Dependence on renal dialysis, Diabetes mellitus (HCC), Hyperlipidemia, Hypertension, Prostate cancer (HCC), Right renal mass, and Systolic CHF (HCC).   Past Surgical History:     has a past surgical history that includes hernia repair; knee surgery; Cholecystectomy; Nasal septum surgery; eye surgery (Bilateral, 04/19/2018); Kidney removal (Right, 07/25/2022); Cataract removal (Bilateral); IR TUNNELED CVC PLACE WO SQ PORT/PUMP > 5 YEARS (02/07/2024); CT NEEDLE BIOPSY LIVER PERCUTANEOUS (08/01/2024); other  no pallor, no cervical LAD  LUNGS:  No increased work of breathing, diminished bibasilar breaths  CARDIOVASCULAR:  regular rate and rhythm, normal S1 and S2  ABDOMEN:  No scars, normal bowel sounds, soft, non-distended, non-tender  EXTREMITIES:  Warm, dry, edema negative  NEUROLOGIC:  Awake, alert, oriented to name, place and time.      DATA:    CBC:   Lab Results   Component Value Date/Time    WBC 9.0 04/03/2025 06:18 AM    RBC 2.46 04/03/2025 06:18 AM    HGB 7.4 04/04/2025 05:58 AM    HCT 22.2 04/04/2025 05:58 AM    .3 04/03/2025 06:18 AM    MCH 33.8 04/03/2025 06:18 AM    MCHC 33.7 04/03/2025 06:18 AM    RDW 19.2 04/03/2025 06:18 AM     04/03/2025 06:18 AM    MPV 7.0 04/03/2025 06:18 AM     BMP:   Lab Results   Component Value Date/Time     04/03/2025 06:18 AM    K 4.7 04/03/2025 06:18 AM    K 4.1 03/31/2025 06:19 AM    CL 98 04/03/2025 06:18 AM    CO2 24 04/03/2025 06:18 AM    BUN 44 04/03/2025 06:18 AM    CREATININE 2.5 04/03/2025 06:18 AM    CALCIUM 9.3 04/03/2025 06:18 AM    GFRAA 40 07/26/2022 05:19 AM    GFRAA >60 03/21/2013 10:55 AM    LABGLOM 26 04/03/2025 06:18 AM    LABGLOM 26 04/10/2024 09:47 AM    GLUCOSE 142 04/03/2025 06:18 AM   Magnesium:    Lab Results   Component Value Date/Time    MG 1.70 02/09/2024 05:32 AM   Phosphorus:    Lab Results   Component Value Date/Time    PHOS 2.9 04/03/2025 06:18 AM

## 2025-04-04 NOTE — PROGRESS NOTES
Physical Therapy    [facility] - Inpatient Rehabilitation Department   Phone: [phone]    Physical Therapy    [] Initial Evaluation            [x] Daily Treatment Note         [] Discharge Summary      Patient: Danny Rock   : 1950   MRN: [id number]   Date of Service:  2025  Admitting Diagnosis: Debility  Current Admission Summary: Per chart review:    Danny Rock is a 75 y.o. male with PMHx notable for HTN, HLD, CAD, HFrEF, atrial fibrillation, renal cancer s/p right nephrectomy, ESRD on HD who initially presented to [facility] on 3/22 with low back pain and had MRI showing severe multilevel foraminal stenosis worst at L5-S1 prompting transfer to [facility] for neurosurgical evaluation.  Repeat MRI C/T/L-spine on 3/23 showed multilevel disc/endplate enhancement compatible with discitis and osteomyelitis at L2-L3, L3-L4 and L4-L5 with adjacent paravertebral edema, and small right psoas muscle abscess.  Blood cultures resulted positive for Enterococcus faecalis.  He was started on IV vancomycin and gentamicin on 3/25, with plan at discharge to continue IV vancomycin 3 times a week after dialysis through 2025. NSGY evaluated while inpatient, recommended no surgical intervention at this time.  Nephrology consulted for HD needs, had old HD access removed and TDC placed on 3/28     Updated : right psoas collection with foci of active extravasation on CT performed 25     Past Medical History:  has a past medical history of Atrial fibrillation (HCC), CAD (coronary artery disease), Cancer of kidney (HCC), Dependence on renal dialysis, Diabetes mellitus (HCC), Hyperlipidemia, Hypertension, Prostate cancer (HCC), Right renal mass, and Systolic CHF (HCC).  Past Surgical History:  has a past surgical history that includes hernia repair; knee surgery; Cholecystectomy; Nasal septum surgery; eye surgery (Bilateral, 2018); Kidney removal (Right, 2022); Cataract removal  independent   Patient will complete transfers at supervision   Patient will ambulate 150 ft with use of LRAD at supervision  Patient will ascend/descend 12 stairs with (B) handrail at contact guard assistance  Patient will complete car transfer at contact guard assistance    Above goals reviewed on 4/4/2025.  All goals are ongoing at this time unless indicated above.      Therapy Session Time      Individual Group Co-treatment   Time In 0920       Time Out 1004       Minutes 44             Timed Code Treatment Minutes:  44    Total Treatment Minutes:  44     Electronically Signed By: Clair Gonzalez, PT  Clair Gonzalez PT, DPT [id number]

## 2025-04-04 NOTE — H&P
left clavicle to the venotomy site was infiltrated with 2% lidocaine. A 27 cm tip-to-cuff Duraflow hemodialysis catheter was then tunneled from below the left clavicle to the venotomy site. A guidewire was advanced through the micropuncture sheath and directed into the inferior vena cava under direct fluoroscopy. A peel away sheath and dilator were placed over the wire into the internal jugular vein. The dialysis catheter was then advanced through the sheath which was subsequently removed. The tip of the catheter was positioned in the right atrium, confirmed by fluoroscopy and spot image. The catheter was sutured into position using 2-0 Ethilon sutures. The catheter was locked with gentamicin citrate. Dermabond was used to close the venotomy site. A dry, sterile dressing was applied. The patient tolerated the procedure well there no immediate complications. Total fluoroscopy time: 2.2 minutes Cumulative Air Kerma: 15.8 mGy     1. Successful Tunneled hemodialysis catheter placement without complication. Ok to use line immediately. Electronically signed by Fawad Salas      DISCLAIMER: Please note that some or all of this record was generated using voice recognition software. Efforts were made by me to ensure accuracy, however some errors may be present due to limitations of this technology and occasionally words are not transcribed correctly. If there are any questions about the content of this document, please contact the author.    Electronically signed by Julian Coronado MD on 4/4/2025 at 2:59 PM

## 2025-04-04 NOTE — PROGRESS NOTES
Progress Note  Date:2025       Room:[facility]4906/4906-01  Patient Name:Danny Rock     YOB: 1950     Age:75 y.o.        Subjective    Subjective:  Pain:  He reports no pain.       Review of Systems   Skin:  Positive for color change (right hallux, no pain on nail boarder).     Objective         Vitals Last 24 Hours:  TEMPERATURE:  Temp  Av.6 °F (36.4 °C)  Min: 97.4 °F (36.3 °C)  Max: 97.7 °F (36.5 °C)  RESPIRATIONS RANGE: Resp  Av  Min: 16  Max: 16  PULSE OXIMETRY RANGE: SpO2  Av %  Min: 98 %  Max: 100 %  PULSE RANGE: Pulse  Av.5  Min: 66  Max: 69  BLOOD PRESSURE RANGE: Systolic (24hrs), Av , Min:115 , Max:132   ; Diastolic (24hrs), Av, Min:54, Max:70    I/O (24Hr):  No intake or output data in the 24 hours ending 25 1203  Objective:  Vital signs: (most recent): Blood pressure 115/70, pulse 69, temperature 97.7 °F (36.5 °C), temperature source Oral, resp. rate 16, height 1.905 m (6' 3\"), weight 73.6 kg (162 lb 4.1 oz), SpO2 100%.    Skin:  There is ecchymosis (hallux nail right, no pain).      Labs/Imaging/Diagnostics    Labs:  CBC:  Recent Labs     25  0618 25  0558   WBC 9.0  --    RBC 2.46*  --    HGB 8.3* 7.4*   HCT 24.7* 22.2*   .3*  --    RDW 19.2*  --      --      CHEMISTRIES:  Recent Labs     25  0618   *   K 4.7   CL 98*   CO2 24   BUN 44*   CREATININE 2.5*   GLUCOSE 142*   PHOS 2.9     PT/INR:No results for input(s): \"PROTIME\", \"INR\" in the last 72 hours.  APTT:No results for input(s): \"APTT\" in the last 72 hours.  LIVER PROFILE:No results for input(s): \"AST\", \"ALT\", \"BILIDIR\", \"BILITOT\", \"ALKPHOS\" in the last 72 hours.    Imaging Last 24 Hours:  CT PELVIS W CONTRAST Additional Contrast? None  Result Date: 2025  EXAMINATION: CT OF THE PELVIS WITH CONTRAST 2025 2:01 pm TECHNIQUE: CT of the pelvis was performed with the administration of intravenous contrast. Multiplanar reformatted images are provided for

## 2025-04-04 NOTE — PROGRESS NOTES
The patient was educated in detail about the side-effects of various antibiotics and things to watch for like new rashes, lip swelling, severe reaction, worsening diarrhea, break through fever etc.  Discussed patient's condition and what to expect. All of the patient's questions were addressed in a satisfactory manner and patient verbalized understanding all instructions.      Level of complexity of visit and medical decision making: High     Risk of Complications/Morbidity: High     Illness(es)/ Infection present that pose threat to life/bodily function.   There is potential for severe exacerbation of infection/side effects of treatment.  Therapy requires intensive monitoring for antimicrobial agent toxicity.   I did an In-depth patient chart review that entails going back  in time and assessing the complete breadth of all health care interactions, with higher-level synthesis for the patient's complex diagnoses.  Counseled patients, family members, and caregivers regarding infection prevention antimicrobial stewardship and resistance for the patient.  Engaging in complex medical decision-making associated with antimicrobial prescribing including considerations such as antimicrobial resistance patterns, hospital antibiogram, recent antibiotic exposures, interactions/complications from comorbidities including concurrent infections, public health considerations to minimize development of antimicrobial resistance  Developing, following, and supervising specialized, individualized infection control protocols for an individual patient based on their diagnosis and risks in order to reduce risk of disease transmission.  Coordinating with staff regarding infection prevention and control measures to enable healthcare facility staff to safely care for patient.  Managing infection prevention and treatment protocols associated with transitions of care for this complex patient.     Thank you for involving me in the care of    Psychiatric:         Mood and Affect: Mood normal.         Behavior: Behavior normal.         Lines and drains: All vascular access sites are healthy with no local erythema, discharge or tenderness.      Intake and output:    No intake/output data recorded.    Lab Data:   All available labs and old records have been reviewed by me.    CBC:  Recent Labs     04/03/25 0618 04/04/25  0558   WBC 9.0  --    RBC 2.46*  --    HGB 8.3* 7.4*   HCT 24.7* 22.2*     --    .3*  --    MCH 33.8  --    MCHC 33.7  --    RDW 19.2*  --         BMP:  Recent Labs     04/03/25 0618   *   K 4.7   CL 98*   CO2 24   BUN 44*   CREATININE 2.5*   CALCIUM 9.3   GLUCOSE 142*        Hepatic Function Panel:   Lab Results   Component Value Date/Time    ALKPHOS 112 03/22/2025 06:28 AM    ALT 26 03/22/2025 06:28 AM    AST 28 03/22/2025 06:28 AM    BILITOT 0.6 03/22/2025 06:28 AM    BILIDIR <0.2 03/16/2021 09:41 AM    IBILI see below 03/16/2021 09:41 AM       CPK:   Lab Results   Component Value Date    CKTOTAL 35 (L) 12/15/2023     ESR:   Lab Results   Component Value Date    SEDRATE 104 (H) 03/31/2025     CRP:   Lab Results   Component Value Date    .0 (H) 03/31/2025           Imaging:    All pertinent images and reports for the current visit were reviewed by me during this visit.  I reviewed the chest x-ray/CT scan/MRI images today and independently interpreted the findings and results today.    CT PELVIS W CONTRAST Additional Contrast? None   Final Result   1. Discitis/osteomyelitis centered to the right of midline at the L3-L4 level   with an associated approximately 4.3 abscess in the right psoas muscle.  More   inferiorly in the right iliopsoas muscle, enlargement and heterogeneous   increased attenuation is consistent with an intramuscular hematoma measuring   up to 8.7 cm with a focus of active extravasation suggesting active bleeding.   2. Persistent subcutaneous fluid collection in the left inguinal region

## 2025-04-04 NOTE — PROGRESS NOTES
Physician Progress Note      PATIENT:               MARCOS TORRES  CSN #:                  [id number]  :                       1950  ADMIT DATE:       3/30/2025 4:53 PM  DISCH DATE:  RESPONDING  PROVIDER #:        Garret Ponce MD          QUERY TEXT:    Based on your medical judgment, please clarify these findings and document if   any of the following are being evaluated and/or treated:    75year old male with  Paroxysmal atrial fibrillation, HTN, CHF and DM2.   Patient on Eliquis.  Radiology note of spontaneous hematoma    The clinical indicators include:  Options provided:  -- Secondary hypercoagulable state in a patient with atrial fibrillation  -- Other - I will add my own diagnosis  -- Disagree - Not applicable / Not valid  -- Disagree - Clinically unable to determine / Unknown  -- Refer to Clinical Documentation Reviewer    PROVIDER RESPONSE TEXT:    Provider disagreed with this query.    Query created by: Roro Winslow on 2025 3:25 PM      Electronically signed by:  Garret Ponce MD 2025 3:55 PM

## 2025-04-04 NOTE — DISCHARGE INSTR - COC
Continuity of Care Form    Patient Name: Danny Rock   :  1950  MRN:  [id number]    Admit date:  3/30/2025  Discharge date:  ***    Code Status Order: Full Code   Advance Directives:     Admitting Physician:  Julian Coronado MD  PCP: Yesi Rosa, JANET - CNP    Discharging Nurse: ***  Discharging Hospital Unit/Room#: ARU-4906/4906-01  Discharging Unit Phone Number: ***    Emergency Contact:   Extended Emergency Contact Information  Primary Emergency Contact: ShwetaAmanda  Address: [address] [place], [place] [address]           [place] [address] [facility]  Home Phone: [phone]  Mobile Phone: [phone]  Relation: Spouse    Past Surgical History:  Past Surgical History:   Procedure Laterality Date    CATARACT REMOVAL Bilateral     ~  with lens implanted    CHOLECYSTECTOMY      CT GUIDED CHEST TUBE  2024    CT GUIDED CHEST TUBE 2024 [facility] CT SCAN    CT NEEDLE BIOPSY LIVER PERCUTANEOUS  2024    CT NEEDLE BIOPSY LIVER PERCUTANEOUS 2024 [facility] CT SCAN    CT NEEDLE BIOPSY LIVER PERCUTANEOUS  2024    CT NEEDLE BIOPSY LIVER PERCUTANEOUS 2024 [facility] CT SCAN    EYE SURGERY Bilateral 2018    Cataract    HERNIA REPAIR      HERNIA REPAIR Left 2024    OPEN LEFT INGUINAL HERNIA REPAIR WITH MESH performed by Camacho Chowdhury MD at [facility] OR    IR TUNNELED CVC PLACE WO SQ PORT/PUMP > 5 YEARS  2024    IR TUNNELED CATHETER PLACEMENT GREATER THAN 5 YEARS 2024 Kosta Baugh MD [facility] SPECIAL PROCEDURES    IR TUNNELED CVC PLACE WO SQ PORT/PUMP > 5 YEARS  2025    IR TUNNELED CVC PLACE WO SQ PORT/PUMP > 5 YEARS 3/28/2025 [facility] CARDIAC CATH/IR/NEURO LAB    KIDNEY REMOVAL Right 2022    ROBOTIC LAPAROSCOPIC RADICAL RIGHT NEPHRECTOMY performed by Larry Olsen MD at [facility] OR    KNEE SURGERY      both knees: scope for cartilage    NASAL SEPTUM SURGERY      OTHER SURGICAL HISTORY  2024    Peritoneal dialysis catheter: removed    OTHER SURGICAL HISTORY  {[facility]:[id number]}

## 2025-04-04 NOTE — DISCHARGE SUMMARY
Physical Medicine & Rehabilitation  Discharge Summary     Patient Identification:  Danny Rock  : 1950  Admit date: 3/30/2025  Discharge date:   Attending provider: Garret Ponce MD        Primary care provider: Yesi Rosa APRN - CNP     Discharge Diagnoses:   Patient Active Problem List   Diagnosis    HLD (hyperlipidemia)    Essential hypertension    Coronary artery disease due to lipid rich plaque    Cardiac dysrhythmia    Diabetes mellitus    Paroxysmal atrial fibrillation (HCC)    Renal mass    Cerebrovascular accident (CVA) ([facility])    Recent cerebrovascular accident (CVA)    HTN (hypertension), benign    PAF (paroxysmal atrial fibrillation) ([facility])    CASTRO (acute kidney injury)    LBBB (left bundle branch block)    H/O right nephrectomy    Anemia    Acute ischemic stroke ([facility])    Overweight (BMI 25.0-29.9)    AIN (acute interstitial nephritis)    Sterile pyuria    Current chronic use of systemic steroids    H/O systemic steroid therapy    Encounter for medication counseling    Secondary hypercoagulable state    Pneumothorax    Left inguinal hernia    Abscess of right shoulder    CRP elevated    Elevated sed rate    Abscess of right arm    ESRD on hemodialysis ([facility])    Hemorrhoid    Constipation    Low back pain    Lumbar foraminal stenosis    History of prostate cancer    Central stenosis of spinal canal    Enterococcus faecalis infection    Enterococcal bacteremia    Moderate malnutrition    Debility    Acute osteomyelitis of lumbar spine (HCC)    Lumbar discitis    Iliopsoas abscess on right (HCC)       Discharge Functional Status:      Physical therapy:  Supine to Sit: Partial/moderate assistance  Sit to Supine: Partial/moderate assistance      Sit to Stand: Partial/moderate assistance  Chair/Bed to Chair Transfer: Supervision or touching assistance  Car Transfer: Substantial/maximal assistance     Ambulation 10 ft: Supervision or touching assistance  Ambulation 50 ft: Supervision or  04/03/2025    BUN 44 (H) 04/03/2025     (L) 04/03/2025    K 4.7 04/03/2025    CL 98 (L) 04/03/2025    CO2 24 04/03/2025       Lab Results   Component Value Date    WBC 9.0 04/03/2025    HGB 7.4 (L) 04/04/2025    HCT 22.2 (L) 04/04/2025    .3 (H) 04/03/2025     04/03/2025       Disposition:  Acute Hospital    Condition at Discharge:    Stable    Follow-up:  See after visit summary from hospitalization    Discharge Medications:     Medication List        ASK your doctor about these medications      Alpha-Lipoic Acid 100 MG Caps     apixaban 5 MG Tabs tablet  Commonly known as: ELIQUIS  Take 1 tablet by mouth 2 times daily     ascorbic acid 500 MG tablet  Commonly known as: VITAMIN C     aspirin 81 MG chewable tablet  Take 1 tablet by mouth daily     Cabometyx 40 MG Tabs chemo tablet  Generic drug: cabozantinib s-malate     insulin lispro (1 Unit Dial) 100 UNIT/ML Sopn  Commonly known as: HumaLOG KwikPen  Sc before meals per sliding scale     melatonin 3 MG Tabs tablet     methocarbamol 750 MG tablet  Commonly known as: ROBAXIN  Take 1 tablet by mouth 4 times daily for 10 days     metoprolol tartrate 50 MG tablet  Commonly known as: LOPRESSOR  Take 1 tablet by mouth 2 times daily     Naproxen Sodium 220 MG Caps     omeprazole 20 MG delayed release capsule  Commonly known as: PRILOSEC     oxyCODONE-acetaminophen 5-325 MG per tablet  Commonly known as: PERCOCET  Take 1 tablet by mouth every 4 hours as needed for Pain for up to 3 days. Max Daily Amount: 6 tablets  Ask about: Should I take this medication?     ashli-triston Tabs     rosuvastatin 10 MG tablet  Commonly known as: CRESTOR  Take 10 mg every other day     sevelamer 800 MG tablet  Commonly known as: RENVELA     vancomycin infusion  Commonly known as: VANCOCIN  Vancomycin 1 gm three times a week during dialysis     VITAMIN B-12 PO     vitamin D3 125 MCG (5000 UT) Tabs tablet  Commonly known as: CHOLECALCIFEROL     zinc sulfate 220 (50 Zn)

## 2025-04-04 NOTE — PROGRESS NOTES
IR Consult Note    Contacted regarding patient's downtrending hgb and findings of right psoas collection with foci of active extravasation on CT performed 4/2/25. Patient was on eliquis and aspirin which have since been held. Patient is currently in the rehab hospital with plans for readmission to [facility]. Patient is hemodynamically stable and not on pressors.    Plan/Recommendations:  1) spontaneous hematoma on Eliquis  -Agree with holding eliquis and aspirin  -Transfuse to keep hgb above 7  -No IR intervention recommended at this time. I would not drain the collection as tamponade of the collection is likely promoting hemostasis and I also would not embolize as imaging was performed 2 days earlier and he remains hemodynamically stable.  -If patient becomes clinically unstable, please contact IR emergently.    Recommendations discussed with Dr. Garret Ponce at 10:30 AM on 4/4/25.    Adin Barnett MD  4/4/2025, 10:27 AM  Interventional Radiology  [phone] ([phone])

## 2025-04-04 NOTE — PROGRESS NOTES
ARU Discharge Assessment    Transportation  \"Has lack of transportation kept you from medical appointments, meetings, work, or from getting things needed for daily living?\"Check all that apply:  [] A.  Yes, it has kept me from medical appointments or from getting my medications  [] B.  Yes, it has kept me from non-medical meetings, appointments, work, or from getting things that I need  [x] C.  No  [] X.  Patient unable to respond  [] Y.  Patient declines to respond    Provision of Current Reconciled Medication List to Subsequent Provider at Discharge  [] No, current reconciled medication list not provided to the subsequent provider.  [x] Yes, current reconciled medication list provided to the subsequent provider. (**Select route of transmission below**)   [x] Via Electronic Health Record   [] Via Health Information Exchange Organization  [] Verbal (e.g. in person, telephone, video conferencing)  [] Paper-based (e.g. fax, copies, printouts)   [] Other Methods (e.g. texting, email, CDs)    Provision of Current Reconciled Medication List to Patient at Discharge  [] No, current reconciled medication list not provided to the patient, family and/or caregiver.   [x] Yes, current reconciled medication list provided to the patient, family and/or caregiver.  (**Select route of transmission below**)   [] Via Electronic Health Record (e.g., electronic access to patient portal)   [] Via Health Information Exchange Organization  [x] Verbal (e.g. in person, telephone, video conferencing)  [x] Paper-based (e.g. fax, copies, printouts)   [] Other Methods (e.g. texting, email, CDs)    Health Literacy  \"How often do you need to have someone help you when you read instructions, pamphlets, or other written material from your doctor or pharmacy?\"  []  0.  Never  []  1.  Rarely  [x]  2.  Sometimes  []  3.  Often  []  4.  Always  []  7.  Patient declines to respond  []  8.  Patient unable to respond    BIMS - **Must be completed in the  = 12-14 days (nearly every day)  **Leave blank if 'No Reponse'**      Enter scores in boxes    Column 1 Column 2   Little interest or pleasure in doing things   0 0   Feeling down, depressed, or hopeless   0 0   **If either A or B in column 2 is coded “2” or “3”, CONTINUE asking the questions below.  If not, END the interview.**     Trouble falling or staying asleep, or sleeping too much       Feeling tired or having little energy       Poor appetite or overeating       Feeling bad about yourself - or that you are a failure or have let yourself or your family down       Trouble concentrating on things, such as reading the newspaper or watching television       Moving or speaking so slowly that other people could have noticed.  Or the opposite- being so fidgety or restless that you have been moving around a lot more than usual.       Thoughts that you would be better off dead, or of hurting yourself in some way.       Total Severity: Add scores for all frequency responses in column 2 (possible score 0-27, or enter 99 if unable to complete (if symptom frequency (column 2) is blank for 3 or more items).   0     Social Isolation  \"How often do you feel lonely or isolated from those around you?\"  [] 0.  Never  [x] 1.  Rarely  [] 2.  Sometimes  [] 3.  Often  [] 4.  Always  [] 7.  Patient declines to respond  [] 8.  Patient unable to respond    Pain Effect on Sleep  \"Over the past 5 days, how much of the time has pain made it hard for you to sleep at night?\"  []  0.  Does not apply - I have not had any pain or hurting in the past 5 days  [x]  1.  Rarely or not at all  []  2.  Occasionally  []  3.  Frequently  []  4.  Almost constantly  []  8.  Unable to answer    **If the patient answers \"0.  Does not apply\" to this question, skip the next two \"Pain Effect...\" questions**    Pain Interference with Therapy Activities  \"Over the past 5 days, how often have you limited your participation in rehabilitation therapy sessions due

## 2025-04-04 NOTE — PROGRESS NOTES
GUIDED CHEST TUBE (11/18/2024); hernia repair (Left, 12/31/2024); other surgical history (Left); shoulder surgery (Right, 01/31/2025); and IR TUNNELED CVC PLACE WO SQ PORT/PUMP > 5 YEARS (03/28/2025).  Recent MRI Brain/Head CT:  CT PELVIS W CONTRAST Additional Contrast? None   Final Result   1. Discitis/osteomyelitis centered to the right of midline at the L3-L4 level   with an associated approximately 4.3 abscess in the right psoas muscle.  More   inferiorly in the right iliopsoas muscle, enlargement and heterogeneous   increased attenuation is consistent with an intramuscular hematoma measuring   up to 8.7 cm with a focus of active extravasation suggesting active bleeding.   2. Persistent subcutaneous fluid collection in the left inguinal region   measures slightly smaller measuring as much as 13.5 cm.  No old hematoma is   favored, but an abscess cannot be excluded.         IR US GUIDED ABSCESS DRAINAGE PERIT/RETROPERIT/PERC    (Results Pending)     Precautions/Restrictions: high fall risk        Pre-Admission Information   Living Status: Pt lives with his wife Tammy.   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 Reported fatigue due to not sleeping well last night.  Pain: Did not state    Safety Interventions: patient left in chair, chair alarm in place, and call light within reach        Therapeutic Interventions:     Session 1:     Cognition: Severity: Moderate   Functional recall   - Pt able to recall WRAP strategies from yesterday's session with 50% accuracy (2/4) independently, increased to 100% accuracy given min verbal prompting  - Pt able to recall therapy sessions from this morning and        Plan  Frequency: 30 minutes/day; 5 days per week, as tolerated, until goals met, or discharged from ARU.  Therapeutic Interventions:  Compensatory Cognitive intervention     Discharge  Barriers to discharge: May need additional assistance to manage medications and/or finances (assistance/supervision)  Inability to communicate or demonstrate problem solving due to cognitive-communicative impairment  Discharge Recommendations: TBD   Continued SLP at Discharge: No     Goals  Patient Goals: get back home  Time Frame: 10-14 days    Pt will recall functional information (daily tasks, personal hx, etc.) with 80% accuracy.  Patient will demonstrate insight into limitations and impact on daily functioning with min cues.  Patient will be instructed in memory strategies to utilize in order to promote recall of newly learned information with >80% min cues. GOAL MET    Above goals reviewed on 4/4/2025. All goals are ongoing at this time unless indicated above.       Therapy Session Time      Session 1 Session 2   Time In 0930    Time Out 1000    Time Code Minutes 30    Individual Minutes 30      Timed Code Treatment Minutes:  30  Total Treatment Minutes:  30    Electronically Signed By:  EMANUEL Farrar  Clinician   Speech-Language Pathology     Speech Language Pathologist observed and directed the patient's plan of care. Co-signed and supervised by:   Heidi Chery M.A. [facility] [place] [id number]  Speech-Language Pathologist   4/4/2025 12:11 PM

## 2025-04-04 NOTE — PLAN OF CARE
Problem: Skin/Tissue Integrity  Goal: Skin integrity remains intact  Description: 1.  Monitor for areas of redness and/or skin breakdown  2.  Assess vascular access sites hourly  3.  Every 4-6 hours minimum:  Change oxygen saturation probe site  4.  Every 4-6 hours:  If on nasal continuous positive airway pressure, respiratory therapy assess nares and determine need for appliance change or resting period  4/3/2025 2328 by Blanche Le, RN  Outcome: Progressing  Flowsheets (Taken 4/3/2025 2045 by Merrill Hernandez RN)  Skin Integrity Remains Intact: Monitor for areas of redness and/or skin breakdown     Problem: Safety - Adult  Goal: Free from fall injury  4/3/2025 2328 by Blanche Le, RN  Outcome: Progressing     Problem: ABCDS Injury Assessment  Goal: Absence of physical injury  4/3/2025 2328 by Blanche Le, RN  Outcome: Progressing

## 2025-04-04 NOTE — H&P
4.3 abscess in the right psoas muscle.  More inferiorly in the right iliopsoas muscle, enlargement and heterogeneous increased attenuation is consistent with an intramuscular hematoma measuring up to 8.7 cm with a focus of active extravasation suggesting active bleeding. 2. Persistent subcutaneous fluid collection in the left inguinal region measures slightly smaller measuring as much as 13.5 cm.  No old hematoma is favored, but an abscess cannot be excluded.     IR TUNNELED CVC PLACE WO SQ PORT/PUMP > 5 YEARS  Result Date: 3/28/2025  Exam: IR TUNNELED CVC PLACE WO SQ PORT/PUMP > 5 YEARS Indication: TDC : Adam Salas MD Procedure: The patient was advised of the benefits, risks, and alternatives of the procedure and informed consent was obtained. A timeout was performed with verification of the patient's name, MRN, site of procedure, and type of procedure to be performed. The patient was positioned in the supine position on the angiographic table. Sterile technique was utilized, including the use of cap, mask, sterile gloves, hand hygiene, and 2% chlorhexidine for cutaneous antisepsis. Moderate sedation was performed by the physician. An independent trained observer assisted in monitoring the patient's level of consciousness and physiologic status. Following the administration of midazolam and fentanyl, the physician spent continuous face-to-face time with the patient in a time period as documented below. Total sedation time: 18 minutes DESCRIPTION: Potential risks and benefits of the procedure were discussed with the patient and written and verbal consent was obtained. The left neck and chest were prepped and draped in sterile fashion. All elements of maximal sterile barrier technique followed including cap and mask and sterile gown and sterile gloves and a large sterile sheet and hand hygiene and 2% chlorhexidine for cutaneous antisepsis (or acceptable alternative antiseptics, per current guidelines). A

## 2025-04-04 NOTE — PROGRESS NOTES
[facility] - Inpatient Rehabilitation Department   Phone: [phone]    Occupational Therapy    [] Initial Evaluation            [x] Daily Treatment Note         [] Discharge Summary      Patient: Danny Rock   : 1950   MRN: [id number]   Date of Service:  2025    Admitting Diagnosis:  Debility  Current Admission Summary: Danny Rock is a 75 y.o. male with PMHx notable for HTN, HLD, CAD, HFrEF, atrial fibrillation, renal cancer s/p right nephrectomy, ESRD on HD who initially presented to [facility] on 3/22 with low back pain and had MRI showing severe multilevel foraminal stenosis worst at L5-S1 prompting transfer to [facility] for neurosurgical evaluation.  Repeat MRI C/T/L-spine on 3/23 showed multilevel disc/endplate enhancement compatible with discitis and osteomyelitis at L2-L3, L3-L4 and L4-L5 with adjacent paravertebral edema, and small right psoas muscle abscess.  Blood cultures resulted positive for Enterococcus faecalis.  He was started on IV vancomycin and gentamicin on 3/25, with plan at discharge to continue IV vancomycin 3 times a week after dialysis through 2025. NSGY evaluated while inpatient, recommended no surgical intervention at this time.  Nephrology consulted for HD needs, had old HD access removed and TDC placed on 3/28   Past Medical History:  has a past medical history of Atrial fibrillation (HCC), CAD (coronary artery disease), Cancer of kidney (HCC), Dependence on renal dialysis, Diabetes mellitus (HCC), Hyperlipidemia, Hypertension, Prostate cancer (HCC), Right renal mass, and Systolic CHF (HCC).  Past Surgical History:  has a past surgical history that includes hernia repair; knee surgery; Cholecystectomy; Nasal septum surgery; eye surgery (Bilateral, 2018); Kidney removal (Right, 2022); Cataract removal (Bilateral); IR TUNNELED CVC PLACE WO SQ PORT/PUMP > 5 YEARS (2024); CT NEEDLE BIOPSY LIVER PERCUTANEOUS (2024); other surgical  decreased coordination, increased pain  Prognosis: fair  Clinical Assessment: Pt w/ fair progress towards therapy goals however is significantly limited by RLE pain, cognitive deficits, BUE ROM limitations. Pt able to complete transfers w/ CGA and functional mobility at CGA. Pt requires cues for RW management and safety during mobility. Pt improving ADL routine for showers to Min A for LB tasks, SBA for UB. Pt demo'd carryover of sock aide. Pt improving to Min A for LB dressing- ongoing AE training recommended to increase proficiency. Continue per OT POC to maximize safety and independence in ADLs.   Safety Interventions: patient left in chair, chair alarm in place, call light within reach, patient at risk for falls, and family/caregiver present    Plan  Frequency: 5 x/week, 75 min/day  Current Treatment Recommendations: strengthening, balance training, functional mobility training, transfer training, endurance training, patient/caregiver education, ADL/self-care training, IADL training, home management training, pain management, safety education, and equipment evaluation/education    Goals  Patient Goals: get back to moving well, get back home   Short Term Goals:  Time Frame: 2-3 Weeks  Patient will complete upper body ADL at modified independent   Patient will complete lower body ADL at modified independent   Patient will complete toileting at modified independent   Patient will complete functional transfers at modified independent   Patient will complete functional mobility at modified independent   Patient to gather and transport IADL items at modified independent     Above goals reviewed on 4/4/2025.  All goals are ongoing at this time unless indicated above.       Therapy Session Time  First Session   Individual Group Co-treatment   Time In 0730      Time Out 0830      Minutes 60        Second Session    Individual Group Co-treatment   Time In       Time Out       Minutes         Timed Code Treatment Minutes:

## 2025-04-04 NOTE — ACP (ADVANCE CARE PLANNING)
Advanced Care Planning Note:     Purpose of Encounter: Advanced care planning in light of hospitalization    Parties In Attendance: Patient     Decisional Capacity: Yes    Subjective: Patient understands that this conversation for any life threatening event and his future wishes to address long term care goal if situations arise that prevent the ability to personally give input    Objective: Patient is admitted to the hospital with psoas hematoma      Goals of Care Determination: Patient would like to pursue full care with all aggressive measures.    Code Status: Full code    Time spent on Advanced care Plannin minutes    Advanced Care Planning Documents: documented patient's wishes, would like his wife Amanda Rock to make medical decisions if unable to make own decisions.    Julian Coronado MD  2025 5:01 PM

## 2025-04-05 LAB
ALBUMIN SERPL-MCNC: 3.2 G/DL (ref 3.4–5)
ANION GAP SERPL CALCULATED.3IONS-SCNC: 10 MMOL/L (ref 3–16)
BASOPHILS # BLD: 0.1 K/UL (ref 0–0.2)
BASOPHILS NFR BLD: 0.7 %
BUN SERPL-MCNC: 17 MG/DL (ref 7–20)
CALCIUM SERPL-MCNC: 8.8 MG/DL (ref 8.3–10.6)
CHLORIDE SERPL-SCNC: 100 MMOL/L (ref 99–110)
CO2 SERPL-SCNC: 26 MMOL/L (ref 21–32)
CREAT SERPL-MCNC: 1.4 MG/DL (ref 0.8–1.3)
DEPRECATED RDW RBC AUTO: 19.1 % (ref 12.4–15.4)
EOSINOPHIL # BLD: 0.1 K/UL (ref 0–0.6)
EOSINOPHIL NFR BLD: 1.3 %
GFR SERPLBLD CREATININE-BSD FMLA CKD-EPI: 52 ML/MIN/{1.73_M2}
GLUCOSE BLD-MCNC: 104 MG/DL (ref 70–99)
GLUCOSE BLD-MCNC: 172 MG/DL (ref 70–99)
GLUCOSE BLD-MCNC: 273 MG/DL (ref 70–99)
GLUCOSE SERPL-MCNC: 172 MG/DL (ref 70–99)
HCT VFR BLD AUTO: 21 % (ref 40.5–52.5)
HCT VFR BLD AUTO: 24.2 % (ref 40.5–52.5)
HCT VFR BLD AUTO: 26.7 % (ref 40.5–52.5)
HGB BLD-MCNC: 7.1 G/DL (ref 13.5–17.5)
HGB BLD-MCNC: 8.1 G/DL (ref 13.5–17.5)
HGB BLD-MCNC: 8.8 G/DL (ref 13.5–17.5)
LYMPHOCYTES # BLD: 0.8 K/UL (ref 1–5.1)
LYMPHOCYTES NFR BLD: 9.4 %
MCH RBC QN AUTO: 33.4 PG (ref 26–34)
MCHC RBC AUTO-ENTMCNC: 33.1 G/DL (ref 31–36)
MCV RBC AUTO: 100.9 FL (ref 80–100)
MONOCYTES # BLD: 0.2 K/UL (ref 0–1.3)
MONOCYTES NFR BLD: 2.4 %
NEUTROPHILS # BLD: 7.2 K/UL (ref 1.7–7.7)
NEUTROPHILS NFR BLD: 86.2 %
PERFORMED ON: ABNORMAL
PHOSPHATE SERPL-MCNC: 2 MG/DL (ref 2.5–4.9)
PLATELET # BLD AUTO: 356 K/UL (ref 135–450)
PMV BLD AUTO: 6.7 FL (ref 5–10.5)
POTASSIUM SERPL-SCNC: 3.3 MMOL/L (ref 3.5–5.1)
RBC # BLD AUTO: 2.64 M/UL (ref 4.2–5.9)
SODIUM SERPL-SCNC: 136 MMOL/L (ref 136–145)
VANCOMYCIN SERPL-MCNC: 12.4 UG/ML
WBC # BLD AUTO: 8.3 K/UL (ref 4–11)

## 2025-04-05 PROCEDURE — 36415 COLL VENOUS BLD VENIPUNCTURE: CPT

## 2025-04-05 PROCEDURE — 6370000000 HC RX 637 (ALT 250 FOR IP): Performed by: STUDENT IN AN ORGANIZED HEALTH CARE EDUCATION/TRAINING PROGRAM

## 2025-04-05 PROCEDURE — 80069 RENAL FUNCTION PANEL: CPT

## 2025-04-05 PROCEDURE — 80202 ASSAY OF VANCOMYCIN: CPT

## 2025-04-05 PROCEDURE — 6370000000 HC RX 637 (ALT 250 FOR IP): Performed by: HOSPITALIST

## 2025-04-05 PROCEDURE — 85025 COMPLETE CBC W/AUTO DIFF WBC: CPT

## 2025-04-05 PROCEDURE — 90935 HEMODIALYSIS ONE EVALUATION: CPT

## 2025-04-05 PROCEDURE — 85018 HEMOGLOBIN: CPT

## 2025-04-05 PROCEDURE — 2500000003 HC RX 250 WO HCPCS: Performed by: STUDENT IN AN ORGANIZED HEALTH CARE EDUCATION/TRAINING PROGRAM

## 2025-04-05 PROCEDURE — 6360000002 HC RX W HCPCS: Performed by: HOSPITALIST

## 2025-04-05 PROCEDURE — 2580000003 HC RX 258: Performed by: STUDENT IN AN ORGANIZED HEALTH CARE EDUCATION/TRAINING PROGRAM

## 2025-04-05 PROCEDURE — 2060000000 HC ICU INTERMEDIATE R&B

## 2025-04-05 PROCEDURE — 6360000002 HC RX W HCPCS: Performed by: STUDENT IN AN ORGANIZED HEALTH CARE EDUCATION/TRAINING PROGRAM

## 2025-04-05 PROCEDURE — 85014 HEMATOCRIT: CPT

## 2025-04-05 RX ADMIN — ERYTHROPOIETIN 10000 UNITS: 10000 INJECTION, SOLUTION INTRAVENOUS; SUBCUTANEOUS at 10:38

## 2025-04-05 RX ADMIN — VANCOMYCIN HYDROCHLORIDE 1000 MG: 1 INJECTION, POWDER, LYOPHILIZED, FOR SOLUTION INTRAVENOUS at 20:20

## 2025-04-05 RX ADMIN — METHOCARBAMOL 750 MG: 500 TABLET ORAL at 14:57

## 2025-04-05 RX ADMIN — SODIUM CHLORIDE, PRESERVATIVE FREE 10 ML: 5 INJECTION INTRAVENOUS at 15:05

## 2025-04-05 RX ADMIN — OXYCODONE HYDROCHLORIDE AND ACETAMINOPHEN 1 TABLET: 5; 325 TABLET ORAL at 15:44

## 2025-04-05 RX ADMIN — POTASSIUM & SODIUM PHOSPHATES POWDER PACK 280-160-250 MG 250 MG: 280-160-250 PACK at 18:46

## 2025-04-05 RX ADMIN — METHOCARBAMOL 750 MG: 500 TABLET ORAL at 18:46

## 2025-04-05 RX ADMIN — PANTOPRAZOLE SODIUM 40 MG: 40 TABLET, DELAYED RELEASE ORAL at 06:55

## 2025-04-05 RX ADMIN — SEVELAMER CARBONATE 800 MG: 800 TABLET, FILM COATED ORAL at 14:59

## 2025-04-05 RX ADMIN — SODIUM CHLORIDE, PRESERVATIVE FREE 10 ML: 5 INJECTION INTRAVENOUS at 20:21

## 2025-04-05 RX ADMIN — METHOCARBAMOL 750 MG: 500 TABLET ORAL at 20:21

## 2025-04-05 RX ADMIN — POTASSIUM & SODIUM PHOSPHATES POWDER PACK 280-160-250 MG 250 MG: 280-160-250 PACK at 20:25

## 2025-04-05 RX ADMIN — Medication 1 MG: at 14:57

## 2025-04-05 RX ADMIN — GABAPENTIN 100 MG: 100 CAPSULE ORAL at 20:25

## 2025-04-05 RX ADMIN — LINEZOLID 600 MG: 600 TABLET, FILM COATED ORAL at 20:21

## 2025-04-05 RX ADMIN — LINEZOLID 600 MG: 600 TABLET, FILM COATED ORAL at 14:57

## 2025-04-05 RX ADMIN — CHOLECALCIFEROL TAB 125 MCG (5000 UNIT) 5000 UNITS: 125 TAB at 14:57

## 2025-04-05 RX ADMIN — ZINC SULFATE 220 MG (50 MG) CAPSULE 50 MG: CAPSULE at 14:57

## 2025-04-05 RX ADMIN — OXYCODONE HYDROCHLORIDE AND ACETAMINOPHEN 500 MG: 500 TABLET ORAL at 14:57

## 2025-04-05 RX ADMIN — CYANOCOBALAMIN TAB 1000 MCG 1000 MCG: 1000 TAB at 14:57

## 2025-04-05 RX ADMIN — MELATONIN TAB 3 MG 3 MG: 3 TAB at 20:21

## 2025-04-05 ASSESSMENT — PAIN DESCRIPTION - PAIN TYPE: TYPE: ACUTE PAIN

## 2025-04-05 ASSESSMENT — PAIN DESCRIPTION - LOCATION
LOCATION: HIP
LOCATION: BACK

## 2025-04-05 ASSESSMENT — PAIN SCALES - GENERAL
PAINLEVEL_OUTOF10: 7
PAINLEVEL_OUTOF10: 7

## 2025-04-05 ASSESSMENT — PAIN DESCRIPTION - ORIENTATION: ORIENTATION: RIGHT

## 2025-04-05 NOTE — PROGRESS NOTES
Medication history completed telephonically and obtained by Pharmacy Technician Yolanda Hayden and was completed based on information available during current patient encounter.     Allergies: Amoxicillin, Bisoprolol-hydrochlorothiazide, Cisapride, Penicillins, and Pioglitazone    Prior to Admission Medications       Med List Status: Complete Set By: Yolanda Hayden at 04/04/2025  8:23 PM          Taking? Last Dose Informant Start Date End Date Provider LT     Alpha-Lipoic Acid 100 MG CAPS   --  --  --  --  Svetlana Deleon MD      Take 1 capsule by mouth Daily     apixaban (ELIQUIS) 5 MG TABS tablet   --  --  02/06/25  --  Santiago Herring MD      Take 1 tablet by mouth 2 times daily     ascorbic acid (VITAMIN C) 500 MG tablet   --  --  --  --  Svetlana Deleon MD      Take 1 tablet by mouth daily     aspirin 81 MG chewable tablet   --  --  12/11/23  --  Mikael Coronado MD      Take 1 tablet by mouth daily     B Complex-C-Folic Acid (EVERETT-ISABELLA) TABS   --  --  --  --  Svetlana Deleon MD      Take 1 tablet by mouth daily     cabozantinib s-malate (CABOMETYX) 40 MG TABS chemo tablet   --  --  --  --  Svetlana Deleon MD      Take 1 tablet by mouth every evening Patient usually takes at 3 pm.     insulin lispro, 1 Unit Dial, (HUMALOG KWIKPEN) 100 UNIT/ML SOPN   --  --  02/10/24  --  Stacey Shelby MD      Sc before meals per sliding scale     Patient taking differently: Inject 0-6 Units into the skin 3 times daily (before meals) Takes as needed up to 6 units with meals     melatonin 3 MG TABS tablet   --  --  --  --  Svetlana Deleon MD      Take 1 tablet by mouth nightly     methocarbamol (ROBAXIN) 750 MG tablet   --  --  03/30/25 04/09/25  Marva Todd MD      Take 1 tablet by mouth 4 times daily for 10 days     Patient taking differently: Take 1 tablet by mouth 4 times daily (3/30-4/9).     metoprolol tartrate (LOPRESSOR) 50 MG tablet   --  --  02/27/24  --  Rene Boo MD

## 2025-04-05 NOTE — PROGRESS NOTES
Treatment time: 3 hours    Net UF: 2600 ml    Pre weight: 73.6 kg   Post weight: 69.0 kg  EDW: 69 kg    Access used: Lt. Chest wall tunneled TDC  Access function: Well    Medications or blood products given:   EPO 10,000 units    Regular outpatient schedule: TTS    Summary of response to treatment: Patient has completed 3 hour hemodialysis treatment. He tolerated treatment well and was able to remove 2600 ml of fluid. Patient returned to his room per hospital bed.    Copy of dialysis treatment record placed in chart, to be scanned into EMR.

## 2025-04-05 NOTE — CONSULTS
[facility]  Consultation Note    Reason for Consult: ESRD management  Requesting Physician:  Dr. JANNIE Coronado    CHIEF COMPLAINT: Right psoas hematoma    History obtained from records and patient.    HISTORY OF PRESENT ILLNESS:                   Danny Rock  is 74yo, male with significant past medical history of ESRD, on HD TTHS at [facility] under care, atrial fibrillation, coronary artery disease, renal CA, status post right nephrectomy, previously on immunotherapy, diabetes mellitus type 2, hyperlipidemia, hypertension, prostate cancer, CHF, EF 40 to 45%, CVA secondary to hypercoagulable state, pneumothorax who is transferred from the ARU due to concerns for right psoas hematoma.  Recent enterococcal faecalis bacteremia and L2-L5 discitis/osteomyelitis. Has TDC for HD access.  I am asked to see the patient with regards to his HD need.  On 4/3/2025, potassium was 4.7 with serum bicarbonate of 24.  Sodium mildly low at 131.  Systolic blood pressure ranging from 110s to 130s.  Seen on HD.  Tolerating well.    Past Medical History:     has a past medical history of Atrial fibrillation (HCC), CAD (coronary artery disease), Cancer of kidney (HCC), Dependence on renal dialysis, Diabetes mellitus (HCC), Hyperlipidemia, Hypertension, Prostate cancer (HCC), Right renal mass, and Systolic CHF (HCC).   Past Surgical History:     has a past surgical history that includes hernia repair; knee surgery; Cholecystectomy; Nasal septum surgery; eye surgery (Bilateral, 04/19/2018); Kidney removal (Right, 07/25/2022); Cataract removal (Bilateral); IR TUNNELED CVC PLACE WO SQ PORT/PUMP > 5 YEARS (02/07/2024); CT NEEDLE BIOPSY LIVER PERCUTANEOUS (08/01/2024); other surgical history (09/30/2024); CT NEEDLE BIOPSY LIVER PERCUTANEOUS (11/18/2024); CT GUIDED CHEST TUBE (11/18/2024); hernia repair (Left, 12/31/2024); other surgical history (Left); shoulder surgery (Right, 01/31/2025); and IR  IntraCATHeter, PRN  melatonin tablet 3 mg, 3 mg, Oral, QPM  traZODone (DESYREL) tablet 50 mg, 50 mg, Oral, Nightly PRN  sodium chloride flush 0.9 % injection 5-40 mL, 5-40 mL, IntraVENous, Q12H  sennosides-docusate sodium (SENOKOT-S) 8.6-50 MG tablet 2 tablet, 2 tablet, Oral, BID PRN  acetaminophen (TYLENOL) tablet 650 mg, 650 mg, Oral, Q4H PRN  bisacodyl (DULCOLAX) EC tablet 5 mg, 5 mg, Oral, Daily PRN  polyethylene glycol (GLYCOLAX) packet 17 g, 17 g, Oral, Daily  Allergies:    Amoxicillin, Bisoprolol-hydrochlorothiazide, Cisapride, Penicillins, and Pioglitazone   Social History:     reports that he quit smoking about 55 years ago. His smoking use included cigarettes. He started smoking about 57 years ago. He has never used smokeless tobacco. He reports current alcohol use. He reports current drug use. Drug: Marijuana (Weed).   Family History:   family history includes Heart Disease in his brother and mother; Skin Cancer in his father; Stroke in his brother.     REVIEW OF SYSTEMS:    System review was done , pertinent positives are mentioned in the HPI    Positive fatigue  No chest pain nor palpitations  No SOB, coughing nor hemoptysis  No abdominal pain, nausea, vomiting nor diarrhea  No fever/chills  No leg swelling  No change in urine output nor gross hematuria    PHYSICAL EXAM:    Vitals:  /76   Pulse 67   Temp 97.3 °F (36.3 °C)   Resp 18   Wt 69 kg (152 lb 1.9 oz)   SpO2 99%   BMI 19.01 kg/m²       CONSTITUTIONAL:  awake, alert, cooperative, no apparent distress, and appears stated age  EYES:  Lids and lashes normal, pupils equal, round   NECK:  Supple, symmetrical, trachea midline, no adenopathy, thyroid symmetric, not enlarged and no tenderness, skin normal  HEMATOLOGIC/LYMPHATICS:  no cervical lymphadenopathy  LUNGS:  No increased work of breathing, rhonchi bilaterally   CARDIOVASCULAR:  Normal apical impulse, regular rate and rhythm, normal S1 and S2  ABDOMEN:  Normal bowel sounds, soft,

## 2025-04-05 NOTE — PROGRESS NOTES
Medication history completed telephonically and obtained by Pharmacy Technician Yolanda Hayden and was completed based on information available during current patient encounter.     Allergies: Amoxicillin, Bisoprolol-hydrochlorothiazide, Cisapride, Penicillins, and Pioglitazone    Prior to Admission Medications       Med List Status: Complete Set By: Yolanda Hayden at 04/04/2025  8:23 PM          Taking? Last Dose Informant Start Date End Date Provider LT     Alpha-Lipoic Acid 100 MG CAPS   --  --  --  --  Svetlana Deleon MD      Take 1 capsule by mouth Daily     apixaban (ELIQUIS) 5 MG TABS tablet   --  --  02/06/25  --  Santiago Herring MD      Take 1 tablet by mouth 2 times daily     ascorbic acid (VITAMIN C) 500 MG tablet   --  --  --  --  Svetlana Deleon MD      Take 1 tablet by mouth daily     aspirin 81 MG chewable tablet   --  --  12/11/23  --  Mikael Coronado MD      Take 1 tablet by mouth daily     B Complex-C-Folic Acid (EVERETT-ISABELLA) TABS   --  --  --  --  Svetlana Deleon MD      Take 1 tablet by mouth daily     cabozantinib s-malate (CABOMETYX) 40 MG TABS chemo tablet   --  --  --  --  Svetlana Deleon MD      Take 1 tablet by mouth every evening Patient usually takes at 3 pm.     insulin lispro, 1 Unit Dial, (HUMALOG KWIKPEN) 100 UNIT/ML SOPN   --  --  02/10/24  --  Stacey Shelby MD      Sc before meals per sliding scale     Patient taking differently: Inject 0-6 Units into the skin 3 times daily (before meals) Takes as needed up to 6 units with meals     melatonin 3 MG TABS tablet   --  --  --  --  Svetlana Deleon MD      Take 1 tablet by mouth nightly     methocarbamol (ROBAXIN) 750 MG tablet   --  --  03/30/25 04/09/25  Marva Todd MD      Take 1 tablet by mouth 4 times daily for 10 days     Patient taking differently: Take 1 tablet by mouth 4 times daily (3/30-4/9).     metoprolol tartrate (LOPRESSOR) 50 MG tablet   --  --  02/27/24  --  Rene Boo MD       BID but it is currently on hold along with his aspirin.    Yolanda Hayden  4/4/2025 8:32 PM

## 2025-04-05 NOTE — PROCEDURES
PROCEDURE NOTE  Date: 4/5/2025   Name: Danny Rock  YOB: 1950    Procedures      Renal HD note  Patient seen on HD, tolerating well.  Fluid removal to EDW  POONAM with HD  Discussed with HD nurse  Follow hemoglobin closely    Pratibha Kat MD

## 2025-04-05 NOTE — PROGRESS NOTES
Progress Note  Date:2025       Room:[facility]3377/3377-01  Patient Name:Danny Rock     YOB: 1950     Age:75 y.o.        Subjective    Subjective:  Pain:  He reports no pain.       Review of Systems   Skin:  Positive for color change (toe nails).     Objective         Vitals Last 24 Hours:  TEMPERATURE:  Temp  Av.1 °F (36.2 °C)  Min: 96.9 °F (36.1 °C)  Max: 97.3 °F (36.3 °C)  RESPIRATIONS RANGE: Resp  Av  Min: 18  Max: 18  PULSE OXIMETRY RANGE: SpO2  Av.7 %  Min: 94 %  Max: 100 %  PULSE RANGE: Pulse  Av  Min: 58  Max: 73  BLOOD PRESSURE RANGE: Systolic (24hrs), Av , Min:117 , Max:134   ; Diastolic (24hrs), Av, Min:64, Max:80    I/O (24Hr):    Intake/Output Summary (Last 24 hours) at 2025 1705  Last data filed at 2025 0655  Gross per 24 hour   Intake --   Output 700 ml   Net -700 ml     Objective:  Vital signs: (most recent): Blood pressure 134/80, pulse 73, temperature 97.3 °F (36.3 °C), temperature source Oral, resp. rate 18, weight 69 kg (152 lb 1.9 oz), SpO2 94%.    Skin:  There is ulceration (right hallux nail boarder healed).      Labs/Imaging/Diagnostics    Labs:  CBC:  Recent Labs     25  0618 25  0558 25  0057 25  1250   WBC 9.0  --   --  8.3   RBC 2.46*  --   --  2.64*   HGB 8.3* 7.4* 7.1* 8.8*   HCT 24.7* 22.2* 21.0* 26.7*   .3*  --   --  100.9*   RDW 19.2*  --   --  19.1*     --   --  356     CHEMISTRIES:  Recent Labs     25  0618 25  1250   * 136   K 4.7 3.3*   CL 98* 100   CO2 24 26   BUN 44* 17   CREATININE 2.5* 1.4*   GLUCOSE 142* 172*   PHOS 2.9 2.0*     PT/INR:No results for input(s): \"PROTIME\", \"INR\" in the last 72 hours.  APTT:No results for input(s): \"APTT\" in the last 72 hours.  LIVER PROFILE:No results for input(s): \"AST\", \"ALT\", \"BILIDIR\", \"BILITOT\", \"ALKPHOS\" in the last 72 hours.    Imaging Last 24 Hours:  No results found.  Assessment//Plan           Hospital Problems           Last  Modified POA    * (Principal) Psoas hematoma, right, secondary to anticoagulant therapy, sequela 4/4/2025 Yes     Assessment:   (Incurvated nail right hallux).     Plan:    (Debrided again.  Will follow.).       Electronically signed by Iain Isbell DPM on 4/5/25 at 5:05 PM EDT

## 2025-04-05 NOTE — DISCHARGE INSTR - COC
Continuity of Care Form    Patient Name: Danny Rock   :  1950  MRN:  [id number]    Admit date:  2025  Discharge date:  2025    Code Status Order: Full Code   Advance Directives:     Admitting Physician:  Julian Coronado MD  PCP: Yesi Rosa, JANET - CNP    Discharging Nurse: beti [facility] Unit/Room#: 3TN-3377/3377-01  Discharging Unit Phone Number: [phone]    Emergency Contact:   Extended Emergency Contact Information  Primary Emergency Contact: Amanda Rock  Address: [address]           [place] [address] [facility] [place]  Home Phone: [phone]  Mobile Phone: [phone]  Relation: Spouse    Past Surgical History:  Past Surgical History:   Procedure Laterality Date    CATARACT REMOVAL Bilateral     ~  with lens implanted    CHOLECYSTECTOMY      CT GUIDED CHEST TUBE  2024    CT GUIDED CHEST TUBE 2024 [facility] CT SCAN    CT NEEDLE BIOPSY LIVER PERCUTANEOUS  2024    CT NEEDLE BIOPSY LIVER PERCUTANEOUS 2024 [facility] CT SCAN    CT NEEDLE BIOPSY LIVER PERCUTANEOUS  2024    CT NEEDLE BIOPSY LIVER PERCUTANEOUS 2024 [facility] CT SCAN    EYE SURGERY Bilateral 2018    Cataract    HERNIA REPAIR      HERNIA REPAIR Left 2024    OPEN LEFT INGUINAL HERNIA REPAIR WITH MESH performed by Camacho Chowdhury MD at [facility] OR    IR TUNNELED CVC PLACE WO SQ PORT/PUMP > 5 YEARS  2024    IR TUNNELED CATHETER PLACEMENT GREATER THAN 5 YEARS 2024 Kosta Baugh MD [facility] SPECIAL PROCEDURES    IR TUNNELED CVC PLACE WO SQ PORT/PUMP > 5 YEARS  2025    IR TUNNELED CVC PLACE WO SQ PORT/PUMP > 5 YEARS 3/28/2025 [facility] CARDIAC CATH/IR/NEURO LAB    KIDNEY REMOVAL Right 2022    ROBOTIC LAPAROSCOPIC RADICAL RIGHT NEPHRECTOMY performed by Larry Olsen MD at [facility] OR    KNEE SURGERY      both knees: scope for cartilage    NASAL SEPTUM SURGERY      OTHER SURGICAL HISTORY  2024    Peritoneal dialysis catheter: removed    OTHER

## 2025-04-06 LAB
GLUCOSE BLD-MCNC: 107 MG/DL (ref 70–99)
GLUCOSE BLD-MCNC: 138 MG/DL (ref 70–99)
GLUCOSE BLD-MCNC: 188 MG/DL (ref 70–99)
GLUCOSE BLD-MCNC: 267 MG/DL (ref 70–99)
HCT VFR BLD AUTO: 23.9 % (ref 40.5–52.5)
HCT VFR BLD AUTO: 26 % (ref 40.5–52.5)
HCT VFR BLD AUTO: 27.4 % (ref 40.5–52.5)
HGB BLD-MCNC: 7.9 G/DL (ref 13.5–17.5)
HGB BLD-MCNC: 9 G/DL (ref 13.5–17.5)
HGB BLD-MCNC: 9 G/DL (ref 13.5–17.5)
PERFORMED ON: ABNORMAL

## 2025-04-06 PROCEDURE — 2060000000 HC ICU INTERMEDIATE R&B

## 2025-04-06 PROCEDURE — 85018 HEMOGLOBIN: CPT

## 2025-04-06 PROCEDURE — 6370000000 HC RX 637 (ALT 250 FOR IP): Performed by: STUDENT IN AN ORGANIZED HEALTH CARE EDUCATION/TRAINING PROGRAM

## 2025-04-06 PROCEDURE — 36415 COLL VENOUS BLD VENIPUNCTURE: CPT

## 2025-04-06 PROCEDURE — 85014 HEMATOCRIT: CPT

## 2025-04-06 PROCEDURE — 6370000000 HC RX 637 (ALT 250 FOR IP): Performed by: HOSPITALIST

## 2025-04-06 PROCEDURE — 2500000003 HC RX 250 WO HCPCS: Performed by: STUDENT IN AN ORGANIZED HEALTH CARE EDUCATION/TRAINING PROGRAM

## 2025-04-06 RX ORDER — INSULIN GLARGINE 100 [IU]/ML
8 INJECTION, SOLUTION SUBCUTANEOUS NIGHTLY
Status: DISCONTINUED | OUTPATIENT
Start: 2025-04-06 | End: 2025-04-08 | Stop reason: HOSPADM

## 2025-04-06 RX ADMIN — INSULIN GLARGINE 8 UNITS: 100 INJECTION, SOLUTION SUBCUTANEOUS at 20:55

## 2025-04-06 RX ADMIN — SEVELAMER CARBONATE 800 MG: 800 TABLET, FILM COATED ORAL at 14:55

## 2025-04-06 RX ADMIN — OXYCODONE HYDROCHLORIDE AND ACETAMINOPHEN 500 MG: 500 TABLET ORAL at 10:23

## 2025-04-06 RX ADMIN — METHOCARBAMOL 750 MG: 500 TABLET ORAL at 20:54

## 2025-04-06 RX ADMIN — Medication 1 MG: at 10:17

## 2025-04-06 RX ADMIN — INSULIN LISPRO 2 UNITS: 100 INJECTION, SOLUTION INTRAVENOUS; SUBCUTANEOUS at 20:55

## 2025-04-06 RX ADMIN — METHOCARBAMOL 750 MG: 500 TABLET ORAL at 10:17

## 2025-04-06 RX ADMIN — OXYCODONE HYDROCHLORIDE AND ACETAMINOPHEN 1 TABLET: 5; 325 TABLET ORAL at 10:17

## 2025-04-06 RX ADMIN — POLYETHYLENE GLYCOL 3350 17 G: 17 POWDER, FOR SOLUTION ORAL at 10:20

## 2025-04-06 RX ADMIN — LINEZOLID 600 MG: 600 TABLET, FILM COATED ORAL at 20:54

## 2025-04-06 RX ADMIN — SEVELAMER CARBONATE 800 MG: 800 TABLET, FILM COATED ORAL at 18:10

## 2025-04-06 RX ADMIN — CHOLECALCIFEROL TAB 125 MCG (5000 UNIT) 5000 UNITS: 125 TAB at 10:17

## 2025-04-06 RX ADMIN — SODIUM CHLORIDE, PRESERVATIVE FREE 10 ML: 5 INJECTION INTRAVENOUS at 10:18

## 2025-04-06 RX ADMIN — PANTOPRAZOLE SODIUM 40 MG: 40 TABLET, DELAYED RELEASE ORAL at 06:46

## 2025-04-06 RX ADMIN — CYANOCOBALAMIN TAB 1000 MCG 1000 MCG: 1000 TAB at 10:17

## 2025-04-06 RX ADMIN — METHOCARBAMOL 750 MG: 500 TABLET ORAL at 14:54

## 2025-04-06 RX ADMIN — LINEZOLID 600 MG: 600 TABLET, FILM COATED ORAL at 10:17

## 2025-04-06 RX ADMIN — SEVELAMER CARBONATE 800 MG: 800 TABLET, FILM COATED ORAL at 10:18

## 2025-04-06 RX ADMIN — TRAZODONE HYDROCHLORIDE 50 MG: 50 TABLET ORAL at 20:54

## 2025-04-06 RX ADMIN — METHOCARBAMOL 750 MG: 500 TABLET ORAL at 18:09

## 2025-04-06 RX ADMIN — MELATONIN TAB 3 MG 3 MG: 3 TAB at 18:09

## 2025-04-06 RX ADMIN — SODIUM CHLORIDE, PRESERVATIVE FREE 10 ML: 5 INJECTION INTRAVENOUS at 20:55

## 2025-04-06 RX ADMIN — INSULIN LISPRO 4 UNITS: 100 INJECTION, SOLUTION INTRAVENOUS; SUBCUTANEOUS at 12:11

## 2025-04-06 RX ADMIN — ZINC SULFATE 220 MG (50 MG) CAPSULE 50 MG: CAPSULE at 10:17

## 2025-04-06 ASSESSMENT — PAIN DESCRIPTION - LOCATION
LOCATION: HIP
LOCATION: HIP;SHOULDER
LOCATION: HIP

## 2025-04-06 ASSESSMENT — PAIN DESCRIPTION - ORIENTATION
ORIENTATION: RIGHT
ORIENTATION: RIGHT

## 2025-04-06 ASSESSMENT — PAIN SCALES - GENERAL
PAINLEVEL_OUTOF10: 6
PAINLEVEL_OUTOF10: 7
PAINLEVEL_OUTOF10: 6
PAINLEVEL_OUTOF10: 5

## 2025-04-06 ASSESSMENT — PAIN - FUNCTIONAL ASSESSMENT: PAIN_FUNCTIONAL_ASSESSMENT: PREVENTS OR INTERFERES SOME ACTIVE ACTIVITIES AND ADLS

## 2025-04-06 ASSESSMENT — PAIN DESCRIPTION - PAIN TYPE: TYPE: ACUTE PAIN

## 2025-04-06 NOTE — PROGRESS NOTES
Date of Admission: 4/4/2025. Hospital Day: 3       HOSPITAL COURSE  75 y.o. male with multiple medical history including HTN, HLD, CAD, HFrEF, atrial fibrillation, renal cancer s/p right nephrectomy, ESRD on HD and currently being treated at inpatient rehab unit at [facility] high-grade enterococcal faecalis bacteremia, bilateral L3-4 and left L4-5 discitis and osteomyelitis, L3-4 level small epidural phlegmon and right psoas collection suspected abscess versus hematoma.  Hospitalist team was consulted for transfer to inpatient unit as patient was found to have worsening hemoglobin 9.4--8.3-7.4 with findings of right iliopsoas muscle 8.7 cm size hematoma with active extravasation suggestive of bleeding on CT scan of pelvis with contrast.  Patient was on Eliquis for atrial fibrillation which was held along with aspirin.  Patient was seen by orthopedic surgery team who recommended IR guided drainage of hematoma and possible embolization.  IR team evaluated patient and recommended keep holding aspirin and Eliquis, transfuse to keep hemoglobin more than 7.  No IR intervention recommended at this time.  PMR team contacted neurosurgery team at [facility] and reviewed the case and reportedly neurosurgery team recommended no acute surgical intervention currently and keep patient at [facility].  Patient currently being followed by ID team of Dr. Winn and being treated with IV vancomycin and linezolid.       Interval  History:   Patient reported moderate right hip pain      Physical Exam     General: Alert, Awake, oriented x 3, cooperative. No distress.   HEENT: Oral mucosa moist.   Neck: No JVD   Respiratory: Breath sounds decreased bilaterally. No rales/wheezes.  Speaks in full and clear sentences, no accessory muscle use.  Cardiovascular: S1 S2, RRR  Gastrointestinal: BS+. Soft, No distention, No tenderness, no rebound or guarding.   Musculoskeletal/ Extermities: No edema legs bilaterally.   PODIATRY  IP CONSULT TO PHARMACY      Francesco Berry MD

## 2025-04-06 NOTE — PLAN OF CARE
Problem: Discharge Planning  Goal: Discharge to home or other facility with appropriate resources  Outcome: Progressing     Problem: Chronic Conditions and Co-morbidities  Goal: Patient's chronic conditions and co-morbidity symptoms are monitored and maintained or improved  Outcome: Progressing     Problem: Safety - Adult  Goal: Free from fall injury  Outcome: Progressing     Problem: Pain  Goal: Verbalizes/displays adequate comfort level or baseline comfort level  Outcome: Progressing     Problem: Cardiovascular - Adult  Goal: Maintains optimal cardiac output and hemodynamic stability  Outcome: Progressing  Goal: Absence of cardiac dysrhythmias or at baseline  Outcome: Progressing     Problem: Skin/Tissue Integrity - Adult  Goal: Skin integrity remains intact  Outcome: Progressing     Problem: Musculoskeletal - Adult  Goal: Return mobility to safest level of function  Outcome: Progressing  Goal: Return ADL status to a safe level of function  Outcome: Progressing     Problem: Hematologic - Adult  Goal: Maintains hematologic stability  Outcome: Progressing

## 2025-04-06 NOTE — PROGRESS NOTES
[facility] Note    Patient Active Problem List   Diagnosis    HLD (hyperlipidemia)    Essential hypertension    Coronary artery disease due to lipid rich plaque    Cardiac dysrhythmia    Diabetes mellitus    Paroxysmal atrial fibrillation (HCC)    Renal mass    Cerebrovascular accident (CVA) (HCC)    Recent cerebrovascular accident (CVA)    HTN (hypertension), benign    PAF (paroxysmal atrial fibrillation) (HCC)    CASTRO (acute kidney injury)    LBBB (left bundle branch block)    H/O right nephrectomy    Anemia    Acute ischemic stroke (HCC)    Overweight (BMI 25.0-29.9)    AIN (acute interstitial nephritis)    Sterile pyuria    Current chronic use of systemic steroids    H/O systemic steroid therapy    Encounter for medication counseling    Secondary hypercoagulable state    Pneumothorax    Left inguinal hernia    Abscess of right shoulder    CRP elevated    Elevated sed rate    Abscess of right arm    ESRD on hemodialysis (HCC)    Hemorrhoid    Constipation    Low back pain    Lumbar foraminal stenosis    History of prostate cancer    Central stenosis of spinal canal    Enterococcus faecalis infection    Enterococcal bacteremia    Moderate malnutrition    Debility    Acute osteomyelitis of lumbar spine (HCC)    Lumbar discitis    Iliopsoas abscess on right (HCC)    Psoas hematoma, left, secondary to anticoagulant therapy, initial encounter    Psoas hematoma, right, secondary to anticoagulant therapy, sequela       Past Medical History:   has a past medical history of Atrial fibrillation (HCC), CAD (coronary artery disease), Cancer of kidney (HCC), Dependence on renal dialysis, Diabetes mellitus (HCC), Hyperlipidemia, Hypertension, Prostate cancer (HCC), Right renal mass, and Systolic CHF (HCC).    Past Social History:   reports that he quit smoking about 55 years ago. His smoking use included cigarettes. He started smoking about 57 years ago. He has never used smokeless tobacco. He reports  current alcohol use. He reports current drug use. Drug: Marijuana (Weed).    Subjective:    No complaints today  Tolerated HD well yesterday, 2.2 L removed.    Review of Systems   Constitutional:  Positive for fatigue. Negative for activity change, appetite change, chills, fever and unexpected weight change.   HENT:  Negative for congestion and facial swelling.    Eyes:  Negative for photophobia, discharge and redness.   Respiratory:  Negative for cough, chest tightness and shortness of breath.    Cardiovascular:  Negative for chest pain, palpitations and leg swelling.   Gastrointestinal:  Negative for abdominal distention, abdominal pain, blood in stool, constipation, diarrhea, nausea and vomiting.   Endocrine: Negative for cold intolerance, heat intolerance and polyuria.   Genitourinary:  Negative for decreased urine volume, difficulty urinating, flank pain and hematuria.   Musculoskeletal:  Negative for joint swelling and neck pain.   Neurological:  Negative for dizziness, seizures, syncope, speech difficulty, light-headedness and headaches.   Hematological:  Does not bruise/bleed easily.   Psychiatric/Behavioral:  Negative for agitation, confusion and hallucinations.        Objective:      /74   Pulse 69   Temp 97.2 °F (36.2 °C) (Oral)   Resp 18   Wt 69.1 kg (152 lb 4.8 oz)   SpO2 97%   BMI 19.04 kg/m²     Wt Readings from Last 3 Encounters:   04/06/25 69.1 kg (152 lb 4.8 oz)   04/04/25 73.6 kg (162 lb 4.1 oz)   03/29/25 65 kg (143 lb 4.8 oz)       BP Readings from Last 3 Encounters:   04/06/25 138/74   04/04/25 115/70   03/30/25 130/70         Chest- clear  Heart-regular  Abd-soft  Ext- no edema    Labs  Hemoglobin   Date Value Ref Range Status   04/06/2025 9.0 (L) 13.5 - 17.5 g/dL Final     Hematocrit   Date Value Ref Range Status   04/06/2025 26.0 (L) 40.5 - 52.5 % Final     WBC   Date Value Ref Range Status   04/05/2025 8.3 4.0 - 11.0 K/uL Final     Platelets   Date Value Ref Range Status

## 2025-04-07 LAB
ANION GAP SERPL CALCULATED.3IONS-SCNC: 15 MMOL/L (ref 3–16)
ANTI-XA UNFRAC HEPARIN: 0.15 IU/ML (ref 0.3–0.7)
ANTI-XA UNFRAC HEPARIN: 0.24 IU/ML (ref 0.3–0.7)
APTT BLD: 34.2 SEC (ref 22.1–36.4)
BUN SERPL-MCNC: 46 MG/DL (ref 7–20)
CALCIUM SERPL-MCNC: 8.7 MG/DL (ref 8.3–10.6)
CHLORIDE SERPL-SCNC: 100 MMOL/L (ref 99–110)
CO2 SERPL-SCNC: 20 MMOL/L (ref 21–32)
CREAT SERPL-MCNC: 2.4 MG/DL (ref 0.8–1.3)
GFR SERPLBLD CREATININE-BSD FMLA CKD-EPI: 27 ML/MIN/{1.73_M2}
GLUCOSE BLD-MCNC: 112 MG/DL (ref 70–99)
GLUCOSE BLD-MCNC: 123 MG/DL (ref 70–99)
GLUCOSE BLD-MCNC: 170 MG/DL (ref 70–99)
GLUCOSE BLD-MCNC: 185 MG/DL (ref 70–99)
GLUCOSE BLD-MCNC: 193 MG/DL (ref 70–99)
GLUCOSE SERPL-MCNC: 150 MG/DL (ref 70–99)
HCT VFR BLD AUTO: 25.3 % (ref 40.5–52.5)
HCT VFR BLD AUTO: 25.6 % (ref 40.5–52.5)
HCT VFR BLD AUTO: 28.3 % (ref 40.5–52.5)
HGB BLD-MCNC: 8.3 G/DL (ref 13.5–17.5)
HGB BLD-MCNC: 8.8 G/DL (ref 13.5–17.5)
HGB BLD-MCNC: 9.5 G/DL (ref 13.5–17.5)
INR PPP: 1.14 (ref 0.85–1.15)
PERFORMED ON: ABNORMAL
POTASSIUM SERPL-SCNC: 3.9 MMOL/L (ref 3.5–5.1)
PROTHROMBIN TIME: 14.8 SEC (ref 11.9–14.9)
SODIUM SERPL-SCNC: 135 MMOL/L (ref 136–145)

## 2025-04-07 PROCEDURE — 6370000000 HC RX 637 (ALT 250 FOR IP): Performed by: STUDENT IN AN ORGANIZED HEALTH CARE EDUCATION/TRAINING PROGRAM

## 2025-04-07 PROCEDURE — 85520 HEPARIN ASSAY: CPT

## 2025-04-07 PROCEDURE — 99223 1ST HOSP IP/OBS HIGH 75: CPT | Performed by: INTERNAL MEDICINE

## 2025-04-07 PROCEDURE — 85730 THROMBOPLASTIN TIME PARTIAL: CPT

## 2025-04-07 PROCEDURE — 97161 PT EVAL LOW COMPLEX 20 MIN: CPT

## 2025-04-07 PROCEDURE — 6370000000 HC RX 637 (ALT 250 FOR IP): Performed by: HOSPITALIST

## 2025-04-07 PROCEDURE — 6360000002 HC RX W HCPCS: Performed by: STUDENT IN AN ORGANIZED HEALTH CARE EDUCATION/TRAINING PROGRAM

## 2025-04-07 PROCEDURE — 2500000003 HC RX 250 WO HCPCS: Performed by: STUDENT IN AN ORGANIZED HEALTH CARE EDUCATION/TRAINING PROGRAM

## 2025-04-07 PROCEDURE — 80048 BASIC METABOLIC PNL TOTAL CA: CPT

## 2025-04-07 PROCEDURE — 2060000000 HC ICU INTERMEDIATE R&B

## 2025-04-07 PROCEDURE — 97116 GAIT TRAINING THERAPY: CPT

## 2025-04-07 PROCEDURE — 97166 OT EVAL MOD COMPLEX 45 MIN: CPT

## 2025-04-07 PROCEDURE — 36415 COLL VENOUS BLD VENIPUNCTURE: CPT

## 2025-04-07 PROCEDURE — 85610 PROTHROMBIN TIME: CPT

## 2025-04-07 PROCEDURE — 97535 SELF CARE MNGMENT TRAINING: CPT

## 2025-04-07 PROCEDURE — 85018 HEMOGLOBIN: CPT

## 2025-04-07 PROCEDURE — 85014 HEMATOCRIT: CPT

## 2025-04-07 RX ORDER — HEPARIN SODIUM 10000 [USP'U]/100ML
5-30 INJECTION, SOLUTION INTRAVENOUS CONTINUOUS
Status: DISPENSED | OUTPATIENT
Start: 2025-04-07 | End: 2025-04-08

## 2025-04-07 RX ORDER — HEPARIN SODIUM 1000 [USP'U]/ML
4000 INJECTION, SOLUTION INTRAVENOUS; SUBCUTANEOUS ONCE
Status: COMPLETED | OUTPATIENT
Start: 2025-04-07 | End: 2025-04-07

## 2025-04-07 RX ORDER — HEPARIN SODIUM 1000 [USP'U]/ML
4000 INJECTION, SOLUTION INTRAVENOUS; SUBCUTANEOUS PRN
Status: DISCONTINUED | OUTPATIENT
Start: 2025-04-07 | End: 2025-04-08 | Stop reason: HOSPADM

## 2025-04-07 RX ORDER — HEPARIN SODIUM 1000 [USP'U]/ML
2000 INJECTION, SOLUTION INTRAVENOUS; SUBCUTANEOUS PRN
Status: DISCONTINUED | OUTPATIENT
Start: 2025-04-07 | End: 2025-04-08 | Stop reason: HOSPADM

## 2025-04-07 RX ADMIN — HEPARIN SODIUM 12 UNITS/KG/HR: 10000 INJECTION, SOLUTION INTRAVENOUS at 11:15

## 2025-04-07 RX ADMIN — HEPARIN SODIUM 14 UNITS/KG/HR: 10000 INJECTION, SOLUTION INTRAVENOUS at 19:06

## 2025-04-07 RX ADMIN — METOPROLOL TARTRATE 50 MG: 50 TABLET, FILM COATED ORAL at 08:17

## 2025-04-07 RX ADMIN — SEVELAMER CARBONATE 800 MG: 800 TABLET, FILM COATED ORAL at 17:49

## 2025-04-07 RX ADMIN — CHOLECALCIFEROL TAB 125 MCG (5000 UNIT) 5000 UNITS: 125 TAB at 08:17

## 2025-04-07 RX ADMIN — HEPARIN SODIUM 4000 UNITS: 1000 INJECTION, SOLUTION INTRAVENOUS; SUBCUTANEOUS at 11:15

## 2025-04-07 RX ADMIN — METOPROLOL TARTRATE 50 MG: 50 TABLET, FILM COATED ORAL at 20:58

## 2025-04-07 RX ADMIN — OXYCODONE HYDROCHLORIDE AND ACETAMINOPHEN 500 MG: 500 TABLET ORAL at 08:17

## 2025-04-07 RX ADMIN — METHOCARBAMOL 750 MG: 500 TABLET ORAL at 08:17

## 2025-04-07 RX ADMIN — OXYCODONE HYDROCHLORIDE AND ACETAMINOPHEN 1 TABLET: 5; 325 TABLET ORAL at 08:32

## 2025-04-07 RX ADMIN — SODIUM CHLORIDE, PRESERVATIVE FREE 10 ML: 5 INJECTION INTRAVENOUS at 08:16

## 2025-04-07 RX ADMIN — METHOCARBAMOL 750 MG: 500 TABLET ORAL at 17:49

## 2025-04-07 RX ADMIN — POLYETHYLENE GLYCOL 3350 17 G: 17 POWDER, FOR SOLUTION ORAL at 08:17

## 2025-04-07 RX ADMIN — OXYCODONE HYDROCHLORIDE AND ACETAMINOPHEN 1 TABLET: 5; 325 TABLET ORAL at 03:46

## 2025-04-07 RX ADMIN — METHOCARBAMOL 750 MG: 500 TABLET ORAL at 14:42

## 2025-04-07 RX ADMIN — LINEZOLID 600 MG: 600 TABLET, FILM COATED ORAL at 20:58

## 2025-04-07 RX ADMIN — HEPARIN SODIUM 2000 UNITS: 1000 INJECTION, SOLUTION INTRAVENOUS; SUBCUTANEOUS at 19:06

## 2025-04-07 RX ADMIN — SEVELAMER CARBONATE 800 MG: 800 TABLET, FILM COATED ORAL at 11:17

## 2025-04-07 RX ADMIN — INSULIN GLARGINE 8 UNITS: 100 INJECTION, SOLUTION SUBCUTANEOUS at 20:59

## 2025-04-07 RX ADMIN — LINEZOLID 600 MG: 600 TABLET, FILM COATED ORAL at 08:17

## 2025-04-07 RX ADMIN — PANTOPRAZOLE SODIUM 40 MG: 40 TABLET, DELAYED RELEASE ORAL at 08:17

## 2025-04-07 RX ADMIN — METHOCARBAMOL 750 MG: 500 TABLET ORAL at 20:58

## 2025-04-07 RX ADMIN — ZINC SULFATE 220 MG (50 MG) CAPSULE 50 MG: CAPSULE at 08:17

## 2025-04-07 RX ADMIN — CYANOCOBALAMIN TAB 1000 MCG 1000 MCG: 1000 TAB at 08:17

## 2025-04-07 RX ADMIN — ROSUVASTATIN 10 MG: 10 TABLET, FILM COATED ORAL at 20:58

## 2025-04-07 RX ADMIN — SEVELAMER CARBONATE 800 MG: 800 TABLET, FILM COATED ORAL at 08:23

## 2025-04-07 RX ADMIN — OXYCODONE HYDROCHLORIDE AND ACETAMINOPHEN 1 TABLET: 5; 325 TABLET ORAL at 14:46

## 2025-04-07 RX ADMIN — Medication 1 MG: at 08:17

## 2025-04-07 RX ADMIN — MELATONIN TAB 3 MG 3 MG: 3 TAB at 20:58

## 2025-04-07 ASSESSMENT — PAIN SCALES - GENERAL
PAINLEVEL_OUTOF10: 0
PAINLEVEL_OUTOF10: 6
PAINLEVEL_OUTOF10: 5
PAINLEVEL_OUTOF10: 0
PAINLEVEL_OUTOF10: 5
PAINLEVEL_OUTOF10: 0

## 2025-04-07 ASSESSMENT — PAIN DESCRIPTION - ORIENTATION
ORIENTATION: LOWER
ORIENTATION: MID

## 2025-04-07 ASSESSMENT — ENCOUNTER SYMPTOMS
ABDOMINAL PAIN: 0
WHEEZING: 0
NAUSEA: 0
BACK PAIN: 0
SINUS PRESSURE: 0
SHORTNESS OF BREATH: 0
RHINORRHEA: 0
COUGH: 0
EYE REDNESS: 0
SORE THROAT: 0
EYE DISCHARGE: 0
CONSTIPATION: 0
COLOR CHANGE: 1
SINUS PAIN: 0
DIARRHEA: 0

## 2025-04-07 ASSESSMENT — PAIN DESCRIPTION - DESCRIPTORS
DESCRIPTORS: ACHING

## 2025-04-07 ASSESSMENT — PAIN DESCRIPTION - LOCATION
LOCATION: BACK

## 2025-04-07 NOTE — PROGRESS NOTES
Progress Note  Date:2025       Room:[facility]77/3377-01  Patient Name:Danny Rock     YOB: 1950     Age:75 y.o.        Subjective    Subjective:  Pain:  He reports no pain.       Review of Systems   Skin:  Positive for color change (toe nails).     Objective         Vitals Last 24 Hours:  TEMPERATURE:  Temp  Av °F (36.7 °C)  Min: 97.2 °F (36.2 °C)  Max: 98.7 °F (37.1 °C)  RESPIRATIONS RANGE: Resp  Av.6  Min: 16  Max: 18  PULSE OXIMETRY RANGE: SpO2  Av.3 %  Min: 97 %  Max: 100 %  PULSE RANGE: Pulse  Av.8  Min: 64  Max: 77  BLOOD PRESSURE RANGE: Systolic (24hrs), Av , Min:111 , Max:157   ; Diastolic (24hrs), Av, Min:66, Max:84    I/O (24Hr):    Intake/Output Summary (Last 24 hours) at 2025 0953  Last data filed at 2025 0353  Gross per 24 hour   Intake --   Output 800 ml   Net -800 ml     Objective:  Vital signs: (most recent): Blood pressure (!) 144/74, pulse 77, temperature 98.7 °F (37.1 °C), temperature source Oral, resp. rate 18, weight 69.7 kg (153 lb 9.6 oz), SpO2 100%.    Skin:  There is ecchymosis (surround toe nail).      Labs/Imaging/Diagnostics    Labs:  CBC:  Recent Labs     25  1250 25  1929 25  1738 25  0052 25  0902   WBC 8.3  --   --   --   --    RBC 2.64*  --   --   --   --    HGB 8.8*   < > 9.0* 8.8* 9.5*   HCT 26.7*   < > 27.4* 25.6* 28.3*   .9*  --   --   --   --    RDW 19.1*  --   --   --   --      --   --   --   --     < > = values in this interval not displayed.     CHEMISTRIES:  Recent Labs     25  1250 25  0902    135*   K 3.3* 3.9    100   CO2 26 20*   BUN 17 46*   CREATININE 1.4* 2.4*   GLUCOSE 172* 150*   PHOS 2.0*  --      PT/INR:  Recent Labs     25  09   PROTIME 14.8   INR 1.14     APTT:  Recent Labs     25   APTT 34.2     LIVER PROFILE:No results for input(s): \"AST\", \"ALT\", \"BILIDIR\", \"BILITOT\", \"ALKPHOS\" in the last 72 hours.    Imaging Last 24  Hours:  No results found.  Assessment//Plan           Hospital Problems           Last Modified POA    * (Principal) Psoas hematoma, right, secondary to anticoagulant therapy, sequela 4/4/2025 Yes     Assessment:   (onychomycosis).     Plan:    (Debridement as needed).       Electronically signed by Iain Isbell DPM on 4/7/25 at 9:53 AM EDT

## 2025-04-07 NOTE — PROGRESS NOTES
[facility] - Inpatient Rehabilitation Department   Phone: [phone]    Occupational Therapy    [x] Initial Evaluation            [] Daily Treatment Note         [] Discharge Summary      Patient: Danny Rock   : 1950   MRN: [id number]   Date of Service:  2025    Admitting Diagnosis:  Psoas hematoma, right, secondary to anticoagulant therapy, sequela  Current Admission Summary: Danny Rock is a 75 y.o. male with PMHx notable for HTN, HLD, CAD, HFrEF, atrial fibrillation, renal cancer s/p right nephrectomy, ESRD on HD who initially presented to [facility] on 3/22 with low back pain and had MRI showing severe multilevel foraminal stenosis worst at L5-S1 prompting transfer to [facility] for neurosurgical evaluation. Repeat MRI C/T/L-spine on 3/23 showed multilevel disc/endplate enhancement compatible with discitis and osteomyelitis at L2-L3, L3-L4 and L4-L5 with adjacent paravertebral edema, and small right psoas muscle abscess. Blood cultures resulted positive for Enterococcus faecalis. He was started on IV vancomycin and gentamicin on 3/25, with plan at discharge to continue IV vancomycin 3 times a week after dialysis through 2025. NSGY evaluated while inpatient, recommended no surgical intervention at this time. Nephrology consulted for HD needs, had old HD access removed and TDC placed on 3/28   Past Medical History:  has a past medical history of Atrial fibrillation (HCC), CAD (coronary artery disease), Cancer of kidney (HCC), Dependence on renal dialysis, Diabetes mellitus (HCC), Hyperlipidemia, Hypertension, Prostate cancer (HCC), Right renal mass, and Systolic CHF (HCC).  Past Surgical History:  has a past surgical history that includes hernia repair; knee surgery; Cholecystectomy; Nasal septum surgery; eye surgery (Bilateral, 2018); Kidney removal (Right, 2022); Cataract removal (Bilateral); IR TUNNELED CVC PLACE WO SQ PORT/PUMP > 5 YEARS (2024); CT NEEDLE  lower body ADL at modified independent   Patient will complete toileting at modified independent   Patient will complete functional transfers at modified independent   Patient will complete functional mobility at modified independent   Patient will increase [facility] ADL score = to or > than 24/24    Above goals reviewed on 4/7/2025.  All goals are ongoing at this time unless indicated above.       Therapy Session Time     Individual Group Co-treatment   Time In    1155   Time Out    1222   Minutes    27        Timed Code Treatment Minutes:  12    Total Treatment Minutes:  27       Electronically Signed By: Maliha Marin, OTR/L [id number]

## 2025-04-07 NOTE — PROGRESS NOTES
[facility] - Inpatient Rehabilitation Department   Phone: [phone]    Physical Therapy    [x] Initial Evaluation            [] Daily Treatment Note         [] Discharge Summary      Patient: Danny Rock   : 1950   MRN: [id number]   Date of Service:  2025  Admitting Diagnosis: Psoas hematoma, right, secondary to anticoagulant therapy, sequela  Current Admission Summary: Per MD note, patient is a 75 y.o. male with multiple medical history including HTN, HLD, CAD, HFrEF, atrial fibrillation, renal cancer s/p right nephrectomy, ESRD on HD and currently being treated at inpatient rehab unit at [facility] high-grade enterococcal faecalis bacteremia, bilateral L3-4 and left L4-5 discitis and osteomyelitis, L3-4 level small epidural phlegmon and right psoas collection suspected abscess versus hematoma. Hospitalist team was consulted for transfer to inpatient unit as patient was found to have worsening hemoglobin 9.4--8.3-7.4 with findings of right iliopsoas muscle 8.7 cm size hematoma with active extravasation suggestive of bleeding on CT scan of pelvis with contrast. Patient was on Eliquis for atrial fibrillation which was held along with aspirin. Patient was seen by orthopedic surgery team who recommended IR guided drainage of hematoma and possible embolization. IR team evaluated patient and recommended keep holding aspirin and Eliquis, transfuse to keep hemoglobin more than 7. No IR intervention recommended at this time. PMR team contacted neurosurgery team at [facility] and reviewed the case and reportedly neurosurgery team recommended no acute surgical intervention currently and keep patient at [facility]. Patient currently being followed by ID team of Dr. Winn and being treated with IV vancomycin and linezolid   Past Medical History:  has a past medical history of Atrial fibrillation (HCC), CAD (coronary artery disease), Cancer of kidney (HCC), Dependence on renal  risk for falls, and nurse notified    Plan  Frequency: 5-7 x/week  Current Treatment Recommendations: strengthening, ROM, balance training, functional mobility training, transfer training, gait training, stair training, endurance training, patient/caregiver education, pain management, home exercise program, safety education, equipment evaluation/education, and positioning    Goals  Patient Goals: Improve strength, mobility   Short Term Goals:  Time Frame: Prior to d/c  Patient will complete bed mobility at Independent   Patient will complete transfers at modified independent   Patient will ambulate 200 ft with use of LRAD at stand by assistance    Above goals reviewed on 4/7/2025.  All goals are ongoing at this time unless indicated above.      Therapy Session Time      Individual Group Co-treatment   Time In     1155   Time Out     1222   Minutes     27     Timed Code Treatment Minutes:   12  Total Treatment Minutes:  27       Electronically Signed By: Leydi Ghosh, SPT  PT providing direct supervision during session and assisting in making skilled judgements throughout session.  Robin Youssef, PT, DPT, [id number]

## 2025-04-07 NOTE — PROGRESS NOTES
Physician Progress Note      PATIENT:               MARCOS TORRES  CSN #:                  [id number]  :                       1950  ADMIT DATE:       3/22/2025 6:51 PM  DISCH DATE:        3/30/2025 4:16 PM  RESPONDING  PROVIDER #:        Marva Todd MD          QUERY TEXT:    Please clarify in documentation the relationship, if any, between  Osteomyelitis  and diabetes. Are the conditions:    Patient presented with bacteremia with enterococcus and multilevel lumbar   discitis and osteomyelitis. MRI of the lumbar, thoracic and cervical spine   3/23 concerning for multilevel abnormal disc signal with disc enhancement and   endplate enhancement compatible with discitis and osteomyelitis at L2-L3,   L3-L4 and L4-L5. Blood culture on 3/22 positive for Enterococcus faecalis and   Staph epidermidis. Repeat blood culture 3/23, 3/24 with 2 out of 2 positive   for Enterococcus faecalis. DELMAR with no valvular  vegetation 3/26. EF 55 to 60%. Plan to continue IV vancomycin 3 times a week   after dialysis, through 2025.    The clinical indicators include:  Options provided:  -- Related to or associated with each other  -- Unrelated to each other  -- Other - I will add my own diagnosis  -- Disagree - Not applicable / Not valid  -- Disagree - Clinically unable to determine / Unknown  -- Refer to Clinical Documentation Reviewer    PROVIDER RESPONSE TEXT:    The conditions noted are unrelated to each other.    Query created by: Haja Seymour on 2025 9:53 AM      Electronically signed by:  Marva Todd MD 2025 3:15 PM

## 2025-04-07 NOTE — CONSULTS
Thyroid: No thyromegaly.   Cardiovascular:      Rate and Rhythm: Normal rate and regular rhythm.      Heart sounds: Normal heart sounds. No murmur heard.     No friction rub.   Pulmonary:      Effort: No respiratory distress.      Breath sounds: No stridor. No wheezing or rales.   Abdominal:      General: Bowel sounds are normal.      Palpations: Abdomen is soft.      Tenderness: There is no abdominal tenderness. There is no guarding or rebound.   Musculoskeletal:         General: No swelling, tenderness or deformity. Normal range of motion.      Cervical back: Normal range of motion and neck supple.      Right lower leg: No edema.      Left lower leg: No edema.   Lymphadenopathy:      Cervical: No cervical adenopathy.   Skin:     General: Skin is warm and dry.      Coloration: Skin is not jaundiced.      Findings: No bruising, erythema or rash.   Neurological:      General: No focal deficit present.      Mental Status: He is alert and oriented to person, place, and time. Mental status is at baseline.      Motor: No abnormal muscle tone.   Psychiatric:         Mood and Affect: Mood normal.         Behavior: Behavior normal.           Lines and drains: All vascular access sites are healthy with no local erythema, discharge or tenderness.      Intake and output:     I/O last 3 completed shifts:  In: 10 [I.V.:10]  Out: 1800 [Urine:1800]    Lab Data:   All available labs were reviewed by me today.     CBC:   Recent Labs     04/05/25  1250 04/05/25  1929 04/06/25  1738 04/07/25  0052 04/07/25  0902   WBC 8.3  --   --   --   --    RBC 2.64*  --   --   --   --    HGB 8.8*   < > 9.0* 8.8* 9.5*   HCT 26.7*   < > 27.4* 25.6* 28.3*     --   --   --   --    .9*  --   --   --   --    MCH 33.4  --   --   --   --    MCHC 33.1  --   --   --   --    RDW 19.1*  --   --   --   --     < > = values in this interval not displayed.        BMP:  Recent Labs     04/05/25  1250 04/07/25  0902    135*   K 3.3* 3.9  D68.69    Pneumothorax J93.9    Left inguinal hernia K40.90    Abscess of right shoulder L02.413    CRP elevated R79.82    Elevated sed rate R70.0    Abscess of right arm L02.413    ESRD on dialysis ([facility]) N18.6, Z99.2    Hemorrhoid K64.9    Constipation K59.00    Low back pain M54.50    Lumbar foraminal stenosis M48.061    History of prostate cancer Z85.46    Central stenosis of spinal canal M48.00    Enterococcus faecalis infection A49.8    Enterococcal bacteremia R78.81, B95.2    Moderate malnutrition E44.0    Debility R53.81    Osteomyelitis of lumbar spine (HCC) M46.26    Lumbar discitis M46.46    Iliopsoas abscess on right ([facility]) K68.12    Psoas hematoma, left, secondary to anticoagulant therapy, initial encounter S30.1XXA    Psoas hematoma, right, secondary to anticoagulant therapy, sequela S30.1XXS         Please note that this chart was generated using Dragon dictation software. Although every effort was made to ensure the accuracy of this automated transcription, some errors in transcription may have occurred inadvertently. If you may need any clarification, please do not hesitate to contact me through EPIC or at the phone number provided below with my electronic signature.  Any pictures or media included in this note were obtained after taking informed verbal consent from the patient and with their approval to include those in the patient's medical record.        Jadiel Esteves MD, MPH, FACP, [facility]  4/7/2025, 10:18 AM  Central Office Phone: [phone]  Central Office Fax: [phone]    [facility]   [address], [address] ([address])  [place] [address]  [place] [facility] days:  Tuesday & Thursday AM    [facility]  [address] , [address] (Medical office Building)  [place], [place], [address]  [facility] days: Wednesday AM

## 2025-04-07 NOTE — PROGRESS NOTES
Date of Admission: 4/4/2025. Hospital Day: 4       HOSPITAL COURSE  75 y.o. male with multiple medical history including HTN, HLD, nonobstructive CAD, HFrEF, atrial fibrillation, renal cancer s/p right nephrectomy, ESRD on HD and currently being treated at inpatient rehab unit at [facility] high-grade enterococcal faecalis bacteremia, bilateral L3-4 and left L4-5 discitis and osteomyelitis, L3-4 level small epidural phlegmon and right psoas collection suspected abscess versus hematoma.  Hospitalist team was consulted for transfer to inpatient unit as patient was found to have worsening hemoglobin 9.4--8.3-7.4 with findings of right iliopsoas muscle 8.7 cm size hematoma with active extravasation suggestive of bleeding on CT scan of pelvis with contrast.  Patient was on Eliquis for atrial fibrillation which was held along with aspirin.  Patient was seen by orthopedic surgery team who recommended IR guided drainage of hematoma and possible embolization.  IR team evaluated patient and recommended keep holding aspirin and Eliquis, transfuse to keep hemoglobin more than 7.  No IR intervention recommended at this time.  PMR team contacted neurosurgery team at [facility] and reviewed the case and reportedly neurosurgery team recommended no acute surgical intervention currently and keep patient at [facility].  Patient currently being followed by ID team  and being treated with IV vancomycin and linezolid.   4/7 started on therapeutic anticoagulation with heparin, he is at high risk of rebleed but also very high risk of CVA with A-fib, plan to continue warfarin 8 hours and if tolerates will switch to oral antibiotics and discharge back to rehab or SNF    Interval  History:   Patient reported moderate right hip pain      Physical Exam     General: Alert, Awake, oriented x 3, cooperative. No distress.   HEENT: Oral mucosa moist.   Neck: No JVD   Respiratory: Breath sounds decreased bilaterally. No

## 2025-04-07 NOTE — PROGRESS NOTES
MD Gordon Frankel MD Aldo Estella, DO                Office: [phone]                Fax: [phone]            [facility]                      NEPHROLOGY  INPATIENT PROGRESS  NOTE:     PATIENT NAME: Danny Rock  : 1950  MRN: [id number]        Patient Active Problem List   Diagnosis    HLD (hyperlipidemia)    Essential hypertension    Coronary artery disease due to lipid rich plaque    Cardiac dysrhythmia    Diabetes mellitus    Paroxysmal atrial fibrillation (HCC)    Renal mass    Cerebrovascular accident (CVA) (HCC)    Recent cerebrovascular accident (CVA)    HTN (hypertension), benign    PAF (paroxysmal atrial fibrillation) (HCC)    CASTRO (acute kidney injury)    LBBB (left bundle branch block)    H/O right nephrectomy    Anemia    Acute ischemic stroke (HCC)    Overweight (BMI 25.0-29.9)    AIN (acute interstitial nephritis)    Sterile pyuria    Current chronic use of systemic steroids    H/O systemic steroid therapy    Encounter for medication counseling    Secondary hypercoagulable state    Pneumothorax    Left inguinal hernia    Abscess of right shoulder    CRP elevated    Elevated sed rate    Abscess of right arm    ESRD on dialysis (HCC)    Hemorrhoid    Constipation    Low back pain    Lumbar foraminal stenosis    History of prostate cancer    Central stenosis of spinal canal    Enterococcus faecalis infection    Enterococcal bacteremia    Moderate malnutrition    Debility    Osteomyelitis of lumbar spine (HCC)    Lumbar discitis    Iliopsoas abscess on right (HCC)    Psoas hematoma, left, secondary to anticoagulant therapy, initial encounter    Psoas hematoma, right, secondary to anticoagulant therapy, sequela       Past Medical History:   has a past medical history of Atrial fibrillation (HCC), CAD (coronary artery disease), Cancer of kidney (HCC), Dependence on renal dialysis, Diabetes mellitus (HCC), Hyperlipidemia, Hypertension, Prostate cancer (HCC), Right  renal mass, and Systolic CHF (HCC).    Past Social History:   reports that he quit smoking about 55 years ago. His smoking use included cigarettes. He started smoking about 57 years ago. He has never used smokeless tobacco. He reports current alcohol use. He reports current drug use. Drug: Marijuana (Weed).      Subjective / interval history / nephrology update / medical decision making:   Refer to assessment and plan for more details.   Patient was seen comfortably  in chair,   Reported no active complaints/distress,   Renal labs, vital signs, recent input output reviewed/noted    Wants to leave to rehab ASAP .    Tolerated HD well Saturday   2.2 L removed.        Objective:      BP (!) 144/74   Pulse 77   Temp 98.7 °F (37.1 °C) (Oral)   Resp 18   Wt 69.7 kg (153 lb 9.6 oz)   SpO2 100%   BMI 19.20 kg/m²     Wt Readings from Last 3 Encounters:   04/07/25 69.7 kg (153 lb 9.6 oz)   04/04/25 73.6 kg (162 lb 4.1 oz)   03/29/25 65 kg (143 lb 4.8 oz)       BP Readings from Last 3 Encounters:   04/07/25 (!) 144/74   04/04/25 115/70   03/30/25 130/70         Chest- clear  Heart-regular  Abd-soft  Ext- no edema    Labs  Hemoglobin   Date Value Ref Range Status   04/07/2025 9.5 (L) 13.5 - 17.5 g/dL Final     Hematocrit   Date Value Ref Range Status   04/07/2025 28.3 (L) 40.5 - 52.5 % Final     WBC   Date Value Ref Range Status   04/05/2025 8.3 4.0 - 11.0 K/uL Final     Platelets   Date Value Ref Range Status   04/05/2025 356 135 - 450 K/uL Final     Lab Results   Component Value Date    CREATININE 2.4 (H) 04/07/2025    BUN 46 (H) 04/07/2025     (L) 04/07/2025    K 3.9 04/07/2025     04/07/2025    CO2 20 (L) 04/07/2025       Assessment/Plan:  1.  ESRD-outpatient HD TTH S at [facility] under our care.  Fluid removal towards dry weight.  Appears euvolemic.    Next HD Tuesday    2.  Mild hyponatremia-follow with HD.  Better.  Rate of correction acceptable.  3.  Anemia due to CKD POONAM with HD.  Follow

## 2025-04-07 NOTE — PLAN OF CARE
Problem: Discharge Planning  Goal: Discharge to home or other facility with appropriate resources  Outcome: Progressing     Problem: Chronic Conditions and Co-morbidities  Goal: Patient's chronic conditions and co-morbidity symptoms are monitored and maintained or improved  Outcome: Progressing     Problem: Safety - Adult  Goal: Free from fall injury  Outcome: Progressing     Problem: Pain  Goal: Verbalizes/displays adequate comfort level or baseline comfort level  Outcome: Progressing     Problem: Cardiovascular - Adult  Goal: Maintains optimal cardiac output and hemodynamic stability  Outcome: Progressing  Goal: Absence of cardiac dysrhythmias or at baseline  Outcome: Progressing     Problem: Skin/Tissue Integrity - Adult  Goal: Skin integrity remains intact  Outcome: Progressing     Problem: Musculoskeletal - Adult  Goal: Return mobility to safest level of function  Outcome: Progressing  Goal: Return ADL status to a safe level of function  Outcome: Progressing     Problem: Metabolic/Fluid and Electrolytes - Adult  Goal: Electrolytes maintained within normal limits  Outcome: Progressing  Goal: Hemodynamic stability and optimal renal function maintained  Outcome: Progressing  Goal: Glucose maintained within prescribed range  Outcome: Progressing     Problem: Hematologic - Adult  Goal: Maintains hematologic stability  Outcome: Progressing     Problem: Respiratory - Adult  Goal: Achieves optimal ventilation and oxygenation  Outcome: Progressing     Problem: Genitourinary - Adult  Goal: Absence of urinary retention  Outcome: Progressing     Problem: Gastrointestinal - Adult  Goal: Maintains adequate nutritional intake  Outcome: Progressing

## 2025-04-07 NOTE — PLAN OF CARE
Problem: Discharge Planning  Goal: Discharge to home or other facility with appropriate resources  4/6/2025 2249 by Qi Licona RN  Outcome: Progressing     Problem: Chronic Conditions and Co-morbidities  Goal: Patient's chronic conditions and co-morbidity symptoms are monitored and maintained or improved  4/6/2025 2249 by Qi Licona RN  Outcome: Progressing     Problem: Safety - Adult  Goal: Free from fall injury  4/6/2025 2249 by Qi Licona RN  Outcome: Progressing     Problem: Pain  Goal: Verbalizes/displays adequate comfort level or baseline comfort level  4/6/2025 2249 by Qi Licona RN  Outcome: Progressing     Problem: Cardiovascular - Adult  Goal: Maintains optimal cardiac output and hemodynamic stability  4/6/2025 2249 by Qi Licona RN  Outcome: Progressing     Problem: Cardiovascular - Adult  Goal: Absence of cardiac dysrhythmias or at baseline  4/6/2025 2249 by Qi Licona RN  Outcome: Progressing     Problem: Skin/Tissue Integrity - Adult  Goal: Skin integrity remains intact  4/6/2025 2249 by Qi Licona RN  Outcome: Progressing     Problem: Musculoskeletal - Adult  Goal: Return mobility to safest level of function  4/6/2025 2249 by Qi Licona RN  Outcome: Progressing     Problem: Musculoskeletal - Adult  Goal: Return ADL status to a safe level of function  4/6/2025 2249 by Qi Licona RN  Outcome: Progressing     Problem: Metabolic/Fluid and Electrolytes - Adult  Goal: Electrolytes maintained within normal limits  4/6/2025 2249 by Qi Licona RN  Outcome: Progressing

## 2025-04-08 ENCOUNTER — HOSPITAL ENCOUNTER (INPATIENT)
Age: 75
LOS: 9 days | Discharge: HOME | DRG: 947 | End: 2025-04-17
Attending: STUDENT IN AN ORGANIZED HEALTH CARE EDUCATION/TRAINING PROGRAM | Admitting: STUDENT IN AN ORGANIZED HEALTH CARE EDUCATION/TRAINING PROGRAM
Payer: MEDICARE

## 2025-04-08 VITALS
TEMPERATURE: 98.1 F | HEIGHT: 75 IN | DIASTOLIC BLOOD PRESSURE: 66 MMHG | HEART RATE: 65 BPM | SYSTOLIC BLOOD PRESSURE: 146 MMHG | RESPIRATION RATE: 19 BRPM | BODY MASS INDEX: 18.75 KG/M2 | WEIGHT: 150.79 LBS | OXYGEN SATURATION: 99 %

## 2025-04-08 DIAGNOSIS — S30.1XXS: ICD-10-CM

## 2025-04-08 DIAGNOSIS — M46.26 OSTEOMYELITIS OF LUMBAR SPINE (HCC): Primary | ICD-10-CM

## 2025-04-08 PROBLEM — M47.816 LUMBAR SPONDYLOSIS: Status: ACTIVE | Noted: 2025-04-08

## 2025-04-08 LAB
ALBUMIN SERPL-MCNC: 2.8 G/DL (ref 3.4–5)
ANION GAP SERPL CALCULATED.3IONS-SCNC: 14 MMOL/L (ref 3–16)
ANTI-XA UNFRAC HEPARIN: 0.21 IU/ML (ref 0.3–0.7)
ANTI-XA UNFRAC HEPARIN: 0.39 IU/ML (ref 0.3–0.7)
ANTI-XA UNFRAC HEPARIN: 0.57 IU/ML (ref 0.3–0.7)
BASOPHILS # BLD: 0.1 K/UL (ref 0–0.2)
BASOPHILS NFR BLD: 1.4 %
BUN SERPL-MCNC: 59 MG/DL (ref 7–20)
CALCIUM SERPL-MCNC: 8.7 MG/DL (ref 8.3–10.6)
CHLORIDE SERPL-SCNC: 98 MMOL/L (ref 99–110)
CO2 SERPL-SCNC: 19 MMOL/L (ref 21–32)
CREAT SERPL-MCNC: 2.5 MG/DL (ref 0.8–1.3)
DEPRECATED RDW RBC AUTO: 19.3 % (ref 12.4–15.4)
EOSINOPHIL # BLD: 0.1 K/UL (ref 0–0.6)
EOSINOPHIL NFR BLD: 1.4 %
GFR SERPLBLD CREATININE-BSD FMLA CKD-EPI: 26 ML/MIN/{1.73_M2}
GLUCOSE BLD-MCNC: 124 MG/DL (ref 70–99)
GLUCOSE BLD-MCNC: 226 MG/DL (ref 70–99)
GLUCOSE BLD-MCNC: 87 MG/DL (ref 70–99)
GLUCOSE BLD-MCNC: 97 MG/DL (ref 70–99)
GLUCOSE SERPL-MCNC: 123 MG/DL (ref 70–99)
HCT VFR BLD AUTO: 26.9 % (ref 40.5–52.5)
HGB BLD-MCNC: 9.1 G/DL (ref 13.5–17.5)
LYMPHOCYTES # BLD: 1.4 K/UL (ref 1–5.1)
LYMPHOCYTES NFR BLD: 16.2 %
MCH RBC QN AUTO: 33.6 PG (ref 26–34)
MCHC RBC AUTO-ENTMCNC: 33.7 G/DL (ref 31–36)
MCV RBC AUTO: 99.8 FL (ref 80–100)
MONOCYTES # BLD: 0.3 K/UL (ref 0–1.3)
MONOCYTES NFR BLD: 3.2 %
NEUTROPHILS # BLD: 6.7 K/UL (ref 1.7–7.7)
NEUTROPHILS NFR BLD: 77.8 %
PERFORMED ON: ABNORMAL
PERFORMED ON: ABNORMAL
PERFORMED ON: NORMAL
PERFORMED ON: NORMAL
PHOSPHATE SERPL-MCNC: 3 MG/DL (ref 2.5–4.9)
PLATELET # BLD AUTO: 301 K/UL (ref 135–450)
PMV BLD AUTO: 6.8 FL (ref 5–10.5)
POTASSIUM SERPL-SCNC: 4.8 MMOL/L (ref 3.5–5.1)
RBC # BLD AUTO: 2.69 M/UL (ref 4.2–5.9)
SODIUM SERPL-SCNC: 131 MMOL/L (ref 136–145)
VANCOMYCIN SERPL-MCNC: 12.3 UG/ML
WBC # BLD AUTO: 8.6 K/UL (ref 4–11)

## 2025-04-08 PROCEDURE — 82728 ASSAY OF FERRITIN: CPT

## 2025-04-08 PROCEDURE — 97116 GAIT TRAINING THERAPY: CPT

## 2025-04-08 PROCEDURE — 83540 ASSAY OF IRON: CPT

## 2025-04-08 PROCEDURE — 6370000000 HC RX 637 (ALT 250 FOR IP): Performed by: STUDENT IN AN ORGANIZED HEALTH CARE EDUCATION/TRAINING PROGRAM

## 2025-04-08 PROCEDURE — 1280000000 HC REHAB R&B

## 2025-04-08 PROCEDURE — 85520 HEPARIN ASSAY: CPT

## 2025-04-08 PROCEDURE — 6370000000 HC RX 637 (ALT 250 FOR IP)

## 2025-04-08 PROCEDURE — 90935 HEMODIALYSIS ONE EVALUATION: CPT

## 2025-04-08 PROCEDURE — 36415 COLL VENOUS BLD VENIPUNCTURE: CPT

## 2025-04-08 PROCEDURE — 2580000003 HC RX 258: Performed by: STUDENT IN AN ORGANIZED HEALTH CARE EDUCATION/TRAINING PROGRAM

## 2025-04-08 PROCEDURE — 6360000002 HC RX W HCPCS: Performed by: STUDENT IN AN ORGANIZED HEALTH CARE EDUCATION/TRAINING PROGRAM

## 2025-04-08 PROCEDURE — 80069 RENAL FUNCTION PANEL: CPT

## 2025-04-08 PROCEDURE — 97110 THERAPEUTIC EXERCISES: CPT

## 2025-04-08 PROCEDURE — 99233 SBSQ HOSP IP/OBS HIGH 50: CPT | Performed by: INTERNAL MEDICINE

## 2025-04-08 PROCEDURE — 83550 IRON BINDING TEST: CPT

## 2025-04-08 PROCEDURE — 80202 ASSAY OF VANCOMYCIN: CPT

## 2025-04-08 PROCEDURE — 5A1D70Z PERFORMANCE OF URINARY FILTRATION, INTERMITTENT, LESS THAN 6 HOURS PER DAY: ICD-10-PCS | Performed by: INTERNAL MEDICINE

## 2025-04-08 PROCEDURE — 85025 COMPLETE CBC W/AUTO DIFF WBC: CPT

## 2025-04-08 PROCEDURE — 6360000002 HC RX W HCPCS: Performed by: HOSPITALIST

## 2025-04-08 PROCEDURE — 6370000000 HC RX 637 (ALT 250 FOR IP): Performed by: HOSPITALIST

## 2025-04-08 PROCEDURE — 97530 THERAPEUTIC ACTIVITIES: CPT

## 2025-04-08 PROCEDURE — 2500000003 HC RX 250 WO HCPCS: Performed by: STUDENT IN AN ORGANIZED HEALTH CARE EDUCATION/TRAINING PROGRAM

## 2025-04-08 RX ORDER — DEXTROSE MONOHYDRATE 100 MG/ML
INJECTION, SOLUTION INTRAVENOUS CONTINUOUS PRN
Status: CANCELLED | OUTPATIENT
Start: 2025-04-08

## 2025-04-08 RX ORDER — OXYCODONE AND ACETAMINOPHEN 5; 325 MG/1; MG/1
1 TABLET ORAL EVERY 4 HOURS PRN
Refills: 0 | Status: DISCONTINUED | OUTPATIENT
Start: 2025-04-08 | End: 2025-04-10

## 2025-04-08 RX ORDER — OXYCODONE AND ACETAMINOPHEN 5; 325 MG/1; MG/1
1 TABLET ORAL EVERY 4 HOURS PRN
Qty: 30 TABLET | Refills: 0 | Status: ON HOLD
Start: 2025-04-08 | End: 2025-04-17 | Stop reason: HOSPADM

## 2025-04-08 RX ORDER — SENNOSIDES A AND B 8.6 MG/1
2 TABLET, FILM COATED ORAL DAILY PRN
Status: CANCELLED | OUTPATIENT
Start: 2025-04-08

## 2025-04-08 RX ORDER — BISACODYL 5 MG/1
5 TABLET, DELAYED RELEASE ORAL DAILY PRN
Qty: 30 TABLET | Refills: 0 | Status: ON HOLD
Start: 2025-04-08 | End: 2025-04-17 | Stop reason: HOSPADM

## 2025-04-08 RX ORDER — SEVELAMER CARBONATE 800 MG/1
800 TABLET, FILM COATED ORAL
Status: DISCONTINUED | OUTPATIENT
Start: 2025-04-09 | End: 2025-04-17 | Stop reason: HOSPADM

## 2025-04-08 RX ORDER — FERROUS SULFATE 325(65) MG
325 TABLET ORAL
Qty: 30 TABLET | Refills: 5 | Status: ON HOLD
Start: 2025-04-08 | End: 2025-04-17 | Stop reason: HOSPADM

## 2025-04-08 RX ORDER — TRAZODONE HYDROCHLORIDE 50 MG/1
50 TABLET ORAL NIGHTLY PRN
Qty: 30 TABLET | Refills: 0 | Status: ON HOLD
Start: 2025-04-08 | End: 2025-04-17 | Stop reason: HOSPADM

## 2025-04-08 RX ORDER — INSULIN LISPRO 100 [IU]/ML
0-4 INJECTION, SOLUTION INTRAVENOUS; SUBCUTANEOUS
Status: DISCONTINUED | OUTPATIENT
Start: 2025-04-08 | End: 2025-04-17 | Stop reason: HOSPADM

## 2025-04-08 RX ORDER — ASCORBIC ACID 500 MG
500 TABLET ORAL DAILY
Status: CANCELLED | OUTPATIENT
Start: 2025-04-09

## 2025-04-08 RX ORDER — LANOLIN ALCOHOL/MO/W.PET/CERES
1000 CREAM (GRAM) TOPICAL DAILY
Status: CANCELLED | OUTPATIENT
Start: 2025-04-09

## 2025-04-08 RX ORDER — LINEZOLID 600 MG/1
600 TABLET, FILM COATED ORAL EVERY 12 HOURS SCHEDULED
Qty: 28 TABLET | Refills: 0 | Status: ON HOLD | OUTPATIENT
Start: 2025-04-08 | End: 2025-04-17 | Stop reason: HOSPADM

## 2025-04-08 RX ORDER — LIDOCAINE 4 G/G
1 PATCH TOPICAL DAILY PRN
Status: DISCONTINUED | OUTPATIENT
Start: 2025-04-08 | End: 2025-04-17 | Stop reason: HOSPADM

## 2025-04-08 RX ORDER — ROSUVASTATIN CALCIUM 10 MG/1
10 TABLET, COATED ORAL
Status: CANCELLED | OUTPATIENT
Start: 2025-04-09

## 2025-04-08 RX ORDER — TRAZODONE HYDROCHLORIDE 50 MG/1
50 TABLET ORAL NIGHTLY PRN
Status: CANCELLED | OUTPATIENT
Start: 2025-04-08

## 2025-04-08 RX ORDER — PANTOPRAZOLE SODIUM 40 MG/1
40 TABLET, DELAYED RELEASE ORAL
Status: CANCELLED | OUTPATIENT
Start: 2025-04-09

## 2025-04-08 RX ORDER — INSULIN GLARGINE 100 [IU]/ML
8 INJECTION, SOLUTION SUBCUTANEOUS NIGHTLY
Status: DISCONTINUED | OUTPATIENT
Start: 2025-04-08 | End: 2025-04-17 | Stop reason: HOSPADM

## 2025-04-08 RX ORDER — LIDOCAINE 4 G/G
1 PATCH TOPICAL DAILY PRN
Qty: 30 PATCH | Refills: 0 | Status: ON HOLD
Start: 2025-04-08 | End: 2025-04-17

## 2025-04-08 RX ORDER — LINEZOLID 600 MG/1
600 TABLET, FILM COATED ORAL EVERY 12 HOURS SCHEDULED
Status: CANCELLED | OUTPATIENT
Start: 2025-04-08

## 2025-04-08 RX ORDER — SENNA AND DOCUSATE SODIUM 50; 8.6 MG/1; MG/1
2 TABLET, FILM COATED ORAL 2 TIMES DAILY PRN
Qty: 30 TABLET | Refills: 0 | Status: ON HOLD
Start: 2025-04-08 | End: 2025-04-17

## 2025-04-08 RX ORDER — POLYETHYLENE GLYCOL 3350 17 G/17G
17 POWDER, FOR SOLUTION ORAL DAILY
Status: CANCELLED | OUTPATIENT
Start: 2025-04-08

## 2025-04-08 RX ORDER — ACETAMINOPHEN 325 MG/1
650 TABLET ORAL EVERY 4 HOURS PRN
Status: CANCELLED | OUTPATIENT
Start: 2025-04-08

## 2025-04-08 RX ORDER — ROSUVASTATIN CALCIUM 10 MG/1
10 TABLET, COATED ORAL
Status: DISCONTINUED | OUTPATIENT
Start: 2025-04-09 | End: 2025-04-17 | Stop reason: HOSPADM

## 2025-04-08 RX ORDER — GABAPENTIN 100 MG/1
100 CAPSULE ORAL
Status: CANCELLED | OUTPATIENT
Start: 2025-04-08

## 2025-04-08 RX ORDER — NEPHROCAP 1 MG
1 CAP ORAL DAILY
Status: CANCELLED | OUTPATIENT
Start: 2025-04-09

## 2025-04-08 RX ORDER — GLUCAGON 1 MG/ML
1 KIT INJECTION PRN
Status: CANCELLED | OUTPATIENT
Start: 2025-04-08

## 2025-04-08 RX ORDER — LANOLIN ALCOHOL/MO/W.PET/CERES
1000 CREAM (GRAM) TOPICAL DAILY
Status: DISCONTINUED | OUTPATIENT
Start: 2025-04-09 | End: 2025-04-17 | Stop reason: HOSPADM

## 2025-04-08 RX ORDER — OXYCODONE AND ACETAMINOPHEN 5; 325 MG/1; MG/1
2 TABLET ORAL EVERY 4 HOURS PRN
Refills: 0 | Status: DISCONTINUED | OUTPATIENT
Start: 2025-04-08 | End: 2025-04-10

## 2025-04-08 RX ORDER — METHOCARBAMOL 500 MG/1
750 TABLET, FILM COATED ORAL EVERY 6 HOURS PRN
Status: CANCELLED | OUTPATIENT
Start: 2025-04-08

## 2025-04-08 RX ORDER — OXYCODONE AND ACETAMINOPHEN 5; 325 MG/1; MG/1
2 TABLET ORAL EVERY 4 HOURS PRN
Refills: 0 | Status: CANCELLED | OUTPATIENT
Start: 2025-04-08

## 2025-04-08 RX ORDER — GABAPENTIN 100 MG/1
100 CAPSULE ORAL
Status: DISCONTINUED | OUTPATIENT
Start: 2025-04-10 | End: 2025-04-10

## 2025-04-08 RX ORDER — INSULIN GLARGINE 100 [IU]/ML
8 INJECTION, SOLUTION SUBCUTANEOUS NIGHTLY
Status: CANCELLED | OUTPATIENT
Start: 2025-04-08

## 2025-04-08 RX ORDER — LIDOCAINE 4 G/G
1 PATCH TOPICAL DAILY PRN
Status: CANCELLED | OUTPATIENT
Start: 2025-04-08

## 2025-04-08 RX ORDER — METOPROLOL TARTRATE 50 MG
50 TABLET ORAL SEE ADMIN INSTRUCTIONS
Qty: 30 TABLET | Refills: 0
Start: 2025-04-08

## 2025-04-08 RX ORDER — LINEZOLID 600 MG/1
600 TABLET, FILM COATED ORAL EVERY 12 HOURS SCHEDULED
Status: DISCONTINUED | OUTPATIENT
Start: 2025-04-08 | End: 2025-04-15

## 2025-04-08 RX ORDER — DEXTROSE MONOHYDRATE 100 MG/ML
INJECTION, SOLUTION INTRAVENOUS CONTINUOUS PRN
Status: DISCONTINUED | OUTPATIENT
Start: 2025-04-08 | End: 2025-04-17 | Stop reason: HOSPADM

## 2025-04-08 RX ORDER — INSULIN GLARGINE 100 [IU]/ML
8 INJECTION, SOLUTION SUBCUTANEOUS NIGHTLY
Qty: 10 ML | Refills: 3
Start: 2025-04-08

## 2025-04-08 RX ORDER — ZINC SULFATE 50(220)MG
50 CAPSULE ORAL DAILY
Status: CANCELLED | OUTPATIENT
Start: 2025-04-09

## 2025-04-08 RX ORDER — ASCORBIC ACID 500 MG
500 TABLET ORAL DAILY
Status: DISCONTINUED | OUTPATIENT
Start: 2025-04-09 | End: 2025-04-17 | Stop reason: HOSPADM

## 2025-04-08 RX ORDER — POLYETHYLENE GLYCOL 3350 17 G/17G
17 POWDER, FOR SOLUTION ORAL DAILY
Qty: 30 PACKET | Refills: 0 | Status: ON HOLD
Start: 2025-04-09 | End: 2025-04-17

## 2025-04-08 RX ORDER — METOPROLOL TARTRATE 50 MG
50 TABLET ORAL
Status: DISCONTINUED | OUTPATIENT
Start: 2025-04-09 | End: 2025-04-17 | Stop reason: HOSPADM

## 2025-04-08 RX ORDER — SENNOSIDES A AND B 8.6 MG/1
2 TABLET, FILM COATED ORAL DAILY PRN
Status: DISCONTINUED | OUTPATIENT
Start: 2025-04-08 | End: 2025-04-17 | Stop reason: HOSPADM

## 2025-04-08 RX ORDER — OXYCODONE AND ACETAMINOPHEN 5; 325 MG/1; MG/1
1 TABLET ORAL EVERY 4 HOURS PRN
Refills: 0 | Status: CANCELLED | OUTPATIENT
Start: 2025-04-08

## 2025-04-08 RX ORDER — METOPROLOL TARTRATE 50 MG
50 TABLET ORAL
Status: CANCELLED | OUTPATIENT
Start: 2025-04-09

## 2025-04-08 RX ORDER — POLYETHYLENE GLYCOL 3350 17 G/17G
17 POWDER, FOR SOLUTION ORAL DAILY
Status: DISCONTINUED | OUTPATIENT
Start: 2025-04-09 | End: 2025-04-17 | Stop reason: HOSPADM

## 2025-04-08 RX ORDER — METHOCARBAMOL 750 MG/1
750 TABLET, FILM COATED ORAL 4 TIMES DAILY
Qty: 40 TABLET | Refills: 0 | Status: ON HOLD | OUTPATIENT
Start: 2025-04-08 | End: 2025-04-17

## 2025-04-08 RX ORDER — ONDANSETRON 4 MG/1
8 TABLET, ORALLY DISINTEGRATING ORAL EVERY 8 HOURS PRN
Status: DISCONTINUED | OUTPATIENT
Start: 2025-04-08 | End: 2025-04-17 | Stop reason: HOSPADM

## 2025-04-08 RX ORDER — OXYCODONE AND ACETAMINOPHEN 5; 325 MG/1; MG/1
1 TABLET ORAL ONCE
Refills: 0 | Status: COMPLETED | OUTPATIENT
Start: 2025-04-08 | End: 2025-04-08

## 2025-04-08 RX ORDER — ONDANSETRON 4 MG/1
8 TABLET, ORALLY DISINTEGRATING ORAL EVERY 8 HOURS PRN
Status: CANCELLED | OUTPATIENT
Start: 2025-04-08

## 2025-04-08 RX ORDER — GLUCAGON 1 MG/ML
1 KIT INJECTION PRN
Status: DISCONTINUED | OUTPATIENT
Start: 2025-04-08 | End: 2025-04-17 | Stop reason: HOSPADM

## 2025-04-08 RX ORDER — ASPIRIN 81 MG/1
81 TABLET, CHEWABLE ORAL DAILY
Status: CANCELLED | OUTPATIENT
Start: 2025-04-09

## 2025-04-08 RX ORDER — SEVELAMER CARBONATE 800 MG/1
800 TABLET, FILM COATED ORAL
Status: CANCELLED | OUTPATIENT
Start: 2025-04-08

## 2025-04-08 RX ORDER — INSULIN LISPRO 100 [IU]/ML
0-4 INJECTION, SOLUTION INTRAVENOUS; SUBCUTANEOUS
Status: CANCELLED | OUTPATIENT
Start: 2025-04-08

## 2025-04-08 RX ORDER — GABAPENTIN 100 MG/1
CAPSULE ORAL
Qty: 90 CAPSULE | Refills: 3 | Status: ON HOLD
Start: 2025-04-08 | End: 2025-04-17 | Stop reason: HOSPADM

## 2025-04-08 RX ORDER — ZINC SULFATE 50(220)MG
50 CAPSULE ORAL DAILY
Status: DISCONTINUED | OUTPATIENT
Start: 2025-04-09 | End: 2025-04-17 | Stop reason: HOSPADM

## 2025-04-08 RX ORDER — NEPHROCAP 1 MG
1 CAP ORAL DAILY
Status: DISCONTINUED | OUTPATIENT
Start: 2025-04-09 | End: 2025-04-17 | Stop reason: HOSPADM

## 2025-04-08 RX ORDER — ASPIRIN 81 MG/1
81 TABLET, CHEWABLE ORAL DAILY
Status: DISCONTINUED | OUTPATIENT
Start: 2025-04-09 | End: 2025-04-09

## 2025-04-08 RX ORDER — METHOCARBAMOL 750 MG/1
750 TABLET, FILM COATED ORAL EVERY 6 HOURS PRN
Status: DISCONTINUED | OUTPATIENT
Start: 2025-04-08 | End: 2025-04-17 | Stop reason: HOSPADM

## 2025-04-08 RX ORDER — ACETAMINOPHEN 325 MG/1
650 TABLET ORAL EVERY 4 HOURS PRN
Status: DISCONTINUED | OUTPATIENT
Start: 2025-04-08 | End: 2025-04-17 | Stop reason: HOSPADM

## 2025-04-08 RX ORDER — PANTOPRAZOLE SODIUM 40 MG/1
40 TABLET, DELAYED RELEASE ORAL
Status: DISCONTINUED | OUTPATIENT
Start: 2025-04-09 | End: 2025-04-17 | Stop reason: HOSPADM

## 2025-04-08 RX ORDER — TRAZODONE HYDROCHLORIDE 50 MG/1
50 TABLET ORAL NIGHTLY PRN
Status: DISCONTINUED | OUTPATIENT
Start: 2025-04-08 | End: 2025-04-11

## 2025-04-08 RX ADMIN — MELATONIN TAB 3 MG 3 MG: 3 TAB at 22:20

## 2025-04-08 RX ADMIN — METHOCARBAMOL 750 MG: 500 TABLET ORAL at 17:15

## 2025-04-08 RX ADMIN — OXYCODONE HYDROCHLORIDE AND ACETAMINOPHEN 1 TABLET: 5; 325 TABLET ORAL at 04:13

## 2025-04-08 RX ADMIN — CYANOCOBALAMIN TAB 1000 MCG 1000 MCG: 1000 TAB at 12:08

## 2025-04-08 RX ADMIN — INSULIN GLARGINE 8 UNITS: 100 INJECTION, SOLUTION SUBCUTANEOUS at 22:20

## 2025-04-08 RX ADMIN — HEPARIN SODIUM 2000 UNITS: 1000 INJECTION, SOLUTION INTRAVENOUS; SUBCUTANEOUS at 02:36

## 2025-04-08 RX ADMIN — OXYCODONE HYDROCHLORIDE AND ACETAMINOPHEN 500 MG: 500 TABLET ORAL at 12:07

## 2025-04-08 RX ADMIN — SODIUM CHLORIDE, PRESERVATIVE FREE 10 ML: 5 INJECTION INTRAVENOUS at 12:09

## 2025-04-08 RX ADMIN — LINEZOLID 600 MG: 600 TABLET, FILM COATED ORAL at 12:07

## 2025-04-08 RX ADMIN — LINEZOLID 600 MG: 600 TABLET, FILM COATED ORAL at 22:20

## 2025-04-08 RX ADMIN — OXYCODONE HYDROCHLORIDE AND ACETAMINOPHEN 2 TABLET: 5; 325 TABLET ORAL at 22:19

## 2025-04-08 RX ADMIN — OXYCODONE HYDROCHLORIDE AND ACETAMINOPHEN 1 TABLET: 5; 325 TABLET ORAL at 01:54

## 2025-04-08 RX ADMIN — Medication 1 MG: at 12:08

## 2025-04-08 RX ADMIN — OXYCODONE HYDROCHLORIDE AND ACETAMINOPHEN 2 TABLET: 5; 325 TABLET ORAL at 09:51

## 2025-04-08 RX ADMIN — POLYETHYLENE GLYCOL 3350 17 G: 17 POWDER, FOR SOLUTION ORAL at 12:09

## 2025-04-08 RX ADMIN — CHOLECALCIFEROL TAB 125 MCG (5000 UNIT) 5000 UNITS: 125 TAB at 12:08

## 2025-04-08 RX ADMIN — VANCOMYCIN HYDROCHLORIDE 1000 MG: 1 INJECTION, POWDER, LYOPHILIZED, FOR SOLUTION INTRAVENOUS at 22:54

## 2025-04-08 RX ADMIN — PANTOPRAZOLE SODIUM 40 MG: 40 TABLET, DELAYED RELEASE ORAL at 05:14

## 2025-04-08 RX ADMIN — INSULIN LISPRO 2 UNITS: 100 INJECTION, SOLUTION INTRAVENOUS; SUBCUTANEOUS at 17:44

## 2025-04-08 RX ADMIN — ZINC SULFATE 220 MG (50 MG) CAPSULE 50 MG: CAPSULE at 12:08

## 2025-04-08 RX ADMIN — SEVELAMER CARBONATE 800 MG: 800 TABLET, FILM COATED ORAL at 12:08

## 2025-04-08 RX ADMIN — METHOCARBAMOL 750 MG: 500 TABLET ORAL at 12:07

## 2025-04-08 RX ADMIN — APIXABAN 5 MG: 5 TABLET, FILM COATED ORAL at 22:20

## 2025-04-08 RX ADMIN — SEVELAMER CARBONATE 800 MG: 800 TABLET, FILM COATED ORAL at 17:23

## 2025-04-08 RX ADMIN — DICLOFENAC SODIUM 2 G: 10 GEL TOPICAL at 04:12

## 2025-04-08 RX ADMIN — ERYTHROPOIETIN 10000 UNITS: 10000 INJECTION, SOLUTION INTRAVENOUS; SUBCUTANEOUS at 11:14

## 2025-04-08 ASSESSMENT — PAIN DESCRIPTION - ORIENTATION
ORIENTATION: RIGHT
ORIENTATION: RIGHT;LEFT
ORIENTATION: RIGHT;LEFT
ORIENTATION: RIGHT;LOWER

## 2025-04-08 ASSESSMENT — PAIN SCALES - GENERAL
PAINLEVEL_OUTOF10: 0
PAINLEVEL_OUTOF10: 7
PAINLEVEL_OUTOF10: 5
PAINLEVEL_OUTOF10: 0
PAINLEVEL_OUTOF10: 4
PAINLEVEL_OUTOF10: 10
PAINLEVEL_OUTOF10: 5
PAINLEVEL_OUTOF10: 4
PAINLEVEL_OUTOF10: 0
PAINLEVEL_OUTOF10: 5
PAINLEVEL_OUTOF10: 0

## 2025-04-08 ASSESSMENT — PAIN SCALES - WONG BAKER
WONGBAKER_NUMERICALRESPONSE: NO HURT
WONGBAKER_NUMERICALRESPONSE: HURTS LITTLE MORE
WONGBAKER_NUMERICALRESPONSE: NO HURT
WONGBAKER_NUMERICALRESPONSE: HURTS LITTLE MORE

## 2025-04-08 ASSESSMENT — ENCOUNTER SYMPTOMS
EYE REDNESS: 0
SINUS PAIN: 0
SORE THROAT: 0
SHORTNESS OF BREATH: 0
RHINORRHEA: 0
NAUSEA: 0
COUGH: 0
EYE DISCHARGE: 0
BACK PAIN: 0
DIARRHEA: 0
SINUS PRESSURE: 0
WHEEZING: 0
CONSTIPATION: 0
ABDOMINAL PAIN: 0

## 2025-04-08 ASSESSMENT — PAIN DESCRIPTION - DESCRIPTORS
DESCRIPTORS: BURNING;CRAMPING
DESCRIPTORS: SPASM;ACHING
DESCRIPTORS: ACHING;DISCOMFORT
DESCRIPTORS: ACHING;DISCOMFORT
DESCRIPTORS: ACHING

## 2025-04-08 ASSESSMENT — PAIN DESCRIPTION - LOCATION
LOCATION: SHOULDER;HIP
LOCATION: BACK
LOCATION: SHOULDER
LOCATION: HIP
LOCATION: BACK
LOCATION: BACK

## 2025-04-08 ASSESSMENT — PAIN DESCRIPTION - PAIN TYPE: TYPE: CHRONIC PAIN

## 2025-04-08 ASSESSMENT — PAIN DESCRIPTION - FREQUENCY: FREQUENCY: CONTINUOUS

## 2025-04-08 ASSESSMENT — PAIN - FUNCTIONAL ASSESSMENT
PAIN_FUNCTIONAL_ASSESSMENT: PREVENTS OR INTERFERES SOME ACTIVE ACTIVITIES AND ADLS
PAIN_FUNCTIONAL_ASSESSMENT: ACTIVITIES ARE NOT PREVENTED
PAIN_FUNCTIONAL_ASSESSMENT: PREVENTS OR INTERFERES SOME ACTIVE ACTIVITIES AND ADLS

## 2025-04-08 NOTE — PROGRESS NOTES
[facility] - Inpatient Rehabilitation Department   Phone: [phone]    Physical Therapy    [] Initial Evaluation            [x] Daily Treatment Note         [] Discharge Summary      Patient: Danny Rock   : 1950   MRN: [id number]   Date of Service:  2025  Admitting Diagnosis: Psoas hematoma, right, secondary to anticoagulant therapy, sequela  Current Admission Summary: Per MD note, patient is a 75 y.o. male with multiple medical history including HTN, HLD, CAD, HFrEF, atrial fibrillation, renal cancer s/p right nephrectomy, ESRD on HD and currently being treated at inpatient rehab unit at [facility] high-grade enterococcal faecalis bacteremia, bilateral L3-4 and left L4-5 discitis and osteomyelitis, L3-4 level small epidural phlegmon and right psoas collection suspected abscess versus hematoma. Hospitalist team was consulted for transfer to inpatient unit as patient was found to have worsening hemoglobin 9.4--8.3-7.4 with findings of right iliopsoas muscle 8.7 cm size hematoma with active extravasation suggestive of bleeding on CT scan of pelvis with contrast. Patient was on Eliquis for atrial fibrillation which was held along with aspirin. Patient was seen by orthopedic surgery team who recommended IR guided drainage of hematoma and possible embolization. IR team evaluated patient and recommended keep holding aspirin and Eliquis, transfuse to keep hemoglobin more than 7. No IR intervention recommended at this time. PMR team contacted neurosurgery team at [facility] and reviewed the case and reportedly neurosurgery team recommended no acute surgical intervention currently and keep patient at [facility]. Patient currently being followed by ID team of Dr. Winn and being treated with IV vancomycin and linezolid   Past Medical History:  has a past medical history of Atrial fibrillation (HCC), CAD (coronary artery disease), Cancer of kidney (HCC), Dependence on renal  wears glasses at all times  Hearing:   WFL  Observation:   General Observation:  Patient seated in recliner chair. On RA, quianawick, R PIV, L chest port      Subjective  General: Patient agreeable to PT session. Reports increased confused today. Patient does not understand why he is still admitted in hospital. States, \"Can't I just take baby aspirin and go home?\" Patient's wife present at end of session.  Pain: 7/10.  Location: R hip  -- Pain in hip increases with mobility and WB activities.   Pain Interventions: RN notified, repositioned , and therapy activities modified       Functional Mobility  Bed Mobility:  Bed mobility not completed on this date.  Comments:   Transfers:  Sit to stand transfer: contact guard assistance  Stand to sit transfer: contact guard assistance  Comments: Using RW. Decreased eccentric control upon returning to sitting in recliner.   Ambulation:  Surface:level surface  Assistive Device: rolling walker  Assistance: contact guard assistance  Distance: 75 ft  Gait Mechanics: Slow gait speed, kyphotic posture, demos crouch gait pattern, shortened step lengths, severe genu varus B.   Comments:    Stair Mobility:  Stair mobility not completed on this date.  Comments:  Wheelchair Mobility:  No w/c mobility completed on this date.  Comments:  Balance:  Static Sitting Balance: fair (+): maintains balance at SBA/supervision without use of UE support  Dynamic Sitting Balance: fair (+): maintains balance at SBA/supervision without use of UE support  Static Standing Balance: fair (-): maintains balance at CGA with use of UE support  Dynamic Standing Balance: poor (+): requires min (A) to maintain balance  Comments:    Other Therapeutic Interventions  Therapeutic Exercise:  -Static standing balance without UE support x20 seconds with min A  -Seated marching x10 (B)  -LAQ x10 (B)  -Heel/toe raises x10 (B)  -Modified sit-ups x8    Functional Outcomes  AM-PAC Inpatient Mobility Raw Score : 16

## 2025-04-08 NOTE — DISCHARGE INSTR - DIET

## 2025-04-08 NOTE — PROGRESS NOTES
Treatment time: 3 hrs    Net UF: 1000 ml    Pre weight: 69.4 kg  Post weight: 68.4 kg    Access used: Lt CW TDC  Access function: Well tolerated, 350 BFR    Medications or blood products given: Retacrit 10,000 units, Heparin dwells    Regular outpatient schedule: TTS    Summary of response to treatment: Pt tolerated well. Pt remained stable throughout entire treatment and upon exiting the hemodialysis suite. Report given to Julián Skelton RN

## 2025-04-08 NOTE — PROGRESS NOTES
MD Gordon Frankel MD Aldo Estella, DO                Office: [phone]                Fax: [phone]            [facility]                      NEPHROLOGY  INPATIENT PROGRESS  NOTE:     PATIENT NAME: Danny Rock  : 1950  MRN: [id number]        Patient Active Problem List   Diagnosis    HLD (hyperlipidemia)    Essential hypertension    Coronary artery disease due to lipid rich plaque    Cardiac dysrhythmia    Diabetes mellitus    Paroxysmal atrial fibrillation (HCC)    Renal mass    Cerebrovascular accident (CVA) (HCC)    Recent cerebrovascular accident (CVA)    HTN (hypertension), benign    PAF (paroxysmal atrial fibrillation) (HCC)    CASTRO (acute kidney injury)    LBBB (left bundle branch block)    H/O right nephrectomy    Anemia    Acute ischemic stroke (HCC)    Overweight (BMI 25.0-29.9)    AIN (acute interstitial nephritis)    Sterile pyuria    Current chronic use of systemic steroids    H/O systemic steroid therapy    Encounter for medication counseling    Secondary hypercoagulable state    Pneumothorax    Left inguinal hernia    Abscess of right shoulder    CRP elevated    Elevated sed rate    Abscess of right arm    ESRD on dialysis (HCC)    Hemorrhoid    Constipation    Low back pain    Lumbar foraminal stenosis    History of prostate cancer    Central stenosis of spinal canal    Enterococcus faecalis infection    Enterococcal bacteremia    Moderate malnutrition    Debility    Osteomyelitis of lumbar spine (HCC)    Lumbar discitis    Iliopsoas abscess on right (HCC)    Psoas hematoma, left, secondary to anticoagulant therapy, initial encounter    Psoas hematoma, right, secondary to anticoagulant therapy, sequela       Past Medical History:   has a past medical history of Atrial fibrillation (HCC), CAD (coronary artery disease), Cancer of kidney (HCC), Dependence on renal dialysis, Diabetes mellitus (HCC), Hyperlipidemia, Hypertension, Prostate cancer (HCC), Right  correction acceptable.  3.  Anemia due to CKD POONAM with HD.  Follow for possible psoas hematoma.  4.  History of coronary artery disease  5.  History of renal cancer status post right nephrectomy  6.  History of paroxysmal atrial fibrillation  7.  History of hyperlipidemia-on statin  8.  History of pneumothorax status post intraperitoneal nodule biopsy  9.  E. faecalis bacteremia-on vancomycin - IV antibiotic: Vancomycin: trough goal <15, pharmacy team assisting.   10.  Hypokalemia-no need to replace for now.  Follow potassium-will adjust with dialysis    Renally is stable.  For discharge plans      Medical decision making- high complexity. Multiple complex health problems.   Discussed with patient and treatment team, Lilly Cotto MD    Thank you for allowing me to participate in this patient's care. Please do not hesitate to contact me for any questions/concerns. We will follow along with you.     Guillermo Moss DO  [facility]   Phone: [phone] or [address]  Fax: [phone]    Severally ill, at risk of impending organ failure needing  higher level of care/monitoring.   Time spent that included face-to-face meeting/discussion with patient, patient's family -as available, and treatment team (including primary/referring team and other consultants; included coordination of care with the treatment team; and review of patient's electronic medical records and ordering appropriates tests.

## 2025-04-08 NOTE — CARE COORDINATION
04/05/25 1453   Readmission Assessment   Number of Days since last admission? 1-7 days   Previous Disposition Other (comment)  (discharged from [facility] and transferred to floor for low blood count)   Who is being Interviewed Patient  (Chart review / discharged from [facility] and transferred to floor for low blood count)   What was the patient's/caregiver's perception as to why they think they needed to return back to the hospital? Other (Comment)  (discharged from [facility] and transferred to floor for low blood count)   Did you visit your Primary Care Physician after you left the hospital, before you returned this time? No  (discharged from [facility] and transferred to floor for low blood count)   Why weren't you able to visit your PCP? Other (Comment)  (discharged from [facility] and transferred to floor for low blood count)   Did you see a specialist, such as Cardiac, Pulmonary, Orthopedic Physician, etc. after you left the hospital?   (discharged from [facility] and transferred to floor for low blood count)   Who advised the patient to return to the hospital? Other (Comment);Physician;Acute Rehab  (discharged from [facility] and transferred to floor for low blood count)   Does the patient report anything that got in the way of taking their medications? No  (discharged from [facility] and transferred to floor for low blood count)   In our efforts to provide the best possible care to you and others like you, can you think of anything that we could have done to help you after you left the hospital the first time, so that you might not have needed to return so soon? Other (Comment)  (discharged from [facility] and transferred to floor for low blood count)       
   04/08/25 1546   IMM Letter   IMM Letter given to Patient/Family/Significant other/Guardian/POA/by: IMM given to pt   IMM Letter date given: 04/08/25   IMM Letter time given: 1545     Electronically signed by Zhen Gonzales on 4/8/2025 at 3:46 PM    
of choice list with basic dialogue that supports the patient's individualized plan of care/goals and shares the quality data associated with the providers was provided to: Patient (hope to go to ARU again if recommended)   Patient Representative Name:       The Patient and/or Patient Representative Agree with the Discharge Plan? Yes    Wilda Santoyo RN, BSN  [phone]

## 2025-04-08 NOTE — PROGRESS NOTES
Clinical Pharmacy Note: Pharmacy to Dose Vancomycin    Vancomycin through 5/19/25  Indication: Bacteremia   Current Dose: 1000 mg after HD  Dosing Method: Dosing by random levels    Random: 12.3    Recent Labs     04/07/25  0902 04/08/25  0801   BUN 46* 59*       Recent Labs     04/07/25  0902 04/08/25  0801   CREATININE 2.4* 2.5*       Recent Labs     04/05/25  1250 04/08/25  0801   WBC 8.3 8.6         Intake/Output Summary (Last 24 hours) at 4/8/2025 0908  Last data filed at 4/8/2025 0405  Gross per 24 hour   Intake 240 ml   Output --   Net 240 ml         Ht Readings from Last 1 Encounters:   04/07/25 1.905 m (6' 3\")        Wt Readings from Last 1 Encounters:   04/08/25 69.4 kg (153 lb)         Body mass index is 19.12 kg/m².    Estimated Creatinine Clearance: 25 mL/min (A) (based on SCr of 2.5 mg/dL (H)).      Assessment/Plan:  Vancomycin level is subtherapeutic.  Will redose vancomycin 1000 mg one time today after hemodialysis.  A vancomycin random level has been ordered on 4/10 at 0600 for follow-up.  Changes in regimen will be determined based on culture results, renal function, and clinical response.  Pharmacy will continue to monitor and adjust regimen as necessary.    Thank you for the consult,    Heidi Guidry, PharmD  PGY-1 Pharmacy Resident

## 2025-04-08 NOTE — PROGRESS NOTES
Infectious Diseases   Progress Note      Admission Date: 4/4/2025  Hospital Day: Hospital Day: 5   Attending: Lilly Cotto MD  Date of service: 4/8/2025     Chief complaint/ Reason for consult:     Recent high-grade Enterococcus faecalis bacteremia  Lumbar discitis and osteomyelitis with foraminal stenosis and spinal canal stenosis at L3-L4 level  Right psoas hematoma  History of prostate cancer  End-stage renal disease on hemodialysis    Microbiology:      I have reviewed allavailable micro lab data and cultures    Results       Procedure Component Value Units Date/Time    Culture, Blood 2 [[id number]] Collected: 03/31/25 1313    Order Status: Completed Specimen: Blood Updated: 04/04/25 1415     Culture, Blood 2 No Growth after 4 days of incubation.    Narrative:      ORDER#: [id number]                          ORDERED BY: MAURISIO MIN  SOURCE: Blood Hand, Right                  COLLECTED:  03/31/25 13:13  ANTIBIOTICS AT SARAH.:                      RECEIVED :  03/31/25 20:42  If child <=2 yrs old please draw pediatric bottle.~Blood Culture #2    Culture, Blood 1 [[id number]] Collected: 03/31/25 1310    Order Status: Completed Specimen: Blood Updated: 04/04/25 1415     Blood Culture, Routine No Growth after 4 days of incubation.    Narrative:      ORDER#: [id number]                          ORDERED BY: MAURISIO MIN  SOURCE: Blood Antecubital-Rig              COLLECTED:  03/31/25 13:10  ANTIBIOTICS AT SARAH.:                      RECEIVED :  03/31/25 20:42  If child <=2 yrs old please draw pediatric bottle.~Blood Culture 1    Culture, Blood 3 [[id number]] Collected: 03/31/25 0000    Order Status: Canceled Specimen: Blood     Culture, Blood 2 [[id number]] Collected: 03/26/25 0910    Order Status: Completed Specimen: Blood Updated: 03/30/25 1015     Culture, Blood 2 No Growth after 4 days of incubation.    Narrative:      ORDER#: [id number]                          ORDERED BY: RAIN LEBRON  SOURCE: Blood  last 90 days.  Collected Specimen Info Organism Ampicillin gentamicin-syn Sodium streptomycin-syn   03/26/25 Blood Staphylococcus epidermidis (1)         Staphylococcus epidermidis (2)       03/24/25 Blood Enterococcus faecalis       03/24/25 Blood Enterococcus faecalis       03/23/25 Blood Enterococcus faecalis (2)  S   S      Enterococcus faecalis (1)       03/23/25 Blood Enterococcus faecalis       03/22/25 Blood Enterococcus faecalis (3)  S  R  S  S     Staphylococcus epidermidis         Enterococcus faecalis (1)         Micrococcus luteus       01/31/25 Abscess Diphtheroids         Micrococcus luteus              Antibiotics and immunizations:       Current antibiotics: All antibiotics and their doses were reviewed by me    Recent Abx Admin                     linezolid (ZYVOX) tablet 600 mg (mg) 600 mg Given 04/08/25 1207     600 mg Given 04/07/25 2058                      Immunization History: All immunization history was reviewed by me today.    There is no immunization history for the selected administration types on file for this patient.    Known drug allergies:     All allergies were reviewed and updated    Allergies   Allergen Reactions    Amoxicillin Other (See Comments)     Urethritis, rash    Bisoprolol-Hydrochlorothiazide Other (See Comments)     G.I. rash    Cisapride Other (See Comments)     diarrhea    Penicillins      Unsure of reaction  Tolerated ceftriaxone 1/29/25  Tolerated Ancef 12/31/24    Pioglitazone Other (See Comments)     G.I upset (long time ago)       Social history:     Social History:  All social andepidemiologic history was reviewed and updated by me today as needed.     Tobacco use:   reports that he quit smoking about 55 years ago. His smoking use included cigarettes. He started smoking about 57 years ago. He has never used smokeless tobacco.  Alcohol use:   reports current alcohol use.  Currently lives in: [place] [address]   reports current drug use. Drug: Marijuana (Weed).

## 2025-04-08 NOTE — DISCHARGE SUMMARY
V2.0  Discharge Summary    Name:  Danny Rock /Age/Sex: 1950 (75 y.o. male)   Admit Date: 2025  Discharge Date: 25    MRN & CSN:  [id number] & [id number] Encounter Date and Time 25 5:18 PM EDT    Attending:  Lilly Cotto MD Discharging Provider: Lilly Cotto MD       Hospital Course:     Brief HPI: Danny Rock is a 75 y.o. male with PMHx of HTN, HLD, nonobstructive CAD, HFrEF, atrial fibrillation, renal cancer s/p right nephrectomy, ESRD on HD, Enterococcus faecalis bacteremia, bilateral L3-4 and left L4-5 discitis and osteomyelitis, L3-4 level small epidural phlegmon and right psoas collection suspected abscess versus hematoma, transferred from [facility] acute rehab unit to the inpatient unit for further evaluation of worsening hemoglobin 9.4->8.3->7.4 with findings of right iliopsoas muscle 8.7 cm size hematoma with active extravasation suggestive of bleeding on CT scan of pelvis with contrast.      R psoas hematoma with ABLA: spontaneous likely d/t anticoagulation.  Orthopedic surgery consulted; recommended IR guided drainage of hematoma and possible embolization.    IR however recommended holding aspirin, Eliquis and transfusing to keep hemoglobin more than 7.    Thus no IR intervention was done.  Eliquis and aspirin initially held for 4 days.    Trial of anticoagulation with IV heparin started on  with stable hemoglobin.    Heparin drip discontinued and resumed on Eliquis.  Plan to resume aspirin after a week if able to tolerate Eliquis without signs of bleeding/worsening anemia.   Monitor CBC daily     Multilevel lumbar discitis and osteomyelitis:  Recent high-grade Enterococcus faecalis bacteremia:  ID following; recommended continuing IV vancomycin until end of May.  Continue oral linezolid 600 mg every 12 hours for at least 2 additional weeks with platelet count monitoring  ID needs to be consulted on arrival to ARU.  Continue pain control.    PAF: Resumed

## 2025-04-08 NOTE — CONSULTS
SQ PORT/PUMP > 5 YEARS  2024    IR TUNNELED CATHETER PLACEMENT GREATER THAN 5 YEARS 2024 Kosta Baugh MD [facility] SPECIAL PROCEDURES    IR TUNNELED CVC PLACE WO SQ PORT/PUMP > 5 YEARS  2025    IR TUNNELED CVC PLACE WO SQ PORT/PUMP > 5 YEARS 3/28/2025 [facility] C[facility]IAC CATH/IR/NEURO LAB    KIDNEY REMOVAL Right 2022    ROBOTIC LAPAROSCOPIC RADICAL RIGHT NEPHRECTOMY performed by Larry Olsen MD at [facility] OR    KNEE SURGERY      both knees: scope for cartilage    NASAL SEPTUM SURGERY      OTHER SURGICAL HISTORY  2024    Peritoneal dialysis catheter: removed    OTHER SURGICAL HISTORY Left     left arm fistula for dialysis: also has right chest catheter for dialysis    SHOULDER SURGERY Right 2025    RIGHT SHOULDER OPEN INCISION AND DRAINAGE performed by Nicolas Jacob MD at [facility] ASC OR       Social History     Tobacco Use    Smoking status: Former     Current packs/day: 0.00     Types: Cigarettes     Start date:      Quit date:      Years since quittin.3    Smokeless tobacco: Never   Vaping Use    Vaping status: Never Used   Substance Use Topics    Alcohol use: Yes     Comment: rarely    Drug use: Yes     Types: Marijuana (Weed)     Comment: gummies at times       Prior Level of Function:   Independent for mobility, self-care, IADLs.  Lives with spouse in a multilevel home with 7 steps to enter.         Objective   Objective:  Patient Vitals for the past 24 hrs:   BP Temp Temp src Pulse Resp SpO2 Height Weight   25 0737 -- -- -- -- -- -- -- 69.4 kg (153 lb)   25 0411 (!) 143/75 98 °F (36.7 °C) Oral 59 18 97 % -- --   25 2333 (!) 143/80 98.6 °F (37 °C) Oral 62 18 100 % -- --   25 2045 128/75 97.7 °F (36.5 °C) Oral 71 -- 98 % -- --   25 1545 (!) 149/79 97.2 °F (36.2 °C) Oral 62 22 100 % -- --   25 1200 -- -- -- -- -- -- 1.905 m (6' 3\") --   25 1130 123/70 97.4 °F (36.3 °C) Oral -- 14 100 % -- --     Gen: No distress.  HEENT: Normocephalic,  atraumatic.   CV: Extremities warm, perfused.  Resp: No respiratory distress. No increased WOB  Abd: Soft, nontender nondistended  Ext: No edema.   Neuro:   - Alert, oriented to person/place/date.   - CN II-XII grossly intact.  - 5/5 strength in bilateral upper and lower extremities.  - 2+ reflexes bilateral biceps/triceps/BR, patellar/Achilles.  - Sensation intact to light touch throughout.   - No dysmetria on FNF or heel-shin testing bilaterally.   - Balance/gait deferred.   Psych: Calm, pleasant. Affect congruent with stated mood.     Laboratory data: Available via EMR.     Therapy progress:    PT    Rolling: Level of difficulty - A Little   Sit to Stand from a Chair: Level of difficulty - A Little  Supine to Sit: Level of difficulty - A Little     Bed to Chair: Physical Assistance Required - A Little  Ambulate Across Room: Physical Assistance Required - A Little  Ascend 3-5 Steps With HR: Physical Assistance Required - A Little    OT    Eating: Physical Assistance Required - None  Grooming: Physical Assistance Required - A Little  LB Dressing: Physical Assistance Required - A Little  UB Dressing: Physical Assistance Required - A Little  Bathing: Physical Assistance Required - A Little  Toileting: Physical Assistance Required - A Little    SLP         Body mass index is 19.12 kg/m².       Assessment:  Multilevel lumbar discitis and osteomyelitis   E. faecalis bacteremia   Right iliopsoas intramuscular hematoma   Paroxysmal A-fib   ESRD   T2DM     Impairments: Generalized weakness and deconditioning limiting independence with functional mobility and self-care.     Plan:   - Patient remains functionally appropriate for inpatient rehab, however heparin drip is medical contraindication to IPR.     Garret Ponce MD 4/8/2025, 9:48 AM    * This document was created using dictation software.  While all precautions were taken to ensure accuracy, errors may have occurred.  Please disregard any typographical errors.

## 2025-04-08 NOTE — PLAN OF CARE
Problem: Discharge Planning  Goal: Discharge to home or other facility with appropriate resources  4/8/2025 0210 by Tin Nazario RN  Outcome: Progressing     Problem: Chronic Conditions and Co-morbidities  Goal: Patient's chronic conditions and co-morbidity symptoms are monitored and maintained or improved  4/8/2025 0210 by Tin Nazario RN  Outcome: Progressing  Flowsheets (Taken 4/8/2025 0210)  Care Plan - Patient's Chronic Conditions and Co-Morbidity Symptoms are Monitored and Maintained or Improved: Monitor and assess patient's chronic conditions and comorbid symptoms for stability, deterioration, or improvement     Problem: Pain  Goal: Verbalizes/displays adequate comfort level or baseline comfort level  4/8/2025 0210 by Tin Nazario RN  Outcome: Progressing  Flowsheets (Taken 4/8/2025 0210)  Verbalizes/displays adequate comfort level or baseline comfort level:   Assess pain using appropriate pain scale   Encourage patient to monitor pain and request assistance     Problem: Cardiovascular - Adult  Goal: Maintains optimal cardiac output and hemodynamic stability  4/8/2025 0210 by Tin Nazario RN  Outcome: Progressing     Problem: Cardiovascular - Adult  Goal: Absence of cardiac dysrhythmias or at baseline  4/8/2025 0210 by Tin Nazario RN  Outcome: Progressing  Flowsheets (Taken 4/8/2025 0210)  Absence of cardiac dysrhythmias or at baseline: Monitor cardiac rate and rhythm     Problem: Metabolic/Fluid and Electrolytes - Adult  Goal: Electrolytes maintained within normal limits  4/8/2025 0210 by Tin Nazario RN  Outcome: Progressing     Problem: Metabolic/Fluid and Electrolytes - Adult  Goal: Hemodynamic stability and optimal renal function maintained  4/8/2025 0210 by Tin Nazario RN  Outcome: Progressing  Flowsheets (Taken 4/8/2025 0210)  Hemodynamic stability and optimal renal function maintained: Monitor intake, output and patient weight     Problem: Hematologic - Adult  Goal:

## 2025-04-09 PROBLEM — E43 SEVERE MALNUTRITION: Status: ACTIVE | Noted: 2025-04-09

## 2025-04-09 PROBLEM — E43 SEVERE MALNUTRITION: Chronic | Status: ACTIVE | Noted: 2025-04-09

## 2025-04-09 LAB
BASOPHILS # BLD: 0.1 K/UL (ref 0–0.2)
BASOPHILS NFR BLD: 0.8 %
DEPRECATED RDW RBC AUTO: 19.2 % (ref 12.4–15.4)
EOSINOPHIL # BLD: 0.1 K/UL (ref 0–0.6)
EOSINOPHIL NFR BLD: 1.3 %
FERRITIN SERPL IA-MCNC: 2997 NG/ML (ref 30–400)
GLUCOSE BLD-MCNC: 131 MG/DL (ref 70–99)
GLUCOSE BLD-MCNC: 150 MG/DL (ref 70–99)
GLUCOSE BLD-MCNC: 168 MG/DL (ref 70–99)
GLUCOSE BLD-MCNC: 78 MG/DL (ref 70–99)
HCT VFR BLD AUTO: 27.8 % (ref 40.5–52.5)
HGB BLD-MCNC: 9.3 G/DL (ref 13.5–17.5)
IRON SATN MFR SERPL: 36 % (ref 20–50)
IRON SERPL-MCNC: 77 UG/DL (ref 59–158)
LYMPHOCYTES # BLD: 1 K/UL (ref 1–5.1)
LYMPHOCYTES NFR BLD: 15.1 %
MCH RBC QN AUTO: 33.6 PG (ref 26–34)
MCHC RBC AUTO-ENTMCNC: 33.6 G/DL (ref 31–36)
MCV RBC AUTO: 100.2 FL (ref 80–100)
MONOCYTES # BLD: 0.3 K/UL (ref 0–1.3)
MONOCYTES NFR BLD: 3.8 %
NEUTROPHILS # BLD: 5.3 K/UL (ref 1.7–7.7)
NEUTROPHILS NFR BLD: 79 %
PERFORMED ON: ABNORMAL
PERFORMED ON: NORMAL
PLATELET # BLD AUTO: 267 K/UL (ref 135–450)
PMV BLD AUTO: 6.4 FL (ref 5–10.5)
RBC # BLD AUTO: 2.77 M/UL (ref 4.2–5.9)
TIBC SERPL-MCNC: 211 UG/DL (ref 260–445)
WBC # BLD AUTO: 6.7 K/UL (ref 4–11)

## 2025-04-09 PROCEDURE — 97161 PT EVAL LOW COMPLEX 20 MIN: CPT

## 2025-04-09 PROCEDURE — 6370000000 HC RX 637 (ALT 250 FOR IP): Performed by: STUDENT IN AN ORGANIZED HEALTH CARE EDUCATION/TRAINING PROGRAM

## 2025-04-09 PROCEDURE — 97530 THERAPEUTIC ACTIVITIES: CPT

## 2025-04-09 PROCEDURE — 1280000000 HC REHAB R&B

## 2025-04-09 PROCEDURE — 97165 OT EVAL LOW COMPLEX 30 MIN: CPT

## 2025-04-09 PROCEDURE — 99223 1ST HOSP IP/OBS HIGH 75: CPT | Performed by: INTERNAL MEDICINE

## 2025-04-09 PROCEDURE — 97535 SELF CARE MNGMENT TRAINING: CPT

## 2025-04-09 PROCEDURE — 97110 THERAPEUTIC EXERCISES: CPT

## 2025-04-09 PROCEDURE — 36415 COLL VENOUS BLD VENIPUNCTURE: CPT

## 2025-04-09 PROCEDURE — 85025 COMPLETE CBC W/AUTO DIFF WBC: CPT

## 2025-04-09 RX ADMIN — APIXABAN 5 MG: 5 TABLET, FILM COATED ORAL at 20:13

## 2025-04-09 RX ADMIN — ACETAMINOPHEN 650 MG: 325 TABLET ORAL at 01:09

## 2025-04-09 RX ADMIN — OXYCODONE HYDROCHLORIDE AND ACETAMINOPHEN 2 TABLET: 5; 325 TABLET ORAL at 21:28

## 2025-04-09 RX ADMIN — OXYCODONE HYDROCHLORIDE AND ACETAMINOPHEN 500 MG: 500 TABLET ORAL at 08:49

## 2025-04-09 RX ADMIN — METHOCARBAMOL 750 MG: 750 TABLET ORAL at 01:10

## 2025-04-09 RX ADMIN — ROSUVASTATIN CALCIUM 10 MG: 10 TABLET, FILM COATED ORAL at 20:13

## 2025-04-09 RX ADMIN — CYANOCOBALAMIN TAB 1000 MCG 1000 MCG: 1000 TAB at 08:49

## 2025-04-09 RX ADMIN — LINEZOLID 600 MG: 600 TABLET, FILM COATED ORAL at 20:13

## 2025-04-09 RX ADMIN — OXYCODONE HYDROCHLORIDE AND ACETAMINOPHEN 2 TABLET: 5; 325 TABLET ORAL at 05:51

## 2025-04-09 RX ADMIN — ZINC SULFATE 220 MG (50 MG) CAPSULE 50 MG: CAPSULE at 08:49

## 2025-04-09 RX ADMIN — METOPROLOL TARTRATE 50 MG: 50 TABLET, FILM COATED ORAL at 08:49

## 2025-04-09 RX ADMIN — CHOLECALCIFEROL TAB 125 MCG (5000 UNIT) 5000 UNITS: 125 TAB at 08:48

## 2025-04-09 RX ADMIN — SEVELAMER CARBONATE 800 MG: 800 TABLET, FILM COATED ORAL at 12:17

## 2025-04-09 RX ADMIN — PANTOPRAZOLE SODIUM 40 MG: 40 TABLET, DELAYED RELEASE ORAL at 05:51

## 2025-04-09 RX ADMIN — METHOCARBAMOL 750 MG: 750 TABLET ORAL at 08:54

## 2025-04-09 RX ADMIN — SEVELAMER CARBONATE 800 MG: 800 TABLET, FILM COATED ORAL at 08:50

## 2025-04-09 RX ADMIN — OXYCODONE HYDROCHLORIDE AND ACETAMINOPHEN 2 TABLET: 5; 325 TABLET ORAL at 14:08

## 2025-04-09 RX ADMIN — Medication 1 MG: at 08:49

## 2025-04-09 RX ADMIN — MELATONIN TAB 3 MG 3 MG: 3 TAB at 17:19

## 2025-04-09 RX ADMIN — SEVELAMER CARBONATE 800 MG: 800 TABLET, FILM COATED ORAL at 17:19

## 2025-04-09 RX ADMIN — POLYETHYLENE GLYCOL 3350 17 G: 17 POWDER, FOR SOLUTION ORAL at 08:48

## 2025-04-09 RX ADMIN — APIXABAN 5 MG: 5 TABLET, FILM COATED ORAL at 08:49

## 2025-04-09 RX ADMIN — INSULIN GLARGINE 8 UNITS: 100 INJECTION, SOLUTION SUBCUTANEOUS at 20:15

## 2025-04-09 RX ADMIN — LINEZOLID 600 MG: 600 TABLET, FILM COATED ORAL at 08:49

## 2025-04-09 RX ADMIN — METOPROLOL TARTRATE 50 MG: 50 TABLET, FILM COATED ORAL at 20:13

## 2025-04-09 ASSESSMENT — PAIN SCALES - WONG BAKER
WONGBAKER_NUMERICALRESPONSE: HURTS A LITTLE BIT
WONGBAKER_NUMERICALRESPONSE: NO HURT
WONGBAKER_NUMERICALRESPONSE: HURTS A LITTLE BIT
WONGBAKER_NUMERICALRESPONSE: NO HURT
WONGBAKER_NUMERICALRESPONSE: HURTS A LITTLE BIT
WONGBAKER_NUMERICALRESPONSE: NO HURT
WONGBAKER_NUMERICALRESPONSE: NO HURT
WONGBAKER_NUMERICALRESPONSE: HURTS A LITTLE BIT
WONGBAKER_NUMERICALRESPONSE: NO HURT
WONGBAKER_NUMERICALRESPONSE: HURTS A LITTLE BIT
WONGBAKER_NUMERICALRESPONSE: HURTS A LITTLE BIT
WONGBAKER_NUMERICALRESPONSE: NO HURT
WONGBAKER_NUMERICALRESPONSE: HURTS A LITTLE BIT

## 2025-04-09 ASSESSMENT — ENCOUNTER SYMPTOMS
COUGH: 0
BACK PAIN: 1
NAUSEA: 0
CONSTIPATION: 0
RHINORRHEA: 0
EYE REDNESS: 0
COLOR CHANGE: 1
ABDOMINAL PAIN: 0
SHORTNESS OF BREATH: 0
SINUS PRESSURE: 0
DIARRHEA: 0
SORE THROAT: 0
WHEEZING: 0
SINUS PAIN: 0
EYE DISCHARGE: 0

## 2025-04-09 ASSESSMENT — PAIN SCALES - GENERAL
PAINLEVEL_OUTOF10: 0
PAINLEVEL_OUTOF10: 3
PAINLEVEL_OUTOF10: 3
PAINLEVEL_OUTOF10: 5
PAINLEVEL_OUTOF10: 0
PAINLEVEL_OUTOF10: 3
PAINLEVEL_OUTOF10: 2
PAINLEVEL_OUTOF10: 0
PAINLEVEL_OUTOF10: 3
PAINLEVEL_OUTOF10: 0
PAINLEVEL_OUTOF10: 5
PAINLEVEL_OUTOF10: 2
PAINLEVEL_OUTOF10: 3
PAINLEVEL_OUTOF10: 0
PAINLEVEL_OUTOF10: 6
PAINLEVEL_OUTOF10: 7
PAINLEVEL_OUTOF10: 0
PAINLEVEL_OUTOF10: 7
PAINLEVEL_OUTOF10: 0
PAINLEVEL_OUTOF10: 6
PAINLEVEL_OUTOF10: 4
PAINLEVEL_OUTOF10: 2
PAINLEVEL_OUTOF10: 2
PAINLEVEL_OUTOF10: 7
PAINLEVEL_OUTOF10: 0
PAINLEVEL_OUTOF10: 0

## 2025-04-09 ASSESSMENT — PAIN DESCRIPTION - ONSET
ONSET: ON-GOING
ONSET: ON-GOING

## 2025-04-09 ASSESSMENT — PAIN DESCRIPTION - LOCATION
LOCATION: HIP
LOCATION: BACK
LOCATION: BACK
LOCATION: HIP

## 2025-04-09 ASSESSMENT — PAIN DESCRIPTION - DESCRIPTORS
DESCRIPTORS: SORE
DESCRIPTORS: ACHING
DESCRIPTORS: ACHING
DESCRIPTORS: ACHING;SHOOTING
DESCRIPTORS: ACHING
DESCRIPTORS: ACHING

## 2025-04-09 ASSESSMENT — PAIN DESCRIPTION - PAIN TYPE
TYPE: ACUTE PAIN

## 2025-04-09 ASSESSMENT — PAIN DESCRIPTION - FREQUENCY
FREQUENCY: CONTINUOUS
FREQUENCY: CONTINUOUS

## 2025-04-09 ASSESSMENT — PAIN DESCRIPTION - ORIENTATION
ORIENTATION: RIGHT
ORIENTATION: RIGHT
ORIENTATION: LOWER
ORIENTATION: LOWER
ORIENTATION: RIGHT
ORIENTATION: RIGHT

## 2025-04-09 NOTE — PLAN OF CARE
ARU Admission Assessment    Ethnicity  \"Are you of , /a, or Swedish origin?\"  Check all that apply:  [x] A.  No, not of , /a, or Swedish Origin  [] B.  Yes, Samoan, Samoan American, Chicano/a  [] C.  Yes, Moldovan  [] D.  Yes, Gonzalez  [] E.  Yes, another , , or Swedish origin  [] X.  Patient unable to respond  [] Y.  Patient declines to respond    Race  \"What is your race?\"  Check all that apply:  [x] A.  White  [] B.  Black or   [] C.   or   [] D.   Macedonian  [] E.  Chinese  [] F.  Egyptian  [] G. Argentine  [] H.  Indonesian  [] I.  Maori  [] J.  Other   [] K.    [] L.  English or Blade  [] M.  Hong Konger  [] N.  Other   [] X.  Patient unable to respond  [] Y.  Patient declines to respond  [] Z.  None of the above    Language  A.  \"What is your preferred language?\"   English    B.  \"Do you need or want an  to communicate with a doctor or health care staff?\"  Check only one:  [x] 0.  No  [] 1.  Yes  [] 9.  Unable to determine    Transportation  \"Has lack of transportation kept you from medical appointments, meetings, work, or from getting things needed for daily living?\"Check all that apply:  [] A.  Yes, it has kept me from medical appointments or from getting my medications  [] B.  Yes, it has kept me from non-medical meetings, appointments, work, or from getting things that I need  [x] C.  No  [] X.  Patient unable to respond  [] Y.  Patient declines to respond    Hearing  Ability to hear (with hearing aid or hearing appliances if normally used)  [x]  0.  Adequate - no difficulty in normal conversation, social interaction, listening to TV  []  1.  Minimal difficulty - difficulty in some environments (e.g. when person speaks softly or setting is noisy)  []  2.  Moderate difficulty - speaker has to increase volume and speak distinctly   []  3.  Highly impaired - absence of  useful hearing    Vision  Ability to see in adequate light (with glasses or other visual appliances)  [x]  0.  Adequate - sees fine detail, such as regular print in newspapers/books  []  1.  Impaired - sees large print, but not regular print in newspapers/books  []  2.  Moderately impaired - limited vision; not able to see newspaper headlines but can identify objects  []  3.  Highly impaired - object identification in question, but eyes appear to follow objects  []  4.  Severely impaired - no vision or sees only light, colors, or shapes; eyes do not appear to follow objects    Health Literacy  \"How often do you need to have someone help you when you read instructions, pamphlets, or other written material from your doctor or pharmacy?\"  [x]  0.  Never  []  1.  Rarely  []  2.  Sometimes  []  3.  Often  []  4.  Always  []  7.  Patient declines to respond  []  8.  Patient unable to respond    BIMS - **Must be completed in the flowsheet at admission prior to proceeding with Delirium Assessment**  [x] BIMS completed in flowsheet at admission  [] BIMS not completed due to patient unable to give appropriate related answers, see Staff Assessment in flowsheet    Signs and Symptoms of Delirium  A.  Acute Onset Mental Status Change - Is there evidence of an acute change in mental status from the patient's baseline?  [x] 0.  No  [] 1.  Yes    B.  Inattention - Did the patient have difficulty focusing attention, for example being easily distractible or having difficulty keeping track of what was being said?  [x]  0.  Behavior not present  []  1.  Behavior continuously present, does not fluctuate  []  2.  Behavior present, fluctuates (comes and goes, changes in severity)    C.  Disorganized thinking - Was the patient's thinking disorganized or incoherent (rambling or irrelevant conversation, unclear or illogical flow of ideas, or unpredictable switching from subject to subject)?  [x]  0.  Behavior not present  []  1.  Behavior  the above    High Risk Drug Classes:  Use and Indication    Is taking: Check if the pt is taking any medications by pharmacological classification, not how it is used, in the following classes  Indication noted: If column 1 is checked, check if there is an indication noted for all meds in the drug class Is taking  (check all that apply) Indication noted (check all that apply)   Antipsychotic [] []   Anticoagulant [x] [x]   Antibiotic [x] [x]   Opioid [x] [x]   Antiplatelet [] []   Hypoglycemic (including insulin) [x] [x]   None of the above []     Special Treatments, Procedures, and Programs    Check all of the following treatments, procedures, and programs that apply on admission. On admission (check all that apply)   Cancer Treatments   A1. Chemotherapy []           A2. IV []           A3. Oral [x]           A10. Other []   B1. Radiation []   Respiratory Therapies   C1. Oxygen Therapy []           C2. Continuous (continuously for at least 14 hours per day) []           C3. Intermittent []           C4. High-concentration []   D1. Suctioning (Does not include oral suctioning) []           D2. Scheduled []           D3. As needed []   E1. Tracheostomy Care []   F1. Invasive Mechanical Ventilator (ventilator or respirator) []   G1. Non-invasive Mechanical Ventilator []           G2. BiPAP []           G3. CPAP []   Other   H1. IV Medications (Do not include sub Q pumps, flushes, Dextrose 50% or lactated ringers) []           H2. Vasoactive medications []           H3. Antibiotics [x]           H4. Anticoagulation []           H10. Other []   I1. Transfusions []   J1. Dialysis []           J2. Hemodialysis [x]           J3. Peritoneal dialysis []   O1. IV access (including a catheter in a vein) []           O2. Peripheral []           O3. Midline []           O4. Central (PICC, tunneled, port) [x]      None of the above (select if no Cancer, Respiratory, or Other boxes are checked) []     The above items have been

## 2025-04-09 NOTE — PROGRESS NOTES
Admission Period/Goal QM Codes for Danny Rock.    QM Admit Code Goal Code   Eating 5 6   Oral Hygiene 3 6   Toileting Hygiene 3 6   Shower/Bathing 3 6   UB Dressing 4 6   LB Dressing 3 6   Putting on/off Footwear 3 6   Rolling Left and Right 3 6   Sit To Lying 4 6   Lying to Sitting on Bedside 3 6   Sit to Stand 3 6   Chair/Bed to Chair Transfer 3 6   Toilet Transfers 3 6   Car Transfers 3 6   Walk 10 Feet 4 6   Walk 50 Feet with Two Turns 4 6   Walk 150 Feet 88 6   Walk 10 Feet on Uneven Surfaces 4 6   1 Step (Curb) 4 6   4 Steps 88 6   12 Steps 88 6   Picking up Object from Floor 4 6   Wheel 50 Feet with 2 Turns 9 -   Type - -   Wheel 150 Feet 9 -   Type - -       The above codes were determined by the treatment team to be the patient's accurate admission assessment codes based on assessment performed soon after the patient's admission and prior to the benefit of services provided by staff, or if appropriate, the patient's usual performance during the admission assessment period.    OT:  Brendan Crooks OTR/L #[id number], 4/11/2025, 1201     PT:  Tin Giron PT, DPT [id number] 4/11/2025 1143     RN:  Carey Rogers, 4/11/25, 1229     :  Shaquille Gonzalez PT, DPT [id number]  4/11/25  1:27 PM

## 2025-04-09 NOTE — PROGRESS NOTES
Progress Note  Date:2025       Room:[facility]4906/4906-01  Patient Name:Danny Rock     YOB: 1950     Age:75 y.o.        Subjective    Subjective:  Pain:  He reports no pain.       Review of Systems   Skin:  Positive for color change (toe nails).     Objective         Vitals Last 24 Hours:  TEMPERATURE:  Temp  Av.8 °F (36.6 °C)  Min: 97.2 °F (36.2 °C)  Max: 98.1 °F (36.7 °C)  RESPIRATIONS RANGE: Resp  Av.1  Min: 16  Max: 19  PULSE OXIMETRY RANGE: SpO2  Av.3 %  Min: 98 %  Max: 99 %  PULSE RANGE: Pulse  Av.2  Min: 65  Max: 76  BLOOD PRESSURE RANGE: Systolic (24hrs), Av , Min:125 , Max:151   ; Diastolic (24hrs), Av, Min:66, Max:83    I/O (24Hr):    Intake/Output Summary (Last 24 hours) at 2025 0820  Last data filed at 2025 0556  Gross per 24 hour   Intake --   Output 1300 ml   Net -1300 ml     Objective:  Vital signs: (most recent): Blood pressure (!) 151/83, pulse 68, temperature 98.1 °F (36.7 °C), temperature source Oral, resp. rate 16, height 1.905 m (6' 3\"), weight 71 kg (156 lb 8.4 oz), SpO2 98%.    Skin:  There is ecchymosis (toe nail right hallux).      Labs/Imaging/Diagnostics    Labs:  CBC:  Recent Labs     25  1643 25  0801 25  0608   WBC  --  8.6 6.7   RBC  --  2.69* 2.77*   HGB 8.3* 9.1* 9.3*   HCT 25.3* 26.9* 27.8*   MCV  --  99.8 100.2*   RDW  --  19.3* 19.2*   PLT  --  301 267     CHEMISTRIES:  Recent Labs     25  0902 25  0801   * 131*   K 3.9 4.8    98*   CO2 20* 19*   BUN 46* 59*   CREATININE 2.4* 2.5*   GLUCOSE 150* 123*   PHOS  --  3.0     PT/INR:  Recent Labs     25  09   PROTIME 14.8   INR 1.14     APTT:  Recent Labs     25   APTT 34.2     LIVER PROFILE:No results for input(s): \"AST\", \"ALT\", \"BILIDIR\", \"BILITOT\", \"ALKPHOS\" in the last 72 hours.    Imaging Last 24 Hours:  No results found.  Assessment//Plan           Hospital Problems           Last Modified POA    * (Principal) Lumbar  spondylosis 4/8/2025 Yes     Assessment:   (Incurvated right hallux nail progressing well.).     Plan:    (Okay to follow up out patient.).       Electronically signed by Iain Isbell DPM on 4/9/25 at 8:20 AM EDT

## 2025-04-09 NOTE — PROGRESS NOTES
Nutrition Note    RECOMMENDATIONS  PO Diet: CCC  ONS: Chocolate Glucerna TID     ASSESSMENT   Nutrition intervention triggered for re-admission to ARU.  Pt has been eating % most meals, with Glucerna BID for increased protein needs.  Pt requests to increase ONS to TID to promote healing & strength.   lb; pt reports  lb, & wt was 170 lb per EMR 3 months ago, which is 8% wt loss.  Nutrition status is stable at this time, despite pt meeting criteria for severe malnutrition (see NFPE).  Will monitor for con't adequate po & supplement intake.       Malnutrition Status: Severe malnutrition  Acute Illness  Findings of the 6 clinical characteristics of malnutrition:  Energy Intake:  No decrease in energy intake  Weight Loss:  Greater than 7.5% over 3 months (8%)     Body Fat Loss:  Moderate body fat loss Buccal region   Muscle Mass Loss:  Moderate muscle mass loss Temples (temporalis), Clavicles (pectoralis & deltoids)  Fluid Accumulation:  No fluid accumulation     Strength:  Not Performed      NUTRITION DIAGNOSIS   Severe malnutrition, in context of acute illness or injury related to acute injury/trauma as evidenced by criteria as identified in malnutrition assessment  Increased nutrient needs related to increase demand for energy/nutrients as evidenced by wounds, rehab for strength and conditioning    Goals: PO intake 75% or greater     NUTRITION RELATED FINDINGS  Objective: ESRD: on HD; Na 131, POCG 78; LBM 4/8, no edema noted  Wounds: Stage II    CURRENT NUTRITION THERAPIES  ADULT DIET; Regular; 5 carb choices (75 gm/meal)  ADULT ORAL NUTRITION SUPPLEMENT; Breakfast, Dinner; Diabetic Oral Supplement       PO Intake: 51-75%, %   PO Supplement Intake:51-75%, %    ANTHROPOMETRICS  Current Height: 190.5 cm (6' 3\")  Current Weight - Scale: 71 kg (156 lb 8.4 oz)        COMPARATIVE STANDARDS  Total Energy Requirements (kcals/day): 2136     Protein (g):  85- 107g       Fluid (mL/day):  1

## 2025-04-09 NOTE — PROGRESS NOTES
4 Eyes Skin Assessment     NAME:  Danny Rock  YOB: 1950  MEDICAL RECORD NUMBER:  [id number]    The patient is being assessed for  Admission    I agree that at least one RN has performed a thorough Head to Toe Skin Assessment on the patient. ALL assessment sites listed below have been assessed.      Areas assessed by both nurses:    Head, Face, Ears, Shoulders, Back, Chest, Arms, Elbows, Hands, Sacrum. Buttock, Coccyx, Ischium, Legs. Feet and Heels, and Under Medical Devices         Does the Patient have a Wound? Yes wound(s) were present on assessment. LDA wound assessment was Initiated and completed by RN  Impaired skin integrity noted at bilateral buttocks, inner gluteal cleft, and left heel.       Price Prevention initiated by RN: Yes  Wound Care Orders initiated by RN: No    Pressure Injury (Stage 3,4, Unstageable, DTI, NWPT, and Complex wounds) if present, place Wound referral order by RN under : Yes    New Ostomies, if present place, Ostomy referral order under : No     Nurse 1 eSignature: Electronically signed by Merrill Hernandez RN on 4/9/25 at 1:15 AM EDT    **SHARE this note so that the co-signing nurse can place an eSignature**    Nurse 2 eSignature: Electronically signed by Linda Joy RN on 4/10/25 at 1:59 AM EDT (4 Eyes Skin Assessment performed the night of patient's admission to ARU (4/8/25))

## 2025-04-09 NOTE — PROGRESS NOTES
[facility] - Inpatient Rehabilitation Department   Phone: [phone]    Physical Therapy    [x] Initial Evaluation            [] Daily Treatment Note         [] Discharge Summary      Patient: Danny Rock   : 1950   MRN: [id number]   Date of Service:  2025  Admitting Diagnosis: Lumbar spondylosis  Current Admission Summary: Danny Rock is a 75 y.o. male with PMHx notable for HTN, HLD, CAD, HFrEF, atrial fibrillation, renal cancer s/p right nephrectomy, ESRD on HD who initially presented to [facility] on 3/22 with low back pain and had MRI showing severe multilevel foraminal stenosis worst at L5-S1 prompting transfer to [facility] for neurosurgical evaluation. Repeat MRI C/T/L-spine on 3/23 showed multilevel disc/endplate enhancement compatible with discitis and osteomyelitis at L2-L3, L3-L4 and L4-L5 with adjacent paravertebral edema, and small right psoas muscle abscess. Blood cultures resulted positive for Enterococcus faecalis. He was started on IV vancomycin and gentamicin on 3/25, with plan at discharge to continue IV vancomycin 3 times a week after dialysis through 2025. NSGY evaluated while inpatient, recommended no surgical intervention at this time. Nephrology consulted for HD needs, had old HD access removed and TDC placed on 3/28.      He was admitted to [facility] at [facility] from 3/30-.  Rehabilitation course was complicated by development of large right iliopsoas intramuscular hematoma.  His hemoglobin continued to downtrend despite holding Eliquis and aspirin.  Case was discussed with Orthopedic Surgery, NSGY and IR.  Patient was discharged back to acute hospital where Eliquis and aspirin were held for 4 days. Patient was started on heparin drip on , with ongoing stability of his hemoglobin.  He is able to resume his Eliquis on , and aspirin after 1 week if stable without signs of recurrent bleeding.  Past Medical History:  has a past medical history of  Atrial fibrillation (HCC), CAD (coronary artery disease), Cancer of kidney (HCC), Dependence on renal dialysis, Diabetes mellitus (HCC), Hyperlipidemia, Hypertension, Prostate cancer (HCC), Right renal mass, and Systolic CHF (HCC).  Past Surgical History:  has a past surgical history that includes hernia repair; knee surgery; Cholecystectomy; Nasal septum surgery; eye surgery (Bilateral, 04/19/2018); Kidney removal (Right, 07/25/2022); Cataract removal (Bilateral); IR TUNNELED CVC PLACE WO SQ PORT/PUMP > 5 YEARS (02/07/2024); CT NEEDLE BIOPSY LIVER PERCUTANEOUS (08/01/2024); other surgical history (09/30/2024); CT NEEDLE BIOPSY LIVER PERCUTANEOUS (11/18/2024); CT GUIDED CHEST TUBE (11/18/2024); hernia repair (Left, 12/31/2024); other surgical history (Left); shoulder surgery (Right, 01/31/2025); and IR TUNNELED CVC PLACE WO SQ PORT/PUMP > 5 YEARS (03/28/2025).  Discharge Recommendations: home with HHPT services   DME Required For Discharge: patient has all required DME for discharge  Precautions/Restrictions:  LUE limb restriction d/t dialysis access  Weight Bearing Restrictions: weight bearing as tolerated  [] Right Upper Extremity  [] Left Upper Extremity [] Right Lower Extremity  [] Left Lower Extremity     Required Braces/Orthotics: no braces required   [] Right  [] Left  Positional Restrictions:no positional restrictions    Pre-Admission Information   Lives With: spouse, + Dog \"Wriggly\"                          Type of Home: house  Home Layout: tri-level  Home Access:  1 KAYLYNN front door; 1 KAYLYNN into back entry; 7-8 steps between levels   Bathroom Layout: tub/shower unit; walk in on lower level  Bathroom Equipment: grab bars in shower, grab bars around toilet, shower chair, hand held shower head  Toilet Height: standard height  Home Equipment: rolling walker, single point cane  Transfer Assistance: Independent without use of device  Ambulation Assistance:modified independent with use of use of AD as needed-reports

## 2025-04-09 NOTE — CARE COORDINATION
Case Management Assessment  Initial Evaluation    Date/Time of Evaluation: 4/9/2025 3:27 PM  Assessment Completed by: TALAT Atkins    If patient is discharged prior to next notation, then this note serves as note for discharge by case management.    Patient Name: Danny Rock                   YOB: 1950  Diagnosis: Lumbar spondylosis [M47.816]                   Date / Time: 4/8/2025  7:57 PM    Patient Admission Status: REHAB IP   Readmission Risk (Low < 19, Mod (19-27), High > 27): Readmission Risk Score: 30    Current PCP: Yesi Rosa APRN - CNP  PCP verified by CM? (P) Yes    Chart Reviewed: Yes      History Provided by: (P) Patient, Spouse  Patient Orientation: (P) Alert and Oriented    Patient Cognition: (P) Alert    Hospitalization in the last 30 days (Readmission):  Yes    If yes, Readmission Assessment in CM Navigator will be completed.    Advance Directives:      Code Status: Full Code   Patient's Primary Decision Maker is: (P) Legal Next of Kin    Primary Decision Maker: Amanda Rock - Spouse - [phone]    Discharge Planning:    Patient lives with: (P) Spouse/Significant Other Type of Home: (P) House  Primary Care Giver: (P) Self  Patient Support Systems include: (P) Spouse/Significant Other   Current Financial resources: (P) Medicare  Current community resources: (P) Other (Comment) (NA)  Current services prior to admission: (P) Durable Medical Equipment            Current DME: (P) Wheelchair            Type of Home Care services:  (P) OT, PT, Skilled Therapy    ADLS  Prior functional level: (P) Assistance with the following:, Mobility, Shopping, Housework, Cooking  Current functional level: (P) Assistance with the following:, Mobility, Shopping, Housework, Cooking    PT AM-PAC:   /24  OT AM-PAC:   /24    Family can provide assistance at DC: (P) Yes  Would you like Case Management to discuss the discharge plan with any other family members/significant others, and if so,

## 2025-04-09 NOTE — CONSULTS
Marijuana (Weed).   Family History:   family history includes Heart Disease in his brother and mother; Skin Cancer in his father; Stroke in his brother.     REVIEW OF SYSTEMS:    System review was done , pertinent positives are mentioned in the HPI    Negative fatigue  No chest pain nor palpitations  No SOB, coughing nor hemoptysis  No abdominal pain, nausea, vomiting nor diarrhea  No fever/chills  No leg swelling  No change in urine output nor gross hematuria    PHYSICAL EXAM:    Vitals:  BP (!) 151/83   Pulse 68   Temp 98.1 °F (36.7 °C) (Oral)   Resp 16   Ht 1.905 m (6' 3\")   Wt 71 kg (156 lb 8.4 oz)   SpO2 98%   BMI 19.56 kg/m²   WEIGHT: well tolerated    Intake/Output Summary (Last 24 hours) at 4/9/2025 0824  Last data filed at 4/9/2025 0556  Gross per 24 hour   Intake --   Output 1300 ml   Net -1300 ml       CONSTITUTIONAL:  awake, alert, cooperative, no apparent distress, and appears stated age  EYES:  Lids and lashes normal, pupils equal, round   NECK:  Supple, symmetrical, trachea midline  HEMATOLOGIC/LYMPHATICS:  no pallor, no cervical LAD  LUNGS:  No increased work of breathing, diminished bibasilar breaths  CARDIOVASCULAR:  regular rate and rhythm, normal S1 and S2  ABDOMEN:  No scars, normal bowel sounds, soft, non-distended, non-tender  EXTREMITIES:  Warm, dry, edema trace  NEUROLOGIC:  Awake, alert, oriented to name, place and time.      DATA:    CBC:   Lab Results   Component Value Date/Time    WBC 6.7 04/09/2025 06:08 AM    RBC 2.77 04/09/2025 06:08 AM    HGB 9.3 04/09/2025 06:08 AM    HCT 27.8 04/09/2025 06:08 AM    .2 04/09/2025 06:08 AM    MCH 33.6 04/09/2025 06:08 AM    MCHC 33.6 04/09/2025 06:08 AM    RDW 19.2 04/09/2025 06:08 AM     04/09/2025 06:08 AM    MPV 6.4 04/09/2025 06:08 AM     BMP:   Lab Results   Component Value Date/Time     04/08/2025 08:01 AM    K 4.8 04/08/2025 08:01 AM    K 4.1 03/31/2025 06:19 AM    CL 98 04/08/2025 08:01 AM    CO2 19 04/08/2025 08:01  AM    BUN 59 04/08/2025 08:01 AM    CREATININE 2.5 04/08/2025 08:01 AM    CALCIUM 8.7 04/08/2025 08:01 AM    GFRAA 40 07/26/2022 05:19 AM    GFRAA >60 03/21/2013 10:55 AM    LABGLOM 26 04/08/2025 08:01 AM    LABGLOM 26 04/10/2024 09:47 AM    GLUCOSE 123 04/08/2025 08:01 AM   Magnesium:    Lab Results   Component Value Date/Time    MG 1.70 02/09/2024 05:32 AM   Phosphorus:    Lab Results   Component Value Date/Time    PHOS 3.0 04/08/2025 08:01 AM

## 2025-04-09 NOTE — PROGRESS NOTES
[facility] - Inpatient Rehabilitation Department   Phone: [phone]    Occupational Therapy    [x] Initial Evaluation            [] Daily Treatment Note         [] Discharge Summary      Patient: Danny Rock   : 1950   MRN: [id number]   Date of Service:  2025    Admitting Diagnosis:  Lumbar spondylosis  Current Admission Summary:   Danny Rock is a 75 y.o. male with PMHx notable for HTN, HLD, CAD, HFrEF, atrial fibrillation, renal cancer s/p right nephrectomy, ESRD on HD who initially presented to [facility] on 3/22 with low back pain and had MRI showing severe multilevel foraminal stenosis worst at L5-S1 prompting transfer to [facility] for neurosurgical evaluation. Repeat MRI C/T/L-spine on 3/23 showed multilevel disc/endplate enhancement compatible with discitis and osteomyelitis at L2-L3, L3-L4 and L4-L5 with adjacent paravertebral edema, and small right psoas muscle abscess. Blood cultures resulted positive for Enterococcus faecalis. He was started on IV vancomycin and gentamicin on 3/25, with plan at discharge to continue IV vancomycin 3 times a week after dialysis through 2025. NSGY evaluated while inpatient, recommended no surgical intervention at this time. Nephrology consulted for HD needs, had old HD access removed and TDC placed on 3/28.      He was admitted to [facility] from 3/30-.  Rehabilitation course was complicated by development of large right iliopsoas intramuscular hematoma.  His hemoglobin continued to downtrend despite holding Eliquis and aspirin.  Case was discussed with Orthopedic Surgery, NSGY and IR.  Patient was discharged back to acute hospital where Eliquis and aspirin were held for 4 days. Patient was started on heparin drip on , with ongoing stability of his hemoglobin.  He is able to resume his Eliquis on , and aspirin after 1 week if stable without signs of recurrent bleeding  Past Medical History:  has a past medical history  with all ADL's  IADL Assistance:  shared roles with spouse; reports doing \"some\", pt's wife completes medication management d/t hx of having errors   Active :        [] Yes                 [] No - not currently, did return to driving recently prior to current dx/admission   Hand Dominance: [] Left                 [x] Right  Current Employment: retired, steel mill   Hobbies:  \"piddle around\"; outdoor activities   Recent Falls: denied hx of falls- per chart review pt has had 2      Available Assistance at Discharge: 24 hr supervision (non-physical) available; stating spouse can assist with ADLs but not lifting/mobility     Examination   Vision:   Vision Corrective Device: wears glasses at all times  Hearing:   WFL  Observation:   General Observation:  dialysis port on chest, IV access on R forearm, on RA, significantly bow legged   Posture:   Forward head   Sensation:   reports numbness and tingling in (R) UE- R shoulder and hand   ROM:   (L) Shoulder: ~40     (R) Shoulder: ~40  (B) Elbow AROM WFL  (B) Wrist AROM WFL  Strength:   (B) UE strength grossly WFL    Therapist Clinical Decision Making (Complexity): low complexity  Clinical Presentation: stable      Subjective  General: Pt in bed upon arrival, pleasant and agreeable to OT evaluation. Reporting feeling weaker since last ARU stay. Pt initially appeared to be confused and required increased time for orientation questions. Pt's cognition appeared to improve during session but may benefit from further screening.   Pain: Patient does not rate upon questioning- pain in R hip and R shoulder. Feels better after medicine, worsens with mobility   Pain Interventions: pain medication in place prior to arrival        Activities of Daily Living  Basic Activities of Daily Living  Feeding: Independent  Feeding Comments: left with breakfast at end of session   Grooming: stand by assistance  Grooming Comments: applied deodorant seated on TTB. Oral hygiene not completed

## 2025-04-09 NOTE — H&P
Patient: Danny Rock  [id number]  Date: 4/9/2025      Chief Complaint: Vertebral discitis/osteomyelitis    History of Present Illness/Hospital Course:  Danny Rock is a 75 y.o. male with PMHx notable for HTN, HLD, CAD, HFrEF, atrial fibrillation, renal cancer s/p right nephrectomy, ESRD on HD who initially presented to [facility] on 3/22 with low back pain and had MRI showing severe multilevel foraminal stenosis worst at L5-S1 prompting transfer to [facility] for neurosurgical evaluation. Repeat MRI C/T/L-spine on 3/23 showed multilevel disc/endplate enhancement compatible with discitis and osteomyelitis at L2-L3, L3-L4 and L4-L5 with adjacent paravertebral edema, and small right psoas muscle abscess. Blood cultures resulted positive for Enterococcus faecalis. He was started on IV vancomycin and gentamicin on 3/25, with plan at discharge to continue IV vancomycin 3 times a week after dialysis through 5/19/2025. NSGY evaluated while inpatient, recommended no surgical intervention at this time. Nephrology consulted for HD needs, had old HD access removed and TDC placed on 3/28.     He was admitted to [facility] from 3/30-4/4.  Rehabilitation course was complicated by development of large right iliopsoas intramuscular hematoma.  His hemoglobin continued to downtrend despite holding Eliquis and aspirin.  Case was discussed with Orthopedic Surgery, NSGY and IR.  Patient was discharged back to acute hospital where Eliquis and aspirin were held for 4 days. Patient was started on heparin drip on 4/7, with ongoing stability of his hemoglobin.  He is able to resume his Eliquis on 4/8, and aspirin after 1 week if stable without signs of recurrent bleeding.    Currently patient reports that he is doing well.  His main complaint is of right posterior hip pain, moderate to severe, overall he thinks this is a little worse since last week.  Denies any lightheadedness, dizziness, chest pain, dyspnea.  Says he felt  compensated  #. HTN  #. HLD  #. Paroxysmal A-fib  #. Sinus bradycardia  -Continue metoprolol 50 mg BID  - Continue statin  - Continue ASA, and Eliquis      #. ESRD  - Nephrology consulted for HD needs, on Tu/Th/Sat schedule  - Renally dose medications       #. T2DM  - Continue Lantus 8 units nightly + low-intensity SSI     #. Onychomycosis  - Nails debrided on 4/2 per Podiatry. Follow-up outpatient.      Chronic Conditions:  - GERD: Continue PPI  - Hx of renal cell carcinoma s/p nephrectomy: Continue home Cabometyx (patient-provided)  - Hx of right shoulder abscess s/p I&D 1/31/24, right glenohumeral joint arthritis: Follows w/ Ortho, next appointment has been re-scheduled.    CODE: Full Code  Diet: ADULT DIET; Regular; 5 carb choices (75 gm/meal)  ADULT ORAL NUTRITION SUPPLEMENT; Breakfast, Dinner; Diabetic Oral Supplement  Bowels: Per ARU protocol  Bladder: Bladder scans per ARU protocol  Sleep: Melatonin nightly   DVT PPx: Eliquis  Dispo: TBD  Services: TBD  DME: TBD  Follow-ups: ID, Podiatry, PCP    Garret Ponce MD 4/9/2025, 8:35 AM     * This document was created using dictation software.  While all precautions were taken to ensure accuracy, errors may have occurred.  Please disregard any typographical errors.

## 2025-04-09 NOTE — CONSULTS
Infectious Diseases   Consult Note        Admission Date: 4/8/2025  Hospital Day: Hospital Day: 2   Attending: Garret Ponce MD  Date of service: 4/9/25     Reason for admission: Lumbar spondylosis [M47.816]    Chief complaint/ Reason for consult: Enterococcus faecalis bacteremia, lumbar discitis and osteomyelitis    Microbiology:        I have reviewed allavailable micro lab data and cultures    Results       Procedure Component Value Units Date/Time    Culture, Blood 2 [[id number]] Collected: 03/31/25 1313    Order Status: Completed Specimen: Blood Updated: 04/04/25 1415     Culture, Blood 2 No Growth after 4 days of incubation.    Narrative:      ORDER#: [id number]                          ORDERED BY: MAURISIO MIN  SOURCE: Blood Hand, Right                  COLLECTED:  03/31/25 13:13  ANTIBIOTICS AT SARAH.:                      RECEIVED :  03/31/25 20:42  If child <=2 yrs old please draw pediatric bottle.~Blood Culture #2    Culture, Blood 1 [[id number]] Collected: 03/31/25 1310    Order Status: Completed Specimen: Blood Updated: 04/04/25 1415     Blood Culture, Routine No Growth after 4 days of incubation.    Narrative:      ORDER#: [id number]                          ORDERED BY: MAURISIO MNI  SOURCE: Blood Antecubital-Rig              COLLECTED:  03/31/25 13:10  ANTIBIOTICS AT SARAH.:                      RECEIVED :  03/31/25 20:42  If child <=2 yrs old please draw pediatric bottle.~Blood Culture 1    Culture, Blood 3 [[id number]] Collected: 03/31/25 0000    Order Status: Canceled Specimen: Blood              Susceptibility data from last 90 days.  Collected Specimen Info Organism Ampicillin gentamicin-syn Sodium streptomycin-syn   03/26/25 Blood Staphylococcus epidermidis (1)         Staphylococcus epidermidis (2)       03/24/25 Blood Enterococcus faecalis       03/24/25 Blood Enterococcus faecalis       03/23/25 Blood Enterococcus faecalis (2)  S   S      Enterococcus faecalis (1)       03/23/25 Blood  for chills, diaphoresis and fever.   HENT:  Negative for ear discharge, ear pain, postnasal drip, rhinorrhea, sinus pressure, sinus pain and sore throat.    Eyes:  Negative for discharge and redness.   Respiratory:  Negative for cough, shortness of breath and wheezing.    Cardiovascular:  Negative for chest pain and leg swelling.   Gastrointestinal:  Negative for abdominal pain, constipation, diarrhea and nausea.   Endocrine: Negative for cold intolerance, heat intolerance and polydipsia.   Genitourinary:  Negative for dysuria, flank pain, frequency, hematuria and urgency.   Musculoskeletal:  Positive for back pain. Negative for myalgias.   Skin:  Negative for rash.   Allergic/Immunologic: Negative for immunocompromised state.   Neurological:  Negative for dizziness, seizures and headaches.   Hematological:  Does not bruise/bleed easily.   Psychiatric/Behavioral:  Negative for agitation, hallucinations and suicidal ideas. The patient is not nervous/anxious.    All other systems reviewed and are negative.         Objective:       PHYSICAL EXAM: The patient was seen and examined in detail at bedside today.    Vitals:   Vitals:    04/09/25 0600 04/09/25 0621 04/09/25 0847 04/09/25 1059   BP:   109/74    Pulse:   93    Resp:  16 16    Temp:   98.4 °F (36.9 °C)    TempSrc:   Oral    SpO2:   99%    Weight: 71 kg (156 lb 8.4 oz)      Height:    1.905 m (6' 3\")       Physical Exam  Vitals and nursing note reviewed.   Constitutional:       Appearance: He is well-developed. He is not diaphoretic.      Comments: The patient was seen earlier today.   HENT:      Head: Normocephalic and atraumatic.      Right Ear: External ear normal. There is no impacted cerumen.      Left Ear: External ear normal. There is no impacted cerumen.      Nose: Nose normal.      Mouth/Throat:      Mouth: Mucous membranes are moist.      Pharynx: Oropharynx is clear. No oropharyngeal exudate.   Eyes:      General: No scleral icterus.        Right eye:

## 2025-04-09 NOTE — PROGRESS NOTES
Patient was admitted to room 4906 at 1900.  Patient was oriented to the Call Light, Phone, TV, Thermostat, Bed Controls, Bathroom and Emergency Cord.  Patient verbalized and demonstrated understanding of all.  Patient was also given an overview of Unit Routines for Acute Rehab, including the patient's rights and responsibilities as well as the CMS IRF BETTY Privacy Act Statement providing notice of data collection.  Patient states that their normal bowel regime is daily. Meal times were explained, including how to order food.  The white board, (which is posted on the wall by the door is used for communication) has the Therapy Scheduled that is posted each day along with the name of your doctor, nurse, and therapist for your convenience.  We recommend any family that will be care givers or any care givers the patient has, take part in therapy.  We have no set visiting hours, we suggest non-caregiver friends and family visitors come after therapy (at 4 PM or later) to allow patient to rest in between sessions.      In conjunction with the patient and patient’s family, this nurse worked to establish a tailored Fall TIPS plan to ensure patient safety and compliance:    Falls TIPS Completion    Patient identified as increased risk for harm if fall:  [] Yes     Fall Risks  History of Falls:    [] Yes   Medication Side Effects:   [x] Yes   Walking Aid:    [x] Yes   IV Pole or Equipment:   [] Yes   Unsteady Walk:     [x] Yes   May Forget or Choose Not to Call: [] Yes     Fall Interventions   Communicate Recent Fall and/or Risk of Harm: [] Yes   Walking Aids:  Crutches: [] Yes   Cane: [] Yes   Walker: [x] Yes   IV Assistance When Walking: [x] Yes   Toileting Schedule: Every 2   Hours  Bedpan:   [] Yes   Assist to Commode: [] Yes   Assist to Bathroom: [x] Yes   Bed Alarm On: [x] Yes   Assistance Out of Bed:  Bedrest: [] Yes   1 Person: [x] Yes   2 People: [] Yes

## 2025-04-09 NOTE — PLAN OF CARE
ARU PATIENT TREATMENT PLAN  [facility]  [address] [place] [address]  [phone]      Danny Rock    : 1950  [id number]  MRN: [id number]  PHYSICIAN:  Garret Ponce MD  Primary Problem    Patient Active Problem List   Diagnosis    HLD (hyperlipidemia)    Essential hypertension    Coronary artery disease due to lipid rich plaque    Cardiac dysrhythmia    Diabetes mellitus    Paroxysmal atrial fibrillation (HCC)    Renal mass    Cerebrovascular accident (CVA) (HCC)    Recent cerebrovascular accident (CVA)    HTN (hypertension), benign    PAF (paroxysmal atrial fibrillation) (HCC)    CASTRO (acute kidney injury)    LBBB (left bundle branch block)    H/O right nephrectomy    Anemia    Acute ischemic stroke ([facility])    Overweight (BMI 25.0-29.9)    AIN (acute interstitial nephritis)    Sterile pyuria    Current chronic use of systemic steroids    H/O systemic steroid therapy    Encounter for medication counseling    Secondary hypercoagulable state    Pneumothorax    Left inguinal hernia    Abscess of right shoulder    CRP elevated    Elevated sed rate    Abscess of right arm    ESRD on dialysis ([facility])    Hemorrhoid    Constipation    Low back pain    Lumbar foraminal stenosis    History of prostate cancer    Central stenosis of spinal canal    Enterococcus faecalis infection    Enterococcal bacteremia    Moderate malnutrition    Debility    Osteomyelitis of lumbar spine (HCC)    Lumbar discitis    Iliopsoas abscess on right (HCC)    Psoas hematoma, left, secondary to anticoagulant therapy, initial encounter    Psoas hematoma, right, secondary to anticoagulant therapy, sequela    Lumbar spondylosis       Rehabilitation Diagnosis:      #.  Multilevel lumbar discitis and osteomyelitis  #.  E. faecalis bacteremia  - MRI of the lumbar, thoracic and cervical spine concerning for multilevel abnormal disc signal with disc enhancement and endplate enhancement compatible with  Good  [x] Fair    [] Guarded   Total expected IRF days: 7  Anticipated discharge destination: Home  [x] Home Independently   [] Home with supervision    []SNF     [] Other                                           Physician anticipated functional outcomes:  By discharge, the patient will progress to being independent with all mobility and activities.   IPOC brief synthesis: 75 y.o. male with PMHx notable for HTN, HLD, CAD, HFrEF, atrial fibrillation, renal cancer s/p right nephrectomy, ESRD on HD who initially presented to [facility] on 3/22 with low back pain and had MRI showing severe multilevel foraminal stenosis worst at L5-S1 prompting transfer to [facility] for neurosurgical evaluation. Repeat MRI C/T/L-spine on 3/23 showed multilevel disc/endplate enhancement compatible with discitis and osteomyelitis at L2-L3, L3-L4 and L4-L5 with adjacent paravertebral edema, and small right psoas muscle abscess. Blood cultures resulted positive for Enterococcus faecalis. He was started on IV vancomycin and gentamicin on 3/25, with plan at discharge to continue IV vancomycin 3 times a week after dialysis through 5/19/2025. NSGY evaluated while inpatient, recommended no surgical intervention at this time. Nephrology consulted for HD needs, had old HD access removed and TDC placed on 3/28.     I have reviewed this initial plan of care and agree with its contents:    Title   Name    Date    Time    Physician: Garret Ponce MD 04/10/25 1:43 PM      Case Mgmt: TALAT Atkins on 4/10/25 at 12:04 PM EDT    OT: SELENE Dupree OTR/L [id number] 4/9/25 1451     PT: Vicki Torres, PT [id number] 4/9/25, 1443    RN: Merrill Hernandez RN 4/9/2025 0100    :  Shaquille Gonzalez PT, DPT [id number]  4/10/25  10:14 AM

## 2025-04-10 LAB
ANION GAP SERPL CALCULATED.3IONS-SCNC: 11 MMOL/L (ref 3–16)
BASOPHILS # BLD: 0.1 K/UL (ref 0–0.2)
BASOPHILS NFR BLD: 1.4 %
BUN SERPL-MCNC: 51 MG/DL (ref 7–20)
CALCIUM SERPL-MCNC: 9 MG/DL (ref 8.3–10.6)
CHLORIDE SERPL-SCNC: 101 MMOL/L (ref 99–110)
CO2 SERPL-SCNC: 23 MMOL/L (ref 21–32)
CREAT SERPL-MCNC: 2.3 MG/DL (ref 0.8–1.3)
DEPRECATED RDW RBC AUTO: 19.3 % (ref 12.4–15.4)
ECHO BSA: 1.99 M2
EOSINOPHIL # BLD: 0.1 K/UL (ref 0–0.6)
EOSINOPHIL NFR BLD: 1.7 %
GFR SERPLBLD CREATININE-BSD FMLA CKD-EPI: 29 ML/MIN/{1.73_M2}
GLUCOSE BLD-MCNC: 139 MG/DL (ref 70–99)
GLUCOSE BLD-MCNC: 140 MG/DL (ref 70–99)
GLUCOSE BLD-MCNC: 177 MG/DL (ref 70–99)
GLUCOSE BLD-MCNC: 82 MG/DL (ref 70–99)
GLUCOSE SERPL-MCNC: 76 MG/DL (ref 70–99)
HCT VFR BLD AUTO: 26.2 % (ref 40.5–52.5)
HGB BLD-MCNC: 8.8 G/DL (ref 13.5–17.5)
LYMPHOCYTES # BLD: 1 K/UL (ref 1–5.1)
LYMPHOCYTES NFR BLD: 12.2 %
MCH RBC QN AUTO: 33.6 PG (ref 26–34)
MCHC RBC AUTO-ENTMCNC: 33.8 G/DL (ref 31–36)
MCV RBC AUTO: 99.4 FL (ref 80–100)
MONOCYTES # BLD: 0.3 K/UL (ref 0–1.3)
MONOCYTES NFR BLD: 3.7 %
NEUTROPHILS # BLD: 6.6 K/UL (ref 1.7–7.7)
NEUTROPHILS NFR BLD: 81 %
PERFORMED ON: ABNORMAL
PERFORMED ON: NORMAL
PLATELET # BLD AUTO: 239 K/UL (ref 135–450)
PMV BLD AUTO: 6.3 FL (ref 5–10.5)
POTASSIUM SERPL-SCNC: 4.6 MMOL/L (ref 3.5–5.1)
RBC # BLD AUTO: 2.64 M/UL (ref 4.2–5.9)
SODIUM SERPL-SCNC: 135 MMOL/L (ref 136–145)
WBC # BLD AUTO: 8.1 K/UL (ref 4–11)

## 2025-04-10 PROCEDURE — 80048 BASIC METABOLIC PNL TOTAL CA: CPT

## 2025-04-10 PROCEDURE — 2580000003 HC RX 258: Performed by: STUDENT IN AN ORGANIZED HEALTH CARE EDUCATION/TRAINING PROGRAM

## 2025-04-10 PROCEDURE — 99232 SBSQ HOSP IP/OBS MODERATE 35: CPT | Performed by: INTERNAL MEDICINE

## 2025-04-10 PROCEDURE — 97530 THERAPEUTIC ACTIVITIES: CPT

## 2025-04-10 PROCEDURE — 6370000000 HC RX 637 (ALT 250 FOR IP): Performed by: STUDENT IN AN ORGANIZED HEALTH CARE EDUCATION/TRAINING PROGRAM

## 2025-04-10 PROCEDURE — 5A1D70Z PERFORMANCE OF URINARY FILTRATION, INTERMITTENT, LESS THAN 6 HOURS PER DAY: ICD-10-PCS | Performed by: STUDENT IN AN ORGANIZED HEALTH CARE EDUCATION/TRAINING PROGRAM

## 2025-04-10 PROCEDURE — 1280000000 HC REHAB R&B

## 2025-04-10 PROCEDURE — 90935 HEMODIALYSIS ONE EVALUATION: CPT

## 2025-04-10 PROCEDURE — 97110 THERAPEUTIC EXERCISES: CPT

## 2025-04-10 PROCEDURE — 51798 US URINE CAPACITY MEASURE: CPT

## 2025-04-10 PROCEDURE — 6360000002 HC RX W HCPCS: Performed by: STUDENT IN AN ORGANIZED HEALTH CARE EDUCATION/TRAINING PROGRAM

## 2025-04-10 PROCEDURE — 97535 SELF CARE MNGMENT TRAINING: CPT

## 2025-04-10 PROCEDURE — 36415 COLL VENOUS BLD VENIPUNCTURE: CPT

## 2025-04-10 PROCEDURE — 97116 GAIT TRAINING THERAPY: CPT

## 2025-04-10 PROCEDURE — 85025 COMPLETE CBC W/AUTO DIFF WBC: CPT

## 2025-04-10 RX ORDER — OXYCODONE AND ACETAMINOPHEN 5; 325 MG/1; MG/1
1 TABLET ORAL EVERY 6 HOURS PRN
Refills: 0 | Status: DISCONTINUED | OUTPATIENT
Start: 2025-04-10 | End: 2025-04-14

## 2025-04-10 RX ORDER — OXYCODONE AND ACETAMINOPHEN 5; 325 MG/1; MG/1
2 TABLET ORAL EVERY 6 HOURS PRN
Refills: 0 | Status: DISCONTINUED | OUTPATIENT
Start: 2025-04-10 | End: 2025-04-14

## 2025-04-10 RX ORDER — HYDROCORTISONE 25 MG/G
CREAM TOPICAL 2 TIMES DAILY
Status: COMPLETED | OUTPATIENT
Start: 2025-04-10 | End: 2025-04-14

## 2025-04-10 RX ADMIN — SEVELAMER CARBONATE 800 MG: 800 TABLET, FILM COATED ORAL at 08:50

## 2025-04-10 RX ADMIN — LINEZOLID 600 MG: 600 TABLET, FILM COATED ORAL at 08:45

## 2025-04-10 RX ADMIN — APIXABAN 5 MG: 5 TABLET, FILM COATED ORAL at 20:37

## 2025-04-10 RX ADMIN — MELATONIN TAB 3 MG 6 MG: 3 TAB at 17:50

## 2025-04-10 RX ADMIN — HYDROCORTISONE: 25 CREAM TOPICAL at 10:44

## 2025-04-10 RX ADMIN — CHOLECALCIFEROL TAB 125 MCG (5000 UNIT) 5000 UNITS: 125 TAB at 08:45

## 2025-04-10 RX ADMIN — CYANOCOBALAMIN TAB 1000 MCG 1000 MCG: 1000 TAB at 08:45

## 2025-04-10 RX ADMIN — OXYCODONE HYDROCHLORIDE AND ACETAMINOPHEN 1 TABLET: 5; 325 TABLET ORAL at 08:49

## 2025-04-10 RX ADMIN — INSULIN GLARGINE 8 UNITS: 100 INJECTION, SOLUTION SUBCUTANEOUS at 20:36

## 2025-04-10 RX ADMIN — ZINC SULFATE 220 MG (50 MG) CAPSULE 50 MG: CAPSULE at 08:46

## 2025-04-10 RX ADMIN — Medication 1 MG: at 08:45

## 2025-04-10 RX ADMIN — PANTOPRAZOLE SODIUM 40 MG: 40 TABLET, DELAYED RELEASE ORAL at 06:56

## 2025-04-10 RX ADMIN — APIXABAN 5 MG: 5 TABLET, FILM COATED ORAL at 08:45

## 2025-04-10 RX ADMIN — OXYCODONE HYDROCHLORIDE AND ACETAMINOPHEN 500 MG: 500 TABLET ORAL at 08:45

## 2025-04-10 RX ADMIN — SEVELAMER CARBONATE 800 MG: 800 TABLET, FILM COATED ORAL at 17:49

## 2025-04-10 RX ADMIN — VANCOMYCIN HYDROCHLORIDE 1000 MG: 1 INJECTION, POWDER, LYOPHILIZED, FOR SOLUTION INTRAVENOUS at 18:07

## 2025-04-10 RX ADMIN — POLYETHYLENE GLYCOL 3350 17 G: 17 POWDER, FOR SOLUTION ORAL at 08:49

## 2025-04-10 RX ADMIN — LINEZOLID 600 MG: 600 TABLET, FILM COATED ORAL at 20:37

## 2025-04-10 RX ADMIN — SEVELAMER CARBONATE 800 MG: 800 TABLET, FILM COATED ORAL at 12:42

## 2025-04-10 RX ADMIN — HYDROCORTISONE: 25 CREAM TOPICAL at 22:28

## 2025-04-10 RX ADMIN — OXYCODONE HYDROCHLORIDE AND ACETAMINOPHEN 1 TABLET: 5; 325 TABLET ORAL at 18:10

## 2025-04-10 ASSESSMENT — PAIN - FUNCTIONAL ASSESSMENT: PAIN_FUNCTIONAL_ASSESSMENT: ACTIVITIES ARE NOT PREVENTED

## 2025-04-10 ASSESSMENT — ENCOUNTER SYMPTOMS
SHORTNESS OF BREATH: 0
SINUS PRESSURE: 0
SORE THROAT: 0
NAUSEA: 0
WHEEZING: 0
BACK PAIN: 0
DIARRHEA: 0
COLOR CHANGE: 1
ABDOMINAL PAIN: 0
COUGH: 0
CONSTIPATION: 0
RHINORRHEA: 0
EYE REDNESS: 0
SINUS PAIN: 0
EYE DISCHARGE: 0

## 2025-04-10 ASSESSMENT — PAIN SCALES - GENERAL
PAINLEVEL_OUTOF10: 8
PAINLEVEL_OUTOF10: 6
PAINLEVEL_OUTOF10: 4

## 2025-04-10 ASSESSMENT — PAIN DESCRIPTION - PAIN TYPE: TYPE: ACUTE PAIN

## 2025-04-10 ASSESSMENT — PAIN DESCRIPTION - DESCRIPTORS: DESCRIPTORS: ACHING

## 2025-04-10 ASSESSMENT — PAIN DESCRIPTION - ORIENTATION: ORIENTATION: UPPER

## 2025-04-10 ASSESSMENT — PAIN DESCRIPTION - LOCATION: LOCATION: BUTTOCKS

## 2025-04-10 NOTE — PLAN OF CARE
Problem: Chronic Conditions and Co-morbidities  Goal: Patient's chronic conditions and co-morbidity symptoms are monitored and maintained or improved  4/10/2025 1528 by Jessika Sheth RN  Outcome: Progressing     Problem: Safety - Adult  Goal: Free from fall injury  4/10/2025 1528 by Jessika Sheth RN  Outcome: Progressing     Problem: Pain  Goal: Verbalizes/displays adequate comfort level or baseline comfort level  4/10/2025 1528 by Jessika Sheth RN  Outcome: Progressing     Problem: Skin/Tissue Integrity  Goal: Skin integrity remains intact  Description: 1.  Monitor for areas of redness and/or skin breakdown  2.  Assess vascular access sites hourly  3.  Every 4-6 hours minimum:  Change oxygen saturation probe site  4.  Every 4-6 hours:  If on nasal continuous positive airway pressure, respiratory therapy assess nares and determine need for appliance change or resting period  Outcome: Progressing  Flowsheets (Taken 4/10/2025 1342)  Skin Integrity Remains Intact: Monitor for areas of redness and/or skin breakdown

## 2025-04-10 NOTE — PROGRESS NOTES
Danny Rock  4/10/2025  [id number]    Chief Complaint: Lumbar spondylosis    Subjective    Rapid response called overnight for increased confusion.  Patient perseverative on urinary frequency.    Patient reports that he is worried about having another bad night of sleep. He is having a hard time getting up to urinate multiple times overnight, and struggles to use urinal successfully. He is asking if he can use external catheter tonight.     He denies abdominal pain, dysuria. Last Bowel Movement:  04/09/25, bright red blood noted per rectum. Denies rectal pain.          Objective    Patient Vitals for the past 24 hrs:   BP Temp Temp src Pulse Resp SpO2 Height Weight   04/10/25 0849 122/76 -- -- 80 16 98 % -- --   04/10/25 0715 (!) 151/76 98.1 °F (36.7 °C) Axillary 87 22 -- -- --   04/10/25 0455 -- -- -- -- -- -- -- 71.4 kg (157 lb 4.8 oz)   04/09/25 2158 -- -- -- -- 16 -- -- --   04/09/25 2128 -- -- -- -- 17 -- -- --   04/09/25 2013 125/61 97.8 °F (36.6 °C) Oral 64 16 99 % -- --   04/09/25 1500 -- -- -- -- 16 -- -- --   04/09/25 1408 -- -- -- -- 16 -- -- --   04/09/25 1059 -- -- -- -- -- -- 1.905 m (6' 3\") --     Patient Vitals for the past 96 hrs (Last 3 readings):   Weight   04/10/25 0455 71.4 kg (157 lb 4.8 oz)   04/09/25 0600 71 kg (156 lb 8.4 oz)   04/08/25 2200 71 kg (156 lb 8.4 oz)      Gen: No distress, pleasant.   HEENT: Normocephalic, atraumatic.  CV: Regular rate and rhythm. Extremities warm, well perfused.  Left chest wall tunneled dialysis catheter.  Resp: No respiratory distress. CTAB.  Abd: Soft, nontender.  Ext: No edema.   Neuro: Alert, oriented, appropriately interactive.     Laboratory data:   Na/K/Cl/CO2:  135/4.6/101/23 (04/10 0547)   BUN/Cr/glu/ALT/AST/amyl/lip:  51/2.3/--/--/--/--/-- (04/10 0547)    WBC/Hgb/Hct/Plts:  8.1/8.8/26.2/239 (04/10 0547)     Therapy progress:       PT    Supine to Sit: Partial/moderate assistance  Sit to Supine: Supervision or touching assistance   Sit to Stand:  patient. This includes preparing to see the patient by reviewing documentation and recent events with nursing team, obtaining/reviewing/updating the medical history, performing a medically appropriate exam, ordering medication and changing orders as indicated. The medical decision making was high for this encounter because of the number and complexity of comorbidities addressed, the complexity of the data that was reviewed/analyzed, and the complexity of the decisions made on further management. This data included vital signs, relevant labs, diagnostic tests, and documentation of consultants. It also included discussion and interpretation for nursing and therapy team about how these issues affect patient's functional progress. For this patient, the risk of complications is high due to number of active problems and contributing comorbidities and how they impact the patient's rehabilitation progress and discharge planning.     Garret Ponce MD 4/10/2025, 9:19 AM    * This document was created using dictation software.  While all precautions were taken to ensure accuracy, errors may have occurred.  Please disregard any typographical errors.

## 2025-04-10 NOTE — PROGRESS NOTES
Treatment time: 3 hours  Net UF: 2000 ml     Pre weight: 71 kg  Post weight:69 kg    Access used: LTDC    Access function: Well tolerated with  ml/min     Medications or blood products given: Heparin dwells     Regular outpatient schedule: TTS     Summary of response to treatment: Patient tolerated treatment well and without any complications. Patient remained stable throughout entire treatment and upon the exiting the dialysis suite via transport.     Report given to Jessika Sheth RN and copy of dialysis treatment record placed in chart, to be scanned into EMR.

## 2025-04-10 NOTE — PROGRESS NOTES
During Rapid Response, ICU RN requested this RN contact the patient's wife to notify her of patient's anxiety, as well as patient's wish for her to visit today. Call placed to patient's wife. Wife informed of patient's anxiety, confusion, and frequency/urgency to void overnight. Wife plans to visit patient today. Wife denies known history of anxiety. Patient reportedly has a history of a stroke. Patient is reportedly confused at times, but not usually too bad. Patient's mother reportedly has a history of Alzheimer's. At home, patient is reportedly up and down all night feeling that he has to void.

## 2025-04-10 NOTE — CARE COORDINATION
Team conference/Weekly Summary         Team conference held today. Patient is a 75 year old male. Patient discharge date is 4/17/25. Patient was admitted for Vertebral discitis/osteomyelitis. Patient is from home with his spouse. He is A&Ox4, has active insurance and is active with a PCP in the community with a recent visit. Patient currently requires contact guard assistance for ambulation. Disposition is TBD. SW will continue to follow therapy and Dr. Ponce recommendations and discuss closer to discharge.    Electronically signed by TALAT Atkins on 4/10/25 at 12:01 PM EDT

## 2025-04-10 NOTE — PROGRESS NOTES
Enterococcus faecalis (2)  S   S      Enterococcus faecalis (1)       03/23/25 Blood Enterococcus faecalis       03/22/25 Blood Enterococcus faecalis (3)  S  R  S  S     Staphylococcus epidermidis         Enterococcus faecalis (1)         Micrococcus luteus       01/31/25 Abscess Diphtheroids         Micrococcus luteus              Antibiotics and immunizations:       Current antibiotics: All antibiotics and their doses were reviewed by me    Recent Abx Admin                     linezolid (ZYVOX) tablet 600 mg (mg) 600 mg Given 04/10/25 0845     600 mg Given 04/09/25 2013                      Immunization History: All immunization history was reviewed by me today.    There is no immunization history for the selected administration types on file for this patient.    Known drug allergies:     All allergies were reviewed and updated    Allergies   Allergen Reactions    Amoxicillin Other (See Comments)     Urethritis, rash    Bisoprolol-Hydrochlorothiazide Other (See Comments)     G.I. rash    Cisapride Other (See Comments)     diarrhea    Penicillins      Unsure of reaction  Tolerated ceftriaxone 1/29/25  Tolerated Ancef 12/31/24    Pioglitazone Other (See Comments)     G.I upset (long time ago)       Social history:     Social History:  All social andepidemiologic history was reviewed and updated by me today as needed.     Tobacco use:   reports that he quit smoking about 55 years ago. His smoking use included cigarettes. He started smoking about 57 years ago. He has never used smokeless tobacco.  Alcohol use:   reports current alcohol use.  Currently lives in: [place] [address]   reports current drug use. Drug: Marijuana (Weed).     COVID VACCINATION AND LAB RESULT RECORDS:     Internal Administration   First Dose      Second Dose           Last COVID Lab SARS-CoV-2 RNA, RT PCR (no units)   Date Value   11/28/2023 NOT DETECTED            Assessment:     The patient is a 75 y.o. old male who  has a past medical  Overweight (BMI 25.0-29.9) E66.3    AIN (acute interstitial nephritis) N10    Sterile pyuria R82.81    Current chronic use of systemic steroids Z79.52    H/O systemic steroid therapy Z92.241    Encounter for medication counseling Z71.89    Secondary hypercoagulable state D68.69    Pneumothorax J93.9    Left inguinal hernia K40.90    Abscess of right shoulder L02.413    CRP elevated R79.82    Elevated sed rate R70.0    Abscess of right arm L02.413    ESRD on hemodialysis (HCC) N18.6, Z99.2    Hemorrhoid K64.9    Constipation K59.00    Low back pain M54.50    Lumbar foraminal stenosis M48.061    History of prostate cancer Z85.46    Central stenosis of spinal canal M48.00    Enterococcus faecalis infection A49.8    Enterococcal bacteremia R78.81, B95.2    Moderate malnutrition E44.0    Debility R53.81    Osteomyelitis of lumbar spine ([facility]) M46.26    Lumbar discitis M46.46    Iliopsoas abscess on right ([facility]) K68.12    Psoas hematoma, left, secondary to anticoagulant therapy, initial encounter S30.1XXA    Psoas hematoma, right, secondary to anticoagulant therapy, sequela S30.1XXS    Lumbar spondylosis M47.816    Severe malnutrition E43       Please note that this chart was generated using Dragon dictation software. Although every effort was made to ensure the accuracy of this automated transcription, some errors in transcription may have occurred inadvertently. If you may need any clarification, please do not hesitate to contact me through EPIC or at the phone number provided below with my electronic signature.  Any pictures or media included in this note were obtained after taking informed verbal consent from the patient and with their approval to include those in the patient's medical record.        Jadiel Esteves MD, MPH, FACP, FIDSA  4/10/2025, 1:55 PM  Central Office Phone: [phone]  Central Office Fax: [phone]    [facility] Infectious Disease   [address], [address] ([address]  Building)  [place] [address]  [facility] days:  Tuesday & Thursday AM    [facility]  [address] , [address] (Medical office Building)  [place], [place], [address]  [facility] days: Wednesday AM

## 2025-04-10 NOTE — PROGRESS NOTES
Patients confusion progressed overnight alert to self only.  Frequent trips to the bathroom overnight.  Patient called appropriately when assistance was needed.   Overlay placed on bed to assist with pressure relief from his lower back.  0656 Arrived to the room with patient notifying this RN that \"he cannot keep doing this\"  Asked patient to elaborate on what he meant by the statement and patient repeated the statement.  After multiple attempts for elaboration from the patient,  patient stated that he cannot keep urinating in a urinal.  Assured patient that he has been getting up to the restroom throughout the night and that I can assist with taking him to the restroom this morning if would like to go.  Patient grown more confused as he was sitting on the side of the bed repeating \"I cannot keep doing this, I cannot keep urinating into a urinal\".  Assisted patient to the restroom with a walker after several minutes of cueing.  Patient in the restroom had difficulty finding the toilet while in the restroom.  Assisted patient to navigate safely to the toilet as patient was sitting on the toilet, patient repeatedly stated that he was \"going nuts, and cannot keep doing this.\"  Patients body then started to noticeably shake and patient then started to breathe rapidly.    Called rapid response due to the progression of confusion and mental status (see vitals @ 0715).  Oncoming day shift RN Jessika at bedside.  Patient repeated to the ICU RN (Hali), and Dr Coronado that he was \"going nuts and cannot keep doing this.\" (See note from Deisi RN).  Patient reported that he did not sleep, however patient was noted to have slept off and on throughout the night with interruptions only to use the restroom.  Patient reported that the year was 2005 or 1999 and the current president was Gera Cedeno.  No new orders given by Dr Coronado.  Patient was safely ambulated back to bed and was able to drink a few cups of water.  Patient stated  that he is not able to handle dialysis today to ABHISHEK Rosen.  After a few minutes more, patient agreed to ambulate to the chair using the walker and gait belt. Fall precautions in place, call light within reach.

## 2025-04-10 NOTE — PROGRESS NOTES
MD Gordon Frankel MD Aldo Estella, DO                 Office: [phone]                      Fax: [phone]             [email or web address]                   NEPHROLOGY CONSULT PROGRESS NOTE    Subjective / interval history / medical decision making.  -no new complaints. Seen and examined on iHD, tolerating well. Says he had anxiety overnight, wants to go home.    IMPRESSION/RECOMMENDATIONS:        #ESKD on HD TTS  -HD TTS at [facility] under our care.  -EDW while inpatient ~68kg  -continue iHD TTS while inpatient  -HD today. Stable from renal.    #hyponatremia  -likely 2/2 hypervolemia. Continue HD  -improving    #metabolic acidosis  -likely from ESKD, continue iHD, improving.    #hypoalbuminemia  -likely 2/2 malnutrition, ESKD  -encourage po protein intake    #anemia of CKD  -has Hx of renal ca s/p nephrectomy  -hgb stable, POONAM with HD.  -monitor      Thank you for the consult.  We will follow this patient along the hospitalization.  Guillermo Moss DO     High complexity, at risk of impending organ failure needing  higher level of care/monitoring.   Time spent 35 minutes that included face-to-face meeting/discussion with patient, and treatment team (including primary/referring team and other consultants; included coordination of care with the treatment team; and review of patient's electronic medical records and ordering appropriates tests.             Reason for Consult:  ESKD management  Requesting Physician/Provider:  Garret Tellez MD     CHIEF COMPLAINT:  inpatient rehab    History obtained from records and patient.    HISTORY OF PRESENT ILLNESS:                   Danny Rock  is 76yo, male with significant past medical history of ESRD, on HD TTHS at [facility] under care, atrial fibrillation, coronary artery disease, renal CA status post right nephrectomy, previously on immunotherapy, diabetes mellitus type 2, hyperlipidemia, hypertension, prostate

## 2025-04-10 NOTE — PROGRESS NOTES
[facility] - Inpatient Rehabilitation Department   Phone: [phone]    Occupational Therapy    [] Initial Evaluation            [x] Daily Treatment Note         [] Discharge Summary      Patient: Danny Rock   : 1950   MRN: [id number]   Date of Service:  4/10/2025    Admitting Diagnosis:  Lumbar spondylosis  Current Admission Summary:   Danny Rock is a 75 y.o. male with PMHx notable for HTN, HLD, CAD, HFrEF, atrial fibrillation, renal cancer s/p right nephrectomy, ESRD on HD who initially presented to [facility] on 3/22 with low back pain and had MRI showing severe multilevel foraminal stenosis worst at L5-S1 prompting transfer to [facility] for neurosurgical evaluation. Repeat MRI C/T/L-spine on 3/23 showed multilevel disc/endplate enhancement compatible with discitis and osteomyelitis at L2-L3, L3-L4 and L4-L5 with adjacent paravertebral edema, and small right psoas muscle abscess. Blood cultures resulted positive for Enterococcus faecalis. He was started on IV vancomycin and gentamicin on 3/25, with plan at discharge to continue IV vancomycin 3 times a week after dialysis through 2025. NSGY evaluated while inpatient, recommended no surgical intervention at this time. Nephrology consulted for HD needs, had old HD access removed and TDC placed on 3/28.      He was admitted to [facility] at [facility] from 3/30-.  Rehabilitation course was complicated by development of large right iliopsoas intramuscular hematoma.  His hemoglobin continued to downtrend despite holding Eliquis and aspirin.  Case was discussed with Orthopedic Surgery, NSGY and IR.  Patient was discharged back to acute hospital where Eliquis and aspirin were held for 4 days. Patient was started on heparin drip on , with ongoing stability of his hemoglobin.  He is able to resume his Eliquis on , and aspirin after 1 week if stable without signs of recurrent bleeding  Past Medical History:  has a past medical  vertebral discitis and R iliopsoas hematoma. Pt have noted to have increased pain and confusion limiting attention/command following and functional mobility tolerance. Pt required increased time for emotional support, directives of activities, and re-direction to session throughout. Pt would benefit from continued activities targeting executive functioning.  Pt tolerated dynamic reaching seated and in stance for simulation of LB dressing skills well in 2nd session, remained with mild confusion and perseveration but more easily directed and attending to commands. Pt limited by decreased endurance, decreased balance, and pain. Pt will benefit from continued OT services to address above deficits and maximize safety and independence.   Safety Interventions: patient left in chair, chair alarm in place, call light within reach, and patient at risk for falls    Plan  Frequency: 5 x/week, 90 min/day  Current Treatment Recommendations: strengthening, ROM, balance training, functional mobility training, transfer training, endurance training, patient/caregiver education, ADL/self-care training, and IADL training    Goals  Patient Goals: to get rid of R hip pain    Short Term Goals:  Time Frame: 7-10 days   Patient will complete upper body ADL at modified independent   Patient will complete lower body ADL at modified independent   Patient will complete toileting at modified independent   Patient will complete functional transfers at modified independent   Patient to gather and transport items at modified independent     Above goals reviewed on 4/10/2025.  All goals are ongoing at this time unless indicated above.       Therapy Session Time     Individual Group Co-treatment   Time In 0730      Time Out 0830      Minutes 60         Second Session Therapy Time:   Individual Concurrent Group Co-treatment   Time In 1048         Time Out 1118         Minutes 30           Timed Code Treatment Minutes:  60 +30  Total Treatment Minutes:

## 2025-04-10 NOTE — PROGRESS NOTES
Progress Note  Date:4/10/2025       Room:[place] [id number]  Patient Name:Danny Rock     YOB: 1950     Age:75 y.o.        Subjective    Subjective:  Pain:  He reports no pain.       Review of Systems   Skin:  Positive for color change (right hallux nail).     Objective         Vitals Last 24 Hours:  TEMPERATURE:  Temp  Av °F (36.7 °C)  Min: 97.8 °F (36.6 °C)  Max: 98.1 °F (36.7 °C)  RESPIRATIONS RANGE: Resp  Av  Min: 16  Max: 22  PULSE OXIMETRY RANGE: SpO2  Av.5 %  Min: 98 %  Max: 99 %  PULSE RANGE: Pulse  Av  Min: 64  Max: 87  BLOOD PRESSURE RANGE: Systolic (24hrs), Av , Min:122 , Max:151   ; Diastolic (24hrs), Av, Min:61, Max:76    I/O (24Hr):    Intake/Output Summary (Last 24 hours) at 4/10/2025 0936  Last data filed at 4/10/2025 0911  Gross per 24 hour   Intake 240 ml   Output --   Net 240 ml     Objective:  Vital signs: (most recent): Blood pressure 122/76, pulse 80, temperature 98.1 °F (36.7 °C), temperature source Axillary, resp. rate 16, height 1.905 m (6' 3\"), weight 71.4 kg (157 lb 4.8 oz), SpO2 98%.    Skin:  There is ecchymosis (right hallux nail, no sign of bacterial infection).      Labs/Imaging/Diagnostics    Labs:  CBC:  Recent Labs     25  0801 25  0608 04/10/25  0547   WBC 8.6 6.7 8.1   RBC 2.69* 2.77* 2.64*   HGB 9.1* 9.3* 8.8*   HCT 26.9* 27.8* 26.2*   MCV 99.8 100.2* 99.4   RDW 19.3* 19.2* 19.3*    267 239     CHEMISTRIES:  Recent Labs     25  0801 04/10/25  0547   * 135*   K 4.8 4.6   CL 98* 101   CO2 19* 23   BUN 59* 51*   CREATININE 2.5* 2.3*   GLUCOSE 123* 76   PHOS 3.0  --      PT/INR:No results for input(s): \"PROTIME\", \"INR\" in the last 72 hours.  APTT:No results for input(s): \"APTT\" in the last 72 hours.  LIVER PROFILE:No results for input(s): \"AST\", \"ALT\", \"BILIDIR\", \"BILITOT\", \"ALKPHOS\" in the last 72 hours.    Imaging Last 24 Hours:  No results found.  Assessment//Plan           Hospital Problems           Last

## 2025-04-10 NOTE — PROGRESS NOTES
Patient expressing concerns and worry that he will \"relapse\" again this evening. He is concerned that he has been confused and not sleeping well due to frequent trips to the bathroom overnight. These concerns brought to the attention of .  feels patient would benefit from external catheter at night to prevent frequent trips to bathroom causing increased tiredness. Will pass on to next nursing shift.

## 2025-04-10 NOTE — PATIENT CARE CONFERENCE
[facility] Inpatient Rehabilitation Department  Weekly Team Conference Note    Patient Name: Danny Rock      MRN: [id number]  : 1950  (75 y.o.) Gender: male     The team conference for this patient was held on 4/10/25 at 11am and was led by:  Garret Ponce MD     CASE MANAGEMENT:  Assessment:  Patient is a 75 year old male. Patient discharge date is 25. Patient was admitted for Vertebral discitis/osteomyelitis . Patient is from home with his spouse. He is A&Ox4, has active insurance and is active with a PCP in the community with a recent visit. Disposition is TBD. SW will continue to follow therapy and Dr. Ponce recommendations and discuss closer to discharge.     PHYSICAL THERAPY    Current Status:    Bed Mobility:  Supine to Sit: minimal assistance  Sit to Supine: stand by assistance  Rolling Left: stand by assistance  Rolling Right: stand by assistance  Scooting: stand by assistance  Comments: Flat surface with increased time.  Pain when rolling to his right side.  Exits bed towards the Right side (at home also) and required min A of trunk due to not wanting to roll to his side to sit upright.   Transfers:  Sit to stand transfer: contact guard assistance  Stand to sit transfer: contact guard assistance  Toilet transfer: minimal assistance, low commode with use of grab bar  Car transfer: minimal assistance, low car surface height  Comments: Ongoing vc of hand placement during transfers  Ambulation:  Surface:level surface  Assistive Device: rolling walker  Assistance: contact guard assistance  Distance: 95 ft plus shorter distances within the therapy gym   Gait Mechanics: Reciprocal pattern with a slightly slower oriana, flexed posture, B knees flexed with genu varus  Comments:  Limited by pain      Ambulation Trial 2  Surface:carpet  Assistive Device: rolling walker  Assistance: contact guard assistance  Distance: 10 ft   Gait Mechanics: same as above  Comments:      Return home, reduce back pain, to get rid of R hip pain.     Interdisciplinary Individualized Plan of Care Review of Previous Week:    Medical and functional progress towards goals:  Medically the patient is doing ok, Hgb down a little, will trend day by day, slept poorly, frequent urination, will use external catheter and adjust sleep meds.  Evaluation by therapy was just completed 4/9. At this time limited progression has been made however pt does demo improved ROM and  for LB dressing simulation. He requires cues for sequence/initiation of ADL routine and up to ModA for LB tasks. CGA to [place] for transfers to/from ADL surfaces. Pt currently needing minimal assistance for transfer to standing and requiring use of a RW for balance and ambulation.   Factors facilitating achievement of goals:  cooperative, supportive spouse  Barriers to achievement of goals:  cognition, pain levels  Interventions to address Barriers:  ADL/IADL training with AE as needed, functional mobility/transfer training, activities to address balance and endurance   Goals still appropriate:  Yes  Modifications to goals: None  Continue Current Plan of Care:  Yes  Modifications to Plan of Care: None    Rehab Team Members in attendance for Team Conference:  Sharon Mitchell, MSW, LSW    Denita Crocker, MS, RD, LD    Brendan Crooks, OTR/L    Zhang Giron PT, DPT    Jessika Sheth, ABHISHEK Rogers, RN (Charge RN)    Shaquille Gonzalez PT, DPT,     Scribe:  Shaquille Gonzalez I, the Rehab Physician, led the above team conference and agree with all decisions made, and approve the established interdisciplinary plan of care as documented within the medical record of Danny Rock.    Garret Ponce MD

## 2025-04-10 NOTE — PROGRESS NOTES
[facility] - Inpatient Rehabilitation Department   Phone: [phone]    Physical Therapy    [] Initial Evaluation            [x] Daily Treatment Note         [] Discharge Summary      Patient: Danny Rock   : 1950   MRN: [id number]   Date of Service:  4/10/2025  Admitting Diagnosis: Lumbar spondylosis  Current Admission Summary: Danny Rock is a 75 y.o. male with PMHx notable for HTN, HLD, CAD, HFrEF, atrial fibrillation, renal cancer s/p right nephrectomy, ESRD on HD who initially presented to [facility] on 3/22 with low back pain and had MRI showing severe multilevel foraminal stenosis worst at L5-S1 prompting transfer to [facility] for neurosurgical evaluation. Repeat MRI C/T/L-spine on 3/23 showed multilevel disc/endplate enhancement compatible with discitis and osteomyelitis at L2-L3, L3-L4 and L4-L5 with adjacent paravertebral edema, and small right psoas muscle abscess. Blood cultures resulted positive for Enterococcus faecalis. He was started on IV vancomycin and gentamicin on 3/25, with plan at discharge to continue IV vancomycin 3 times a week after dialysis through 2025. NSGY evaluated while inpatient, recommended no surgical intervention at this time. Nephrology consulted for HD needs, had old HD access removed and TDC placed on 3/28.      He was admitted to [facility] from 3/30-.  Rehabilitation course was complicated by development of large right iliopsoas intramuscular hematoma.  His hemoglobin continued to downtrend despite holding Eliquis and aspirin.  Case was discussed with Orthopedic Surgery, NSGY and IR.  Patient was discharged back to acute hospital where Eliquis and aspirin were held for 4 days. Patient was started on heparin drip on , with ongoing stability of his hemoglobin.  He is able to resume his Eliquis on , and aspirin after 1 week if stable without signs of recurrent bleeding.  Past Medical History:  has a past medical history of  requiring CGA. Patient reports increased pain in R hip requiring discontinuation of activity.  Patient completes the following seated exercises 10x secondary to patient c/o pain in standing, requires CGA: hip abduction with green theraband, hip adduction with therapy ball, (B) LAQ with green theraband, (B) marches with (B) 1.5 lb ankle weights.  Patient completes the following seated exercises with swiss ball 10x (B), with CGA: trunk flex/extension, (B) trunk horizontal rotation, (B) trunk rotation, reaching for rolled ball.  Patient completes ambulating for 50 ft with RW, requiring CGA. Patient reports feeling nervous this morning and was unsure what he should do, fixated on fear of dialysis.  Educated on dialysis procedure and staff presence during treatment.  Patient verbalized understanding after continued education and reassurance.  Patient reports needing to use the restroom.  Patient completes toilet transfer with CGA.  Handed over care to RN secondary to patient having a bowel movement and time constraint.      Functional Outcomes                 Cognition  WFL  Orientation:    alert and oriented x 4  Command Following:   WFL    Education  Barriers To Learning: none  Patient Education: patient educated on goals, PT role and benefits, plan of care  Learning Assessment:  patient verbalizes understanding, would benefit from continued reinforcement    Assessment  Activity Tolerance: limited by pain   Impairments Requiring Therapeutic Intervention: decreased functional mobility, decreased ROM, decreased strength, decreased endurance, decreased balance, increased pain, decreased posture  Prognosis: good  Clinical Assessment:    Pt with improvement in ambulation distance and overall balance and mobility.  Increased confusion noted this date with increased cues for transfers and overall mobility.  Patient continues to be limited by pain during functional activities requiring activity modification or discontinuation  of tasks.  Continued skilled PT services to promote improvements in functional mobility for a safe discharge home.   Safety Interventions: patient left in chair, chair alarm in place, call light within reach, telesitter in use, and family/caregiver present    Plan  Frequency: 5 x/week, 90 min/day  Current Treatment Recommendations: strengthening, ROM, balance training, functional mobility training, transfer training, gait training, stair training, endurance training, and neuromuscular re-education    Goals  Patient Goals: to return home, reduce back pain    Short Term Goals:  Time Frame: 7 days   Patient will complete bed mobility at modified independent   Patient will complete transfers at [facility]   Patient will ambulate 150 ft with use of rolling walker at modified independent  Patient will ascend/descend 8 stairs with a single railing support at [facility]  Patient will complete car transfer at modified independent    Above goals reviewed on 4/10/2025.  All goals are ongoing at this time unless indicated above.      Therapy Session Time      Individual Group Co-treatment   Time In  930       Time Out  1000       Minutes  30           Second Session Therapy Time:   Individual Concurrent Group Co-treatment   Time In 1245         Time Out 1345         Minutes 60           Total Treatment Minutes:  90    First Session By: Tin Giron PT, DPT    Second Session By: Paresh Vincent, SPT  Therapist observed and directed the patient's plan of care.  Co-signed and supervised by: Zhang Giron PT, DPT - [id number]

## 2025-04-10 NOTE — PROGRESS NOTES
Rapid Response Quick Summary    Room: [facility]4906/4906-01    Assessment of concern / patient:  anxiety and increased confusion, also complains of urgency to void     Physician involved:  Dr GARY Coronado    Interventions:  monitoring of vitals and oxygen levels     Disposition:  to remain in current

## 2025-04-11 LAB
GLUCOSE BLD-MCNC: 114 MG/DL (ref 70–99)
GLUCOSE BLD-MCNC: 145 MG/DL (ref 70–99)
GLUCOSE BLD-MCNC: 154 MG/DL (ref 70–99)
GLUCOSE BLD-MCNC: 177 MG/DL (ref 70–99)
HCT VFR BLD AUTO: 26.3 % (ref 40.5–52.5)
HGB BLD-MCNC: 8.8 G/DL (ref 13.5–17.5)
PERFORMED ON: ABNORMAL

## 2025-04-11 PROCEDURE — 97530 THERAPEUTIC ACTIVITIES: CPT

## 2025-04-11 PROCEDURE — 99232 SBSQ HOSP IP/OBS MODERATE 35: CPT | Performed by: INTERNAL MEDICINE

## 2025-04-11 PROCEDURE — 85014 HEMATOCRIT: CPT

## 2025-04-11 PROCEDURE — 97116 GAIT TRAINING THERAPY: CPT

## 2025-04-11 PROCEDURE — 1280000000 HC REHAB R&B

## 2025-04-11 PROCEDURE — 85018 HEMOGLOBIN: CPT

## 2025-04-11 PROCEDURE — 6370000000 HC RX 637 (ALT 250 FOR IP): Performed by: STUDENT IN AN ORGANIZED HEALTH CARE EDUCATION/TRAINING PROGRAM

## 2025-04-11 PROCEDURE — 97535 SELF CARE MNGMENT TRAINING: CPT

## 2025-04-11 PROCEDURE — 97110 THERAPEUTIC EXERCISES: CPT

## 2025-04-11 PROCEDURE — 93005 ELECTROCARDIOGRAM TRACING: CPT | Performed by: STUDENT IN AN ORGANIZED HEALTH CARE EDUCATION/TRAINING PROGRAM

## 2025-04-11 PROCEDURE — 36415 COLL VENOUS BLD VENIPUNCTURE: CPT

## 2025-04-11 RX ORDER — TRAZODONE HYDROCHLORIDE 50 MG/1
25 TABLET ORAL NIGHTLY PRN
Status: DISCONTINUED | OUTPATIENT
Start: 2025-04-11 | End: 2025-04-17 | Stop reason: HOSPADM

## 2025-04-11 RX ORDER — TRAZODONE HYDROCHLORIDE 50 MG/1
25 TABLET ORAL NIGHTLY
Status: DISCONTINUED | OUTPATIENT
Start: 2025-04-11 | End: 2025-04-17 | Stop reason: HOSPADM

## 2025-04-11 RX ORDER — SIMETHICONE 80 MG
80 TABLET,CHEWABLE ORAL EVERY 6 HOURS PRN
Status: DISCONTINUED | OUTPATIENT
Start: 2025-04-11 | End: 2025-04-17 | Stop reason: HOSPADM

## 2025-04-11 RX ADMIN — PANTOPRAZOLE SODIUM 40 MG: 40 TABLET, DELAYED RELEASE ORAL at 05:57

## 2025-04-11 RX ADMIN — SEVELAMER CARBONATE 800 MG: 800 TABLET, FILM COATED ORAL at 17:16

## 2025-04-11 RX ADMIN — OXYCODONE HYDROCHLORIDE AND ACETAMINOPHEN 500 MG: 500 TABLET ORAL at 07:50

## 2025-04-11 RX ADMIN — ZINC SULFATE 220 MG (50 MG) CAPSULE 50 MG: CAPSULE at 08:36

## 2025-04-11 RX ADMIN — HYDROCORTISONE: 25 CREAM TOPICAL at 07:51

## 2025-04-11 RX ADMIN — SEVELAMER CARBONATE 800 MG: 800 TABLET, FILM COATED ORAL at 07:50

## 2025-04-11 RX ADMIN — TRAZODONE HYDROCHLORIDE 25 MG: 50 TABLET ORAL at 21:02

## 2025-04-11 RX ADMIN — MELATONIN TAB 3 MG 6 MG: 3 TAB at 19:31

## 2025-04-11 RX ADMIN — CYANOCOBALAMIN TAB 1000 MCG 1000 MCG: 1000 TAB at 07:50

## 2025-04-11 RX ADMIN — LINEZOLID 600 MG: 600 TABLET, FILM COATED ORAL at 07:50

## 2025-04-11 RX ADMIN — DICLOFENAC SODIUM 2 G: 10 GEL TOPICAL at 08:40

## 2025-04-11 RX ADMIN — APIXABAN 2.5 MG: 2.5 TABLET, FILM COATED ORAL at 21:09

## 2025-04-11 RX ADMIN — OXYCODONE HYDROCHLORIDE AND ACETAMINOPHEN 1 TABLET: 5; 325 TABLET ORAL at 21:09

## 2025-04-11 RX ADMIN — HYDROCORTISONE: 25 CREAM TOPICAL at 21:11

## 2025-04-11 RX ADMIN — ROSUVASTATIN CALCIUM 10 MG: 10 TABLET, FILM COATED ORAL at 21:09

## 2025-04-11 RX ADMIN — OXYCODONE HYDROCHLORIDE AND ACETAMINOPHEN 1 TABLET: 5; 325 TABLET ORAL at 07:47

## 2025-04-11 RX ADMIN — OXYCODONE HYDROCHLORIDE AND ACETAMINOPHEN 1 TABLET: 5; 325 TABLET ORAL at 14:58

## 2025-04-11 RX ADMIN — SEVELAMER CARBONATE 800 MG: 800 TABLET, FILM COATED ORAL at 12:35

## 2025-04-11 RX ADMIN — INSULIN GLARGINE 8 UNITS: 100 INJECTION, SOLUTION SUBCUTANEOUS at 21:09

## 2025-04-11 RX ADMIN — METOPROLOL TARTRATE 50 MG: 50 TABLET, FILM COATED ORAL at 21:08

## 2025-04-11 RX ADMIN — LINEZOLID 600 MG: 600 TABLET, FILM COATED ORAL at 21:09

## 2025-04-11 RX ADMIN — CHOLECALCIFEROL TAB 125 MCG (5000 UNIT) 5000 UNITS: 125 TAB at 07:50

## 2025-04-11 RX ADMIN — Medication 1 MG: at 07:50

## 2025-04-11 RX ADMIN — METOPROLOL TARTRATE 50 MG: 50 TABLET, FILM COATED ORAL at 07:50

## 2025-04-11 ASSESSMENT — PAIN DESCRIPTION - DESCRIPTORS: DESCRIPTORS: ACHING

## 2025-04-11 ASSESSMENT — ENCOUNTER SYMPTOMS
ABDOMINAL PAIN: 0
EYE DISCHARGE: 0
SINUS PRESSURE: 0
BACK PAIN: 0
SINUS PAIN: 0
DIARRHEA: 0
EYE REDNESS: 0
SORE THROAT: 0
SHORTNESS OF BREATH: 0
NAUSEA: 0
RHINORRHEA: 0
WHEEZING: 0
COUGH: 0
CONSTIPATION: 0

## 2025-04-11 ASSESSMENT — PAIN DESCRIPTION - PAIN TYPE: TYPE: ACUTE PAIN

## 2025-04-11 ASSESSMENT — PAIN SCALES - GENERAL
PAINLEVEL_OUTOF10: 3
PAINLEVEL_OUTOF10: 8
PAINLEVEL_OUTOF10: 6
PAINLEVEL_OUTOF10: 7
PAINLEVEL_OUTOF10: 3
PAINLEVEL_OUTOF10: 6
PAINLEVEL_OUTOF10: 6
PAINLEVEL_OUTOF10: 7

## 2025-04-11 ASSESSMENT — PAIN DESCRIPTION - ORIENTATION: ORIENTATION: RIGHT

## 2025-04-11 ASSESSMENT — PAIN DESCRIPTION - LOCATION
LOCATION: BACK;BUTTOCKS
LOCATION: HIP

## 2025-04-11 ASSESSMENT — PAIN DESCRIPTION - FREQUENCY: FREQUENCY: CONTINUOUS

## 2025-04-11 ASSESSMENT — PAIN DESCRIPTION - ONSET: ONSET: ON-GOING

## 2025-04-11 ASSESSMENT — PAIN - FUNCTIONAL ASSESSMENT: PAIN_FUNCTIONAL_ASSESSMENT: ACTIVITIES ARE NOT PREVENTED

## 2025-04-11 NOTE — PROGRESS NOTES
[facility] - Inpatient Rehabilitation Department   Phone: [phone]    Occupational Therapy    [] Initial Evaluation            [x] Daily Treatment Note         [] Discharge Summary      Patient: Danny Rock   : 1950   MRN: [id number]   Date of Service:  2025    Admitting Diagnosis:  Lumbar spondylosis  Current Admission Summary:   Danny Rock is a 75 y.o. male with PMHx notable for HTN, HLD, CAD, HFrEF, atrial fibrillation, renal cancer s/p right nephrectomy, ESRD on HD who initially presented to [facility] on 3/22 with low back pain and had MRI showing severe multilevel foraminal stenosis worst at L5-S1 prompting transfer to [facility] for neurosurgical evaluation. Repeat MRI C/T/L-spine on 3/23 showed multilevel disc/endplate enhancement compatible with discitis and osteomyelitis at L2-L3, L3-L4 and L4-L5 with adjacent paravertebral edema, and small right psoas muscle abscess. Blood cultures resulted positive for Enterococcus faecalis. He was started on IV vancomycin and gentamicin on 3/25, with plan at discharge to continue IV vancomycin 3 times a week after dialysis through 2025. NSGY evaluated while inpatient, recommended no surgical intervention at this time. Nephrology consulted for HD needs, had old HD access removed and TDC placed on 3/28.      He was admitted to [facility] from 3/30-.  Rehabilitation course was complicated by development of large right iliopsoas intramuscular hematoma.  His hemoglobin continued to downtrend despite holding Eliquis and aspirin.  Case was discussed with Orthopedic Surgery, NSGY and IR.  Patient was discharged back to acute hospital where Eliquis and aspirin were held for 4 days. Patient was started on heparin drip on , with ongoing stability of his hemoglobin.  He is able to resume his Eliquis on , and aspirin after 1 week if stable without signs of recurrent bleeding  Past Medical History:  has a past medical  body ADL at modified independent   Patient will complete lower body ADL at modified independent   Patient will complete toileting at modified independent   Patient will complete functional transfers at modified independent   Patient to gather and transport items at modified independent     Above goals reviewed on 4/11/2025.  All goals are ongoing at this time unless indicated above.       Therapy Session Time     Individual Group Co-treatment   Time In 0933      Time Out 1000      Minutes 27         Second Session Therapy Time:   Individual Concurrent Group Co-treatment   Time In 1245         Time Out 1350         Minutes 65           Timed Code Treatment Minutes:  27 + 65  Total Treatment Minutes:  92  minutes        Electronically Signed By: BENNY Trimble/ANA    Therapist observed and directed the patient's plan of care.  Co-signed and supervised by: Brendan Crooks OTR/ALEX  #[id number]

## 2025-04-11 NOTE — PROGRESS NOTES
[facility] - Inpatient Rehabilitation Department   Phone: [phone]    Physical Therapy    [] Initial Evaluation            [x] Daily Treatment Note         [] Discharge Summary      Patient: Danny Rock   : 1950   MRN: [id number]   Date of Service:  2025  Admitting Diagnosis: Lumbar spondylosis  Current Admission Summary: Danny Rock is a 75 y.o. male with PMHx notable for HTN, HLD, CAD, HFrEF, atrial fibrillation, renal cancer s/p right nephrectomy, ESRD on HD who initially presented to [facility] on 3/22 with low back pain and had MRI showing severe multilevel foraminal stenosis worst at L5-S1 prompting transfer to [facility] for neurosurgical evaluation. Repeat MRI C/T/L-spine on 3/23 showed multilevel disc/endplate enhancement compatible with discitis and osteomyelitis at L2-L3, L3-L4 and L4-L5 with adjacent paravertebral edema, and small right psoas muscle abscess. Blood cultures resulted positive for Enterococcus faecalis. He was started on IV vancomycin and gentamicin on 3/25, with plan at discharge to continue IV vancomycin 3 times a week after dialysis through 2025. NSGY evaluated while inpatient, recommended no surgical intervention at this time. Nephrology consulted for HD needs, had old HD access removed and TDC placed on 3/28.      He was admitted to [facility] from 3/30-.  Rehabilitation course was complicated by development of large right iliopsoas intramuscular hematoma.  His hemoglobin continued to downtrend despite holding Eliquis and aspirin.  Case was discussed with Orthopedic Surgery, NSGY and IR.  Patient was discharged back to acute hospital where Eliquis and aspirin were held for 4 days. Patient was started on heparin drip on , with ongoing stability of his hemoglobin.  He is able to resume his Eliquis on , and aspirin after 1 week if stable without signs of recurrent bleeding.  Past Medical History:  has a past medical history of  decreased muscular endurance, as well as by increasing R hip pain. Pt will benefit from continued skilled PT services to address these deficits and promote return to PLOF and safe return to home.  Safety Interventions: patient left in chair, chair alarm in place, call light within reach, telesitter in use, and family/caregiver present    Plan  Frequency: 5 x/week, 90 min/day  Current Treatment Recommendations: strengthening, ROM, balance training, functional mobility training, transfer training, gait training, stair training, endurance training, and neuromuscular re-education    Goals  Patient Goals: to return home, reduce back pain    Short Term Goals:  Time Frame: 7 days   Patient will complete bed mobility at modified independent   Patient will complete transfers at [facility]   Patient will ambulate 150 ft with use of rolling walker at modified independent  Patient will ascend/descend 8 stairs with a single railing support at [facility]  Patient will complete car transfer at modified independent    Above goals reviewed on 4/11/2025.  All goals are ongoing at this time unless indicated above.      Therapy Session Time      Individual Group Co-treatment   Time In  0730       Time Out  0830       Minutes  60           Second Session Therapy Time:   Individual Concurrent Group Co-treatment   Time In 1030         Time Out 1100         Minutes 30           Total Treatment Minutes:  60 + 30 = 90 minutes      Electronically signed by: ANGEL Gaitan  Therapist was present, directed the patient's care, made skilled judgement, and was responsible for assessment and treatment of the patient.  Co-signed and supervised by: Tin Giron, TEMI [id number]

## 2025-04-11 NOTE — CONSULTS
Pharmacy Medication History Note      List of current medications patient is taking is complete.    Source of information: Fill History, latest admission, patient      Last dose times updated.     Emeterio Rojas PharmD, [facility]  Clinical Pharmacy Specialist  [id number]

## 2025-04-11 NOTE — PROGRESS NOTES
MD Gordon Frankel MD Aldo Estella, DO                 Office: [phone]                      Fax: [phone]             [employer]                   NEPHROLOGY CONSULT PROGRESS NOTE    Subjective / interval history / medical decision making.  -no new complaints.     IMPRESSION/RECOMMENDATIONS:        #ESKD on HD TTS  -HD TTS at [facility] under our care.  -EDW while inpatient ~68kg  -continue iHD TTS while inpatient  -HD tomorrow. Stable otherwise from renal.    #hyponatremia  -likely 2/2 hypervolemia. Continue HD  -improving    #metabolic acidosis  -likely from ESKD, continue iHD, improving.    #hypoalbuminemia  -likely 2/2 malnutrition, ESKD  -encourage po protein intake    #anemia of CKD  -has Hx of renal ca s/p nephrectomy  -hgb stable, POONAM with HD.  -monitor      Thank you for the consult.  We will follow this patient along the hospitalization.  Guillermo Moss DO     High complexity, at risk of impending organ failure needing  higher level of care/monitoring.   Time spent 35 minutes that included face-to-face meeting/discussion with patient, and treatment team (including primary/referring team and other consultants; included coordination of care with the treatment team; and review of patient's electronic medical records and ordering appropriates tests.             Reason for Consult:  ESKD management  Requesting Physician/Provider:  Garret Tellez MD     CHIEF COMPLAINT:  inpatient rehab    History obtained from records and patient.    HISTORY OF PRESENT ILLNESS:                   Danny Rock  is 74yo, male with significant past medical history of ESRD, on HD TTHS at [facility] under care, atrial fibrillation, coronary artery disease, renal CA status post right nephrectomy, previously on immunotherapy, diabetes mellitus type 2, hyperlipidemia, hypertension, prostate cancer, CHF, EF 40 to 45%, CVA secondary to hypercoagulable state, pneumothorax who      04/10/2025 05:47 AM    K 4.6 04/10/2025 05:47 AM     04/10/2025 05:47 AM    CO2 23 04/10/2025 05:47 AM    BUN 51 04/10/2025 05:47 AM    CREATININE 2.3 04/10/2025 05:47 AM    CALCIUM 9.0 04/10/2025 05:47 AM    GFRAA 40 07/26/2022 05:19 AM    GFRAA >60 03/21/2013 10:55 AM    LABGLOM 29 04/10/2025 05:47 AM    LABGLOM 26 04/10/2024 09:47 AM    GLUCOSE 76 04/10/2025 05:47 AM   Magnesium:    Lab Results   Component Value Date/Time    MG 1.70 02/09/2024 05:32 AM   Phosphorus:    Lab Results   Component Value Date/Time    PHOS 3.0 04/08/2025 08:01 AM

## 2025-04-11 NOTE — PROGRESS NOTES
Danny Rock  4/11/2025  [id number]    Chief Complaint: Lumbar spondylosis    Subjective    No acute events overnight.    Patient reports that he is doing well today. Stable right hip pain. Slept better overall with external catheter. Still had some trouble falling asleep. Discussed stability of hemoglobin today, patient was pleased to hear this.    Denies chest pain, dyspnea, fevers/chills. Last Bowel Movement:  04/10/25.           Objective    Patient Vitals for the past 24 hrs:   BP Temp Temp src Pulse Resp SpO2 Weight   04/11/25 0830 136/75 97.7 °F (36.5 °C) Oral (!) 109 16 96 % --   04/11/25 0747 -- -- -- -- 16 -- --   04/11/25 0600 -- -- -- -- -- -- 71.3 kg (157 lb 1.6 oz)   04/10/25 1930 120/63 97.8 °F (36.6 °C) Oral 82 17 94 % --   04/10/25 1800 134/66 98 °F (36.7 °C) Oral 90 18 96 % --   04/10/25 1735 126/73 97.6 °F (36.4 °C) -- 97 18 -- 69 kg (152 lb 1.9 oz)   04/10/25 1432 138/74 98 °F (36.7 °C) -- 68 17 -- 71 kg (156 lb 8.4 oz)   04/10/25 0919 -- -- -- -- 16 -- --     Patient Vitals for the past 96 hrs (Last 3 readings):   Weight   04/11/25 0600 71.3 kg (157 lb 1.6 oz)   04/10/25 1735 69 kg (152 lb 1.9 oz)   04/10/25 1432 71 kg (156 lb 8.4 oz)      Gen: No distress, pleasant.   HEENT: Normocephalic, atraumatic.  CV: Regular rate and rhythm. Extremities warm, well perfused.  Left chest wall tunneled dialysis catheter.  Resp: No respiratory distress. CTAB.  Abd: Soft, nontender.  Ext: No edema.   Neuro: Alert, oriented, appropriately interactive.     Laboratory data:   Na/K/Cl/CO2:  135/4.6/101/23 (04/10 0547)   BUN/Cr/glu/ALT/AST/amyl/lip:  51/2.3/--/--/--/--/-- (04/10 0547)    WBC/Hgb/Hct/Plts:  8.1/8.8/26.2/239 (04/10 0547)     Therapy progress:       PT    Supine to Sit: Partial/moderate assistance  Sit to Supine: Supervision or touching assistance   Sit to Stand: Partial/moderate assistance  Chair/Bed to Chair Transfer: Partial/moderate assistance  Car Transfer: Partial/moderate  Lantus 8 units nightly + low-intensity SSI     #. Onychomycosis  - Nails debrided on 4/2 per Podiatry. Follow-up outpatient.     Chronic Conditions:  - GERD: Continue PPI  - Hx of renal cell carcinoma s/p nephrectomy: Continue home Cabometyx (patient-provided)  - Hx of right shoulder abscess s/p I&D 1/31/24, right glenohumeral joint arthritis: Follows w/ Ortho, next appointment has been re-scheduled.    CODE: Full Code  Diet: ADULT DIET; Regular; 5 carb choices (75 gm/meal)  ADULT ORAL NUTRITION SUPPLEMENT; Breakfast, Dinner, Lunch; Diabetic Oral Supplement  Bowels: Per ARU protocol. Added PRN Simethicone for cramping/bloating.   Bladder: Bladder scans per ARU protocol  DVT PPx: Eliquis  Dispo: Home on 4/17.  Services: TBD  DME: TBD  Follow-ups: ID, Podiatry, PCP    Future Appointments         Provider Specialty Dept Phone    6/9/2025 11:00 AM Rene Boo MD Cardiology [phone]            Garret Ponce MD 4/11/2025, 5:25 PM    * This document was created using dictation software.  While all precautions were taken to ensure accuracy, errors may have occurred.  Please disregard any typographical errors.

## 2025-04-11 NOTE — PROGRESS NOTES
Infectious Diseases   Progress Note      Admission Date: 4/8/2025  Hospital Day: Hospital Day: 4   Attending: Garret Ponce MD  Date of service: 4/11/2025     Chief complaint/ Reason for consult:     Enterococcus faecalis bacteremia  Lumbar discitis and osteomyelitis with foraminal stenosis and spinal canal stenosis at L3-L4 level  History of prostate cancer  ESRD on hemodialysis  Elevated sed rate     Microbiology:      I have reviewed allavailable micro lab data and cultures    Results       Procedure Component Value Units Date/Time    Culture, Blood 2 [[id number]] Collected: 03/31/25 1313    Order Status: Completed Specimen: Blood Updated: 04/04/25 1415     Culture, Blood 2 No Growth after 4 days of incubation.    Narrative:      ORDER#: [id number]                          ORDERED BY: MAURISIO MIN  SOURCE: Blood Hand, Right                  COLLECTED:  03/31/25 13:13  ANTIBIOTICS AT SARAH.:                      RECEIVED :  03/31/25 20:42  If child <=2 yrs old please draw pediatric bottle.~Blood Culture #2    Culture, Blood 1 [[id number]] Collected: 03/31/25 1310    Order Status: Completed Specimen: Blood Updated: 04/04/25 1415     Blood Culture, Routine No Growth after 4 days of incubation.    Narrative:      ORDER#: [id number]                          ORDERED BY: MAURISIO MIN  SOURCE: Blood Antecubital-Rig              COLLECTED:  03/31/25 13:10  ANTIBIOTICS AT SARAH.:                      RECEIVED :  03/31/25 20:42  If child <=2 yrs old please draw pediatric bottle.~Blood Culture 1    Culture, Blood 3 [[id number]] Collected: 03/31/25 0000    Order Status: Canceled Specimen: Blood              Susceptibility data from last 90 days.  Collected Specimen Info Organism Ampicillin gentamicin-syn Sodium streptomycin-syn   03/26/25 Blood Staphylococcus epidermidis (1)         Staphylococcus epidermidis (2)       03/24/25 Blood Enterococcus faecalis       03/24/25 Blood Enterococcus faecalis       03/23/25 Blood

## 2025-04-11 NOTE — PROGRESS NOTES
1930 Assessment complete see flowsheets. A&Ox2. The care plan and education has been reviewed and mutually agreed upon with the patient. Patient currently has an external catheter to promote sleep. In bed, fall precautions in place, hourly rounding, call light and belongings in reach, bed in lowest position, wheels locked in place, side rails up x 2, walkways free of clutter. No further needs expressed at this time.    2013 Meds administered whole with water per eMAR with no complications.  Patient still congruent and alert to self and the situation.  Patient stated that he feels better now than he did this morning and is ready to get some sleep.  Patient in bed with fall precautions in place and call light within reach.      0018 Patient called out confused and uncomfortable.  Attempted to reposition patient a few times and provided a warm blanket.  Patient stated that he felt more comfortable now.  Patient in bed with fall precautions in place and call light within reach.    0025 patient called out stating that he did not know what to do and why he was still uncomfortable.  Patient stated that his right hip was \"hurting\".  Applied voltaren gel.  Patient stated that this helped his hip.  Patient still stating that \"he does not know what to do but he needs to get up to go pee.\"  Reminded patient of the external catheter that was placed so if he wanted to urinate he could go.  Patient still appeared anxious and confused.  Offered to ambulate patient up to the chair.  Patient agreed.  Assisted patient up to the chair with walker and gait belt.  External catheter was repositioned to ensure correct suction.  Assisted patient with the call light and how to use it when operating the television. Placed a pillow under his buttocks to allow additional comfort.  Assisted patient with drinking some water.  Patient stated that this will work for now.  Patient insisted on keeping the light on.  Fall precautions in place, call  light within reach. Will continue to follow ongoing care plan.     0103  Assisted patient to the restroom using walker and gait belt.  Changed external catheter and brief. Patient in bed with fall precautions in place and call light within reach.      0130 Assisted patient to the restroom using walker and gait belt. Patient stated \" I feel like I have to have a bowel movement and dont want to have an accident.  Patient has passed flatus thusfar. Patient in bed with fall precautions in place and call light within reach.      0151 Assisted patient back to bed using walker and gait belt.  Applied zinc to buttocks. Patient remains confused however states he is going to try to sleep.  Patient in bed with fall precautions in place and call light within reach.      0555 Administered scheduled protonix whole with water; no complications.  Patient is much more alert, and responsive this morning asking and answering questions appropriately.  Provided patient with a written schedule for therapy events today.  Patient in bed with fall precautions in place and call light within reach.

## 2025-04-12 LAB
ALBUMIN SERPL-MCNC: 3 G/DL (ref 3.4–5)
ANION GAP SERPL CALCULATED.3IONS-SCNC: 11 MMOL/L (ref 3–16)
BASOPHILS # BLD: 0 K/UL (ref 0–0.2)
BASOPHILS NFR BLD: 0.7 %
BUN SERPL-MCNC: 43 MG/DL (ref 7–20)
CALCIUM SERPL-MCNC: 8.8 MG/DL (ref 8.3–10.6)
CHLORIDE SERPL-SCNC: 100 MMOL/L (ref 99–110)
CO2 SERPL-SCNC: 24 MMOL/L (ref 21–32)
CREAT SERPL-MCNC: 2.3 MG/DL (ref 0.8–1.3)
DEPRECATED RDW RBC AUTO: 19.8 % (ref 12.4–15.4)
EKG ATRIAL RATE: 65 BPM
EKG DIAGNOSIS: NORMAL
EKG Q-T INTERVAL: 454 MS
EKG QRS DURATION: 126 MS
EKG QTC CALCULATION (BAZETT): 468 MS
EKG R AXIS: -20 DEGREES
EKG T AXIS: 67 DEGREES
EKG VENTRICULAR RATE: 64 BPM
EOSINOPHIL # BLD: 0.2 K/UL (ref 0–0.6)
EOSINOPHIL NFR BLD: 2.1 %
GFR SERPLBLD CREATININE-BSD FMLA CKD-EPI: 29 ML/MIN/{1.73_M2}
GLUCOSE BLD-MCNC: 116 MG/DL (ref 70–99)
GLUCOSE BLD-MCNC: 194 MG/DL (ref 70–99)
GLUCOSE BLD-MCNC: 83 MG/DL (ref 70–99)
GLUCOSE BLD-MCNC: 99 MG/DL (ref 70–99)
GLUCOSE SERPL-MCNC: 83 MG/DL (ref 70–99)
HCT VFR BLD AUTO: 26.9 % (ref 40.5–52.5)
HGB BLD-MCNC: 9 G/DL (ref 13.5–17.5)
LYMPHOCYTES # BLD: 1 K/UL (ref 1–5.1)
LYMPHOCYTES NFR BLD: 13.8 %
MCH RBC QN AUTO: 33.9 PG (ref 26–34)
MCHC RBC AUTO-ENTMCNC: 33.5 G/DL (ref 31–36)
MCV RBC AUTO: 101.2 FL (ref 80–100)
MONOCYTES # BLD: 0.3 K/UL (ref 0–1.3)
MONOCYTES NFR BLD: 3.8 %
NEUTROPHILS # BLD: 5.7 K/UL (ref 1.7–7.7)
NEUTROPHILS NFR BLD: 79.6 %
PERFORMED ON: ABNORMAL
PERFORMED ON: ABNORMAL
PERFORMED ON: NORMAL
PERFORMED ON: NORMAL
PHOSPHATE SERPL-MCNC: 2.6 MG/DL (ref 2.5–4.9)
PLATELET # BLD AUTO: 174 K/UL (ref 135–450)
PMV BLD AUTO: 6.5 FL (ref 5–10.5)
POTASSIUM SERPL-SCNC: 4.4 MMOL/L (ref 3.5–5.1)
RBC # BLD AUTO: 2.66 M/UL (ref 4.2–5.9)
SODIUM SERPL-SCNC: 135 MMOL/L (ref 136–145)
WBC # BLD AUTO: 7.2 K/UL (ref 4–11)

## 2025-04-12 PROCEDURE — 2500000003 HC RX 250 WO HCPCS: Performed by: STUDENT IN AN ORGANIZED HEALTH CARE EDUCATION/TRAINING PROGRAM

## 2025-04-12 PROCEDURE — 80069 RENAL FUNCTION PANEL: CPT

## 2025-04-12 PROCEDURE — 97110 THERAPEUTIC EXERCISES: CPT

## 2025-04-12 PROCEDURE — 85025 COMPLETE CBC W/AUTO DIFF WBC: CPT

## 2025-04-12 PROCEDURE — 1280000000 HC REHAB R&B

## 2025-04-12 PROCEDURE — 97530 THERAPEUTIC ACTIVITIES: CPT

## 2025-04-12 PROCEDURE — 97116 GAIT TRAINING THERAPY: CPT

## 2025-04-12 PROCEDURE — 93010 ELECTROCARDIOGRAM REPORT: CPT | Performed by: INTERNAL MEDICINE

## 2025-04-12 PROCEDURE — 2580000003 HC RX 258: Performed by: STUDENT IN AN ORGANIZED HEALTH CARE EDUCATION/TRAINING PROGRAM

## 2025-04-12 PROCEDURE — 6370000000 HC RX 637 (ALT 250 FOR IP): Performed by: STUDENT IN AN ORGANIZED HEALTH CARE EDUCATION/TRAINING PROGRAM

## 2025-04-12 PROCEDURE — 6360000002 HC RX W HCPCS: Performed by: INTERNAL MEDICINE

## 2025-04-12 PROCEDURE — 97535 SELF CARE MNGMENT TRAINING: CPT

## 2025-04-12 PROCEDURE — 6360000002 HC RX W HCPCS: Performed by: STUDENT IN AN ORGANIZED HEALTH CARE EDUCATION/TRAINING PROGRAM

## 2025-04-12 PROCEDURE — 90935 HEMODIALYSIS ONE EVALUATION: CPT

## 2025-04-12 RX ORDER — SODIUM CHLORIDE 0.9 % (FLUSH) 0.9 %
5-40 SYRINGE (ML) INJECTION 2 TIMES DAILY
Status: DISCONTINUED | OUTPATIENT
Start: 2025-04-12 | End: 2025-04-17 | Stop reason: HOSPADM

## 2025-04-12 RX ADMIN — SODIUM CHLORIDE, PRESERVATIVE FREE 10 ML: 5 INJECTION INTRAVENOUS at 20:19

## 2025-04-12 RX ADMIN — LINEZOLID 600 MG: 600 TABLET, FILM COATED ORAL at 08:29

## 2025-04-12 RX ADMIN — CYANOCOBALAMIN TAB 1000 MCG 1000 MCG: 1000 TAB at 08:29

## 2025-04-12 RX ADMIN — POLYETHYLENE GLYCOL 3350 17 G: 17 POWDER, FOR SOLUTION ORAL at 08:30

## 2025-04-12 RX ADMIN — OXYCODONE HYDROCHLORIDE AND ACETAMINOPHEN 1 TABLET: 5; 325 TABLET ORAL at 04:44

## 2025-04-12 RX ADMIN — CHOLECALCIFEROL TAB 125 MCG (5000 UNIT) 5000 UNITS: 125 TAB at 08:29

## 2025-04-12 RX ADMIN — VANCOMYCIN HYDROCHLORIDE 1000 MG: 1 INJECTION, POWDER, LYOPHILIZED, FOR SOLUTION INTRAVENOUS at 18:58

## 2025-04-12 RX ADMIN — PANTOPRAZOLE SODIUM 40 MG: 40 TABLET, DELAYED RELEASE ORAL at 05:27

## 2025-04-12 RX ADMIN — HYDROCORTISONE: 25 CREAM TOPICAL at 20:15

## 2025-04-12 RX ADMIN — INSULIN LISPRO 1 UNITS: 100 INJECTION, SOLUTION INTRAVENOUS; SUBCUTANEOUS at 12:18

## 2025-04-12 RX ADMIN — SEVELAMER CARBONATE 800 MG: 800 TABLET, FILM COATED ORAL at 12:16

## 2025-04-12 RX ADMIN — SODIUM CHLORIDE, PRESERVATIVE FREE 10 ML: 5 INJECTION INTRAVENOUS at 08:32

## 2025-04-12 RX ADMIN — ACETAMINOPHEN 650 MG: 325 TABLET ORAL at 08:30

## 2025-04-12 RX ADMIN — OXYCODONE HYDROCHLORIDE AND ACETAMINOPHEN 1 TABLET: 5; 325 TABLET ORAL at 12:18

## 2025-04-12 RX ADMIN — APIXABAN 2.5 MG: 2.5 TABLET, FILM COATED ORAL at 20:15

## 2025-04-12 RX ADMIN — METHOCARBAMOL 750 MG: 750 TABLET ORAL at 04:43

## 2025-04-12 RX ADMIN — APIXABAN 2.5 MG: 2.5 TABLET, FILM COATED ORAL at 08:29

## 2025-04-12 RX ADMIN — METHOCARBAMOL 750 MG: 750 TABLET ORAL at 12:18

## 2025-04-12 RX ADMIN — HYDROCORTISONE: 25 CREAM TOPICAL at 08:42

## 2025-04-12 RX ADMIN — LINEZOLID 600 MG: 600 TABLET, FILM COATED ORAL at 20:14

## 2025-04-12 RX ADMIN — TRAZODONE HYDROCHLORIDE 25 MG: 50 TABLET ORAL at 20:14

## 2025-04-12 RX ADMIN — OXYCODONE HYDROCHLORIDE AND ACETAMINOPHEN 1 TABLET: 5; 325 TABLET ORAL at 20:14

## 2025-04-12 RX ADMIN — INSULIN GLARGINE 8 UNITS: 100 INJECTION, SOLUTION SUBCUTANEOUS at 20:13

## 2025-04-12 RX ADMIN — ERYTHROPOIETIN 10000 UNITS: 10000 INJECTION, SOLUTION INTRAVENOUS; SUBCUTANEOUS at 16:44

## 2025-04-12 RX ADMIN — DICLOFENAC SODIUM 2 G: 10 GEL TOPICAL at 08:30

## 2025-04-12 RX ADMIN — Medication 1 MG: at 08:29

## 2025-04-12 RX ADMIN — OXYCODONE HYDROCHLORIDE AND ACETAMINOPHEN 500 MG: 500 TABLET ORAL at 08:29

## 2025-04-12 RX ADMIN — ZINC SULFATE 220 MG (50 MG) CAPSULE 50 MG: CAPSULE at 08:30

## 2025-04-12 RX ADMIN — SEVELAMER CARBONATE 800 MG: 800 TABLET, FILM COATED ORAL at 18:10

## 2025-04-12 RX ADMIN — SEVELAMER CARBONATE 800 MG: 800 TABLET, FILM COATED ORAL at 08:29

## 2025-04-12 RX ADMIN — MELATONIN TAB 3 MG 6 MG: 3 TAB at 18:09

## 2025-04-12 ASSESSMENT — PAIN - FUNCTIONAL ASSESSMENT
PAIN_FUNCTIONAL_ASSESSMENT: ACTIVITIES ARE NOT PREVENTED

## 2025-04-12 ASSESSMENT — PAIN SCALES - GENERAL
PAINLEVEL_OUTOF10: 2
PAINLEVEL_OUTOF10: 1
PAINLEVEL_OUTOF10: 4
PAINLEVEL_OUTOF10: 1
PAINLEVEL_OUTOF10: 3
PAINLEVEL_OUTOF10: 3
PAINLEVEL_OUTOF10: 6
PAINLEVEL_OUTOF10: 4
PAINLEVEL_OUTOF10: 6

## 2025-04-12 ASSESSMENT — PAIN DESCRIPTION - ONSET: ONSET: ON-GOING

## 2025-04-12 ASSESSMENT — PAIN DESCRIPTION - ORIENTATION
ORIENTATION: RIGHT
ORIENTATION: RIGHT
ORIENTATION: RIGHT;LEFT;LOWER

## 2025-04-12 ASSESSMENT — PAIN DESCRIPTION - LOCATION
LOCATION: BACK;HIP
LOCATION: HIP
LOCATION: HIP;BACK

## 2025-04-12 ASSESSMENT — PAIN DESCRIPTION - DESCRIPTORS
DESCRIPTORS: ACHING

## 2025-04-12 ASSESSMENT — PAIN DESCRIPTION - PAIN TYPE: TYPE: ACUTE PAIN

## 2025-04-12 ASSESSMENT — PAIN SCALES - WONG BAKER: WONGBAKER_NUMERICALRESPONSE: NO HURT

## 2025-04-12 ASSESSMENT — PAIN DESCRIPTION - FREQUENCY: FREQUENCY: CONTINUOUS

## 2025-04-12 NOTE — PROGRESS NOTES
[facility] - Inpatient Rehabilitation Department   Phone: [phone]    Physical Therapy    [] Initial Evaluation            [x] Daily Treatment Note         [] Discharge Summary      Patient: Danny Rock   : 1950   MRN: [id number]   Date of Service:  2025  Admitting Diagnosis: Lumbar spondylosis  Current Admission Summary: Danny Rock is a 75 y.o. male with PMHx notable for HTN, HLD, CAD, HFrEF, atrial fibrillation, renal cancer s/p right nephrectomy, ESRD on HD who initially presented to [facility] on 3/22 with low back pain and had MRI showing severe multilevel foraminal stenosis worst at L5-S1 prompting transfer to [facility] for neurosurgical evaluation. Repeat MRI C/T/L-spine on 3/23 showed multilevel disc/endplate enhancement compatible with discitis and osteomyelitis at L2-L3, L3-L4 and L4-L5 with adjacent paravertebral edema, and small right psoas muscle abscess. Blood cultures resulted positive for Enterococcus faecalis. He was started on IV vancomycin and gentamicin on 3/25, with plan at discharge to continue IV vancomycin 3 times a week after dialysis through 2025. NSGY evaluated while inpatient, recommended no surgical intervention at this time. Nephrology consulted for HD needs, had old HD access removed and TDC placed on 3/28.      He was admitted to [facility] from 3/30-.  Rehabilitation course was complicated by development of large right iliopsoas intramuscular hematoma.  His hemoglobin continued to downtrend despite holding Eliquis and aspirin.  Case was discussed with Orthopedic Surgery, NSGY and IR.  Patient was discharged back to acute hospital where Eliquis and aspirin were held for 4 days. Patient was started on heparin drip on , with ongoing stability of his hemoglobin.  He is able to resume his Eliquis on , and aspirin after 1 week if stable without signs of recurrent bleeding.  Past Medical History:  has a past medical history of

## 2025-04-12 NOTE — PLAN OF CARE
Problem: Discharge Planning  Goal: Discharge to home or other facility with appropriate resources  Outcome: Progressing     Problem: Safety - Adult  Goal: Free from fall injury  4/12/2025 1300 by Stacy Abraham RN  Outcome: Progressing     Problem: Pain  Goal: Verbalizes/displays adequate comfort level or baseline comfort level  4/12/2025 1300 by Stacy Abraham, RN  Outcome: Progressing

## 2025-04-12 NOTE — PROGRESS NOTES
MD Gordon Frankel MD Aldo Estella, DO                 Office: [phone]                      Fax: [phone]             [place]                   NEPHROLOGY CONSULT PROGRESS NOTE    Subjective / interval history / medical decision making.  -no new complaints.   HD today -      IMPRESSION/RECOMMENDATIONS:        #ESKD on HD TTS  -HD TTS at [facility] under our care.  -EDW while inpatient ~68kg  -continue iHD TTS while inpatient  -HD Saturday per schedule  Discussed and confirmed with HD nurse    With renal solutes plateauing, continue to monitor closely as might be able to decrease HD times and decrease low flows      #hyponatremia  -likely 2/2 hypervolemia. Continue HD  -improving    #metabolic acidosis  -likely from ESKD, continue iHD, improving.    #hypoalbuminemia  -likely 2/2 malnutrition, ESKD  -encourage po protein intake    #anemia of CKD  -has Hx of renal ca s/p nephrectomy  -hgb stable, POONAM with HD.  -monitor      Thank you for the consult.  We will follow this patient along the hospitalization.  Gordon Rolle MD     High complexity, at risk of impending organ failure needing  higher level of care/monitoring.              Reason for Consult:  ESKD management  Requesting Physician/Provider:  Garret Tellez MD     CHIEF COMPLAINT:  inpatient rehab    History obtained from records and patient.    HISTORY OF PRESENT ILLNESS:                   Danny Rock  is 76yo, male with significant past medical history of ESRD, on HD TTHS at [facility] under care, atrial fibrillation, coronary artery disease, renal CA status post right nephrectomy, previously on immunotherapy, diabetes mellitus type 2, hyperlipidemia, hypertension, prostate cancer, CHF, EF 40 to 45%, CVA secondary to hypercoagulable state, pneumothorax who is transferred from inpatient for treatment of right psoas hematoma and now transferred back to ARU.  Recent enterococcal faecalis  bacteremia and L2-L5 discitis/osteomyelitis. Has TDC for HD access.  I am asked to see the patient with regards to his HD needs.    Past Medical History:     has a past medical history of Atrial fibrillation (HCC), CAD (coronary artery disease), Cancer of kidney (HCC), Dependence on renal dialysis, Diabetes mellitus (HCC), Hyperlipidemia, Hypertension, Prostate cancer (HCC), Right renal mass, and Systolic CHF (HCC).   Past Surgical History:     has a past surgical history that includes hernia repair; knee surgery; Cholecystectomy; Nasal septum surgery; eye surgery (Bilateral, 04/19/2018); Kidney removal (Right, 07/25/2022); Cataract removal (Bilateral); IR TUNNELED CVC PLACE WO SQ PORT/PUMP > 5 YEARS (02/07/2024); CT NEEDLE BIOPSY LIVER PERCUTANEOUS (08/01/2024); other surgical history (09/30/2024); CT NEEDLE BIOPSY LIVER PERCUTANEOUS (11/18/2024); CT GUIDED CHEST TUBE (11/18/2024); hernia repair (Left, 12/31/2024); other surgical history (Left); shoulder surgery (Right, 01/31/2025); and IR TUNNELED CVC PLACE WO SQ PORT/PUMP > 5 YEARS (03/28/2025).   Current Medications:    Current Facility-Administered Medications: sodium chloride flush 0.9 % injection 5-40 mL, 5-40 mL, IntraVENous, BID  apixaban (ELIQUIS) tablet 2.5 mg, 2.5 mg, Oral, BID  simethicone (MYLICON) chewable tablet 80 mg, 80 mg, Oral, Q6H PRN  traZODone (DESYREL) tablet 25 mg, 25 mg, Oral, Nightly  traZODone (DESYREL) tablet 25 mg, 25 mg, Oral, Nightly PRN  hydrocortisone (ANUSOL-HC) 2.5 % rectal cream, , Rectal, BID  melatonin tablet 6 mg, 6 mg, Oral, QPM  oxyCODONE-acetaminophen (PERCOCET) 5-325 MG per tablet 1 tablet, 1 tablet, Oral, Q6H PRN **OR** oxyCODONE-acetaminophen (PERCOCET) 5-325 MG per tablet 2 tablet, 2 tablet, Oral, Q6H PRN  cabozantinib s-malate (CABOMETYX) chemo tablet 40 mg (Patient Supplied), 40 mg, Oral, Daily  acetaminophen (TYLENOL) tablet 650 mg, 650 mg, Oral, Q4H PRN  polyethylene glycol (GLYCOLAX) packet 17 g, 17 g, Oral,

## 2025-04-12 NOTE — PROGRESS NOTES
[facility] - Inpatient Rehabilitation Department   Phone: [phone]    Occupational Therapy    [] Initial Evaluation            [x] Daily Treatment Note         [] Discharge Summary      Patient: Danny Rock   : 1950   MRN: [id number]   Date of Service:  2025    Admitting Diagnosis:  Lumbar spondylosis  Current Admission Summary:   Danny Rock is a 75 y.o. male with PMHx notable for HTN, HLD, CAD, HFrEF, atrial fibrillation, renal cancer s/p right nephrectomy, ESRD on HD who initially presented to [facility] on 3/22 with low back pain and had MRI showing severe multilevel foraminal stenosis worst at L5-S1 prompting transfer to [facility] for neurosurgical evaluation. Repeat MRI C/T/L-spine on 3/23 showed multilevel disc/endplate enhancement compatible with discitis and osteomyelitis at L2-L3, L3-L4 and L4-L5 with adjacent paravertebral edema, and small right psoas muscle abscess. Blood cultures resulted positive for Enterococcus faecalis. He was started on IV vancomycin and gentamicin on 3/25, with plan at discharge to continue IV vancomycin 3 times a week after dialysis through 2025. NSGY evaluated while inpatient, recommended no surgical intervention at this time. Nephrology consulted for HD needs, had old HD access removed and TDC placed on 3/28.      He was admitted to [facility] from 3/30-.  Rehabilitation course was complicated by development of large right iliopsoas intramuscular hematoma.  His hemoglobin continued to downtrend despite holding Eliquis and aspirin.  Case was discussed with Orthopedic Surgery, NSGY and IR.  Patient was discharged back to acute hospital where Eliquis and aspirin were held for 4 days. Patient was started on heparin drip on , with ongoing stability of his hemoglobin.  He is able to resume his Eliquis on , and aspirin after 1 week if stable without signs of recurrent bleeding  Past Medical History:  has a past medical  ADL at modified independent   Patient will complete toileting at modified independent   Patient will complete functional transfers at modified independent   Patient to gather and transport items at modified independent     Above goals reviewed on 4/12/2025.  All goals are ongoing at this time unless indicated above.       Therapy Session Time  First Session Therapy Time:   Individual Concurrent Group Co-treatment   Time In 0732         Time Out 0822         Minutes 50           Second Session Therapy Time:   Individual Concurrent Group Co-treatment   Time In 0950         Time Out 1030         Minutes 40           Timed Code Treatment Minutes:  50 + 40 minutes   Total Treatment Minutes:  90 minutes        Electronically Signed By: JASON Dupree MOT OTR/L [id number]

## 2025-04-13 VITALS
BODY MASS INDEX: 19.32 KG/M2 | RESPIRATION RATE: 16 BRPM | SYSTOLIC BLOOD PRESSURE: 131 MMHG | TEMPERATURE: 97.7 F | OXYGEN SATURATION: 98 % | HEIGHT: 75 IN | WEIGHT: 155.4 LBS | HEART RATE: 73 BPM | DIASTOLIC BLOOD PRESSURE: 78 MMHG

## 2025-04-13 LAB
GLUCOSE BLD-MCNC: 147 MG/DL (ref 70–99)
GLUCOSE BLD-MCNC: 171 MG/DL (ref 70–99)
GLUCOSE BLD-MCNC: 201 MG/DL (ref 70–99)
GLUCOSE BLD-MCNC: 74 MG/DL (ref 70–99)
PERFORMED ON: ABNORMAL
PERFORMED ON: NORMAL

## 2025-04-13 PROCEDURE — 1280000000 HC REHAB R&B

## 2025-04-13 PROCEDURE — 2500000003 HC RX 250 WO HCPCS: Performed by: STUDENT IN AN ORGANIZED HEALTH CARE EDUCATION/TRAINING PROGRAM

## 2025-04-13 PROCEDURE — 6370000000 HC RX 637 (ALT 250 FOR IP): Performed by: STUDENT IN AN ORGANIZED HEALTH CARE EDUCATION/TRAINING PROGRAM

## 2025-04-13 RX ADMIN — HYDROCORTISONE: 25 CREAM TOPICAL at 20:21

## 2025-04-13 RX ADMIN — INSULIN LISPRO 1 UNITS: 100 INJECTION, SOLUTION INTRAVENOUS; SUBCUTANEOUS at 12:49

## 2025-04-13 RX ADMIN — TRAZODONE HYDROCHLORIDE 25 MG: 50 TABLET ORAL at 20:22

## 2025-04-13 RX ADMIN — METHOCARBAMOL 750 MG: 750 TABLET ORAL at 05:10

## 2025-04-13 RX ADMIN — SEVELAMER CARBONATE 800 MG: 800 TABLET, FILM COATED ORAL at 08:52

## 2025-04-13 RX ADMIN — SODIUM CHLORIDE, PRESERVATIVE FREE 10 ML: 5 INJECTION INTRAVENOUS at 19:32

## 2025-04-13 RX ADMIN — OXYCODONE HYDROCHLORIDE AND ACETAMINOPHEN 1 TABLET: 5; 325 TABLET ORAL at 22:16

## 2025-04-13 RX ADMIN — LINEZOLID 600 MG: 600 TABLET, FILM COATED ORAL at 08:51

## 2025-04-13 RX ADMIN — SEVELAMER CARBONATE 800 MG: 800 TABLET, FILM COATED ORAL at 12:49

## 2025-04-13 RX ADMIN — SODIUM CHLORIDE, PRESERVATIVE FREE 10 ML: 5 INJECTION INTRAVENOUS at 08:51

## 2025-04-13 RX ADMIN — Medication 1 MG: at 08:51

## 2025-04-13 RX ADMIN — PANTOPRAZOLE SODIUM 40 MG: 40 TABLET, DELAYED RELEASE ORAL at 05:09

## 2025-04-13 RX ADMIN — APIXABAN 2.5 MG: 2.5 TABLET, FILM COATED ORAL at 20:22

## 2025-04-13 RX ADMIN — CYANOCOBALAMIN TAB 1000 MCG 1000 MCG: 1000 TAB at 08:51

## 2025-04-13 RX ADMIN — LINEZOLID 600 MG: 600 TABLET, FILM COATED ORAL at 20:22

## 2025-04-13 RX ADMIN — OXYCODONE HYDROCHLORIDE AND ACETAMINOPHEN 1 TABLET: 5; 325 TABLET ORAL at 14:18

## 2025-04-13 RX ADMIN — SEVELAMER CARBONATE 800 MG: 800 TABLET, FILM COATED ORAL at 17:19

## 2025-04-13 RX ADMIN — APIXABAN 2.5 MG: 2.5 TABLET, FILM COATED ORAL at 08:51

## 2025-04-13 RX ADMIN — INSULIN GLARGINE 8 UNITS: 100 INJECTION, SOLUTION SUBCUTANEOUS at 20:21

## 2025-04-13 RX ADMIN — HYDROCORTISONE: 25 CREAM TOPICAL at 08:53

## 2025-04-13 RX ADMIN — CHOLECALCIFEROL TAB 125 MCG (5000 UNIT) 5000 UNITS: 125 TAB at 08:51

## 2025-04-13 RX ADMIN — MELATONIN TAB 3 MG 6 MG: 3 TAB at 17:19

## 2025-04-13 RX ADMIN — ZINC SULFATE 220 MG (50 MG) CAPSULE 50 MG: CAPSULE at 08:51

## 2025-04-13 RX ADMIN — OXYCODONE HYDROCHLORIDE AND ACETAMINOPHEN 1 TABLET: 5; 325 TABLET ORAL at 05:09

## 2025-04-13 RX ADMIN — METHOCARBAMOL 750 MG: 750 TABLET ORAL at 14:19

## 2025-04-13 RX ADMIN — OXYCODONE HYDROCHLORIDE AND ACETAMINOPHEN 500 MG: 500 TABLET ORAL at 08:51

## 2025-04-13 ASSESSMENT — PAIN DESCRIPTION - ORIENTATION
ORIENTATION: RIGHT
ORIENTATION: RIGHT
ORIENTATION: LOWER

## 2025-04-13 ASSESSMENT — PAIN DESCRIPTION - DESCRIPTORS
DESCRIPTORS: ACHING

## 2025-04-13 ASSESSMENT — PAIN DESCRIPTION - LOCATION
LOCATION: HIP
LOCATION: HIP
LOCATION: BACK

## 2025-04-13 ASSESSMENT — PAIN SCALES - WONG BAKER: WONGBAKER_NUMERICALRESPONSE: NO HURT

## 2025-04-13 ASSESSMENT — PAIN SCALES - GENERAL
PAINLEVEL_OUTOF10: 5
PAINLEVEL_OUTOF10: 3
PAINLEVEL_OUTOF10: 6
PAINLEVEL_OUTOF10: 4
PAINLEVEL_OUTOF10: 6

## 2025-04-13 NOTE — PLAN OF CARE
Problem: Safety - Adult  Goal: Free from fall injury  4/13/2025 1114 by Ana Dunne RN  Outcome: Progressing  Note: Pt remains free from falls.  Safety precautions in place.  Bed in lowest position, bed/chair wheels locked, call light with in reach, bedside table in reach, bed/chair alarm on, fall risk wrist band on.

## 2025-04-13 NOTE — PROGRESS NOTES
MD Gordon Frankel MD Aldo Estella, DO                 Office: [phone]                      Fax: [phone]             [place]                   NEPHROLOGY CONSULT PROGRESS NOTE    Subjective / interval history / nephrology update / medical decision making:   Refer to assessment and plan for more details.   Patient was seen comfortably   in chair ,   Reported no  active complaints/distress,   Recent renal labs, vital signs, input output reviewed.      IMPRESSION/RECOMMENDATIONS:        #ESKD on HD TTS  -HD TTS at [facility] under our care.  -EDW while inpatient ~68kg  -continue iHD TTS while inpatient  -s/p HD Saturday per schedule  Next HD Tuesday   Discussed and confirmed with HD nurse    With renal solutes plateauing, continue to monitor closely as might be able to decrease HD times and decrease low flows      # Volume status  Euvolemic clinically  Estimated target weight 68 kg  Slightly above 1 L usually    #hyponatremia  -likely 2/2 hypervolemia. Continue HD  -improving    #metabolic acidosis  -likely from ESKD, continue iHD, improving.    #hypoalbuminemia  -likely 2/2 malnutrition, ESKD  -encourage po protein intake    Hypophosphatemia, improving with  Nutrition    #anemia of CKD  -has Hx of renal ca s/p nephrectomy  -hgb stable, POONAM with HD.  -monitor      Medical decision making- high complexity. Multiple complex health problems.   Discussed with patient and treatment team, Garret Ponce MD  , pt wife    Thank you for allowing me to participate in this patient's care. Please do not hesitate to contact me for any questions/concerns. We will follow along with you.     Gordon Rolle MD  [facility]   Phone: [phone] or [address] [facility]  Fax: [phone]    Severally ill, at risk of impending organ failure needing  higher level of care/monitoring.   Time spent that included face-to-face meeting/discussion with patient, patient's

## 2025-04-13 NOTE — PLAN OF CARE
Problem: Safety - Adult  Goal: Free from fall injury  4/12/2025 2342 by Blanche Le, RN  Outcome: Progressing     Problem: Pain  Goal: Verbalizes/displays adequate comfort level or baseline comfort level  4/12/2025 2342 by Blanche Le, RN  Outcome: Progressing     Problem: Skin/Tissue Integrity  Goal: Skin integrity remains intact  Description: 1.  Monitor for areas of redness and/or skin breakdown  2.  Assess vascular access sites hourly  3.  Every 4-6 hours minimum:  Change oxygen saturation probe site  4.  Every 4-6 hours:  If on nasal continuous positive airway pressure, respiratory therapy assess nares and determine need for appliance change or resting period  Outcome: Progressing

## 2025-04-14 LAB
GLUCOSE BLD-MCNC: 107 MG/DL (ref 70–99)
GLUCOSE BLD-MCNC: 115 MG/DL (ref 70–99)
GLUCOSE BLD-MCNC: 117 MG/DL (ref 70–99)
GLUCOSE BLD-MCNC: 152 MG/DL (ref 70–99)
PERFORMED ON: ABNORMAL

## 2025-04-14 PROCEDURE — 97530 THERAPEUTIC ACTIVITIES: CPT

## 2025-04-14 PROCEDURE — 2500000003 HC RX 250 WO HCPCS: Performed by: STUDENT IN AN ORGANIZED HEALTH CARE EDUCATION/TRAINING PROGRAM

## 2025-04-14 PROCEDURE — 97110 THERAPEUTIC EXERCISES: CPT

## 2025-04-14 PROCEDURE — 99232 SBSQ HOSP IP/OBS MODERATE 35: CPT | Performed by: INTERNAL MEDICINE

## 2025-04-14 PROCEDURE — 97535 SELF CARE MNGMENT TRAINING: CPT

## 2025-04-14 PROCEDURE — 6370000000 HC RX 637 (ALT 250 FOR IP): Performed by: STUDENT IN AN ORGANIZED HEALTH CARE EDUCATION/TRAINING PROGRAM

## 2025-04-14 PROCEDURE — 1280000000 HC REHAB R&B

## 2025-04-14 RX ORDER — OXYCODONE AND ACETAMINOPHEN 5; 325 MG/1; MG/1
1 TABLET ORAL EVERY 6 HOURS PRN
Refills: 0 | Status: DISCONTINUED | OUTPATIENT
Start: 2025-04-14 | End: 2025-04-17 | Stop reason: HOSPADM

## 2025-04-14 RX ADMIN — LINEZOLID 600 MG: 600 TABLET, FILM COATED ORAL at 09:59

## 2025-04-14 RX ADMIN — POLYETHYLENE GLYCOL 3350 17 G: 17 POWDER, FOR SOLUTION ORAL at 10:00

## 2025-04-14 RX ADMIN — Medication 1 MG: at 09:59

## 2025-04-14 RX ADMIN — SEVELAMER CARBONATE 800 MG: 800 TABLET, FILM COATED ORAL at 09:59

## 2025-04-14 RX ADMIN — PANTOPRAZOLE SODIUM 40 MG: 40 TABLET, DELAYED RELEASE ORAL at 05:50

## 2025-04-14 RX ADMIN — METOPROLOL TARTRATE 50 MG: 50 TABLET, FILM COATED ORAL at 09:59

## 2025-04-14 RX ADMIN — ZINC SULFATE 220 MG (50 MG) CAPSULE 50 MG: CAPSULE at 09:59

## 2025-04-14 RX ADMIN — ROSUVASTATIN CALCIUM 10 MG: 10 TABLET, FILM COATED ORAL at 22:22

## 2025-04-14 RX ADMIN — APIXABAN 2.5 MG: 2.5 TABLET, FILM COATED ORAL at 09:59

## 2025-04-14 RX ADMIN — HYDROCORTISONE: 25 CREAM TOPICAL at 09:59

## 2025-04-14 RX ADMIN — SODIUM CHLORIDE, PRESERVATIVE FREE 10 ML: 5 INJECTION INTRAVENOUS at 22:23

## 2025-04-14 RX ADMIN — SODIUM CHLORIDE, PRESERVATIVE FREE 10 ML: 5 INJECTION INTRAVENOUS at 10:00

## 2025-04-14 RX ADMIN — OXYCODONE HYDROCHLORIDE AND ACETAMINOPHEN 1 TABLET: 5; 325 TABLET ORAL at 11:56

## 2025-04-14 RX ADMIN — APIXABAN 2.5 MG: 2.5 TABLET, FILM COATED ORAL at 22:22

## 2025-04-14 RX ADMIN — LINEZOLID 600 MG: 600 TABLET, FILM COATED ORAL at 22:21

## 2025-04-14 RX ADMIN — TRAZODONE HYDROCHLORIDE 25 MG: 50 TABLET ORAL at 22:22

## 2025-04-14 RX ADMIN — MELATONIN TAB 3 MG 6 MG: 3 TAB at 17:37

## 2025-04-14 RX ADMIN — SEVELAMER CARBONATE 800 MG: 800 TABLET, FILM COATED ORAL at 17:37

## 2025-04-14 RX ADMIN — OXYCODONE HYDROCHLORIDE AND ACETAMINOPHEN 1 TABLET: 5; 325 TABLET ORAL at 22:20

## 2025-04-14 RX ADMIN — CHOLECALCIFEROL TAB 125 MCG (5000 UNIT) 5000 UNITS: 125 TAB at 09:59

## 2025-04-14 RX ADMIN — CYANOCOBALAMIN TAB 1000 MCG 1000 MCG: 1000 TAB at 09:59

## 2025-04-14 RX ADMIN — METOPROLOL TARTRATE 50 MG: 50 TABLET, FILM COATED ORAL at 22:22

## 2025-04-14 RX ADMIN — OXYCODONE HYDROCHLORIDE AND ACETAMINOPHEN 1 TABLET: 5; 325 TABLET ORAL at 05:50

## 2025-04-14 RX ADMIN — METHOCARBAMOL 750 MG: 750 TABLET ORAL at 22:21

## 2025-04-14 RX ADMIN — SEVELAMER CARBONATE 800 MG: 800 TABLET, FILM COATED ORAL at 11:56

## 2025-04-14 RX ADMIN — OXYCODONE HYDROCHLORIDE AND ACETAMINOPHEN 500 MG: 500 TABLET ORAL at 09:59

## 2025-04-14 RX ADMIN — HYDROCORTISONE: 25 CREAM TOPICAL at 22:23

## 2025-04-14 RX ADMIN — INSULIN GLARGINE 8 UNITS: 100 INJECTION, SOLUTION SUBCUTANEOUS at 22:22

## 2025-04-14 ASSESSMENT — ENCOUNTER SYMPTOMS
SHORTNESS OF BREATH: 0
SINUS PRESSURE: 0
CONSTIPATION: 0
DIARRHEA: 0
EYE REDNESS: 0
ABDOMINAL PAIN: 0
COUGH: 0
SORE THROAT: 0
EYE DISCHARGE: 0
WHEEZING: 0
SINUS PAIN: 0
RHINORRHEA: 0
NAUSEA: 0
BACK PAIN: 0

## 2025-04-14 ASSESSMENT — PAIN - FUNCTIONAL ASSESSMENT: PAIN_FUNCTIONAL_ASSESSMENT: ACTIVITIES ARE NOT PREVENTED

## 2025-04-14 ASSESSMENT — PAIN SCALES - WONG BAKER
WONGBAKER_NUMERICALRESPONSE: HURTS A LITTLE BIT
WONGBAKER_NUMERICALRESPONSE: NO HURT
WONGBAKER_NUMERICALRESPONSE: HURTS A LITTLE BIT

## 2025-04-14 ASSESSMENT — PAIN DESCRIPTION - LOCATION
LOCATION: HIP

## 2025-04-14 ASSESSMENT — PAIN DESCRIPTION - ORIENTATION
ORIENTATION: RIGHT

## 2025-04-14 ASSESSMENT — PAIN DESCRIPTION - DESCRIPTORS
DESCRIPTORS: ACHING
DESCRIPTORS: ACHING;THROBBING

## 2025-04-14 ASSESSMENT — PAIN SCALES - GENERAL
PAINLEVEL_OUTOF10: 6
PAINLEVEL_OUTOF10: 5
PAINLEVEL_OUTOF10: 3
PAINLEVEL_OUTOF10: 3
PAINLEVEL_OUTOF10: 7
PAINLEVEL_OUTOF10: 3

## 2025-04-14 NOTE — PROGRESS NOTES
Infectious Diseases   Progress Note      Admission Date: 4/8/2025  Hospital Day: Hospital Day: 7   Attending: Garret Ponce MD  Date of service: 4/14/2025     Chief complaint/ Reason for consult:     Enterococcus faecalis bacteremia  Lumbar discitis and osteomyelitis with foraminal stenosis and spinal canal stenosis at L3-L4 level  History of prostate cancer  ESRD on hemodialysis  Elevated sed rate     Microbiology:      I have reviewed allavailable micro lab data and cultures    Results       ** No results found for the last 336 hours. **             Susceptibility data from last 90 days.  Collected Specimen Info Organism Ampicillin gentamicin-syn Sodium streptomycin-syn   03/26/25 Blood Staphylococcus epidermidis (1)         Staphylococcus epidermidis (2)       03/24/25 Blood Enterococcus faecalis       03/24/25 Blood Enterococcus faecalis       03/23/25 Blood Enterococcus faecalis (2)  S   S      Enterococcus faecalis (1)       03/23/25 Blood Enterococcus faecalis       03/22/25 Blood Enterococcus faecalis (3)  S  R  S  S     Staphylococcus epidermidis         Enterococcus faecalis (1)         Micrococcus luteus       01/31/25 Abscess Diphtheroids         Micrococcus luteus              Antibiotics and immunizations:       Current antibiotics: All antibiotics and their doses were reviewed by me    Recent Abx Admin                     linezolid (ZYVOX) tablet 600 mg (mg) 600 mg Given 04/14/25 0959     600 mg Given 04/13/25 2022                      Immunization History: All immunization history was reviewed by me today.    There is no immunization history for the selected administration types on file for this patient.    Known drug allergies:     All allergies were reviewed and updated    Allergies   Allergen Reactions    Amoxicillin Other (See Comments)     Urethritis, rash    Bisoprolol-Hydrochlorothiazide Other (See Comments)     G.I. rash    Cisapride Other (See Comments)     diarrhea    Penicillins

## 2025-04-14 NOTE — PROGRESS NOTES
w/c mobility completed on this date.  Comments:  Balance:  Static Sitting Balance: good: independent with functional balance in unsupported position  Dynamic Sitting Balance: fair (+): maintains balance at SBA/supervision without use of UE support  Static Standing Balance: fair (-): maintains balance at CGA with use of UE support  Dynamic Standing Balance: fair (-): maintains balance at CGA with use of UE support  Comments:     Other Therapeutic Interventions    1st session:    See functional mobility details above.   Seated stepper x 7 minutes to improve ROM/strength/endurance.     Seated hip stretching:  3x30\" each RLE HS/knee extension with fwd trunk lean,  hip flexion, hip ER (into a Figure 4 position).     Standing 3 way target tap (fwd/lat/bwd) x 10 rounds B with BUE support.      2nd session:   Pt was seated in the chair at arrival. Agreed to therapy.    Pt ambulated ~45 ft with the RW at SBA. Limited by pain (did not rate).   // bars: side steps 2 sets of 2 lengths B,  fwd walk + retro walk x 3 reps per direction.   Standing posture activity placing back to the wall (buttocks touching but feet about 1 foot from the wall): thoracic extension (try to touch shoulders towards the wall) x 10 reps, single arm row/extension (touching elbow to wall) x 10 each, and single arm ER/scap squeeze x 10 B.      Seated core using the red wt ball (back unsupported in the wc): small range oblique twists x 10 B, and diagonal chop (moving ball shoulder to hip with slight trunk rotation x 10 B.  Both activities moving within limited available shoulder ROM.      Pt able to walk 95 ft with RW back to his room. Utilized the bathroom.            Functional Outcomes                 Cognition  WFL  Safety Judgement: decreased awareness of need for safety  Sequencing: requires cues for some  Orientation:    alert and oriented x 4  Command Following:   WFL    Education  Barriers To Learning: none  Patient Education: patient educated on  Brian, PT, [id number]

## 2025-04-14 NOTE — PROGRESS NOTES
Danny Rock  4/14/2025  [id number]    Chief Complaint: Lumbar spondylosis    Subjective    No acute events overnight.    Patient reports that he is doing well today.  He is having stable right hip pain, not any worse today.  He is sleeping better overnight.    Denies chest pain, dyspnea, fevers/chills. Last Bowel Movement:  04/13/25.           Objective    Patient Vitals for the past 24 hrs:   BP Temp Temp src Pulse Resp SpO2 Weight   04/14/25 0620 -- -- -- -- 16 -- --   04/14/25 0550 -- -- -- -- 17 -- 72.7 kg (160 lb 3.2 oz)   04/13/25 2246 -- -- -- -- 16 -- --   04/13/25 2216 -- -- -- -- 16 -- --   04/13/25 1930 131/78 97.7 °F (36.5 °C) Oral 73 18 98 % --   04/13/25 0845 126/77 97.4 °F (36.3 °C) Oral 100 16 100 % --     Patient Vitals for the past 96 hrs (Last 3 readings):   Weight   04/14/25 0550 72.7 kg (160 lb 3.2 oz)   04/13/25 0600 70.5 kg (155 lb 6.4 oz)   04/12/25 1700 70.4 kg (155 lb 3.3 oz)      Gen: No distress, pleasant.   HEENT: Normocephalic, atraumatic.  CV: Regular rate and rhythm. Extremities warm, well perfused.  Left chest wall tunneled dialysis catheter.  Resp: No respiratory distress. CTAB.  Abd: Soft, nontender.  Ext: No edema.   Neuro: Alert, oriented, appropriately interactive.     Laboratory data:   Na/K/Cl/CO2:  135/4.4/100/24 (04/12 0739)   BUN/Cr/glu/ALT/AST/amyl/lip:  43/2.3/--/--/--/--/-- (04/12 0739)    WBC/Hgb/Hct/Plts:  7.2/9.0/26.9/174 (04/12 0739)     Therapy progress:       PT    Supine to Sit: Partial/moderate assistance  Sit to Supine: Supervision or touching assistance   Sit to Stand: Partial/moderate assistance  Chair/Bed to Chair Transfer: Partial/moderate assistance  Car Transfer: Partial/moderate assistance  Ambulation 10 ft: Supervision or touching assistance  Ambulation 50 ft: Supervision or touching assistance  Ambulation 150 ft:    Stairs - 1 Step: Supervision or touching assistance  Stairs - 4 Step: Supervision or touching assistance  Stairs - 12 Step:

## 2025-04-14 NOTE — PLAN OF CARE
Problem: Chronic Conditions and Co-morbidities  Goal: Patient's chronic conditions and co-morbidity symptoms are monitored and maintained or improved  4/14/2025 1217 by Jessika Sheth RN  Outcome: Progressing     Problem: Safety - Adult  Goal: Free from fall injury  4/14/2025 1217 by Jessika Sheth RN  Outcome: Progressing     Problem: Pain  Goal: Verbalizes/displays adequate comfort level or baseline comfort level  4/14/2025 1217 by Jessika Sheth RN  Outcome: Progressing     Problem: Skin/Tissue Integrity  Goal: Skin integrity remains intact  Description: 1.  Monitor for areas of redness and/or skin breakdown  2.  Assess vascular access sites hourly  3.  Every 4-6 hours minimum:  Change oxygen saturation probe site  4.  Every 4-6 hours:  If on nasal continuous positive airway pressure, respiratory therapy assess nares and determine need for appliance change or resting period  4/14/2025 1217 by Jessika Sheth RN  Outcome: Progressing  Flowsheets (Taken 4/14/2025 1032)  Skin Integrity Remains Intact: Monitor for areas of redness and/or skin breakdown

## 2025-04-14 NOTE — PROGRESS NOTES
MD Gordon Frankel MD Aldo Estella, DO                 Office: [phone]                      Fax: [phone]             [place]                   NEPHROLOGY CONSULT PROGRESS NOTE  Subjective / interval history / nephrology update / medical decision making:   Refer to assessment and plan for more details.   Patient was seen comfortably  in chair,   Reported no active complaints/distress,   Recent renal labs, vital signs, input output reviewed.    Feels fatigued, tired after therapy        IMPRESSION/RECOMMENDATIONS:        #ESKD on HD TTS  -HD TTS at [facility] under our care.  -EDW while inpatient ~68kg  -continue iHD TTS while inpatient  -s/p HD Saturday per schedule  Next HD Tuesday   Discussed and confirmed with HD nurse    We again discussed with improved renal solutes plateauing, continue to monitor closely as we can decrease HD times and decrease low flows      # Volume status  Euvolemic clinically  Estimated target weight 68 kg  Slightly above 1 L usually    #hyponatremia  -likely 2/2 hypervolemia. Continue HD  -improving    #metabolic acidosis  -likely from ESKD, continue iHD, improving.    #hypoalbuminemia  -likely 2/2 malnutrition, ESKD  -encourage po protein intake    Hypophosphatemia, improving with  Nutrition    #anemia of CKD  -has Hx of renal ca s/p nephrectomy  -hgb stable, POONAM with HD.  -monitor      Medical decision making- high complexity. Multiple complex health problems.   Discussed with patient and treatment team, Garret Ponce MD  , pt wife    Thank you for allowing me to participate in this patient's care. Please do not hesitate to contact me for any questions/concerns. We will follow along with you.     Gordon Rolle MD  [facility]   Phone: [phone] or [address]  Fax: [phone]    Severally ill, at risk of impending organ failure needing  higher level of care/monitoring.   Time spent that included

## 2025-04-14 NOTE — PROGRESS NOTES
[facility] - Inpatient Rehabilitation Department   Phone: [phone]    Occupational Therapy    [] Initial Evaluation            [x] Daily Treatment Note         [] Discharge Summary      Patient: Danny Rock   : 1950   MRN: [id number]   Date of Service:  2025    Admitting Diagnosis:  Lumbar spondylosis  Current Admission Summary:   Danny Rock is a 75 y.o. male with PMHx notable for HTN, HLD, CAD, HFrEF, atrial fibrillation, renal cancer s/p right nephrectomy, ESRD on HD who initially presented to [facility] on 3/22 with low back pain and had MRI showing severe multilevel foraminal stenosis worst at L5-S1 prompting transfer to [facility] for neurosurgical evaluation. Repeat MRI C/T/L-spine on 3/23 showed multilevel disc/endplate enhancement compatible with discitis and osteomyelitis at L2-L3, L3-L4 and L4-L5 with adjacent paravertebral edema, and small right psoas muscle abscess. Blood cultures resulted positive for Enterococcus faecalis. He was started on IV vancomycin and gentamicin on 3/25, with plan at discharge to continue IV vancomycin 3 times a week after dialysis through 2025. NSGY evaluated while inpatient, recommended no surgical intervention at this time. Nephrology consulted for HD needs, had old HD access removed and TDC placed on 3/28.      He was admitted to [facility] from 3/30-.  Rehabilitation course was complicated by development of large right iliopsoas intramuscular hematoma.  His hemoglobin continued to downtrend despite holding Eliquis and aspirin.  Case was discussed with Orthopedic Surgery, NSGY and IR.  Patient was discharged back to acute hospital where Eliquis and aspirin were held for 4 days. Patient was started on heparin drip on , with ongoing stability of his hemoglobin.  He is able to resume his Eliquis on , and aspirin after 1 week if stable without signs of recurrent bleeding  Past Medical History:  has a past medical

## 2025-04-14 NOTE — PLAN OF CARE
Assessment complete see flowsheets. A&Ox2. Meds given per eMAR. The care plan and education has been reviewed and mutually agreed upon with the patient. In bed, fall precautions in place, hourly rounding, camera in use for safety, call light and belongings in reach, external catheter in use, bed in lowest position, overlay in use on bed, wheels locked in place, side rails up x 2, walkways free of clutter. No further needs expressed at this time.    2216 Patient called out requesting something for pain in his back.  Administered PRN percocet (1) per request.  Fall precautions in place, call light within reach.  Will continue to follow current care plan.     Problem: Chronic Conditions and Co-morbidities  Goal: Patient's chronic conditions and co-morbidity symptoms are monitored and maintained or improved  Outcome: Progressing     Problem: Discharge Planning  Goal: Discharge to home or other facility with appropriate resources  Outcome: Progressing     Problem: Safety - Adult  Goal: Free from fall injury  4/13/2025 2220 by Brionna Arellano, RN  Outcome: Progressing  4/13/2025 1114 by Ana Dunne, RN  Outcome: Progressing  Note: Pt remains free from falls.  Safety precautions in place.  Bed in lowest position, bed/chair wheels locked, call light with in reach, bedside table in reach, bed/chair alarm on, fall risk wrist band on.       Problem: Pain  Goal: Verbalizes/displays adequate comfort level or baseline comfort level  Outcome: Progressing     Problem: Skin/Tissue Integrity  Goal: Skin integrity remains intact  Description: 1.  Monitor for areas of redness and/or skin breakdown  2.  Assess vascular access sites hourly  3.  Every 4-6 hours minimum:  Change oxygen saturation probe site  4.  Every 4-6 hours:  If on nasal continuous positive airway pressure, respiratory therapy assess nares and determine need for appliance change or resting period  Outcome: Progressing  Flowsheets (Taken 4/13/2025 2220)  Skin

## 2025-04-15 LAB
ALBUMIN SERPL-MCNC: 2.9 G/DL (ref 3.4–5)
ANION GAP SERPL CALCULATED.3IONS-SCNC: 11 MMOL/L (ref 3–16)
BASOPHILS # BLD: 0 K/UL (ref 0–0.2)
BASOPHILS NFR BLD: 0.4 %
BUN SERPL-MCNC: 57 MG/DL (ref 7–20)
CALCIUM SERPL-MCNC: 8.8 MG/DL (ref 8.3–10.6)
CHLORIDE SERPL-SCNC: 100 MMOL/L (ref 99–110)
CO2 SERPL-SCNC: 23 MMOL/L (ref 21–32)
CREAT SERPL-MCNC: 2.6 MG/DL (ref 0.8–1.3)
DEPRECATED RDW RBC AUTO: 19.7 % (ref 12.4–15.4)
EOSINOPHIL # BLD: 0.1 K/UL (ref 0–0.6)
EOSINOPHIL NFR BLD: 1.7 %
GFR SERPLBLD CREATININE-BSD FMLA CKD-EPI: 25 ML/MIN/{1.73_M2}
GLUCOSE BLD-MCNC: 130 MG/DL (ref 70–99)
GLUCOSE BLD-MCNC: 181 MG/DL (ref 70–99)
GLUCOSE BLD-MCNC: 70 MG/DL (ref 70–99)
GLUCOSE BLD-MCNC: 71 MG/DL (ref 70–99)
GLUCOSE SERPL-MCNC: 71 MG/DL (ref 70–99)
HCT VFR BLD AUTO: 25.8 % (ref 40.5–52.5)
HGB BLD-MCNC: 8.7 G/DL (ref 13.5–17.5)
LYMPHOCYTES # BLD: 0.9 K/UL (ref 1–5.1)
LYMPHOCYTES NFR BLD: 10.3 %
MCH RBC QN AUTO: 34.3 PG (ref 26–34)
MCHC RBC AUTO-ENTMCNC: 33.9 G/DL (ref 31–36)
MCV RBC AUTO: 101.1 FL (ref 80–100)
MONOCYTES # BLD: 0.5 K/UL (ref 0–1.3)
MONOCYTES NFR BLD: 5.6 %
NEUTROPHILS # BLD: 6.7 K/UL (ref 1.7–7.7)
NEUTROPHILS NFR BLD: 82 %
PERFORMED ON: ABNORMAL
PERFORMED ON: ABNORMAL
PERFORMED ON: NORMAL
PERFORMED ON: NORMAL
PHOSPHATE SERPL-MCNC: 2.1 MG/DL (ref 2.5–4.9)
PLATELET # BLD AUTO: 125 K/UL (ref 135–450)
PMV BLD AUTO: 6.8 FL (ref 5–10.5)
POTASSIUM SERPL-SCNC: 5.1 MMOL/L (ref 3.5–5.1)
RBC # BLD AUTO: 2.55 M/UL (ref 4.2–5.9)
SODIUM SERPL-SCNC: 134 MMOL/L (ref 136–145)
VANCOMYCIN SERPL-MCNC: 12.4 UG/ML
WBC # BLD AUTO: 8.2 K/UL (ref 4–11)

## 2025-04-15 PROCEDURE — 6370000000 HC RX 637 (ALT 250 FOR IP): Performed by: STUDENT IN AN ORGANIZED HEALTH CARE EDUCATION/TRAINING PROGRAM

## 2025-04-15 PROCEDURE — 99232 SBSQ HOSP IP/OBS MODERATE 35: CPT | Performed by: INTERNAL MEDICINE

## 2025-04-15 PROCEDURE — 90935 HEMODIALYSIS ONE EVALUATION: CPT

## 2025-04-15 PROCEDURE — 1280000000 HC REHAB R&B

## 2025-04-15 PROCEDURE — 80069 RENAL FUNCTION PANEL: CPT

## 2025-04-15 PROCEDURE — 2580000003 HC RX 258: Performed by: STUDENT IN AN ORGANIZED HEALTH CARE EDUCATION/TRAINING PROGRAM

## 2025-04-15 PROCEDURE — 6360000002 HC RX W HCPCS: Performed by: INTERNAL MEDICINE

## 2025-04-15 PROCEDURE — 2500000003 HC RX 250 WO HCPCS: Performed by: STUDENT IN AN ORGANIZED HEALTH CARE EDUCATION/TRAINING PROGRAM

## 2025-04-15 PROCEDURE — 85025 COMPLETE CBC W/AUTO DIFF WBC: CPT

## 2025-04-15 PROCEDURE — 97535 SELF CARE MNGMENT TRAINING: CPT

## 2025-04-15 PROCEDURE — 80202 ASSAY OF VANCOMYCIN: CPT

## 2025-04-15 PROCEDURE — 97530 THERAPEUTIC ACTIVITIES: CPT

## 2025-04-15 PROCEDURE — 6360000002 HC RX W HCPCS: Performed by: STUDENT IN AN ORGANIZED HEALTH CARE EDUCATION/TRAINING PROGRAM

## 2025-04-15 PROCEDURE — 97110 THERAPEUTIC EXERCISES: CPT

## 2025-04-15 RX ORDER — ASPIRIN 81 MG/1
81 TABLET, CHEWABLE ORAL DAILY
Status: DISCONTINUED | OUTPATIENT
Start: 2025-04-15 | End: 2025-04-17 | Stop reason: HOSPADM

## 2025-04-15 RX ADMIN — SODIUM CHLORIDE, PRESERVATIVE FREE 10 ML: 5 INJECTION INTRAVENOUS at 19:10

## 2025-04-15 RX ADMIN — VANCOMYCIN HYDROCHLORIDE 1000 MG: 1 INJECTION, POWDER, LYOPHILIZED, FOR SOLUTION INTRAVENOUS at 19:15

## 2025-04-15 RX ADMIN — APIXABAN 2.5 MG: 2.5 TABLET, FILM COATED ORAL at 09:13

## 2025-04-15 RX ADMIN — TRAZODONE HYDROCHLORIDE 25 MG: 50 TABLET ORAL at 19:03

## 2025-04-15 RX ADMIN — OXYCODONE HYDROCHLORIDE AND ACETAMINOPHEN 1 TABLET: 5; 325 TABLET ORAL at 04:22

## 2025-04-15 RX ADMIN — Medication 1 MG: at 09:13

## 2025-04-15 RX ADMIN — CYANOCOBALAMIN TAB 1000 MCG 1000 MCG: 1000 TAB at 09:13

## 2025-04-15 RX ADMIN — METHOCARBAMOL 750 MG: 750 TABLET ORAL at 04:23

## 2025-04-15 RX ADMIN — OXYCODONE HYDROCHLORIDE AND ACETAMINOPHEN 500 MG: 500 TABLET ORAL at 09:13

## 2025-04-15 RX ADMIN — APIXABAN 2.5 MG: 2.5 TABLET, FILM COATED ORAL at 19:03

## 2025-04-15 RX ADMIN — OXYCODONE HYDROCHLORIDE AND ACETAMINOPHEN 1 TABLET: 5; 325 TABLET ORAL at 19:09

## 2025-04-15 RX ADMIN — LINEZOLID 600 MG: 600 TABLET, FILM COATED ORAL at 09:13

## 2025-04-15 RX ADMIN — ZINC SULFATE 220 MG (50 MG) CAPSULE 50 MG: CAPSULE at 09:13

## 2025-04-15 RX ADMIN — METHOCARBAMOL 750 MG: 750 TABLET ORAL at 19:09

## 2025-04-15 RX ADMIN — ERYTHROPOIETIN 10000 UNITS: 10000 INJECTION, SOLUTION INTRAVENOUS; SUBCUTANEOUS at 17:26

## 2025-04-15 RX ADMIN — CHOLECALCIFEROL TAB 125 MCG (5000 UNIT) 5000 UNITS: 125 TAB at 09:13

## 2025-04-15 RX ADMIN — SEVELAMER CARBONATE 800 MG: 800 TABLET, FILM COATED ORAL at 11:30

## 2025-04-15 RX ADMIN — MELATONIN TAB 3 MG 6 MG: 3 TAB at 19:03

## 2025-04-15 RX ADMIN — ACETAMINOPHEN 650 MG: 325 TABLET ORAL at 09:17

## 2025-04-15 RX ADMIN — SEVELAMER CARBONATE 800 MG: 800 TABLET, FILM COATED ORAL at 09:13

## 2025-04-15 RX ADMIN — SODIUM CHLORIDE, PRESERVATIVE FREE 10 ML: 5 INJECTION INTRAVENOUS at 09:14

## 2025-04-15 RX ADMIN — INSULIN GLARGINE 8 UNITS: 100 INJECTION, SOLUTION SUBCUTANEOUS at 23:02

## 2025-04-15 RX ADMIN — SEVELAMER CARBONATE 800 MG: 800 TABLET, FILM COATED ORAL at 19:03

## 2025-04-15 ASSESSMENT — PAIN DESCRIPTION - LOCATION: LOCATION: GENERALIZED

## 2025-04-15 ASSESSMENT — ENCOUNTER SYMPTOMS
DIARRHEA: 0
SORE THROAT: 0
BACK PAIN: 0
SHORTNESS OF BREATH: 0
SINUS PRESSURE: 0
COUGH: 0
SINUS PAIN: 0
ABDOMINAL PAIN: 0
EYE REDNESS: 0
CONSTIPATION: 0
EYE DISCHARGE: 0
WHEEZING: 0
NAUSEA: 0
RHINORRHEA: 0

## 2025-04-15 ASSESSMENT — PAIN SCALES - GENERAL
PAINLEVEL_OUTOF10: 0
PAINLEVEL_OUTOF10: 8
PAINLEVEL_OUTOF10: 7
PAINLEVEL_OUTOF10: 0
PAINLEVEL_OUTOF10: 5

## 2025-04-15 ASSESSMENT — PAIN SCALES - WONG BAKER: WONGBAKER_NUMERICALRESPONSE: NO HURT

## 2025-04-15 ASSESSMENT — PAIN DESCRIPTION - ORIENTATION: ORIENTATION: OTHER (COMMENT)

## 2025-04-15 ASSESSMENT — PAIN DESCRIPTION - DESCRIPTORS: DESCRIPTORS: OTHER (COMMENT)

## 2025-04-15 ASSESSMENT — PAIN - FUNCTIONAL ASSESSMENT: PAIN_FUNCTIONAL_ASSESSMENT: PREVENTS OR INTERFERES WITH ALL ACTIVE AND SOME PASSIVE ACTIVITIES

## 2025-04-15 NOTE — CARE COORDINATION
Discharge Planning Note:     (SW) spoke with patient's wife Amanda re discharge. Amanda states that patient is considering outpatient PT/OT rather than HHC. SW will follow up for decision. Patient does not require any DME at discharge.  Electronically signed by TALAT Atkins on 4/15/25 at 3:55 PM EDT

## 2025-04-15 NOTE — PROGRESS NOTES
[facility] - Inpatient Rehabilitation Department   Phone: [phone]    Physical Therapy    [] Initial Evaluation            [x] Daily Treatment Note         [] Discharge Summary      Patient: Danny Rock   : 1950   MRN: [id number]   Date of Service:  4/15/2025  Admitting Diagnosis: Lumbar spondylosis  Current Admission Summary: Danny Rock is a 75 y.o. male with PMHx notable for HTN, HLD, CAD, HFrEF, atrial fibrillation, renal cancer s/p right nephrectomy, ESRD on HD who initially presented to [facility] on 3/22 with low back pain and had MRI showing severe multilevel foraminal stenosis worst at L5-S1 prompting transfer to [facility] for neurosurgical evaluation. Repeat MRI C/T/L-spine on 3/23 showed multilevel disc/endplate enhancement compatible with discitis and osteomyelitis at L2-L3, L3-L4 and L4-L5 with adjacent paravertebral edema, and small right psoas muscle abscess. Blood cultures resulted positive for Enterococcus faecalis. He was started on IV vancomycin and gentamicin on 3/25, with plan at discharge to continue IV vancomycin 3 times a week after dialysis through 2025. NSGY evaluated while inpatient, recommended no surgical intervention at this time. Nephrology consulted for HD needs, had old HD access removed and TDC placed on 3/28.      He was admitted to [facility] at [facility] from 3/30-.  Rehabilitation course was complicated by development of large right iliopsoas intramuscular hematoma.  His hemoglobin continued to downtrend despite holding Eliquis and aspirin.  Case was discussed with Orthopedic Surgery, NSGY and IR.  Patient was discharged back to acute hospital where Eliquis and aspirin were held for 4 days. Patient was started on heparin drip on , with ongoing stability of his hemoglobin.  He is able to resume his Eliquis on , and aspirin after 1 week if stable without signs of recurrent bleeding.  Past Medical History:  has a past medical history of  Sitting Balance: good: independent with functional balance in unsupported position  Dynamic Sitting Balance: fair (+): maintains balance at SBA/supervision without use of UE support  Static Standing Balance: fair (-): maintains balance at CGA with use of UE support  Dynamic Standing Balance: fair (-): maintains balance at CGA with use of UE support  Comments:     Other Therapeutic Interventions    1st session:    See functional mobility details above. Pt performed seated stepper at 1.0 for 6 minutes as a warm up and for improved muscular endurance. Pt performed forward stepping over hurdles with use of one UE on bottom ballet bar and with CGA to increase step height and length and improved dynamic standing balance. Pt also performed lateral stepping over hurdles with B UE on top ballet bar to increase lateral stepping ability and hip abductor strengthening. Pt ambulated with RW back to room, returned to seated in chair.    2nd session:   Pt was seated in the chair at arrival.  Agreed to therapy session. Did not rate his right hip pain during the session but noted to have less reports of pain with mobility.  Transfers to standing remained SBA. Pt ambulated 180 ft and 90 ft with the RW at SBA.  Seated RLE stretching included 3x30\" HS (with fwd trunk flexion), hip flexion, and hip ER (Figure 4 stretch).    -Seated fwd trunk leans with big yellow SB fwd x 10, diagonally to the R and L x 10 each.    -Seated resisted trunk extension with the green tband: fwd bend with returning to neutral posture x 10, diagonally x 10 R/L.   -Clock abs x 2 rounds while holding the blue weighted ball.  -Static unsupported stance 2x1 minute.     Pt assisted with going to the bathroom and then returned to bed with needs in reach.       Functional Outcomes                 Cognition  WFL  Safety Judgement: decreased awareness of need for safety  Sequencing: requires cues for some  Orientation:    alert and oriented x 4  Command Following:

## 2025-04-15 NOTE — PROGRESS NOTES
Patient was very restless and anxious overnight was up several times and was back and forth between the bed and chair. He got very little sleep.

## 2025-04-15 NOTE — CONSULTS
Gastroenterology Consult Note        Patient: Danny Rock  : 1950  Acct#:      Date:  4/15/2025      No diagnosis found.    Subjective:       History of Present Illness  Patient is a 75 y.o.  male admitted with Lumbar spondylosis [M47.816] who is seen in consult for bright red blood per rectum.  History of CAD, CHF, A-fib on Eliquis, diabetes.  History of stage II renal cell carcinoma, s/p nephrectomy in 2023.  He developed severe renal failure which was thought to be secondary to Keytruda and was treated with infliximab.  He is on dialysis.  In March he was diagnosed with discitis, Enterococcus bacteremia and psoas muscle abscess.  Later developed right iliopsoas intramuscular hematoma treated conservatively.  He is in ARU recovering.    He was seen by GI in February for bright red blood per rectum felt to be hemorrhoidal.  He was treated with fiber and Anusol.  He reports intermittent blood with bowel movements.  Does state that he can go a couple days without a bowel movement.  Recently finished a 5-day course of Anusol.  Had 3 soft bowel movements today with some bright red blood.  It was also noted that he had blood dripping from his bottom.  May get some abdominal discomfort with bowel movements.  No nausea, vomiting.    Past Medical History:   Diagnosis Date    Atrial fibrillation (HCC)     CAD (coronary artery disease)     Cancer of kidney (HCC)     Immunotherapy: just finished treatment 23    Dependence on renal dialysis     dialysis Tues, Thurs Sat    Diabetes mellitus (HCC)     Hyperlipidemia     Hypertension     Prostate cancer (HCC)     radiation    Right renal mass     dialysis    Systolic CHF (HCC)       Past Surgical History:   Procedure Laterality Date    CATARACT REMOVAL Bilateral     ~  with lens implanted    CHOLECYSTECTOMY      CT GUIDED CHEST TUBE  2024    CT GUIDED CHEST TUBE 2024 [facility] CT SCAN    CT NEEDLE BIOPSY LIVER

## 2025-04-15 NOTE — PROGRESS NOTES
Danny Rock  4/15/2025  [id number]    Chief Complaint: Lumbar spondylosis    Subjective    No acute events overnight.    Patient reports that he is doing well today. He continues to have hematochezia, without abdominal or rectal pain.    Denies chest pain, dyspnea, fevers/chills. Last Bowel Movement:  04/14/25.           Objective    Patient Vitals for the past 24 hrs:   BP Temp Temp src Pulse Resp SpO2 Weight   04/15/25 0600 -- -- -- -- -- -- 73.7 kg (162 lb 6.4 oz)   04/15/25 0500 -- -- -- -- 16 -- --   04/15/25 0422 -- -- -- -- 16 -- --   04/14/25 2250 -- -- -- -- 16 -- --   04/14/25 2215 (!) 145/75 97.6 °F (36.4 °C) Oral 85 16 98 % --   04/14/25 1226 -- -- -- -- 16 -- --   04/14/25 1156 -- -- -- -- 16 -- --   04/14/25 0945 139/63 97.6 °F (36.4 °C) Oral (!) 102 18 96 % --     Patient Vitals for the past 96 hrs (Last 3 readings):   Weight   04/15/25 0600 73.7 kg (162 lb 6.4 oz)   04/14/25 0550 72.7 kg (160 lb 3.2 oz)   04/13/25 0600 70.5 kg (155 lb 6.4 oz)      Gen: No distress, pleasant.   HEENT: Normocephalic, atraumatic.  CV: Regular rate and rhythm. Extremities warm, well perfused.  Left chest wall tunneled dialysis catheter.  Resp: No respiratory distress. CTAB.  Abd: Soft, nontender.  Ext: No edema.   Neuro: Alert, oriented, appropriately interactive.     Laboratory data:   Na/K/Cl/CO2:  134/5.1/100/23 (04/15 0606)   BUN/Cr/glu/ALT/AST/amyl/lip:  57/2.6/--/--/--/--/-- (04/15 0606)    WBC/Hgb/Hct/Plts:  8.2/8.7/25.8/125 (04/15 0606)     Therapy progress:       PT    Supine to Sit: Partial/moderate assistance  Sit to Supine: Supervision or touching assistance   Sit to Stand: Partial/moderate assistance  Chair/Bed to Chair Transfer: Partial/moderate assistance  Car Transfer: Partial/moderate assistance  Ambulation 10 ft: Supervision or touching assistance  Ambulation 50 ft: Supervision or touching assistance  Ambulation 150 ft:    Stairs - 1 Step: Supervision or touching assistance  Stairs - 4 Step:

## 2025-04-15 NOTE — PROGRESS NOTES
Treatment time: 3 hours  Net UF: 2500 ml     Pre weight: 73.6 kg  Post weight:71.1 kg    Access used: LTDC    Access function: well with  ml/min     Medications or blood products given: Retacrit     Regular outpatient schedule: TTS     Summary of response to treatment: Patient tolerated treatment fair  The patient did cramp at the end of treatment.  Patient remained stable throughout entire treatment.

## 2025-04-15 NOTE — PROCEDURES
MD Gordon Frankel MD Aldo Estella, DO                 Office: [phone]                 Fax: [phone]         [facility]                       HEMODIALYSIS  inpatient visit note:     PATIENT NAME: Danny Rock  : 1950  MRN: [id number]    Indication for Dialysis: ESRD   Patient seen on dialysis treatment.  Tolerating treatment  Fairly well    Vitals:    04/15/25 1101   BP: 131/88   Pulse:    Resp:    Temp:    SpO2:        General: No acute distress   CVS:  Heart sounds S1 S2 +     RS: Normal respiratory effort, Breat sound: diminished at bases.     Abd: bowel sounds +,  distension .   Extremities/MSK:  Edema,        Vascular Access:   Hemodialysis catheter location:   TDc. Exit site demonstrates: normal findings; no signs of bleeding, redness, tenderness or discharge        Labs Reviewed  by me   Labs   Lab Results   Component Value Date    CREATININE 2.6 (H) 04/15/2025    BUN 57 (H) 04/15/2025     (L) 04/15/2025    K 5.1 04/15/2025     04/15/2025    CO2 23 04/15/2025     Lab Results   Component Value Date    WBC 8.2 04/15/2025    HGB 8.7 (L) 04/15/2025    HCT 25.8 (L) 04/15/2025    .1 (H) 04/15/2025     (L) 04/15/2025       Dialysis Treatment and Prescription reviewed    RX:  See dialysis flowsheet for specifics on access, blood flow rate, dialysate baths, duration of dialysis, anticoagulation and other technical information.    COMMENTS:  Stable on dialysis.    Continue to Target dry weight and clearance.    Monitor closely for any hypotension    :Tolerating dialysis well, with no complications.  Hypotension on dialysis-stable to improved.  Good flow access.  :Anemia. Stable.Continue Aransep.    :Fluid Overload. Stable    Fluid removal as tolerated with dialysis.  :Stable from Renal.  Continue out patient Dialysis treatments.    Please refer to the orders.   Dialysis treatment plan and dialysis orders discussed with dialysis RN   at bedside.

## 2025-04-15 NOTE — PROGRESS NOTES
[facility] - Inpatient Rehabilitation Department   Phone: [phone]    Occupational Therapy    [] Initial Evaluation            [x] Daily Treatment Note         [] Discharge Summary      Patient: Danny Rock   : 1950   MRN: [id number]   Date of Service:  4/15/2025    Admitting Diagnosis:  Lumbar spondylosis  Current Admission Summary:   Danny Rock is a 75 y.o. male with PMHx notable for HTN, HLD, CAD, HFrEF, atrial fibrillation, renal cancer s/p right nephrectomy, ESRD on HD who initially presented to [facility] on 3/22 with low back pain and had MRI showing severe multilevel foraminal stenosis worst at L5-S1 prompting transfer to [facility] for neurosurgical evaluation. Repeat MRI C/T/L-spine on 3/23 showed multilevel disc/endplate enhancement compatible with discitis and osteomyelitis at L2-L3, L3-L4 and L4-L5 with adjacent paravertebral edema, and small right psoas muscle abscess. Blood cultures resulted positive for Enterococcus faecalis. He was started on IV vancomycin and gentamicin on 3/25, with plan at discharge to continue IV vancomycin 3 times a week after dialysis through 2025. NSGY evaluated while inpatient, recommended no surgical intervention at this time. Nephrology consulted for HD needs, had old HD access removed and TDC placed on 3/28.      He was admitted to [facility] at [facility] from 3/30-.  Rehabilitation course was complicated by development of large right iliopsoas intramuscular hematoma.  His hemoglobin continued to downtrend despite holding Eliquis and aspirin.  Case was discussed with Orthopedic Surgery, NSGY and IR.  Patient was discharged back to acute hospital where Eliquis and aspirin were held for 4 days. Patient was started on heparin drip on , with ongoing stability of his hemoglobin.  He is able to resume his Eliquis on , and aspirin after 1 week if stable without signs of recurrent bleeding  Past Medical History:  has a past medical

## 2025-04-15 NOTE — PROGRESS NOTES
Subjective / interval history / nephrology update / medical decision making:   Refer to assessment and plan for more details.   Patient was seen comfortably  in chair  Reported no active complaints/distress,   Recent renal labs, vital signs, input output reviewed.        Reason for Consult:  ESKD management  Requesting Physician/Provider:  Garret Tellez MD     CHIEF COMPLAINT:  inpatient rehab    History obtained from records and patient.    HISTORY OF PRESENT ILLNESS:                   Danny Rock  is 76yo, male with significant past medical history of ESRD, on HD TTHS at [facility] under care, atrial fibrillation, coronary artery disease, renal CA status post right nephrectomy, previously on immunotherapy, diabetes mellitus type 2, hyperlipidemia, hypertension, prostate cancer, CHF, EF 40 to 45%, CVA secondary to hypercoagulable state, pneumothorax who is transferred from inpatient for treatment of right psoas hematoma and now transferred back to ARU.  Recent enterococcal faecalis bacteremia and L2-L5 discitis/osteomyelitis. Has TDC for HD access.  I am asked to see the patient with regards to his HD needs.    Past Medical History:     has a past medical history of Atrial fibrillation (HCC), CAD (coronary artery disease), Cancer of kidney (HCC), Dependence on renal dialysis, Diabetes mellitus (HCC), Hyperlipidemia, Hypertension, Prostate cancer (HCC), Right renal mass, and Systolic CHF (HCC).   Past Surgical History:     has a past surgical history that includes hernia repair; knee surgery; Cholecystectomy; Nasal septum surgery; eye surgery (Bilateral, 04/19/2018); Kidney removal (Right, 07/25/2022); Cataract removal (Bilateral); IR TUNNELED CVC PLACE WO SQ PORT/PUMP > 5 YEARS (02/07/2024); CT NEEDLE BIOPSY LIVER PERCUTANEOUS (08/01/2024); other surgical history (09/30/2024); CT NEEDLE BIOPSY LIVER PERCUTANEOUS (11/18/2024); CT GUIDED CHEST TUBE (11/18/2024); hernia repair (Left,

## 2025-04-15 NOTE — CONSULTS
Clinical Pharmacy Note: Pharmacy to Dose Vancomycin    Vancomycin Day: 24  Indication: enterococcus bacteremia  Current Dose: 1g w/ HD  Dosing Method: Dosing by random levels    Random: 12.4    Recent Labs     04/12/25  0739 04/15/25  0606   BUN 43* 57*       Recent Labs     04/12/25  0739 04/15/25  0606   CREATININE 2.3* 2.6*       Recent Labs     04/12/25  0739 04/15/25  0606   WBC 7.2 8.2         Intake/Output Summary (Last 24 hours) at 4/15/2025 0736  Last data filed at 4/14/2025 1809  Gross per 24 hour   Intake 540 ml   Output --   Net 540 ml         Ht Readings from Last 1 Encounters:   04/09/25 1.905 m (6' 3\")        Wt Readings from Last 1 Encounters:   04/14/25 72.7 kg (160 lb 3.2 oz)         Body mass index is 20.02 kg/m².    Estimated Creatinine Clearance: 25 mL/min (A) (based on SCr of 2.6 mg/dL (H)).      Assessment/Plan:  Vancomycin level is therapeutic.  Continue dosing Vancomycin 1g on HD days.   Next lvl 4/22  Changes in regimen will be determined based on culture results, renal function, and clinical response.  Pharmacy will continue to monitor and adjust regimen as necessary.    Thank you for the consult,    Emeterio Rojas PharmD, BCPS  Clinical Pharmacy Specialist  [id number]

## 2025-04-15 NOTE — PROGRESS NOTES
02/07/2024    IR TUNNELED CATHETER PLACEMENT GREATER THAN 5 YEARS 2/7/2024 Kosta Baugh MD [facility] SPECIAL PROCEDURES    IR TUNNELED CVC PLACE WO SQ PORT/PUMP > 5 YEARS  03/28/2025    IR TUNNELED CVC PLACE WO SQ PORT/PUMP > 5 YEARS 3/28/2025 [facility] CARDIAC CATH/IR/NEURO LAB    KIDNEY REMOVAL Right 07/25/2022    ROBOTIC LAPAROSCOPIC RADICAL RIGHT NEPHRECTOMY performed by Larry Olsen MD at [facility] OR    KNEE SURGERY      both knees: scope for cartilage    NASAL SEPTUM SURGERY      OTHER SURGICAL HISTORY  09/30/2024    Peritoneal dialysis catheter: removed    OTHER SURGICAL HISTORY Left     left arm fistula for dialysis: also has right chest catheter for dialysis    SHOULDER SURGERY Right 01/31/2025    RIGHT SHOULDER OPEN INCISION AND DRAINAGE performed by Nicolas Jacob MD at [facility] ASC OR       Family History: All family history was reviewed today.        Problem Relation Age of Onset    Heart Disease Mother     Skin Cancer Father     Heart Disease Brother     Stroke Brother        Objective:       PHYSICAL EXAM:      Vitals:   Vitals:    04/15/25 0500 04/15/25 0600 04/15/25 0911 04/15/25 1101   BP:   115/78 131/88   Pulse:   83    Resp: 16  18    Temp:   97.2 °F (36.2 °C)    TempSrc:   Axillary    SpO2:   95%    Weight:  73.7 kg (162 lb 6.4 oz)     Height:           Physical Exam  Vitals and nursing note reviewed.   Constitutional:       Appearance: He is well-developed. He is not diaphoretic.      Comments: The patient was seen earlier today.   HENT:      Head: Normocephalic and atraumatic.      Right Ear: External ear normal. There is no impacted cerumen.      Left Ear: External ear normal. There is no impacted cerumen.      Nose: Nose normal.      Mouth/Throat:      Mouth: Mucous membranes are moist.      Pharynx: Oropharynx is clear. No oropharyngeal exudate.   Eyes:      General: No scleral icterus.        Right eye: No discharge.         Left eye: No discharge.      Conjunctiva/sclera: Conjunctivae normal.       inadvertently. If you may need any clarification, please do not hesitate to contact me through EPIC or at the phone number provided below with my electronic signature.  Any pictures or media included in this note were obtained after taking informed verbal consent from the patient and with their approval to include those in the patient's medical record.        Jadiel Esteves MD, MPH, FACP, FIDSA  4/15/2025, 3:22 PM  Central Office Phone: [phone]  Central Office Fax: [phone]    [facility] Infectious Disease   [address], [address] ([address])  [place] [address]  [facility] days:  Tuesday & Thursday AM    [facility] Infectious Disease  [address] , [address] (Medical office Building)  [place], [place], [address]  [facility] days: Wednesday AM

## 2025-04-16 ENCOUNTER — ANESTHESIA (OUTPATIENT)
Dept: ENDOSCOPY | Age: 75
DRG: 947 | End: 2025-04-16
Payer: MEDICARE

## 2025-04-16 ENCOUNTER — ANESTHESIA EVENT (OUTPATIENT)
Dept: ENDOSCOPY | Age: 75
DRG: 947 | End: 2025-04-16
Payer: MEDICARE

## 2025-04-16 LAB
GLUCOSE BLD-MCNC: 110 MG/DL (ref 70–99)
GLUCOSE BLD-MCNC: 115 MG/DL (ref 70–99)
GLUCOSE BLD-MCNC: 118 MG/DL (ref 70–99)
GLUCOSE BLD-MCNC: 158 MG/DL (ref 70–99)
GLUCOSE BLD-MCNC: 243 MG/DL (ref 70–99)
GLUCOSE BLD-MCNC: 54 MG/DL (ref 70–99)
GLUCOSE BLD-MCNC: 63 MG/DL (ref 70–99)
GLUCOSE BLD-MCNC: 67 MG/DL (ref 70–99)
GLUCOSE BLD-MCNC: 89 MG/DL (ref 70–99)
PERFORMED ON: ABNORMAL
PERFORMED ON: NORMAL

## 2025-04-16 PROCEDURE — 2500000003 HC RX 250 WO HCPCS: Performed by: ANESTHESIOLOGY

## 2025-04-16 PROCEDURE — 7100000001 HC PACU RECOVERY - ADDTL 15 MIN: Performed by: INTERNAL MEDICINE

## 2025-04-16 PROCEDURE — 97530 THERAPEUTIC ACTIVITIES: CPT

## 2025-04-16 PROCEDURE — 2709999900 HC NON-CHARGEABLE SUPPLY: Performed by: INTERNAL MEDICINE

## 2025-04-16 PROCEDURE — 6370000000 HC RX 637 (ALT 250 FOR IP): Performed by: INTERNAL MEDICINE

## 2025-04-16 PROCEDURE — 99232 SBSQ HOSP IP/OBS MODERATE 35: CPT | Performed by: INTERNAL MEDICINE

## 2025-04-16 PROCEDURE — 97535 SELF CARE MNGMENT TRAINING: CPT

## 2025-04-16 PROCEDURE — 6370000000 HC RX 637 (ALT 250 FOR IP)

## 2025-04-16 PROCEDURE — 3700000000 HC ANESTHESIA ATTENDED CARE: Performed by: INTERNAL MEDICINE

## 2025-04-16 PROCEDURE — 3609008400 HC SIGMOIDOSCOPY DIAGNOSTIC: Performed by: INTERNAL MEDICINE

## 2025-04-16 PROCEDURE — 2580000003 HC RX 258: Performed by: REGISTERED NURSE

## 2025-04-16 PROCEDURE — 0DJD8ZZ INSPECTION OF LOWER INTESTINAL TRACT, VIA NATURAL OR ARTIFICIAL OPENING ENDOSCOPIC: ICD-10-PCS | Performed by: INTERNAL MEDICINE

## 2025-04-16 PROCEDURE — 2500000003 HC RX 250 WO HCPCS: Performed by: INTERNAL MEDICINE

## 2025-04-16 PROCEDURE — 6360000002 HC RX W HCPCS: Performed by: REGISTERED NURSE

## 2025-04-16 PROCEDURE — 6360000002 HC RX W HCPCS: Performed by: STUDENT IN AN ORGANIZED HEALTH CARE EDUCATION/TRAINING PROGRAM

## 2025-04-16 PROCEDURE — 6370000000 HC RX 637 (ALT 250 FOR IP): Performed by: STUDENT IN AN ORGANIZED HEALTH CARE EDUCATION/TRAINING PROGRAM

## 2025-04-16 PROCEDURE — 7100000000 HC PACU RECOVERY - FIRST 15 MIN: Performed by: INTERNAL MEDICINE

## 2025-04-16 PROCEDURE — 1280000000 HC REHAB R&B

## 2025-04-16 PROCEDURE — 2500000003 HC RX 250 WO HCPCS: Performed by: STUDENT IN AN ORGANIZED HEALTH CARE EDUCATION/TRAINING PROGRAM

## 2025-04-16 RX ORDER — SEVELAMER CARBONATE 800 MG/1
TABLET, FILM COATED ORAL
Status: COMPLETED
Start: 2025-04-16 | End: 2025-04-16

## 2025-04-16 RX ORDER — LIDOCAINE HYDROCHLORIDE 20 MG/ML
INJECTION, SOLUTION EPIDURAL; INFILTRATION; INTRACAUDAL; PERINEURAL
Status: DISCONTINUED | OUTPATIENT
Start: 2025-04-16 | End: 2025-04-16 | Stop reason: SDUPTHER

## 2025-04-16 RX ORDER — SODIUM CHLORIDE 9 MG/ML
INJECTION, SOLUTION INTRAVENOUS PRN
Status: CANCELLED | OUTPATIENT
Start: 2025-04-16

## 2025-04-16 RX ORDER — FENTANYL CITRATE 50 UG/ML
25 INJECTION, SOLUTION INTRAMUSCULAR; INTRAVENOUS EVERY 5 MIN PRN
Refills: 0 | Status: CANCELLED | OUTPATIENT
Start: 2025-04-16

## 2025-04-16 RX ORDER — PROPOFOL 10 MG/ML
INJECTION, EMULSION INTRAVENOUS
Status: DISCONTINUED | OUTPATIENT
Start: 2025-04-16 | End: 2025-04-16 | Stop reason: SDUPTHER

## 2025-04-16 RX ORDER — OXYCODONE HYDROCHLORIDE 5 MG/1
5 TABLET ORAL PRN
Refills: 0 | Status: CANCELLED | OUTPATIENT
Start: 2025-04-16 | End: 2025-04-16

## 2025-04-16 RX ORDER — PROCHLORPERAZINE EDISYLATE 5 MG/ML
5 INJECTION INTRAMUSCULAR; INTRAVENOUS
Status: CANCELLED | OUTPATIENT
Start: 2025-04-16

## 2025-04-16 RX ORDER — DIPHENHYDRAMINE HYDROCHLORIDE 50 MG/ML
12.5 INJECTION, SOLUTION INTRAMUSCULAR; INTRAVENOUS
Status: CANCELLED | OUTPATIENT
Start: 2025-04-16

## 2025-04-16 RX ORDER — SODIUM CHLORIDE 9 MG/ML
INJECTION, SOLUTION INTRAVENOUS
Status: DISCONTINUED | OUTPATIENT
Start: 2025-04-16 | End: 2025-04-16 | Stop reason: SDUPTHER

## 2025-04-16 RX ORDER — DEXTROSE MONOHYDRATE 25 G/50ML
12.5 INJECTION, SOLUTION INTRAVENOUS ONCE
Status: COMPLETED | OUTPATIENT
Start: 2025-04-16 | End: 2025-04-16

## 2025-04-16 RX ORDER — ONDANSETRON 2 MG/ML
4 INJECTION INTRAMUSCULAR; INTRAVENOUS
Status: CANCELLED | OUTPATIENT
Start: 2025-04-16

## 2025-04-16 RX ORDER — SODIUM CHLORIDE 9 MG/ML
INJECTION, SOLUTION INTRAVENOUS CONTINUOUS
Status: CANCELLED | OUTPATIENT
Start: 2025-04-16

## 2025-04-16 RX ORDER — OXYCODONE HYDROCHLORIDE 5 MG/1
10 TABLET ORAL PRN
Refills: 0 | Status: CANCELLED | OUTPATIENT
Start: 2025-04-16 | End: 2025-04-16

## 2025-04-16 RX ORDER — SODIUM CHLORIDE 0.9 % (FLUSH) 0.9 %
5-40 SYRINGE (ML) INJECTION EVERY 12 HOURS SCHEDULED
Status: CANCELLED | OUTPATIENT
Start: 2025-04-16

## 2025-04-16 RX ORDER — SODIUM CHLORIDE 0.9 % (FLUSH) 0.9 %
5-40 SYRINGE (ML) INJECTION PRN
Status: CANCELLED | OUTPATIENT
Start: 2025-04-16

## 2025-04-16 RX ORDER — NALOXONE HYDROCHLORIDE 0.4 MG/ML
INJECTION, SOLUTION INTRAMUSCULAR; INTRAVENOUS; SUBCUTANEOUS PRN
Status: CANCELLED | OUTPATIENT
Start: 2025-04-16

## 2025-04-16 RX ADMIN — METOPROLOL TARTRATE 50 MG: 50 TABLET, FILM COATED ORAL at 21:47

## 2025-04-16 RX ADMIN — GLUCAGON 1 MG: 1 INJECTION, POWDER, LYOPHILIZED, FOR SOLUTION INTRAMUSCULAR; INTRAVENOUS at 07:52

## 2025-04-16 RX ADMIN — PROPOFOL 50 MG: 10 INJECTION, EMULSION INTRAVENOUS at 13:59

## 2025-04-16 RX ADMIN — SODIUM CHLORIDE, PRESERVATIVE FREE 10 ML: 5 INJECTION INTRAVENOUS at 08:19

## 2025-04-16 RX ADMIN — SODIUM CHLORIDE: 9 INJECTION, SOLUTION INTRAVENOUS at 13:52

## 2025-04-16 RX ADMIN — METHOCARBAMOL 750 MG: 750 TABLET ORAL at 21:47

## 2025-04-16 RX ADMIN — ROSUVASTATIN CALCIUM 10 MG: 10 TABLET, FILM COATED ORAL at 21:47

## 2025-04-16 RX ADMIN — APIXABAN 2.5 MG: 2.5 TABLET, FILM COATED ORAL at 21:47

## 2025-04-16 RX ADMIN — SODIUM CHLORIDE, PRESERVATIVE FREE 10 ML: 5 INJECTION INTRAVENOUS at 21:48

## 2025-04-16 RX ADMIN — PROPOFOL 120 MCG/KG/MIN: 10 INJECTION, EMULSION INTRAVENOUS at 13:58

## 2025-04-16 RX ADMIN — SEVELAMER CARBONATE 800 MG: 800 TABLET, FILM COATED ORAL at 17:55

## 2025-04-16 RX ADMIN — INSULIN GLARGINE 8 UNITS: 100 INJECTION, SOLUTION SUBCUTANEOUS at 21:46

## 2025-04-16 RX ADMIN — TRAZODONE HYDROCHLORIDE 25 MG: 50 TABLET ORAL at 21:47

## 2025-04-16 RX ADMIN — GLUCAGON 1 MG: 1 INJECTION, POWDER, LYOPHILIZED, FOR SOLUTION INTRAMUSCULAR; INTRAVENOUS at 12:01

## 2025-04-16 RX ADMIN — MELATONIN TAB 3 MG 6 MG: 3 TAB at 17:56

## 2025-04-16 RX ADMIN — INSULIN LISPRO 1 UNITS: 100 INJECTION, SOLUTION INTRAVENOUS; SUBCUTANEOUS at 21:46

## 2025-04-16 RX ADMIN — PANTOPRAZOLE SODIUM 40 MG: 40 TABLET, DELAYED RELEASE ORAL at 07:00

## 2025-04-16 RX ADMIN — LIDOCAINE HYDROCHLORIDE 100 MG: 20 INJECTION, SOLUTION EPIDURAL; INFILTRATION; INTRACAUDAL; PERINEURAL at 13:59

## 2025-04-16 RX ADMIN — DEXTROSE MONOHYDRATE 12.5 G: 25 INJECTION, SOLUTION INTRAVENOUS at 13:06

## 2025-04-16 RX ADMIN — OXYCODONE HYDROCHLORIDE AND ACETAMINOPHEN 1 TABLET: 5; 325 TABLET ORAL at 21:47

## 2025-04-16 ASSESSMENT — PAIN SCALES - GENERAL
PAINLEVEL_OUTOF10: 2
PAINLEVEL_OUTOF10: 8

## 2025-04-16 ASSESSMENT — ENCOUNTER SYMPTOMS
EYE DISCHARGE: 0
SINUS PRESSURE: 0
SORE THROAT: 0
DIARRHEA: 0
BACK PAIN: 0
COUGH: 0
WHEEZING: 0
EYE REDNESS: 0
CONSTIPATION: 0
ABDOMINAL PAIN: 0
SINUS PAIN: 0
SHORTNESS OF BREATH: 0
NAUSEA: 0
RHINORRHEA: 0

## 2025-04-16 ASSESSMENT — PAIN - FUNCTIONAL ASSESSMENT
PAIN_FUNCTIONAL_ASSESSMENT: NONE - DENIES PAIN
PAIN_FUNCTIONAL_ASSESSMENT: NONE - DENIES PAIN

## 2025-04-16 ASSESSMENT — PAIN DESCRIPTION - DESCRIPTORS: DESCRIPTORS: ACHING

## 2025-04-16 ASSESSMENT — PAIN DESCRIPTION - ORIENTATION: ORIENTATION: RIGHT

## 2025-04-16 ASSESSMENT — PAIN DESCRIPTION - LOCATION: LOCATION: HIP

## 2025-04-16 NOTE — PATIENT CARE CONFERENCE
[facility] Inpatient Rehabilitation Department  Weekly Team Conference Note    Patient Name: Danny Rock      MRN: [id number]  : 1950  (75 y.o.) Gender: male     The team conference for this patient was held on  at 11am and was led by:  Garret Ponce MD     CASE MANAGEMENT:  Assessment: Patient is a 75 year old male. Patient discharge date is . Patient was admitted for Lumbar spondylosis . Patient is from home with his spouse. He is A&Ox4, has active insurance and is active with a PCP in the community with a recent visit. Disposition is home with home healthcare. There are no DME recommendations, as patient has all required DME for discharge. SW to continue to follow for additional recommendations.     PHYSICAL THERAPY    Current Status:  Bed Mobility:  Supine to Sit: modified independent  Sit to Supine: modified independent  Rolling Left: modified independent  Rolling Right: modified independent  Scooting: modified independent  Comments: HOB flat, pt did utilize the bed railing for assistance with rolling.   Transfers:  Sit to stand transfer: stand by assistance  Stand to sit transfer: stand by assistance    Comments: Pt required hand placement reminders due to some confusion this morning and being more focused on his pain and not feeling well.   Ambulation:  Surface:level surface  Assistive Device: rolling walker  Assistance: contact guard assistance  Distance: 20 ft   Gait Mechanics: Reduced stance time on right LE , Decreased step length and height (R>L), NBOS,  B knee varus and slight B knee flexion throughout  Comments: Increased shakiness with mobility.  Pt reports he is unable to walk any further due to pain and not feeling well.    Goals:                  Short Term Goals:  Time Frame: 7 days   Patient will complete bed mobility at modified independent   Patient will complete transfers at modified independent   Patient will ambulate 150 ft with use of rolling

## 2025-04-16 NOTE — PLAN OF CARE
Problem: Discharge Planning  Goal: Discharge to home or other facility with appropriate resources  4/16/2025 1021 by Stacy Abraham, RN  Outcome: Progressing     Problem: Safety - Adult  Goal: Free from fall injury  4/16/2025 1021 by Stacy Abraham, RN  Outcome: Progressing     Problem: Pain  Goal: Verbalizes/displays adequate comfort level or baseline comfort level  4/16/2025 1021 by Stacy Abraham, RN  Outcome: Progressing

## 2025-04-16 NOTE — ANESTHESIA POSTPROCEDURE EVALUATION
Department of Anesthesiology  Postprocedure Note    Patient: Danny Rock  MRN: [id number]  YOB: 1950  Date of evaluation: 4/16/2025    Procedure Summary       Date: 04/16/25 Room / Location: [facility] 06 / [facility]    Anesthesia Start: 1352 Anesthesia Stop: 1407    Procedure: SIGMOIDOSCOPY DIAGNOSTIC FLEXIBLE Diagnosis:       Hematochezia      (Hematochezia [K92.1])    Surgeons: Chema Ambrocio MD Responsible Provider: Raghav Triplett DO    Anesthesia Type: MAC ASA Status: 4            Anesthesia Type: No value filed.    Ye Phase I: Ye Score: 10    Ye Phase II:      Anesthesia Post Evaluation    Patient location during evaluation: PACU  Patient participation: complete - patient participated  Level of consciousness: awake  Pain score: 0  Airway patency: patent  Nausea & Vomiting: no nausea  Cardiovascular status: hemodynamically stable  Respiratory status: acceptable  Hydration status: euvolemic  Multimodal analgesia pain management approach  Pain management: adequate    No notable events documented.

## 2025-04-16 NOTE — PROGRESS NOTES
Danny Rock  4/16/2025  [id number]    Chief Complaint: Lumbar spondylosis    Subjective    No acute events overnight. Remains NPO awaiting procedure.    Patient reports that he is feeling tired today. He is having some right knee, and knee pain.    Denies abdominal pain, dark black or bloody stool. Last Bowel Movement:  04/15/25.           Objective    Patient Vitals for the past 24 hrs:   BP Temp Temp src Pulse Resp SpO2 Weight   04/16/25 0815 121/69 98.1 °F (36.7 °C) Oral 91 16 95 % --   04/15/25 1915 126/65 97.7 °F (36.5 °C) Axillary (!) 108 16 95 % --   04/15/25 1847 123/75 97.8 °F (36.6 °C) -- 97 16 96 % --   04/15/25 1807 102/61 97.9 °F (36.6 °C) -- -- -- -- 71.1 kg (156 lb 12 oz)   04/15/25 1511 126/70 97.8 °F (36.6 °C) -- 76 18 -- 73.6 kg (162 lb 4.1 oz)   04/15/25 1101 131/88 -- -- -- -- -- --   04/15/25 0911 115/78 97.2 °F (36.2 °C) Axillary 83 18 95 % --     Patient Vitals for the past 96 hrs (Last 3 readings):   Weight   04/15/25 1807 71.1 kg (156 lb 12 oz)   04/15/25 1511 73.6 kg (162 lb 4.1 oz)   04/15/25 0600 73.7 kg (162 lb 6.4 oz)      Gen: No distress, pleasant.   HEENT: Normocephalic, atraumatic.  CV: Regular rate and rhythm. Extremities warm, well perfused.  Left chest wall tunneled dialysis catheter.  Resp: No respiratory distress. CTAB.  Abd: Soft, nontender.  Ext: No edema.   Neuro: Alert, oriented, appropriately interactive.     Laboratory data:   Na/K/Cl/CO2:  134/5.1/100/23 (04/15 0606)   BUN/Cr/glu/ALT/AST/amyl/lip:  57/2.6/--/--/--/--/-- (04/15 0606)    WBC/Hgb/Hct/Plts:  8.2/8.7/25.8/125 (04/15 0606)     Therapy progress:       PT    Supine to Sit: Partial/moderate assistance  Sit to Supine: Supervision or touching assistance   Sit to Stand: Partial/moderate assistance  Chair/Bed to Chair Transfer: Partial/moderate assistance  Car Transfer: Partial/moderate assistance  Ambulation 10 ft: Supervision or touching assistance  Ambulation 50 ft: Supervision or touching

## 2025-04-16 NOTE — PROGRESS NOTES
Infectious Diseases   Progress Note      Admission Date: 4/8/2025  Hospital Day: Hospital Day: 9   Attending: Garret Ponce MD  Date of service: 4/16/2025     Chief complaint/ Reason for consult:     Enterococcus faecalis bacteremia  Lumbar discitis and osteomyelitis with foraminal stenosis and spinal canal stenosis at L3-L4 level  History of prostate cancer  ESRD on hemodialysis  Elevated sed rate     Microbiology:      I have reviewed allavailable micro lab data and cultures    Results       ** No results found for the last 336 hours. **             Susceptibility data from last 90 days.  Collected Specimen Info Organism Ampicillin gentamicin-syn Sodium streptomycin-syn   03/26/25 Blood Staphylococcus epidermidis (1)         Staphylococcus epidermidis (2)       03/24/25 Blood Enterococcus faecalis       03/24/25 Blood Enterococcus faecalis       03/23/25 Blood Enterococcus faecalis (2)  S   S      Enterococcus faecalis (1)       03/23/25 Blood Enterococcus faecalis       03/22/25 Blood Enterococcus faecalis (3)  S  R  S  S     Staphylococcus epidermidis         Enterococcus faecalis (1)         Micrococcus luteus       01/31/25 Abscess Diphtheroids         Micrococcus luteus              Antibiotics and immunizations:       Current antibiotics: All antibiotics and their doses were reviewed by me    Recent Abx Admin        No antibiotic orders with administrations found.                      Immunization History: All immunization history was reviewed by me today.    There is no immunization history for the selected administration types on file for this patient.    Known drug allergies:     All allergies were reviewed and updated    Allergies   Allergen Reactions    Amoxicillin Other (See Comments)     Urethritis, rash    Bisoprolol-Hydrochlorothiazide Other (See Comments)     G.I. rash    Cisapride Other (See Comments)     diarrhea    Penicillins      Unsure of reaction  Tolerated ceftriaxone  1/29/25  Tolerated Ancef 12/31/24    Pioglitazone Other (See Comments)     BRITTON upset (long time ago)       Social history:     Social History:  All social andepidemiologic history was reviewed and updated by me today as needed.     Tobacco use:   reports that he quit smoking about 55 years ago. His smoking use included cigarettes. He started smoking about 57 years ago. He has never used smokeless tobacco.  Alcohol use:   reports current alcohol use.  Currently lives in: [place] [id number]   reports current drug use. Drug: Marijuana (Weed).     COVID VACCINATION AND LAB RESULT RECORDS:     Internal Administration   First Dose      Second Dose           Last COVID Lab SARS-CoV-2 RNA, RT PCR (no units)   Date Value   11/28/2023 NOT DETECTED            Assessment:     The patient is a 75 y.o. old male who  has a past medical history of Atrial fibrillation (HCC), CAD (coronary artery disease), Cancer of kidney (HCC), Dependence on renal dialysis, Diabetes mellitus (HCC), Hyperlipidemia, Hypertension, Prostate cancer (HCC), Right renal mass, and Systolic CHF (HCC). with following problems:    Enterococcus faecalis bacteremia-covered with IV vancomycin  Lumbar discitis and osteomyelitis with foraminal stenosis and spinal canal stenosis at L3-L4 level  History of prostate cancer  ESRD on hemodialysis-stable  Elevated sed rate   Elevated CRP  Essential hypertension-BP controlled  Mixed hyperlipidemia  Paroxysmal atrial fibrillation-heart rate controlled  Type 2 diabetes mellitus maintain good glucose control      Discussion:      The patient is on linezolid and IV vancomycin.    He is tolerating antibiotics okay.    No diarrhea.          Plan:     Diagnostic Workup:    Plan recheck CBC on Friday  Continue to follow  fever curve, WBC count and blood cultures.  Continue to monitor blood counts, liver and renal function.    Antimicrobials:    The patient has undergone flexible sigmoidoscopy today.  Has been noted to have

## 2025-04-16 NOTE — PROGRESS NOTES
[facility] - Inpatient Rehabilitation Department   Phone: [phone]    Occupational Therapy    [] Initial Evaluation            [x] Daily Treatment Note         [] Discharge Summary      Patient: Danny Rock   : 1950   MRN: [id number]   Date of Service:  2025    Admitting Diagnosis:  Lumbar spondylosis  Current Admission Summary:   Danny Rock is a 75 y.o. male with PMHx notable for HTN, HLD, CAD, HFrEF, atrial fibrillation, renal cancer s/p right nephrectomy, ESRD on HD who initially presented to [facility] on 3/22 with low back pain and had MRI showing severe multilevel foraminal stenosis worst at L5-S1 prompting transfer to [facility] for neurosurgical evaluation. Repeat MRI C/T/L-spine on 3/23 showed multilevel disc/endplate enhancement compatible with discitis and osteomyelitis at L2-L3, L3-L4 and L4-L5 with adjacent paravertebral edema, and small right psoas muscle abscess. Blood cultures resulted positive for Enterococcus faecalis. He was started on IV vancomycin and gentamicin on 3/25, with plan at discharge to continue IV vancomycin 3 times a week after dialysis through 2025. NSGY evaluated while inpatient, recommended no surgical intervention at this time. Nephrology consulted for HD needs, had old HD access removed and TDC placed on 3/28.      He was admitted to [facility] at [facility] from 3/30-.  Rehabilitation course was complicated by development of large right iliopsoas intramuscular hematoma.  His hemoglobin continued to downtrend despite holding Eliquis and aspirin.  Case was discussed with Orthopedic Surgery, NSGY and IR.  Patient was discharged back to acute hospital where Eliquis and aspirin were held for 4 days. Patient was started on heparin drip on , with ongoing stability of his hemoglobin.  He is able to resume his Eliquis on , and aspirin after 1 week if stable without signs of recurrent bleeding  Past Medical History:  has a past medical

## 2025-04-16 NOTE — ANESTHESIA PRE PROCEDURE
Department of Anesthesiology  Preprocedure Note       Name:  Danny Rock   Age:  75 y.o.  :  1950                                          MRN:  [id number]         Date:  2025      Surgeon: Surgeon(s):  Chema Ambrocio MD    Procedure: Procedure(s):  SIGMOIDOSCOPY DIAGNOSTIC FLEXIBLE    Medications prior to admission:   Prior to Admission medications    Medication Sig Start Date End Date Taking? Authorizing Provider   apixaban (ELIQUIS) 2.5 MG TABS tablet Take 1 tablet by mouth 2 times daily   Yes ProviderSvetlana MD   methocarbamol (ROBAXIN) 750 MG tablet Take 1 tablet by mouth 4 times daily for 10 days (3/30-). 25 Yes Lilly Cotto MD   metoprolol tartrate (LOPRESSOR) 50 MG tablet Take 1 tablet by mouth See Admin Instructions Take one tablet by mouth twice daily on non-dialysis days only. 25  Yes Lilly Cotto MD   vancomycin (VANCOCIN) infusion Vancomycin 1 gm every , , , 25 Yes Lilly Cotto MD   traZODone (DESYREL) 50 MG tablet Take 1 tablet by mouth nightly as needed for Sleep 25  Yes Lilly Cotto MD   sennosides-docusate sodium (SENOKOT-S) 8.6-50 MG tablet Take 2 tablets by mouth 2 times daily as needed for Constipation 25  Yes Lilly Cotto MD   bisacodyl (DULCOLAX) 5 MG EC tablet Take 1 tablet by mouth daily as needed for Constipation 25  Yes Lilly Cotto MD   diclofenac sodium (VOLTAREN) 1 % GEL Apply 2 g topically 4 times daily as needed for Pain 25  Yes Lilly Cotto MD   linezolid (ZYVOX) 600 MG tablet Take 1 tablet by mouth every 12 hours for 14 days 25 Yes Lilly Cotto MD   lidocaine 4 % external patch Place 1 patch onto the skin daily as needed (Right hip pain) 25  Yes Lilly Cotto MD   insulin glargine (LANTUS) 100 UNIT/ML injection vial Inject 8 Units into the skin nightly 25  Yes Lilly Cotto MD   polyethylene glycol (GLYCOLAX) 17 g packet Take 1 packet by mouth daily

## 2025-04-16 NOTE — PLAN OF CARE
Problem: Chronic Conditions and Co-morbidities  Goal: Patient's chronic conditions and co-morbidity symptoms are monitored and maintained or improved  Outcome: Progressing     Problem: Discharge Planning  Goal: Discharge to home or other facility with appropriate resources  Outcome: Progressing     Problem: Safety - Adult  Goal: Free from fall injury  Outcome: Progressing     Problem: Pain  Goal: Verbalizes/displays adequate comfort level or baseline comfort level  Outcome: Progressing     Problem: Nutrition Deficit:  Goal: Optimize nutritional status  Outcome: Progressing     Problem: Skin/Tissue Integrity  Goal: Skin integrity remains intact  Description: 1.  Monitor for areas of redness and/or skin breakdown  2.  Assess vascular access sites hourly  3.  Every 4-6 hours minimum:  Change oxygen saturation probe site  4.  Every 4-6 hours:  If on nasal continuous positive airway pressure, respiratory therapy assess nares and determine need for appliance change or resting period  Outcome: Progressing

## 2025-04-16 NOTE — PROGRESS NOTES
MD Gordon Frankel MD Aldo Estella, DO                 Office: [phone]                      Fax: [phone]             [employer]                   NEPHROLOGY CONSULT PROGRESS NOTE       IMPRESSION/RECOMMENDATIONS:        #ESKD on HD TTS  -HD TTS at [facility] under our care.  -EDW while inpatient ~68kg  -continue iHD TTS while inpatient    S/P HD Tuesday  - tolerated - changed HD setting as below      Treatment time: 3 hours  Net UF: 2500 ml     Pre weight: 73.6 kg  Post weight:71.1 kg        Next HD Thursday     Discussed and confirmed with HD nurse    We had discussed with improved renal solutes plateauing, continue to monitor closely as we can decrease HD times and decrease low flows      # Volume status  Euvolemic clinically  Estimated target weight 68 kg  Slightly above 1 L usually    #hyponatremia  -likely 2/2 hypervolemia. Continue HD  -improving    #metabolic acidosis  -likely from ESKD, continue iHD, improving.    #hypoalbuminemia  -likely 2/2 malnutrition, ESKD  -encourage po protein intake    Hypophosphatemia, improving with  Nutrition    #anemia of CKD  -has Hx of renal ca s/p nephrectomy  -hgb stable, POONAM with HD.  -monitor      Medical decision making- high complexity. Multiple complex health problems.   Discussed with patient and treatment team, Garret Ponce MD  , pt wife    Thank you for allowing me to participate in this patient's care. Please do not hesitate to contact me for any questions/concerns. We will follow along with you.     Gordon Rolle MD  [facility]   Phone: [phone] or [employer]  Fax: [phone]    Severally ill, at risk of impending organ failure needing  higher level of care/monitoring.   Time spent that included face-to-face meeting/discussion with patient, patient's family -as available, and treatment team (including primary/referring team and other consultants; included coordination of  PRN  pantoprazole (PROTONIX) tablet 40 mg, 40 mg, Oral, QAM AC  rosuvastatin (CRESTOR) tablet 10 mg, 10 mg, Oral, Once per day on Monday Wednesday Friday  sevelamer (RENVELA) tablet 800 mg, 800 mg, Oral, TID WC  vancomycin (VANCOCIN) 1,000 mg in sodium chloride 0.9 % 250 mL IVPB ([id number]), 1,000 mg, IntraVENous, Once per day on Tuesday Thursday Saturday  vancomycin (VANCOCIN) intermittent dosing (placeholder), , Other, RX Placeholder  Virt-Caps 1 mg, 1 capsule, Oral, Daily  vitamin B-12 (CYANOCOBALAMIN) tablet 1,000 mcg, 1,000 mcg, Oral, Daily  vitamin D3 (CHOLECALCIFEROL) tablet 5,000 Units, 5,000 Units, Oral, Daily  zinc sulfate (ZINCATE) 220 mg capsule - elemental zinc 50 mg, 50 mg, Oral, Daily  lidocaine 4 % external patch 1 patch, 1 patch, TransDERmal, Daily PRN  diclofenac sodium (VOLTAREN) 1 % gel 2 g, 2 g, Topical, 4x Daily PRN       PHYSICAL EXAM:    Vitals:  /85   Pulse 96   Temp 98.5 °F (36.9 °C) (Temporal)   Resp 16   Ht 1.905 m (6' 3\")   Wt 71.1 kg (156 lb 12 oz)   SpO2 97%   BMI 19.59 kg/m²   WEIGHT: well tolerated    Intake/Output Summary (Last 24 hours) at 4/16/2025 1303  Last data filed at 4/16/2025 0703  Gross per 24 hour   Intake --   Output 400 ml   Net -400 ml       General: No acute distress   CVS:  Heart sounds S1 S2 +     RS: Normal respiratory effort, Breat sound: diminished at bases.     Abd: bowel sounds +,  distension .   Extremities/MSK:  Edema,        DATA:    CBC:   Lab Results   Component Value Date/Time    WBC 8.2 04/15/2025 06:06 AM    RBC 2.55 04/15/2025 06:06 AM    HGB 8.7 04/15/2025 06:06 AM    HCT 25.8 04/15/2025 06:06 AM    .1 04/15/2025 06:06 AM    MCH 34.3 04/15/2025 06:06 AM    MCHC 33.9 04/15/2025 06:06 AM    RDW 19.7 04/15/2025 06:06 AM     04/15/2025 06:06 AM    MPV 6.8 04/15/2025 06:06 AM     BMP:   Lab Results   Component Value Date/Time     04/15/2025 06:06 AM    K 5.1 04/15/2025 06:06 AM    K 4.6 04/10/2025 05:47 AM

## 2025-04-16 NOTE — PROGRESS NOTES
Pt. Restless, Impulsive at times. Assisted to bathroom and back to bed. Pt. Npo after midnight for colonoscopy.   Educated patient on NPO status and verbalizes understanding. All needs met. Safety/fall precautions in place.

## 2025-04-16 NOTE — BRIEF OP NOTE
Flex sig:       Nonbleeding angioectasias c/w radiation proctopathy.       Rec:     Observation.   If has persistent or significant bleeding can have full bowel prep for APC of the proctopathy.     Call with questions.

## 2025-04-16 NOTE — PROGRESS NOTES
more focused on his pain and not feeling well.   Ambulation:  Surface:level surface  Assistive Device: rolling walker  Assistance: contact guard assistance  Distance: 20 ft   Gait Mechanics: Reduced stance time on right LE , Decreased step length and height (R>L), NBOS,  B knee varus and slight B knee flexion throughout  Comments: Increased shakiness with mobility.  Pt reports he is unable to walk any further due to pain and not feeling well.    Stair Mobility:   Stair mobility not completed on this date.  Comments:   Wheelchair Mobility:  No w/c mobility completed on this date.  Comments:  Balance:  Static Sitting Balance: good: independent with functional balance in unsupported position  Dynamic Sitting Balance: fair (+): maintains balance at SBA/supervision without use of UE support  Static Standing Balance: fair (-): maintains balance at CGA with use of UE support  Dynamic Standing Balance: fair (-): maintains balance at CGA with use of UE support  Comments:     Other Therapeutic Interventions    1st session:    See functional mobility details above. PT assisted with donning socks/shoes, shorts/depends, and shirt while seated EOB.    Pt unable to participate in therapy today due to pain and not feeling well.  Requested to return to bed after a short duration seated EOB and a very short walk.     2nd attempt:  Pt LETA for flex sig tomorrow.  Variance of 45 minutes.     3rd attempt:  Pt just returned from the procedure. Reporting buttocks pain. Hasn't been able to eat much.  BS still low.   Agreeable to making up missed time tomorrow.          Functional Outcomes                 Cognition  WFL  Safety Judgement: decreased awareness of need for safety  Sequencing: requires cues for some  Orientation:    alert and oriented x 4  Command Following:   WFL    Education  Barriers To Learning: none  Patient Education: patient educated on goals, PT role and benefits, plan of care  Learning Assessment:  patient verbalizes

## 2025-04-17 VITALS
OXYGEN SATURATION: 98 % | HEIGHT: 75 IN | DIASTOLIC BLOOD PRESSURE: 78 MMHG | BODY MASS INDEX: 19.27 KG/M2 | SYSTOLIC BLOOD PRESSURE: 131 MMHG | HEART RATE: 66 BPM | RESPIRATION RATE: 18 BRPM | TEMPERATURE: 97.7 F | WEIGHT: 154.98 LBS

## 2025-04-17 LAB
ANION GAP SERPL CALCULATED.3IONS-SCNC: 8 MMOL/L (ref 3–16)
BASOPHILS # BLD: 0 K/UL (ref 0–0.2)
BASOPHILS NFR BLD: 0.6 %
BUN SERPL-MCNC: 39 MG/DL (ref 7–20)
CALCIUM SERPL-MCNC: 9 MG/DL (ref 8.3–10.6)
CHLORIDE SERPL-SCNC: 102 MMOL/L (ref 99–110)
CO2 SERPL-SCNC: 25 MMOL/L (ref 21–32)
CREAT SERPL-MCNC: 2 MG/DL (ref 0.8–1.3)
DEPRECATED RDW RBC AUTO: 20.1 % (ref 12.4–15.4)
EOSINOPHIL # BLD: 0.1 K/UL (ref 0–0.6)
EOSINOPHIL NFR BLD: 2.1 %
GFR SERPLBLD CREATININE-BSD FMLA CKD-EPI: 34 ML/MIN/{1.73_M2}
GLUCOSE SERPL-MCNC: 86 MG/DL (ref 70–99)
HCT VFR BLD AUTO: 28.2 % (ref 40.5–52.5)
HGB BLD-MCNC: 9.5 G/DL (ref 13.5–17.5)
LYMPHOCYTES # BLD: 1.2 K/UL (ref 1–5.1)
LYMPHOCYTES NFR BLD: 17.6 %
MCH RBC QN AUTO: 34 PG (ref 26–34)
MCHC RBC AUTO-ENTMCNC: 33.8 G/DL (ref 31–36)
MCV RBC AUTO: 100.4 FL (ref 80–100)
MONOCYTES # BLD: 0.4 K/UL (ref 0–1.3)
MONOCYTES NFR BLD: 5.8 %
NEUTROPHILS # BLD: 5.1 K/UL (ref 1.7–7.7)
NEUTROPHILS NFR BLD: 73.9 %
PLATELET # BLD AUTO: 148 K/UL (ref 135–450)
PMV BLD AUTO: 7 FL (ref 5–10.5)
POTASSIUM SERPL-SCNC: 4.9 MMOL/L (ref 3.5–5.1)
RBC # BLD AUTO: 2.81 M/UL (ref 4.2–5.9)
SODIUM SERPL-SCNC: 135 MMOL/L (ref 136–145)
WBC # BLD AUTO: 6.9 K/UL (ref 4–11)

## 2025-04-17 PROCEDURE — 99232 SBSQ HOSP IP/OBS MODERATE 35: CPT | Performed by: INTERNAL MEDICINE

## 2025-04-17 PROCEDURE — 2580000003 HC RX 258: Performed by: INTERNAL MEDICINE

## 2025-04-17 PROCEDURE — 6360000002 HC RX W HCPCS: Performed by: INTERNAL MEDICINE

## 2025-04-17 PROCEDURE — 80048 BASIC METABOLIC PNL TOTAL CA: CPT

## 2025-04-17 PROCEDURE — 85025 COMPLETE CBC W/AUTO DIFF WBC: CPT

## 2025-04-17 PROCEDURE — 97535 SELF CARE MNGMENT TRAINING: CPT

## 2025-04-17 PROCEDURE — 97530 THERAPEUTIC ACTIVITIES: CPT

## 2025-04-17 PROCEDURE — 2500000003 HC RX 250 WO HCPCS: Performed by: INTERNAL MEDICINE

## 2025-04-17 PROCEDURE — 90935 HEMODIALYSIS ONE EVALUATION: CPT

## 2025-04-17 PROCEDURE — 6370000000 HC RX 637 (ALT 250 FOR IP): Performed by: INTERNAL MEDICINE

## 2025-04-17 PROCEDURE — 36415 COLL VENOUS BLD VENIPUNCTURE: CPT

## 2025-04-17 RX ORDER — SENNA AND DOCUSATE SODIUM 50; 8.6 MG/1; MG/1
2 TABLET, FILM COATED ORAL 2 TIMES DAILY PRN
Qty: 30 TABLET | Refills: 0 | Status: SHIPPED | OUTPATIENT
Start: 2025-04-17

## 2025-04-17 RX ORDER — POLYETHYLENE GLYCOL 3350 17 G/17G
17 POWDER, FOR SOLUTION ORAL DAILY
Qty: 30 PACKET | Refills: 0 | Status: SHIPPED | OUTPATIENT
Start: 2025-04-17 | End: 2025-05-17

## 2025-04-17 RX ORDER — TRAZODONE HYDROCHLORIDE 50 MG/1
25 TABLET ORAL NIGHTLY
Qty: 60 TABLET | Refills: 0 | Status: SHIPPED | OUTPATIENT
Start: 2025-04-17

## 2025-04-17 RX ORDER — ASPIRIN 81 MG/1
81 TABLET, CHEWABLE ORAL DAILY
Qty: 30 TABLET | Refills: 0 | Status: SHIPPED | OUTPATIENT
Start: 2025-04-17

## 2025-04-17 RX ORDER — OXYCODONE AND ACETAMINOPHEN 5; 325 MG/1; MG/1
1 TABLET ORAL EVERY 6 HOURS PRN
Qty: 14 TABLET | Refills: 0 | Status: SHIPPED | OUTPATIENT
Start: 2025-04-17 | End: 2025-04-24

## 2025-04-17 RX ORDER — LIDOCAINE 4 G/G
1 PATCH TOPICAL DAILY PRN
Qty: 30 PATCH | Refills: 0 | Status: SHIPPED | OUTPATIENT
Start: 2025-04-17

## 2025-04-17 RX ORDER — METHOCARBAMOL 750 MG/1
750 TABLET, FILM COATED ORAL 3 TIMES DAILY PRN
Qty: 14 TABLET | Refills: 0 | Status: SHIPPED | OUTPATIENT
Start: 2025-04-17 | End: 2025-04-24

## 2025-04-17 RX ADMIN — OXYCODONE HYDROCHLORIDE AND ACETAMINOPHEN 1 TABLET: 5; 325 TABLET ORAL at 12:06

## 2025-04-17 RX ADMIN — CHOLECALCIFEROL TAB 125 MCG (5000 UNIT) 5000 UNITS: 125 TAB at 09:43

## 2025-04-17 RX ADMIN — OXYCODONE HYDROCHLORIDE AND ACETAMINOPHEN 1 TABLET: 5; 325 TABLET ORAL at 05:44

## 2025-04-17 RX ADMIN — PANTOPRAZOLE SODIUM 40 MG: 40 TABLET, DELAYED RELEASE ORAL at 05:44

## 2025-04-17 RX ADMIN — SODIUM CHLORIDE, PRESERVATIVE FREE 10 ML: 5 INJECTION INTRAVENOUS at 09:45

## 2025-04-17 RX ADMIN — APIXABAN 2.5 MG: 2.5 TABLET, FILM COATED ORAL at 09:42

## 2025-04-17 RX ADMIN — SEVELAMER CARBONATE 800 MG: 800 TABLET, FILM COATED ORAL at 09:42

## 2025-04-17 RX ADMIN — ZINC SULFATE 220 MG (50 MG) CAPSULE 50 MG: CAPSULE at 09:45

## 2025-04-17 RX ADMIN — SEVELAMER CARBONATE 800 MG: 800 TABLET, FILM COATED ORAL at 15:55

## 2025-04-17 RX ADMIN — Medication 1 MG: at 09:43

## 2025-04-17 RX ADMIN — EPOETIN ALFA-EPBX 10000 UNITS: 10000 INJECTION, SOLUTION INTRAVENOUS; SUBCUTANEOUS at 14:38

## 2025-04-17 RX ADMIN — CYANOCOBALAMIN TAB 1000 MCG 1000 MCG: 1000 TAB at 09:43

## 2025-04-17 RX ADMIN — ACETAMINOPHEN 650 MG: 325 TABLET ORAL at 09:42

## 2025-04-17 RX ADMIN — DICLOFENAC SODIUM 2 G: 10 GEL TOPICAL at 09:43

## 2025-04-17 RX ADMIN — VANCOMYCIN HYDROCHLORIDE 1000 MG: 1 INJECTION, POWDER, LYOPHILIZED, FOR SOLUTION INTRAVENOUS at 15:54

## 2025-04-17 RX ADMIN — METHOCARBAMOL 750 MG: 750 TABLET ORAL at 05:44

## 2025-04-17 RX ADMIN — OXYCODONE HYDROCHLORIDE AND ACETAMINOPHEN 500 MG: 500 TABLET ORAL at 09:43

## 2025-04-17 ASSESSMENT — ENCOUNTER SYMPTOMS
SINUS PRESSURE: 0
DIARRHEA: 0
ABDOMINAL PAIN: 0
NAUSEA: 0
EYE DISCHARGE: 0
WHEEZING: 0
BACK PAIN: 0
SHORTNESS OF BREATH: 0
EYE REDNESS: 0
SINUS PAIN: 0
CONSTIPATION: 0
COUGH: 0
SORE THROAT: 0
RHINORRHEA: 0

## 2025-04-17 ASSESSMENT — PAIN SCALES - GENERAL
PAINLEVEL_OUTOF10: 7
PAINLEVEL_OUTOF10: 5
PAINLEVEL_OUTOF10: 4
PAINLEVEL_OUTOF10: 7
PAINLEVEL_OUTOF10: 5

## 2025-04-17 ASSESSMENT — PAIN DESCRIPTION - LOCATION: LOCATION: KNEE

## 2025-04-17 ASSESSMENT — PAIN DESCRIPTION - ORIENTATION: ORIENTATION: RIGHT

## 2025-04-17 ASSESSMENT — PAIN DESCRIPTION - DESCRIPTORS: DESCRIPTORS: ACHING

## 2025-04-17 NOTE — DISCHARGE INSTRUCTIONS
We hope your stay on rehab has exceeded your expectations. Once again the entire Acute Rehab Staff at [facility] wish to thank you for allowing us the privilege to care for you.         A few days after you are discharged from Rehab, you will receive a survey (Blake  Gankian) in the mail or through email.  This is a nationally distributed survey sent to thousands of rehab patients throughout the nation.              It is very important to everyone on the rehab unit that we receive feedback based on your experience.          Thank you, we wish you good health always,         Acute Rehab Team      Hospital Preference:     __[facility]        Medical Diagnosis/Conditions    _______________________ (free text)    Emergency Contact:    ________________________________________Phone#________________________      Advanced Directives:    Code Status: ?  []  Full Code  ?  []  DNR  ? []  DNRCC  ? []  DNRCC - Arrest    (as of date of discharge:  _________)      Medical POA: ?  []   Yes ______________________________ ?  []   No                                       (Name and phone number)                     Living Will:   ?   []   Yes    ?  []   No        Insurance Information:    _______________________ (free text)      Individual Responsible  for the coordination of the discharge/follow up:    ______________________________________________________    Functional Status:    VISUAL DEFICITS:    Yes []  No  []       If yes, assisted device:   Wears Glasses Yes []  No  []  Wears Contacts  Yes []  No  []  Legally Blind Yes []  No  []    HEARING DEFICITS:    Yes []  No  []       If yes, assisted device:   Wears Hearing Aids Yes []  No  []  Pocket Talker  Yes []  No  []       Physical Therapist & Contact #:  OccupationalTherapist & Contact #: SELENE Dupree OTR/ALEX [id number] [phone]   Speech Therapist & Contact #:       Activities of Daily Living:     ADL's - Adaptive Equipment used: shower chair     []   Independent ?  []  Modified Independent ?  []  Supervision                []  Minimal Assistance ? []  Moderate Assistance ? []  Maximal Assistance      Driving Restriction:      []  YES   [] NO     Mobility:     Ambulation: Device _____________________ (free text)     []  Independent ?  []  Modified Independent ?  []  Supervision                []  Minimal Assistance ? []  Moderate Assistance ? []  Maximal Assistance     Stairs:  Device _____________________ (free text)    Number of stairs: _____________ (free text)    Handrails:    [] Right   [] Left      [] Bilateral   []  Independent ?  []  Modified Independent ?  []  Supervision                []  Minimal Assistance ? []  Moderate Assistance ? []  Maximal Assistance     Wheelchair:     ? []  Independent ?  []  Modified Independent ?  []  Supervision                 []  Minimal Assistance  ?[]  Moderate Assistance ? []  Maximal Assistance       Current Diet Consistency:?       []  Regular   [] Soft and Bite-Sized  ?[] Minced and Moist     ?[]  Puree     Current Liquid Level:?         [] Thin Liquids     [] Mildly Thick (Nectar) ?    [] Moderately Thick (Honey)    [] Pudding Thick    [] Castillo Water Protocol     Dietary Restrictions:?      []  General Diet   []  Tube Feed, w/ Diet   []  Tube Feed, NPO   []  Carb Control   []  Cardiac   []  Renal    []  Other:

## 2025-04-17 NOTE — PLAN OF CARE
Problem: Chronic Conditions and Co-morbidities  Goal: Patient's chronic conditions and co-morbidity symptoms are monitored and maintained or improved  Outcome: Completed     Problem: Discharge Planning  Goal: Discharge to home or other facility with appropriate resources  Outcome: Completed     Problem: Safety - Adult  Goal: Free from fall injury  Outcome: Completed     Problem: Pain  Goal: Verbalizes/displays adequate comfort level or baseline comfort level  Outcome: Completed     Problem: Nutrition Deficit:  Goal: Optimize nutritional status  Outcome: Completed     Problem: Skin/Tissue Integrity  Goal: Skin integrity remains intact  Description: 1.  Monitor for areas of redness and/or skin breakdown  2.  Assess vascular access sites hourly  3.  Every 4-6 hours minimum:  Change oxygen saturation probe site  4.  Every 4-6 hours:  If on nasal continuous positive airway pressure, respiratory therapy assess nares and determine need for appliance change or resting period  Outcome: Completed  Flowsheets (Taken 4/17/2025 1577)  Skin Integrity Remains Intact: Monitor for areas of redness and/or skin breakdown     Problem: ABCDS Injury Assessment  Goal: Absence of physical injury  Outcome: Completed

## 2025-04-17 NOTE — PROGRESS NOTES
CLINICAL PHARMACY NOTE: MEDS TO BEDS    Total # of Prescriptions Filled: 8   The following medications were delivered to the patient:  Aspirin Low aspirin 81 mg chew  Trazodone HCL 50 mg  Percocet 5/325 mg  MiraLAX 3350 17 GM  Lidocaine Pain relief 4% patch  Stimulant laxative 8.6-5.0 mg   Diclofenac sodium 1% gel  Methocarbamol 750 mg     Additional Documentation: Jessika approved to deliver medication to patient room=signed  Pattie Oden CPhT

## 2025-04-17 NOTE — PROGRESS NOTES
[facility] - Inpatient Rehabilitation Department   Phone: [phone]    Occupational Therapy    [] Initial Evaluation            [x] Daily Treatment Note         [x] Discharge Summary      Patient: Danny Rock   : 1950   MRN: [id number]   Date of Service:  2025    Admitting Diagnosis:  Lumbar spondylosis    Current Admission Summary:   Danny Rock is a 75 y.o. male with PMHx notable for HTN, HLD, CAD, HFrEF, atrial fibrillation, renal cancer s/p right nephrectomy, ESRD on HD who initially presented to [facility] on 3/22 with low back pain and had MRI showing severe multilevel foraminal stenosis worst at L5-S1 prompting transfer to [facility] for neurosurgical evaluation. Repeat MRI C/T/L-spine on 3/23 showed multilevel disc/endplate enhancement compatible with discitis and osteomyelitis at L2-L3, L3-L4 and L4-L5 with adjacent paravertebral edema, and small right psoas muscle abscess. Blood cultures resulted positive for Enterococcus faecalis. He was started on IV vancomycin and gentamicin on 3/25, with plan at discharge to continue IV vancomycin 3 times a week after dialysis through 2025. NSGY evaluated while inpatient, recommended no surgical intervention at this time. Nephrology consulted for HD needs, had old HD access removed and TDC placed on 3/28.      He was admitted to [facility] at [facility] from 3/30-.  Rehabilitation course was complicated by development of large right iliopsoas intramuscular hematoma.  His hemoglobin continued to downtrend despite holding Eliquis and aspirin.  Case was discussed with Orthopedic Surgery, NSGY and IR.  Patient was discharged back to acute hospital where Eliquis and aspirin were held for 4 days. Patient was started on heparin drip on , with ongoing stability of his hemoglobin.  He is able to resume his Eliquis on , and aspirin after 1 week if stable without signs of recurrent bleeding    Past Medical History:  has a past medical

## 2025-04-17 NOTE — PROGRESS NOTES
MD Gordon Frankel MD Aldo Estella, DO                 Office: [phone]                      Fax: [phone]             [employer]                   NEPHROLOGY CONSULT PROGRESS NOTE       IMPRESSION/RECOMMENDATIONS:        #ESKD on HD TTS  -HD TTS at [facility] under our care.  -EDW while inpatient ~68kg  -continue iHD TTS while inpatient    S/P HD Tuesday  - tolerated - changed HD setting as below      Treatment time: 3 hours  Net UF: 2500 ml     Pre weight: 73.6 kg  Post weight:71.1 kg        Next HD Thursday   - today   On it  W/ low flow    -outpt will get 24 hrs urine - as solutes improving , ?? Renal recovery   - have d/w outpt HD manager too       Discussed and confirmed with HD nurse    We had discussed with improved renal solutes plateauing, continue to monitor closely as we can decrease HD times and decrease low flows      # Volume status  Euvolemic clinically  Estimated target weight 68 kg  Slightly above 1 L usually    #hyponatremia  -likely 2/2 hypervolemia. Continue HD  -improving    #metabolic acidosis  -likely from ESKD, continue iHD, improving.    #hypoalbuminemia  -likely 2/2 malnutrition, ESKD  -encourage po protein intake    Hypophosphatemia, improving with  Nutrition    #anemia of CKD  -has Hx of renal ca s/p nephrectomy  -hgb stable, POONAM with HD.  -monitor      Medical decision making- high complexity. Multiple complex health problems.   Discussed with patient and treatment team, Garret Ponce MD  , pt wife    Thank you for allowing me to participate in this patient's care. Please do not hesitate to contact me for any questions/concerns. We will follow along with you.     Gordon Rolle MD  [facility]   Phone: [phone] or [employer]  Fax: [phone]    Severally ill, at risk of impending organ failure needing  higher level of care/monitoring.   Time spent that included face-to-face meeting/discussion with

## 2025-04-17 NOTE — PROGRESS NOTES
Treatment time: 3 hrs    Net UF: 1500 ml    Pre weight: 71.8 kg  Post weight: 70.3 kg  EDW: 68 kg    Access used: Lt CW TDC  Access function: Well tolerated, 300 BFR    Medications or blood products given: Retacrit 10,000 units, Heparin dwells    Regular outpatient schedule: TTS    Summary of response to treatment: Pt tolerated well. Pt remained stable throughout entire treatment and upon exiting the hemodialysis suite. Report given to Jessika Sheth RN

## 2025-04-17 NOTE — PROGRESS NOTES
[facility] - Inpatient Rehabilitation Department   Phone: [phone]    Physical Therapy    [] Initial Evaluation            [x] Daily Treatment Note         [x] Discharge Summary      Patient: Danny Rock   : 1950   MRN: [id number]   Date of Service:  2025  Admitting Diagnosis: Lumbar spondylosis  Current Admission Summary: Danny Rock is a 75 y.o. male with PMHx notable for HTN, HLD, CAD, HFrEF, atrial fibrillation, renal cancer s/p right nephrectomy, ESRD on HD who initially presented to [facility] on 3/22 with low back pain and had MRI showing severe multilevel foraminal stenosis worst at L5-S1 prompting transfer to [facility] for neurosurgical evaluation. Repeat MRI C/T/L-spine on 3/23 showed multilevel disc/endplate enhancement compatible with discitis and osteomyelitis at L2-L3, L3-L4 and L4-L5 with adjacent paravertebral edema, and small right psoas muscle abscess. Blood cultures resulted positive for Enterococcus faecalis. He was started on IV vancomycin and gentamicin on 3/25, with plan at discharge to continue IV vancomycin 3 times a week after dialysis through 2025. NSGY evaluated while inpatient, recommended no surgical intervention at this time. Nephrology consulted for HD needs, had old HD access removed and TDC placed on 3/28.      He was admitted to [facility] from 3/30-.  Rehabilitation course was complicated by development of large right iliopsoas intramuscular hematoma.  His hemoglobin continued to downtrend despite holding Eliquis and aspirin.  Case was discussed with Orthopedic Surgery, NSGY and IR.  Patient was discharged back to acute hospital where Eliquis and aspirin were held for 4 days. Patient was started on heparin drip on , with ongoing stability of his hemoglobin.  He is able to resume his Eliquis on , and aspirin after 1 week if stable without signs of recurrent bleeding.  Past Medical History:  has a past medical history of  currently is able to perform bed mobility, transfers, and ambulate household distances with the RW at mod I.  Pt requires supervision for stairs due to inability to recall safe sequencing.  Ongoing skilled PT services recommended for continued strengthening and endurance.       Safety Interventions: patient left in chair, chair alarm in place, call light within reach, telesitter in use, and family/caregiver present- wife present    Plan  Frequency: 5 x/week, 90 min/day  Current Treatment Recommendations: strengthening, ROM, balance training, functional mobility training, transfer training, gait training, stair training, endurance training, and neuromuscular re-education    Goals  Patient Goals: to return home, reduce back pain    Short Term Goals:  Time Frame: 7 days   Patient will complete bed mobility at modified independent --MET  Patient will complete transfers at [facility] --MET  Patient will ambulate 150 ft with use of rolling walker at modified independent--not met  Patient will ascend/descend 8 stairs with a single railing support at modified independent--not met  Patient will complete car transfer at [facility]--MET    Above goals reviewed on 4/17/2025.  All goals are ongoing at this time unless indicated above.      Therapy Session Time      Individual Group Co-treatment   Time In 0830       Time Out 0930       Minutes 60           Total Treatment Minutes:  60    Electronically signed by: Vicki Torres, [id number]

## 2025-04-17 NOTE — DISCHARGE INSTR - COC
Continuity of Care Form    Patient Name: Danny Rock   :  1950  MRN:  [id number]    Admit date:  2025  Discharge date:  25    Code Status Order: Full Code   Advance Directives:    Date/Time Healthcare Directive Type of Healthcare Directive Copy in Chart Healthcare Agent Appointed Healthcare Agent's Name Healthcare Agent's Phone Number    25 1252 No, patient does not have an advance directive for healthcare treatment  --  --  --  --  --             Admitting Physician:  Garret Ponce MD  PCP: Yesi Rosa APRN - CNP    Discharging Nurse: Jessika  Discharging Hospital Unit/Room#: ARU-4906/4906-01  Discharging Unit Phone Number: [phone]    Emergency Contact:   Extended Emergency Contact Information  Primary Emergency Contact: Amanda Rock  Address: [address] [place] [address]            [place] [address] [facility]  Home Phone: [phone]  Mobile Phone: [phone]  Relation: Spouse    Past Surgical History:  Past Surgical History:   Procedure Laterality Date    CATARACT REMOVAL Bilateral     ~  with lens implanted    CHOLECYSTECTOMY      CT GUIDED CHEST TUBE  2024    CT GUIDED CHEST TUBE 2024 [facility] CT SCAN    CT NEEDLE BIOPSY LIVER PERCUTANEOUS  2024    CT NEEDLE BIOPSY LIVER PERCUTANEOUS 2024 [facility] CT SCAN    CT NEEDLE BIOPSY LIVER PERCUTANEOUS  2024    CT NEEDLE BIOPSY LIVER PERCUTANEOUS 2024 [facility] CT SCAN    EYE SURGERY Bilateral 2018    Cataract    HERNIA REPAIR      HERNIA REPAIR Left 2024    OPEN LEFT INGUINAL HERNIA REPAIR WITH MESH performed by Camacho Chowdhury MD at [facility] OR    IR TUNNELED CVC PLACE WO SQ PORT/PUMP > 5 YEARS  2024    IR TUNNELED CATHETER PLACEMENT GREATER THAN 5 YEARS 2024 Kosta Baugh MD [facility] SPECIAL PROCEDURES    IR TUNNELED CVC PLACE WO SQ PORT/PUMP > 5 YEARS  2025    IR TUNNELED CVC PLACE WO SQ PORT/PUMP > 5 YEARS 3/28/2025 [facility] CARDIAC CATH/IR/NEURO LAB    KIDNEY REMOVAL

## 2025-04-17 NOTE — CARE COORDINATION
04/17/25 1046   IMM Letter   IMM Letter given to Patient/Family/Significant other/Guardian/POA/by: patient signed   IMM Letter date given: 04/17/25   IMM Letter time given: 0904     Electronically signed by TALAT Atkins on 4/17/25 at 10:46 AM EDT

## 2025-04-17 NOTE — PROGRESS NOTES
Infectious Diseases   Progress Note      Admission Date: 4/8/2025  Hospital Day: Hospital Day: 10   Attending: Garret Ponce MD  Date of service: 4/17/2025     Chief complaint/ Reason for consult:     Enterococcus faecalis bacteremia  Lumbar discitis and osteomyelitis with foraminal stenosis and spinal canal stenosis at L3-L4 level  History of prostate cancer  ESRD on hemodialysis  Elevated sed rate     Microbiology:      I have reviewed allavailable micro lab data and cultures    Results       ** No results found for the last 336 hours. **             Susceptibility data from last 90 days.  Collected Specimen Info Organism Ampicillin gentamicin-syn Sodium streptomycin-syn   03/26/25 Blood Staphylococcus epidermidis (1)         Staphylococcus epidermidis (2)       03/24/25 Blood Enterococcus faecalis       03/24/25 Blood Enterococcus faecalis       03/23/25 Blood Enterococcus faecalis (2)  S   S      Enterococcus faecalis (1)       03/23/25 Blood Enterococcus faecalis       03/22/25 Blood Enterococcus faecalis (3)  S  R  S  S     Staphylococcus epidermidis         Enterococcus faecalis (1)         Micrococcus luteus       01/31/25 Abscess Diphtheroids         Micrococcus luteus              Antibiotics and immunizations:       Current antibiotics: All antibiotics and their doses were reviewed by me    Recent Abx Admin        No antibiotic orders with administrations found.                      Immunization History: All immunization history was reviewed by me today.    There is no immunization history for the selected administration types on file for this patient.    Known drug allergies:     All allergies were reviewed and updated    Allergies   Allergen Reactions    Amoxicillin Other (See Comments)     Urethritis, rash    Bisoprolol-Hydrochlorothiazide Other (See Comments)     G.I. rash    Cisapride Other (See Comments)     diarrhea    Penicillins      Unsure of reaction  Tolerated ceftriaxone  1/29/25  Tolerated Ancef 12/31/24    Pioglitazone Other (See Comments)     BRITTON upset (long time ago)       Social history:     Social History:  All social andepidemiologic history was reviewed and updated by me today as needed.     Tobacco use:   reports that he quit smoking about 55 years ago. His smoking use included cigarettes. He started smoking about 57 years ago. He has never used smokeless tobacco.  Alcohol use:   reports current alcohol use.  Currently lives in: [place] [address]   reports current drug use. Drug: Marijuana (Weed).     COVID VACCINATION AND LAB RESULT RECORDS:     Internal Administration   First Dose      Second Dose           Last COVID Lab SARS-CoV-2 RNA, RT PCR (no units)   Date Value   11/28/2023 NOT DETECTED            Assessment:     The patient is a 75 y.o. old male who  has a past medical history of Atrial fibrillation (HCC), CAD (coronary artery disease), Cancer of kidney (HCC), Dependence on renal dialysis, Diabetes mellitus (HCC), Hyperlipidemia, Hypertension, Prostate cancer (HCC), Right renal mass, and Systolic CHF (HCC). with following problems:    Enterococcus faecalis bacteremia-currently on IV vancomycin  Lumbar discitis and osteomyelitis with foraminal stenosis and spinal canal stenosis at L3-L4 level-covered with IV vancomycin  History of prostate cancer  ESRD on hemodialysis-stable  Elevated sed rate   Elevated CRP  Essential hypertension-blood pressure okay  Mixed hyperlipidemia  Paroxysmal atrial fibrillation-heart rate controlled  Type 2 diabetes mellitus maintain good glucose control      Discussion:      The patient is afebrile.  White cell count 6900 today.    Serum creatinine is 2.0.    Hemoglobin is 9.5 and the platelet count is 148,000 today.        Plan:     Diagnostic Workup:      Continue to follow  fever curve, WBC count and blood cultures.  Continue to monitor blood counts, liver and renal function.    Antimicrobials:    Improvement in hemoglobin and platelet

## 2025-04-17 NOTE — PROGRESS NOTES
ARU Discharge Assessment    Transportation  \"Has lack of transportation kept you from medical appointments, meetings, work, or from getting things needed for daily living?\"Check all that apply:  [] A.  Yes, it has kept me from medical appointments or from getting my medications  [] B.  Yes, it has kept me from non-medical meetings, appointments, work, or from getting things that I need  [x] C.  No  [] X.  Patient unable to respond  [] Y.  Patient declines to respond    Provision of Current Reconciled Medication List to Subsequent Provider at Discharge  [] No, current reconciled medication list not provided to the subsequent provider.  [x] Yes, current reconciled medication list provided to the subsequent provider. (**Select route of transmission below**)   [x] Via Electronic Health Record   [x] Via Health Information Exchange Organization  [] Verbal (e.g. in person, telephone, video conferencing)  [] Paper-based (e.g. fax, copies, printouts)   [] Other Methods (e.g. texting, email, CDs)    Provision of Current Reconciled Medication List to Patient at Discharge  [] No, current reconciled medication list not provided to the patient, family and/or caregiver.   [x] Yes, current reconciled medication list provided to the patient, family and/or caregiver.  (**Select route of transmission below**)   [x] Via Electronic Health Record (e.g., electronic access to patient portal)   [x] Via Health Information Exchange Organization  [] Verbal (e.g. in person, telephone, video conferencing)  [] Paper-based (e.g. fax, copies, printouts)   [] Other Methods (e.g. texting, email, CDs)    Health Literacy  \"How often do you need to have someone help you when you read instructions, pamphlets, or other written material from your doctor or pharmacy?\"  []  0.  Never  [x]  1.  Rarely  []  2.  Sometimes  []  3.  Often  []  4.  Always  []  7.  Patient declines to respond  []  8.  Patient unable to respond    BIMS - **Must be completed in the

## 2025-04-17 NOTE — PLAN OF CARE
Problem: Chronic Conditions and Co-morbidities  Goal: Patient's chronic conditions and co-morbidity symptoms are monitored and maintained or improved  Outcome: Progressing     Problem: Discharge Planning  Goal: Discharge to home or other facility with appropriate resources  4/16/2025 2317 by Radha Palomino RN  Outcome: Progressing     Problem: Safety - Adult  Goal: Free from fall injury  4/16/2025 2317 by Radha Palomino RN  Outcome: Progressing     Problem: Pain  Goal: Verbalizes/displays adequate comfort level or baseline comfort level  4/16/2025 2317 by Radha Palomino RN  Outcome: Progressing  Flowsheets (Taken 4/16/2025 2147)  Verbalizes/displays adequate comfort level or baseline comfort level: Encourage patient to monitor pain and request assistance     Problem: Nutrition Deficit:  Goal: Optimize nutritional status  Outcome: Progressing     Problem: Skin/Tissue Integrity  Goal: Skin integrity remains intact  Description: 1.  Monitor for areas of redness and/or skin breakdown  2.  Assess vascular access sites hourly  3.  Every 4-6 hours minimum:  Change oxygen saturation probe site  4.  Every 4-6 hours:  If on nasal continuous positive airway pressure, respiratory therapy assess nares and determine need for appliance change or resting period  Outcome: Progressing  Flowsheets (Taken 4/16/2025 2305)  Skin Integrity Remains Intact: Monitor for areas of redness and/or skin breakdown     Problem: ABCDS Injury Assessment  Goal: Absence of physical injury  Outcome: Progressing  Flowsheets (Taken 4/16/2025 2305)  Absence of Physical Injury: Implement safety measures based on patient assessment

## 2025-04-17 NOTE — CARE COORDINATION
Case Management -  Discharge Note      Patient Name: Danny Rock                   YOB: 1950  Room: [place] [address]            Readmission Risk (Low < 19, Mod (19-27), High > 27): Readmission Risk Score: 29.5    Current PCP: Yesi Rosa APRN - CNP      (IMM) Important Message from Medicare:    Has pt received appropriate compliance notices before being discharged if required: yes  Compliance doc:  [x] 2nd IMM; [] Code 44 [] [place]  Date Given: 4/17/25 Given By: ANISHA    PT AM-PAC:   /24  OT AM-PAC:   /24    Patient/patient representative has been educated on the benefits of OP PT/OT as well as the possible risks of declining recommended services. Patient/patient representative has acknowledged the information provided and decided on the following discharge plan. Patient/ patient representative has been provided freedom of choice regarding service provider, supported by basic dialogue that supports the patient's individualized plan of care/goals.    Patient to continue HD at:    [address] [address]  Phone: [phone]  Fax: [phone]  Patient Schedule: T/Th/Sa @ 8AM     [facility] agency notified of discharge:  [] Yes [] No  [x] NA    Family notified of discharge:  [x] Yes  [] No  [] NA    Facility notified of discharge:  [] Yes  [] No  [x] NA    Pt is being discharged with Outpt IV Antibiotics  [] Yes [] No  [x] NA  If yes, make sure REGINE is faxed to [facility] agency, and meds are called in to pharmacy by RN from REGINE orders only.      Financial    Payor: MEDICARE / Plan: MEDICARE PART A AND B / Product Type: *No Product type* /     Pharmacy:  Potential assistance Purchasing Medications: No  Meds-to-Beds request: Yes      [facility] [address] [phone] - F [phone]  [address] [facility] [address]  Phone: [phone] Fax: [phone]      Notes:    Additional Case Management Notes:

## 2025-04-18 NOTE — DISCHARGE SUMMARY
Physical Medicine & Rehabilitation  Discharge Summary     Patient Identification:  Danny Rock  : 1950  Admit date: 2025  Discharge date: 2025  Attending provider: No att. providers found        Primary care provider: Yesi Rosa APRN - CNP     Discharge Diagnoses:   Patient Active Problem List   Diagnosis    HLD (hyperlipidemia)    Essential hypertension    Coronary artery disease due to lipid rich plaque    Cardiac dysrhythmia    Diabetes mellitus    Paroxysmal atrial fibrillation (HCC)    Renal mass    Cerebrovascular accident (CVA) (HCC)    Recent cerebrovascular accident (CVA)    HTN (hypertension), benign    PAF (paroxysmal atrial fibrillation) ([facility])    CASTRO (acute kidney injury)    LBBB (left bundle branch block)    H/O right nephrectomy    Anemia    Acute ischemic stroke ([facility])    Overweight (BMI 25.0-29.9)    AIN (acute interstitial nephritis)    Sterile pyuria    Current chronic use of systemic steroids    H/O systemic steroid therapy    Encounter for medication counseling    Secondary hypercoagulable state    Pneumothorax    Left inguinal hernia    Abscess of right shoulder    CRP elevated    Elevated sed rate    Abscess of right arm    ESRD on hemodialysis ([facility])    Hemorrhoid    Constipation    Low back pain    Lumbar foraminal stenosis    History of prostate cancer    Central stenosis of spinal canal    Enterococcus faecalis infection    Enterococcal bacteremia    Moderate malnutrition    Debility    Osteomyelitis of lumbar spine (HCC)    Lumbar discitis    Iliopsoas abscess on right (HCC)    Psoas hematoma, left, secondary to anticoagulant therapy, initial encounter    Psoas hematoma, right, secondary to anticoagulant therapy, sequela    Lumbar spondylosis    Severe malnutrition       Discharge Functional Status:      Physical therapy:  Supine to Sit: Independent  Sit to Supine: Independent      Sit to Stand: Independent  Chair/Bed to Chair Transfer: Independent  Car Transfer:

## 2025-05-06 ENCOUNTER — HOSPITAL ENCOUNTER (OUTPATIENT)
Dept: CT IMAGING | Age: 75
Discharge: HOME | End: 2025-05-06
Attending: INTERNAL MEDICINE
Payer: MEDICARE

## 2025-05-06 DIAGNOSIS — C64.1 MALIGNANT NEOPLASM OF RIGHT KIDNEY, EXCEPT RENAL PELVIS (HCC): ICD-10-CM

## 2025-05-06 PROCEDURE — 71250 CT THORAX DX C-: CPT

## 2025-05-06 PROCEDURE — 74150 CT ABDOMEN W/O CONTRAST: CPT

## 2025-05-15 ENCOUNTER — APPOINTMENT (OUTPATIENT)
Dept: CT IMAGING | Age: 75
DRG: 551 | End: 2025-05-15
Payer: MEDICARE

## 2025-05-15 ENCOUNTER — HOSPITAL ENCOUNTER (INPATIENT)
Age: 75
LOS: 9 days | Discharge: NURSING, REHAB OR LONG-TERM CARE | DRG: 551 | End: 2025-05-24
Attending: EMERGENCY MEDICINE | Admitting: HOSPITALIST
Payer: MEDICARE

## 2025-05-15 ENCOUNTER — APPOINTMENT (OUTPATIENT)
Dept: GENERAL RADIOLOGY | Age: 75
DRG: 551 | End: 2025-05-15
Payer: MEDICARE

## 2025-05-15 DIAGNOSIS — R53.1 GENERAL WEAKNESS: Primary | ICD-10-CM

## 2025-05-15 DIAGNOSIS — Z87.39 HISTORY OF DISCITIS: ICD-10-CM

## 2025-05-15 DIAGNOSIS — R79.89 ELEVATED TROPONIN: ICD-10-CM

## 2025-05-15 PROBLEM — Z87.09 HISTORY OF BACTERIAL SINUSITIS: Status: ACTIVE | Noted: 2025-05-15

## 2025-05-15 LAB
ALBUMIN SERPL-MCNC: 3.1 G/DL (ref 3.4–5)
ALBUMIN/GLOB SERPL: 1.1 {RATIO} (ref 1.1–2.2)
ALP SERPL-CCNC: 129 U/L (ref 40–129)
ALT SERPL-CCNC: 18 U/L (ref 10–40)
ANION GAP SERPL CALCULATED.3IONS-SCNC: 12 MMOL/L (ref 3–16)
AST SERPL-CCNC: 27 U/L (ref 15–37)
BACTERIA URNS QL MICRO: NORMAL /HPF
BASOPHILS # BLD: 0.1 K/UL (ref 0–0.2)
BASOPHILS NFR BLD: 1 %
BILIRUB SERPL-MCNC: 0.3 MG/DL (ref 0–1)
BILIRUB UR QL STRIP.AUTO: NEGATIVE
BUN SERPL-MCNC: 36 MG/DL (ref 7–20)
CALCIUM SERPL-MCNC: 8.6 MG/DL (ref 8.3–10.6)
CHLORIDE SERPL-SCNC: 102 MMOL/L (ref 99–110)
CLARITY UR: CLEAR
CO2 SERPL-SCNC: 24 MMOL/L (ref 21–32)
COLOR UR: YELLOW
CREAT SERPL-MCNC: 2.1 MG/DL (ref 0.8–1.3)
CRP SERPL-MCNC: 46.3 MG/L (ref 0–5.1)
DEPRECATED RDW RBC AUTO: 21.5 % (ref 12.4–15.4)
EOSINOPHIL # BLD: 0.1 K/UL (ref 0–0.6)
EOSINOPHIL NFR BLD: 1.7 %
EPI CELLS #/AREA URNS AUTO: 0 /HPF (ref 0–5)
ERYTHROCYTE [SEDIMENTATION RATE] IN BLOOD BY WESTERGREN METHOD: 39 MM/HR (ref 0–20)
GFR SERPLBLD CREATININE-BSD FMLA CKD-EPI: 32 ML/MIN/{1.73_M2}
GLUCOSE BLD-MCNC: 151 MG/DL (ref 70–99)
GLUCOSE BLD-MCNC: 88 MG/DL (ref 70–99)
GLUCOSE SERPL-MCNC: 125 MG/DL (ref 70–99)
GLUCOSE UR STRIP.AUTO-MCNC: NEGATIVE MG/DL
HCT VFR BLD AUTO: 26.8 % (ref 40.5–52.5)
HGB BLD-MCNC: 9 G/DL (ref 13.5–17.5)
HGB UR QL STRIP.AUTO: NEGATIVE
HYALINE CASTS #/AREA URNS AUTO: 0 /LPF (ref 0–8)
INR PPP: 1.71 (ref 0.85–1.15)
KETONES UR STRIP.AUTO-MCNC: NEGATIVE MG/DL
LACTATE BLDV-SCNC: 1.4 MMOL/L (ref 0.4–1.9)
LEUKOCYTE ESTERASE UR QL STRIP.AUTO: ABNORMAL
LIPASE SERPL-CCNC: 23 U/L (ref 13–60)
LYMPHOCYTES # BLD: 0.7 K/UL (ref 1–5.1)
LYMPHOCYTES NFR BLD: 11 %
MAGNESIUM SERPL-MCNC: 1.57 MG/DL (ref 1.8–2.4)
MCH RBC QN AUTO: 35.7 PG (ref 26–34)
MCHC RBC AUTO-ENTMCNC: 33.4 G/DL (ref 31–36)
MCV RBC AUTO: 106.8 FL (ref 80–100)
MONOCYTES # BLD: 0.3 K/UL (ref 0–1.3)
MONOCYTES NFR BLD: 4 %
NEUTROPHILS # BLD: 5.5 K/UL (ref 1.7–7.7)
NEUTROPHILS NFR BLD: 82.3 %
NITRITE UR QL STRIP.AUTO: NEGATIVE
PERFORMED ON: ABNORMAL
PERFORMED ON: NORMAL
PH UR STRIP.AUTO: 8.5 [PH] (ref 5–8)
PLATELET # BLD AUTO: 185 K/UL (ref 135–450)
PMV BLD AUTO: 7.5 FL (ref 5–10.5)
POTASSIUM SERPL-SCNC: 3.4 MMOL/L (ref 3.5–5.1)
PROCALCITONIN SERPL IA-MCNC: 0.2 NG/ML (ref 0–0.15)
PROT SERPL-MCNC: 5.8 G/DL (ref 6.4–8.2)
PROT UR STRIP.AUTO-MCNC: 100 MG/DL
PROTHROMBIN TIME: 20.2 SEC (ref 11.9–14.9)
RBC # BLD AUTO: 2.51 M/UL (ref 4.2–5.9)
RBC CLUMPS #/AREA URNS AUTO: 0 /HPF (ref 0–4)
SODIUM SERPL-SCNC: 138 MMOL/L (ref 136–145)
SP GR UR STRIP.AUTO: 1.01 (ref 1–1.03)
TROPONIN, HIGH SENSITIVITY: 67 NG/L (ref 0–22)
TROPONIN, HIGH SENSITIVITY: 76 NG/L (ref 0–22)
UA COMPLETE W REFLEX CULTURE PNL UR: ABNORMAL
UA DIPSTICK W REFLEX MICRO PNL UR: YES
URN SPEC COLLECT METH UR: ABNORMAL
UROBILINOGEN UR STRIP-ACNC: 1 E.U./DL
WBC # BLD AUTO: 6.6 K/UL (ref 4–11)
WBC #/AREA URNS AUTO: 0 /HPF (ref 0–5)

## 2025-05-15 PROCEDURE — 80053 COMPREHEN METABOLIC PANEL: CPT

## 2025-05-15 PROCEDURE — 84484 ASSAY OF TROPONIN QUANT: CPT

## 2025-05-15 PROCEDURE — 99285 EMERGENCY DEPT VISIT HI MDM: CPT

## 2025-05-15 PROCEDURE — 83605 ASSAY OF LACTIC ACID: CPT

## 2025-05-15 PROCEDURE — 2500000003 HC RX 250 WO HCPCS: Performed by: HOSPITALIST

## 2025-05-15 PROCEDURE — 83735 ASSAY OF MAGNESIUM: CPT

## 2025-05-15 PROCEDURE — 99223 1ST HOSP IP/OBS HIGH 75: CPT | Performed by: INTERNAL MEDICINE

## 2025-05-15 PROCEDURE — 87040 BLOOD CULTURE FOR BACTERIA: CPT

## 2025-05-15 PROCEDURE — 70450 CT HEAD/BRAIN W/O DYE: CPT

## 2025-05-15 PROCEDURE — 81001 URINALYSIS AUTO W/SCOPE: CPT

## 2025-05-15 PROCEDURE — 5A1D70Z PERFORMANCE OF URINARY FILTRATION, INTERMITTENT, LESS THAN 6 HOURS PER DAY: ICD-10-PCS | Performed by: STUDENT IN AN ORGANIZED HEALTH CARE EDUCATION/TRAINING PROGRAM

## 2025-05-15 PROCEDURE — 73502 X-RAY EXAM HIP UNI 2-3 VIEWS: CPT

## 2025-05-15 PROCEDURE — 73030 X-RAY EXAM OF SHOULDER: CPT

## 2025-05-15 PROCEDURE — 6370000000 HC RX 637 (ALT 250 FOR IP): Performed by: HOSPITALIST

## 2025-05-15 PROCEDURE — 1200000000 HC SEMI PRIVATE

## 2025-05-15 PROCEDURE — 72131 CT LUMBAR SPINE W/O DYE: CPT

## 2025-05-15 PROCEDURE — 86140 C-REACTIVE PROTEIN: CPT

## 2025-05-15 PROCEDURE — 84145 PROCALCITONIN (PCT): CPT

## 2025-05-15 PROCEDURE — 85652 RBC SED RATE AUTOMATED: CPT

## 2025-05-15 PROCEDURE — 93005 ELECTROCARDIOGRAM TRACING: CPT | Performed by: EMERGENCY MEDICINE

## 2025-05-15 PROCEDURE — 83690 ASSAY OF LIPASE: CPT

## 2025-05-15 PROCEDURE — 85610 PROTHROMBIN TIME: CPT

## 2025-05-15 PROCEDURE — 85025 COMPLETE CBC W/AUTO DIFF WBC: CPT

## 2025-05-15 PROCEDURE — 71045 X-RAY EXAM CHEST 1 VIEW: CPT

## 2025-05-15 RX ORDER — ONDANSETRON 2 MG/ML
4 INJECTION INTRAMUSCULAR; INTRAVENOUS EVERY 6 HOURS PRN
Status: DISCONTINUED | OUTPATIENT
Start: 2025-05-15 | End: 2025-05-24 | Stop reason: HOSPADM

## 2025-05-15 RX ORDER — ONDANSETRON 4 MG/1
4 TABLET, ORALLY DISINTEGRATING ORAL EVERY 8 HOURS PRN
Status: DISCONTINUED | OUTPATIENT
Start: 2025-05-15 | End: 2025-05-24 | Stop reason: HOSPADM

## 2025-05-15 RX ORDER — SEVELAMER CARBONATE 800 MG/1
800 TABLET, FILM COATED ORAL
Status: DISCONTINUED | OUTPATIENT
Start: 2025-05-15 | End: 2025-05-24 | Stop reason: HOSPADM

## 2025-05-15 RX ORDER — FUROSEMIDE 40 MG/1
40 TABLET ORAL
Status: ON HOLD | COMMUNITY
End: 2025-05-24 | Stop reason: HOSPADM

## 2025-05-15 RX ORDER — POLYETHYLENE GLYCOL 3350 17 G/17G
17 POWDER, FOR SOLUTION ORAL DAILY PRN
Status: DISCONTINUED | OUTPATIENT
Start: 2025-05-15 | End: 2025-05-24 | Stop reason: HOSPADM

## 2025-05-15 RX ORDER — SODIUM CHLORIDE 0.9 % (FLUSH) 0.9 %
5-40 SYRINGE (ML) INJECTION EVERY 12 HOURS SCHEDULED
Status: DISCONTINUED | OUTPATIENT
Start: 2025-05-15 | End: 2025-05-24 | Stop reason: HOSPADM

## 2025-05-15 RX ORDER — ACETAMINOPHEN 325 MG/1
650 TABLET ORAL EVERY 6 HOURS PRN
Status: DISCONTINUED | OUTPATIENT
Start: 2025-05-15 | End: 2025-05-23

## 2025-05-15 RX ORDER — METOPROLOL TARTRATE 50 MG
TABLET ORAL
Qty: 180 TABLET | Refills: 1 | Status: ON HOLD | OUTPATIENT
Start: 2025-05-15

## 2025-05-15 RX ORDER — POTASSIUM CHLORIDE 1500 MG/1
40 TABLET, EXTENDED RELEASE ORAL PRN
Status: DISCONTINUED | OUTPATIENT
Start: 2025-05-15 | End: 2025-05-24 | Stop reason: HOSPADM

## 2025-05-15 RX ORDER — INSULIN LISPRO 100 [IU]/ML
0-4 INJECTION, SOLUTION INTRAVENOUS; SUBCUTANEOUS
Status: DISCONTINUED | OUTPATIENT
Start: 2025-05-15 | End: 2025-05-24 | Stop reason: HOSPADM

## 2025-05-15 RX ORDER — MAGNESIUM SULFATE IN WATER 40 MG/ML
2000 INJECTION, SOLUTION INTRAVENOUS PRN
Status: DISCONTINUED | OUTPATIENT
Start: 2025-05-15 | End: 2025-05-24 | Stop reason: HOSPADM

## 2025-05-15 RX ORDER — ASPIRIN 81 MG/1
81 TABLET, CHEWABLE ORAL DAILY
Status: DISCONTINUED | OUTPATIENT
Start: 2025-05-16 | End: 2025-05-24 | Stop reason: HOSPADM

## 2025-05-15 RX ORDER — TRAZODONE HYDROCHLORIDE 50 MG/1
25 TABLET ORAL NIGHTLY
Status: DISCONTINUED | OUTPATIENT
Start: 2025-05-15 | End: 2025-05-24 | Stop reason: HOSPADM

## 2025-05-15 RX ORDER — ONDANSETRON 8 MG/1
8 TABLET, FILM COATED ORAL EVERY 8 HOURS PRN
COMMUNITY

## 2025-05-15 RX ORDER — MORPHINE SULFATE 2 MG/ML
2 INJECTION, SOLUTION INTRAMUSCULAR; INTRAVENOUS EVERY 4 HOURS PRN
Status: DISCONTINUED | OUTPATIENT
Start: 2025-05-15 | End: 2025-05-23

## 2025-05-15 RX ORDER — ACETAMINOPHEN 650 MG/1
650 SUPPOSITORY RECTAL EVERY 6 HOURS PRN
Status: DISCONTINUED | OUTPATIENT
Start: 2025-05-15 | End: 2025-05-23

## 2025-05-15 RX ORDER — POTASSIUM CHLORIDE 7.45 MG/ML
10 INJECTION INTRAVENOUS PRN
Status: DISCONTINUED | OUTPATIENT
Start: 2025-05-15 | End: 2025-05-24 | Stop reason: HOSPADM

## 2025-05-15 RX ORDER — SENNA AND DOCUSATE SODIUM 50; 8.6 MG/1; MG/1
2 TABLET, FILM COATED ORAL 2 TIMES DAILY PRN
Status: DISCONTINUED | OUTPATIENT
Start: 2025-05-15 | End: 2025-05-24 | Stop reason: HOSPADM

## 2025-05-15 RX ORDER — PANTOPRAZOLE SODIUM 40 MG/1
40 TABLET, DELAYED RELEASE ORAL
Status: DISCONTINUED | OUTPATIENT
Start: 2025-05-16 | End: 2025-05-24 | Stop reason: HOSPADM

## 2025-05-15 RX ORDER — SODIUM CHLORIDE 9 MG/ML
INJECTION, SOLUTION INTRAVENOUS PRN
Status: DISCONTINUED | OUTPATIENT
Start: 2025-05-15 | End: 2025-05-24 | Stop reason: HOSPADM

## 2025-05-15 RX ORDER — GLUCAGON 1 MG/ML
1 KIT INJECTION PRN
Status: DISCONTINUED | OUTPATIENT
Start: 2025-05-15 | End: 2025-05-24 | Stop reason: HOSPADM

## 2025-05-15 RX ORDER — METOPROLOL TARTRATE 50 MG
50 TABLET ORAL
Status: DISCONTINUED | OUTPATIENT
Start: 2025-05-16 | End: 2025-05-24 | Stop reason: HOSPADM

## 2025-05-15 RX ORDER — SODIUM CHLORIDE 0.9 % (FLUSH) 0.9 %
5-40 SYRINGE (ML) INJECTION PRN
Status: DISCONTINUED | OUTPATIENT
Start: 2025-05-15 | End: 2025-05-24 | Stop reason: HOSPADM

## 2025-05-15 RX ORDER — METHOCARBAMOL 750 MG/1
750 TABLET, FILM COATED ORAL 3 TIMES DAILY PRN
COMMUNITY

## 2025-05-15 RX ORDER — INSULIN GLARGINE 100 [IU]/ML
8 INJECTION, SOLUTION SUBCUTANEOUS NIGHTLY
Status: DISCONTINUED | OUTPATIENT
Start: 2025-05-15 | End: 2025-05-15

## 2025-05-15 RX ORDER — DEXTROSE MONOHYDRATE 100 MG/ML
INJECTION, SOLUTION INTRAVENOUS CONTINUOUS PRN
Status: DISCONTINUED | OUTPATIENT
Start: 2025-05-15 | End: 2025-05-24 | Stop reason: HOSPADM

## 2025-05-15 RX ADMIN — MELATONIN TAB 3 MG 3 MG: 3 TAB at 20:53

## 2025-05-15 RX ADMIN — TRAZODONE HYDROCHLORIDE 25 MG: 50 TABLET ORAL at 20:54

## 2025-05-15 RX ADMIN — ACETAMINOPHEN 650 MG: 325 TABLET ORAL at 17:16

## 2025-05-15 RX ADMIN — APIXABAN 2.5 MG: 2.5 TABLET, FILM COATED ORAL at 20:54

## 2025-05-15 RX ADMIN — SODIUM CHLORIDE, PRESERVATIVE FREE 10 ML: 5 INJECTION INTRAVENOUS at 20:54

## 2025-05-15 ASSESSMENT — PAIN DESCRIPTION - ORIENTATION: ORIENTATION: RIGHT

## 2025-05-15 ASSESSMENT — PAIN DESCRIPTION - DESCRIPTORS: DESCRIPTORS: ACHING;DISCOMFORT

## 2025-05-15 ASSESSMENT — PAIN SCALES - WONG BAKER: WONGBAKER_NUMERICALRESPONSE: NO HURT

## 2025-05-15 ASSESSMENT — PAIN - FUNCTIONAL ASSESSMENT: PAIN_FUNCTIONAL_ASSESSMENT: 0-10

## 2025-05-15 ASSESSMENT — PAIN DESCRIPTION - LOCATION: LOCATION: HIP

## 2025-05-15 ASSESSMENT — PAIN SCALES - GENERAL
PAINLEVEL_OUTOF10: 8
PAINLEVEL_OUTOF10: 9

## 2025-05-15 NOTE — PROGRESS NOTES
4 Eyes Skin Assessment     The patient is being assess for  Admission    I agree that 2 RN's have performed a thorough Head to Toe Skin Assessment on the patient. ALL assessment sites listed below have been assessed.       Areas assessed by both nurses:   [x]   Head, Face, and Ears   [x]   Shoulders, Back, and Chest  [x]   Arms, Elbows, and Hands   [x]   Coccyx, Sacrum, and Ischum  [x]   Legs, Feet, and Heels    Multiple wounds noted to bilateral buttocks area and left heel. Multiple bruising and scattered abrasions noted to bilateral feet and hands.         Does the Patient have Skin Breakdown?  Yes a wound was noted on the Admission Assessment and an WOUND LDA was Initiated documentation include the Maria E-wound, Wound Assessment, Measurements, Dressing Treatment, Drainage, and Color\",        Price Prevention initiated:  Yes   Wound Care Orders initiated:  Yes      [facility] nurse consulted for Pressure Injury (Stage 3,4, Unstageable, DTI, NWPT, and Complex wounds):  Yes      Nurse 1 eSignature: Electronically signed by Charleen Aleman RN on 5/15/25 at 4:59 PM EDT    **SHARE this note so that the co-signing nurse is able to place an eSignature**    Nurse 2 eSignature: Electronically signed by Moon Kendrick RN on 5/15/25 at 5:10 PM EDT

## 2025-05-15 NOTE — ED NOTES
Patient Name: Danny Rock  : 1950 75 y.o.  MRN: [id number]  ED Room #: ED-0006/06     Chief complaint:   Chief Complaint   Patient presents with    Fatigue     Patient arrives from home via [facility] with complaints of generalized weakness. Patient reports generalized weakness. Patient is dialysis pt. Currently does physical therapy for hip and shoulder pain. Patient reports not being able to get out of bed today because he felt so weak and from the pain. Pt last dialysis was Tuesday      Hospital Problem/Diagnosis:   Hospital Problems           Last Modified POA    * (Principal) Weakness 5/15/2025 Yes         O2 Flow Rate:O2 Device: None (Room air)   (if applicable)  Cardiac Rhythm:   (if applicable)  Active LDA's:   Peripheral IV 05/15/25 Right Forearm (Active)   Site Assessment Clean, dry & intact 05/15/25 0904   Line Status Brisk blood return 05/15/25 0904            How does patient ambulate? Unknown, did not assess in the Emergency Department    2. How does patient take pills? Unknown, no oral medications were given in the Emergency Department    3. Is patient alert? Alert    4. Is patient oriented? To Person, To Place, To Time, To Situation, and Follows Commands    5.   Patient arrived from:  home  Facility Name: ___________________________________________    6. If patient is disoriented or from a Skill Nursing Facility has family been notified of admission? No    7. Patient belongings? Belongings: Clothing    Disposition of belongings? Kept with Patient     8. Any specific patient or family belongings/needs/dynamics?   a.     9. Miscellaneous comments/pending orders?  a.       If there are any additional questions please reach out to the Emergency Department.

## 2025-05-15 NOTE — CONSULTS
MD Gordon Frankel MD Aldo Estella, DO Vanessa Godines, PA-C Mark Kremer, PA-C                Office: [phone]                      Fax: [phone]             [place]                   NEPHROLOGY CONSULT INITIAL NOTE        IMPRESSION/RECOMMENDATIONS:      #ESKD on HD TTS  -HD TTS at [facility] under our care.  -EDW while inpatient ~71kg  -was having some renal function recovery last admission, therefore may explain electrolyte and fluid volume stability.  -continue iHD TTS while inpatient  -no acute indication for iHD today. Will trend -lytes.   -monitor renal function recovery, if any.  -Next HD Saturday.    # Volume status  -Euvolemic clinically  Estimated target weight ~71kg    #hypomagnesemia  -replace PRN.     #hypokalemia  -mild, stable, monitor.     #hypoalbuminemia  -likely 2/2 malnutrition, ESKD  -encourage po protein intake     #anemia of CKD  -has Hx of renal ca s/p nephrectomy  -hgb stable, POONAM with HD.  -monitor    Thank you for the consult.  We will follow this patient along the hospitalization.  Guillermo Moss DO     High complexity, at risk of impending organ failure needing higher level of care/monitoring.   Time spent 37 minutes that included face-to-face meeting/discussion with patient, and treatment team (including primary/referring team and other consultants; included coordination of care with the treatment team; and review of patient's electronic medical records and ordering appropriates tests.             Reason for Consult:  ESKD management  Requesting Physician/Provider: Rakesh Gayle MD     CHIEF COMPLAINT:  fatigue    History obtained from records and patient.    HISTORY OF PRESENT ILLNESS:                Danny Rock  is 75 y.o. y.o. male with significant past medical history of ESKD on HD TTS at [facility] under care, atrial fibrillation, coronary artery disease, renal CA status post right nephrectomy, previously on  immunotherapy, diabetes mellitus type 2, hyperlipidemia, hypertension, prostate cancer, CHF, EF 40 to 45% who presented with fatigue. In ED, labs K+ 3.4, BUN 36, Mg 1.57, alb 3.1, hgb 9.0. Nephrology consulted for  ESKD management.    Past Medical History:     has a past medical history of Atrial fibrillation (HCC), CAD (coronary artery disease), Cancer of kidney (HCC), Dependence on renal dialysis, Diabetes mellitus (HCC), Hyperlipidemia, Hypertension, Prostate cancer (HCC), Right renal mass, and Systolic CHF (HCC).   Past Surgical History:     has a past surgical history that includes hernia repair; knee surgery; Cholecystectomy; Nasal septum surgery; eye surgery (Bilateral, 04/19/2018); Kidney removal (Right, 07/25/2022); Cataract removal (Bilateral); IR TUNNELED CVC PLACE WO SQ PORT/PUMP > 5 YEARS (02/07/2024); CT NEEDLE BIOPSY LIVER PERCUTANEOUS (08/01/2024); other surgical history (09/30/2024); CT NEEDLE BIOPSY LIVER PERCUTANEOUS (11/18/2024); CT GUIDED CHEST TUBE (11/18/2024); hernia repair (Left, 12/31/2024); other surgical history (Left); shoulder surgery (Right, 01/31/2025); IR TUNNELED CVC PLACE WO SQ PORT/PUMP > 5 YEARS (03/28/2025); and sigmoidoscopy (N/A, 4/16/2025).   Current Medications:    Current Facility-Administered Medications: apixaban (ELIQUIS) tablet 2.5 mg, 2.5 mg, Oral, BID  aspirin chewable tablet 81 mg, 81 mg, Oral, Daily  cabozantinib s-malate (CABOMETYX) chemo tablet 40 mg, 40 mg, Oral, QPM  insulin glargine (LANTUS) injection vial 8 Units, 8 Units, SubCUTAneous, Nightly  metoprolol tartrate (LOPRESSOR) tablet 50 mg, 50 mg, Oral, See Admin Instructions  melatonin tablet 3 mg, 3 mg, Oral, Nightly  [START ON 5/16/2025] pantoprazole (PROTONIX) tablet 40 mg, 40 mg, Oral, QAM AC  sevelamer (RENVELA) tablet 800 mg, 800 mg, Oral, TID WC  sennosides-docusate sodium (SENOKOT-S) 8.6-50 MG tablet 2 tablet, 2 tablet, Oral, BID PRN  traZODone (DESYREL) tablet 25 mg, 25 mg, Oral, Nightly  sodium

## 2025-05-15 NOTE — PROGRESS NOTES
Report received from ER RN. Patient arrived to unit via stretcher on telemetry. Patient assisted to transfer from stretcher to bed with assistance of 3. Vital signs obtained. Patient ans wife oriented to unit and room. Patient admission assessments completed. Skin assessment complete with this writer and second RN/Moon. Patient assisted to change into hospital gown, order food, and use the urinal. Bed alarm is on, call light is within reach.

## 2025-05-15 NOTE — ED PROVIDER NOTES
[facility] Emergency Department      Pt Name: Danny Rock  MRN: [id number]  Birthdate 1950  Date of evaluation: 5/15/2025  Provider: ROOSEVELT MELO MD  CHIEF COMPLAINT  Chief Complaint   Patient presents with    Fatigue     Patient arrives from home via [facility] with complaints of generalized weakness. Patient reports generalized weakness. Patient is dialysis pt. Currently does physical therapy for hip and shoulder pain. Patient reports not being able to get out of bed today because he felt so weak and from the pain. Pt last dialysis was Tuesday      HPI  Danny Rock is a 75 y.o. male who presents because of fatigue, weakness, pain.  He says he did a new physical therapy workout yesterday and felt poorly afterwards.  He was having pain across his lower back and his right hip.  He denies any fall.  He was scheduled for dialysis this morning but could not get out of bed to go.  His wife called EMS providers.  He does report recent treatment for an infection in his right shoulder.  He was scheduled to see his orthopedic doctor later today.  Denies any fever or chills.  Last dialysis was on Tuesday.  Denies any chest pain.  He does admit to some tingling in both of his hands for the past several weeks.  Denies any other areas of numbness.    REVIEW OF SYSTEMS:  No fever, generalized weakness Pertinent positives and negatives as per the HPI.  All other pertinent review of systems reviewed and negative.  Nursing notes reviewed.    PAST MEDICAL HISTORY  Past Medical History:   Diagnosis Date    Atrial fibrillation (HCC)     CAD (coronary artery disease)     Cancer of kidney (HCC)     Immunotherapy: just finished treatment 11-22-23    Dependence on renal dialysis     dialysis Tues, Thurs Sat    Diabetes mellitus (HCC)     Hyperlipidemia     Hypertension     Prostate cancer (HCC)     radiation    Right renal mass     dialysis    Systolic CHF (HCC)      SURGICAL HISTORY  Past Surgical History:   Procedure

## 2025-05-15 NOTE — TELEPHONE ENCOUNTER
Requested Prescriptions     Pending Prescriptions Disp Refills    metoprolol tartrate (LOPRESSOR) 50 MG tablet [Pharmacy Med Name: Metoprolol Tartrate Oral Tablet 50 MG 50 MG  Tablet] 180 tablet      Sig: TABLET 1 TABLET BY MOUTH TWO TIMES DAILY      LAST OV: 10/1/2024   NEXT OV: 6/9/2025

## 2025-05-15 NOTE — CONSULTS
counseling:        The patient was educated in detail about the side-effects of various antibiotics and things to watch for like new rashes, lip swelling, severe reaction, worsening diarrhea, break through fever etc.  Discussed patient's condition and what to expect. All of the patient's questions were addressed in a satisfactory manner and patient verbalized understanding all instructions.      Level of complexity of visit and medical decision making: High     Risk of Complications/Morbidity: High     Illness(es)/ Infection present that pose threat to life/bodily function.   There is potential for severe exacerbation of infection/side effects of treatment.  Therapy requires intensive monitoring for antimicrobial agent toxicity.   I did an In-depth patient chart review that entails going back  in time and assessing the complete breadth of all health care interactions, with higher-level synthesis for the patient's complex diagnoses.  Counseled patients, family members, and caregivers regarding infection prevention antimicrobial stewardship and resistance for the patient.  Engaging in complex medical decision-making associated with antimicrobial prescribing including considerations such as antimicrobial resistance patterns, , hospital antibiogram, recent antibiotic exposures, interactions/complications from comorbidities including concurrent infections, public health considerations to minimize development of antimicrobial resistance  Developing, following, and supervising specialized, individualized infection control protocols for an individual patient based on their diagnosis and risks in order to reduce risk of disease transmission.  Coordinating with staff regarding infection prevention and control measures to enable healthcare facility staff to safely care for patient.  Managing infection prevention and treatment protocols associated with transitions of care for this complex patient.    Thank you for involving me  Worsening cortical irregularity involving the distal clavicle could be due   to osteomyelitis.             Outside records:    Labs, Microbiology, Radiology and pertinent results from Care everywhere, if available, were reviewed as a part ofthe consultation.      Problem list:       Patient Active Problem List   Diagnosis Code    HLD (hyperlipidemia) E78.5    Essential hypertension I10    Coronary artery disease due to lipid rich plaque I25.10, I25.83    Cardiac dysrhythmia I49.9    Diabetes mellitus E11.9    Paroxysmal atrial fibrillation ([facility]) I48.0    Renal mass N28.89    Cerebrovascular accident (CVA) ([facility]) I63.9    Recent cerebrovascular accident (CVA) Z86.73    HTN (hypertension), benign I10    PAF (paroxysmal atrial fibrillation) ([facility]) I48.0    CASTRO (acute kidney injury) N17.9    LBBB (left bundle branch block) I44.7    H/O right nephrectomy Z90.5    Anemia D64.9    Acute ischemic stroke ([facility]) I63.9    Overweight (BMI 25.0-29.9) E66.3    AIN (acute interstitial nephritis) N10    Sterile pyuria R82.81    Current chronic use of systemic steroids Z79.52    H/O systemic steroid therapy Z92.241    Encounter for medication counseling Z71.89    Secondary hypercoagulable state D68.69    Pneumothorax J93.9    Left inguinal hernia K40.90    Abscess of right shoulder L02.413    CRP elevated R79.82    Elevated sed rate R70.0    Abscess of right arm L02.413    ESRD on hemodialysis ([facility]) N18.6, Z99.2    Hemorrhoid K64.9    Constipation K59.00    Low back pain M54.50    Lumbar foraminal stenosis M48.061    History of prostate cancer Z85.46    Central stenosis of spinal canal M48.00    Enterococcus faecalis infection A49.8    Enterococcal bacteremia R78.81, B95.2    Moderate malnutrition E44.0    Debility R53.81    Osteomyelitis of lumbar spine ([facility]) M46.26    Lumbar discitis M46.46    Iliopsoas abscess on right ([facility]) K68.12    Psoas hematoma, left, secondary to anticoagulant therapy, initial encounter S30.1XXA    Psoas

## 2025-05-15 NOTE — CONSULTS
MD Gordon Frankel MD Aldo Estella, DO Vanessa Godines, PA-C Mark Kremer, PA-C                 Office: [phone]                      Fax: [phone]             [facility]                   Nephrology consult received. Full consult report will follow.     Thank you for allowing us to participate in this patient's care. Please do not hesitate to contact us anytime. We will follow along with you.     Guillermo Moss DO,  [facility]  Office: [phone] or Via [employer]  Fax: [phone]

## 2025-05-15 NOTE — H&P
HOSPITALISTS HISTORY AND PHYSICAL    5/15/2025 11:21 AM    Patient Information:  DANNY TORRES is a 75 y.o. male [id number]  PCP:  Yesi Rosa APRN - CNP (Tel: [phone] )    Chief complaint:    Chief Complaint   Patient presents with    Fatigue     Patient arrives from home via [facility] with complaints of generalized weakness. Patient reports generalized weakness. Patient is dialysis pt. Currently does physical therapy for hip and shoulder pain. Patient reports not being able to get out of bed today because he felt so weak and from the pain. Pt last dialysis was Tuesday         History of Present Illness:  Danny Torres is a 75 y.o. male who presente to ER with complaints of weakness pain.  Patient said admitted physical level overnight yesterday fell forward poorly afterwards.  Patient is having pain all across his lower back and right hip.  Denies any fall he was scheduled for dialysis this morning but did not go due to not getting out of bed.  While called EMS.  Patient reports currently being treated for right shoulder infection patient is on IV antibiotics during dialysis was scheduled to see orthopedic doctor today but could not get it denies any fevers chills nothing that makes it better      REVIEW OF SYSTEMS:   Constitutional: Negative for fever,chills or night sweats  ENT: Negative for rhinorrhea, epistaxis, hoarseness, sore throat.  Respiratory: Negative for shortness of breath,wheezing  Cardiovascular: Negative for chest pain, palpitations   Gastrointestinal: Negative for nausea, vomiting, diarrhea  Genitourinary: Negative for polyuria, dysuria   Hematologic/Lymphatic: Negative for bleeding tendency, easy bruising  Musculoskeletal: Negative for myalgias and arthralgias  Neurologic: Negative for confusion,dysarthria.  Skin: Negative for

## 2025-05-15 NOTE — PROGRESS NOTES
Pharmacy Home Medication Reconciliation Note    A medication reconciliation has been completed for Danny Rock 1950    Pharmacy: [facility] [address] [place], [place]  Information provided by: patient and wife w/med list    The patient's home medication list is as follows:  No current facility-administered medications on file prior to encounter.     Current Outpatient Medications on File Prior to Encounter   Medication Sig Dispense Refill    metoprolol tartrate (LOPRESSOR) 50 MG tablet TABLET 1 TABLET BY MOUTH TWO TIMES DAILY (Patient taking differently: Take 1 tablet by mouth four times a week Take one tablet by mouth twice daily on non-dialysis days (Mon, Wed, Fri, Sun).) 180 tablet 1    furosemide (LASIX) 40 MG tablet Take 1 tablet by mouth four times a week Take one tablet by mouth on non-dialysis days only (Mon, Wed, Fri, Sun).      methocarbamol (ROBAXIN) 750 MG tablet Take 1 tablet by mouth 3 times daily as needed (Muscle spasms and pain.)      ondansetron (ZOFRAN) 8 MG tablet Take 1 tablet by mouth every 8 hours as needed for Nausea or Vomiting      aspirin 81 MG chewable tablet Take 1 tablet by mouth daily 30 tablet 0    traZODone (DESYREL) 50 MG tablet Take 0.5 tablets by mouth nightly May take additional 0.5 tablets as needed for insomnia. Max nightly dose: 50 mg total 60 tablet 0    polyethylene glycol (GLYCOLAX) 17 g packet Take 1 packet by mouth daily 30 packet 0    sennosides-docusate sodium (SENOKOT-S) 8.6-50 MG tablet Take 2 tablets by mouth 2 times daily as needed for Constipation 30 tablet 0    diclofenac sodium (VOLTAREN) 1 % GEL Apply 2 g topically 4 times daily as needed for Pain 2 g 0    lidocaine 4 % external patch Place 1 patch onto the skin daily as needed (Right hip pain) 30 patch 0    apixaban (ELIQUIS) 2.5 MG TABS tablet Take 1 tablet by mouth 2 times daily      vancomycin (VANCOCIN) infusion Vancomycin 1 gm every TU, TH, SA, (Patient taking differently: Infuse 1,000 mg

## 2025-05-16 ENCOUNTER — APPOINTMENT (OUTPATIENT)
Dept: CT IMAGING | Age: 75
DRG: 551 | End: 2025-05-16
Payer: MEDICARE

## 2025-05-16 PROBLEM — U07.1 COVID-19: Status: ACTIVE | Noted: 2025-05-16

## 2025-05-16 PROBLEM — M75.101 RIGHT ROTATOR CUFF TEAR ARTHROPATHY: Status: ACTIVE | Noted: 2025-05-16

## 2025-05-16 PROBLEM — M25.411 EFFUSION OF JOINT OF RIGHT SHOULDER: Status: ACTIVE | Noted: 2025-05-16

## 2025-05-16 PROBLEM — M12.811 RIGHT ROTATOR CUFF TEAR ARTHROPATHY: Status: ACTIVE | Noted: 2025-05-16

## 2025-05-16 LAB
ANION GAP SERPL CALCULATED.3IONS-SCNC: 11 MMOL/L (ref 3–16)
ANISOCYTOSIS BLD QL SMEAR: ABNORMAL
APPEARANCE FLUID: NORMAL
BASOPHILS # BLD: 0.1 K/UL (ref 0–0.2)
BASOPHILS NFR BLD: 1 %
BDY FLUID QUALITY: NORMAL
BUN SERPL-MCNC: 39 MG/DL (ref 7–20)
BURR CELLS BLD QL SMEAR: ABNORMAL
CALCIUM SERPL-MCNC: 8.7 MG/DL (ref 8.3–10.6)
CELL COUNT FLUID TYPE: NORMAL
CHLORIDE SERPL-SCNC: 105 MMOL/L (ref 99–110)
CO2 SERPL-SCNC: 21 MMOL/L (ref 21–32)
COLOR FLUID: YELLOW
CREAT SERPL-MCNC: 2.1 MG/DL (ref 0.8–1.3)
CRYSTALS FLD MICRO: NORMAL
DACRYOCYTES BLD QL SMEAR: ABNORMAL
DEPRECATED RDW RBC AUTO: 22.3 % (ref 12.4–15.4)
EKG DIAGNOSIS: NORMAL
EKG Q-T INTERVAL: 436 MS
EKG QRS DURATION: 136 MS
EKG QTC CALCULATION (BAZETT): 490 MS
EKG R AXIS: -8 DEGREES
EKG T AXIS: 134 DEGREES
EKG VENTRICULAR RATE: 76 BPM
EOSINOPHIL # BLD: 0.2 K/UL (ref 0–0.6)
EOSINOPHIL NFR BLD: 3 %
EST. AVERAGE GLUCOSE BLD GHB EST-MCNC: 105.4 MG/DL
GFR SERPLBLD CREATININE-BSD FMLA CKD-EPI: 32 ML/MIN/{1.73_M2}
GLUCOSE BLD-MCNC: 127 MG/DL (ref 70–99)
GLUCOSE BLD-MCNC: 145 MG/DL (ref 70–99)
GLUCOSE BLD-MCNC: 164 MG/DL (ref 70–99)
GLUCOSE BLD-MCNC: 187 MG/DL (ref 70–99)
GLUCOSE SERPL-MCNC: 125 MG/DL (ref 70–99)
HBA1C MFR BLD: 5.3 %
HCT VFR BLD AUTO: 26.9 % (ref 40.5–52.5)
HGB BLD-MCNC: 8.7 G/DL (ref 13.5–17.5)
LYMPHOCYTES # BLD: 1 K/UL (ref 1–5.1)
LYMPHOCYTES NFR BLD: 15 %
LYMPHOCYTES NFR FLD: 22 %
MACROCYTES BLD QL SMEAR: ABNORMAL
MACROPHAGES # FLD: 6 %
MCH RBC QN AUTO: 36.1 PG (ref 26–34)
MCHC RBC AUTO-ENTMCNC: 32.2 G/DL (ref 31–36)
MCV RBC AUTO: 111.6 FL (ref 80–100)
MONOCYTES # BLD: 0.2 K/UL (ref 0–1.3)
MONOCYTES NFR BLD: 3 %
MONOCYTES NFR FLD: 18 %
NEUTROPHIL, FLUID: 54 %
NEUTROPHILS # BLD: 5 K/UL (ref 1.7–7.7)
NEUTROPHILS NFR BLD: 78 %
NUC CELL # FLD: 155 /CUMM
ORGANISM: ABNORMAL
OVALOCYTES BLD QL SMEAR: ABNORMAL
PATH INTERP BLD-IMP: YES
PERFORMED ON: ABNORMAL
PHOSPHATE SERPL-MCNC: 3.3 MG/DL (ref 2.5–4.9)
PLATELET # BLD AUTO: 197 K/UL (ref 135–450)
PLATELET BLD QL SMEAR: ADEQUATE
PMV BLD AUTO: 7.8 FL (ref 5–10.5)
POIKILOCYTOSIS BLD QL SMEAR: ABNORMAL
POLYCHROMASIA BLD QL SMEAR: ABNORMAL
POTASSIUM SERPL-SCNC: 4.2 MMOL/L (ref 3.5–5.1)
PTH-INTACT SERPL-MCNC: 159 PG/ML (ref 14–72)
RBC # BLD AUTO: 2.41 M/UL (ref 4.2–5.9)
RBC FLUID: 5240 /CUMM
REPORT: NORMAL
RESP PATH DNA+RNA PNL NPH NAA+NON-PROBE: ABNORMAL
SCHISTOCYTES BLD QL SMEAR: ABNORMAL
SLIDE REVIEW: ABNORMAL
SODIUM SERPL-SCNC: 137 MMOL/L (ref 136–145)
SPECIMEN SOURCE FLD: NORMAL
TOTAL CELLS COUNTED FLD: 100
WBC # BLD AUTO: 6.4 K/UL (ref 4–11)

## 2025-05-16 PROCEDURE — 97162 PT EVAL MOD COMPLEX 30 MIN: CPT

## 2025-05-16 PROCEDURE — 6370000000 HC RX 637 (ALT 250 FOR IP): Performed by: STUDENT IN AN ORGANIZED HEALTH CARE EDUCATION/TRAINING PROGRAM

## 2025-05-16 PROCEDURE — 87205 SMEAR GRAM STAIN: CPT

## 2025-05-16 PROCEDURE — 0202U NFCT DS 22 TRGT SARS-COV-2: CPT

## 2025-05-16 PROCEDURE — 83036 HEMOGLOBIN GLYCOSYLATED A1C: CPT

## 2025-05-16 PROCEDURE — 6360000002 HC RX W HCPCS: Performed by: HOSPITALIST

## 2025-05-16 PROCEDURE — 36415 COLL VENOUS BLD VENIPUNCTURE: CPT

## 2025-05-16 PROCEDURE — 6360000002 HC RX W HCPCS: Performed by: STUDENT IN AN ORGANIZED HEALTH CARE EDUCATION/TRAINING PROGRAM

## 2025-05-16 PROCEDURE — 84100 ASSAY OF PHOSPHORUS: CPT

## 2025-05-16 PROCEDURE — 2500000003 HC RX 250 WO HCPCS: Performed by: HOSPITALIST

## 2025-05-16 PROCEDURE — 97530 THERAPEUTIC ACTIVITIES: CPT

## 2025-05-16 PROCEDURE — 87040 BLOOD CULTURE FOR BACTERIA: CPT

## 2025-05-16 PROCEDURE — 99233 SBSQ HOSP IP/OBS HIGH 50: CPT | Performed by: INTERNAL MEDICINE

## 2025-05-16 PROCEDURE — 93010 ELECTROCARDIOGRAM REPORT: CPT | Performed by: INTERNAL MEDICINE

## 2025-05-16 PROCEDURE — 87070 CULTURE OTHR SPECIMN AEROBIC: CPT

## 2025-05-16 PROCEDURE — 1200000000 HC SEMI PRIVATE

## 2025-05-16 PROCEDURE — 97535 SELF CARE MNGMENT TRAINING: CPT

## 2025-05-16 PROCEDURE — G0545 PR INHERENT VISIT TO INPT: HCPCS | Performed by: INTERNAL MEDICINE

## 2025-05-16 PROCEDURE — 80048 BASIC METABOLIC PNL TOTAL CA: CPT

## 2025-05-16 PROCEDURE — 6370000000 HC RX 637 (ALT 250 FOR IP): Performed by: HOSPITALIST

## 2025-05-16 PROCEDURE — 20610 DRAIN/INJ JOINT/BURSA W/O US: CPT | Performed by: NURSE PRACTITIONER

## 2025-05-16 PROCEDURE — 89051 BODY FLUID CELL COUNT: CPT

## 2025-05-16 PROCEDURE — 83970 ASSAY OF PARATHORMONE: CPT

## 2025-05-16 PROCEDURE — 99223 1ST HOSP IP/OBS HIGH 75: CPT | Performed by: ORTHOPAEDIC SURGERY

## 2025-05-16 PROCEDURE — 85025 COMPLETE CBC W/AUTO DIFF WBC: CPT

## 2025-05-16 PROCEDURE — 89060 EXAM SYNOVIAL FLUID CRYSTALS: CPT

## 2025-05-16 PROCEDURE — 2500000003 HC RX 250 WO HCPCS: Performed by: STUDENT IN AN ORGANIZED HEALTH CARE EDUCATION/TRAINING PROGRAM

## 2025-05-16 PROCEDURE — 73201 CT UPPER EXTREMITY W/DYE: CPT

## 2025-05-16 PROCEDURE — 97166 OT EVAL MOD COMPLEX 45 MIN: CPT

## 2025-05-16 PROCEDURE — 6360000004 HC RX CONTRAST MEDICATION: Performed by: INTERNAL MEDICINE

## 2025-05-16 RX ORDER — GABAPENTIN 100 MG/1
200 CAPSULE ORAL DAILY
Status: DISCONTINUED | OUTPATIENT
Start: 2025-05-16 | End: 2025-05-19

## 2025-05-16 RX ORDER — IOPAMIDOL 755 MG/ML
75 INJECTION, SOLUTION INTRAVASCULAR
Status: COMPLETED | OUTPATIENT
Start: 2025-05-16 | End: 2025-05-16

## 2025-05-16 RX ORDER — LIDOCAINE 4 G/G
1 PATCH TOPICAL DAILY
Status: DISCONTINUED | OUTPATIENT
Start: 2025-05-16 | End: 2025-05-24 | Stop reason: HOSPADM

## 2025-05-16 RX ORDER — HYDROCODONE BITARTRATE AND ACETAMINOPHEN 5; 325 MG/1; MG/1
1 TABLET ORAL EVERY 6 HOURS PRN
Status: DISCONTINUED | OUTPATIENT
Start: 2025-05-16 | End: 2025-05-23

## 2025-05-16 RX ADMIN — MORPHINE SULFATE 2 MG: 2 INJECTION, SOLUTION INTRAMUSCULAR; INTRAVENOUS at 20:44

## 2025-05-16 RX ADMIN — ACETAMINOPHEN 650 MG: 325 TABLET ORAL at 13:48

## 2025-05-16 RX ADMIN — SODIUM CHLORIDE, PRESERVATIVE FREE 10 ML: 5 INJECTION INTRAVENOUS at 20:38

## 2025-05-16 RX ADMIN — TRAZODONE HYDROCHLORIDE 25 MG: 50 TABLET ORAL at 20:37

## 2025-05-16 RX ADMIN — METOPROLOL TARTRATE 50 MG: 50 TABLET, FILM COATED ORAL at 09:40

## 2025-05-16 RX ADMIN — IOPAMIDOL 75 ML: 755 INJECTION, SOLUTION INTRAVENOUS at 01:12

## 2025-05-16 RX ADMIN — HYDROCODONE BITARTRATE AND ACETAMINOPHEN 1 TABLET: 5; 325 TABLET ORAL at 15:15

## 2025-05-16 RX ADMIN — SEVELAMER CARBONATE 800 MG: 800 TABLET, FILM COATED ORAL at 13:48

## 2025-05-16 RX ADMIN — APIXABAN 2.5 MG: 2.5 TABLET, FILM COATED ORAL at 09:40

## 2025-05-16 RX ADMIN — MELATONIN TAB 3 MG 3 MG: 3 TAB at 20:38

## 2025-05-16 RX ADMIN — INSULIN LISPRO 1 UNITS: 100 INJECTION, SOLUTION INTRAVENOUS; SUBCUTANEOUS at 13:48

## 2025-05-16 RX ADMIN — ASPIRIN 81 MG: 81 TABLET, CHEWABLE ORAL at 09:40

## 2025-05-16 RX ADMIN — GABAPENTIN 200 MG: 100 CAPSULE ORAL at 15:15

## 2025-05-16 RX ADMIN — METOPROLOL TARTRATE 50 MG: 50 TABLET, FILM COATED ORAL at 20:38

## 2025-05-16 RX ADMIN — SEVELAMER CARBONATE 800 MG: 800 TABLET, FILM COATED ORAL at 09:40

## 2025-05-16 RX ADMIN — PANTOPRAZOLE SODIUM 40 MG: 40 TABLET, DELAYED RELEASE ORAL at 06:22

## 2025-05-16 RX ADMIN — MORPHINE SULFATE 2 MG: 2 INJECTION, SOLUTION INTRAMUSCULAR; INTRAVENOUS at 00:11

## 2025-05-16 RX ADMIN — SODIUM CHLORIDE, PRESERVATIVE FREE 10 ML: 5 INJECTION INTRAVENOUS at 09:40

## 2025-05-16 RX ADMIN — SEVELAMER CARBONATE 800 MG: 800 TABLET, FILM COATED ORAL at 15:15

## 2025-05-16 RX ADMIN — WATER 40 MG: 1 INJECTION INTRAMUSCULAR; INTRAVENOUS; SUBCUTANEOUS at 15:16

## 2025-05-16 ASSESSMENT — PAIN DESCRIPTION - FREQUENCY: FREQUENCY: INTERMITTENT

## 2025-05-16 ASSESSMENT — PAIN DESCRIPTION - LOCATION
LOCATION: BACK
LOCATION: LEG
LOCATION: FOOT
LOCATION: FOOT
LOCATION: BACK;SHOULDER

## 2025-05-16 ASSESSMENT — PAIN SCALES - GENERAL
PAINLEVEL_OUTOF10: 7
PAINLEVEL_OUTOF10: 8
PAINLEVEL_OUTOF10: 3
PAINLEVEL_OUTOF10: 0
PAINLEVEL_OUTOF10: 8
PAINLEVEL_OUTOF10: 6
PAINLEVEL_OUTOF10: 5
PAINLEVEL_OUTOF10: 8
PAINLEVEL_OUTOF10: 0
PAINLEVEL_OUTOF10: 0

## 2025-05-16 ASSESSMENT — PAIN SCALES - WONG BAKER
WONGBAKER_NUMERICALRESPONSE: NO HURT
WONGBAKER_NUMERICALRESPONSE: NO HURT
WONGBAKER_NUMERICALRESPONSE: HURTS WHOLE LOT
WONGBAKER_NUMERICALRESPONSE: NO HURT

## 2025-05-16 ASSESSMENT — PAIN DESCRIPTION - ORIENTATION
ORIENTATION: RIGHT;LEFT
ORIENTATION: RIGHT;MID
ORIENTATION: RIGHT;LEFT

## 2025-05-16 ASSESSMENT — PAIN - FUNCTIONAL ASSESSMENT
PAIN_FUNCTIONAL_ASSESSMENT: ACTIVITIES ARE NOT PREVENTED

## 2025-05-16 ASSESSMENT — PAIN DESCRIPTION - DESCRIPTORS
DESCRIPTORS: ACHING;DISCOMFORT
DESCRIPTORS: ACHING

## 2025-05-16 ASSESSMENT — PAIN DESCRIPTION - PAIN TYPE
TYPE: ACUTE PAIN
TYPE: ACUTE PAIN

## 2025-05-16 ASSESSMENT — PAIN DESCRIPTION - ONSET: ONSET: ON-GOING

## 2025-05-16 NOTE — INTERDISCIPLINARY ROUNDS
Spiritual Health History and Assessment/Progress Note  [facility]    Interdisciplinary rounds,  , Adjustment to illness,      Name: Danny Rock MRN: [id number]    Age: 75 y.o.     Sex: male   Language: English   Taoist: Advent   Weakness     Date: 5/16/2025            Total Time Calculated: 15 min              Spiritual Assessment began in [facility] 5 TWR ONCOLOGY        Referral/Consult From: Rounding   Encounter Overview/Reason: Interdisciplinary rounds  Service Provided For: Patient    Dinorah, Belief, Meaning:   Patient identifies as spiritual, is connected with a dinorah tradition or spiritual practice, and has beliefs or practices that help with coping during difficult times  Family/Friends No family/friends present      Importance and Influence:  Patient has spiritual/personal beliefs that influence decisions regarding their health  Family/Friends No family/friends present    Community:  Patient is connected with a spiritual community and feels well-supported. Support system includes: Spouse/Partner, Children, Dinorah Community, and Extended family  Family/Friends No family/friends present    Assessment and Plan of Care:     Patient Interventions include: Facilitated expression of thoughts and feelings, Explored spiritual coping/struggle/distress, Engaged in theological reflection, and Affirmed coping skills/support systems  Family/Friends Interventions include: No family/friends present    Patient Plan of Care: Spiritual Care available upon further referral  Family/Friends Plan of Care: No family/friends present    Electronically signed by Chaplain Jagdish on 5/16/2025 at 9:17 AM

## 2025-05-16 NOTE — PROGRESS NOTES
[facility] - Inpatient Rehabilitation Department   Phone: [phone]    Physical Therapy    [x] Initial Evaluation            [] Daily Treatment Note         [] Discharge Summary      Patient: Danny Rock   : 1950   MRN: [id number]   Date of Service:  2025  Admitting Diagnosis: Weakness  Current Admission Summary: Danny Rock is a 75 y.o. male who presente to ER with complaints of weakness pain.  Patient said admitted physical level overnight yesterday fell forward poorly afterwards.  Patient is having pain all across his lower back and right hip.  Denies any fall he was scheduled for dialysis this morning but did not go due to not getting out of bed.  While called EMS.  Patient reports currently being treated for right shoulder infection patient is on IV antibiotics during dialysis was scheduled to see orthopedic doctor today but could not get it denies any fevers chills nothing that makes it better.  Past Medical History:  has a past medical history of Atrial fibrillation (HCC), CAD (coronary artery disease), Cancer of kidney (HCC), Dependence on renal dialysis, Diabetes mellitus (HCC), Hyperlipidemia, Hypertension, Prostate cancer (HCC), Right renal mass, and Systolic CHF (HCC).  Past Surgical History:  has a past surgical history that includes hernia repair; knee surgery; Cholecystectomy; Nasal septum surgery; eye surgery (Bilateral, 2018); Kidney removal (Right, 2022); Cataract removal (Bilateral); IR TUNNELED CVC PLACE WO SQ PORT/PUMP > 5 YEARS (2024); CT NEEDLE BIOPSY LIVER PERCUTANEOUS (2024); other surgical history (2024); CT NEEDLE BIOPSY LIVER PERCUTANEOUS (2024); CT GUIDED CHEST TUBE (2024); hernia repair (Left, 2024); other surgical history (Left); shoulder surgery (Right, 2025); IR TUNNELED CVC PLACE WO SQ PORT/PUMP > 5 YEARS (2025); and sigmoidoscopy (N/A, 2025).  Discharge Recommendations: Danny Rock  reports significant back pain with mobility, BLE weakness and diminished sensation to B LE and that he can't feel his feet on the floor, and that this has been going on for a couple of days. Discussed concerns with physician and nurse following session. He requires skilled PT in order to return to his baseline level of function.  Safety Interventions: patient left in bed, bed alarm in place, call light within reach, patient at risk for falls, and nurse notified    Plan  Frequency: 3-5 x/per week  Current Treatment Recommendations: strengthening, balance training, functional mobility training, transfer training, gait training, stair training, endurance training, neuromuscular re-education, patient/caregiver education, pain management, home exercise program, safety education, and equipment evaluation/education    Goals  Patient Goals: did not state   Short Term Goals:  Time Frame: discharge  Patient will complete bed mobility at moderate assistance   Patient will complete transfers at maximum assistance   Sit EOB 3-5 minutes with min A    Above goals reviewed on 5/16/2025.  All goals are ongoing at this time unless indicated above.      Therapy Session Time      Individual Group Co-treatment   Time In     1036   Time Out     1135   Minutes     59     Timed Code Treatment Minutes:  44 Minutes  Total Treatment Minutes: 59 Minutes       Electronically Signed By: Gurjit Ball, SPT    Therapist observed and directed the above evaluation. Thanks, Cheyenne Germain, PT, DPT [id number]

## 2025-05-16 NOTE — PLAN OF CARE
Problem: Chronic Conditions and Co-morbidities  Goal: Patient's chronic conditions and co-morbidity symptoms are monitored and maintained or improved  5/16/2025 1132 by Jil Rangel RN  Outcome: Progressing  5/16/2025 1115 by Jil Rangel RN  Outcome: Progressing  5/16/2025 0833 by Barby Ayers LPN  Outcome: Progressing     Problem: Pain  Goal: Verbalizes/displays adequate comfort level or baseline comfort level  5/16/2025 1132 by Jil Rangel RN  Outcome: Progressing  5/16/2025 1115 by Jil Rangel RN  Outcome: Progressing  5/16/2025 0833 by Barby Ayers LPN  Outcome: Progressing     Problem: Safety - Adult  Goal: Free from fall injury  5/16/2025 1132 by Jil Rangel RN  Outcome: Progressing  5/16/2025 1115 by Jil Rangel RN  Outcome: Progressing  5/16/2025 0833 by Barby Ayers LPN  Outcome: Progressing     Problem: Skin/Tissue Integrity  Goal: Skin integrity remains intact  Description: 1.  Monitor for areas of redness and/or skin breakdown2.  Assess vascular access sites hourly3.  Every 4-6 hours minimum:  Change oxygen saturation probe site4.  Every 4-6 hours:  If on nasal continuous positive airway pressure, respiratory therapy assess nares and determine need for appliance change or resting period  5/16/2025 1132 by Jil Rangel RN  Outcome: Progressing  5/16/2025 1115 by Jil Rangel RN  Outcome: Progressing  Flowsheets (Taken 5/16/2025 0930)  Skin Integrity Remains Intact:   Monitor for areas of redness and/or skin breakdown   Assess vascular access sites hourly  5/16/2025 0833 by Barby Ayers LPN  Outcome: Progressing

## 2025-05-16 NOTE — PROGRESS NOTES
Date of Admission: 5/15/2025. Hospital Day: 2       HOSPITAL COURSE  75 y.o. male who presented to [facility] ER with weakness.  Had recent prolonged hospitalization and rehab stay, started with right shoulder septic joint on 1/2025 had complicated by discitis and lumbar osteomyelitis with Enterococcus bacteremia    Interval  History:   Patient reports significant lower back as well as right hip pain.  Was discharged from rehab a month ago and apparently was doing well until 2 days ago and feels very weak as well as numbness to right lower leg      Physical Exam     BP (!) 166/92   Pulse 90   Temp 97.3 °F (36.3 °C) (Oral)   Resp 18   Wt 69.9 kg (154 lb)   SpO2 100%   PF 97 L/min   BMI 19.25 kg/m²     General appearance: No apparent distress, appears stated age and cooperative.  Respiratory:  Normal respiratory effort. Clear to auscultation, bilaterally without Rales/Wheezes/Rhonchi.  Cardiovascular: Regular rate and rhythm with normal S1/S2 without murmurs, rubs or gallops.  Neuro: Motor 4+/5 bilaterally upper and lower extremity          Assessment/Plan:    # Generalized weakness  #Acute on chronic lower back pain  # Acute right hip pain  No obvious focal weakness, suspect pain and weakness in the setting of severe lumbar spinal spondylarthritis, spinal infection.  Pain control with low-dose gabapentin, lidocaine patch  Start IV steroid  No red flag sign but will consider repeating MRI if no improvement in the setting of lumbar osteomyelitis/discitis and     #Enterococcus faecalis bacteremia.  # History of right shoulder septic arthritis  # Multilevel lumbar discitis and osteomyelitis  Recent history with lumbar discitis and osteomyelitis.  Patient has worsening right clavicle cortical irregularity, consult orthopedics  ID following. Continue IV vancomycin for now.  Further recommendation to follow    # Renal cancer.  Continue oral chemo     # CAD.  Continue home medication    # A-fib.  Rate

## 2025-05-16 NOTE — PROGRESS NOTES
MD Gordon Frankel MD Aldo Estella, DO Vanessa Godines, PA-C Mark Kremer, PA-C                Office: [phone]                      Fax: [phone]             [employer]                   NEPHROLOGY CONSULT PROGRESS NOTE    Subjective / interval history / medical decision making.  - Reports chronic leg nerve pain persists. Legs still weak he says. Making uop. Inquiring about decrease iHD to 2x/week.    IMPRESSION/RECOMMENDATIONS:      #ESKD on HD TTS  -HD TTS at [facility] under our care.  -EDW while inpatient ~70kg  -was having some renal function recovery last admission, therefore may explain electrolyte and fluid volume stability.  -will decrease iHD to 2x/week: Tue/Saturday.  -monitor renal function.  -Next HD Saturday.  -if remains inpatient throughout weekend, will order 24 hour urine CrCl Sunday.    # Volume status  -Euvolemic clinically  Estimated target weight ~70kg    #hypomagnesemia  -replace PRN.     #hypokalemia  -mild, stable, monitor.     #hypoalbuminemia  -likely 2/2 malnutrition, ESKD  -encourage po protein intake    #Secondary Hyperparathyroidism  -  -stable, monitor     #anemia of CKD  -has Hx of renal ca s/p nephrectomy  -hgb stable, POONAM with HD.  -monitor    Thank you for the consult.  We will follow this patient along the hospitalization.  Guillermo Moss DO     High complexity, at risk of impending organ failure needing higher level of care/monitoring.   Time spent 37 minutes that included face-to-face meeting/discussion with patient, and treatment team (including primary/referring team and other consultants; included coordination of care with the treatment team; and review of patient's electronic medical records and ordering appropriates tests.             Reason for Consult:  ESKD management  Requesting Physician/Provider: Rakesh Gayle MD     CHIEF COMPLAINT:  fatigue    History obtained from records and patient.    HISTORY OF PRESENT

## 2025-05-16 NOTE — PROGRESS NOTES
Shift assessment complete. VSS. Patient resting in bed. Respirations even and unlabored. Call light within reach. Fall precautions in place. Bed in low, locked position. No additional needs noted at this time.

## 2025-05-16 NOTE — PROGRESS NOTES
independent with all ADL's (wife washes hair; occasionally helps don shirts with long sleeves); occasionally needs help with long socks  IADL Assistance:  Needs assist with all past 2-3 week; pt was cooking, helping with laundry about 3 wks ago.  wife completes medication management d/t hx of having errors; wife cuts grass.   Active :        [] Yes                 [x] No - not in 4-5 months; hope to get back to it; wife drives  Hand Dominance: [] Left                 [x] Right  Current Employment: retired, steel [employer]   Hobbies:  \"piddle around\"; outdoor activities   Recent Falls: Pt reports one \"stumble\" coming out of bathroom. denied hx of falls- per previous chart review in April, pt has had 2      Available Assistance at Discharge: 24 hr supervision (non-physical) available; stating spouse can assist with ADLs but not lifting/mobility; can guide down if needed.     Examination   Vision:   Vision Gross Assessment: Impaired and Vision Corrective Device: wears glasses at all times  Hearing:   WFL  Perception:   WFL  Observation:   General Observation:  bandage on L heel, on RA, L UE alert, L HD chest port, L UE fistula  Sensation:   reports numbness and tingling in (L) LE, (B) LE (heel), bilat LEs, stated he can't feel his feet on the floor.  Proprioception:    WFL  Tone:   Normotonic  Coordination Testing:   Coordination and Movement Description: (R) UE, (L) UE, fine motor impairments, gross motor impairments, tremors  Finger/Thumb Opposition: WFL  Comment: Pt reported that tremors are new.    ROM:   (L) Shoulder: 0 degrees ROM      (R) Shoulder: ~45 degrees AROM  (B) Elbow AROM WFL  (B) Wrist AROM WFL  (L) Hand: WFL     (R) Hand: WFL  Strength:   Formal MMT held secondary to pain and shaking seated EOB    Therapist Clinical Decision Making (Complexity): medium complexity  Clinical Presentation: evolving      Subjective  General: Pt in semi-medeiros's position in bed on therapy arrival. Agreeable to OT/PT  Following:   accurately follows one step commands     Education  Barriers To Learning: cognition and physical  Patient Education: patient educated on OT role and benefits, plan of care, discharge recommendations, bed mobility  Learning Assessment:  patient will require reinforcement due to pain and cognition    Assessment  Activity Tolerance: Poor; very limited by pain and numbness, very shaky.  Impairments Requiring Therapeutic Intervention: decreased functional mobility, decreased ADL status, decreased cognition, decreased endurance, decreased balance, increased pain  Prognosis: fair  Clinical Assessment: Pt is below his baseline level of occupational function, based on the above deficits associated with weakness. Pt lives with his wife and was mostly independent with ADLs and Mod I with RW for functional mobility. He recently completed an ARU stay at [facility]. Pt is currently very limited by acute numbness and pain in LEs, requiring Total Assist of 2 for bed mobility, total assist for UB and LB ADLs.  Skilled OT services needed to address deficits and facilitate safe return to PLOF.     Safety Interventions: patient left in bed, bed alarm in place, call light within reach, patient at risk for falls, nurse notified, and MD notified    Plan  Frequency: 3-5 x/per week  Current Treatment Recommendations: balance training, functional mobility training, transfer training, endurance training, ADL/self-care training, pain management, and safety education    Goals  Patient Goals: Not stated; agreeable to rehab prior to d/c home   Short Term Goals:  Time Frame: Discharge  Patient will complete upper body ADL at minimal assistance   Patient will complete lower body ADL at moderate assistance   Patient will complete toileting at maximum assistance   Patient will complete functional transfers at minimal assistance, with LRAD   Patient will increase functional sitting balance to SBA for improved ADL completion  Patient will

## 2025-05-16 NOTE — CONSULTS
[facility] Orthopedic Surgery  Consult Note    Patient: Danny Rock  Admit Date: 5/15/2025  Requesting Physician: Francesco Berry MD  Room: [id number]-    Chief complaint: RIGHT shoulder pain    HPI: Danny Rock is a 75 y.o. male who presented to [facility] ER with weakness yesterday.  Underwent right shoulder I&D with Dr. Jacob on 1/31/2 for septic joint - grew micrococcus. He has followed up with him twice since surgery.  Has been on abx as outpt per direction of Dr. Esteves.  He has known glenohumeral arthritis as well and has been participating in PT.      Describes pain in the right shoulder of moderate to severe intensity and of aching nature since 6 months ago and worse the last week or so which is relieved by nothing. Denies new numbness/tingling.       Independent imaging review of the right shoulder via CT scan demonstrated: slightly smaller fluid collection than Nicolas in subdeltoid -- likely communicates with joint.  There is humeral head elevation and severe glenohumeral arthritis.    ESRD on HD.  Afebrile, no leukocytosis  A1c 5,3  CRP 46  ESR 39  PCT 0.2  Blood cultures NGTD  Positive COVID    Relevant notes, labs and other tests reviewed.  Medical History:  Past Medical History:   Diagnosis Date    Atrial fibrillation (HCC)     CAD (coronary artery disease)     Cancer of kidney (HCC)     Immunotherapy: just finished treatment 11-22-23    Dependence on renal dialysis     dialysis Tues, Thurs Sat    Diabetes mellitus (HCC)     Hyperlipidemia     Hypertension     Prostate cancer (HCC)     radiation    Right renal mass     dialysis    Systolic CHF (HCC)      Past Surgical History:   Procedure Laterality Date    CATARACT REMOVAL Bilateral     ~ 2006 with lens implanted    CHOLECYSTECTOMY      CT GUIDED CHEST TUBE  11/18/2024    CT GUIDED CHEST TUBE 11/18/2024 [facility] CT SCAN    CT NEEDLE BIOPSY LIVER PERCUTANEOUS  08/01/2024    CT NEEDLE BIOPSY LIVER PERCUTANEOUS 8/1/2024 [facility] CT SCAN    CT NEEDLE

## 2025-05-16 NOTE — PLAN OF CARE
Problem: Chronic Conditions and Co-morbidities  Goal: Patient's chronic conditions and co-morbidity symptoms are monitored and maintained or improved  5/16/2025 1115 by Jil Rangel RN  Outcome: Progressing  5/16/2025 0833 by Barby Ayers LPN  Outcome: Progressing     Problem: Pain  Goal: Verbalizes/displays adequate comfort level or baseline comfort level  5/16/2025 1115 by Jil Rangel RN  Outcome: Progressing  5/16/2025 0833 by Barby Ayers LPN  Outcome: Progressing     Problem: Safety - Adult  Goal: Free from fall injury  5/16/2025 1115 by Jil Rangel RN  Outcome: Progressing  5/16/2025 0833 by Barby Ayers LPN  Outcome: Progressing     Problem: Skin/Tissue Integrity  Goal: Skin integrity remains intact  Description: 1.  Monitor for areas of redness and/or skin breakdown2.  Assess vascular access sites hourly3.  Every 4-6 hours minimum:  Change oxygen saturation probe site4.  Every 4-6 hours:  If on nasal continuous positive airway pressure, respiratory therapy assess nares and determine need for appliance change or resting period  5/16/2025 1115 by Jil Rangel RN  Outcome: Progressing  5/16/2025 0833 by Barby Ayers LPN  Outcome: Progressing

## 2025-05-16 NOTE — PROGRESS NOTES
Infectious Diseases   Progress Note      Admission Date: 5/15/2025  Hospital Day: Hospital Day: 2   Attending: Francesco Berry MD  Date of service: 5/16/2025     Chief complaint/ Reason for consult:     Generalized weakness  COVID-19 infection  L5 spondylolysis and spondylolisthesis  Bilateral frontal sinusitis  Worsening of right clavicular cortical irregularity  Recent Enterococcus faecalis bacteremia in April 2025  History of lumbar discitis and osteomyelitis with foraminal stenosis at L3-L4 level  ESRD on hemodialysis    Microbiology:      I have reviewed allavailable micro lab data and cultures    Results       Procedure Component Value Units Date/Time    Respiratory Panel, Molecular, with COVID-19 (Restricted: peds pts or suitable admitted adults) [[id number]] Collected: 05/16/25 0620    Order Status: Sent Specimen: Nasopharyngeal Swab Updated: 05/16/25 1236    Culture, Blood 2 [[id number]] Collected: 05/16/25 0529    Order Status: Sent Specimen: Blood Updated: 05/16/25 1235    Culture, Blood 1 [[id number]] Collected: 05/15/25 0920    Order Status: Completed Specimen: Blood Updated: 05/16/25 1015     Blood Culture, Routine No Growth to date.  Any change in status will be called.    Narrative:      ORDER#: [id number]                          ORDERED BY: ROOSEVELT MELO  SOURCE: Blood Antecubital-Rig              COLLECTED:  05/15/25 09:20  ANTIBIOTICS AT SARAH.:                      RECEIVED :  05/15/25 13:53  If child <=2 yrs old please draw pediatric bottle.~Blood Culture 1             Susceptibility data from last 90 days.  Collected Specimen Info Organism Ampicillin gentamicin-syn Sodium streptomycin-syn   03/26/25 Blood Staphylococcus epidermidis (1)         Staphylococcus epidermidis (2)       03/24/25 Blood Enterococcus faecalis       03/24/25 Blood Enterococcus faecalis       03/23/25 Blood Enterococcus faecalis (2)  S   S      Enterococcus faecalis (1)       03/23/25 Blood Enterococcus faecalis

## 2025-05-16 NOTE — CARE COORDINATION
Case Management Assessment  Initial Evaluation    Date/Time of Evaluation: 5/16/2025 5:14 PM  Assessment Completed by: Brianna Hudson RN    If patient is discharged prior to next notation, then this note serves as note for discharge by case management.    Patient Name: Danny Rock                   YOB: 1950  Diagnosis: Weakness [R53.1]                   Date / Time: 5/15/2025  8:47 AM    Patient Admission Status: Inpatient   Readmission Risk (Low < 19, Mod (19-27), High > 27): Readmission Risk Score: 31.4    Current PCP: Yesi Rosa APRN - CNP  PCP verified by CM? Yes    Chart Reviewed: Yes      History Provided by: Patient, Spouse  Patient Orientation: Alert and Oriented, Person, Place, Situation    Patient Cognition: Alert    Hospitalization in the last 30 days (Readmission):  Yes    If yes, Readmission Assessment in  Navigator will be completed.    Advance Directives:      Code Status: Full Code   Patient's Primary Decision Maker is: Legal Next of Kin    Primary Decision Maker: Amanda Rock - Spouse - [phone]    Discharge Planning:    Patient lives with:   Type of Home: House  Primary Care Giver: Self  Patient Support Systems include: Spouse/Significant Other, Children   Current Financial resources: Medicare  Current community resources: Other (Comment) (n/a)  Current services prior to admission: Other (Comment) (OT/PT)            Current DME: Wheelchair            Type of Home Care services:  OT, PT    ADLS  Prior functional level: Assistance with the following:, Mobility, Shopping, Cooking, Housework  Current functional level: Assistance with the following:, Cooking, Housework, Shopping, Mobility    PT AM-PAC: 6 /24  OT AM-PAC: 10 /24    Family can provide assistance at DC: Yes  Would you like Case Management to discuss the discharge plan with any other family members/significant others, and if so, who? Yes (Wife Tammy)  Plans to Return to Present Housing: Unknown at

## 2025-05-17 LAB
ANION GAP SERPL CALCULATED.3IONS-SCNC: 11 MMOL/L (ref 3–16)
BUN SERPL-MCNC: 45 MG/DL (ref 7–20)
CALCIUM SERPL-MCNC: 8.6 MG/DL (ref 8.3–10.6)
CHLORIDE SERPL-SCNC: 105 MMOL/L (ref 99–110)
CO2 SERPL-SCNC: 21 MMOL/L (ref 21–32)
CREAT SERPL-MCNC: 2.2 MG/DL (ref 0.8–1.3)
GFR SERPLBLD CREATININE-BSD FMLA CKD-EPI: 30 ML/MIN/{1.73_M2}
GLUCOSE BLD-MCNC: 173 MG/DL (ref 70–99)
GLUCOSE BLD-MCNC: 176 MG/DL (ref 70–99)
GLUCOSE BLD-MCNC: 301 MG/DL (ref 70–99)
GLUCOSE SERPL-MCNC: 173 MG/DL (ref 70–99)
PERFORMED ON: ABNORMAL
POTASSIUM SERPL-SCNC: 4.4 MMOL/L (ref 3.5–5.1)
SODIUM SERPL-SCNC: 137 MMOL/L (ref 136–145)

## 2025-05-17 PROCEDURE — 99232 SBSQ HOSP IP/OBS MODERATE 35: CPT | Performed by: ORTHOPAEDIC SURGERY

## 2025-05-17 PROCEDURE — 6370000000 HC RX 637 (ALT 250 FOR IP): Performed by: STUDENT IN AN ORGANIZED HEALTH CARE EDUCATION/TRAINING PROGRAM

## 2025-05-17 PROCEDURE — 6360000002 HC RX W HCPCS: Performed by: HOSPITALIST

## 2025-05-17 PROCEDURE — 2500000003 HC RX 250 WO HCPCS: Performed by: HOSPITALIST

## 2025-05-17 PROCEDURE — 80048 BASIC METABOLIC PNL TOTAL CA: CPT

## 2025-05-17 PROCEDURE — 36415 COLL VENOUS BLD VENIPUNCTURE: CPT

## 2025-05-17 PROCEDURE — 90935 HEMODIALYSIS ONE EVALUATION: CPT

## 2025-05-17 PROCEDURE — 1200000000 HC SEMI PRIVATE

## 2025-05-17 PROCEDURE — 2500000003 HC RX 250 WO HCPCS: Performed by: STUDENT IN AN ORGANIZED HEALTH CARE EDUCATION/TRAINING PROGRAM

## 2025-05-17 PROCEDURE — 6360000002 HC RX W HCPCS: Performed by: STUDENT IN AN ORGANIZED HEALTH CARE EDUCATION/TRAINING PROGRAM

## 2025-05-17 PROCEDURE — 6370000000 HC RX 637 (ALT 250 FOR IP): Performed by: HOSPITALIST

## 2025-05-17 RX ADMIN — ASPIRIN 81 MG: 81 TABLET, CHEWABLE ORAL at 09:22

## 2025-05-17 RX ADMIN — PANTOPRAZOLE SODIUM 40 MG: 40 TABLET, DELAYED RELEASE ORAL at 05:34

## 2025-05-17 RX ADMIN — INSULIN LISPRO 3 UNITS: 100 INJECTION, SOLUTION INTRAVENOUS; SUBCUTANEOUS at 20:15

## 2025-05-17 RX ADMIN — TRAZODONE HYDROCHLORIDE 25 MG: 50 TABLET ORAL at 20:14

## 2025-05-17 RX ADMIN — WATER 40 MG: 1 INJECTION INTRAMUSCULAR; INTRAVENOUS; SUBCUTANEOUS at 10:43

## 2025-05-17 RX ADMIN — SODIUM CHLORIDE, PRESERVATIVE FREE 10 ML: 5 INJECTION INTRAVENOUS at 09:22

## 2025-05-17 RX ADMIN — MORPHINE SULFATE 2 MG: 2 INJECTION, SOLUTION INTRAMUSCULAR; INTRAVENOUS at 23:21

## 2025-05-17 RX ADMIN — MELATONIN TAB 3 MG 3 MG: 3 TAB at 20:14

## 2025-05-17 RX ADMIN — SODIUM CHLORIDE, PRESERVATIVE FREE 10 ML: 5 INJECTION INTRAVENOUS at 20:15

## 2025-05-17 RX ADMIN — GABAPENTIN 200 MG: 100 CAPSULE ORAL at 09:22

## 2025-05-17 RX ADMIN — HYDROCODONE BITARTRATE AND ACETAMINOPHEN 1 TABLET: 5; 325 TABLET ORAL at 17:41

## 2025-05-17 RX ADMIN — HYDROCODONE BITARTRATE AND ACETAMINOPHEN 1 TABLET: 5; 325 TABLET ORAL at 05:34

## 2025-05-17 ASSESSMENT — PAIN DESCRIPTION - ORIENTATION
ORIENTATION: RIGHT;LOWER
ORIENTATION: RIGHT

## 2025-05-17 ASSESSMENT — PAIN DESCRIPTION - LOCATION
LOCATION: HIP;OTHER (COMMENT)
LOCATION: BACK;HIP
LOCATION: COCCYX

## 2025-05-17 ASSESSMENT — PAIN SCALES - GENERAL
PAINLEVEL_OUTOF10: 5
PAINLEVEL_OUTOF10: 7
PAINLEVEL_OUTOF10: 4
PAINLEVEL_OUTOF10: 6

## 2025-05-17 ASSESSMENT — PAIN DESCRIPTION - DESCRIPTORS
DESCRIPTORS: ACHING
DESCRIPTORS: SHARP
DESCRIPTORS: SHOOTING;STABBING

## 2025-05-17 NOTE — PROGRESS NOTES
ORTHOPAEDIC NOTE    Chief Complaint   Patient presents with    Fatigue     Patient arrives from home via [facility] with complaints of generalized weakness. Patient reports generalized weakness. Patient is dialysis pt. Currently does physical therapy for hip and shoulder pain. Patient reports not being able to get out of bed today because he felt so weak and from the pain. Pt last dialysis was Tuesday        HPI  Saw patient in dialysis today  He reports right shoulder feeling better today  Swelling persists, mainly anterior  No events overnight  Denies N/T      Past Medical History:   Diagnosis Date    Atrial fibrillation (HCC)     CAD (coronary artery disease)     Cancer of kidney (HCC)     Immunotherapy: just finished treatment 11-22-23    Dependence on renal dialysis     dialysis Tues, Thurs Sat    Diabetes mellitus (HCC)     Hyperlipidemia     Hypertension     Prostate cancer (HCC)     radiation    Right renal mass     dialysis    Systolic CHF (HCC)        Past Surgical History:   Procedure Laterality Date    CATARACT REMOVAL Bilateral     ~ 2006 with lens implanted    CHOLECYSTECTOMY      CT GUIDED CHEST TUBE  11/18/2024    CT GUIDED CHEST TUBE 11/18/2024 [facility] CT SCAN    CT NEEDLE BIOPSY LIVER PERCUTANEOUS  08/01/2024    CT NEEDLE BIOPSY LIVER PERCUTANEOUS 8/1/2024 [facility] CT SCAN    CT NEEDLE BIOPSY LIVER PERCUTANEOUS  11/18/2024    CT NEEDLE BIOPSY LIVER PERCUTANEOUS 11/18/2024 [facility] CT SCAN    EYE SURGERY Bilateral 04/19/2018    Cataract    HERNIA REPAIR      HERNIA REPAIR Left 12/31/2024    OPEN LEFT INGUINAL HERNIA REPAIR WITH MESH performed by Camacho Chowdhury MD at [facility] OR    IR TUNNELED CVC PLACE WO SQ PORT/PUMP > 5 YEARS  02/07/2024    IR TUNNELED CATHETER PLACEMENT GREATER THAN 5 YEARS 2/7/2024 Kosta Baugh MD [facility] SPECIAL PROCEDURES    IR TUNNELED CVC PLACE WO SQ PORT/PUMP > 5 YEARS  03/28/2025    IR TUNNELED CVC PLACE WO SQ PORT/PUMP > 5 YEARS 3/28/2025 [facility] CARDIAC CATH/IR/NEURO LAB    KIDNEY

## 2025-05-17 NOTE — PROGRESS NOTES
Date of Admission: 5/15/2025. Hospital Day: 3       HOSPITAL COURSE  75 y.o. male who presented to [facility] ER with weakness.  Had recent prolonged hospitalization and rehab stay, started with right shoulder septic joint on 1/2025 had complicated by discitis and lumbar osteomyelitis with Enterococcus bacteremia    Interval  History:   Reports moderate improvement in leg pain, numbness improving.  Still somewhat anxious.  Willing to work with therapy and hopes to be able to walk more today      Physical Exam     /67   Pulse 53   Temp 97.2 °F (36.2 °C) (Oral)   Resp 15   Wt 69.9 kg (154 lb)   SpO2 96%   PF 97 L/min   BMI 19.25 kg/m²     General appearance: No apparent distress, appears stated age and cooperative.  Respiratory:  Normal respiratory effort. Clear to auscultation, bilaterally without Rales/Wheezes/Rhonchi.  Cardiovascular: Regular rate and rhythm with normal S1/S2 without murmurs, rubs or gallops.  Neuro: Motor 4+/5 bilaterally upper and lower extremity  MSK.  Bilateral hip pain and lower back on bilateral hip flexion and abduction/adduction movement, right hip inferolateral tenderness          Assessment/Plan:    # Generalized weakness  #Acute on chronic lower back pain  # Acute right hip pain  No obvious focal weakness, suspect pain and weakness in the setting of severe lumbar spinal spondylarthritis, spinal infection.  Pain control with low-dose gabapentin, lidocaine patch  Continue IV steroid  No neurological red flag sign but will consider repeating MRI if no improvement in the setting of lumbar osteomyelitis/discitis and     #Enterococcus faecalis bacteremia.  # History of right shoulder septic arthritis  # Multilevel lumbar discitis and osteomyelitis  Recent history with lumbar discitis and osteomyelitis.  Patient has worsening right clavicle cortical irregularity, consulted orthopedics, underwent IND of shoulder, right.  Culture pending  ID following. Continue IV vancomycin

## 2025-05-17 NOTE — PROGRESS NOTES
MD Gordon Frankel MD Aldo Estella, DO Vanessa Godines, PA-C Mark Kremer, PA-C                Office: [phone]                      Fax: [phone]             [employer]                   NEPHROLOGY CONSULT PROGRESS NOTE    Subjective / interval history / medical decision making.  - reports feeling well this am, no new complaints. Inquiring about cutting down on iHD to 2x/week. Still making urine.    IMPRESSION/RECOMMENDATIONS:      #ESKD on HD TTS  -HD TTS at [facility] under our care.  -EDW while inpatient ~70kg  -was having some renal function recovery last admission, therefore may explain electrolyte and fluid volume stability.  -will decrease iHD to 2x/week: Tue/Saturday. Updated his [facility] HD unit for 2x/week Rx.  -monitor renal function.  -plan for iHD today, UF only 1-2L; may have some renal function recovery.  -if remains inpatient throughout weekend, will order 24 hour urine CrCl Sunday.    # Volume status  -Euvolemic clinically  Estimated target weight ~70kg    #hypomagnesemia  -replace PRN.     #hypokalemia  -mild, stable, monitor.     #hypoalbuminemia  -likely 2/2 malnutrition, ESKD  -encourage po protein intake    #Secondary Hyperparathyroidism  -  -stable, monitor     #anemia of CKD  -has Hx of renal ca s/p nephrectomy  -hgb stable, POONAM with HD.  -monitor    Thank you for the consult.  We will follow this patient along the hospitalization.  Guillermo Moss DO     High complexity, at risk of impending organ failure needing higher level of care/monitoring.   Time spent 37 minutes that included face-to-face meeting/discussion with patient, and treatment team (including primary/referring team and other consultants; included coordination of care with the treatment team; and review of patient's electronic medical records and ordering appropriates tests.             Reason for Consult:  ESKD management  Requesting Physician/Provider: Rakesh Gayle MD  Epidermal Autograft Text: The defect edges were debeveled with a #15 scalpel blade.  Given the location of the defect, shape of the defect and the proximity to free margins an epidermal autograft was deemed most appropriate.  Using a sterile surgical marker, the primary defect shape was transferred to the donor site. The epidermal graft was then harvested.  The skin graft was then placed in the primary defect and oriented appropriately.

## 2025-05-17 NOTE — PLAN OF CARE
Problem: Chronic Conditions and Co-morbidities  Goal: Patient's chronic conditions and co-morbidity symptoms are monitored and maintained or improved  Outcome: Progressing  Flowsheets (Taken 5/16/2025 2142)  Care Plan - Patient's Chronic Conditions and Co-Morbidity Symptoms are Monitored and Maintained or Improved:   Monitor and assess patient's chronic conditions and comorbid symptoms for stability, deterioration, or improvement   Collaborate with multidisciplinary team to address chronic and comorbid conditions and prevent exacerbation or deterioration   Update acute care plan with appropriate goals if chronic or comorbid symptoms are exacerbated and prevent overall improvement and discharge     Problem: Pain  Goal: Verbalizes/displays adequate comfort level or baseline comfort level  Outcome: Progressing  Flowsheets (Taken 5/16/2025 2020)  Verbalizes/displays adequate comfort level or baseline comfort level:   Encourage patient to monitor pain and request assistance   Assess pain using appropriate pain scale   Administer analgesics based on type and severity of pain and evaluate response   Implement non-pharmacological measures as appropriate and evaluate response   Consider cultural and social influences on pain and pain management   Notify Licensed Independent Practitioner if interventions unsuccessful or patient reports new pain     Problem: Safety - Adult  Goal: Free from fall injury  Outcome: Progressing     Problem: Skin/Tissue Integrity  Goal: Skin integrity remains intact  Description: 1.  Monitor for areas of redness and/or skin breakdown2.  Assess vascular access sites hourly3.  Every 4-6 hours minimum:  Change oxygen saturation probe site4.  Every 4-6 hours:  If on nasal continuous positive airway pressure, respiratory therapy assess nares and determine need for appliance change or resting period  Outcome: Progressing  Flowsheets (Taken 5/16/2025 2142)  Skin Integrity Remains Intact:   Monitor for  areas of redness and/or skin breakdown   Assess vascular access sites hourly     Problem: Cardiovascular - Adult  Goal: Maintains optimal cardiac output and hemodynamic stability  Outcome: Progressing  Goal: Absence of cardiac dysrhythmias or at baseline  Outcome: Progressing  Flowsheets (Taken 5/16/2025 2142)  Absence of cardiac dysrhythmias or at baseline:   Monitor cardiac rate and rhythm   Assess for signs of decreased cardiac output   Administer antiarrhythmia medication and electrolyte replacement as ordered     Problem: Skin/Tissue Integrity - Adult  Goal: Skin integrity remains intact  Description: 1.  Monitor for areas of redness and/or skin breakdown2.  Assess vascular access sites hourly3.  Every 4-6 hours minimum:  Change oxygen saturation probe site4.  Every 4-6 hours:  If on nasal continuous positive airway pressure, respiratory therapy assess nares and determine need for appliance change or resting period  Outcome: Progressing  Flowsheets (Taken 5/16/2025 2142)  Skin Integrity Remains Intact:   Monitor for areas of redness and/or skin breakdown   Assess vascular access sites hourly  Goal: Oral mucous membranes remain intact  Outcome: Progressing     Problem: Musculoskeletal - Adult  Goal: Return mobility to safest level of function  Outcome: Progressing  Goal: Return ADL status to a safe level of function  Outcome: Progressing     Problem: Genitourinary - Adult  Goal: Absence of urinary retention  Outcome: Progressing     Problem: Infection - Adult  Goal: Absence of infection at discharge  Outcome: Progressing  Goal: Absence of infection during hospitalization  Outcome: Progressing

## 2025-05-17 NOTE — PROGRESS NOTES
Treatment time: 3 hrs    Net UF: 1000 ml     Pre weight: 69.9 kg  Post weight: 69 kg     Access used: Ltdc  Access function:  tolerated well,  BFR 350ml/min     Medications or blood products given: heparin dwells     Regular outpatient schedule: TTS     Summary of response to treatment: Pt tolerated well. Pt remained stable throughout entire treatment and upon exiting the hemodialysis suite.      Copy of dialysis treatment record placed in chart, to be scanned into EMR.

## 2025-05-17 NOTE — PROGRESS NOTES
Initial assessment completed- see doc flowsheets. VSS. A&O x2. Disoriented to situation and place. Appears very anxious upon entering. Repeatedly stated \"I don't know what I have to do here. I don't want to make you look bad but I do not think I can do this. I am a mess. I do not have confidence in myself to do this.\" 1 on 1 with pt. Pain medications given. Lidocaine patch to R shoulder- see MAR. No S/S of respiratory distress/SOB. Room air- tolerating well. Call light within reach. Bed alarm on. Care ongoing.

## 2025-05-18 LAB
ANION GAP SERPL CALCULATED.3IONS-SCNC: 11 MMOL/L (ref 3–16)
BUN SERPL-MCNC: 39 MG/DL (ref 7–20)
CALCIUM SERPL-MCNC: 8.3 MG/DL (ref 8.3–10.6)
CHLORIDE SERPL-SCNC: 99 MMOL/L (ref 99–110)
CO2 SERPL-SCNC: 24 MMOL/L (ref 21–32)
CREAT SERPL-MCNC: 1.8 MG/DL (ref 0.8–1.3)
GFR SERPLBLD CREATININE-BSD FMLA CKD-EPI: 38 ML/MIN/{1.73_M2}
GLUCOSE BLD-MCNC: 149 MG/DL (ref 70–99)
GLUCOSE BLD-MCNC: 217 MG/DL (ref 70–99)
GLUCOSE BLD-MCNC: 225 MG/DL (ref 70–99)
GLUCOSE BLD-MCNC: 231 MG/DL (ref 70–99)
GLUCOSE SERPL-MCNC: 160 MG/DL (ref 70–99)
PERFORMED ON: ABNORMAL
POTASSIUM SERPL-SCNC: 4.6 MMOL/L (ref 3.5–5.1)
SODIUM SERPL-SCNC: 134 MMOL/L (ref 136–145)

## 2025-05-18 PROCEDURE — 99231 SBSQ HOSP IP/OBS SF/LOW 25: CPT | Performed by: ORTHOPAEDIC SURGERY

## 2025-05-18 PROCEDURE — 80048 BASIC METABOLIC PNL TOTAL CA: CPT

## 2025-05-18 PROCEDURE — 2500000003 HC RX 250 WO HCPCS: Performed by: STUDENT IN AN ORGANIZED HEALTH CARE EDUCATION/TRAINING PROGRAM

## 2025-05-18 PROCEDURE — 6370000000 HC RX 637 (ALT 250 FOR IP): Performed by: STUDENT IN AN ORGANIZED HEALTH CARE EDUCATION/TRAINING PROGRAM

## 2025-05-18 PROCEDURE — 6360000002 HC RX W HCPCS: Performed by: STUDENT IN AN ORGANIZED HEALTH CARE EDUCATION/TRAINING PROGRAM

## 2025-05-18 PROCEDURE — 1200000000 HC SEMI PRIVATE

## 2025-05-18 PROCEDURE — 2500000003 HC RX 250 WO HCPCS: Performed by: HOSPITALIST

## 2025-05-18 PROCEDURE — 36415 COLL VENOUS BLD VENIPUNCTURE: CPT

## 2025-05-18 PROCEDURE — 6370000000 HC RX 637 (ALT 250 FOR IP): Performed by: HOSPITALIST

## 2025-05-18 PROCEDURE — 82575 CREATININE CLEARANCE TEST: CPT

## 2025-05-18 RX ADMIN — SENNOSIDES AND DOCUSATE SODIUM 2 TABLET: 50; 8.6 TABLET ORAL at 08:53

## 2025-05-18 RX ADMIN — PANTOPRAZOLE SODIUM 40 MG: 40 TABLET, DELAYED RELEASE ORAL at 05:16

## 2025-05-18 RX ADMIN — METOPROLOL TARTRATE 50 MG: 50 TABLET, FILM COATED ORAL at 20:03

## 2025-05-18 RX ADMIN — INSULIN LISPRO 1 UNITS: 100 INJECTION, SOLUTION INTRAVENOUS; SUBCUTANEOUS at 11:45

## 2025-05-18 RX ADMIN — MELATONIN TAB 3 MG 3 MG: 3 TAB at 20:03

## 2025-05-18 RX ADMIN — SODIUM CHLORIDE, PRESERVATIVE FREE 10 ML: 5 INJECTION INTRAVENOUS at 08:54

## 2025-05-18 RX ADMIN — INSULIN LISPRO 1 UNITS: 100 INJECTION, SOLUTION INTRAVENOUS; SUBCUTANEOUS at 16:25

## 2025-05-18 RX ADMIN — SODIUM CHLORIDE, PRESERVATIVE FREE 10 ML: 5 INJECTION INTRAVENOUS at 20:03

## 2025-05-18 RX ADMIN — INSULIN LISPRO 1 UNITS: 100 INJECTION, SOLUTION INTRAVENOUS; SUBCUTANEOUS at 20:15

## 2025-05-18 RX ADMIN — TRAZODONE HYDROCHLORIDE 25 MG: 50 TABLET ORAL at 20:04

## 2025-05-18 RX ADMIN — ASPIRIN 81 MG: 81 TABLET, CHEWABLE ORAL at 08:53

## 2025-05-18 RX ADMIN — WATER 40 MG: 1 INJECTION INTRAMUSCULAR; INTRAVENOUS; SUBCUTANEOUS at 08:55

## 2025-05-18 RX ADMIN — HYDROCODONE BITARTRATE AND ACETAMINOPHEN 1 TABLET: 5; 325 TABLET ORAL at 05:27

## 2025-05-18 RX ADMIN — GABAPENTIN 200 MG: 100 CAPSULE ORAL at 08:53

## 2025-05-18 ASSESSMENT — PAIN DESCRIPTION - DESCRIPTORS
DESCRIPTORS: SORE;STABBING
DESCRIPTORS: ACHING

## 2025-05-18 ASSESSMENT — PAIN SCALES - GENERAL
PAINLEVEL_OUTOF10: 4
PAINLEVEL_OUTOF10: 6
PAINLEVEL_OUTOF10: 8

## 2025-05-18 ASSESSMENT — PAIN DESCRIPTION - ORIENTATION: ORIENTATION: RIGHT

## 2025-05-18 ASSESSMENT — PAIN DESCRIPTION - LOCATION
LOCATION: HIP
LOCATION: HIP

## 2025-05-18 NOTE — PLAN OF CARE
Problem: Chronic Conditions and Co-morbidities  Goal: Patient's chronic conditions and co-morbidity symptoms are monitored and maintained or improved  Outcome: Progressing  Flowsheets (Taken 5/17/2025 2055)  Care Plan - Patient's Chronic Conditions and Co-Morbidity Symptoms are Monitored and Maintained or Improved:   Monitor and assess patient's chronic conditions and comorbid symptoms for stability, deterioration, or improvement   Collaborate with multidisciplinary team to address chronic and comorbid conditions and prevent exacerbation or deterioration   Update acute care plan with appropriate goals if chronic or comorbid symptoms are exacerbated and prevent overall improvement and discharge     Problem: Pain  Goal: Verbalizes/displays adequate comfort level or baseline comfort level  Outcome: Progressing     Problem: Safety - Adult  Goal: Free from fall injury  Outcome: Progressing     Problem: Skin/Tissue Integrity  Goal: Skin integrity remains intact  Description: 1.  Monitor for areas of redness and/or skin breakdown2.  Assess vascular access sites hourly3.  Every 4-6 hours minimum:  Change oxygen saturation probe site4.  Every 4-6 hours:  If on nasal continuous positive airway pressure, respiratory therapy assess nares and determine need for appliance change or resting period  Outcome: Progressing  Flowsheets (Taken 5/17/2025 2055)  Skin Integrity Remains Intact:   Monitor for areas of redness and/or skin breakdown   Assess vascular access sites hourly     Problem: Cardiovascular - Adult  Goal: Maintains optimal cardiac output and hemodynamic stability  Outcome: Progressing  Flowsheets (Taken 5/17/2025 2055)  Maintains optimal cardiac output and hemodynamic stability:   Monitor blood pressure and heart rate   Monitor urine output and notify Licensed Independent Practitioner for values outside of normal range   Assess for signs of decreased cardiac output   Administer fluid and/or volume expanders as

## 2025-05-18 NOTE — PROGRESS NOTES
MD Gordon Frankel MD Aldo Estella, DO Vanessa Godines, PA-C Mark Kremer, PA-C                Office: [phone]                      Fax: [phone]             [email or web address]                   NEPHROLOGY CONSULT PROGRESS NOTE    Subjective / interval history / medical decision making.  - reports feeling well this am, no new complaints. Still making urine.    IMPRESSION/RECOMMENDATIONS:      #ESKD on HD TTS  -HD TTS at [facility] under our care.  -EDW while inpatient ~70kg  -was having some renal function recovery last admission, therefore may explain electrolyte and fluid volume stability.  -decreased iHD to 2x/week: Tue/Saturday. Updated his [facility] HD unit for 2x/week Rx.  -monitor renal function.  - IHD done yesterday.  -will order 24 hour urine CrCl.  Follow    # Volume status  -Euvolemic clinically  Estimated target weight ~70kg    #hypomagnesemia  -replace PRN.     #hypokalemia  -mild, stable, monitor.     #hypoalbuminemia  -likely 2/2 malnutrition, ESKD  -encourage po protein intake    #Secondary Hyperparathyroidism  -  -stable, monitor     #anemia of CKD  -has Hx of renal ca s/p nephrectomy  -hgb stable, POONAM with HD.  -monitor    Thank you for the consult.  We will follow this patient along the hospitalization.  Pratibha Kat MD     Reason for Consult:  ESKD management  Requesting Physician/Provider: Rakesh Gayle MD     CHIEF COMPLAINT:  fatigue    History obtained from records and patient.    HISTORY OF PRESENT ILLNESS:                Danny Rock  is 75 y.o. y.o. male with significant past medical history of ESKD on HD TTS at [facility] under care, atrial fibrillation, coronary artery disease, renal CA status post right nephrectomy, previously on immunotherapy, diabetes mellitus type 2, hyperlipidemia, hypertension, prostate cancer, CHF, EF 40 to 45% who presented with fatigue. In ED, labs K+ 3.4, BUN 36, Mg 1.57, alb 3.1, hgb  9.0. Nephrology consulted for  ESKD management.    Past Medical History:     has a past medical history of Atrial fibrillation (HCC), CAD (coronary artery disease), Cancer of kidney (HCC), Dependence on renal dialysis, Diabetes mellitus (HCC), Hyperlipidemia, Hypertension, Prostate cancer (HCC), Right renal mass, and Systolic CHF (HCC).   Past Surgical History:     has a past surgical history that includes hernia repair; knee surgery; Cholecystectomy; Nasal septum surgery; eye surgery (Bilateral, 04/19/2018); Kidney removal (Right, 07/25/2022); Cataract removal (Bilateral); IR TUNNELED CVC PLACE WO SQ PORT/PUMP > 5 YEARS (02/07/2024); CT NEEDLE BIOPSY LIVER PERCUTANEOUS (08/01/2024); other surgical history (09/30/2024); CT NEEDLE BIOPSY LIVER PERCUTANEOUS (11/18/2024); CT GUIDED CHEST TUBE (11/18/2024); hernia repair (Left, 12/31/2024); other surgical history (Left); shoulder surgery (Right, 01/31/2025); IR TUNNELED CVC PLACE WO SQ PORT/PUMP > 5 YEARS (03/28/2025); and sigmoidoscopy (N/A, 4/16/2025).   Current Medications:    Current Facility-Administered Medications: gabapentin (NEURONTIN) capsule 200 mg, 200 mg, Oral, Daily  HYDROcodone-acetaminophen (NORCO) 5-325 MG per tablet 1 tablet, 1 tablet, Oral, Q6H PRN  methylPREDNISolone sodium succ (SOLU-MEDROL) 40 mg in sterile water 1 mL injection, 40 mg, IntraVENous, Daily  lidocaine 4 % external patch 1 patch, 1 patch, TransDERmal, Daily  cabozantinib s-malate (CABOMETYX) chemo tablet 40 mg (Patient Supplied), 40 mg, Oral, Daily  [Held by provider] apixaban (ELIQUIS) tablet 2.5 mg, 2.5 mg, Oral, BID  aspirin chewable tablet 81 mg, 81 mg, Oral, Daily  metoprolol tartrate (LOPRESSOR) tablet 50 mg, 50 mg, Oral, 2 times per day on Sunday Monday Wednesday Friday  melatonin tablet 3 mg, 3 mg, Oral, Nightly  pantoprazole (PROTONIX) tablet 40 mg, 40 mg, Oral, QAM AC  sevelamer (RENVELA) tablet 800 mg, 800 mg, Oral, TID WC  sennosides-docusate sodium (SENOKOT-S) 8.6-50 MG

## 2025-05-18 NOTE — PLAN OF CARE
Problem: Chronic Conditions and Co-morbidities  Goal: Patient's chronic conditions and co-morbidity symptoms are monitored and maintained or improved  5/18/2025 1201 by Yeny Winslow RN  Outcome: Progressing  5/18/2025 0106 by Ashutosh Lambert RN  Outcome: Progressing  Flowsheets (Taken 5/17/2025 2055)  Care Plan - Patient's Chronic Conditions and Co-Morbidity Symptoms are Monitored and Maintained or Improved:   Monitor and assess patient's chronic conditions and comorbid symptoms for stability, deterioration, or improvement   Collaborate with multidisciplinary team to address chronic and comorbid conditions and prevent exacerbation or deterioration   Update acute care plan with appropriate goals if chronic or comorbid symptoms are exacerbated and prevent overall improvement and discharge     Problem: Pain  Goal: Verbalizes/displays adequate comfort level or baseline comfort level  5/18/2025 1201 by Yeny Winslow RN  Outcome: Progressing  5/18/2025 0106 by Ashutosh Lambert RN  Outcome: Progressing     Problem: Safety - Adult  Goal: Free from fall injury  5/18/2025 1201 by Yeny Winslow RN  Outcome: Progressing  5/18/2025 0106 by Ashutosh Lambert RN  Outcome: Progressing     Problem: Skin/Tissue Integrity  Goal: Skin integrity remains intact  Description: 1.  Monitor for areas of redness and/or skin breakdown2.  Assess vascular access sites hourly3.  Every 4-6 hours minimum:  Change oxygen saturation probe site4.  Every 4-6 hours:  If on nasal continuous positive airway pressure, respiratory therapy assess nares and determine need for appliance change or resting period  5/18/2025 1201 by Yeny Winslow RN  Outcome: Progressing  5/18/2025 0106 by Ashutosh Lambert RN  Outcome: Progressing  Flowsheets (Taken 5/17/2025 2055)  Skin Integrity Remains Intact:   Monitor for areas of redness and/or skin breakdown   Assess vascular access sites hourly     Problem: Cardiovascular - Adult  Goal: Maintains

## 2025-05-18 NOTE — PROGRESS NOTES
ORTHOPAEDIC NOTE    Chief Complaint   Patient presents with    Fatigue     Patient arrives from home via [facility] with complaints of generalized weakness. Patient reports generalized weakness. Patient is dialysis pt. Currently does physical therapy for hip and shoulder pain. Patient reports not being able to get out of bed today because he felt so weak and from the pain. Pt last dialysis was Tuesday        HPI  No issues  He reports right shoulder feeling better overall  Swelling persists, mainly anterior  Denies N/T      Past Medical History:   Diagnosis Date    Atrial fibrillation (HCC)     CAD (coronary artery disease)     Cancer of kidney (HCC)     Immunotherapy: just finished treatment 11-22-23    Dependence on renal dialysis     dialysis Tues, Thurs Sat    Diabetes mellitus (HCC)     Hyperlipidemia     Hypertension     Prostate cancer (HCC)     radiation    Right renal mass     dialysis    Systolic CHF (HCC)        Past Surgical History:   Procedure Laterality Date    CATARACT REMOVAL Bilateral     ~ 2006 with lens implanted    CHOLECYSTECTOMY      CT GUIDED CHEST TUBE  11/18/2024    CT GUIDED CHEST TUBE 11/18/2024 [facility] CT SCAN    CT NEEDLE BIOPSY LIVER PERCUTANEOUS  08/01/2024    CT NEEDLE BIOPSY LIVER PERCUTANEOUS 8/1/2024 [facility] CT SCAN    CT NEEDLE BIOPSY LIVER PERCUTANEOUS  11/18/2024    CT NEEDLE BIOPSY LIVER PERCUTANEOUS 11/18/2024 [facility] CT SCAN    EYE SURGERY Bilateral 04/19/2018    Cataract    HERNIA REPAIR      HERNIA REPAIR Left 12/31/2024    OPEN LEFT INGUINAL HERNIA REPAIR WITH MESH performed by Camacho Chodwhury MD at [facility] OR    IR TUNNELED CVC PLACE WO SQ PORT/PUMP > 5 YEARS  02/07/2024    IR TUNNELED CATHETER PLACEMENT GREATER THAN 5 YEARS 2/7/2024 Kosta Baugh MD [facility] SPECIAL PROCEDURES    IR TUNNELED CVC PLACE WO SQ PORT/PUMP > 5 YEARS  03/28/2025    IR TUNNELED CVC PLACE WO SQ PORT/PUMP > 5 YEARS 3/28/2025 [facility] CARDIAC CATH/IR/NEURO LAB    KIDNEY REMOVAL Right 07/25/2022    ROBOTIC

## 2025-05-18 NOTE — CARE COORDINATION
Discharge Planning:     (CM) spoke with the patient and later in the day spoke with the patients spouse-Amanda about potential discharge plan. Spouse-Amanda want to speak to her  and son about possible discharge to rehab once medically stable.  The patient has been to [facility] in the past and would consider returning. CM also mentioned [place] due to the patients home and location. CM left a SNF in the patient room for review.  Patient is on HD TTS, per Nephro the patient might decrease his HD days to Tues-Saturday.    At this time the family would like to talk and decide on the best plan for discharge. CM will follow.    Electronically signed by Renetta Germain on 5/18/25 at 2:37 PM EDT

## 2025-05-19 ENCOUNTER — APPOINTMENT (OUTPATIENT)
Dept: MRI IMAGING | Age: 75
DRG: 551 | End: 2025-05-19
Payer: MEDICARE

## 2025-05-19 PROBLEM — E44.0 MODERATE MALNUTRITION: Chronic | Status: ACTIVE | Noted: 2025-05-19

## 2025-05-19 LAB
BACTERIA BLD CULT: NORMAL
BACTERIA FLD AEROBE CULT: NORMAL
COLLECT DURATION TIME UR: 24 HR
CREAT 24H UR-MRATE: 0.7 G/24HR (ref 0.6–2.5)
CREAT CL 24H UR+SERPL-VRATE: 23 ML/MIN (ref 100–140)
CREAT SERPL-MCNC: 1.8 MG/DL (ref 0.8–1.3)
GLUCOSE BLD-MCNC: 137 MG/DL (ref 70–99)
GLUCOSE BLD-MCNC: 237 MG/DL (ref 70–99)
GLUCOSE BLD-MCNC: 244 MG/DL (ref 70–99)
GLUCOSE BLD-MCNC: 269 MG/DL (ref 70–99)
GRAM STN SPEC: NORMAL
MAGNESIUM SERPL-MCNC: 1.75 MG/DL (ref 1.8–2.4)
PATH INTERP BLD-IMP: NORMAL
PERFORMED ON: ABNORMAL
PROT 24H UR-MRATE: 0.57 G/24HR
SPECIMEN VOL 24H UR: 1150 ML

## 2025-05-19 PROCEDURE — 6370000000 HC RX 637 (ALT 250 FOR IP): Performed by: HOSPITALIST

## 2025-05-19 PROCEDURE — G0545 PR INHERENT VISIT TO INPT: HCPCS | Performed by: INTERNAL MEDICINE

## 2025-05-19 PROCEDURE — 83735 ASSAY OF MAGNESIUM: CPT

## 2025-05-19 PROCEDURE — 97530 THERAPEUTIC ACTIVITIES: CPT

## 2025-05-19 PROCEDURE — 2500000003 HC RX 250 WO HCPCS: Performed by: HOSPITALIST

## 2025-05-19 PROCEDURE — 99233 SBSQ HOSP IP/OBS HIGH 50: CPT | Performed by: INTERNAL MEDICINE

## 2025-05-19 PROCEDURE — 72148 MRI LUMBAR SPINE W/O DYE: CPT

## 2025-05-19 PROCEDURE — 6360000002 HC RX W HCPCS: Performed by: STUDENT IN AN ORGANIZED HEALTH CARE EDUCATION/TRAINING PROGRAM

## 2025-05-19 PROCEDURE — 97112 NEUROMUSCULAR REEDUCATION: CPT

## 2025-05-19 PROCEDURE — 1200000000 HC SEMI PRIVATE

## 2025-05-19 PROCEDURE — 6370000000 HC RX 637 (ALT 250 FOR IP): Performed by: STUDENT IN AN ORGANIZED HEALTH CARE EDUCATION/TRAINING PROGRAM

## 2025-05-19 PROCEDURE — 2500000003 HC RX 250 WO HCPCS: Performed by: STUDENT IN AN ORGANIZED HEALTH CARE EDUCATION/TRAINING PROGRAM

## 2025-05-19 PROCEDURE — 36415 COLL VENOUS BLD VENIPUNCTURE: CPT

## 2025-05-19 PROCEDURE — 84156 ASSAY OF PROTEIN URINE: CPT

## 2025-05-19 RX ORDER — ENOXAPARIN SODIUM 100 MG/ML
40 INJECTION SUBCUTANEOUS DAILY
Status: DISCONTINUED | OUTPATIENT
Start: 2025-05-20 | End: 2025-05-20

## 2025-05-19 RX ORDER — GABAPENTIN 100 MG/1
200 CAPSULE ORAL 2 TIMES DAILY
Status: DISCONTINUED | OUTPATIENT
Start: 2025-05-19 | End: 2025-05-24 | Stop reason: HOSPADM

## 2025-05-19 RX ADMIN — HYDROCODONE BITARTRATE AND ACETAMINOPHEN 1 TABLET: 5; 325 TABLET ORAL at 04:04

## 2025-05-19 RX ADMIN — MELATONIN TAB 3 MG 3 MG: 3 TAB at 22:04

## 2025-05-19 RX ADMIN — ASPIRIN 81 MG: 81 TABLET, CHEWABLE ORAL at 09:36

## 2025-05-19 RX ADMIN — SODIUM CHLORIDE, PRESERVATIVE FREE 10 ML: 5 INJECTION INTRAVENOUS at 22:04

## 2025-05-19 RX ADMIN — INSULIN LISPRO 1 UNITS: 100 INJECTION, SOLUTION INTRAVENOUS; SUBCUTANEOUS at 18:06

## 2025-05-19 RX ADMIN — WATER 40 MG: 1 INJECTION INTRAMUSCULAR; INTRAVENOUS; SUBCUTANEOUS at 11:53

## 2025-05-19 RX ADMIN — METOPROLOL TARTRATE 50 MG: 50 TABLET, FILM COATED ORAL at 09:36

## 2025-05-19 RX ADMIN — TRAZODONE HYDROCHLORIDE 25 MG: 50 TABLET ORAL at 22:04

## 2025-05-19 RX ADMIN — PANTOPRAZOLE SODIUM 40 MG: 40 TABLET, DELAYED RELEASE ORAL at 05:53

## 2025-05-19 RX ADMIN — GABAPENTIN 200 MG: 100 CAPSULE ORAL at 09:35

## 2025-05-19 RX ADMIN — SODIUM CHLORIDE, PRESERVATIVE FREE 10 ML: 5 INJECTION INTRAVENOUS at 09:37

## 2025-05-19 RX ADMIN — INSULIN LISPRO 1 UNITS: 100 INJECTION, SOLUTION INTRAVENOUS; SUBCUTANEOUS at 12:02

## 2025-05-19 RX ADMIN — INSULIN LISPRO 2 UNITS: 100 INJECTION, SOLUTION INTRAVENOUS; SUBCUTANEOUS at 22:04

## 2025-05-19 RX ADMIN — GABAPENTIN 200 MG: 100 CAPSULE ORAL at 22:04

## 2025-05-19 RX ADMIN — HYDROCODONE BITARTRATE AND ACETAMINOPHEN 1 TABLET: 5; 325 TABLET ORAL at 11:55

## 2025-05-19 ASSESSMENT — ENCOUNTER SYMPTOMS
BACK PAIN: 1
DIARRHEA: 0
WHEEZING: 0
EYE REDNESS: 0
SINUS PRESSURE: 0
SINUS PAIN: 0
SHORTNESS OF BREATH: 0
COUGH: 0
SORE THROAT: 0
ABDOMINAL PAIN: 0
RHINORRHEA: 0
EYE DISCHARGE: 0
CONSTIPATION: 0
NAUSEA: 0

## 2025-05-19 ASSESSMENT — PAIN DESCRIPTION - LOCATION
LOCATION: HIP
LOCATION: HIP;SHOULDER

## 2025-05-19 ASSESSMENT — PAIN DESCRIPTION - ORIENTATION: ORIENTATION: RIGHT

## 2025-05-19 ASSESSMENT — PAIN SCALES - GENERAL
PAINLEVEL_OUTOF10: 7
PAINLEVEL_OUTOF10: 6
PAINLEVEL_OUTOF10: 7
PAINLEVEL_OUTOF10: 3

## 2025-05-19 ASSESSMENT — PAIN DESCRIPTION - DESCRIPTORS: DESCRIPTORS: ACHING;TENDER

## 2025-05-19 ASSESSMENT — PAIN SCALES - WONG BAKER: WONGBAKER_NUMERICALRESPONSE: HURTS A LITTLE BIT

## 2025-05-19 NOTE — CARE COORDINATION
Wound Follow-up Progress Note       NAME:  Danny Rock  MEDICAL RECORD NUMBER:  [id number]  AGE:  75 y.o.   GENDER:  male  :  1950  TODAY'S DATE:  2025    Subjective    Wound Identification:  Wound Type: pressure and blister  Contributing Factors: diabetes, decreased mobility, and cancer    Objective        Patient Goal of Care:  Wound Healing     BP (!) 155/77   Pulse 54   Temp 97.3 °F (36.3 °C) (Oral)   Resp 16   Ht 1.905 m (6' 3\")   Wt 69 kg (152 lb 1.9 oz)   SpO2 100%   PF 97 L/min   BMI 19.01 kg/m²   Price Risk Score: Price Scale Score: 16    Assessment    Measurements:  Wound 25 Buttocks Inner open areas to EFRAIN buttocks near crack (Active)   Wound Etiology Pressure Stage 2 25   Dressing Status Dry;Intact 25   Wound Cleansed Soap and water 25   Dressing/Treatment Zinc paste 25   Wound Assessment Pink/red 25   Drainage Amount None (dry) 25   Drainage Description Sanguinous 25   Odor None 25   Maria E-wound Assessment Blanchable erythema 25   Number of days: 49       Wound 25 Heel Left bruising vs intact blister (Active)   Wound Etiology Other 25   Dressing Status Clean;Dry;Intact 25   Dressing/Treatment Foam 25   Wound Assessment Dry 25   Drainage Amount None (dry) 25   Maria E-wound Assessment Intact 25   Number of days: 40     Patient agreeable to visit. Patient has ruptured blister to left posterior heel, with skin flaps still attached, there is no drainage, but wound is moist, wound bed pink. There is skin erosion to the bilateral buttocks area with non blanchable erythema.  Patient is incontinent of stool. Patient does have male pure wick in place.  Maria E- care provided.  Zinc paste and sacral heart dressing applied.  Will continue wound care as ordered.   Response to treatment:

## 2025-05-19 NOTE — PROGRESS NOTES
Nutrition Note    RECOMMENDATIONS  PO Diet: Continue current diet  ONS: Ordered Glucerna BID (prefers chocolate per past RD notes)    ASSESSMENT   Pt triggered for positive nutrition screen r/t wound - documentation of stage 2 pressure injury to buttocks. Upon visiting, pt reported he believes he was eating well prior to admission and unsure of any unintentional wt loss. Wt hx in EMR indicates 31 lb (17%) wt loss over the last year. Evidence of muscle wasting and fat loss upon NFPE. Pt asking for ONS to offer additional protein. On regular, 5 carb choice diet with documented meal intakes averaging greater than 50% RD will monitor for adequate po intake.     Malnutrition Status: Moderate malnutrition  Chronic Illness  Findings of the 6 clinical characteristics of malnutrition:  Energy Intake:  75% or less estimated energy requirements for 1 month or longer (AEB wt loss)  Weight Loss:  Mild weight loss (Wt hx in EMR indicates 31 lb (17%) wt loss over the last year)     Body Fat Loss:  Mild body fat loss Orbital   Muscle Mass Loss:  Mild muscle mass loss Thigh (quadriceps), Calf (gastrocnemius), Scapula (trapezius)  Fluid Accumulation:  No fluid accumulation     Strength:  Not Performed    NUTRITION DIAGNOSIS   Moderate malnutrition, in context of chronic illness related to   as evidenced by criteria as identified in malnutrition assessment  Increased nutrient needs related to increase demand for energy/nutrients as evidenced by wounds    Goals: PO intake 50% or greater     NUTRITION RELATED FINDINGS  Objective: ESRD on HD. LBM 5/19.  Wounds: Pressure Injury, Stage II (left heel bruising versus blister)    CURRENT NUTRITION THERAPIES  ADULT DIET; Regular; 5 carb choices (75 gm/meal)       PO Intake: 51-75%, %   PO Supplement Intake:None Ordered    ANTHROPOMETRICS  Current Height: 190.5 cm (6' 3\")  Current Weight - Scale: 69 kg (152 lb 1.9 oz)    Admission weight:      COMPARATIVE STANDARDS  [employer]

## 2025-05-19 NOTE — CARE COORDINATION
Discharge Planning:     (CM) spoke with the patients spouse-Amanda who would like to move forward with SNF placement. Pre request referrals sent to the following:    [facility]  [address] [place] [address]  Report: [phone]  Fax: [phone]       [facility]  [address] [place]  [address]  Report: [phone]  Fax: [phone]     Update: [facility] accepted. No pre-cert needed.    Electronically signed by Renetta Germain on 5/19/25 at 3:36 PM EDT

## 2025-05-19 NOTE — PROGRESS NOTES
Date of Admission: 5/15/2025. Hospital Day: 5       HOSPITAL COURSE  75 y.o. male who presented to [facility] ER with weakness , worsening lower back pain with radiculopathy.  Had recent prolonged hospitalization and rehab stay, started with right shoulder septic joint on 1/2025 had complicated by discitis and lumbar osteomyelitis with Enterococcus bacteremia.  Pain somewhat improved but persistent, follow-up and MRI lumbar spine.  Tentative discharge in 24 hours to SNF if MRI reassuring    Interval  History:    Had an episode of bowel incontinence yesterday and reports pain got worse  Willing to work with therapy and hopes to be able to walk more today      Physical Exam     BP (!) 155/77   Pulse 54   Temp 97.3 °F (36.3 °C) (Oral)   Resp 16   Ht 1.905 m (6' 3\")   Wt 69 kg (152 lb 1.9 oz)   SpO2 100%   PF 97 L/min   BMI 19.01 kg/m²     General appearance: No apparent distress, appears stated age and cooperative.  Respiratory:  Normal respiratory effort. Clear to auscultation, bilaterally without Rales/Wheezes/Rhonchi.  Cardiovascular: Regular rate and rhythm with normal S1/S2 without murmurs, rubs or gallops.  Neuro: Motor 4+/5 bilaterally upper and lower extremity  MSK.  Bilateral hip pain and lower back on bilateral hip flexion and abduction/adduction movement, right hip inferolateral tenderness          Assessment/Plan:    # Generalized weakness  #Acute on chronic lower back pain  # Acute right hip pain  No obvious focal weakness, suspect pain and weakness in the setting of severe lumbar spinal spondylarthritis, spinal infection.  Pain control with low-dose gabapentin, increase to 200 twice daily, continue, lidocaine patch  Switch steroid to oral  No neurological red flag sign , no saddle anesthesia but with incontinence as well as  Recent discitis osteomyelitis, obtain MRI lumbar spine    #Rt psoas hematoma  Psoas hematoma vs abscess . Appears to have worsened  from prior  on Mri. H.o hematoma .   D/w orthopedics. Consult IR for drainage or gen surgery if   IR not feasible.  Eliquis on hold    # History of Enterococcus faecalis bacteremia.  # History of right shoulder septic arthritis  # Multilevel lumbar discitis and osteomyelitis  Recent history with lumbar discitis and osteomyelitis.  Patient has worsening right clavicle cortical irregularity, consulted orthopedics, underwent IND of shoulder, right.  Culture no growth so far  ID following. Continue IV vancomycin for now.     ESRD: On hemodialysis, appears to have some renal recovery.  Plan for hemodialysis twice a week.  Nephrology following    # Renal cancer.  Continue oral chemo     # CAD.  Continue home medication    # A-fib.  Rate controlled, hold  Eliquis  with hematoma progression      Active Hospital Problems    Diagnosis     Moderate malnutrition [E44.0]     COVID-19 [U07.1]     Effusion of joint of right shoulder [M25.411]     Right rotator cuff tear arthropathy [M75.101, M12.811]     Weakness [R53.1]     History of bacterial sinusitis [Z87.09]            DVT Prophylaxis:    Diet: ADULT DIET; Regular; 5 carb choices (75 gm/meal)  ADULT ORAL NUTRITION SUPPLEMENT; Breakfast, Dinner; Diabetic Oral Supplement  Code Status: Full Code      Dispo -TBD.  Expected DC  in/on   .1-2 d   Barrier to discharge           Chief Complaint:   Chief Complaint   Patient presents with    Fatigue     Patient arrives from home via [facility] with complaints of generalized weakness. Patient reports generalized weakness. Patient is dialysis pt. Currently does physical therapy for hip and shoulder pain. Patient reports not being able to get out of bed today because he felt so weak and from the pain. Pt last dialysis was Tuesday              Medications:  Reviewed    Infusion Medications    sodium chloride      dextrose       Scheduled Medications    gabapentin  200 mg Oral BID    methylPREDNISolone  40 mg IntraVENous Daily    lidocaine  1 patch TransDERmal Daily

## 2025-05-19 NOTE — PROGRESS NOTES
MD Gordon Frankel MD Aldo Estella, DO Vanessa Godines, PA-C Mark Kremer, PA-C                 Office: [phone]                      Fax: [phone]             [employer]       #ESKD on HD Tue/Thurs  #COVID 19  -attempted to see patient multiple times but off floor.  -plan for iHD tomorrow per his routine iHD Tue/Thurs schedule.   -5/19: 24 hr CrCl 23 ml/min, Ur Volume 1150ml.            -stable otherwise from renal standpoint.        Guillermo Moss DO,  [facility]  Office: [phone] or [employer]  Fax: [phone]

## 2025-05-19 NOTE — PROGRESS NOTES
[facility], [place] - Inpatient Rehabilitation Department   Phone: [phone]    Physical Therapy    [] Initial Evaluation            [x] Daily Treatment Note         [] Discharge Summary      Patient: Danny Rock   : 1950   MRN: [id number]   Date of Service:  2025  Admitting Diagnosis: Weakness  Current Admission Summary: \"Danny Rock is a 75 y.o. male who presente to ER with complaints of weakness pain.  Patient said admitted physical level overnight yesterday fell forward poorly afterwards.  Patient is having pain all across his lower back and right hip.  Denies any fall he was scheduled for dialysis this morning but did not go due to not getting out of bed.  While called EMS.  Patient reports currently being treated for right shoulder infection patient is on IV antibiotics during dialysis was scheduled to see orthopedic doctor today but could not get it denies any fevers chills nothing that makes it better.\"    Found to have COVID-19.    Past Medical History:  has a past medical history of Atrial fibrillation (HCC), CAD (coronary artery disease), Cancer of kidney (HCC), Dependence on renal dialysis, Diabetes mellitus (HCC), Hyperlipidemia, Hypertension, Prostate cancer (HCC), Right renal mass, and Systolic CHF (HCC).  Past Surgical History:  has a past surgical history that includes hernia repair; knee surgery; Cholecystectomy; Nasal septum surgery; eye surgery (Bilateral, 2018); Kidney removal (Right, 2022); Cataract removal (Bilateral); IR TUNNELED CVC PLACE WO SQ PORT/PUMP > 5 YEARS (2024); CT NEEDLE BIOPSY LIVER PERCUTANEOUS (2024); other surgical history (2024); CT NEEDLE BIOPSY LIVER PERCUTANEOUS (2024); CT GUIDED CHEST TUBE (2024); hernia repair (Left, 2024); other surgical history (Left); shoulder surgery (Right, 2025); IR TUNNELED CVC PLACE WO SQ PORT/PUMP > 5 YEARS (2025); and sigmoidoscopy (N/A, 2025).  Discharge  transfer: moderate assistance  Stand to sit transfer: moderate assistance  Stand step transfer: moderate assistance, with RW  Comments: posterior lean while side stepping to the chair, decreased step length  Ambulation:  Assistive Device: rolling walker  Assistance: minimal assistance  Distance: 4 steps forward, 4 steps back  Gait Mechanics: narrow ALFREDO, decreased step length  Comments:  The pt transferred from bed to chair and then stood again for 2 minutes, then again for 1 minute.  Pt completed 10 reps of standing marching, with RW, CGA for static balance.   Stair Mobility:  Stair mobility not completed on this date.  Comments:  Wheelchair Mobility:  No w/c mobility completed on this date.  Comments:  Balance:  Static Sitting Balance: fair (-): maintains balance at SBA with use of UE support  Dynamic Sitting Balance: fair (-): maintains balance at CGA with use of UE support  Static Standing Balance: fair (-): maintains balance at CGA with use of UE support  Dynamic Standing Balance: poor: requires mod (A) to maintain balance  Comments:    Other Therapeutic Interventions    Functional Outcomes  AM-PAC Inpatient Mobility Raw Score : 11              Cognition  WFL - slight inconsistencies with account of pre-admission info, pt has baseline decreased cognition measured by the SLUMs on a previous admission  Orientation:    alert and oriented x 4  Command Following:   WFL    Education  Barriers To Learning: cognition and physical  Patient Education: patient educated on PT role and benefits, plan of care, discharge recommendations  Learning Assessment:  patient verbalizes understanding, would benefit from continued reinforcement    Assessment  Activity Tolerance: Fatigued with standing and stepping, limited by pain and weakness  Impairments Requiring Therapeutic Intervention: decreased functional mobility, decreased strength, decreased endurance, decreased sensation, decreased balance, increased pain  Prognosis:

## 2025-05-19 NOTE — PROGRESS NOTES
Infectious Diseases   Progress Note      Admission Date: 5/15/2025  Hospital Day: Hospital Day: 5   Attending: Francesco Berry MD  Date of service: 5/19/2025     Chief complaint/ Reason for consult:     Generalized weakness  COVID-19 infection  L5 spondylolysis and spondylolisthesis  Bilateral frontal sinusitis  Worsening of right clavicular cortical irregularity  Recent Enterococcus faecalis bacteremia in April 2025  History of lumbar discitis and osteomyelitis with foraminal stenosis at L3-L4 level  ESRD on hemodialysis    Microbiology:      I have reviewed allavailable micro lab data and cultures    Results       Procedure Component Value Units Date/Time    Culture, Body Fluid (with Gram Stain) [[id number]] Collected: 05/16/25 1405    Order Status: Completed Specimen: Body Fluid from Synovial Fluid Updated: 05/19/25 0852     Body Fluid Culture, Sterile --     No growth to date  No growth 36 to 48 hours  No Aerobic or Anaerobic growth  Holding for 3 weeks.       Gram Stain Result No Epithelial Cells seen  1+ WBC's (Mononuclear)  No organisms seen      Narrative:      ORDER#: [id number]                          ORDERED BY: KENN RODRIGUEZ  SOURCE: Synovial Fluid                     COLLECTED:  05/16/25 14:05  ANTIBIOTICS AT SARAH.:                      RECEIVED :  05/16/25 17:06    Respiratory Panel, Molecular, with COVID-19 (Restricted: peds pts or suitable admitted adults) [[id number]]  (Abnormal) Collected: 05/16/25 0620    Order Status: Completed Specimen: Nasopharyngeal Swab Updated: 05/16/25 1331     Organism SARS CoV 2  by PCR     Respiratory Panel PCR --     POSITIVE FOR  See additional report for complete Respiratory Pathogen Panel  This test has been authorized by FDA under an  Emergency Use Authorization (EUA).  This test is only authorized for the duration of the  time of declaration that circumstances exist justifying the  authorization of the emergency use of in vitro diagnostic  testing for  External ear normal. There is no impacted cerumen.      Left Ear: External ear normal. There is no impacted cerumen.      Nose: Nose normal.      Mouth/Throat:      Mouth: Mucous membranes are moist.      Pharynx: Oropharynx is clear. No oropharyngeal exudate.   Eyes:      General: No scleral icterus.        Right eye: No discharge.         Left eye: No discharge.      Conjunctiva/sclera: Conjunctivae normal.      Pupils: Pupils are equal, round, and reactive to light.   Neck:      Thyroid: No thyromegaly.   Cardiovascular:      Rate and Rhythm: Normal rate and regular rhythm.      Heart sounds: Normal heart sounds. No murmur heard.     No friction rub.   Pulmonary:      Effort: No respiratory distress.      Breath sounds: No stridor. No wheezing or rales.   Abdominal:      General: Bowel sounds are normal.      Palpations: Abdomen is soft.      Tenderness: There is no abdominal tenderness. There is no guarding or rebound.   Musculoskeletal:         General: Tenderness (Lower back) present. No swelling or deformity. Normal range of motion.      Cervical back: Normal range of motion and neck supple.      Right lower leg: No edema.      Left lower leg: No edema.   Lymphadenopathy:      Cervical: No cervical adenopathy.   Skin:     General: Skin is warm and dry.      Coloration: Skin is not jaundiced.      Findings: No bruising, erythema or rash.   Neurological:      General: No focal deficit present.      Mental Status: He is alert and oriented to person, place, and time. Mental status is at baseline.      Motor: No abnormal muscle tone.   Psychiatric:         Mood and Affect: Mood normal.         Behavior: Behavior normal.           Lines and drains: All vascular access sites are healthy with no local erythema, discharge or tenderness.      Intake and output:    I/O last 3 completed shifts:  In: 1070 [P.O.:1070]  Out: 1300 [Urine:1300]    Lab Data:   All available labs and old records have been reviewed by

## 2025-05-19 NOTE — PLAN OF CARE
[facility] Orthopedic Surgery  Plan of Care Note        Patient off floor at MRI when attempted to see earlier today.    No growth from culture right shoulder aspirate (5/16).    Plan:  No plans on surgical intervention regarding the right shoulder unless something grows from culture. Cell count also supportive of inflammatory arthritis rather than infectious etiology.  - Shoulder activity as tolerated  - Pain control  - MRI showing ongoing right psoas fluid collection relatively similar in size to previously noted fluid collection.  His Eliquis has been held the last few days.  Will see if IR can aspirate right psoas fluid collection.  Alternatively, would consider general surgery evaluation for this.    Jovi Jacome, JANET - CNP 5/19/2025 3:34 PM

## 2025-05-19 NOTE — PROGRESS NOTES
[facility] - Inpatient Rehabilitation Department   Phone: [phone]    Occupational Therapy    [] Initial Evaluation            [x] Daily Treatment Note         [] Discharge Summary      Patient: Danny Rock   : 1950   MRN: [id number]   Date of Service:  2025    Admitting Diagnosis:  Weakness  Current Admission Summary: Per ED note, \"[place] [facility] of generalized weakness. Patient reports generalized weakness. Patient is dialysis pt. Currently does physical therapy for hip and shoulder pain. Patient reports not being able to get out of bed today because he felt so weak and from the pain. Pt last dialysis was Tuesday \"  Past Medical History:  has a past medical history of Atrial fibrillation (HCC), CAD (coronary artery disease), Cancer of kidney (HCC), Dependence on renal dialysis, Diabetes mellitus (HCC), Hyperlipidemia, Hypertension, Prostate cancer (HCC), Right renal mass, and Systolic CHF (HCC).  Past Surgical History:  has a past surgical history that includes hernia repair; knee surgery; Cholecystectomy; Nasal septum surgery; eye surgery (Bilateral, 2018); Kidney removal (Right, 2022); Cataract removal (Bilateral); IR TUNNELED CVC PLACE WO SQ PORT/PUMP > 5 YEARS (2024); CT NEEDLE BIOPSY LIVER PERCUTANEOUS (2024); other surgical history (2024); CT NEEDLE BIOPSY LIVER PERCUTANEOUS (2024); CT GUIDED CHEST TUBE (2024); hernia repair (Left, 2024); other surgical history (Left); shoulder surgery (Right, 2025); IR TUNNELED CVC PLACE WO SQ PORT/PUMP > 5 YEARS (2025); and sigmoidoscopy (N/A, 2025).    Discharge Recommendations: Danny Rock scored a 13/24 on the AM-PAC ADL Inpatient form. Current research shows that an AM-PAC score of 17 or less is typically not associated with a discharge to the patient's home setting. Based on the patient's AM-PAC score and their current ADL

## 2025-05-20 ENCOUNTER — APPOINTMENT (OUTPATIENT)
Dept: CT IMAGING | Age: 75
DRG: 551 | End: 2025-05-20
Payer: MEDICARE

## 2025-05-20 LAB
ALBUMIN SERPL-MCNC: 3 G/DL (ref 3.4–5)
ANION GAP SERPL CALCULATED.3IONS-SCNC: 13 MMOL/L (ref 3–16)
BACTERIA BLD CULT ORG #2: NORMAL
BUN SERPL-MCNC: 68 MG/DL (ref 7–20)
CALCIUM SERPL-MCNC: 8.7 MG/DL (ref 8.3–10.6)
CHLORIDE SERPL-SCNC: 103 MMOL/L (ref 99–110)
CO2 SERPL-SCNC: 21 MMOL/L (ref 21–32)
CREAT SERPL-MCNC: 2.4 MG/DL (ref 0.8–1.3)
GFR SERPLBLD CREATININE-BSD FMLA CKD-EPI: 27 ML/MIN/{1.73_M2}
GLUCOSE BLD-MCNC: 157 MG/DL (ref 70–99)
GLUCOSE BLD-MCNC: 174 MG/DL (ref 70–99)
GLUCOSE BLD-MCNC: 237 MG/DL (ref 70–99)
GLUCOSE SERPL-MCNC: 143 MG/DL (ref 70–99)
PERFORMED ON: ABNORMAL
PHOSPHATE SERPL-MCNC: 2.7 MG/DL (ref 2.5–4.9)
POTASSIUM SERPL-SCNC: 4.9 MMOL/L (ref 3.5–5.1)
SODIUM SERPL-SCNC: 137 MMOL/L (ref 136–145)

## 2025-05-20 PROCEDURE — 87070 CULTURE OTHR SPECIMN AEROBIC: CPT

## 2025-05-20 PROCEDURE — 2500000003 HC RX 250 WO HCPCS: Performed by: STUDENT IN AN ORGANIZED HEALTH CARE EDUCATION/TRAINING PROGRAM

## 2025-05-20 PROCEDURE — 80069 RENAL FUNCTION PANEL: CPT

## 2025-05-20 PROCEDURE — 99232 SBSQ HOSP IP/OBS MODERATE 35: CPT | Performed by: NURSE PRACTITIONER

## 2025-05-20 PROCEDURE — 0K9N3ZZ DRAINAGE OF RIGHT HIP MUSCLE, PERCUTANEOUS APPROACH: ICD-10-PCS | Performed by: HOSPITALIST

## 2025-05-20 PROCEDURE — 2500000003 HC RX 250 WO HCPCS: Performed by: HOSPITALIST

## 2025-05-20 PROCEDURE — 90935 HEMODIALYSIS ONE EVALUATION: CPT

## 2025-05-20 PROCEDURE — 87116 MYCOBACTERIA CULTURE: CPT

## 2025-05-20 PROCEDURE — 6360000002 HC RX W HCPCS: Performed by: STUDENT IN AN ORGANIZED HEALTH CARE EDUCATION/TRAINING PROGRAM

## 2025-05-20 PROCEDURE — G0545 PR INHERENT VISIT TO INPT: HCPCS | Performed by: INTERNAL MEDICINE

## 2025-05-20 PROCEDURE — 0R9J3ZZ DRAINAGE OF RIGHT SHOULDER JOINT, PERCUTANEOUS APPROACH: ICD-10-PCS | Performed by: HOSPITALIST

## 2025-05-20 PROCEDURE — 1200000000 HC SEMI PRIVATE

## 2025-05-20 PROCEDURE — 36415 COLL VENOUS BLD VENIPUNCTURE: CPT

## 2025-05-20 PROCEDURE — 10140 I&D HMTMA SEROMA/FLUID COLLJ: CPT

## 2025-05-20 PROCEDURE — 87206 SMEAR FLUORESCENT/ACID STAI: CPT

## 2025-05-20 PROCEDURE — 87205 SMEAR GRAM STAIN: CPT

## 2025-05-20 PROCEDURE — 6370000000 HC RX 637 (ALT 250 FOR IP): Performed by: HOSPITALIST

## 2025-05-20 PROCEDURE — 99232 SBSQ HOSP IP/OBS MODERATE 35: CPT | Performed by: INTERNAL MEDICINE

## 2025-05-20 PROCEDURE — 6370000000 HC RX 637 (ALT 250 FOR IP): Performed by: STUDENT IN AN ORGANIZED HEALTH CARE EDUCATION/TRAINING PROGRAM

## 2025-05-20 PROCEDURE — 87102 FUNGUS ISOLATION CULTURE: CPT

## 2025-05-20 RX ADMIN — HYDROCODONE BITARTRATE AND ACETAMINOPHEN 1 TABLET: 5; 325 TABLET ORAL at 17:32

## 2025-05-20 RX ADMIN — SODIUM CHLORIDE, PRESERVATIVE FREE 10 ML: 5 INJECTION INTRAVENOUS at 21:31

## 2025-05-20 RX ADMIN — SEVELAMER CARBONATE 800 MG: 800 TABLET, FILM COATED ORAL at 12:03

## 2025-05-20 RX ADMIN — WATER 40 MG: 1 INJECTION INTRAMUSCULAR; INTRAVENOUS; SUBCUTANEOUS at 12:03

## 2025-05-20 RX ADMIN — TRAZODONE HYDROCHLORIDE 25 MG: 50 TABLET ORAL at 21:30

## 2025-05-20 RX ADMIN — ASPIRIN 81 MG: 81 TABLET, CHEWABLE ORAL at 12:03

## 2025-05-20 RX ADMIN — GABAPENTIN 200 MG: 100 CAPSULE ORAL at 21:30

## 2025-05-20 RX ADMIN — SODIUM CHLORIDE, PRESERVATIVE FREE 10 ML: 5 INJECTION INTRAVENOUS at 12:04

## 2025-05-20 RX ADMIN — GABAPENTIN 200 MG: 100 CAPSULE ORAL at 12:03

## 2025-05-20 RX ADMIN — ENOXAPARIN SODIUM 40 MG: 100 INJECTION SUBCUTANEOUS at 12:03

## 2025-05-20 RX ADMIN — MELATONIN TAB 3 MG 3 MG: 3 TAB at 21:30

## 2025-05-20 RX ADMIN — INSULIN LISPRO 1 UNITS: 100 INJECTION, SOLUTION INTRAVENOUS; SUBCUTANEOUS at 21:30

## 2025-05-20 ASSESSMENT — ENCOUNTER SYMPTOMS
EYE REDNESS: 0
SINUS PAIN: 0
SINUS PRESSURE: 0
BACK PAIN: 1
EYE DISCHARGE: 0
ABDOMINAL PAIN: 0
RHINORRHEA: 0
DIARRHEA: 0
SORE THROAT: 0
CONSTIPATION: 0
NAUSEA: 0
SHORTNESS OF BREATH: 0
COUGH: 0
WHEEZING: 0

## 2025-05-20 ASSESSMENT — PAIN - FUNCTIONAL ASSESSMENT: PAIN_FUNCTIONAL_ASSESSMENT: ACTIVITIES ARE NOT PREVENTED

## 2025-05-20 ASSESSMENT — PAIN DESCRIPTION - DESCRIPTORS: DESCRIPTORS: ACHING;TENDER

## 2025-05-20 ASSESSMENT — PAIN DESCRIPTION - ORIENTATION: ORIENTATION: RIGHT

## 2025-05-20 ASSESSMENT — PAIN DESCRIPTION - PAIN TYPE: TYPE: ACUTE PAIN

## 2025-05-20 ASSESSMENT — PAIN SCALES - GENERAL: PAINLEVEL_OUTOF10: 9

## 2025-05-20 ASSESSMENT — PAIN DESCRIPTION - LOCATION: LOCATION: HIP

## 2025-05-20 NOTE — PROGRESS NOTES
ORTHOPAEDIC Progress NOTE      Subjective:  No new issues  He reports right shoulder feeling better overall  Swelling persists, mainly anterior  Denies N/T  Also still having right low back and thigh pain.     Physical Exam  Musculoskeletal:      Left lower leg: Left lower leg edema: Subjective.       Body mass index is 19.01 kg/m².  NAD, pleasant  Chronically ill appearing, thin build  RUE SILT M/U/R/A/LABC nerve distributions; AIN/PIN/IO intact  Rad pulse intact  Right shoulder -   Prior anterior surgical incision well healed  No erythema or drainage  Fluid collection around the right shoulder persists, predominantly anterior  Active FE ~30-40, symmetric bilaterally  Active ER intact  IR deferred, patient laying supine in bed  Shoulder exam unchanged    Imaging:  Prior images were reviewed    Labs:  Lab Results   Component Value Date    WBC 6.4 05/16/2025    HGB 8.7 (L) 05/16/2025    HCT 26.9 (L) 05/16/2025    .6 (H) 05/16/2025     05/16/2025      Lab Results   Component Value Date     05/20/2025    K 4.9 05/20/2025     05/20/2025    CO2 21 05/20/2025    BUN 68 (H) 05/20/2025    CREATININE 2.4 (H) 05/20/2025    GLUCOSE 143 (H) 05/20/2025    CALCIUM 8.7 05/20/2025    BILITOT 0.3 05/15/2025    ALKPHOS 129 05/15/2025    AST 27 05/15/2025    ALT 18 05/15/2025    LABGLOM 27 (A) 05/20/2025    GFRAA 40 (A) 07/26/2022    AGRATIO 1.1 05/15/2025    GLOB 3.0 01/04/2021     Lab Results   Component Value Date    INR 1.71 (H) 05/15/2025    INR 1.14 04/07/2025    INR 1.31 (H) 03/26/2025     Lab Results   Component Value Date    APTT 34.2 04/07/2025    APTT 31.8 08/01/2024    APTT 32.1 07/08/2022     Lab Results   Component Value Date    LABA1C 5.3 05/16/2025    LABA1C 6.6 03/23/2025    LABA1C 6.7 02/02/2025     Lab Results   Component Value Date    VITD25 130.4 03/14/2024         Cell Count with Differential, Body Fluid       Component  Ref Range & Units (hover) 5/16/25 1405 1/29/25 1125   Cell Count  Fluid Type Shoulder  Right Shoulder   Color, Fluid Yellow Pale Yellow   Appearance, Fluid Hazy Turbid   Clot Eval. see below see below CM   Comment: No Clots Seen   Nucl Cell, Fluid 155 9,009   RBC, Fluid 5,240 3,200   Neutrophil Count, Fluid 54 93   Lymphocytes, Body Fluid 22 1   Monocyte Count, Fluid 18 6   Macrophages 6    Number of Cells Counted Fluid 100 100 CM            Culture, Body Fluid (with Gram Stain)  Body Fluid Culture, Sterile     No growth to date  No growth 36 to 48 hours P    [facility]   Gram Stain Result No Epithelial Cells seen  1+ WBC's (Mononuclear)  No organisms seen [facility]              Narrative  Performed by: [facility]  ORDER#: [id number]                          ORDERED BY: KENN RODRIGUEZ  SOURCE: Synovial Fluid                     COLLECTED:  05/16/25 14:05  ANTIBIOTICS AT SARAH.:                      RECEIVED :  05/16/25 17:06             Culture, Body Fluid (with Gram Stain)  Body Fluid Culture, Sterile No Aerobic or Anaerobic growth after 3 weeks. [facility]   Gram Stain Result No Epithelial Cells seen  3+ WBC's (Polymorphonuclear)  1+ WBC's (Mononuclear)  No organisms seen [facility]              Narrative  Performed by: [facility]  ORDER#: [id number]                          ORDERED BY: KENN RODRIGUEZ  SOURCE: Fluid Aspirate Right Shouklder     COLLECTED:  01/29/25 11:30  ANTIBIOTICS AT SARAH.:                      RECEIVED :  01/29/25 19:16           Culture, Tissue (with Gram Stain)       Component  Ref Range & Units (hover)  Resulting Agency   Gram Stain Result No Epithelial Cells seen  1+ WBC's (Polymorphonuclear)  No organisms seen [facility]   Anaerobic Culture No anaerobes isolated [facility]   Organism Micrococcus luteus Abnormal  [facility]   WOUND/ABSCESS Rare growth  No further workup [facility]   Organism Diphtheroids Abnormal  [facility]

## 2025-05-20 NOTE — PROGRESS NOTES
Occupational Therapy  HOLD OT tx session at this time secondary to pt currently LETA. Thank you, AMINTA Betancourt, OTR/L, [id number].

## 2025-05-20 NOTE — PROGRESS NOTES
[facility]    Hospitalist Progress Note:      Name:  Danny Rock /Age/Sex: 1950  (75 y.o. male)   MRN & CSN:  [id number] & [id number] Encounter Date: 2025    Location:  [id number]/5576-01 PCP: Yesi Rosa APRN - CNP     Attending:Julian Coronado MD  Admission Date: 5/15/2025      Hospital Day: 6    Chief Complain/Reason for Admission:  Chief Complaint   Patient presents with    Fatigue     Patient arrives from home via [facility] with complaints of generalized weakness. Patient reports generalized weakness. Patient is dialysis pt. Currently does physical therapy for hip and shoulder pain. Patient reports not being able to get out of bed today because he felt so weak and from the pain. Pt last dialysis was Tuesday      Assessment:  Danny Rock is a 75 y.o. male who presente to ER with complaints of generalized weakness and pain.   Generalized weakness  COVID-19 infection-remains on room air, getting symptomatic treatment  Right shoulder joint with large effusion: S/p right shoulder aspiration-fluid analysis studies not consistent with infection.  Acute on Chronic back pain likely due to L5 spondylolysis and spondylolisthesis: History of lumbar discitis and osteomyelitis with foraminal stenosis at L3-L4 level  R Psoas muscle enlargement associated with fluid collection hematoma vs abscess  Recent Enterococcus faecalis bacteremia in 2025  ESRD on hemodialysis  Essential hypertension  Paroxysmal atrial fibrillation: on eliquis as outpt  Mixed hyperlipidemia  Type 2 diabetes mellitus-maintain good glucose control  History of prostate cancer    Plan:   Ortho team noted, plan for IR guided aspiration of right shoulder joint.    Follow-up joint aspiration fluid culture.  RUE WBAT and ROM as tolerated  Optimize pain control  F/u ID team  Hold anticoagulation -Eliquis  Cont HD per nephrology team  Current meds reviewed, adjusted.   See orders  Diet ADULT DIET; Regular; 5

## 2025-05-20 NOTE — PROGRESS NOTES
Shift assessment completed. Routine vitals obtained and stable. Scheduled medications given. Patient is awake, alert and oriented. Respirations are easy and unlabored. Patient does not appear to be in distress, resting comfortably in bed. Patient going off unit at this time to Interventional Radiology and from there to dialysis.

## 2025-05-20 NOTE — PROGRESS NOTES
Treatment time: 3 hours  Net UF: 2000 ml     Pre weight: 69 kg  Post weight:67 kg      Access used: LCVC    Access function: well with  ml/min       Summary of response to treatment: Patient tolerated treatment well and without any complications. Patient remained stable throughout entire treatment and upon the exiting the dialysis suite via transport.     Report given to Brooklyn Samayoa RN and copy of dialysis treatment record placed in chart, to be scanned into EMR.

## 2025-05-20 NOTE — PLAN OF CARE
Problem: Chronic Conditions and Co-morbidities  Goal: Patient's chronic conditions and co-morbidity symptoms are monitored and maintained or improved  Outcome: Progressing     Problem: Pain  Goal: Verbalizes/displays adequate comfort level or baseline comfort level  Outcome: Progressing     Problem: Safety - Adult  Goal: Free from fall injury  Outcome: Progressing     Problem: Skin/Tissue Integrity  Goal: Skin integrity remains intact  Description: 1.  Monitor for areas of redness and/or skin breakdown2.  Assess vascular access sites hourly3.  Every 4-6 hours minimum:  Change oxygen saturation probe site4.  Every 4-6 hours:  If on nasal continuous positive airway pressure, respiratory therapy assess nares and determine need for appliance change or resting period  Outcome: Progressing     Problem: Cardiovascular - Adult  Goal: Maintains optimal cardiac output and hemodynamic stability  Outcome: Progressing  Goal: Absence of cardiac dysrhythmias or at baseline  Outcome: Progressing     Problem: Skin/Tissue Integrity - Adult  Goal: Skin integrity remains intact  Description: 1.  Monitor for areas of redness and/or skin breakdown2.  Assess vascular access sites hourly3.  Every 4-6 hours minimum:  Change oxygen saturation probe site4.  Every 4-6 hours:  If on nasal continuous positive airway pressure, respiratory therapy assess nares and determine need for appliance change or resting period  Outcome: Progressing     Problem: Musculoskeletal - Adult  Goal: Return mobility to safest level of function  Outcome: Progressing  Goal: Return ADL status to a safe level of function  Outcome: Progressing     Problem: Genitourinary - Adult  Goal: Absence of urinary retention  Outcome: Progressing     Problem: Infection - Adult  Goal: Absence of infection at discharge  Outcome: Progressing  Goal: Absence of infection during hospitalization  Outcome: Progressing     Problem: Nutrition Deficit:  Goal: Optimize nutritional  status  Outcome: Progressing

## 2025-05-20 NOTE — PLAN OF CARE
Problem: Chronic Conditions and Co-morbidities  Goal: Patient's chronic conditions and co-morbidity symptoms are monitored and maintained or improved  Outcome: Progressing     Problem: Pain  Goal: Verbalizes/displays adequate comfort level or baseline comfort level  Outcome: Progressing     Problem: Safety - Adult  Goal: Free from fall injury  Outcome: Progressing     Problem: Skin/Tissue Integrity  Goal: Skin integrity remains intact  Description: 1.  Monitor for areas of redness and/or skin breakdown2.  Assess vascular access sites hourly3.  Every 4-6 hours minimum:  Change oxygen saturation probe site4.  Every 4-6 hours:  If on nasal continuous positive airway pressure, respiratory therapy assess nares and determine need for appliance change or resting period  Outcome: Progressing     Problem: Cardiovascular - Adult  Goal: Maintains optimal cardiac output and hemodynamic stability  Outcome: Progressing  Goal: Absence of cardiac dysrhythmias or at baseline  Outcome: Progressing     Problem: Skin/Tissue Integrity - Adult  Goal: Skin integrity remains intact  Description: 1.  Monitor for areas of redness and/or skin breakdown2.  Assess vascular access sites hourly3.  Every 4-6 hours minimum:  Change oxygen saturation probe site4.  Every 4-6 hours:  If on nasal continuous positive airway pressure, respiratory therapy assess nares and determine need for appliance change or resting period  Outcome: Progressing  Goal: Oral mucous membranes remain intact  Outcome: Progressing     Problem: Musculoskeletal - Adult  Goal: Return mobility to safest level of function  Outcome: Progressing  Goal: Return ADL status to a safe level of function  Outcome: Progressing     Problem: Genitourinary - Adult  Goal: Absence of urinary retention  Outcome: Progressing     Problem: Infection - Adult  Goal: Absence of infection at discharge  Outcome: Progressing  Goal: Absence of infection during hospitalization  Outcome: Progressing

## 2025-05-21 PROBLEM — I95.1 ORTHOSTATIC HYPOTENSION: Status: ACTIVE | Noted: 2025-05-21

## 2025-05-21 PROBLEM — R55 NEAR SYNCOPE: Status: ACTIVE | Noted: 2025-05-21

## 2025-05-21 LAB
ACID FAST STN SPEC QL: NORMAL
ALBUMIN SERPL-MCNC: 3 G/DL (ref 3.4–5)
ALBUMIN/GLOB SERPL: 1.1 {RATIO} (ref 1.1–2.2)
ALP SERPL-CCNC: 130 U/L (ref 40–129)
ALT SERPL-CCNC: 28 U/L (ref 10–40)
ANION GAP SERPL CALCULATED.3IONS-SCNC: 23 MMOL/L (ref 3–16)
AST SERPL-CCNC: 33 U/L (ref 15–37)
BASOPHILS # BLD: 0.1 K/UL (ref 0–0.2)
BASOPHILS NFR BLD: 1.3 %
BILIRUB SERPL-MCNC: 0.3 MG/DL (ref 0–1)
BUN SERPL-MCNC: 58 MG/DL (ref 7–20)
CALCIUM SERPL-MCNC: 8.5 MG/DL (ref 8.3–10.6)
CHLORIDE SERPL-SCNC: 97 MMOL/L (ref 99–110)
CO2 SERPL-SCNC: 16 MMOL/L (ref 21–32)
CREAT SERPL-MCNC: 2.7 MG/DL (ref 0.8–1.3)
DEPRECATED RDW RBC AUTO: 20.7 % (ref 12.4–15.4)
EKG DIAGNOSIS: NORMAL
EKG Q-T INTERVAL: 420 MS
EKG QRS DURATION: 140 MS
EKG QTC CALCULATION (BAZETT): 499 MS
EKG R AXIS: -26 DEGREES
EKG T AXIS: 136 DEGREES
EKG VENTRICULAR RATE: 85 BPM
EOSINOPHIL # BLD: 0 K/UL (ref 0–0.6)
EOSINOPHIL NFR BLD: 0 %
GFR SERPLBLD CREATININE-BSD FMLA CKD-EPI: 24 ML/MIN/{1.73_M2}
GLUCOSE BLD-MCNC: 204 MG/DL (ref 70–99)
GLUCOSE BLD-MCNC: 218 MG/DL (ref 70–99)
GLUCOSE BLD-MCNC: 232 MG/DL (ref 70–99)
GLUCOSE BLD-MCNC: 261 MG/DL (ref 70–99)
GLUCOSE SERPL-MCNC: 315 MG/DL (ref 70–99)
HCT VFR BLD AUTO: 25 % (ref 40.5–52.5)
HEMOCCULT STL QL: ABNORMAL
HGB BLD-MCNC: 8.4 G/DL (ref 13.5–17.5)
LOEFFLER MB STN SPEC: NORMAL
LYMPHOCYTES # BLD: 1.4 K/UL (ref 1–5.1)
LYMPHOCYTES NFR BLD: 13.2 %
MCH RBC QN AUTO: 36.5 PG (ref 26–34)
MCHC RBC AUTO-ENTMCNC: 33.5 G/DL (ref 31–36)
MCV RBC AUTO: 109.2 FL (ref 80–100)
MONOCYTES # BLD: 0.4 K/UL (ref 0–1.3)
MONOCYTES NFR BLD: 3.8 %
NEUTROPHILS # BLD: 8.4 K/UL (ref 1.7–7.7)
NEUTROPHILS NFR BLD: 81.7 %
PERFORMED ON: ABNORMAL
PLATELET # BLD AUTO: 276 K/UL (ref 135–450)
PMV BLD AUTO: 7.7 FL (ref 5–10.5)
POTASSIUM SERPL-SCNC: 4.7 MMOL/L (ref 3.5–5.1)
PROT SERPL-MCNC: 5.7 G/DL (ref 6.4–8.2)
RBC # BLD AUTO: 2.29 M/UL (ref 4.2–5.9)
SODIUM SERPL-SCNC: 136 MMOL/L (ref 136–145)
TROPONIN, HIGH SENSITIVITY: 95 NG/L (ref 0–22)
WBC # BLD AUTO: 10.3 K/UL (ref 4–11)

## 2025-05-21 PROCEDURE — 2500000003 HC RX 250 WO HCPCS: Performed by: STUDENT IN AN ORGANIZED HEALTH CARE EDUCATION/TRAINING PROGRAM

## 2025-05-21 PROCEDURE — 93005 ELECTROCARDIOGRAM TRACING: CPT | Performed by: STUDENT IN AN ORGANIZED HEALTH CARE EDUCATION/TRAINING PROGRAM

## 2025-05-21 PROCEDURE — 6370000000 HC RX 637 (ALT 250 FOR IP): Performed by: STUDENT IN AN ORGANIZED HEALTH CARE EDUCATION/TRAINING PROGRAM

## 2025-05-21 PROCEDURE — 2500000003 HC RX 250 WO HCPCS: Performed by: HOSPITALIST

## 2025-05-21 PROCEDURE — 99232 SBSQ HOSP IP/OBS MODERATE 35: CPT | Performed by: INTERNAL MEDICINE

## 2025-05-21 PROCEDURE — 85025 COMPLETE CBC W/AUTO DIFF WBC: CPT

## 2025-05-21 PROCEDURE — 6360000002 HC RX W HCPCS: Performed by: STUDENT IN AN ORGANIZED HEALTH CARE EDUCATION/TRAINING PROGRAM

## 2025-05-21 PROCEDURE — 93010 ELECTROCARDIOGRAM REPORT: CPT | Performed by: INTERNAL MEDICINE

## 2025-05-21 PROCEDURE — 84484 ASSAY OF TROPONIN QUANT: CPT

## 2025-05-21 PROCEDURE — 97530 THERAPEUTIC ACTIVITIES: CPT

## 2025-05-21 PROCEDURE — G0545 PR INHERENT VISIT TO INPT: HCPCS | Performed by: INTERNAL MEDICINE

## 2025-05-21 PROCEDURE — 99223 1ST HOSP IP/OBS HIGH 75: CPT | Performed by: INTERNAL MEDICINE

## 2025-05-21 PROCEDURE — 82270 OCCULT BLOOD FECES: CPT

## 2025-05-21 PROCEDURE — 1200000000 HC SEMI PRIVATE

## 2025-05-21 PROCEDURE — 36415 COLL VENOUS BLD VENIPUNCTURE: CPT

## 2025-05-21 PROCEDURE — 6370000000 HC RX 637 (ALT 250 FOR IP): Performed by: HOSPITALIST

## 2025-05-21 PROCEDURE — 80053 COMPREHEN METABOLIC PANEL: CPT

## 2025-05-21 RX ADMIN — MELATONIN TAB 3 MG 3 MG: 3 TAB at 22:31

## 2025-05-21 RX ADMIN — METOPROLOL TARTRATE 50 MG: 50 TABLET, FILM COATED ORAL at 09:08

## 2025-05-21 RX ADMIN — GABAPENTIN 200 MG: 100 CAPSULE ORAL at 09:08

## 2025-05-21 RX ADMIN — SODIUM CHLORIDE, PRESERVATIVE FREE 10 ML: 5 INJECTION INTRAVENOUS at 09:09

## 2025-05-21 RX ADMIN — SODIUM CHLORIDE, PRESERVATIVE FREE 10 ML: 5 INJECTION INTRAVENOUS at 22:32

## 2025-05-21 RX ADMIN — TRAZODONE HYDROCHLORIDE 25 MG: 50 TABLET ORAL at 22:31

## 2025-05-21 RX ADMIN — SEVELAMER CARBONATE 800 MG: 800 TABLET, FILM COATED ORAL at 09:08

## 2025-05-21 RX ADMIN — ASPIRIN 81 MG: 81 TABLET, CHEWABLE ORAL at 09:10

## 2025-05-21 RX ADMIN — INSULIN LISPRO 1 UNITS: 100 INJECTION, SOLUTION INTRAVENOUS; SUBCUTANEOUS at 22:31

## 2025-05-21 RX ADMIN — HYDROCODONE BITARTRATE AND ACETAMINOPHEN 1 TABLET: 5; 325 TABLET ORAL at 17:56

## 2025-05-21 RX ADMIN — PANTOPRAZOLE SODIUM 40 MG: 40 TABLET, DELAYED RELEASE ORAL at 05:40

## 2025-05-21 RX ADMIN — GABAPENTIN 200 MG: 100 CAPSULE ORAL at 22:31

## 2025-05-21 RX ADMIN — SEVELAMER CARBONATE 800 MG: 800 TABLET, FILM COATED ORAL at 17:58

## 2025-05-21 RX ADMIN — INSULIN LISPRO 1 UNITS: 100 INJECTION, SOLUTION INTRAVENOUS; SUBCUTANEOUS at 09:09

## 2025-05-21 RX ADMIN — WATER 40 MG: 1 INJECTION INTRAMUSCULAR; INTRAVENOUS; SUBCUTANEOUS at 09:08

## 2025-05-21 RX ADMIN — INSULIN LISPRO 2 UNITS: 100 INJECTION, SOLUTION INTRAVENOUS; SUBCUTANEOUS at 17:58

## 2025-05-21 ASSESSMENT — PAIN DESCRIPTION - DESCRIPTORS: DESCRIPTORS: ACHING;STABBING;THROBBING

## 2025-05-21 ASSESSMENT — ENCOUNTER SYMPTOMS
EYE DISCHARGE: 0
DIARRHEA: 0
CONSTIPATION: 0
SINUS PAIN: 0
BACK PAIN: 0
WHEEZING: 0
RHINORRHEA: 0
SHORTNESS OF BREATH: 0
COUGH: 0
ABDOMINAL PAIN: 0
SORE THROAT: 0
EYE REDNESS: 0
SINUS PRESSURE: 0
NAUSEA: 0

## 2025-05-21 ASSESSMENT — PAIN DESCRIPTION - LOCATION: LOCATION: HIP;LEG;SHOULDER

## 2025-05-21 ASSESSMENT — PAIN SCALES - GENERAL: PAINLEVEL_OUTOF10: 7

## 2025-05-21 ASSESSMENT — PAIN DESCRIPTION - ORIENTATION: ORIENTATION: RIGHT

## 2025-05-21 NOTE — PROGRESS NOTES
[facility] - Inpatient Rehabilitation Department   Phone: [phone]    Physical Therapy    [] Initial Evaluation            [x] Daily Treatment Note         [] Discharge Summary      Patient: Danny Rock   : 1950   MRN: [id number]   Date of Service:  2025  Admitting Diagnosis: General weakness  Current Admission Summary: \"Danny Rock is a 75 y.o. male who presente to ER with complaints of weakness pain.  Patient said admitted physical level overnight yesterday fell forward poorly afterwards.  Patient is having pain all across his lower back and right hip.  Denies any fall he was scheduled for dialysis this morning but did not go due to not getting out of bed.  While called EMS.  Patient reports currently being treated for right shoulder infection patient is on IV antibiotics during dialysis was scheduled to see orthopedic doctor today but could not get it denies any fevers chills nothing that makes it better.\"    Found to have COVID-19.    Past Medical History:  has a past medical history of Atrial fibrillation (HCC), CAD (coronary artery disease), Cancer of kidney (HCC), Dependence on renal dialysis, Diabetes mellitus (HCC), Hyperlipidemia, Hypertension, Prostate cancer (HCC), Right renal mass, and Systolic CHF (HCC).  Past Surgical History:  has a past surgical history that includes hernia repair; knee surgery; Cholecystectomy; Nasal septum surgery; eye surgery (Bilateral, 2018); Kidney removal (Right, 2022); Cataract removal (Bilateral); IR TUNNELED CVC PLACE WO SQ PORT/PUMP > 5 YEARS (2024); CT NEEDLE BIOPSY LIVER PERCUTANEOUS (2024); other surgical history (2024); CT NEEDLE BIOPSY LIVER PERCUTANEOUS (2024); CT GUIDED CHEST TUBE (2024); hernia repair (Left, 2024); other surgical history (Left); shoulder surgery (Right, 2025); IR TUNNELED CVC PLACE WO SQ PORT/PUMP > 5 YEARS (2025); and sigmoidoscopy (N/A,

## 2025-05-21 NOTE — PROGRESS NOTES
Rapid Response Quick Summary    Room: [facility]-5576/5576-01    Assessment of concern / patient:  syncopal episode while patient was ambulating to the bathroom with therapy     Physician involved:  Dr Solorio     Interventions: assisted to supine position in the bed, Pt was arousable when back in the bed.   Monitoring of vitals and oxygen levels, 12 lead EKG done, chart reviewed by the MD, tele monitor in place      Disposition:  To remain in current room

## 2025-05-21 NOTE — PROGRESS NOTES
Pt hypotensive- see flowsheets. Other VS stable. Remains asymptomatic. BLE elevated in bed. Secure message sent to PA- no new orders, only monitor for now.     Pt also complaining of pain but with hypotension, will hold off for now. PA agrees. Care ongoing.

## 2025-05-21 NOTE — PROGRESS NOTES
MD Gordon Frankel MD Aldo Estella, DO Vanessa Godines, PA-C Mark Kremer, PA-C                Office: [phone]                      Fax: [phone]             [place]                   NEPHROLOGY CONSULT PROGRESS NOTE    Subjective / interval history / medical decision making.  -tolerating iHD without problems.    IMPRESSION/RECOMMENDATIONS:      #ESKD on HD TTS  -HD TTS at [facility] under our care.  -EDW while inpatient ~70kg  -was having some renal function recovery last admission, therefore may explain electrolyte and fluid volume stability.  -decreased iHD to 2x/week: Tue/Saturday. Updated his [facility] HD unit for 2x/week Rx.  -monitor renal function.    -plan for next iHD Saturday per his routine iHD Tue/Thurs schedule.            -5/19: 24 hr CrCl 23 ml/min, Ur Volume 1150ml.       # Volume status  -Euvolemic clinically  Estimated target weight ~70kg    #hypomagnesemia  -replace PRN.     #hypokalemia  -mild, stable, monitor.     #hypoalbuminemia  -likely 2/2 malnutrition, ESKD  -encourage po protein intake    #Secondary Hyperparathyroidism  -  -stable, monitor     #anemia of CKD  -has Hx of renal ca s/p nephrectomy  -hgb stable, POONAM with HD.  -monitor    #COVID 19 positive.  #bacteremia - on IV Vanc per ID.      Dispo - stable from renal standpoint. Monitor for outpatient IV abx requirements.    Thank you for the consult.   Guillermo Moss DO     Reason for Consult:  ESKD management  Requesting Physician/Provider: Rakesh Gayle MD     CHIEF COMPLAINT:  fatigue    History obtained from records and patient.    HISTORY OF PRESENT ILLNESS:                Danny Rock  is 75 y.o. y.o. male with significant past medical history of ESKD on HD TTS at [facility] under care, atrial fibrillation, coronary artery disease, renal CA status post right nephrectomy, previously on immunotherapy, diabetes mellitus type 2, hyperlipidemia, hypertension,  prostate cancer, CHF, EF 40 to 45% who presented with fatigue. In ED, labs K+ 3.4, BUN 36, Mg 1.57, alb 3.1, hgb 9.0. Nephrology consulted for  ESKD management.    Past Medical History:     has a past medical history of Atrial fibrillation (HCC), CAD (coronary artery disease), Cancer of kidney (HCC), Dependence on renal dialysis, Diabetes mellitus (HCC), Hyperlipidemia, Hypertension, Prostate cancer (HCC), Right renal mass, and Systolic CHF (HCC).   Past Surgical History:     has a past surgical history that includes hernia repair; knee surgery; Cholecystectomy; Nasal septum surgery; eye surgery (Bilateral, 04/19/2018); Kidney removal (Right, 07/25/2022); Cataract removal (Bilateral); IR TUNNELED CVC PLACE WO SQ PORT/PUMP > 5 YEARS (02/07/2024); CT NEEDLE BIOPSY LIVER PERCUTANEOUS (08/01/2024); other surgical history (09/30/2024); CT NEEDLE BIOPSY LIVER PERCUTANEOUS (11/18/2024); CT GUIDED CHEST TUBE (11/18/2024); hernia repair (Left, 12/31/2024); other surgical history (Left); shoulder surgery (Right, 01/31/2025); IR TUNNELED CVC PLACE WO SQ PORT/PUMP > 5 YEARS (03/28/2025); and sigmoidoscopy (N/A, 4/16/2025).   Current Medications:    Current Facility-Administered Medications: gabapentin (NEURONTIN) capsule 200 mg, 200 mg, Oral, BID  HYDROcodone-acetaminophen (NORCO) 5-325 MG per tablet 1 tablet, 1 tablet, Oral, Q6H PRN  methylPREDNISolone sodium succ (SOLU-MEDROL) 40 mg in sterile water 1 mL injection, 40 mg, IntraVENous, Daily  lidocaine 4 % external patch 1 patch, 1 patch, TransDERmal, Daily  cabozantinib s-malate (CABOMETYX) chemo tablet 40 mg (Patient Supplied), 40 mg, Oral, Daily  [Held by provider] apixaban (ELIQUIS) tablet 2.5 mg, 2.5 mg, Oral, BID  aspirin chewable tablet 81 mg, 81 mg, Oral, Daily  metoprolol tartrate (LOPRESSOR) tablet 50 mg, 50 mg, Oral, 2 times per day on Sunday Monday Wednesday Friday  melatonin tablet 3 mg, 3 mg, Oral, Nightly  pantoprazole (PROTONIX) tablet 40 mg, 40 mg, Oral, QAM

## 2025-05-21 NOTE — PLAN OF CARE
[facility] Orthopedic Surgery  Plan of Care Note      Patient sleeping    Right psoas aspiration via IR yesterday shows no organisms on gram stain. They did not have enough fluid to run all labs requesting by ID.   Noted to have + guaiac since seeing him yesterday.  Hgb relatively stable, no leukocytosis.     Right shoulder aspiration results from Friday reassuring still  Nucleated cell count only 155, neutrophil 54%  Culture remains no growth to date    Plan:  - Activity as tolerated right hip and right shoulder  - No plans on surgical intervention regarding either from our end.   - Abx per ID  - pain control  - Defer anticoag to primary team  - Continue PT/OT  - Dispo: per primary team    JANET Varela - CNP 5/21/2025 1:00 PM

## 2025-05-21 NOTE — PLAN OF CARE
Problem: Chronic Conditions and Co-morbidities  Goal: Patient's chronic conditions and co-morbidity symptoms are monitored and maintained or improved  5/21/2025 1918 by Brooklyn Samayoa RN  Outcome: Not Progressing  5/21/2025 0522 by Ashutosh Lambert RN  Outcome: Progressing  Flowsheets (Taken 5/20/2025 2356)  Care Plan - Patient's Chronic Conditions and Co-Morbidity Symptoms are Monitored and Maintained or Improved:   Monitor and assess patient's chronic conditions and comorbid symptoms for stability, deterioration, or improvement   Collaborate with multidisciplinary team to address chronic and comorbid conditions and prevent exacerbation or deterioration   Update acute care plan with appropriate goals if chronic or comorbid symptoms are exacerbated and prevent overall improvement and discharge     Problem: Cardiovascular - Adult  Goal: Maintains optimal cardiac output and hemodynamic stability  5/21/2025 1918 by Brooklyn Samayoa RN  Outcome: Not Progressing  5/21/2025 0522 by Ashutosh Lambert RN  Outcome: Progressing  Flowsheets (Taken 5/20/2025 2356)  Maintains optimal cardiac output and hemodynamic stability:   Monitor blood pressure and heart rate   Monitor urine output and notify Licensed Independent Practitioner for values outside of normal range   Assess for signs of decreased cardiac output   Administer fluid and/or volume expanders as ordered   Administer vasoactive medications as ordered  Goal: Absence of cardiac dysrhythmias or at baseline  5/21/2025 1918 by Brooklyn Samayoa RN  Outcome: Not Progressing  5/21/2025 0522 by Ashutosh Lambert RN  Outcome: Progressing  Flowsheets (Taken 5/20/2025 2356)  Absence of cardiac dysrhythmias or at baseline:   Monitor cardiac rate and rhythm   Assess for signs of decreased cardiac output   Administer antiarrhythmia medication and electrolyte replacement as ordered     Problem: Musculoskeletal - Adult  Goal: Return mobility to safest level of function  5/21/2025  1918 by Brooklyn Samayoa RN  Outcome: Not Progressing  5/21/2025 0522 by Ashutosh Lambert RN  Outcome: Progressing  Goal: Return ADL status to a safe level of function  5/21/2025 1918 by Brooklyn Samayoa RN  Outcome: Not Progressing  5/21/2025 0522 by Ashutosh Lambert RN  Outcome: Progressing  Flowsheets (Taken 5/20/2025 2356)  Return ADL Status to a Safe Level of Function:   Administer medication as ordered   Assess activities of daily living deficits and provide assistive devices as needed     Problem: Chronic Conditions and Co-morbidities  Goal: Patient's chronic conditions and co-morbidity symptoms are monitored and maintained or improved  5/21/2025 1918 by Brooklyn Samayoa RN  Outcome: Not Progressing  5/21/2025 0522 by Ashutosh Lambert RN  Outcome: Progressing  Flowsheets (Taken 5/20/2025 2356)  Care Plan - Patient's Chronic Conditions and Co-Morbidity Symptoms are Monitored and Maintained or Improved:   Monitor and assess patient's chronic conditions and comorbid symptoms for stability, deterioration, or improvement   Collaborate with multidisciplinary team to address chronic and comorbid conditions and prevent exacerbation or deterioration   Update acute care plan with appropriate goals if chronic or comorbid symptoms are exacerbated and prevent overall improvement and discharge     Problem: Cardiovascular - Adult  Goal: Maintains optimal cardiac output and hemodynamic stability  5/21/2025 1918 by Brooklyn Samayoa RN  Outcome: Not Progressing  5/21/2025 0522 by Ashutosh Lambert RN  Outcome: Progressing  Flowsheets (Taken 5/20/2025 2356)  Maintains optimal cardiac output and hemodynamic stability:   Monitor blood pressure and heart rate   Monitor urine output and notify Licensed Independent Practitioner for values outside of normal range   Assess for signs of decreased cardiac output   Administer fluid and/or volume expanders as ordered   Administer vasoactive medications as ordered  Goal: Absence of cardiac

## 2025-05-21 NOTE — PROGRESS NOTES
Initial assessment completed- see doc flowsheets. VSS. A&O x4 with some confusion. Able to make needs known. Attempted to get up with STEDY but pt too weak and in pain. Cleaned up and changed in bed after incontinence. No S/S of respiratory distress/SOB. Room air- tolerating well. Turning q2H. Bed alarm on. Call light within reach. Care ongoing.

## 2025-05-21 NOTE — PROGRESS NOTES
MD Gordon Frankel MD Aldo Estella, DO Vanessa Godines, PA-C Mark Kremer, PA-C                Office: [phone]                      Fax: [phone]             [employer]                   NEPHROLOGY CONSULT PROGRESS NOTE    Subjective / interval history / medical decision making.  -feels tired, fatigued from dialysis and COVID.     IMPRESSION/RECOMMENDATIONS:      #ESKD on HD TTS  -HD TTS at [facility] under our care.  -EDW while inpatient ~70kg increease to 70.5kg.  -was having some renal function recovery last admission, therefore may explain electrolyte and fluid volume stability.  -5/19: 24 hr CrCl 23 ml/min, Ur Volume 1150ml.     -decreased iHD to 2x/week: Tue/Saturday. Updated his [facility] HD unit for 2x/week Rx.    -plan for next iHD Saturday per his routine iHD Tue/Thurs schedule.                  # Volume status  -Euvolemic clinically  Estimated target weight ~70kg    #hypomagnesemia  -replace PRN.     #hypokalemia  -mild, stable, monitor.     #hypoalbuminemia  -likely 2/2 malnutrition, ESKD  -encourage po protein intake    #Secondary Hyperparathyroidism  -  -stable, monitor     #anemia of CKD  -has Hx of renal ca s/p nephrectomy  -hgb stable, POONAM with HD.  -monitor    #COVID 19 positive.  #bacteremia - on IV Vanc per ID.      Dispo - stable from renal standpoint. Monitor for outpatient IV abx requirements.    Thank you for the consult.   Guillermo Moss DO     Reason for Consult:  ESKD management  Requesting Physician/Provider: Rakesh Gayle MD     CHIEF COMPLAINT:  fatigue    History obtained from records and patient.    HISTORY OF PRESENT ILLNESS:                Danny Rock  is 75 y.o. y.o. male with significant past medical history of ESKD on HD TTS at [facility] under care, atrial fibrillation, coronary artery disease, renal CA status post right nephrectomy, previously on immunotherapy, diabetes mellitus type 2, hyperlipidemia,  mother; Skin Cancer in his father; Stroke in his brother.     REVIEW OF SYSTEMS:    System review was done , pertinent positives are mentioned in the HPI    Negative fatigue  No chest pain nor palpitations  No SOB, coughing nor hemoptysis  No abdominal pain, nausea, vomiting nor diarrhea  No fever/chills  No leg swelling  No change in urine output nor gross hematuria    PHYSICAL EXAM:    Vitals:  /74   Pulse (!) 101   Temp 97.2 °F (36.2 °C) (Oral)   Resp 14   Ht 1.905 m (6' 3\")   Wt 67 kg (147 lb 11.3 oz)   SpO2 99%   PF 97 L/min   BMI 18.46 kg/m²   WEIGHT: well tolerated    Intake/Output Summary (Last 24 hours) at 5/21/2025 1450  Last data filed at 5/21/2025 1007  Gross per 24 hour   Intake 340 ml   Output 360 ml   Net -20 ml       CONSTITUTIONAL:  awake, alert, cooperative, no apparent distress, and appears stated age  EYES:  Lids and lashes normal, pupils equal, round   NECK:  Supple, symmetrical, trachea midline  HEMATOLOGIC/LYMPHATICS:  no pallor, no cervical LAD  LUNGS:  No increased work of breathing, CTAB  CARDIOVASCULAR:  regular rate and rhythm, normal S1 and S2  ABDOMEN:  No scars, normal bowel sounds, soft, non-distended, non-tender  EXTREMITIES:  Warm, dry, edema no  NEUROLOGIC:  Awake, alert, oriented to name, place and time.      DATA:    CBC:   Lab Results   Component Value Date/Time    WBC 10.3 05/21/2025 06:16 AM    RBC 2.29 05/21/2025 06:16 AM    HGB 8.4 05/21/2025 06:16 AM    HCT 25.0 05/21/2025 06:16 AM    .2 05/21/2025 06:16 AM    MCH 36.5 05/21/2025 06:16 AM    MCHC 33.5 05/21/2025 06:16 AM    RDW 20.7 05/21/2025 06:16 AM     05/21/2025 06:16 AM    MPV 7.7 05/21/2025 06:16 AM     BMP:   Lab Results   Component Value Date/Time     05/20/2025 07:42 AM    K 4.9 05/20/2025 07:42 AM    K 4.6 05/18/2025 06:03 AM     05/20/2025 07:42 AM    CO2 21 05/20/2025 07:42 AM    BUN 68 05/20/2025 07:42 AM    CREATININE 2.4 05/20/2025 07:42 AM    CALCIUM 8.7 05/20/2025

## 2025-05-21 NOTE — PROGRESS NOTES
[facility]    Hospitalist Progress Note:      Name:  Danny Rock /Age/Sex: 1950  (75 y.o. male)   MRN & CSN:  [id number] & [id number] Encounter Date: 2025    Location:  5TN-[id number]/5576-01 PCP: Yesi Rosa APRN - CNP     Attending:Julian Coronado MD  Admission Date: 5/15/2025      Hospital Day: 7    Chief Complain/Reason for Admission:  Chief Complaint   Patient presents with    Fatigue     Patient arrives from home via [facility] with complaints of generalized weakness. Patient reports generalized weakness. Patient is dialysis pt. Currently does physical therapy for hip and shoulder pain. Patient reports not being able to get out of bed today because he felt so weak and from the pain. Pt last dialysis was Tuesday      Assessment:  Danny Rock is a 75 y.o. male who presente to ER with complaints of generalized weakness and pain.   Acute on Chronic back pain likely due to L5 spondylolysis and spondylolisthesis: History of lumbar discitis and osteomyelitis with foraminal stenosis at L3-L4 level  Right shoulder joint with large effusion: S/p right shoulder aspiration-fluid analysis studies not consistent with infection.  R Psoas muscle enlargement associated with fluid collection hematoma vs abscess  COVID-19 infection-remains on room air, getting symptomatic treatment  Recent Enterococcus faecalis bacteremia in 2025  ESRD on hemodialysis  Essential hypertension  Paroxysmal atrial fibrillation: on eliquis as outpt  Mixed hyperlipidemia  Type 2 diabetes mellitus-maintain good glucose control  History of prostate cancer  Generalized weakness and physical deconditioning    Plan:   Con IV abx, Noted ID team, await aspiration of psoas fluid collection.  Ortho team noted, plan for IR guided aspiration of R psoas fluid collection. Follow-up joint aspiration fluid culture.  RUE WBAT and ROM as tolerated  Optimize pain control  Hold anticoagulation -Eliquis  Cont HD per  condition->Emergency Medical Condition (MA) Reason for Exam: weakness; stroke like symptoms FINDINGS: BRAIN/VENTRICLES: Motion artifact degrades image quality.  There is no acute intracerebral hemorrhage or extra-axial fluid collection.  There is mild cerebral atrophy with mild periventricular and deep white matter small vessel ischemic disease.  Encephalomalacia changes involve the left parietal lobe. Old lacunar infarcts involve the left basal ganglia. ORBITS: Status post bilateral cataract removal. SINUSES: Air-fluid levels are present within the bilateral frontal sinuses. SOFT TISSUES/SKULL:  The calvarium is intact.     1. No acute intracranial abnormality. 2. Acute bilateral frontal sinusitis.     XR HIP 2-3 VW W PELVIS RIGHT  Result Date: 5/15/2025  EXAMINATION: ONE XRAY VIEW OF THE PELVIS AND TWO XRAY VIEWS RIGHT HIP 5/15/2025 9:53 am COMPARISON: None. HISTORY: ORDERING SYSTEM PROVIDED HISTORY: pain TECHNOLOGIST PROVIDED HISTORY: Reason for exam:->pain Reason for Exam: pain FINDINGS: There is no acute fracture or dislocation.  The bones are demineralized. There are no bony destructive lesions.  There is mild bilateral hip osteoarthritis.  Degenerative changes involve the lumbar spine bilateral sacroiliac joints.  Fiducial markers overlie the prostate gland.  Vascular calcifications are present.     1. No acute abnormality.     XR SHOULDER RIGHT (MIN 2 VIEWS)  Result Date: 5/15/2025  EXAMINATION: THREE XRAY VIEWS OF THE RIGHT SHOULDER 5/15/2025 9:53 am COMPARISON: 01/28/2025 HISTORY: ORDERING SYSTEM PROVIDED HISTORY: infection TECHNOLOGIST PROVIDED HISTORY: Reason for exam:->infection Reason for Exam: pain FINDINGS: There is no acute fracture or dislocation.  There is worsening cortical irregularity involving the distal clavicle and acromion.  The bones are slightly demineralized.  Moderate to severe degenerative changes involve the acromioclavicular and glenohumeral joints.     1. Worsening cortical

## 2025-05-21 NOTE — CARE COORDINATION
Discharge Planning:     (CM) spoke with the patients spouse-Amanda, who would like a referral sent to [facility]. Patient was already accepted by [facility]. CM sent the referral through [facility]. CM to follow.    [place] [id number]  Phone: [phone]  Fax: [phone]     Electronically signed by Renetta Germain on 5/21/25 at 2:31 PM EDT

## 2025-05-21 NOTE — CONSULTS
[facility]   Electrophysiology Consult    Date: 5/21/2025  Date of admission: 5/15/2025  8:47 AM  Reason for Admission: Weakness [R53.1]    Consult Requesting Physician: Julian Coronado MD    -Reason for Consultation: Atrial fibrillation, Near Syncope    Chief Complaint   Patient presents with    Fatigue     Patient arrives from home via [facility] with complaints of generalized weakness. Patient reports generalized weakness. Patient is dialysis pt. Currently does physical therapy for hip and shoulder pain. Patient reports not being able to get out of bed today because he felt so weak and from the pain. Pt last dialysis was Tuesday        HISTORY OF PRESENT ILLNESS: History obtained from patient and medical record.     Danny Rock is a 75 y.o. male with a past medical history of ESRD on hemodialysis, persistent AF, HTN, HLD, CAD, and stroke.     Pt presented to hospital due to weakness and fatigue. He underwent right should I&D, which showed septic joint with microccous. EP consulted for atrial fibrillation and syncope.     Denies having chest pain, palpitations, shortness of breath, orthopnea, cough, or dizziness at the time of this visit.    Assessment and Plan:     1.permanent Atrial Fibrillation  - Currently in AF  - [id number] score:high; [id number] Vasc score and anticoagulation discussed. High risk for stroke and thromboembolism. Anticoagulation is recommended. Risk of bleeding was discussed.  ~ Eliquis on hold per consulting teams. Resume as soon as safely possible given persistent AF and history of stroke    - Poor candidate for EP procedures. Continue rate control    2. LBBB   - Chronic    3. Near Syncope   - Patient had severe lightheadedness upon standing.  Pulse was barely palpable.  Heart rate remained elevated with an appropriate response to standing.  This is consistent with orthostatic hypotension.  Etiology is multifactorial including diabetes ESRD, recent sepsis and COVID

## 2025-05-21 NOTE — PLAN OF CARE
Problem: Chronic Conditions and Co-morbidities  Goal: Patient's chronic conditions and co-morbidity symptoms are monitored and maintained or improved  5/21/2025 0522 by Ashutosh Lambert RN  Outcome: Progressing  Flowsheets (Taken 5/20/2025 2356)  Care Plan - Patient's Chronic Conditions and Co-Morbidity Symptoms are Monitored and Maintained or Improved:   Monitor and assess patient's chronic conditions and comorbid symptoms for stability, deterioration, or improvement   Collaborate with multidisciplinary team to address chronic and comorbid conditions and prevent exacerbation or deterioration   Update acute care plan with appropriate goals if chronic or comorbid symptoms are exacerbated and prevent overall improvement and discharge  5/20/2025 1739 by Brooklyn Samayoa RN  Outcome: Progressing     Problem: Pain  Goal: Verbalizes/displays adequate comfort level or baseline comfort level  5/21/2025 0522 by Ashutosh Lambert RN  Outcome: Progressing  Flowsheets (Taken 5/20/2025 2039)  Verbalizes/displays adequate comfort level or baseline comfort level:   Encourage patient to monitor pain and request assistance   Assess pain using appropriate pain scale   Administer analgesics based on type and severity of pain and evaluate response   Implement non-pharmacological measures as appropriate and evaluate response   Notify Licensed Independent Practitioner if interventions unsuccessful or patient reports new pain   Consider cultural and social influences on pain and pain management  5/20/2025 1739 by Brooklyn Samayoa RN  Outcome: Progressing     Problem: Safety - Adult  Goal: Free from fall injury  5/21/2025 0522 by Ashutosh Lambert RN  Outcome: Progressing  5/20/2025 1739 by Brooklyn Samayoa RN  Outcome: Progressing     Problem: Skin/Tissue Integrity  Goal: Skin integrity remains intact  Description: 1.  Monitor for areas of redness and/or skin breakdown2.  Assess vascular access sites hourly3.  Every 4-6 hours minimum:

## 2025-05-21 NOTE — PROGRESS NOTES
Infectious Diseases   Progress Note      Admission Date: 5/15/2025  Hospital Day: Hospital Day: 7   Attending: Julian Coronado MD  Date of service: 5/21/2025     Chief complaint/ Reason for consult:     Generalized weakness  COVID-19 infection  L5 spondylolysis and spondylolisthesis  Bilateral frontal sinusitis  Worsening of right clavicular cortical irregularity  Recent Enterococcus faecalis bacteremia in April 2025  History of lumbar discitis and osteomyelitis with foraminal stenosis at L3-L4 level  ESRD on hemodialysis    Microbiology:      I have reviewed allavailable micro lab data and cultures    Results       Procedure Component Value Units Date/Time    Culture, Body Fluid (with Gram Stain) [[id number]] Collected: 05/20/25 1250    Order Status: Completed Specimen: Body Fluid Updated: 05/21/25 1145     Body Fluid Culture, Sterile No growth to date     Gram Stain Result No Epithelial Cells seen  No WBC's seen  No organisms seen      Narrative:      ORDER#: [id number]                          ORDERED BY: KENN RODRIGUEZ  SOURCE: Fluid Right psoas                  COLLECTED:  05/20/25 12:50  ANTIBIOTICS AT SARAH.:                      RECEIVED :  05/20/25 18:45    Culture, Fungus [[id number]] Collected: 05/20/25 1250    Order Status: Sent Specimen: Fluid Updated: 05/21/25 1433     Fungus Stain No Fungal elements seen    Narrative:      ORDER#: [id number]                          ORDERED BY: KENN RODRIGUEZ  SOURCE: Fluid                              COLLECTED:  05/20/25 12:50  ANTIBIOTICS AT SARAH.:                      RECEIVED :  05/21/25 02:45    Culture with Smear, Acid Fast Bacillius [[id number]] Collected: 05/20/25 1250    Order Status: Sent Specimen: Fluid Updated: 05/21/25 1532     AFB Smear No AFB observed by Fluorescent stain    Narrative:      ORDER#: [id number]                          ORDERED BY: KENN RODRIGUEZ  SOURCE: Fluid                              COLLECTED:  05/20/25 12:50  ANTIBIOTICS AT SARAH.:  person, place, and time. Mental status is at baseline.      Motor: No abnormal muscle tone.   Psychiatric:         Mood and Affect: Mood normal.         Behavior: Behavior normal.          Lines and drains: All vascular access sites are healthy with no local erythema, discharge or tenderness.      Intake and output:    I/O last 3 completed shifts:  In: 350 [P.O.:330; I.V.:20]  Out: 1160 [Urine:1160]    Lab Data:   All available labs and old records have been reviewed by me.    CBC:  Recent Labs     05/21/25  0616   WBC 10.3   RBC 2.29*   HGB 8.4*   HCT 25.0*      .2*   MCH 36.5*   MCHC 33.5   RDW 20.7*          BMP:  Recent Labs     05/19/25  0420 05/20/25  0742 05/21/25  1414   NA  --  137 136   K  --  4.9 4.7   CL  --  103 97*   CO2  --  21 16*   BUN  --  68* 58*   CREATININE 1.8* 2.4* 2.7*   CALCIUM  --  8.7 8.5   GLUCOSE  --  143* 315*        Hepatic Function Panel:   Lab Results   Component Value Date/Time    ALKPHOS 130 05/21/2025 02:14 PM    ALT 28 05/21/2025 02:14 PM    AST 33 05/21/2025 02:14 PM    BILITOT 0.3 05/21/2025 02:14 PM    BILIDIR <0.2 03/16/2021 09:41 AM    IBILI see below 03/16/2021 09:41 AM       CPK:   Lab Results   Component Value Date    CKTOTAL 35 (L) 12/15/2023     ESR:   Lab Results   Component Value Date    SEDRATE 39 (H) 05/15/2025     CRP:   Lab Results   Component Value Date    CRP 46.3 (H) 05/15/2025           Imaging:    All pertinent images and reports for the current visit were reviewed by me during this visit.  I reviewed the chest x-ray/CT scan/MRI images today and independently interpreted the findings and results today.    CT DRAINAGE HEMATOMA/SEROMA/FLUID COLLECTION   Final Result   Successful CT-guided percutaneous aspiration of a right psoas collection   collection via a posterior approach.         MRI LUMBAR SPINE WO CONTRAST   Final Result   1. Persistent findings of discitis/osteomyelitis at L3-L4, similar to   slightly progressed compared with 03/23/2025

## 2025-05-21 NOTE — PROGRESS NOTES
Pt with blood streaks in BM upon checking. Sent sample down for occult- came back positive. PA made aware. Care ongoing.

## 2025-05-21 NOTE — PROGRESS NOTES
Rapid Response Quick Summary    Room: 5TN-5576/5576-01    Assessment of concern / patient:  syncopal episode while sitting up on the side of the bed     Physician involved:  Dr Solorio     Interventions: Assisted to supine position,  Monitoring of vitals and oxygen levels, blood sugar checked,    Disposition:  staff reports the patient became responsive quickly, patient denies and SOB or pain. MD reviewed the chart. Patient to remain in current room.

## 2025-05-22 LAB
ALBUMIN SERPL-MCNC: 2.8 G/DL (ref 3.4–5)
ANION GAP SERPL CALCULATED.3IONS-SCNC: 16 MMOL/L (ref 3–16)
BUN SERPL-MCNC: 80 MG/DL (ref 7–20)
CALCIUM SERPL-MCNC: 8.1 MG/DL (ref 8.3–10.6)
CHLORIDE SERPL-SCNC: 95 MMOL/L (ref 99–110)
CO2 SERPL-SCNC: 22 MMOL/L (ref 21–32)
CREAT SERPL-MCNC: 3.1 MG/DL (ref 0.8–1.3)
GFR SERPLBLD CREATININE-BSD FMLA CKD-EPI: 20 ML/MIN/{1.73_M2}
GLUCOSE BLD-MCNC: 181 MG/DL (ref 70–99)
GLUCOSE BLD-MCNC: 212 MG/DL (ref 70–99)
GLUCOSE BLD-MCNC: 258 MG/DL (ref 70–99)
GLUCOSE BLD-MCNC: 262 MG/DL (ref 70–99)
GLUCOSE SERPL-MCNC: 182 MG/DL (ref 70–99)
PERFORMED ON: ABNORMAL
PHOSPHATE SERPL-MCNC: 5.6 MG/DL (ref 2.5–4.9)
POTASSIUM SERPL-SCNC: 4.4 MMOL/L (ref 3.5–5.1)
SODIUM SERPL-SCNC: 133 MMOL/L (ref 136–145)

## 2025-05-22 PROCEDURE — 6360000002 HC RX W HCPCS: Performed by: STUDENT IN AN ORGANIZED HEALTH CARE EDUCATION/TRAINING PROGRAM

## 2025-05-22 PROCEDURE — 99232 SBSQ HOSP IP/OBS MODERATE 35: CPT

## 2025-05-22 PROCEDURE — 51798 US URINE CAPACITY MEASURE: CPT

## 2025-05-22 PROCEDURE — 99232 SBSQ HOSP IP/OBS MODERATE 35: CPT | Performed by: INTERNAL MEDICINE

## 2025-05-22 PROCEDURE — 2500000003 HC RX 250 WO HCPCS: Performed by: HOSPITALIST

## 2025-05-22 PROCEDURE — 6370000000 HC RX 637 (ALT 250 FOR IP): Performed by: HOSPITALIST

## 2025-05-22 PROCEDURE — 97110 THERAPEUTIC EXERCISES: CPT

## 2025-05-22 PROCEDURE — 1200000000 HC SEMI PRIVATE

## 2025-05-22 PROCEDURE — 80069 RENAL FUNCTION PANEL: CPT

## 2025-05-22 PROCEDURE — 94760 N-INVAS EAR/PLS OXIMETRY 1: CPT

## 2025-05-22 PROCEDURE — 2500000003 HC RX 250 WO HCPCS: Performed by: STUDENT IN AN ORGANIZED HEALTH CARE EDUCATION/TRAINING PROGRAM

## 2025-05-22 PROCEDURE — 97530 THERAPEUTIC ACTIVITIES: CPT

## 2025-05-22 PROCEDURE — 6370000000 HC RX 637 (ALT 250 FOR IP): Performed by: STUDENT IN AN ORGANIZED HEALTH CARE EDUCATION/TRAINING PROGRAM

## 2025-05-22 RX ORDER — FLUDROCORTISONE ACETATE 0.1 MG/1
0.1 TABLET ORAL 2 TIMES DAILY
Status: DISCONTINUED | OUTPATIENT
Start: 2025-05-22 | End: 2025-05-24 | Stop reason: HOSPADM

## 2025-05-22 RX ADMIN — APIXABAN 2.5 MG: 2.5 TABLET, FILM COATED ORAL at 20:38

## 2025-05-22 RX ADMIN — PANTOPRAZOLE SODIUM 40 MG: 40 TABLET, DELAYED RELEASE ORAL at 05:18

## 2025-05-22 RX ADMIN — FLUDROCORTISONE ACETATE 0.1 MG: 0.1 TABLET ORAL at 20:38

## 2025-05-22 RX ADMIN — SEVELAMER CARBONATE 800 MG: 800 TABLET, FILM COATED ORAL at 09:24

## 2025-05-22 RX ADMIN — SODIUM CHLORIDE, PRESERVATIVE FREE 10 ML: 5 INJECTION INTRAVENOUS at 20:38

## 2025-05-22 RX ADMIN — GABAPENTIN 200 MG: 100 CAPSULE ORAL at 20:38

## 2025-05-22 RX ADMIN — HYDROCODONE BITARTRATE AND ACETAMINOPHEN 1 TABLET: 5; 325 TABLET ORAL at 17:24

## 2025-05-22 RX ADMIN — FLUDROCORTISONE ACETATE 0.1 MG: 0.1 TABLET ORAL at 13:03

## 2025-05-22 RX ADMIN — WATER 40 MG: 1 INJECTION INTRAMUSCULAR; INTRAVENOUS; SUBCUTANEOUS at 09:25

## 2025-05-22 RX ADMIN — MELATONIN TAB 3 MG 3 MG: 3 TAB at 20:38

## 2025-05-22 RX ADMIN — INSULIN LISPRO 1 UNITS: 100 INJECTION, SOLUTION INTRAVENOUS; SUBCUTANEOUS at 09:25

## 2025-05-22 RX ADMIN — TRAZODONE HYDROCHLORIDE 25 MG: 50 TABLET ORAL at 20:38

## 2025-05-22 RX ADMIN — SEVELAMER CARBONATE 800 MG: 800 TABLET, FILM COATED ORAL at 17:19

## 2025-05-22 RX ADMIN — GABAPENTIN 200 MG: 100 CAPSULE ORAL at 09:23

## 2025-05-22 RX ADMIN — INSULIN LISPRO 2 UNITS: 100 INJECTION, SOLUTION INTRAVENOUS; SUBCUTANEOUS at 20:38

## 2025-05-22 RX ADMIN — HYDROCODONE BITARTRATE AND ACETAMINOPHEN 1 TABLET: 5; 325 TABLET ORAL at 09:23

## 2025-05-22 RX ADMIN — ASPIRIN 81 MG: 81 TABLET, CHEWABLE ORAL at 09:24

## 2025-05-22 RX ADMIN — SODIUM CHLORIDE, PRESERVATIVE FREE 10 ML: 5 INJECTION INTRAVENOUS at 09:26

## 2025-05-22 RX ADMIN — SEVELAMER CARBONATE 800 MG: 800 TABLET, FILM COATED ORAL at 12:39

## 2025-05-22 RX ADMIN — INSULIN LISPRO 2 UNITS: 100 INJECTION, SOLUTION INTRAVENOUS; SUBCUTANEOUS at 17:19

## 2025-05-22 RX ADMIN — INSULIN LISPRO 1 UNITS: 100 INJECTION, SOLUTION INTRAVENOUS; SUBCUTANEOUS at 12:38

## 2025-05-22 ASSESSMENT — ENCOUNTER SYMPTOMS
DIARRHEA: 0
SINUS PAIN: 0
SINUS PRESSURE: 0
COUGH: 0
NAUSEA: 0
ABDOMINAL PAIN: 0
WHEEZING: 0
SHORTNESS OF BREATH: 0
SORE THROAT: 0
EYE REDNESS: 0
EYE DISCHARGE: 0
BACK PAIN: 0
CONSTIPATION: 0
RHINORRHEA: 0

## 2025-05-22 ASSESSMENT — PAIN - FUNCTIONAL ASSESSMENT: PAIN_FUNCTIONAL_ASSESSMENT: ACTIVITIES ARE NOT PREVENTED

## 2025-05-22 ASSESSMENT — PAIN DESCRIPTION - LOCATION
LOCATION: COCCYX
LOCATION: BACK
LOCATION: BUTTOCKS

## 2025-05-22 ASSESSMENT — PAIN SCALES - GENERAL
PAINLEVEL_OUTOF10: 0
PAINLEVEL_OUTOF10: 0
PAINLEVEL_OUTOF10: 8
PAINLEVEL_OUTOF10: 8
PAINLEVEL_OUTOF10: 7

## 2025-05-22 ASSESSMENT — PAIN DESCRIPTION - PAIN TYPE
TYPE: ACUTE PAIN
TYPE: CHRONIC PAIN

## 2025-05-22 ASSESSMENT — PAIN DESCRIPTION - ORIENTATION
ORIENTATION: MID;LOWER
ORIENTATION: MID
ORIENTATION: MID

## 2025-05-22 ASSESSMENT — PAIN SCALES - WONG BAKER
WONGBAKER_NUMERICALRESPONSE: NO HURT
WONGBAKER_NUMERICALRESPONSE: NO HURT

## 2025-05-22 ASSESSMENT — PAIN DESCRIPTION - DESCRIPTORS
DESCRIPTORS: BURNING
DESCRIPTORS: ACHING;THROBBING
DESCRIPTORS: ACHING;SHARP

## 2025-05-22 ASSESSMENT — PAIN DESCRIPTION - FREQUENCY
FREQUENCY: CONTINUOUS
FREQUENCY: CONTINUOUS

## 2025-05-22 NOTE — PROGRESS NOTES
Culture, Blood, PCR ID Panel [[id number]] Collected: 05/16/25 0620    Order Status: Completed Updated: 05/16/25 1332     Report SEE IMAGE    Culture, Blood 2 [[id number]] Collected: 05/16/25 0529    Order Status: Completed Specimen: Blood Updated: 05/20/25 0715     Culture, Blood 2 No Growth after 4 days of incubation.    Narrative:      ORDER#: [id number]                          ORDERED BY: ROOSEVELT MELO  SOURCE: Blood Hand, Right Hand, Right      COLLECTED:  05/16/25 05:29  ANTIBIOTICS AT SARAH.:                      RECEIVED :  05/16/25 12:35  If child <=2 yrs old please draw pediatric bottle.~Blood Culture #2    Culture, Blood 1 [[id number]] Collected: 05/15/25 0920    Order Status: Completed Specimen: Blood Updated: 05/19/25 1015     Blood Culture, Routine No Growth after 4 days of incubation.    Narrative:      ORDER#: [id number]                          ORDERED BY: ROOSEVELT MELO  SOURCE: Blood Antecubital-Rig              COLLECTED:  05/15/25 09:20  ANTIBIOTICS AT SARAH.:                      RECEIVED :  05/15/25 13:53  If child <=2 yrs old please draw pediatric bottle.~Blood Culture 1             Susceptibility data from last 90 days.  Collected Specimen Info Organism Ampicillin gentamicin-syn Sodium streptomycin-syn   05/16/25 Nasopharyngeal Swab Coronavirus sars cov 2 (covid 19) by pcr       03/26/25 Blood Staphylococcus epidermidis (1)         Staphylococcus epidermidis (2)       03/24/25 Blood Enterococcus faecalis       03/24/25 Blood Enterococcus faecalis       03/23/25 Blood Enterococcus faecalis (2)  S   S      Enterococcus faecalis (1)       03/23/25 Blood Enterococcus faecalis       03/22/25 Blood Enterococcus faecalis (3)  S  R  S  S     Staphylococcus epidermidis         Enterococcus faecalis (1)         Micrococcus luteus              Antibiotics and immunizations:       Current antibiotics: All antibiotics and their doses were reviewed by me    Recent Abx Admin        No antibiotic orders  with administrations found.                      Immunization History: All immunization history was reviewed by me today.    There is no immunization history for the selected administration types on file for this patient.    Known drug allergies:     All allergies were reviewed and updated    Allergies   Allergen Reactions    Amoxicillin Other (See Comments)     Urethritis, rash    Bisoprolol-Hydrochlorothiazide Other (See Comments)     G.I. rash    Cisapride Other (See Comments)     diarrhea    Penicillins      Unsure of reaction  Tolerated ceftriaxone 1/29/25  Tolerated Ancef 12/31/24    Pioglitazone Other (See Comments)     G.I upset (long time ago)       Social history:     Social History:  All social andepidemiologic history was reviewed and updated by me today as needed.     Tobacco use:   reports that he quit smoking about 55 years ago. His smoking use included cigarettes. He started smoking about 57 years ago. He has never used smokeless tobacco.  Alcohol use:   reports current alcohol use.  Currently lives in: [place] [id number]   reports current drug use. Drug: Marijuana (Weed).     COVID VACCINATION AND LAB RESULT RECORDS:     Internal Administration   First Dose      Second Dose           Last COVID Lab SARS-CoV-2 RNA, RT PCR (no units)   Date Value   11/28/2023 NOT DETECTED            Assessment:     The patient is a 75 y.o. old male who  has a past medical history of Atrial fibrillation ([facility]), CAD (coronary artery disease), Cancer of kidney (HCC), Dependence on renal dialysis, Diabetes mellitus (HCC), Hyperlipidemia, Hypertension, Prostate cancer (HCC), Right renal mass, and Systolic CHF ([facility]). with following problems:    Generalized weakness-improving  COVID-19 infection-still on room air  L5 spondylolysis and spondylolisthesis  Bilateral frontal sinusitis  Worsening of right clavicular cortical irregularity  Recent Enterococcus faecalis bacteremia in April 2025  History of lumbar discitis and

## 2025-05-22 NOTE — PROGRESS NOTES
Wound care completed per orders. Pt tolerated well. Care ongoing.    Bladder scan done- 187 ml read. Complaints of indigestion. PA made aware. Awaiting response.

## 2025-05-22 NOTE — PROGRESS NOTES
No new orders regarding bladder scan result per PA. OK to give Protonix early for complaints of indigestion. Care ongoing.

## 2025-05-22 NOTE — PROGRESS NOTES
[facility]    Hospitalist Progress Note:      Name:  Danny Rock /Age/Sex: 1950  (75 y.o. male)   MRN & CSN:  [id number] & [id number] Encounter Date: 2025    Location:  5TN-5576/5576-01 PCP: Yesi Rosa APRN - CNP     Attending:Julian Coronado MD  Admission Date: 5/15/2025      Hospital Day: 8    Chief Complain/Reason for Admission:  Chief Complaint   Patient presents with    Fatigue     Patient arrives from home via [facility] with complaints of generalized weakness. Patient reports generalized weakness. Patient is dialysis pt. Currently does physical therapy for hip and shoulder pain. Patient reports not being able to get out of bed today because he felt so weak and from the pain. Pt last dialysis was Tuesday      Assessment:  Danny Rock is a 75 y.o. male who presente to ER with complaints of generalized weakness and pain.   Acute on Chronic back pain likely due to L5 spondylolysis and spondylolisthesis: History of lumbar discitis and osteomyelitis with foraminal stenosis at L3-L4 level  Right shoulder joint with large effusion: S/p right shoulder aspiration-fluid analysis studies not consistent with infection.  R Psoas muscle enlargement associated with fluid collection hematoma vs abscess  COVID-19 infection-remains on room air, getting symptomatic treatment  Recent Enterococcus faecalis bacteremia in 2025  ESRD on hemodialysis  Essential hypertension  Paroxysmal atrial fibrillation: on eliquis as outpt.  Followed by EP cardiology team, poor candidate for repeat procedures.  Mixed hyperlipidemia  Type 2 diabetes mellitus-maintain good glucose control  History of prostate cancer  Generalized weakness and physical deconditioning    Plan:   F/u aspiration of psoas fluid collection culture. Cont IV abx per ID team. F/u with ID.  Ortho team noted, no plan for surgical intervention.  RUE WBAT and ROM as tolerated  Optimize pain control  Telemonitoring, resume

## 2025-05-22 NOTE — PLAN OF CARE
[facility] Orthopedic Surgery  Plan of Care Note        Right psoas aspiration via IR 5/20 shows no growth to date. They did not have enough fluid to run all labs requested by ID.     Right shoulder aspiration results from Friday reassuring still  Nucleated cell count only 155, neutrophil 54%  Culture remains no growth to date    Plan:  - Activity as tolerated right hip and right shoulder  - No plans on surgical intervention regarding either from our end.   - Abx per ID  - pain control  - Defer anticoag to primary team  - Continue PT/OT  - Dispo: per primary team    We will follow peripherally moving forward - please call with questions/new issues.     JANET Varela - CNP 5/22/2025 9:30 AM

## 2025-05-22 NOTE — PLAN OF CARE
Problem: Chronic Conditions and Co-morbidities  Goal: Patient's chronic conditions and co-morbidity symptoms are monitored and maintained or improved  5/22/2025 0104 by Ashutosh Lambert RN  Outcome: Progressing  Flowsheets (Taken 5/21/2025 2255)  Care Plan - Patient's Chronic Conditions and Co-Morbidity Symptoms are Monitored and Maintained or Improved:   Monitor and assess patient's chronic conditions and comorbid symptoms for stability, deterioration, or improvement   Collaborate with multidisciplinary team to address chronic and comorbid conditions and prevent exacerbation or deterioration   Update acute care plan with appropriate goals if chronic or comorbid symptoms are exacerbated and prevent overall improvement and discharge  5/21/2025 1918 by Brooklyn Samayoa RN  Outcome: Not Progressing     Problem: Cardiovascular - Adult  Goal: Maintains optimal cardiac output and hemodynamic stability  5/22/2025 0104 by Ashutosh Lambert RN  Outcome: Progressing  Flowsheets (Taken 5/21/2025 2255)  Maintains optimal cardiac output and hemodynamic stability:   Monitor blood pressure and heart rate   Monitor urine output and notify Licensed Independent Practitioner for values outside of normal range   Assess for signs of decreased cardiac output  5/21/2025 1918 by Brooklyn Samayoa RN  Outcome: Not Progressing  Goal: Absence of cardiac dysrhythmias or at baseline  5/22/2025 0104 by Ashutosh Lambert RN  Outcome: Progressing  Flowsheets (Taken 5/21/2025 2255)  Absence of cardiac dysrhythmias or at baseline:   Monitor cardiac rate and rhythm   Assess for signs of decreased cardiac output   Administer antiarrhythmia medication and electrolyte replacement as ordered  5/21/2025 1918 by Brooklyn Samayoa RN  Outcome: Not Progressing     Problem: Musculoskeletal - Adult  Goal: Return mobility to safest level of function  5/22/2025 0104 by Ashutosh Lambert RN  Outcome: Progressing  5/21/2025 1918 by Brooklyn Samayoa RN  Outcome: Not

## 2025-05-22 NOTE — PROGRESS NOTES
Secure message sent to PA regarding low DBP and scheduled metoprolol 50 mg. PA stated to hold for now- see MAR. Care ongoing.

## 2025-05-22 NOTE — PROGRESS NOTES
MD Gordon Frankel MD Aldo Estella, DO Vanessa Godines, PA-C Mark Kremer, PA-C                Office: [phone]                      Fax: [phone]             "[employer]                   NEPHROLOGY CONSULT PROGRESS NOTE    Subjective / interval history / medical decision making.  -feels tired, fatigued from dialysis and COVID.     IMPRESSION/RECOMMENDATIONS:      #ESKD on HD TTS  -HD TTS at [facility] under our care.  -EDW while inpatient ~70kg increease to 70.5kg.  -was having some renal function recovery last admission, therefore may explain electrolyte and fluid volume stability.  -5/19: 24 hr CrCl 23 ml/min, Ur Volume 1150ml.     -decreased iHD to 2x/week: Tue/Saturday. Updated his [facility] HD unit for 2x/week Rx.    -BUN increasing due to corticosteroids. Will plan for iHD tomorrow insstead of Saturday, likely hold off over the weekend. Then following iHD Tuesday/Thursday.               # Volume status  -Euvolemic clinically  Estimated target weight ~70kg    #hypomagnesemia  -replace PRN.     #hypokalemia  -mild, stable, monitor.     #hypoalbuminemia  -likely 2/2 malnutrition, ESKD  -encourage po protein intake    #Secondary Hyperparathyroidism  -  -stable, monitor     #anemia of CKD  -has Hx of renal ca s/p nephrectomy  -hgb stable, POONAM with HD.  -monitor    #COVID 19 positive.  #bacteremia - on IV Vanc per ID.      Dispo - stable from renal standpoint. Monitor for outpatient IV abx requirements.    Thank you for the consult.   Guillermo Moss DO     Reason for Consult:  ESKD management  Requesting Physician/Provider: Rakesh Gayle MD     CHIEF COMPLAINT:  fatigue    History obtained from records and patient.    HISTORY OF PRESENT ILLNESS:                Danny Rock  is 75 y.o. y.o. male with significant past medical history of ESKD on HD TTS at [facility] under care, atrial fibrillation, coronary artery disease, renal CA status post  (PROTONIX) tablet 40 mg, 40 mg, Oral, QAM AC  sevelamer (RENVELA) tablet 800 mg, 800 mg, Oral, TID WC  sennosides-docusate sodium (SENOKOT-S) 8.6-50 MG tablet 2 tablet, 2 tablet, Oral, BID PRN  traZODone (DESYREL) tablet 25 mg, 25 mg, Oral, Nightly  sodium chloride flush 0.9 % injection 5-40 mL, 5-40 mL, IntraVENous, 2 times per day  sodium chloride flush 0.9 % injection 5-40 mL, 5-40 mL, IntraVENous, PRN  0.9 % sodium chloride infusion, , IntraVENous, PRN  potassium chloride (KLOR-CON M) extended release tablet 40 mEq, 40 mEq, Oral, PRN **OR** potassium bicarb-citric acid (EFFER-K) effervescent tablet 40 mEq, 40 mEq, Oral, PRN **OR** potassium chloride 10 mEq/100 mL IVPB (Peripheral Line), 10 mEq, IntraVENous, PRN  magnesium sulfate 2000 mg in 50 mL IVPB premix, 2,000 mg, IntraVENous, PRN  ondansetron (ZOFRAN-ODT) disintegrating tablet 4 mg, 4 mg, Oral, Q8H PRN **OR** ondansetron (ZOFRAN) injection 4 mg, 4 mg, IntraVENous, Q6H PRN  polyethylene glycol (GLYCOLAX) packet 17 g, 17 g, Oral, Daily PRN  acetaminophen (TYLENOL) tablet 650 mg, 650 mg, Oral, Q6H PRN **OR** acetaminophen (TYLENOL) suppository 650 mg, 650 mg, Rectal, Q6H PRN  morphine (PF) injection 2 mg, 2 mg, IntraVENous, Q4H PRN  dextrose bolus 10% 125 mL, 125 mL, IntraVENous, PRN **OR** dextrose bolus 10% 250 mL, 250 mL, IntraVENous, PRN  glucagon injection 1 mg, 1 mg, SubCUTAneous, PRN  dextrose 10 % infusion, , IntraVENous, Continuous PRN  insulin lispro (HUMALOG,ADMELOG) injection vial 0-4 Units, 0-4 Units, SubCUTAneous, 4x Daily AC & HS  Allergies:    Amoxicillin, Bisoprolol-hydrochlorothiazide, Cisapride, Penicillins, and Pioglitazone   Social History:     reports that he quit smoking about 55 years ago. His smoking use included cigarettes. He started smoking about 57 years ago. He has never used smokeless tobacco. He reports current alcohol use. He reports current drug use. Drug: Marijuana (Weed).   Family History:   family history includes Heart

## 2025-05-22 NOTE — PROGRESS NOTES
136 133*   K 4.9 4.7 4.4    97* 95*   CO2 21 16* 22   BUN 68* 58* 80*   CREATININE 2.4* 2.7* 3.1*     Recent Labs     25  1414   ALT 28   AST 33     Recent Labs     25  0616   WBC 10.3   HGB 8.4*   HCT 25.0*   .2*        Lab Results   Component Value Date/Time    CKTOTAL 35 12/15/2023 05:59 AM    TROPHS 95 2025 02:14 PM     Lab Results   Component Value Date/Time    PROBNP 14,352 2024 03:27 PM    PROBNP 19,683 2024 10:50 AM    PROBNP 308 2015 02:27 AM     Lab Results   Component Value Date/Time    PROTIME 20.2 05/15/2025 09:20 AM    PROTIME 14.8 2025 09:02 AM    PROTIME 16.4 2025 09:10 AM    INR 1.71 05/15/2025 09:20 AM    INR 1.14 2025 09:02 AM    INR 1.31 2025 09:10 AM     Lab Results   Component Value Date/Time    CHOL 136 2023 05:17 AM    HDL 28 2023 05:17 AM    TRIG 152 2023 05:17 AM       EC25  Atrial fibrillation with LBBB     ECHO: 3/25   Left Ventricle: Low normal left ventricular systolic function with a visually estimated EF of 55 - 60%. Left ventricle size is normal. Normal wall thickness. Unable to assess wall motion.      DELMAR: 3/25    Left Ventricle: Normal left ventricular systolic function with a visually estimated EF of 50 - 55%. Normal wall motion.    Right Ventricle: Right ventricle is dilated. Normal systolic function.    Left Atrium: Left atrium is dilated. Left atrial appendage was free of thrombus.  SHAI exit velocity was borderline at 40 cm/s.    Right Atrium: Right atrium is dilated.    Aorta: Mildly dilated aortic root. Ao root diameter is 4.4 cm. Moderately dilated ascending aorta. Ao ascending diameter is 4.3 cm. Mild atherosclerosis of the descending aorta.    Aortic Valve: Trileaflet valve. No regurgitation. No stenosis.    Mitral Valve: Valve structure is normal. Mild regurgitation with multiple jets.    Tricuspid Valve: Valve structure is normal. Trace regurgitation.     meals) Takes as needed up to 6 units with meals 2/10/24  Yes Stacey Shelby MD   Alpha-Lipoic Acid 100 MG CAPS Take 1 capsule by mouth Daily   Yes Svetlana Deleon MD   melatonin 3 MG TABS tablet Take 1 tablet by mouth nightly   Yes Svetlana Deleon MD   zinc sulfate (ZINCATE) 220 (50 Zn)  mg capsule - elemental zinc Take 1 capsule by mouth daily   Yes Svetlana Deleon MD   vitamin B-12 (CYANOCOBALAMIN) 1000 MCG tablet Take 1 tablet by mouth daily   Yes Svetlana Deleon MD   ascorbic acid (VITAMIN C) 500 MG tablet Take 1 tablet by mouth daily   Yes Svetlana Deleon MD   vitamin D3 (CHOLECALCIFEROL) 125 MCG (5000 UT) TABS tablet Take 1 tablet by mouth daily   Yes Svetlana Deleon MD   omeprazole (PRILOSEC) 20 MG capsule Take 1 capsule by mouth every other day   Yes Svetlana Deleon MD   insulin glargine (LANTUS) 100 UNIT/ML injection vial Inject 8 Units into the skin nightly  Patient not taking: Reported on 5/15/2025 4/8/25   Lilly Cotto MD   Naproxen Sodium 220 MG CAPS Take 220 mg by mouth as needed for Pain  22  Svetlana Deleon MD       Physical Examination:  Vitals:    25 0954   BP:    Pulse: 74   Resp: 16   Temp:    SpO2: 97%      In: 130 [P.O.:120; I.V.:10]  Out: 510    Wt Readings from Last 3 Encounters:   25 67 kg (147 lb 11.3 oz)   25 70.3 kg (154 lb 15.7 oz)   25 68.4 kg (150 lb 12.7 oz)     Temp  Av.8 °F (36.6 °C)  Min: 97.4 °F (36.3 °C)  Max: 98.2 °F (36.8 °C)  Pulse  Av.4  Min: 74  Max: 83  BP  Min: 101/64  Max: 121/73  SpO2  Av.2 %  Min: 95 %  Max: 99 %    Intake/Output Summary (Last 24 hours) at 2025 1305  Last data filed at 2025 0505  Gross per 24 hour   Intake --   Output 510 ml   Net -510 ml       Constitutional: Oriented. No distress.   Cardiovascular: Normal rate, irregular rhythm, S1&S2.    Pulmonary/Chest: Bilateral respiratory sounds. No rhonchi.    Abdominal: Soft. No tenderness

## 2025-05-22 NOTE — CARE COORDINATION
Discharge Planning:     (ANISHA) Spoke with [facility] who can accept. Patient will be transported to and from HD by family. Patient will also provide his chemo medication to the facility on admission. Cm updated the patient spouse-Amanda.    Electronically signed by Renetta Germain on 5/22/25 at 4:01 PM EDT

## 2025-05-22 NOTE — PROGRESS NOTES
[facility] - Inpatient Rehabilitation Department   Phone: [phone]    Physical Therapy    [] Initial Evaluation            [x] Daily Treatment Note         [] Discharge Summary      Patient: Danny Rock   : 1950   MRN: [id number]   Date of Service:  2025  Admitting Diagnosis: Weakness  Current Admission Summary: \"Danny Rock is a 75 y.o. male who presente to ER with complaints of weakness pain.  Patient said admitted physical level overnight yesterday fell forward poorly afterwards.  Patient is having pain all across his lower back and right hip.  Denies any fall he was scheduled for dialysis this morning but did not go due to not getting out of bed.  While called EMS.  Patient reports currently being treated for right shoulder infection patient is on IV antibiotics during dialysis was scheduled to see orthopedic doctor today but could not get it denies any fevers chills nothing that makes it better.\"    Found to have COVID-19.    Past Medical History:  has a past medical history of Atrial fibrillation (HCC), CAD (coronary artery disease), Cancer of kidney (HCC), Dependence on renal dialysis, Diabetes mellitus (HCC), Hyperlipidemia, Hypertension, Prostate cancer (HCC), Right renal mass, and Systolic CHF (HCC).  Past Surgical History:  has a past surgical history that includes hernia repair; knee surgery; Cholecystectomy; Nasal septum surgery; eye surgery (Bilateral, 2018); Kidney removal (Right, 2022); Cataract removal (Bilateral); IR TUNNELED CVC PLACE WO SQ PORT/PUMP > 5 YEARS (2024); CT NEEDLE BIOPSY LIVER PERCUTANEOUS (2024); other surgical history (2024); CT NEEDLE BIOPSY LIVER PERCUTANEOUS (2024); CT GUIDED CHEST TUBE (2024); hernia repair (Left, 2024); other surgical history (Left); shoulder surgery (Right, 2025); IR TUNNELED CVC PLACE WO SQ PORT/PUMP > 5 YEARS (2025); and sigmoidoscopy (N/A, 2025).  Discharge

## 2025-05-23 LAB
ALBUMIN SERPL-MCNC: 3.2 G/DL (ref 3.4–5)
ANION GAP SERPL CALCULATED.3IONS-SCNC: 16 MMOL/L (ref 3–16)
BACTERIA FLD AEROBE CULT: NORMAL
BUN SERPL-MCNC: 98 MG/DL (ref 7–20)
CALCIUM SERPL-MCNC: 8.5 MG/DL (ref 8.3–10.6)
CHLORIDE SERPL-SCNC: 96 MMOL/L (ref 99–110)
CO2 SERPL-SCNC: 20 MMOL/L (ref 21–32)
CREAT SERPL-MCNC: 3 MG/DL (ref 0.8–1.3)
GFR SERPLBLD CREATININE-BSD FMLA CKD-EPI: 21 ML/MIN/{1.73_M2}
GLUCOSE BLD-MCNC: 159 MG/DL (ref 70–99)
GLUCOSE BLD-MCNC: 178 MG/DL (ref 70–99)
GLUCOSE BLD-MCNC: 201 MG/DL (ref 70–99)
GLUCOSE BLD-MCNC: 213 MG/DL (ref 70–99)
GLUCOSE SERPL-MCNC: 220 MG/DL (ref 70–99)
GRAM STN SPEC: NORMAL
PERFORMED ON: ABNORMAL
PHOSPHATE SERPL-MCNC: 4.8 MG/DL (ref 2.5–4.9)
POTASSIUM SERPL-SCNC: 4.9 MMOL/L (ref 3.5–5.1)
SODIUM SERPL-SCNC: 132 MMOL/L (ref 136–145)

## 2025-05-23 PROCEDURE — 80069 RENAL FUNCTION PANEL: CPT

## 2025-05-23 PROCEDURE — 6370000000 HC RX 637 (ALT 250 FOR IP): Performed by: HOSPITALIST

## 2025-05-23 PROCEDURE — 99232 SBSQ HOSP IP/OBS MODERATE 35: CPT | Performed by: INTERNAL MEDICINE

## 2025-05-23 PROCEDURE — 36415 COLL VENOUS BLD VENIPUNCTURE: CPT

## 2025-05-23 PROCEDURE — 1200000000 HC SEMI PRIVATE

## 2025-05-23 PROCEDURE — 6370000000 HC RX 637 (ALT 250 FOR IP): Performed by: STUDENT IN AN ORGANIZED HEALTH CARE EDUCATION/TRAINING PROGRAM

## 2025-05-23 PROCEDURE — 90935 HEMODIALYSIS ONE EVALUATION: CPT

## 2025-05-23 PROCEDURE — 2500000003 HC RX 250 WO HCPCS: Performed by: HOSPITALIST

## 2025-05-23 RX ORDER — OXYCODONE HYDROCHLORIDE 5 MG/1
5 TABLET ORAL EVERY 4 HOURS PRN
Refills: 0 | Status: DISCONTINUED | OUTPATIENT
Start: 2025-05-23 | End: 2025-05-24 | Stop reason: HOSPADM

## 2025-05-23 RX ORDER — ACETAMINOPHEN 650 MG/1
650 SUPPOSITORY RECTAL EVERY 6 HOURS SCHEDULED
Status: DISCONTINUED | OUTPATIENT
Start: 2025-05-23 | End: 2025-05-24 | Stop reason: HOSPADM

## 2025-05-23 RX ORDER — ACETAMINOPHEN 325 MG/1
650 TABLET ORAL EVERY 6 HOURS SCHEDULED
Status: DISCONTINUED | OUTPATIENT
Start: 2025-05-23 | End: 2025-05-24 | Stop reason: HOSPADM

## 2025-05-23 RX ORDER — CYCLOBENZAPRINE HCL 10 MG
10 TABLET ORAL 3 TIMES DAILY
Status: DISCONTINUED | OUTPATIENT
Start: 2025-05-23 | End: 2025-05-24 | Stop reason: HOSPADM

## 2025-05-23 RX ADMIN — SODIUM CHLORIDE, PRESERVATIVE FREE 10 ML: 5 INJECTION INTRAVENOUS at 21:41

## 2025-05-23 RX ADMIN — CYCLOBENZAPRINE 10 MG: 10 TABLET, FILM COATED ORAL at 12:43

## 2025-05-23 RX ADMIN — APIXABAN 2.5 MG: 2.5 TABLET, FILM COATED ORAL at 21:40

## 2025-05-23 RX ADMIN — INSULIN LISPRO 1 UNITS: 100 INJECTION, SOLUTION INTRAVENOUS; SUBCUTANEOUS at 12:43

## 2025-05-23 RX ADMIN — SODIUM CHLORIDE, PRESERVATIVE FREE 10 ML: 5 INJECTION INTRAVENOUS at 09:37

## 2025-05-23 RX ADMIN — ACETAMINOPHEN 650 MG: 325 TABLET ORAL at 18:12

## 2025-05-23 RX ADMIN — GABAPENTIN 200 MG: 100 CAPSULE ORAL at 21:40

## 2025-05-23 RX ADMIN — GABAPENTIN 200 MG: 100 CAPSULE ORAL at 09:37

## 2025-05-23 RX ADMIN — MELATONIN TAB 3 MG 3 MG: 3 TAB at 21:40

## 2025-05-23 RX ADMIN — ACETAMINOPHEN 650 MG: 325 TABLET ORAL at 23:38

## 2025-05-23 RX ADMIN — ASPIRIN 81 MG: 81 TABLET, CHEWABLE ORAL at 09:37

## 2025-05-23 RX ADMIN — FLUDROCORTISONE ACETATE 0.1 MG: 0.1 TABLET ORAL at 21:48

## 2025-05-23 RX ADMIN — INSULIN LISPRO 1 UNITS: 100 INJECTION, SOLUTION INTRAVENOUS; SUBCUTANEOUS at 18:12

## 2025-05-23 RX ADMIN — METOPROLOL TARTRATE 50 MG: 50 TABLET, FILM COATED ORAL at 09:37

## 2025-05-23 RX ADMIN — TRAZODONE HYDROCHLORIDE 25 MG: 50 TABLET ORAL at 21:40

## 2025-05-23 RX ADMIN — FLUDROCORTISONE ACETATE 0.1 MG: 0.1 TABLET ORAL at 09:37

## 2025-05-23 RX ADMIN — PANTOPRAZOLE SODIUM 40 MG: 40 TABLET, DELAYED RELEASE ORAL at 05:45

## 2025-05-23 RX ADMIN — APIXABAN 2.5 MG: 2.5 TABLET, FILM COATED ORAL at 09:37

## 2025-05-23 RX ADMIN — ACETAMINOPHEN 650 MG: 325 TABLET ORAL at 12:43

## 2025-05-23 RX ADMIN — CYCLOBENZAPRINE 10 MG: 10 TABLET, FILM COATED ORAL at 21:40

## 2025-05-23 ASSESSMENT — ENCOUNTER SYMPTOMS
SINUS PAIN: 0
EYE DISCHARGE: 0
CONSTIPATION: 0
SORE THROAT: 0
ABDOMINAL PAIN: 0
RHINORRHEA: 0
NAUSEA: 0
WHEEZING: 0
DIARRHEA: 0
EYE REDNESS: 0
BACK PAIN: 0
COUGH: 0
SINUS PRESSURE: 0
SHORTNESS OF BREATH: 0

## 2025-05-23 ASSESSMENT — PAIN DESCRIPTION - LOCATION: LOCATION: HEAD

## 2025-05-23 ASSESSMENT — PAIN DESCRIPTION - DESCRIPTORS: DESCRIPTORS: ACHING

## 2025-05-23 ASSESSMENT — PAIN SCALES - GENERAL: PAINLEVEL_OUTOF10: 3

## 2025-05-23 NOTE — PROGRESS NOTES
[facility]    Hospitalist Progress Note:      Name:  Danny Rock /Age/Sex: 1950  (75 y.o. male)   MRN & CSN:  [id number] & [id number] Encounter Date: 2025    Location:  5TN-5576/5576-01 PCP: Yesi Rosa APRN - CNP     Attending:Julian Coronado MD  Admission Date: 5/15/2025      Hospital Day: 9    Chief Complain/Reason for Admission:  Chief Complaint   Patient presents with    Fatigue     Patient arrives from home via [facility] with complaints of generalized weakness. Patient reports generalized weakness. Patient is dialysis pt. Currently does physical therapy for hip and shoulder pain. Patient reports not being able to get out of bed today because he felt so weak and from the pain. Pt last dialysis was Tuesday      Assessment:  Danny Rock is a 75 y.o. male who presente to ER with complaints of generalized weakness and pain.   Acute on Chronic back pain likely due to L5 spondylolysis and spondylolisthesis: History of lumbar discitis and osteomyelitis with foraminal stenosis at L3-L4 level  Right shoulder joint with large effusion: S/p right shoulder aspiration-fluid analysis studies not consistent with infection.  R Psoas muscle enlargement associated with fluid collection hematoma vs abscess  COVID-19 infection-remains on room air, getting symptomatic treatment  Recent Enterococcus faecalis bacteremia in 2025  ESRD on hemodialysis  Essential hypertension  Paroxysmal atrial fibrillation: on eliquis as outpt.  Followed by EP cardiology team, poor candidate for repeat procedures.  Mixed hyperlipidemia  Type 2 diabetes mellitus-maintain good glucose control  History of prostate cancer  Generalized weakness and physical deconditioning    Plan:   F/u aspiration of psoas fluid collection culture, so far no growth. Cont IV abx - Vanc till DC per ID team.Noted  ID.  Ortho team noted, no plan for surgical intervention.  RUE WBAT and ROM as tolerated  Optimize pain  control - scheduled tylenol, flexeril, oxycodon  Telemonitoring, resume Eliquis.  Noted cardiology team, started on Florinef and as needed midodrine.  Recommended compression stockings.  Cont HD per nephrology team  PT/OT  Current meds reviewed, adjusted.   See orders  Diet ADULT DIET; Regular; 5 carb choices (75 gm/meal)  ADULT ORAL NUTRITION SUPPLEMENT; Breakfast, Lunch, Dinner; Diabetic Oral Supplement   DVT Prophylaxis [] Lovenox, []  Heparin, [x] SCDs, [] Ambulation,  [] Eliquis, [] Xarelto  [] Coumadin   Code Status Full Code   Disposition Expected Disposition: Home vs ECF vs Hm with C  Estimated Date of Discharge:  1-2 days  Reason for continued admission:R shoulder pain     Subjective:  Pt. seen and examined at bedside.    Overall symptoms are stable  States pain at R shoulder area.  Denies any Cough/SOB/Abd pain/ N/V/Diarrhea.     Objective:  Vital Signs:  /67   Pulse 77   Temp 97.3 °F (36.3 °C) (Oral)   Resp 13   Ht 1.905 m (6' 3\")   Wt 67 kg (147 lb 11.3 oz)   SpO2 96%   PF 97 L/min   BMI 18.46 kg/m²     Diet: ADULT DIET; Regular; 5 carb choices (75 gm/meal)  ADULT ORAL NUTRITION SUPPLEMENT; Breakfast, Lunch, Dinner; Diabetic Oral Supplement    Intake/Output Summary (Last 24 hours) at 5/23/2025 1157  Last data filed at 5/23/2025 0200  Gross per 24 hour   Intake 130 ml   Output 1050 ml   Net -920 ml     In: 130   Out: 1050 [Urine:1050]  Wt Readings from Last 3 Encounters:   05/20/25 67 kg (147 lb 11.3 oz)   04/17/25 70.3 kg (154 lb 15.7 oz)   04/08/25 68.4 kg (150 lb 12.7 oz)       Physical Examination:   General: Alert, Awake, oriented x 3, cooperative. No distress.  Generalized  physical deconditioning  HEENT: Oral mucosa moist.    Respiratory: Breath sounds clear bilaterally. No rales/wheezes.  Cardiovascular: S1 S2, RRR  Gastrointestinal: BS+. Soft, No distention, No tenderness, No rigidity or guarding.   Musculoskeletal/ Extermities: No edema legs bilaterally. Mild R shoulder decreased

## 2025-05-23 NOTE — PROGRESS NOTES
Infectious Diseases   Progress Note      Admission Date: 5/15/2025  Hospital Day: Hospital Day: 9   Attending: Julian Coronado MD  Date of service: 5/23/2025     Chief complaint/ Reason for consult:     Generalized weakness  COVID-19 infection  L5 spondylolysis and spondylolisthesis  Bilateral frontal sinusitis  Worsening of right clavicular cortical irregularity  Recent Enterococcus faecalis bacteremia in April 2025  History of lumbar discitis and osteomyelitis with foraminal stenosis at L3-L4 level  ESRD on hemodialysis    Microbiology:      I have reviewed allavailable micro lab data and cultures    Results       Procedure Component Value Units Date/Time    Culture, Body Fluid (with Gram Stain) [[id number]] Collected: 05/20/25 1250    Order Status: Completed Specimen: Body Fluid Updated: 05/23/25 0741     Body Fluid Culture, Sterile No growth 60 to 72 hours     Gram Stain Result No Epithelial Cells seen  No WBC's seen  No organisms seen      Narrative:      ORDER#: [id number]                          ORDERED BY: KENN RODRIGUEZ  SOURCE: Fluid Right psoas                  COLLECTED:  05/20/25 12:50  ANTIBIOTICS AT SARAH.:                      RECEIVED :  05/20/25 18:45    Culture, Fungus [[id number]] Collected: 05/20/25 1250    Order Status: Sent Specimen: Fluid Updated: 05/21/25 1433     Fungus Stain No Fungal elements seen    Narrative:      ORDER#: [id number]                          ORDERED BY: KENN RODRIGUEZ  SOURCE: Fluid                              COLLECTED:  05/20/25 12:50  ANTIBIOTICS AT SARAH.:                      RECEIVED :  05/21/25 02:45    Culture with Smear, Acid Fast Bacillius [[id number]] Collected: 05/20/25 1250    Order Status: Sent Specimen: Fluid Updated: 05/21/25 1532     AFB Smear No AFB observed by Fluorescent stain    Narrative:      ORDER#: [id number]                          ORDERED BY: KENN RODRIGUEZ  SOURCE: Fluid                              COLLECTED:  05/20/25 12:50  ANTIBIOTICS AT  I63.9    Overweight (BMI 25.0-29.9) E66.3    AIN (acute interstitial nephritis) N10    Sterile pyuria R82.81    Current chronic use of systemic steroids Z79.52    H/O systemic steroid therapy Z92.241    Encounter for medication counseling Z71.89    Secondary hypercoagulable state D68.69    Pneumothorax J93.9    Left inguinal hernia K40.90    Abscess of right shoulder L02.413    CRP elevated R79.82    Elevated sed rate R70.0    Abscess of right arm L02.413    ESRD (end stage renal disease) ([facility]) N18.6    Hemorrhoid K64.9    Constipation K59.00    Low back pain M54.50    Lumbar foraminal stenosis M48.061    History of prostate cancer Z85.46    Central stenosis of spinal canal M48.00    Enterococcus faecalis infection A49.8    Enterococcal bacteremia R78.81, B95.2    Moderate malnutrition E44.0    Debility R53.81    Osteomyelitis of lumbar spine ([facility]) M46.26    Lumbar discitis M46.46    Iliopsoas abscess on right ([facility]) K68.12    Psoas hematoma, left, secondary to anticoagulant therapy, initial encounter S30.1XXA    Psoas hematoma, right, secondary to anticoagulant therapy, sequela S30.1XXS    Lumbar spondylosis M47.816    Severe malnutrition E43    General weakness R53.1    History of bacterial sinusitis Z87.09    COVID-19 U07.1    Effusion of joint of right shoulder M25.411    Right rotator cuff tear arthropathy M75.101, M12.811    Orthostatic hypotension I95.1    Near syncope R55       Please note that this chart was generated using Dragon dictation software. Although every effort was made to ensure the accuracy of this automated transcription, some errors in transcription may have occurred inadvertently. If you may need any clarification, please do not hesitate to contact me through EPIC or at the phone number provided below with my electronic signature.  Any pictures or media included in this note were obtained after taking informed verbal consent from the patient and with their approval to include those in the

## 2025-05-23 NOTE — PROGRESS NOTES
Nutrition Note    RECOMMENDATIONS  PO Diet: Continue current diet  ONS: Increased Glucerna to TID (prefers chocolate). Recommend change to Nepro BID or decrease frequency of Glucerna if phos remains elevated.     ASSESSMENT   Pt triggered for follow-up. On regular, 5 carb choice diet with Glucerna ordered once daily. Documented meal intakes averaging greater than 50%. Upon visiting, pt stated his appetite isn't \"stellar\" and upset he has not been receiving food that he wants. Accepting Glucerna and requesting at each meal. RD will continue to monitor for adequate po intake.     Malnutrition Status: Moderate malnutrition  Chronic Illness  Findings of the 6 clinical characteristics of malnutrition:  Energy Intake:  75% or less estimated energy requirements for 1 month or longer (AEB wt loss)  Weight Loss:  Mild weight loss (Wt hx in EMR indicates 31 lb (17%) wt loss over the last year)     Body Fat Loss:  Mild body fat loss Orbital   Muscle Mass Loss:  Mild muscle mass loss Thigh (quadriceps), Calf (gastrocnemius), Scapula (trapezius)  Fluid Accumulation:  No fluid accumulation     Strength:  Not Performed    NUTRITION DIAGNOSIS   Moderate malnutrition, in context of chronic illness related to inadequate protein-energy intake as evidenced by criteria as identified in malnutrition assessment  Increased nutrient needs related to increase demand for energy/nutrients as evidenced by wounds    Goals: PO intake 50% or greater, prior to discharge     NUTRITION RELATED FINDINGS  Objective: LBM 5/22. Emesis documented yesterday.  Wounds: Pressure Injury, Stage II (left heel bruising versus blister)    CURRENT NUTRITION THERAPIES  ADULT DIET; Regular; 5 carb choices (75 gm/meal)  ADULT ORAL NUTRITION SUPPLEMENT; Breakfast; Diabetic Oral Supplement       PO Intake: 51-75%, %   PO Supplement Intake:%    ANTHROPOMETRICS  Current Height: 190.5 cm (6' 3\")  Current Weight - Scale: 67 kg (147 lb 11.3 oz)

## 2025-05-23 NOTE — PLAN OF CARE
Problem: Chronic Conditions and Co-morbidities  Goal: Patient's chronic conditions and co-morbidity symptoms are monitored and maintained or improved  Outcome: Progressing  Flowsheets (Taken 5/22/2025 1937)  Care Plan - Patient's Chronic Conditions and Co-Morbidity Symptoms are Monitored and Maintained or Improved:   Monitor and assess patient's chronic conditions and comorbid symptoms for stability, deterioration, or improvement   Collaborate with multidisciplinary team to address chronic and comorbid conditions and prevent exacerbation or deterioration   Update acute care plan with appropriate goals if chronic or comorbid symptoms are exacerbated and prevent overall improvement and discharge     Problem: Pain  Goal: Verbalizes/displays adequate comfort level or baseline comfort level  Outcome: Progressing  Flowsheets (Taken 5/22/2025 1915)  Verbalizes/displays adequate comfort level or baseline comfort level:   Encourage patient to monitor pain and request assistance   Assess pain using appropriate pain scale   Administer analgesics based on type and severity of pain and evaluate response   Implement non-pharmacological measures as appropriate and evaluate response   Consider cultural and social influences on pain and pain management   Notify Licensed Independent Practitioner if interventions unsuccessful or patient reports new pain     Problem: Safety - Adult  Goal: Free from fall injury  Outcome: Progressing     Problem: Skin/Tissue Integrity  Goal: Skin integrity remains intact  Description: 1.  Monitor for areas of redness and/or skin breakdown2.  Assess vascular access sites hourly3.  Every 4-6 hours minimum:  Change oxygen saturation probe site4.  Every 4-6 hours:  If on nasal continuous positive airway pressure, respiratory therapy assess nares and determine need for appliance change or resting period  Outcome: Progressing  Flowsheets (Taken 5/22/2025 1937)  Skin Integrity Remains Intact:   Monitor for

## 2025-05-23 NOTE — PROGRESS NOTES
MD Gordon Frankel MD Aldo Estella, DO Vanessa Godines, PA-C Mark Kremer, PA-C                Office: [phone]                      Fax: [phone]             [employer]                   NEPHROLOGY CONSULT PROGRESS NOTE    Subjective / interval history / medical decision making.  -after sleeping better, he feels better.     IMPRESSION/RECOMMENDATIONS:      #ESKD on HD TTS  -HD TTS at [facility] under our care.  -EDW while inpatient ~70kg increease to 70.5kg.  -was having some renal function recovery last admission, therefore may explain electrolyte and fluid volume stability.  -5/19: 24 hr CrCl 23 ml/min, Ur Volume 1150ml.     -decreased iHD to 2x/week: Tue/Saturday. Updated his [facility] HD unit for 2x/week Rx.    -BUN increasing due to corticosteroids.   -Plan for iHD today instead of Saturday, likely hold off over the weekend. Then following iHD Tuesday/Thursday.  Otherwise stable from renal standpoint after iHD.              # Volume status  -Euvolemic clinically  Estimated target weight ~70kg    #hypomagnesemia  -replace PRN.     #hypokalemia  -mild, stable, monitor.     #hypoalbuminemia  -likely 2/2 malnutrition, ESKD  -encourage po protein intake    #Secondary Hyperparathyroidism  -  -stable, monitor     #anemia of CKD  -has Hx of renal ca s/p nephrectomy  -hgb stable, POONAM with HD.  -monitor    #COVID 19 positive.  #bacteremia - on IV Vanc per ID.      Dispo - stable from renal standpoint. Monitor for outpatient IV abx requirements.    Thank you for the consult.   Guillermo Moss DO     Reason for Consult:  ESKD management  Requesting Physician/Provider: Rakesh Gayle MD     CHIEF COMPLAINT:  fatigue    History obtained from records and patient.    HISTORY OF PRESENT ILLNESS:                Danny Rock  is 75 y.o. y.o. male with significant past medical history of ESKD on HD TTS at [facility] under care, atrial fibrillation, coronary

## 2025-05-23 NOTE — PROGRESS NOTES
Treatment time: 3 hours  Net UF: 1000 ml     Pre weight: 67 kg  Post weight:66 kg  EDW: TBD    Access used: LTDC  LTDC- dressing change    Access function: well  with -350 ml/min     Medications or blood products given: LTDC-heparin dwells     Regular outpatient schedule: TTS     Summary of response to treatment: Patient tolerated treatment well. Demanded to end HD x 7 mind early. No complications noted.. Patient remained stable throughout entire treatment.     Report given to Qi Doyle RN and copy of dialysis treatment record placed in chart, to be scanned into EMR.

## 2025-05-24 VITALS
HEIGHT: 75 IN | WEIGHT: 145.72 LBS | OXYGEN SATURATION: 99 % | HEART RATE: 88 BPM | BODY MASS INDEX: 18.12 KG/M2 | SYSTOLIC BLOOD PRESSURE: 135 MMHG | RESPIRATION RATE: 16 BRPM | DIASTOLIC BLOOD PRESSURE: 68 MMHG | TEMPERATURE: 97.8 F

## 2025-05-24 LAB
ALBUMIN SERPL-MCNC: 2.9 G/DL (ref 3.4–5)
ANION GAP SERPL CALCULATED.3IONS-SCNC: 10 MMOL/L (ref 3–16)
BUN SERPL-MCNC: 51 MG/DL (ref 7–20)
CALCIUM SERPL-MCNC: 8 MG/DL (ref 8.3–10.6)
CHLORIDE SERPL-SCNC: 99 MMOL/L (ref 99–110)
CO2 SERPL-SCNC: 25 MMOL/L (ref 21–32)
CREAT SERPL-MCNC: 1.9 MG/DL (ref 0.8–1.3)
GFR SERPLBLD CREATININE-BSD FMLA CKD-EPI: 36 ML/MIN/{1.73_M2}
GLUCOSE BLD-MCNC: 168 MG/DL (ref 70–99)
GLUCOSE BLD-MCNC: 226 MG/DL (ref 70–99)
GLUCOSE SERPL-MCNC: 150 MG/DL (ref 70–99)
PERFORMED ON: ABNORMAL
PERFORMED ON: ABNORMAL
PHOSPHATE SERPL-MCNC: 2.7 MG/DL (ref 2.5–4.9)
POTASSIUM SERPL-SCNC: 3.7 MMOL/L (ref 3.5–5.1)
SODIUM SERPL-SCNC: 134 MMOL/L (ref 136–145)

## 2025-05-24 PROCEDURE — 6370000000 HC RX 637 (ALT 250 FOR IP): Performed by: HOSPITALIST

## 2025-05-24 PROCEDURE — 6370000000 HC RX 637 (ALT 250 FOR IP): Performed by: STUDENT IN AN ORGANIZED HEALTH CARE EDUCATION/TRAINING PROGRAM

## 2025-05-24 PROCEDURE — 36415 COLL VENOUS BLD VENIPUNCTURE: CPT

## 2025-05-24 PROCEDURE — 80069 RENAL FUNCTION PANEL: CPT

## 2025-05-24 PROCEDURE — 6370000000 HC RX 637 (ALT 250 FOR IP): Performed by: INTERNAL MEDICINE

## 2025-05-24 PROCEDURE — 2500000003 HC RX 250 WO HCPCS: Performed by: HOSPITALIST

## 2025-05-24 RX ORDER — FLUDROCORTISONE ACETATE 0.1 MG/1
0.1 TABLET ORAL 2 TIMES DAILY
Qty: 60 TABLET | Refills: 0 | Status: SHIPPED | OUTPATIENT
Start: 2025-05-24 | End: 2025-06-23

## 2025-05-24 RX ADMIN — CYCLOBENZAPRINE 10 MG: 10 TABLET, FILM COATED ORAL at 09:00

## 2025-05-24 RX ADMIN — FLUDROCORTISONE ACETATE 0.1 MG: 0.1 TABLET ORAL at 09:01

## 2025-05-24 RX ADMIN — APIXABAN 2.5 MG: 2.5 TABLET, FILM COATED ORAL at 09:01

## 2025-05-24 RX ADMIN — SEVELAMER CARBONATE 800 MG: 800 TABLET, FILM COATED ORAL at 09:01

## 2025-05-24 RX ADMIN — CYCLOBENZAPRINE 10 MG: 10 TABLET, FILM COATED ORAL at 13:55

## 2025-05-24 RX ADMIN — ASPIRIN 81 MG: 81 TABLET, CHEWABLE ORAL at 09:01

## 2025-05-24 RX ADMIN — INSULIN LISPRO 2 UNITS: 100 INJECTION, SOLUTION INTRAVENOUS; SUBCUTANEOUS at 13:06

## 2025-05-24 RX ADMIN — GABAPENTIN 200 MG: 100 CAPSULE ORAL at 09:00

## 2025-05-24 RX ADMIN — SODIUM CHLORIDE, PRESERVATIVE FREE 10 ML: 5 INJECTION INTRAVENOUS at 09:03

## 2025-05-24 RX ADMIN — ACETAMINOPHEN 650 MG: 325 TABLET ORAL at 05:52

## 2025-05-24 RX ADMIN — NYSTATIN: 100000 SUSPENSION ORAL at 13:05

## 2025-05-24 RX ADMIN — PANTOPRAZOLE SODIUM 40 MG: 40 TABLET, DELAYED RELEASE ORAL at 05:52

## 2025-05-24 RX ADMIN — ACETAMINOPHEN 650 MG: 325 TABLET ORAL at 13:06

## 2025-05-24 RX ADMIN — SEVELAMER CARBONATE 800 MG: 800 TABLET, FILM COATED ORAL at 13:06

## 2025-05-24 ASSESSMENT — PAIN DESCRIPTION - LOCATION: LOCATION: MOUTH

## 2025-05-24 ASSESSMENT — PAIN SCALES - GENERAL: PAINLEVEL_OUTOF10: 2

## 2025-05-24 NOTE — CARE COORDINATION
05/24/25 1322   IMM Letter   IMM Letter given to Patient/Family/Significant other/Guardian/POA/by: YEIMY Chandler RN/ANISHA  (2nd)   IMM Letter date given: 05/24/25   IMM Letter time given: 1322     Follow-Up copy of Important Message from Medicare (IMM2) has been explained to patient and/or designated healthcare decision maker (HCDM). Pt and/or HCDM aware that patient is permitted to stay an additional 4 hours prior to discharge to consider an appeal if they feel as though they are being discharged too soon. Patient may discharge as planned if chooses to do so.  Patient/HCDM voice no other concerns or questions regarding this process.

## 2025-05-24 NOTE — CARE COORDINATION
Case Management -  Discharge Note      Patient Name: Danny Rock                   YOB: 1950  Room: [facility]55/5576-            Readmission Risk (Low < 19, Mod (19-27), High > 27): Readmission Risk Score: 30    Current PCP: Yesi Rosa APRN - CNP      (IMM) Important Message from Medicare:    Has pt received appropriate compliance notices before being discharged if required: yes  Compliance doc:  [x] 2nd IMM; [] Code 44 [] [place]  Date Given: 5/24/25 Given By: case management    PT AM-PAC: 16 /24  OT AM-PAC: 13 /24    Patient/patient representative has been educated on the benefits of SNF as well as the possible risks of declining recommended services. Patient/patient representative has acknowledged the information provided and decided on the following discharge plan. Patient/ patient representative has been provided freedom of choice regarding service provider, supported by basic dialogue that supports the patient's individualized plan of care/goals.    Patient noted to have a discharge order.  Pt has been medically cleared for transition to Skilled Nursing Rehab Facility    [place] [address]  Phone: [phone]  Fax: [phone]        HENS Completed:  yes, Document ID : [id number]   Pre-cert required/obtained:  not needed    Transportation scheduled for 1400  Transportation provided by [facility] [phone]   AVS faxed and agency notified:  yes  The following prescriptions sent with pt: yes if any  Family Notified:  yes wife    Nurse to call report to facility: [phone]    [facility] agency notified of discharge:  [] Yes [] No  [x] NA    Family notified of discharge:  [x] Yes  [] No  [] NA    Facility notified of discharge:  [x] Yes  [] No  [] NA    Pt is being discharged with Outpt IV Antibiotics  [] Yes [] No  [x] NA  If yes, make sure REGINE is faxed to [facility] agency, and meds are called in to pharmacy by RN from REGINE orders only.

## 2025-05-24 NOTE — DISCHARGE INSTR - COC
Continuity of Care Form    Patient Name: Danny Rock   :  1950  MRN:  [id number]    Admit date:  5/15/2025  Discharge date:        Code Status Order: Full Code   Advance Directives:     Admitting Physician:  Francesco Berry MD  PCP: Yesi Rosa, APRN - CNP    Discharging Nurse: RIP  Discharging Hospital Unit/Room#: 5TN-5576/5576-01  Discharging Unit Phone Number: [phone]    Emergency Contact:   Extended Emergency Contact Information  Primary Emergency Contact: Amanda Rock  Address: [address] [place], [place] [address]           [place] [address] [facility]  Home Phone: [phone]  Mobile Phone: [phone]  Relation: Spouse    Past Surgical History:  Past Surgical History:   Procedure Laterality Date    CATARACT REMOVAL Bilateral     ~  with lens implanted    CHOLECYSTECTOMY      CT GUIDED CHEST TUBE  2024    CT GUIDED CHEST TUBE 2024 [facility] CT SCAN    CT NEEDLE BIOPSY LIVER PERCUTANEOUS  2024    CT NEEDLE BIOPSY LIVER PERCUTANEOUS 2024 [facility] CT SCAN    CT NEEDLE BIOPSY LIVER PERCUTANEOUS  2024    CT NEEDLE BIOPSY LIVER PERCUTANEOUS 2024 [facility] CT SCAN    EYE SURGERY Bilateral 2018    Cataract    HERNIA REPAIR      HERNIA REPAIR Left 2024    OPEN LEFT INGUINAL HERNIA REPAIR WITH MESH performed by Camacho Chowdhury MD at [facility] OR    IR TUNNELED CVC PLACE WO SQ PORT/PUMP > 5 YEARS  2024    IR TUNNELED CATHETER PLACEMENT GREATER THAN 5 YEARS 2024 Kosta Baugh MD [facility] SPECIAL PROCEDURES    IR TUNNELED CVC PLACE WO SQ PORT/PUMP > 5 YEARS  2025    IR TUNNELED CVC PLACE WO SQ PORT/PUMP > 5 YEARS 3/28/2025 [facility] CARDIAC CATH/IR/NEURO LAB    KIDNEY REMOVAL Right 2022    ROBOTIC LAPAROSCOPIC RADICAL RIGHT NEPHRECTOMY performed by Larry Olsen MD at [facility] OR    KNEE SURGERY      both knees: scope for cartilage    NASAL SEPTUM SURGERY      OTHER SURGICAL HISTORY  2024    Peritoneal dialysis catheter: removed    OTHER SURGICAL  Therapy  Weight Bearing Status/Restrictions: No weight bearing restrictions  Other Medical Equipment (for information only, NOT a DME order):  walker  Other Treatments: chemo, hd     Patient's personal belongings (please select all that are sent with patient):  None    RN SIGNATURE:  Electronically signed by Moon Kendrick RN on 5/24/25 at 2:01 PM EDT    CASE MANAGEMENT/SOCIAL WORK SECTION    Inpatient Status Date: 5/15/25    Readmission Risk Assessment Score:  [facility] RISK OF UNPLANNED READMISSION 2.0             30 Total Score        Discharging to [place]/ [place] [address]  Phone: [phone]  Fax: [phone]     / signature: Electronically signed by Nimco Chandler RN on 5/24/25 at 1:07 PM EDT    PHYSICIAN SECTION    Prognosis: Fair    Condition at Discharge: Stable    Rehab Potential (if transferring to Rehab): Fair    Recommended Labs or Other Treatments After Discharge:     Physician Certification: I certify the above information and transfer of Danny Rock  is necessary for the continuing treatment of the diagnosis listed and that he requires Skilled Nursing Facility for greater 30 days.     Update Admission H&P: No change in H&P    PHYSICIAN SIGNATURE:  Electronically signed by Julian Coronado MD on 5/24/25 at 1:42 PM EDT

## 2025-05-24 NOTE — DISCHARGE SUMMARY
Discharge Summary    Name:  Danny Rock /Age/Sex: 1950 (75 y.o. male)   Admit Date: 5/15/2025  Discharge Date: 25    MRN & CSN:  [id number] & [id number] Encounter Date and Time 25    Attending:  Julian Coronado MD Discharging Provider: Julian Coronado MD     Hospital Course:   REASON FOR ADMISSION/CHIEF COMPLAINT:   Chief Complaint   Patient presents with    Fatigue     Patient arrives from home via [facility] with complaints of generalized weakness. Patient reports generalized weakness. Patient is dialysis pt. Currently does physical therapy for hip and shoulder pain. Patient reports not being able to get out of bed today because he felt so weak and from the pain. Pt last dialysis was Tuesday      General weakness    PRINCIPAL DIAGNOSIS* AND HOSPITAL PROBLEM LIST:  Weakness [R53.1]    CONSULTS DURING THE ADMISSION:  IP CONSULT TO INFECTIOUS DISEASES  IP CONSULT TO NEPHROLOGY  IP CONSULT TO ORTHOPEDIC SURGERY  IP CONSULT TO CARDIOLOGY    BRIEF HPI & SUMMARY OF HOSPITAL COURSE:   Danny Rock is a 75 y.o. male who presente to ER with complaints of generalized weakness and pain. Admitted for further evaluation and management. Please review H&P and Epic Chart for full details. In summary, during the course of hospitalization patient was found to have following problems including  Acute on Chronic back pain likely due to L5 spondylolysis and spondylolisthesis: History of lumbar discitis and osteomyelitis with foraminal stenosis at L3-L4 level. F/b ID and Ortho. Completed antibiotic course.  Right shoulder joint with large effusion: S/p right shoulder aspiration-fluid analysis studies not consistent with infection.  R Psoas muscle enlargement associated with fluid collection hematoma: s/p aspiration, wound cx with no growth  COVID-19 infection-remains on room air, getting symptomatic treatment  Recent Enterococcus faecalis bacteremia in 2025  ESRD on hemodialysis  Essential  as: LOPRESSOR  TABLET 1 TABLET BY MOUTH TWO TIMES DAILY  What changed: See the new instructions.     rosuvastatin 10 MG tablet  Commonly known as: CRESTOR  Take 10 mg every other day  What changed:   how much to take  how to take this  when to take this  additional instructions            CONTINUE taking these medications      Alpha-Lipoic Acid 100 MG Caps     ascorbic acid 500 MG tablet  Commonly known as: VITAMIN C     aspirin 81 MG chewable tablet  Take 1 tablet by mouth daily     Cabometyx 40 MG Tabs chemo tablet  Generic drug: cabozantinib s-malate     diclofenac sodium 1 % Gel  Commonly known as: VOLTAREN  Apply 2 g topically 4 times daily as needed for Pain     Eliquis 2.5 MG Tabs tablet  Generic drug: apixaban     lidocaine 4 % external patch  Place 1 patch onto the skin daily as needed (Right hip pain)     melatonin 3 MG Tabs tablet     methocarbamol 750 MG tablet  Commonly known as: ROBAXIN     omeprazole 20 MG delayed release capsule  Commonly known as: PRILOSEC     ondansetron 8 MG tablet  Commonly known as: ZOFRAN     ashli-triston Tabs     sennosides-docusate sodium 8.6-50 MG tablet  Commonly known as: SENOKOT-S  Take 2 tablets by mouth 2 times daily as needed for Constipation     sevelamer 800 MG tablet  Commonly known as: RENVELA     traZODone 50 MG tablet  Commonly known as: DESYREL  Take 0.5 tablets by mouth nightly May take additional 0.5 tablets as needed for insomnia. Max nightly dose: 50 mg total     vitamin B-12 1000 MCG tablet  Commonly known as: CYANOCOBALAMIN     vitamin D3 125 MCG (5000 UT) Tabs tablet  Commonly known as: CHOLECALCIFEROL     zinc sulfate 220 (50 Zn)  mg capsule - elemental zinc  Commonly known as: ZINCATE            STOP taking these medications      furosemide 40 MG tablet  Commonly known as: LASIX     insulin glargine 100 UNIT/ML injection vial  Commonly known as: LANTUS     Naproxen Sodium 220 MG Caps     polyethylene glycol 17 g packet  Commonly known as: GLYCOLAX

## 2025-05-24 NOTE — PROGRESS NOTES
Pt discharged to [facility] today. Transported via stretcher. Report called to facility. Vitals were stable. Dressings changed, medications and belongings sent with wife.

## 2025-05-24 NOTE — PROGRESS NOTES
MD Gordon Frankel MD  Guillermo MossDO               Office: [phone]                      Fax: [phone]             [facility]                   NEPHROLOGY   PROGRESS NOTE       IMPRESSION/RECOMMENDATIONS:      Weakness [R53.1]    #ESKD on HD TTS  -HD TTS at [facility] under our care.  -EDW while inpatient ~70kg increease to 70.5kg.  -was having some renal function recovery last admission, therefore may explain electrolyte and fluid volume stability.  -5/19: 24 hr CrCl 23 ml/min, Ur Volume 1150ml.     -decreased iHD to 2x/week: Tue/Saturday. Updated his [facility] HD unit for 2x/week Rx.    -BUN increasing due to corticosteroids.   -s/p HD Friday instead of Saturday,   Will hold off over the weekend.     HD outpt Tuesday/Thursday.  Otherwise stable from renal standpoint       # Volume status  -Euvolemic clinically  Estimated target weight ~70kg    #hypomagnesemia  -replace PRN.     #hypokalemia  -mild, stable, monitor.     #hypoalbuminemia  -likely 2/2 malnutrition, ESKD  -encourage po protein intake    #Secondary Hyperparathyroidism  -  -stable, monitor     #anemia of CKD  -has Hx of renal ca s/p nephrectomy  -hgb stable, POONAM with HD.  -monitor    #COVID 19 positive.  #bacteremia - on IV Vanc per ID.      Dispo - stable from renal standpoint. Monitor for outpatient IV abx requirements.    Thank you for the consult.   Gordon Rolle MD         Reason for Consult:  ESKD management  Requesting Physician/Provider: Rakesh Gayle MD     CHIEF COMPLAINT:  fatigue    History obtained from records and patient.    HISTORY OF PRESENT ILLNESS:                Danny Rock  is 75 y.o. y.o. male with significant past medical history of ESKD on HD TTS at [facility] under care, atrial fibrillation, coronary artery disease, renal CA status post right nephrectomy, previously on immunotherapy, diabetes mellitus type 2, hyperlipidemia, hypertension, prostate

## 2025-05-26 LAB
BACTERIA FLD AEROBE CULT: NORMAL
GRAM STN SPEC: NORMAL

## 2025-05-26 NOTE — PROGRESS NOTES
Date of Admission: 5/15/2025. Hospital Day: 10       HOSPITAL COURSE  75 y.o. male who presented to [facility] ER with weakness.  Had recent prolonged hospitalization and rehab stay, started with right shoulder septic joint on 1/2025 had complicated by discitis and lumbar osteomyelitis with Enterococcus bacteremia    Interval  History:   Reports moderate improvement in leg pain, numbness improving.  Needs snf, precert started      Physical Exam     /68   Pulse 88   Temp 97.8 °F (36.6 °C) (Oral)   Resp 16   Ht 1.905 m (6' 3\")   Wt 66.1 kg (145 lb 11.6 oz)   SpO2 99%   PF 97 L/min   BMI 18.21 kg/m²     General appearance: No apparent distress, appears stated age and cooperative.  Respiratory:  Normal respiratory effort. Clear to auscultation, bilaterally without Rales/Wheezes/Rhonchi.  Cardiovascular: Regular rate and rhythm with normal S1/S2 without murmurs, rubs or gallops.  Neuro: Motor 4+/5 bilaterally upper and lower extremity  MSK.  Bilateral hip pain and lower back on bilateral hip flexion and abduction/adduction movement, right hip inferolateral tenderness          Assessment/Plan:    # Generalized weakness  #Acute on chronic lower back pain  # Acute right hip pain  No obvious focal weakness, suspect pain and weakness in the setting of severe lumbar spinal spondylarthritis,  recent spinal infection.  Pain control with low-dose gabapentin, lidocaine patch  Continue IV steroid  No neurological red flag sign but will consider repeating MRI if no improvement in the setting of lumbar osteomyelitis/discitis     #Enterococcus faecalis bacteremia.  # History of right shoulder septic arthritis  # Multilevel lumbar discitis and osteomyelitis  Recent history with lumbar discitis and osteomyelitis.  Patient has worsening right clavicle cortical irregularity, consulted orthopedics, underwent IND of shoulder, right.  Culture NGTD  ID following. Finishing  IV vancomycin  tomorrow.     ESRD: On

## 2025-06-02 LAB
BACTERIA FLD AEROBE CULT: NORMAL
FUNGUS SPEC CULT: NORMAL
GRAM STN SPEC: NORMAL
LOEFFLER MB STN SPEC: NORMAL

## 2025-06-03 LAB
ACID FAST STN SPEC QL: NORMAL
MYCOBACTERIUM SPEC CULT: NORMAL

## 2025-06-10 LAB
ACID FAST STN SPEC QL: NORMAL
Lab: NORMAL
MYCOBACTERIUM SPEC CULT: NORMAL
REPORT: NORMAL
THIS TEST SENT TO: NORMAL

## 2025-06-16 LAB
FUNGUS SPEC CULT: NORMAL
LOEFFLER MB STN SPEC: NORMAL

## 2025-06-17 LAB
ACID FAST STN SPEC QL: NORMAL
MYCOBACTERIUM SPEC CULT: NORMAL

## 2025-06-23 LAB
FUNGUS SPEC CULT: NORMAL
LOEFFLER MB STN SPEC: NORMAL

## 2025-06-24 LAB
ACID FAST STN SPEC QL: NORMAL
MYCOBACTERIUM SPEC CULT: NORMAL

## 2025-07-01 LAB
ACID FAST STN SPEC QL: NORMAL
MYCOBACTERIUM SPEC CULT: NORMAL

## 2025-07-08 ENCOUNTER — OFFICE VISIT (OUTPATIENT)
Dept: ORTHOPEDIC SURGERY | Age: 75
End: 2025-07-08
Payer: MEDICARE

## 2025-07-08 DIAGNOSIS — M25.512 LEFT SHOULDER PAIN, UNSPECIFIED CHRONICITY: Primary | ICD-10-CM

## 2025-07-08 LAB
ACID FAST STN SPEC QL: NORMAL
MYCOBACTERIUM SPEC CULT: NORMAL

## 2025-07-08 PROCEDURE — G8419 CALC BMI OUT NRM PARAM NOF/U: HCPCS | Performed by: ORTHOPAEDIC SURGERY

## 2025-07-08 PROCEDURE — 1160F RVW MEDS BY RX/DR IN RCRD: CPT | Performed by: ORTHOPAEDIC SURGERY

## 2025-07-08 PROCEDURE — 99213 OFFICE O/P EST LOW 20 MIN: CPT | Performed by: ORTHOPAEDIC SURGERY

## 2025-07-08 PROCEDURE — G8427 DOCREV CUR MEDS BY ELIG CLIN: HCPCS | Performed by: ORTHOPAEDIC SURGERY

## 2025-07-08 PROCEDURE — 1123F ACP DISCUSS/DSCN MKR DOCD: CPT | Performed by: ORTHOPAEDIC SURGERY

## 2025-07-08 PROCEDURE — 1036F TOBACCO NON-USER: CPT | Performed by: ORTHOPAEDIC SURGERY

## 2025-07-08 PROCEDURE — 20610 DRAIN/INJ JOINT/BURSA W/O US: CPT | Performed by: ORTHOPAEDIC SURGERY

## 2025-07-08 PROCEDURE — 1159F MED LIST DOCD IN RCRD: CPT | Performed by: ORTHOPAEDIC SURGERY

## 2025-07-08 RX ORDER — BUPIVACAINE HYDROCHLORIDE 5 MG/ML
4 INJECTION, SOLUTION PERINEURAL ONCE
Status: COMPLETED | OUTPATIENT
Start: 2025-07-08 | End: 2025-07-08

## 2025-07-08 RX ORDER — METHYLPREDNISOLONE ACETATE 40 MG/ML
40 INJECTION, SUSPENSION INTRA-ARTICULAR; INTRALESIONAL; INTRAMUSCULAR; SOFT TISSUE ONCE
Status: COMPLETED | OUTPATIENT
Start: 2025-07-08 | End: 2025-07-08

## 2025-07-08 RX ADMIN — BUPIVACAINE HYDROCHLORIDE 20 MG: 5 INJECTION, SOLUTION PERINEURAL at 13:45

## 2025-07-08 RX ADMIN — METHYLPREDNISOLONE ACETATE 40 MG: 40 INJECTION, SUSPENSION INTRA-ARTICULAR; INTRALESIONAL; INTRAMUSCULAR; SOFT TISSUE at 13:46

## 2025-07-08 NOTE — PROGRESS NOTES
7/8/2025     Reason for visit:  Status post right shoulder I&D on 1/31/2025    History of Present Illness:  Patient returns for postoperative evaluation.  He reports that he continues to have pain in the right shoulder.  Per the patient and his wife he was admitted to  over Memorial Day weekend and underwent a second I&D of the right shoulder that was open.  He is currently on IV antibiotics.  His next visit with his infectious disease doctor is in early August.  He does report continued pain within both shoulders.    Objective:  There were no vitals taken for this visit.     Physical Exam:  The patient is well-appearing and in no apparent distress  Neg Spurling's test  Examination of the right shoulder  There is no swelling, ecchymosis, or gross deformity  There is no evidence of muscle atrophy  Range of motion demonstrates approximate 100 degrees of forward flexion and abduction with mild pain  Intact motor and sensory function throughout the median/radial/ulnar/PIN/AIN distributions  Palpable radial pulse, brisk cap refill, 2+ symmetric reflexes     Assessment:  Status post right shoulder I&D on 1/31/2025    Plan:  The patient is doing fairly well.  He has quite severe advanced glenohumeral degenerative joint disease and therefore will continue to have some pain and dysfunction as a result of that.  At this point I would recommend ordering ESR and CRP to monitor for appropriate resolution of the right shoulder infection.  He is also having left shoulder pain therefore we discussed treatment about that. The patient elected proceed with cortisone injection.  Therefore injection was given to the left shoulder subacromial space via sterile technique.  The injection consisted of 40 mg of Depo-Medrol combined with 4 mL of 0.5% Marcaine.  The patient tolerated this well.  Patient will return to see me following his visit with his infectious disease doctor.    Greater than 20 minutes were spent with this encounter.

## 2025-07-14 ENCOUNTER — TRANSCRIBE ORDERS (OUTPATIENT)
Dept: ADMINISTRATIVE | Age: 75
End: 2025-07-14

## 2025-07-14 DIAGNOSIS — I73.9 PVD (PERIPHERAL VASCULAR DISEASE): Primary | ICD-10-CM

## 2025-07-24 ENCOUNTER — HOSPITAL ENCOUNTER (OUTPATIENT)
Dept: VASCULAR LAB | Age: 75
Discharge: HOME | End: 2025-07-26
Payer: MEDICARE

## 2025-07-24 DIAGNOSIS — I73.9 PVD (PERIPHERAL VASCULAR DISEASE): ICD-10-CM

## 2025-07-24 PROCEDURE — 93922 UPR/L XTREMITY ART 2 LEVELS: CPT

## 2025-07-25 LAB
VAS LEFT ABI: 2.1
VAS LEFT DORSALIS PEDIS BP: 248 MMHG
VAS LEFT PTA BP: 200 MMHG
VAS LEFT TBI: 0.37
VAS LEFT TOE PRESSURE: 44 MMHG
VAS RIGHT ABI: 1.71
VAS RIGHT ARM BP: 118 MMHG
VAS RIGHT DORSALIS PEDIS BP: 202 MMHG
VAS RIGHT PTA BP: 164 MMHG
VAS RIGHT TBI: 0.55
VAS RIGHT TOE PRESSURE: 65 MMHG

## 2025-07-25 NOTE — PROGRESS NOTES
Normal left ventricular systolic function with a visually estimated EF of 50 - 55%. Normal wall motion.    Right Ventricle: Right ventricle is dilated. Normal systolic function.    Left Atrium: Left atrium is dilated. Left atrial appendage was free of thrombus.  SHAI exit velocity was borderline at 40 cm/s.    Right Atrium: Right atrium is dilated.    Aorta: Mildly dilated aortic root. Ao root diameter is 4.4 cm. Moderately dilated ascending aorta. Ao ascending diameter is 4.3 cm. Mild atherosclerosis of the descending aorta.    Aortic Valve: Trileaflet valve. No regurgitation. No stenosis.    Mitral Valve: Valve structure is normal. Mild regurgitation with multiple jets.    Tricuspid Valve: Valve structure is normal. Trace regurgitation.    Pulmonic Valve: Valve structure is normal. Trace regurgitation.    Excellent visualization of all cardiac valves with no valvular vegetations noted.       Nuclear stress test March 2013:  Summary   There is normal isotope uptake at stress and rest. There is no evidence of   myocardial ischemia or scar.   Normal LV function.      Stress Protocols      Resting ECG   Normal sinus rhythm.   Left bundle branch block.      Resting HR:54 bpm Resting BP:152/70 mmHg      Stress Protocol:Pharmacologic - Lexiscan's      Peak HR:63 bpm HR/BP product:[id number]   Peak BP:149/68 mmHg   Predicted HR: 156 bpm   % of predicted HR: 40   Test duration: 4 min   Reason for termination:Completed      ECG Findings   Normal sinus rhythm.     8/8/23 CT chest abdomen and pelvis  IMPRESSION:  Chest   Stable chest CT.  Scattered punctate pulmonary nodules are unchanged   Severe coronary artery calcification is seen.  Aortic valve calcification is  seen.  Aortic caliber is unchanged   Abdomen and pelvis   Stable CT    Carotid Duplex Bilateral: 11/7/23  Indication: Stroke  Summary:   There is <50% stenosis of the bilateral internal carotid arteries.   Vertebral flow are antegrade bilaterally.    Cardiac cath 2006:

## 2025-07-28 ENCOUNTER — OFFICE VISIT (OUTPATIENT)
Dept: CARDIOLOGY CLINIC | Age: 75
End: 2025-07-28
Payer: MEDICARE

## 2025-07-28 VITALS
HEIGHT: 75 IN | HEART RATE: 61 BPM | SYSTOLIC BLOOD PRESSURE: 104 MMHG | WEIGHT: 167 LBS | OXYGEN SATURATION: 97 % | DIASTOLIC BLOOD PRESSURE: 58 MMHG | BODY MASS INDEX: 20.76 KG/M2

## 2025-07-28 DIAGNOSIS — I48.0 PAF (PAROXYSMAL ATRIAL FIBRILLATION) (HCC): ICD-10-CM

## 2025-07-28 DIAGNOSIS — I25.83 CORONARY ARTERY DISEASE DUE TO LIPID RICH PLAQUE: ICD-10-CM

## 2025-07-28 DIAGNOSIS — I10 ESSENTIAL HYPERTENSION: ICD-10-CM

## 2025-07-28 DIAGNOSIS — M54.50 LOW BACK PAIN, UNSPECIFIED BACK PAIN LATERALITY, UNSPECIFIED CHRONICITY, UNSPECIFIED WHETHER SCIATICA PRESENT: Primary | ICD-10-CM

## 2025-07-28 DIAGNOSIS — I25.10 CORONARY ARTERY DISEASE DUE TO LIPID RICH PLAQUE: ICD-10-CM

## 2025-07-28 DIAGNOSIS — E78.49 OTHER HYPERLIPIDEMIA: ICD-10-CM

## 2025-07-28 PROCEDURE — 1159F MED LIST DOCD IN RCRD: CPT | Performed by: INTERNAL MEDICINE

## 2025-07-28 PROCEDURE — G8427 DOCREV CUR MEDS BY ELIG CLIN: HCPCS | Performed by: INTERNAL MEDICINE

## 2025-07-28 PROCEDURE — 3074F SYST BP LT 130 MM HG: CPT | Performed by: INTERNAL MEDICINE

## 2025-07-28 PROCEDURE — 1123F ACP DISCUSS/DSCN MKR DOCD: CPT | Performed by: INTERNAL MEDICINE

## 2025-07-28 PROCEDURE — 1036F TOBACCO NON-USER: CPT | Performed by: INTERNAL MEDICINE

## 2025-07-28 PROCEDURE — G8420 CALC BMI NORM PARAMETERS: HCPCS | Performed by: INTERNAL MEDICINE

## 2025-07-28 PROCEDURE — 99214 OFFICE O/P EST MOD 30 MIN: CPT | Performed by: INTERNAL MEDICINE

## 2025-07-28 PROCEDURE — 3078F DIAST BP <80 MM HG: CPT | Performed by: INTERNAL MEDICINE

## 2025-07-28 RX ORDER — OXYCODONE AND ACETAMINOPHEN 5; 325 MG/1; MG/1
TABLET ORAL
COMMUNITY
Start: 2025-01-23

## 2025-07-28 RX ORDER — MULTIVIT-MIN/FERROUS GLUCONATE 9 MG/15 ML
15 LIQUID (ML) ORAL DAILY
COMMUNITY

## 2025-07-28 RX ORDER — FLUDROCORTISONE ACETATE 0.1 MG/1
TABLET ORAL
COMMUNITY
Start: 2025-05-25

## 2025-07-28 RX ORDER — ACETAMINOPHEN 325 MG/1
650 TABLET ORAL 3 TIMES DAILY
COMMUNITY
Start: 2025-06-01

## 2025-07-28 RX ORDER — POTASSIUM CHLORIDE 1500 MG/1
20 TABLET, EXTENDED RELEASE ORAL ONCE
COMMUNITY

## 2025-07-28 RX ORDER — MELOXICAM 15 MG/1
15 TABLET ORAL DAILY
COMMUNITY

## 2025-07-28 RX ORDER — ATORVASTATIN CALCIUM 20 MG/1
TABLET, FILM COATED ORAL
COMMUNITY
Start: 2025-05-25

## (undated) DEVICE — CAP WATER BTL AIR TBNG L 16 IN CO2 TBNG L 48 IN ENDOSCP

## (undated) DEVICE — SHOULDER STABILIZATION KIT,                                    DISPOSABLE 12 PER BOX

## (undated) DEVICE — 3M™ STERI-DRAPE™ U-DRAPE 1015: Brand: STERI-DRAPE™

## (undated) DEVICE — BLANKET WRM W29.9XL79.1IN UP BODY FORC AIR MISTRAL-AIR

## (undated) DEVICE — 3M™ STERI-DRAPE™ INCISE DRAPE 1050 (60CM X 45CM): Brand: STERI-DRAPE™

## (undated) DEVICE — UPPER EXTREMTY: Brand: MEDLINE INDUSTRIES, INC.

## (undated) DEVICE — SUTURE VCRL + SZ 2-0 L27IN ABSRB WHT SH 1/2 CIR TAPERCUT VCP417H

## (undated) DEVICE — SUTURE VICRYL + SZ 2-0 L36IN ABSRB UD L36MM CT-1 1/2 CIR VCP945H

## (undated) DEVICE — GOWN SIRUS NONREIN XL W/TWL: Brand: MEDLINE INDUSTRIES, INC.

## (undated) DEVICE — SOLUTION IRRIG 1000ML 0.9% SOD CHL USP POUR PLAS BTL

## (undated) DEVICE — SEAL

## (undated) DEVICE — GLOVE ORANGE PI 8 1/2   MSG9085

## (undated) DEVICE — 30977 SEE SHARP - ENHANCED INTRAOPERATIVE LAPAROSCOPE CLEANING & DEFOGGING: Brand: 30977 SEE SHARP - ENHANCED INTRAOPERATIVE LAPAROSCOPE CLEANING & DEFOGGING

## (undated) DEVICE — SUTURE MCRYL + SZ 4-0 L27IN ABSRB UD L19MM PS-2 3/8 CIR MCP426H

## (undated) DEVICE — HANDPIECE SET WITH HIGH FLOW TIP AND SUCTION TUBE: Brand: INTERPULSE

## (undated) DEVICE — TOTAL TRAY, DB, 100% SILI FOLEY, 16FR 10: Brand: MEDLINE

## (undated) DEVICE — SUTURE ABSORBABLE L 18 IN SZ 4-0 NDL L 19 MM POLYGLACTIN 910 36/BX

## (undated) DEVICE — COVER,MAYO STAND,STERILE: Brand: MEDLINE

## (undated) DEVICE — SUTURE PDS II SZ 0 L36IN ABSRB VLT L36MM CT-1 1/2 CIR Z346H

## (undated) DEVICE — GAUZE,SPONGE,4"X4",8PLY,STRL,LF,10/TRAY: Brand: MEDLINE

## (undated) DEVICE — SPONGE LAP W18XL18IN WHT COT 4 PLY FLD STRUNG RADPQ DISP ST

## (undated) DEVICE — SUTURE MONOCRYL + SZ 2-0 L36IN ABSRB UD CT-1 L36MM 1/2 CIR MCP945H

## (undated) DEVICE — SUTURE PDS + SZ 2 0 L27IN ABSRB VLT L36MM CT 1 1 2 CIR PDP339H

## (undated) DEVICE — ESWAB LQ AMIES  REG. NYLON FLOCKED  APPLICATOR: Brand: ESWAB™

## (undated) DEVICE — GOWN,AURORA,NONREINF,RAGLAN,XXL,STERILE: Brand: MEDLINE

## (undated) DEVICE — TIP COVER ACCESSORY

## (undated) DEVICE — STAPLER 30 RELOAD WHITE: Brand: ENDOWRIST

## (undated) DEVICE — STAPLER SHEATH: Brand: ENDOWRIST

## (undated) DEVICE — MAJOR SET UP: Brand: MEDLINE INDUSTRIES, INC.

## (undated) DEVICE — CANNULA SEAL

## (undated) DEVICE — STERILE LATEX POWDER FREE SURGICAL GLOVES WITH HYDROGEL COATING: Brand: PROTEXIS

## (undated) DEVICE — 3M™ TEGADERM™ TRANSPARENT FILM DRESSING FRAME STYLE, 1627, 4 IN X 10 IN (10 CM X 25 CM), 20/CT 4CT/CASE: Brand: 3M™ TEGADERM™

## (undated) DEVICE — INSUFFLATION NEEDLE TO ESTABLISH PNEUMOPERITONEUM.: Brand: INSUFFLATION NEEDLE

## (undated) DEVICE — SPONGE LAP W18XL18IN WHT COT 4 PLY FLD STRUNG RADPQ DISP ST 2 PER PACK

## (undated) DEVICE — PAD ABSRB W8XL10IN ABD HYDROPHOBIC NONWOVEN THCK LAYR CELOS

## (undated) DEVICE — ELECTRODE PT RET AD L9FT HI MOIST COND ADH HYDRGEL CORDED

## (undated) DEVICE — KIT EVAC 400CC DIA1/8IN H PAT 12.5IN 3 SPR RND SHP PVC DRN

## (undated) DEVICE — LIQUIBAND RAPID ADHESIVE 36/CS 0.8ML: Brand: MEDLINE

## (undated) DEVICE — PACK,SHOULDER,DRAPE,POUCH: Brand: MEDLINE

## (undated) DEVICE — SUTURE ETHILON SZ 3-0 L18IN NONABSORBABLE BLK PS-2 L19MM 3/8 1669H

## (undated) DEVICE — TUBING IRRIG COMPATIBLE W ERBE MEDIVATOR PMP HYDR

## (undated) DEVICE — SUTURE VCRL SZ 0 L36IN ABSRB UD CT-1 L36MM 1/2 CIR TAPR PNT VCP946H

## (undated) DEVICE — NEEDLE,22GX1.5",REG,BEVEL: Brand: MEDLINE

## (undated) DEVICE — TRI-LUMEN FILTERED TUBE SET WITH ACTIVATED CHARCOAL FILTER: Brand: AIRSEAL

## (undated) DEVICE — BLADE CLIPPER GEN PURP NS

## (undated) DEVICE — APPLICATOR MEDICATED 26 CC SOLUTION HI LT ORNG CHLORAPREP

## (undated) DEVICE — HYPODERMIC SAFETY NEEDLE: Brand: MAGELLAN

## (undated) DEVICE — BAG RETRIEVAL SPECIMEN SUPERBAG 15 2XL NYLON ITRODUCER

## (undated) DEVICE — ARM DRAPE

## (undated) DEVICE — 3M™ TEGADERM™ CHG CHLORHEXIDINE GLUCONATE GEL PAD 1664, 25 EACH/CARTON, 4 CARTONS/CASE: Brand: 3M™ TEGADERM™

## (undated) DEVICE — BLADELESS OBTURATOR: Brand: WECK VISTA

## (undated) DEVICE — AIR/WATER CLEANING ADAPTER FOR OLYMPUS® GI ENDOSCOPE: Brand: BULLDOG®

## (undated) DEVICE — SOLUTION IRRIG 500ML STRL H2O NONPYROGENIC

## (undated) DEVICE — STERILE POLYISOPRENE POWDER-FREE SURGICAL GLOVES: Brand: PROTEXIS

## (undated) DEVICE — SUTURE PDS + SZ 0 L27IN ABSRB VLT L26MM CT 2 1 2 CIR PDP334H

## (undated) DEVICE — CONTAINER,SPECIMEN,OR STERILE,4OZ: Brand: MEDLINE

## (undated) DEVICE — ADHESIVE SKIN CLSR 0.7ML TOP DERMBND ADV

## (undated) DEVICE — T-MAX DISPOSABLE FACE MASK 8 PER BOX

## (undated) DEVICE — ROBOTIC PK

## (undated) DEVICE — ENDOSCOPIC KIT 6X3/16 FT COLON W/ 1.1 OZ 2 GWN W/O BRSH

## (undated) DEVICE — SYRINGE MED 50ML LUERLOCK TIP

## (undated) DEVICE — PENROSE DRAIN 12" X 1/4: Brand: CARDINAL HEALTH

## (undated) DEVICE — MERCY FAIRFIELD TURNOVER KIT: Brand: MEDLINE INDUSTRIES, INC.

## (undated) DEVICE — BW-412T DISP COMBO CLEANING BRUSH: Brand: SINGLE USE COMBINATION CLEANING BRUSH

## (undated) DEVICE — DRESSING,GAUZE,XEROFORM,CURAD,5"X9",ST: Brand: CURAD

## (undated) DEVICE — AIRSEAL 8 MM ACCESS PORT AND LOW PROFILE OBTURATOR WITH BLADELESS OPTICAL TIP, 100 MM LENGTH: Brand: AIRSEAL

## (undated) DEVICE — STOCKINETTE,IMPERVIOUS,12X48,STERILE: Brand: MEDLINE

## (undated) DEVICE — TOWEL,OR,DSP,ST,BLUE,STD,4/PK,20PK/CS: Brand: MEDLINE

## (undated) DEVICE — SINGLE USE AIR/WATER, SUCTION AND BIOPSY VALVES SET: Brand: ORCAPOD™

## (undated) DEVICE — DRAPE,SPLIT ,77X120: Brand: MEDLINE

## (undated) DEVICE — SUTURE VCRL + SZ 3-0 L18IN ABSRB UD SH 1/2 CIR TAPERCUT NDL VCP864D

## (undated) DEVICE — GLOVE ORANGE PI 7 1/2   MSG9075

## (undated) DEVICE — SYRINGE MED 10ML TRNSLUC BRL PLUNG BLK MRK POLYPR CTRL

## (undated) DEVICE — REDUCER: Brand: ENDOWRIST

## (undated) DEVICE — GOWN AURORA NONREINF LG: Brand: MEDLINE INDUSTRIES, INC.

## (undated) DEVICE — DRAPE,U/ SHT,SPLIT,PLAS,STERIL: Brand: MEDLINE

## (undated) DEVICE — COLUMN DRAPE

## (undated) DEVICE — SUTURE VICRYL SZ 0 L36IN ABSRB UD CT-1 L36MM 1/2 CIR TAPR PNT VCP946H

## (undated) DEVICE — SUTURE PROL SZ 0 L30IN NONABSORBABLE BLU SH L26MM 1/2 CIR 8834H

## (undated) DEVICE — ATHLETIC SUPPORTER LATEX FREE, LARGE

## (undated) DEVICE — SHEET, T, LAPAROTOMY, STERILE: Brand: MEDLINE

## (undated) DEVICE — SUTURE ABSORBABLE MONOFILAMENT 0 CTX 60 IN VIO PDS + PDP990G

## (undated) DEVICE — MEDIUM-LARGE CLIP APPLIER: Brand: ENDOWRIST

## (undated) DEVICE — RESTRAINT EXT ANK WRST LT BLU FOAM 2 STRP SIDE BCKL HK AND

## (undated) DEVICE — CLIP INT M L POLYMER LOK LIG HEM O LOK

## (undated) DEVICE — SUTURE VICRYL + SZ 0 L27IN CT 3 ABSRB VCP329H

## (undated) DEVICE — LIGHT HANDLE: Brand: DEVON